# Patient Record
Sex: FEMALE | Race: WHITE | NOT HISPANIC OR LATINO | URBAN - METROPOLITAN AREA
[De-identification: names, ages, dates, MRNs, and addresses within clinical notes are randomized per-mention and may not be internally consistent; named-entity substitution may affect disease eponyms.]

---

## 2017-01-17 ENCOUNTER — COMMUNICATION - HEALTHEAST (OUTPATIENT)
Dept: FAMILY MEDICINE | Facility: CLINIC | Age: 82
End: 2017-01-17

## 2017-01-17 DIAGNOSIS — I10 UNSPECIFIED ESSENTIAL HYPERTENSION: ICD-10-CM

## 2017-02-01 ENCOUNTER — COMMUNICATION - HEALTHEAST (OUTPATIENT)
Dept: FAMILY MEDICINE | Facility: CLINIC | Age: 82
End: 2017-02-01

## 2017-02-27 ENCOUNTER — COMMUNICATION - HEALTHEAST (OUTPATIENT)
Dept: FAMILY MEDICINE | Facility: CLINIC | Age: 82
End: 2017-02-27

## 2017-02-27 DIAGNOSIS — E03.9 HYPOTHYROIDISM, UNSPECIFIED TYPE: ICD-10-CM

## 2017-03-01 ENCOUNTER — ANESTHESIA - HEALTHEAST (OUTPATIENT)
Dept: SURGERY | Facility: CLINIC | Age: 82
End: 2017-03-01

## 2017-03-01 ENCOUNTER — SURGERY - HEALTHEAST (OUTPATIENT)
Dept: SURGERY | Facility: CLINIC | Age: 82
End: 2017-03-01

## 2017-03-01 ASSESSMENT — MIFFLIN-ST. JEOR
SCORE: 1621.58
SCORE: 1601.62

## 2017-03-02 ASSESSMENT — MIFFLIN-ST. JEOR: SCORE: 1637.46

## 2017-03-03 ASSESSMENT — MIFFLIN-ST. JEOR: SCORE: 1655.6

## 2017-03-04 ASSESSMENT — MIFFLIN-ST. JEOR: SCORE: 1661.04

## 2017-03-05 ASSESSMENT — MIFFLIN-ST. JEOR
SCORE: 1748.59
SCORE: 1731.8

## 2017-03-06 ASSESSMENT — MIFFLIN-ST. JEOR: SCORE: 1729.54

## 2017-03-07 ENCOUNTER — OFFICE VISIT - HEALTHEAST (OUTPATIENT)
Dept: GERIATRICS | Facility: CLINIC | Age: 82
End: 2017-03-07

## 2017-03-07 ENCOUNTER — AMBULATORY - HEALTHEAST (OUTPATIENT)
Dept: GERIATRICS | Facility: CLINIC | Age: 82
End: 2017-03-07

## 2017-03-07 DIAGNOSIS — I25.10 ATHEROSCLEROSIS OF CORONARY ARTERY OF NATIVE HEART WITHOUT ANGINA PECTORIS, UNSPECIFIED VESSEL OR LESION TYPE: ICD-10-CM

## 2017-03-07 DIAGNOSIS — E66.01 MORBID OBESITY DUE TO EXCESS CALORIES (H): ICD-10-CM

## 2017-03-07 DIAGNOSIS — Z95.2 AORTIC VALVE REPLACED: ICD-10-CM

## 2017-03-07 DIAGNOSIS — N18.30 CKD (CHRONIC KIDNEY DISEASE), STAGE III (H): ICD-10-CM

## 2017-03-07 DIAGNOSIS — I48.0 PAROXYSMAL ATRIAL FIBRILLATION (H): ICD-10-CM

## 2017-03-07 DIAGNOSIS — Z98.890 S/P ORIF (OPEN REDUCTION INTERNAL FIXATION) FRACTURE: ICD-10-CM

## 2017-03-07 DIAGNOSIS — Z86.79 HISTORY OF AORTIC STENOSIS: ICD-10-CM

## 2017-03-07 DIAGNOSIS — Z95.0 CARDIAC PACEMAKER IN SITU: ICD-10-CM

## 2017-03-07 DIAGNOSIS — Z87.81 S/P ORIF (OPEN REDUCTION INTERNAL FIXATION) FRACTURE: ICD-10-CM

## 2017-03-07 DIAGNOSIS — I10 ESSENTIAL HYPERTENSION WITH GOAL BLOOD PRESSURE LESS THAN 140/90: ICD-10-CM

## 2017-03-07 DIAGNOSIS — I44.2 COMPLETE HEART BLOCK (H): ICD-10-CM

## 2017-03-07 DIAGNOSIS — S72.141D CLOSED INTERTROCHANTERIC FRACTURE OF HIP, RIGHT, WITH ROUTINE HEALING, SUBSEQUENT ENCOUNTER: ICD-10-CM

## 2017-03-07 DIAGNOSIS — J45.40 MODERATE PERSISTENT ASTHMA WITHOUT COMPLICATION: ICD-10-CM

## 2017-03-14 ENCOUNTER — OFFICE VISIT - HEALTHEAST (OUTPATIENT)
Dept: GERIATRICS | Facility: CLINIC | Age: 82
End: 2017-03-14

## 2017-03-14 DIAGNOSIS — N18.30 CKD (CHRONIC KIDNEY DISEASE), STAGE III (H): ICD-10-CM

## 2017-03-14 DIAGNOSIS — Z86.79 HISTORY OF AORTIC STENOSIS: ICD-10-CM

## 2017-03-14 DIAGNOSIS — Z87.81 S/P ORIF (OPEN REDUCTION INTERNAL FIXATION) FRACTURE: ICD-10-CM

## 2017-03-14 DIAGNOSIS — I10 ESSENTIAL HYPERTENSION WITH GOAL BLOOD PRESSURE LESS THAN 140/90: ICD-10-CM

## 2017-03-14 DIAGNOSIS — I44.2 COMPLETE HEART BLOCK (H): ICD-10-CM

## 2017-03-14 DIAGNOSIS — I48.0 PAROXYSMAL ATRIAL FIBRILLATION (H): ICD-10-CM

## 2017-03-14 DIAGNOSIS — Z95.2 AORTIC VALVE REPLACED: ICD-10-CM

## 2017-03-14 DIAGNOSIS — G25.81 RESTLESS LEGS SYNDROME (RLS): ICD-10-CM

## 2017-03-14 DIAGNOSIS — J45.40 MODERATE PERSISTENT ASTHMA WITHOUT COMPLICATION: ICD-10-CM

## 2017-03-14 DIAGNOSIS — E66.01 MORBID OBESITY DUE TO EXCESS CALORIES (H): ICD-10-CM

## 2017-03-14 DIAGNOSIS — Z95.0 CARDIAC PACEMAKER IN SITU: ICD-10-CM

## 2017-03-14 DIAGNOSIS — Z98.890 S/P ORIF (OPEN REDUCTION INTERNAL FIXATION) FRACTURE: ICD-10-CM

## 2017-03-14 DIAGNOSIS — E78.00 PURE HYPERCHOLESTEROLEMIA: ICD-10-CM

## 2017-03-14 DIAGNOSIS — S72.141D CLOSED INTERTROCHANTERIC FRACTURE OF HIP, RIGHT, WITH ROUTINE HEALING, SUBSEQUENT ENCOUNTER: ICD-10-CM

## 2017-03-14 DIAGNOSIS — E55.9 VITAMIN D DEFICIENCY: ICD-10-CM

## 2017-03-16 ENCOUNTER — OFFICE VISIT - HEALTHEAST (OUTPATIENT)
Dept: GERIATRICS | Facility: CLINIC | Age: 82
End: 2017-03-16

## 2017-03-16 ENCOUNTER — AMBULATORY - HEALTHEAST (OUTPATIENT)
Dept: CARDIOLOGY | Facility: CLINIC | Age: 82
End: 2017-03-16

## 2017-03-16 DIAGNOSIS — G25.81 RESTLESS LEGS SYNDROME (RLS): ICD-10-CM

## 2017-03-16 DIAGNOSIS — I48.0 PAROXYSMAL ATRIAL FIBRILLATION (H): ICD-10-CM

## 2017-03-16 DIAGNOSIS — Z98.890 S/P ORIF (OPEN REDUCTION INTERNAL FIXATION) FRACTURE: ICD-10-CM

## 2017-03-16 DIAGNOSIS — Z87.81 S/P ORIF (OPEN REDUCTION INTERNAL FIXATION) FRACTURE: ICD-10-CM

## 2017-03-16 DIAGNOSIS — Z95.0 PACEMAKER: ICD-10-CM

## 2017-03-16 DIAGNOSIS — R06.02 SHORTNESS OF BREATH: ICD-10-CM

## 2017-03-21 ENCOUNTER — OFFICE VISIT - HEALTHEAST (OUTPATIENT)
Dept: GERIATRICS | Facility: CLINIC | Age: 82
End: 2017-03-21

## 2017-03-21 DIAGNOSIS — I44.2 COMPLETE HEART BLOCK (H): ICD-10-CM

## 2017-03-21 DIAGNOSIS — Z87.81 S/P ORIF (OPEN REDUCTION INTERNAL FIXATION) FRACTURE: ICD-10-CM

## 2017-03-21 DIAGNOSIS — S72.141D CLOSED INTERTROCHANTERIC FRACTURE OF HIP, RIGHT, WITH ROUTINE HEALING, SUBSEQUENT ENCOUNTER: ICD-10-CM

## 2017-03-21 DIAGNOSIS — E66.01 MORBID OBESITY DUE TO EXCESS CALORIES (H): ICD-10-CM

## 2017-03-21 DIAGNOSIS — G25.81 RESTLESS LEGS SYNDROME (RLS): ICD-10-CM

## 2017-03-21 DIAGNOSIS — Z95.2 AORTIC VALVE REPLACED: ICD-10-CM

## 2017-03-21 DIAGNOSIS — I48.0 PAROXYSMAL ATRIAL FIBRILLATION (H): ICD-10-CM

## 2017-03-21 DIAGNOSIS — E55.9 VITAMIN D DEFICIENCY: ICD-10-CM

## 2017-03-21 DIAGNOSIS — E78.00 PURE HYPERCHOLESTEROLEMIA: ICD-10-CM

## 2017-03-21 DIAGNOSIS — J45.40 MODERATE PERSISTENT ASTHMA WITHOUT COMPLICATION: ICD-10-CM

## 2017-03-21 DIAGNOSIS — Z20.828 EXPOSURE TO INFLUENZA: ICD-10-CM

## 2017-03-21 DIAGNOSIS — Z95.0 CARDIAC PACEMAKER IN SITU: ICD-10-CM

## 2017-03-21 DIAGNOSIS — E87.79 OTHER HYPERVOLEMIA: ICD-10-CM

## 2017-03-21 DIAGNOSIS — I10 ESSENTIAL HYPERTENSION WITH GOAL BLOOD PRESSURE LESS THAN 140/90: ICD-10-CM

## 2017-03-21 DIAGNOSIS — Z98.890 S/P ORIF (OPEN REDUCTION INTERNAL FIXATION) FRACTURE: ICD-10-CM

## 2017-03-21 DIAGNOSIS — N18.30 CKD (CHRONIC KIDNEY DISEASE), STAGE III (H): ICD-10-CM

## 2017-03-21 DIAGNOSIS — Z86.79 HISTORY OF AORTIC STENOSIS: ICD-10-CM

## 2017-03-23 ENCOUNTER — OFFICE VISIT - HEALTHEAST (OUTPATIENT)
Dept: GERIATRICS | Facility: CLINIC | Age: 82
End: 2017-03-23

## 2017-03-23 DIAGNOSIS — I44.2 COMPLETE HEART BLOCK (H): ICD-10-CM

## 2017-03-23 DIAGNOSIS — Z95.0 CARDIAC PACEMAKER IN SITU: ICD-10-CM

## 2017-03-23 DIAGNOSIS — Z20.828 EXPOSURE TO INFLUENZA: ICD-10-CM

## 2017-03-23 DIAGNOSIS — S72.141D CLOSED INTERTROCHANTERIC FRACTURE OF HIP, RIGHT, WITH ROUTINE HEALING, SUBSEQUENT ENCOUNTER: ICD-10-CM

## 2017-03-23 DIAGNOSIS — Z87.81 S/P ORIF (OPEN REDUCTION INTERNAL FIXATION) FRACTURE: ICD-10-CM

## 2017-03-23 DIAGNOSIS — E66.01 MORBID OBESITY DUE TO EXCESS CALORIES (H): ICD-10-CM

## 2017-03-23 DIAGNOSIS — Z86.79 HISTORY OF AORTIC STENOSIS: ICD-10-CM

## 2017-03-23 DIAGNOSIS — I48.0 PAROXYSMAL ATRIAL FIBRILLATION (H): ICD-10-CM

## 2017-03-23 DIAGNOSIS — Z95.2 AORTIC VALVE REPLACED: ICD-10-CM

## 2017-03-23 DIAGNOSIS — J45.40 MODERATE PERSISTENT ASTHMA WITHOUT COMPLICATION: ICD-10-CM

## 2017-03-23 DIAGNOSIS — I10 ESSENTIAL HYPERTENSION WITH GOAL BLOOD PRESSURE LESS THAN 140/90: ICD-10-CM

## 2017-03-23 DIAGNOSIS — G25.81 RESTLESS LEGS SYNDROME (RLS): ICD-10-CM

## 2017-03-23 DIAGNOSIS — N18.30 CKD (CHRONIC KIDNEY DISEASE), STAGE III (H): ICD-10-CM

## 2017-03-23 DIAGNOSIS — E55.9 VITAMIN D DEFICIENCY: ICD-10-CM

## 2017-03-23 DIAGNOSIS — E78.00 PURE HYPERCHOLESTEROLEMIA: ICD-10-CM

## 2017-03-23 DIAGNOSIS — Z98.890 S/P ORIF (OPEN REDUCTION INTERNAL FIXATION) FRACTURE: ICD-10-CM

## 2017-03-26 ENCOUNTER — OFFICE VISIT - HEALTHEAST (OUTPATIENT)
Dept: GERIATRICS | Facility: CLINIC | Age: 82
End: 2017-03-26

## 2017-03-26 DIAGNOSIS — I10 ESSENTIAL HYPERTENSION WITH GOAL BLOOD PRESSURE LESS THAN 140/90: ICD-10-CM

## 2017-03-26 DIAGNOSIS — J45.909 ASTHMA: ICD-10-CM

## 2017-03-26 DIAGNOSIS — S72.141D CLOSED INTERTROCHANTERIC FRACTURE OF HIP, RIGHT, WITH ROUTINE HEALING, SUBSEQUENT ENCOUNTER: ICD-10-CM

## 2017-03-26 DIAGNOSIS — E66.01 MORBID OBESITY DUE TO EXCESS CALORIES (H): ICD-10-CM

## 2017-03-30 ENCOUNTER — OFFICE VISIT - HEALTHEAST (OUTPATIENT)
Dept: GERIATRICS | Facility: CLINIC | Age: 82
End: 2017-03-30

## 2017-03-30 DIAGNOSIS — E66.01 MORBID OBESITY DUE TO EXCESS CALORIES (H): ICD-10-CM

## 2017-03-30 DIAGNOSIS — Z87.81 S/P ORIF (OPEN REDUCTION INTERNAL FIXATION) FRACTURE: ICD-10-CM

## 2017-03-30 DIAGNOSIS — E87.79 OTHER HYPERVOLEMIA: ICD-10-CM

## 2017-03-30 DIAGNOSIS — Z86.79 HISTORY OF AORTIC STENOSIS: ICD-10-CM

## 2017-03-30 DIAGNOSIS — I48.0 PAROXYSMAL ATRIAL FIBRILLATION (H): ICD-10-CM

## 2017-03-30 DIAGNOSIS — B35.4 TINEA CORPORIS: ICD-10-CM

## 2017-03-30 DIAGNOSIS — I25.10 ATHEROSCLEROSIS OF CORONARY ARTERY OF NATIVE HEART WITHOUT ANGINA PECTORIS, UNSPECIFIED VESSEL OR LESION TYPE: ICD-10-CM

## 2017-03-30 DIAGNOSIS — Z95.0 CARDIAC PACEMAKER IN SITU: ICD-10-CM

## 2017-03-30 DIAGNOSIS — J45.40 MODERATE PERSISTENT ASTHMA WITHOUT COMPLICATION: ICD-10-CM

## 2017-03-30 DIAGNOSIS — Z95.2 AORTIC VALVE REPLACED: ICD-10-CM

## 2017-03-30 DIAGNOSIS — S72.141D CLOSED INTERTROCHANTERIC FRACTURE OF HIP, RIGHT, WITH ROUTINE HEALING, SUBSEQUENT ENCOUNTER: ICD-10-CM

## 2017-03-30 DIAGNOSIS — Z98.890 S/P ORIF (OPEN REDUCTION INTERNAL FIXATION) FRACTURE: ICD-10-CM

## 2017-03-30 DIAGNOSIS — E55.9 VITAMIN D DEFICIENCY: ICD-10-CM

## 2017-03-30 DIAGNOSIS — Z20.828 EXPOSURE TO INFLUENZA: ICD-10-CM

## 2017-03-30 DIAGNOSIS — E78.00 PURE HYPERCHOLESTEROLEMIA: ICD-10-CM

## 2017-03-30 DIAGNOSIS — I10 ESSENTIAL HYPERTENSION WITH GOAL BLOOD PRESSURE LESS THAN 140/90: ICD-10-CM

## 2017-03-30 DIAGNOSIS — I44.2 COMPLETE HEART BLOCK (H): ICD-10-CM

## 2017-03-30 DIAGNOSIS — G25.81 RESTLESS LEGS SYNDROME (RLS): ICD-10-CM

## 2017-03-30 DIAGNOSIS — N18.30 CKD (CHRONIC KIDNEY DISEASE), STAGE III (H): ICD-10-CM

## 2017-04-04 ENCOUNTER — OFFICE VISIT - HEALTHEAST (OUTPATIENT)
Dept: GERIATRICS | Facility: CLINIC | Age: 82
End: 2017-04-04

## 2017-04-04 DIAGNOSIS — Z87.81 S/P ORIF (OPEN REDUCTION INTERNAL FIXATION) FRACTURE: ICD-10-CM

## 2017-04-04 DIAGNOSIS — E87.70 FLUID OVERLOAD: ICD-10-CM

## 2017-04-04 DIAGNOSIS — S72.141D CLOSED INTERTROCHANTERIC FRACTURE OF HIP, RIGHT, WITH ROUTINE HEALING, SUBSEQUENT ENCOUNTER: ICD-10-CM

## 2017-04-04 DIAGNOSIS — Z95.2 AORTIC VALVE REPLACED: ICD-10-CM

## 2017-04-04 DIAGNOSIS — Z98.890 S/P ORIF (OPEN REDUCTION INTERNAL FIXATION) FRACTURE: ICD-10-CM

## 2017-04-04 DIAGNOSIS — J45.909 ASTHMA: ICD-10-CM

## 2017-04-04 DIAGNOSIS — J44.9 COPD (CHRONIC OBSTRUCTIVE PULMONARY DISEASE) (H): ICD-10-CM

## 2017-04-04 DIAGNOSIS — E66.01 MORBID OBESITY DUE TO EXCESS CALORIES (H): ICD-10-CM

## 2017-04-04 DIAGNOSIS — I48.0 PAROXYSMAL ATRIAL FIBRILLATION (H): ICD-10-CM

## 2017-04-04 DIAGNOSIS — I10 ESSENTIAL HYPERTENSION WITH GOAL BLOOD PRESSURE LESS THAN 140/90: ICD-10-CM

## 2017-04-04 DIAGNOSIS — I25.10 ATHEROSCLEROSIS OF CORONARY ARTERY OF NATIVE HEART WITHOUT ANGINA PECTORIS, UNSPECIFIED VESSEL OR LESION TYPE: ICD-10-CM

## 2017-04-04 DIAGNOSIS — N18.30 CKD (CHRONIC KIDNEY DISEASE), STAGE III (H): ICD-10-CM

## 2017-04-04 DIAGNOSIS — Z20.828 EXPOSURE TO INFLUENZA: ICD-10-CM

## 2017-04-04 DIAGNOSIS — Z95.0 CARDIAC PACEMAKER IN SITU: ICD-10-CM

## 2017-04-06 ENCOUNTER — OFFICE VISIT - HEALTHEAST (OUTPATIENT)
Dept: GERIATRICS | Facility: CLINIC | Age: 82
End: 2017-04-06

## 2017-04-06 DIAGNOSIS — G25.81 RESTLESS LEGS SYNDROME (RLS): ICD-10-CM

## 2017-04-06 DIAGNOSIS — Z87.81 S/P ORIF (OPEN REDUCTION INTERNAL FIXATION) FRACTURE: ICD-10-CM

## 2017-04-06 DIAGNOSIS — Z95.0 CARDIAC PACEMAKER IN SITU: ICD-10-CM

## 2017-04-06 DIAGNOSIS — Z95.2 AORTIC VALVE REPLACED: ICD-10-CM

## 2017-04-06 DIAGNOSIS — I10 ESSENTIAL HYPERTENSION WITH GOAL BLOOD PRESSURE LESS THAN 140/90: ICD-10-CM

## 2017-04-06 DIAGNOSIS — E66.01 MORBID OBESITY DUE TO EXCESS CALORIES (H): ICD-10-CM

## 2017-04-06 DIAGNOSIS — Z98.890 S/P ORIF (OPEN REDUCTION INTERNAL FIXATION) FRACTURE: ICD-10-CM

## 2017-04-06 DIAGNOSIS — E55.9 VITAMIN D DEFICIENCY: ICD-10-CM

## 2017-04-06 DIAGNOSIS — I25.10 ATHEROSCLEROSIS OF CORONARY ARTERY OF NATIVE HEART WITHOUT ANGINA PECTORIS, UNSPECIFIED VESSEL OR LESION TYPE: ICD-10-CM

## 2017-04-06 DIAGNOSIS — Z86.79 HISTORY OF AORTIC STENOSIS: ICD-10-CM

## 2017-04-06 DIAGNOSIS — B35.4 TINEA CORPORIS: ICD-10-CM

## 2017-04-06 DIAGNOSIS — N18.30 CKD (CHRONIC KIDNEY DISEASE), STAGE III (H): ICD-10-CM

## 2017-04-06 DIAGNOSIS — I44.2 COMPLETE HEART BLOCK (H): ICD-10-CM

## 2017-04-06 DIAGNOSIS — I48.0 PAROXYSMAL ATRIAL FIBRILLATION (H): ICD-10-CM

## 2017-04-06 DIAGNOSIS — S72.141D CLOSED INTERTROCHANTERIC FRACTURE OF HIP, RIGHT, WITH ROUTINE HEALING, SUBSEQUENT ENCOUNTER: ICD-10-CM

## 2017-04-11 ENCOUNTER — OFFICE VISIT - HEALTHEAST (OUTPATIENT)
Dept: GERIATRICS | Facility: CLINIC | Age: 82
End: 2017-04-11

## 2017-04-11 DIAGNOSIS — S72.141D CLOSED INTERTROCHANTERIC FRACTURE OF HIP, RIGHT, WITH ROUTINE HEALING, SUBSEQUENT ENCOUNTER: ICD-10-CM

## 2017-04-11 DIAGNOSIS — I25.10 ATHEROSCLEROSIS OF CORONARY ARTERY OF NATIVE HEART WITHOUT ANGINA PECTORIS, UNSPECIFIED VESSEL OR LESION TYPE: ICD-10-CM

## 2017-04-11 DIAGNOSIS — Z86.79 HISTORY OF AORTIC STENOSIS: ICD-10-CM

## 2017-04-11 DIAGNOSIS — I48.0 PAROXYSMAL ATRIAL FIBRILLATION (H): ICD-10-CM

## 2017-04-11 DIAGNOSIS — Z87.81 S/P ORIF (OPEN REDUCTION INTERNAL FIXATION) FRACTURE: ICD-10-CM

## 2017-04-11 DIAGNOSIS — I44.2 COMPLETE HEART BLOCK (H): ICD-10-CM

## 2017-04-11 DIAGNOSIS — B35.4 TINEA CORPORIS: ICD-10-CM

## 2017-04-11 DIAGNOSIS — E55.9 VITAMIN D DEFICIENCY: ICD-10-CM

## 2017-04-11 DIAGNOSIS — G25.81 RESTLESS LEGS SYNDROME (RLS): ICD-10-CM

## 2017-04-11 DIAGNOSIS — E66.01 MORBID OBESITY DUE TO EXCESS CALORIES (H): ICD-10-CM

## 2017-04-11 DIAGNOSIS — Z95.0 CARDIAC PACEMAKER IN SITU: ICD-10-CM

## 2017-04-11 DIAGNOSIS — Z98.890 S/P ORIF (OPEN REDUCTION INTERNAL FIXATION) FRACTURE: ICD-10-CM

## 2017-04-11 DIAGNOSIS — Z95.2 AORTIC VALVE REPLACED: ICD-10-CM

## 2017-04-11 DIAGNOSIS — I10 ESSENTIAL HYPERTENSION WITH GOAL BLOOD PRESSURE LESS THAN 140/90: ICD-10-CM

## 2017-04-11 DIAGNOSIS — N18.30 CKD (CHRONIC KIDNEY DISEASE), STAGE III (H): ICD-10-CM

## 2017-04-13 ENCOUNTER — OFFICE VISIT - HEALTHEAST (OUTPATIENT)
Dept: GERIATRICS | Facility: CLINIC | Age: 82
End: 2017-04-13

## 2017-04-13 DIAGNOSIS — E66.01 MORBID OBESITY DUE TO EXCESS CALORIES (H): ICD-10-CM

## 2017-04-13 DIAGNOSIS — J45.909 ASTHMA: ICD-10-CM

## 2017-04-13 DIAGNOSIS — I48.0 PAROXYSMAL ATRIAL FIBRILLATION (H): ICD-10-CM

## 2017-04-13 DIAGNOSIS — E87.70 FLUID OVERLOAD: ICD-10-CM

## 2017-04-13 DIAGNOSIS — J44.9 COPD (CHRONIC OBSTRUCTIVE PULMONARY DISEASE) (H): ICD-10-CM

## 2017-04-13 DIAGNOSIS — Z95.2 AORTIC VALVE REPLACED: ICD-10-CM

## 2017-04-13 DIAGNOSIS — N18.30 CKD (CHRONIC KIDNEY DISEASE), STAGE III (H): ICD-10-CM

## 2017-04-13 DIAGNOSIS — S72.141D CLOSED INTERTROCHANTERIC FRACTURE OF HIP, RIGHT, WITH ROUTINE HEALING, SUBSEQUENT ENCOUNTER: ICD-10-CM

## 2017-04-13 DIAGNOSIS — Z98.890 S/P ORIF (OPEN REDUCTION INTERNAL FIXATION) FRACTURE: ICD-10-CM

## 2017-04-13 DIAGNOSIS — I10 ESSENTIAL HYPERTENSION WITH GOAL BLOOD PRESSURE LESS THAN 140/90: ICD-10-CM

## 2017-04-13 DIAGNOSIS — Z87.81 S/P ORIF (OPEN REDUCTION INTERNAL FIXATION) FRACTURE: ICD-10-CM

## 2017-04-18 ENCOUNTER — OFFICE VISIT - HEALTHEAST (OUTPATIENT)
Dept: GERIATRICS | Facility: CLINIC | Age: 82
End: 2017-04-18

## 2017-04-18 DIAGNOSIS — Z95.0 CARDIAC PACEMAKER IN SITU: ICD-10-CM

## 2017-04-18 DIAGNOSIS — N18.30 CKD (CHRONIC KIDNEY DISEASE), STAGE III (H): ICD-10-CM

## 2017-04-18 DIAGNOSIS — E66.01 MORBID OBESITY DUE TO EXCESS CALORIES (H): ICD-10-CM

## 2017-04-18 DIAGNOSIS — I10 ESSENTIAL HYPERTENSION WITH GOAL BLOOD PRESSURE LESS THAN 140/90: ICD-10-CM

## 2017-04-18 DIAGNOSIS — E55.9 VITAMIN D DEFICIENCY: ICD-10-CM

## 2017-04-18 DIAGNOSIS — Z87.81 S/P ORIF (OPEN REDUCTION INTERNAL FIXATION) FRACTURE: ICD-10-CM

## 2017-04-18 DIAGNOSIS — Z95.2 AORTIC VALVE REPLACED: ICD-10-CM

## 2017-04-18 DIAGNOSIS — I48.0 PAROXYSMAL ATRIAL FIBRILLATION (H): ICD-10-CM

## 2017-04-18 DIAGNOSIS — Z86.79 HISTORY OF AORTIC STENOSIS: ICD-10-CM

## 2017-04-18 DIAGNOSIS — I25.10 ATHEROSCLEROSIS OF CORONARY ARTERY OF NATIVE HEART WITHOUT ANGINA PECTORIS, UNSPECIFIED VESSEL OR LESION TYPE: ICD-10-CM

## 2017-04-18 DIAGNOSIS — Z98.890 S/P ORIF (OPEN REDUCTION INTERNAL FIXATION) FRACTURE: ICD-10-CM

## 2017-04-18 DIAGNOSIS — G25.81 RESTLESS LEGS SYNDROME (RLS): ICD-10-CM

## 2017-04-18 DIAGNOSIS — B35.4 TINEA CORPORIS: ICD-10-CM

## 2017-04-18 DIAGNOSIS — S72.141D CLOSED INTERTROCHANTERIC FRACTURE OF HIP, RIGHT, WITH ROUTINE HEALING, SUBSEQUENT ENCOUNTER: ICD-10-CM

## 2017-04-18 DIAGNOSIS — I44.2 COMPLETE HEART BLOCK (H): ICD-10-CM

## 2017-04-20 ENCOUNTER — OFFICE VISIT - HEALTHEAST (OUTPATIENT)
Dept: GERIATRICS | Facility: CLINIC | Age: 82
End: 2017-04-20

## 2017-04-20 DIAGNOSIS — S72.141D CLOSED INTERTROCHANTERIC FRACTURE OF HIP, RIGHT, WITH ROUTINE HEALING, SUBSEQUENT ENCOUNTER: ICD-10-CM

## 2017-04-20 DIAGNOSIS — J44.9 COPD (CHRONIC OBSTRUCTIVE PULMONARY DISEASE) (H): ICD-10-CM

## 2017-04-20 DIAGNOSIS — J45.909 ASTHMA: ICD-10-CM

## 2017-04-20 DIAGNOSIS — E66.01 MORBID OBESITY DUE TO EXCESS CALORIES (H): ICD-10-CM

## 2017-04-20 DIAGNOSIS — I48.0 PAROXYSMAL ATRIAL FIBRILLATION (H): ICD-10-CM

## 2017-04-20 DIAGNOSIS — Z98.890 S/P ORIF (OPEN REDUCTION INTERNAL FIXATION) FRACTURE: ICD-10-CM

## 2017-04-20 DIAGNOSIS — Z87.81 S/P ORIF (OPEN REDUCTION INTERNAL FIXATION) FRACTURE: ICD-10-CM

## 2017-04-20 DIAGNOSIS — E87.70 FLUID OVERLOAD: ICD-10-CM

## 2017-04-24 ENCOUNTER — RECORDS - HEALTHEAST (OUTPATIENT)
Dept: ADMINISTRATIVE | Facility: OTHER | Age: 82
End: 2017-04-24

## 2017-04-25 ENCOUNTER — AMBULATORY - HEALTHEAST (OUTPATIENT)
Dept: GERIATRICS | Facility: CLINIC | Age: 82
End: 2017-04-25

## 2017-04-25 ENCOUNTER — OFFICE VISIT - HEALTHEAST (OUTPATIENT)
Dept: GERIATRICS | Facility: CLINIC | Age: 82
End: 2017-04-25

## 2017-04-25 DIAGNOSIS — Z87.81 S/P ORIF (OPEN REDUCTION INTERNAL FIXATION) FRACTURE: ICD-10-CM

## 2017-04-25 DIAGNOSIS — E55.9 VITAMIN D DEFICIENCY: ICD-10-CM

## 2017-04-25 DIAGNOSIS — B35.4 TINEA CORPORIS: ICD-10-CM

## 2017-04-25 DIAGNOSIS — I44.2 COMPLETE HEART BLOCK (H): ICD-10-CM

## 2017-04-25 DIAGNOSIS — I48.0 PAROXYSMAL ATRIAL FIBRILLATION (H): ICD-10-CM

## 2017-04-25 DIAGNOSIS — E66.01 MORBID OBESITY DUE TO EXCESS CALORIES (H): ICD-10-CM

## 2017-04-25 DIAGNOSIS — Z98.890 S/P ORIF (OPEN REDUCTION INTERNAL FIXATION) FRACTURE: ICD-10-CM

## 2017-04-25 DIAGNOSIS — Z95.0 CARDIAC PACEMAKER IN SITU: ICD-10-CM

## 2017-04-25 DIAGNOSIS — I25.10 ATHEROSCLEROSIS OF CORONARY ARTERY OF NATIVE HEART WITHOUT ANGINA PECTORIS, UNSPECIFIED VESSEL OR LESION TYPE: ICD-10-CM

## 2017-04-25 DIAGNOSIS — N18.30 CKD (CHRONIC KIDNEY DISEASE), STAGE III (H): ICD-10-CM

## 2017-04-25 DIAGNOSIS — S72.141D CLOSED INTERTROCHANTERIC FRACTURE OF HIP, RIGHT, WITH ROUTINE HEALING, SUBSEQUENT ENCOUNTER: ICD-10-CM

## 2017-04-25 DIAGNOSIS — Z95.3 H/O HEART VALVE REPLACEMENT WITH BIOPROSTHETIC VALVE: ICD-10-CM

## 2017-04-25 DIAGNOSIS — I10 ESSENTIAL HYPERTENSION WITH GOAL BLOOD PRESSURE LESS THAN 140/90: ICD-10-CM

## 2017-04-25 DIAGNOSIS — Z86.79 HISTORY OF AORTIC STENOSIS: ICD-10-CM

## 2017-05-08 ENCOUNTER — AMBULATORY - HEALTHEAST (OUTPATIENT)
Dept: GERIATRICS | Facility: CLINIC | Age: 82
End: 2017-05-08

## 2017-05-09 ENCOUNTER — COMMUNICATION - HEALTHEAST (OUTPATIENT)
Dept: FAMILY MEDICINE | Facility: CLINIC | Age: 82
End: 2017-05-09

## 2017-05-09 ENCOUNTER — COMMUNICATION - HEALTHEAST (OUTPATIENT)
Dept: GERIATRICS | Facility: CLINIC | Age: 82
End: 2017-05-09

## 2017-05-10 ENCOUNTER — COMMUNICATION - HEALTHEAST (OUTPATIENT)
Dept: FAMILY MEDICINE | Facility: CLINIC | Age: 82
End: 2017-05-10

## 2017-05-16 ENCOUNTER — COMMUNICATION - HEALTHEAST (OUTPATIENT)
Dept: FAMILY MEDICINE | Facility: CLINIC | Age: 82
End: 2017-05-16

## 2017-05-19 ENCOUNTER — COMMUNICATION - HEALTHEAST (OUTPATIENT)
Dept: FAMILY MEDICINE | Facility: CLINIC | Age: 82
End: 2017-05-19

## 2017-05-30 ENCOUNTER — COMMUNICATION - HEALTHEAST (OUTPATIENT)
Dept: FAMILY MEDICINE | Facility: CLINIC | Age: 82
End: 2017-05-30

## 2017-06-01 ENCOUNTER — OFFICE VISIT - HEALTHEAST (OUTPATIENT)
Dept: FAMILY MEDICINE | Facility: CLINIC | Age: 82
End: 2017-06-01

## 2017-06-01 DIAGNOSIS — D64.9 NORMOCHROMIC NORMOCYTIC ANEMIA: ICD-10-CM

## 2017-06-01 DIAGNOSIS — E03.9 HYPOTHYROIDISM: ICD-10-CM

## 2017-06-01 DIAGNOSIS — L30.4 INTERTRIGO: ICD-10-CM

## 2017-06-01 DIAGNOSIS — Z95.3 H/O HEART VALVE REPLACEMENT WITH BIOPROSTHETIC VALVE: ICD-10-CM

## 2017-06-01 DIAGNOSIS — E11.8 TYPE 2 DIABETES MELLITUS WITH COMPLICATION, WITHOUT LONG-TERM CURRENT USE OF INSULIN (H): ICD-10-CM

## 2017-06-01 DIAGNOSIS — J45.40 MODERATE PERSISTENT ASTHMA WITHOUT COMPLICATION: ICD-10-CM

## 2017-06-01 DIAGNOSIS — E55.9 VITAMIN D DEFICIENCY: ICD-10-CM

## 2017-06-01 DIAGNOSIS — I10 ESSENTIAL HYPERTENSION WITH GOAL BLOOD PRESSURE LESS THAN 140/90: ICD-10-CM

## 2017-06-01 DIAGNOSIS — I48.0 PAROXYSMAL ATRIAL FIBRILLATION (H): ICD-10-CM

## 2017-06-01 DIAGNOSIS — E21.3 HYPERPARATHYROIDISM, UNSPECIFIED (H): ICD-10-CM

## 2017-06-01 DIAGNOSIS — E11.9 TYPE 2 DIABETES MELLITUS (H): ICD-10-CM

## 2017-06-01 DIAGNOSIS — N18.4 CHRONIC KIDNEY DISEASE (CKD), STAGE 4 (SEVERE): ICD-10-CM

## 2017-06-01 DIAGNOSIS — R60.9 EDEMA: ICD-10-CM

## 2017-06-01 LAB — HBA1C MFR BLD: 7.3 % (ref 3.5–6)

## 2017-06-05 ENCOUNTER — COMMUNICATION - HEALTHEAST (OUTPATIENT)
Dept: FAMILY MEDICINE | Facility: CLINIC | Age: 82
End: 2017-06-05

## 2017-06-08 ENCOUNTER — OFFICE VISIT - HEALTHEAST (OUTPATIENT)
Dept: FAMILY MEDICINE | Facility: CLINIC | Age: 82
End: 2017-06-08

## 2017-06-08 DIAGNOSIS — R60.9 EDEMA: ICD-10-CM

## 2017-06-08 DIAGNOSIS — E03.9 HYPOTHYROIDISM, UNSPECIFIED TYPE: ICD-10-CM

## 2017-06-08 DIAGNOSIS — N18.30 CHRONIC KIDNEY DISEASE, STAGE III (MODERATE) (H): ICD-10-CM

## 2017-06-08 DIAGNOSIS — I25.10 ATHEROSCLEROSIS OF CORONARY ARTERY OF NATIVE HEART WITHOUT ANGINA PECTORIS, UNSPECIFIED VESSEL OR LESION TYPE: ICD-10-CM

## 2017-06-08 DIAGNOSIS — I10 ESSENTIAL HYPERTENSION WITH GOAL BLOOD PRESSURE LESS THAN 140/90: ICD-10-CM

## 2017-06-09 ENCOUNTER — COMMUNICATION - HEALTHEAST (OUTPATIENT)
Dept: FAMILY MEDICINE | Facility: CLINIC | Age: 82
End: 2017-06-09

## 2017-06-20 ENCOUNTER — COMMUNICATION - HEALTHEAST (OUTPATIENT)
Dept: FAMILY MEDICINE | Facility: CLINIC | Age: 82
End: 2017-06-20

## 2017-06-21 ENCOUNTER — OFFICE VISIT - HEALTHEAST (OUTPATIENT)
Dept: FAMILY MEDICINE | Facility: CLINIC | Age: 82
End: 2017-06-21

## 2017-06-21 DIAGNOSIS — N18.30 CHRONIC KIDNEY DISEASE, STAGE III (MODERATE) (H): ICD-10-CM

## 2017-06-21 DIAGNOSIS — E78.5 HYPERLIPIDEMIA: ICD-10-CM

## 2017-06-21 DIAGNOSIS — I10 ESSENTIAL HYPERTENSION WITH GOAL BLOOD PRESSURE LESS THAN 140/90: ICD-10-CM

## 2017-06-21 DIAGNOSIS — R60.9 EDEMA, UNSPECIFIED TYPE: ICD-10-CM

## 2017-06-22 ENCOUNTER — COMMUNICATION - HEALTHEAST (OUTPATIENT)
Dept: FAMILY MEDICINE | Facility: CLINIC | Age: 82
End: 2017-06-22

## 2017-06-23 ENCOUNTER — COMMUNICATION - HEALTHEAST (OUTPATIENT)
Dept: FAMILY MEDICINE | Facility: CLINIC | Age: 82
End: 2017-06-23

## 2017-06-27 ENCOUNTER — COMMUNICATION - HEALTHEAST (OUTPATIENT)
Dept: FAMILY MEDICINE | Facility: CLINIC | Age: 82
End: 2017-06-27

## 2017-07-07 ENCOUNTER — COMMUNICATION - HEALTHEAST (OUTPATIENT)
Dept: SCHEDULING | Facility: CLINIC | Age: 82
End: 2017-07-07

## 2017-07-11 ENCOUNTER — OFFICE VISIT - HEALTHEAST (OUTPATIENT)
Dept: FAMILY MEDICINE | Facility: CLINIC | Age: 82
End: 2017-07-11

## 2017-07-11 DIAGNOSIS — N18.4 CHRONIC KIDNEY DISEASE (CKD), STAGE 4 (SEVERE): ICD-10-CM

## 2017-07-11 DIAGNOSIS — R60.9 EDEMA, UNSPECIFIED TYPE: ICD-10-CM

## 2017-07-11 DIAGNOSIS — I95.9 HYPOTENSION: ICD-10-CM

## 2017-07-11 DIAGNOSIS — I10 ESSENTIAL HYPERTENSION: ICD-10-CM

## 2017-07-11 DIAGNOSIS — D64.9 NORMOCHROMIC NORMOCYTIC ANEMIA: ICD-10-CM

## 2017-07-12 ENCOUNTER — COMMUNICATION - HEALTHEAST (OUTPATIENT)
Dept: FAMILY MEDICINE | Facility: CLINIC | Age: 82
End: 2017-07-12

## 2017-07-12 ENCOUNTER — AMBULATORY - HEALTHEAST (OUTPATIENT)
Dept: FAMILY MEDICINE | Facility: CLINIC | Age: 82
End: 2017-07-12

## 2017-07-12 DIAGNOSIS — E87.6 HYPOKALEMIA: ICD-10-CM

## 2017-07-14 ENCOUNTER — COMMUNICATION - HEALTHEAST (OUTPATIENT)
Dept: FAMILY MEDICINE | Facility: CLINIC | Age: 82
End: 2017-07-14

## 2017-07-17 ENCOUNTER — COMMUNICATION - HEALTHEAST (OUTPATIENT)
Dept: FAMILY MEDICINE | Facility: CLINIC | Age: 82
End: 2017-07-17

## 2017-07-18 ENCOUNTER — OFFICE VISIT - HEALTHEAST (OUTPATIENT)
Dept: FAMILY MEDICINE | Facility: CLINIC | Age: 82
End: 2017-07-18

## 2017-07-18 ENCOUNTER — COMMUNICATION - HEALTHEAST (OUTPATIENT)
Dept: NURSING | Facility: CLINIC | Age: 82
End: 2017-07-18

## 2017-07-18 DIAGNOSIS — R29.898 LEG WEAKNESS, BILATERAL: ICD-10-CM

## 2017-07-18 DIAGNOSIS — R60.0 LOCALIZED EDEMA: ICD-10-CM

## 2017-07-18 DIAGNOSIS — S81.801A LEG WOUND, RIGHT: ICD-10-CM

## 2017-07-18 DIAGNOSIS — L03.115 CELLULITIS OF RIGHT LOWER EXTREMITY: ICD-10-CM

## 2017-07-19 ENCOUNTER — AMBULATORY - HEALTHEAST (OUTPATIENT)
Dept: CARE COORDINATION | Facility: CLINIC | Age: 82
End: 2017-07-19

## 2017-07-20 ENCOUNTER — AMBULATORY - HEALTHEAST (OUTPATIENT)
Dept: FAMILY MEDICINE | Facility: CLINIC | Age: 82
End: 2017-07-20

## 2017-07-20 DIAGNOSIS — S81.809A MULTIPLE OPEN WOUNDS OF LOWER LEG: ICD-10-CM

## 2017-07-24 ENCOUNTER — COMMUNICATION - HEALTHEAST (OUTPATIENT)
Dept: FAMILY MEDICINE | Facility: CLINIC | Age: 82
End: 2017-07-24

## 2017-07-25 ENCOUNTER — OFFICE VISIT - HEALTHEAST (OUTPATIENT)
Dept: FAMILY MEDICINE | Facility: CLINIC | Age: 82
End: 2017-07-25

## 2017-07-25 ENCOUNTER — COMMUNICATION - HEALTHEAST (OUTPATIENT)
Dept: FAMILY MEDICINE | Facility: CLINIC | Age: 82
End: 2017-07-25

## 2017-07-25 ENCOUNTER — COMMUNICATION - HEALTHEAST (OUTPATIENT)
Dept: CARE COORDINATION | Facility: CLINIC | Age: 82
End: 2017-07-25

## 2017-07-25 ENCOUNTER — AMBULATORY - HEALTHEAST (OUTPATIENT)
Dept: FAMILY MEDICINE | Facility: CLINIC | Age: 82
End: 2017-07-25

## 2017-07-25 DIAGNOSIS — M54.2 NECK PAIN: ICD-10-CM

## 2017-07-25 DIAGNOSIS — E87.6 HYPOKALEMIA: ICD-10-CM

## 2017-07-25 DIAGNOSIS — S81.801D LEG WOUND, RIGHT, SUBSEQUENT ENCOUNTER: ICD-10-CM

## 2017-07-25 DIAGNOSIS — N18.4 CHRONIC KIDNEY DISEASE (CKD), STAGE 4 (SEVERE): ICD-10-CM

## 2017-07-25 DIAGNOSIS — R60.9 EDEMA, UNSPECIFIED TYPE: ICD-10-CM

## 2017-07-26 ENCOUNTER — COMMUNICATION - HEALTHEAST (OUTPATIENT)
Dept: FAMILY MEDICINE | Facility: CLINIC | Age: 82
End: 2017-07-26

## 2017-07-28 ENCOUNTER — COMMUNICATION - HEALTHEAST (OUTPATIENT)
Dept: OBGYN | Facility: HOSPITAL | Age: 82
End: 2017-07-28

## 2017-07-31 ENCOUNTER — RECORDS - HEALTHEAST (OUTPATIENT)
Dept: ADMINISTRATIVE | Facility: OTHER | Age: 82
End: 2017-07-31

## 2017-08-10 ENCOUNTER — AMBULATORY - HEALTHEAST (OUTPATIENT)
Dept: CARDIOLOGY | Facility: CLINIC | Age: 82
End: 2017-08-10

## 2017-08-10 DIAGNOSIS — Z95.0 PACEMAKER: ICD-10-CM

## 2017-08-10 LAB — HCC DEVICE COMMENTS: NORMAL

## 2017-08-11 ENCOUNTER — COMMUNICATION - HEALTHEAST (OUTPATIENT)
Dept: FAMILY MEDICINE | Facility: CLINIC | Age: 82
End: 2017-08-11

## 2017-08-17 ENCOUNTER — COMMUNICATION - HEALTHEAST (OUTPATIENT)
Dept: NURSING | Facility: CLINIC | Age: 82
End: 2017-08-17

## 2017-08-20 ENCOUNTER — RECORDS - HEALTHEAST (OUTPATIENT)
Dept: ADMINISTRATIVE | Facility: OTHER | Age: 82
End: 2017-08-20

## 2017-08-21 ENCOUNTER — COMMUNICATION - HEALTHEAST (OUTPATIENT)
Dept: FAMILY MEDICINE | Facility: CLINIC | Age: 82
End: 2017-08-21

## 2017-08-21 DIAGNOSIS — K31.89 GASTRIC ACIDITY: ICD-10-CM

## 2017-08-21 DIAGNOSIS — E03.9 HYPOTHYROIDISM, UNSPECIFIED TYPE: ICD-10-CM

## 2017-08-24 ENCOUNTER — OFFICE VISIT - HEALTHEAST (OUTPATIENT)
Dept: FAMILY MEDICINE | Facility: CLINIC | Age: 82
End: 2017-08-24

## 2017-08-24 ENCOUNTER — COMMUNICATION - HEALTHEAST (OUTPATIENT)
Dept: NURSING | Facility: CLINIC | Age: 82
End: 2017-08-24

## 2017-08-24 DIAGNOSIS — E87.6 HYPOKALEMIA: ICD-10-CM

## 2017-08-24 DIAGNOSIS — N18.30 CKD (CHRONIC KIDNEY DISEASE), STAGE III (H): ICD-10-CM

## 2017-08-24 DIAGNOSIS — I10 ESSENTIAL HYPERTENSION WITH GOAL BLOOD PRESSURE LESS THAN 140/90: ICD-10-CM

## 2017-08-24 DIAGNOSIS — I25.10 ATHEROSCLEROSIS OF CORONARY ARTERY OF NATIVE HEART WITHOUT ANGINA PECTORIS, UNSPECIFIED VESSEL OR LESION TYPE: ICD-10-CM

## 2017-08-24 DIAGNOSIS — R60.9 EDEMA, UNSPECIFIED TYPE: ICD-10-CM

## 2017-08-24 DIAGNOSIS — S81.801D LEG WOUND, RIGHT, SUBSEQUENT ENCOUNTER: ICD-10-CM

## 2017-08-24 DIAGNOSIS — N18.4 CHRONIC KIDNEY DISEASE (CKD), STAGE 4 (SEVERE): ICD-10-CM

## 2017-08-24 ASSESSMENT — MIFFLIN-ST. JEOR: SCORE: 1556.55

## 2017-08-25 ENCOUNTER — COMMUNICATION - HEALTHEAST (OUTPATIENT)
Dept: FAMILY MEDICINE | Facility: CLINIC | Age: 82
End: 2017-08-25

## 2017-09-05 ENCOUNTER — COMMUNICATION - HEALTHEAST (OUTPATIENT)
Dept: NURSING | Facility: CLINIC | Age: 82
End: 2017-09-05

## 2017-09-05 DIAGNOSIS — R60.0 PERIPHERAL EDEMA: ICD-10-CM

## 2017-09-05 DIAGNOSIS — R27.0 ATAXIA: ICD-10-CM

## 2017-09-11 ENCOUNTER — COMMUNICATION - HEALTHEAST (OUTPATIENT)
Dept: NURSING | Facility: CLINIC | Age: 82
End: 2017-09-11

## 2017-09-12 ENCOUNTER — AMBULATORY - HEALTHEAST (OUTPATIENT)
Dept: CARDIOLOGY | Facility: CLINIC | Age: 82
End: 2017-09-12

## 2017-09-12 DIAGNOSIS — Z95.0 CARDIAC PACEMAKER IN SITU: ICD-10-CM

## 2017-09-12 LAB — HCC DEVICE COMMENTS: NORMAL

## 2017-09-12 ASSESSMENT — MIFFLIN-ST. JEOR: SCORE: 1615.23

## 2017-09-20 ENCOUNTER — COMMUNICATION - HEALTHEAST (OUTPATIENT)
Dept: FAMILY MEDICINE | Facility: CLINIC | Age: 82
End: 2017-09-20

## 2017-09-21 ENCOUNTER — COMMUNICATION - HEALTHEAST (OUTPATIENT)
Dept: NURSING | Facility: CLINIC | Age: 82
End: 2017-09-21

## 2017-09-28 ENCOUNTER — COMMUNICATION - HEALTHEAST (OUTPATIENT)
Dept: NURSING | Facility: CLINIC | Age: 82
End: 2017-09-28

## 2017-09-29 ENCOUNTER — COMMUNICATION - HEALTHEAST (OUTPATIENT)
Dept: NURSING | Facility: CLINIC | Age: 82
End: 2017-09-29

## 2017-10-04 ENCOUNTER — OFFICE VISIT - HEALTHEAST (OUTPATIENT)
Dept: FAMILY MEDICINE | Facility: CLINIC | Age: 82
End: 2017-10-04

## 2017-10-04 DIAGNOSIS — Z23 NEED FOR IMMUNIZATION AGAINST INFLUENZA: ICD-10-CM

## 2017-10-04 DIAGNOSIS — J45.40 MODERATE PERSISTENT ASTHMA: ICD-10-CM

## 2017-10-04 DIAGNOSIS — D64.9 NORMOCHROMIC NORMOCYTIC ANEMIA: ICD-10-CM

## 2017-10-04 DIAGNOSIS — E11.9 TYPE 2 DIABETES MELLITUS (H): ICD-10-CM

## 2017-10-04 DIAGNOSIS — N18.30 CHRONIC KIDNEY DISEASE, STAGE III (MODERATE) (H): ICD-10-CM

## 2017-10-04 DIAGNOSIS — R60.9 EDEMA, UNSPECIFIED TYPE: ICD-10-CM

## 2017-10-04 DIAGNOSIS — I10 ESSENTIAL HYPERTENSION WITH GOAL BLOOD PRESSURE LESS THAN 140/90: ICD-10-CM

## 2017-10-04 LAB — HBA1C MFR BLD: 7.2 % (ref 3.5–6)

## 2017-10-05 ENCOUNTER — COMMUNICATION - HEALTHEAST (OUTPATIENT)
Dept: FAMILY MEDICINE | Facility: CLINIC | Age: 82
End: 2017-10-05

## 2017-10-12 ENCOUNTER — AMBULATORY - HEALTHEAST (OUTPATIENT)
Dept: CARDIOLOGY | Facility: CLINIC | Age: 82
End: 2017-10-12

## 2017-10-12 DIAGNOSIS — Z95.0 CARDIAC PACEMAKER IN SITU: ICD-10-CM

## 2017-10-12 LAB — HCC DEVICE COMMENTS: NORMAL

## 2017-11-16 ENCOUNTER — AMBULATORY - HEALTHEAST (OUTPATIENT)
Dept: CARDIOLOGY | Facility: CLINIC | Age: 82
End: 2017-11-16

## 2017-11-16 DIAGNOSIS — Z95.0 CARDIAC PACEMAKER IN SITU: ICD-10-CM

## 2017-11-16 LAB — HCC DEVICE COMMENTS: NORMAL

## 2017-11-17 ENCOUNTER — OFFICE VISIT - HEALTHEAST (OUTPATIENT)
Dept: FAMILY MEDICINE | Facility: CLINIC | Age: 82
End: 2017-11-17

## 2017-11-17 DIAGNOSIS — B35.1 ONYCHOMYCOSIS: ICD-10-CM

## 2017-11-17 DIAGNOSIS — D64.9 NORMOCHROMIC NORMOCYTIC ANEMIA: ICD-10-CM

## 2017-11-17 DIAGNOSIS — I10 ESSENTIAL HYPERTENSION WITH GOAL BLOOD PRESSURE LESS THAN 140/90: ICD-10-CM

## 2017-11-17 DIAGNOSIS — I25.10 ATHEROSCLEROSIS OF CORONARY ARTERY OF NATIVE HEART WITHOUT ANGINA PECTORIS, UNSPECIFIED VESSEL OR LESION TYPE: ICD-10-CM

## 2017-11-17 DIAGNOSIS — R60.9 EDEMA, UNSPECIFIED TYPE: ICD-10-CM

## 2017-11-17 DIAGNOSIS — N18.30 CHRONIC KIDNEY DISEASE, STAGE III (MODERATE) (H): ICD-10-CM

## 2017-11-17 DIAGNOSIS — E11.8 TYPE 2 DIABETES MELLITUS WITH COMPLICATION, WITHOUT LONG-TERM CURRENT USE OF INSULIN (H): ICD-10-CM

## 2017-11-18 ENCOUNTER — COMMUNICATION - HEALTHEAST (OUTPATIENT)
Dept: FAMILY MEDICINE | Facility: CLINIC | Age: 82
End: 2017-11-18

## 2017-11-22 ENCOUNTER — COMMUNICATION - HEALTHEAST (OUTPATIENT)
Dept: FAMILY MEDICINE | Facility: CLINIC | Age: 82
End: 2017-11-22

## 2017-11-30 ENCOUNTER — RECORDS - HEALTHEAST (OUTPATIENT)
Dept: ADMINISTRATIVE | Facility: OTHER | Age: 82
End: 2017-11-30

## 2017-12-21 ENCOUNTER — AMBULATORY - HEALTHEAST (OUTPATIENT)
Dept: CARDIOLOGY | Facility: CLINIC | Age: 82
End: 2017-12-21

## 2017-12-21 DIAGNOSIS — Z95.0 CARDIAC PACEMAKER IN SITU: ICD-10-CM

## 2017-12-22 ENCOUNTER — AMBULATORY - HEALTHEAST (OUTPATIENT)
Dept: CARDIOLOGY | Facility: CLINIC | Age: 82
End: 2017-12-22

## 2017-12-22 ENCOUNTER — SURGERY - HEALTHEAST (OUTPATIENT)
Dept: CARDIOLOGY | Facility: CLINIC | Age: 82
End: 2017-12-22

## 2017-12-22 DIAGNOSIS — I44.2 COMPLETE HEART BLOCK (H): ICD-10-CM

## 2017-12-22 DIAGNOSIS — Z45.010 PACEMAKER BATTERY DEPLETION: ICD-10-CM

## 2017-12-22 LAB — HCC DEVICE COMMENTS: NORMAL

## 2017-12-23 ENCOUNTER — COMMUNICATION - HEALTHEAST (OUTPATIENT)
Dept: FAMILY MEDICINE | Facility: CLINIC | Age: 82
End: 2017-12-23

## 2017-12-23 DIAGNOSIS — I10 HTN (HYPERTENSION): ICD-10-CM

## 2017-12-28 ENCOUNTER — COMMUNICATION - HEALTHEAST (OUTPATIENT)
Dept: CARDIOLOGY | Facility: CLINIC | Age: 82
End: 2017-12-28

## 2018-01-02 ENCOUNTER — COMMUNICATION - HEALTHEAST (OUTPATIENT)
Dept: NURSING | Facility: CLINIC | Age: 83
End: 2018-01-02

## 2018-01-04 ENCOUNTER — COMMUNICATION - HEALTHEAST (OUTPATIENT)
Dept: FAMILY MEDICINE | Facility: CLINIC | Age: 83
End: 2018-01-04

## 2018-01-09 ENCOUNTER — COMMUNICATION - HEALTHEAST (OUTPATIENT)
Dept: FAMILY MEDICINE | Facility: CLINIC | Age: 83
End: 2018-01-09

## 2018-01-12 ENCOUNTER — COMMUNICATION - HEALTHEAST (OUTPATIENT)
Dept: CARDIOLOGY | Facility: CLINIC | Age: 83
End: 2018-01-12

## 2018-01-22 ENCOUNTER — COMMUNICATION - HEALTHEAST (OUTPATIENT)
Dept: FAMILY MEDICINE | Facility: CLINIC | Age: 83
End: 2018-01-22

## 2018-01-23 ENCOUNTER — COMMUNICATION - HEALTHEAST (OUTPATIENT)
Dept: FAMILY MEDICINE | Facility: CLINIC | Age: 83
End: 2018-01-23

## 2018-01-24 ENCOUNTER — COMMUNICATION - HEALTHEAST (OUTPATIENT)
Dept: FAMILY MEDICINE | Facility: CLINIC | Age: 83
End: 2018-01-24

## 2018-01-31 ENCOUNTER — OFFICE VISIT - HEALTHEAST (OUTPATIENT)
Dept: FAMILY MEDICINE | Facility: CLINIC | Age: 83
End: 2018-01-31

## 2018-01-31 DIAGNOSIS — Z01.810 PREOP CARDIOVASCULAR EXAM: ICD-10-CM

## 2018-01-31 DIAGNOSIS — N18.30 CHRONIC KIDNEY DISEASE, STAGE III (MODERATE) (H): ICD-10-CM

## 2018-01-31 DIAGNOSIS — I44.2 COMPLETE HEART BLOCK (H): ICD-10-CM

## 2018-01-31 DIAGNOSIS — J45.40 MODERATE PERSISTENT ASTHMA WITHOUT COMPLICATION: ICD-10-CM

## 2018-01-31 DIAGNOSIS — E11.8 TYPE 2 DIABETES MELLITUS WITH COMPLICATION, WITHOUT LONG-TERM CURRENT USE OF INSULIN (H): ICD-10-CM

## 2018-01-31 DIAGNOSIS — D64.9 NORMOCHROMIC NORMOCYTIC ANEMIA: ICD-10-CM

## 2018-01-31 DIAGNOSIS — I25.10 ATHEROSCLEROSIS OF CORONARY ARTERY OF NATIVE HEART WITHOUT ANGINA PECTORIS, UNSPECIFIED VESSEL OR LESION TYPE: ICD-10-CM

## 2018-01-31 DIAGNOSIS — I10 ESSENTIAL HYPERTENSION WITH GOAL BLOOD PRESSURE LESS THAN 140/90: ICD-10-CM

## 2018-01-31 LAB
ANION GAP SERPL CALCULATED.3IONS-SCNC: 9 MMOL/L (ref 5–18)
ATRIAL RATE - MUSE: NORMAL BPM
BUN SERPL-MCNC: 31 MG/DL (ref 8–28)
CALCIUM SERPL-MCNC: 9.8 MG/DL (ref 8.5–10.5)
CHLORIDE BLD-SCNC: 100 MMOL/L (ref 98–107)
CO2 SERPL-SCNC: 32 MMOL/L (ref 22–31)
CREAT SERPL-MCNC: 1.27 MG/DL (ref 0.6–1.1)
DIASTOLIC BLOOD PRESSURE - MUSE: NORMAL MMHG
ERYTHROCYTE [DISTWIDTH] IN BLOOD BY AUTOMATED COUNT: 14.6 % (ref 11–14.5)
GFR SERPL CREATININE-BSD FRML MDRD: 40 ML/MIN/1.73M2
GLUCOSE BLD-MCNC: 110 MG/DL (ref 70–125)
HBA1C MFR BLD: 6.9 % (ref 3.5–6)
HCT VFR BLD AUTO: 32.1 % (ref 35–47)
HGB BLD-MCNC: 10.2 G/DL (ref 12–16)
INTERPRETATION ECG - MUSE: NORMAL
MCH RBC QN AUTO: 28.3 PG (ref 27–34)
MCHC RBC AUTO-ENTMCNC: 31.8 G/DL (ref 32–36)
MCV RBC AUTO: 89 FL (ref 80–100)
P AXIS - MUSE: NORMAL DEGREES
PLATELET # BLD AUTO: 178 THOU/UL (ref 140–440)
PMV BLD AUTO: 7.7 FL (ref 7–10)
POTASSIUM BLD-SCNC: 4.6 MMOL/L (ref 3.5–5)
PR INTERVAL - MUSE: NORMAL MS
QRS DURATION - MUSE: 180 MS
QT - MUSE: 568 MS
QTC - MUSE: 568 MS
R AXIS - MUSE: 158 DEGREES
RBC # BLD AUTO: 3.61 MILL/UL (ref 3.8–5.4)
SODIUM SERPL-SCNC: 141 MMOL/L (ref 136–145)
SYSTOLIC BLOOD PRESSURE - MUSE: NORMAL MMHG
T AXIS - MUSE: 165 DEGREES
VENTRICULAR RATE- MUSE: 60 BPM
WBC: 5 THOU/UL (ref 4–11)

## 2018-01-31 ASSESSMENT — MIFFLIN-ST. JEOR: SCORE: 1610.69

## 2018-02-01 ENCOUNTER — COMMUNICATION - HEALTHEAST (OUTPATIENT)
Dept: FAMILY MEDICINE | Facility: CLINIC | Age: 83
End: 2018-02-01

## 2018-02-07 ENCOUNTER — COMMUNICATION - HEALTHEAST (OUTPATIENT)
Dept: CARDIOLOGY | Facility: CLINIC | Age: 83
End: 2018-02-07

## 2018-02-07 ASSESSMENT — MIFFLIN-ST. JEOR
SCORE: 1654.63
SCORE: 1610.69

## 2018-02-09 ENCOUNTER — COMMUNICATION - HEALTHEAST (OUTPATIENT)
Dept: CARDIOLOGY | Facility: CLINIC | Age: 83
End: 2018-02-09

## 2018-02-10 ASSESSMENT — MIFFLIN-ST. JEOR: SCORE: 1650.63

## 2018-02-11 ASSESSMENT — MIFFLIN-ST. JEOR: SCORE: 1706.63

## 2018-02-12 ENCOUNTER — SURGERY - HEALTHEAST (OUTPATIENT)
Dept: CARDIOLOGY | Facility: CLINIC | Age: 83
End: 2018-02-12

## 2018-02-13 ASSESSMENT — MIFFLIN-ST. JEOR: SCORE: 1692.63

## 2018-02-14 ENCOUNTER — RECORDS - HEALTHEAST (OUTPATIENT)
Dept: ADMINISTRATIVE | Facility: OTHER | Age: 83
End: 2018-02-14

## 2018-02-14 ENCOUNTER — COMMUNICATION - HEALTHEAST (OUTPATIENT)
Dept: FAMILY MEDICINE | Facility: CLINIC | Age: 83
End: 2018-02-14

## 2018-02-15 ENCOUNTER — OFFICE VISIT - HEALTHEAST (OUTPATIENT)
Dept: GERIATRICS | Facility: CLINIC | Age: 83
End: 2018-02-15

## 2018-02-15 DIAGNOSIS — E55.9 VITAMIN D DEFICIENCY: ICD-10-CM

## 2018-02-15 DIAGNOSIS — E03.9 HYPOTHYROIDISM, UNSPECIFIED TYPE: ICD-10-CM

## 2018-02-15 DIAGNOSIS — D64.9 NORMOCHROMIC NORMOCYTIC ANEMIA: ICD-10-CM

## 2018-02-15 DIAGNOSIS — L03.115 CELLULITIS OF RIGHT LOWER EXTREMITY: ICD-10-CM

## 2018-02-15 DIAGNOSIS — N18.30 CKD (CHRONIC KIDNEY DISEASE), STAGE III (H): ICD-10-CM

## 2018-02-15 DIAGNOSIS — J45.40 MODERATE PERSISTENT ASTHMA WITHOUT COMPLICATION: ICD-10-CM

## 2018-02-15 DIAGNOSIS — I25.10 ATHEROSCLEROSIS OF NATIVE CORONARY ARTERY OF NATIVE HEART WITHOUT ANGINA PECTORIS: ICD-10-CM

## 2018-02-15 DIAGNOSIS — I10 ESSENTIAL HYPERTENSION WITH GOAL BLOOD PRESSURE LESS THAN 140/90: ICD-10-CM

## 2018-02-15 DIAGNOSIS — F51.01 PRIMARY INSOMNIA: ICD-10-CM

## 2018-02-15 DIAGNOSIS — E78.2 MIXED HYPERLIPIDEMIA: ICD-10-CM

## 2018-02-15 DIAGNOSIS — K21.9 GASTROESOPHAGEAL REFLUX DISEASE WITHOUT ESOPHAGITIS: ICD-10-CM

## 2018-02-15 DIAGNOSIS — I50.31 ACUTE DIASTOLIC CHF (CONGESTIVE HEART FAILURE) (H): ICD-10-CM

## 2018-02-15 DIAGNOSIS — E87.6 HYPOKALEMIA: ICD-10-CM

## 2018-02-15 DIAGNOSIS — I48.0 PAF (PAROXYSMAL ATRIAL FIBRILLATION) (H): ICD-10-CM

## 2018-02-15 DIAGNOSIS — Z95.0 CARDIAC PACEMAKER IN SITU: ICD-10-CM

## 2018-02-16 ENCOUNTER — OFFICE VISIT - HEALTHEAST (OUTPATIENT)
Dept: GERIATRICS | Facility: CLINIC | Age: 83
End: 2018-02-16

## 2018-02-16 DIAGNOSIS — T14.8XXA OPEN WOUND: ICD-10-CM

## 2018-02-16 DIAGNOSIS — D64.9 NORMOCHROMIC NORMOCYTIC ANEMIA: ICD-10-CM

## 2018-02-16 DIAGNOSIS — L03.115 CELLULITIS OF RIGHT LOWER EXTREMITY: ICD-10-CM

## 2018-02-16 DIAGNOSIS — N18.30 TYPE 2 DIABETES MELLITUS WITH STAGE 3 CHRONIC KIDNEY DISEASE, WITHOUT LONG-TERM CURRENT USE OF INSULIN (H): ICD-10-CM

## 2018-02-16 DIAGNOSIS — N18.30 CKD (CHRONIC KIDNEY DISEASE), STAGE III (H): ICD-10-CM

## 2018-02-16 DIAGNOSIS — E03.9 HYPOTHYROIDISM, UNSPECIFIED TYPE: ICD-10-CM

## 2018-02-16 DIAGNOSIS — E66.01 MORBID OBESITY DUE TO EXCESS CALORIES (H): ICD-10-CM

## 2018-02-16 DIAGNOSIS — J45.40 MODERATE PERSISTENT ASTHMA WITHOUT COMPLICATION: ICD-10-CM

## 2018-02-16 DIAGNOSIS — E11.22 TYPE 2 DIABETES MELLITUS WITH STAGE 3 CHRONIC KIDNEY DISEASE, WITHOUT LONG-TERM CURRENT USE OF INSULIN (H): ICD-10-CM

## 2018-02-17 ENCOUNTER — RECORDS - HEALTHEAST (OUTPATIENT)
Dept: LAB | Facility: CLINIC | Age: 83
End: 2018-02-17

## 2018-02-19 ENCOUNTER — OFFICE VISIT - HEALTHEAST (OUTPATIENT)
Dept: GERIATRICS | Facility: CLINIC | Age: 83
End: 2018-02-19

## 2018-02-19 DIAGNOSIS — Z95.3 H/O HEART VALVE REPLACEMENT WITH BIOPROSTHETIC VALVE: ICD-10-CM

## 2018-02-19 DIAGNOSIS — E11.22 TYPE 2 DIABETES MELLITUS WITH STAGE 3 CHRONIC KIDNEY DISEASE, WITHOUT LONG-TERM CURRENT USE OF INSULIN (H): ICD-10-CM

## 2018-02-19 DIAGNOSIS — T14.8XXA OPEN WOUND: ICD-10-CM

## 2018-02-19 DIAGNOSIS — I48.21 PERMANENT ATRIAL FIBRILLATION (H): ICD-10-CM

## 2018-02-19 DIAGNOSIS — F51.01 PRIMARY INSOMNIA: ICD-10-CM

## 2018-02-19 DIAGNOSIS — I10 ESSENTIAL HYPERTENSION WITH GOAL BLOOD PRESSURE LESS THAN 140/90: ICD-10-CM

## 2018-02-19 DIAGNOSIS — N18.30 TYPE 2 DIABETES MELLITUS WITH STAGE 3 CHRONIC KIDNEY DISEASE, WITHOUT LONG-TERM CURRENT USE OF INSULIN (H): ICD-10-CM

## 2018-02-19 DIAGNOSIS — N18.30 CKD (CHRONIC KIDNEY DISEASE), STAGE III (H): ICD-10-CM

## 2018-02-19 DIAGNOSIS — J45.40 MODERATE PERSISTENT ASTHMA WITHOUT COMPLICATION: ICD-10-CM

## 2018-02-19 DIAGNOSIS — I50.31 ACUTE DIASTOLIC CHF (CONGESTIVE HEART FAILURE) (H): ICD-10-CM

## 2018-02-19 DIAGNOSIS — E03.9 HYPOTHYROIDISM, UNSPECIFIED TYPE: ICD-10-CM

## 2018-02-19 DIAGNOSIS — L03.115 CELLULITIS OF RIGHT LOWER EXTREMITY: ICD-10-CM

## 2018-02-19 LAB
25(OH)D3 SERPL-MCNC: 56.7 NG/ML (ref 30–80)
ANION GAP SERPL CALCULATED.3IONS-SCNC: 10 MMOL/L (ref 5–18)
BUN SERPL-MCNC: 33 MG/DL (ref 8–28)
CALCIUM SERPL-MCNC: 9.7 MG/DL (ref 8.5–10.5)
CHLORIDE BLD-SCNC: 90 MMOL/L (ref 98–107)
CO2 SERPL-SCNC: 36 MMOL/L (ref 22–31)
CREAT SERPL-MCNC: 1.74 MG/DL (ref 0.6–1.1)
ERYTHROCYTE [DISTWIDTH] IN BLOOD BY AUTOMATED COUNT: 15.6 % (ref 11–14.5)
GFR SERPL CREATININE-BSD FRML MDRD: 28 ML/MIN/1.73M2
GLUCOSE BLD-MCNC: 125 MG/DL (ref 70–125)
HCT VFR BLD AUTO: 32.4 % (ref 35–47)
HGB BLD-MCNC: 9.6 G/DL (ref 12–16)
MCH RBC QN AUTO: 27.9 PG (ref 27–34)
MCHC RBC AUTO-ENTMCNC: 29.6 G/DL (ref 32–36)
MCV RBC AUTO: 94 FL (ref 80–100)
PLATELET # BLD AUTO: 320 THOU/UL (ref 140–440)
PMV BLD AUTO: 9.8 FL (ref 8.5–12.5)
POTASSIUM BLD-SCNC: 4.5 MMOL/L (ref 3.5–5)
RBC # BLD AUTO: 3.44 MILL/UL (ref 3.8–5.4)
SODIUM SERPL-SCNC: 136 MMOL/L (ref 136–145)
WBC: 8.5 THOU/UL (ref 4–11)

## 2018-02-20 ENCOUNTER — OFFICE VISIT - HEALTHEAST (OUTPATIENT)
Dept: GERIATRICS | Facility: CLINIC | Age: 83
End: 2018-02-20

## 2018-02-20 DIAGNOSIS — Z95.0 CARDIAC PACEMAKER IN SITU: ICD-10-CM

## 2018-02-20 DIAGNOSIS — N18.30 CKD (CHRONIC KIDNEY DISEASE), STAGE III (H): ICD-10-CM

## 2018-02-20 DIAGNOSIS — L03.115 CELLULITIS OF RIGHT LOWER EXTREMITY: ICD-10-CM

## 2018-02-20 DIAGNOSIS — I50.30 HEART FAILURE WITH PRESERVED EJECTION FRACTION (H): ICD-10-CM

## 2018-02-22 ENCOUNTER — OFFICE VISIT - HEALTHEAST (OUTPATIENT)
Dept: CARDIOLOGY | Facility: CLINIC | Age: 83
End: 2018-02-22

## 2018-02-22 ENCOUNTER — OFFICE VISIT - HEALTHEAST (OUTPATIENT)
Dept: GERIATRICS | Facility: CLINIC | Age: 83
End: 2018-02-22

## 2018-02-22 DIAGNOSIS — T14.8XXA OPEN WOUND: ICD-10-CM

## 2018-02-22 DIAGNOSIS — E11.22 TYPE 2 DIABETES MELLITUS WITH STAGE 3 CHRONIC KIDNEY DISEASE, WITHOUT LONG-TERM CURRENT USE OF INSULIN (H): ICD-10-CM

## 2018-02-22 DIAGNOSIS — E87.6 HYPOKALEMIA: ICD-10-CM

## 2018-02-22 DIAGNOSIS — N18.30 TYPE 2 DIABETES MELLITUS WITH STAGE 3 CHRONIC KIDNEY DISEASE, WITHOUT LONG-TERM CURRENT USE OF INSULIN (H): ICD-10-CM

## 2018-02-22 DIAGNOSIS — N18.30 CKD (CHRONIC KIDNEY DISEASE), STAGE III (H): ICD-10-CM

## 2018-02-22 DIAGNOSIS — Z95.3 H/O HEART VALVE REPLACEMENT WITH BIOPROSTHETIC VALVE: ICD-10-CM

## 2018-02-22 DIAGNOSIS — I10 ESSENTIAL HYPERTENSION WITH GOAL BLOOD PRESSURE LESS THAN 140/90: ICD-10-CM

## 2018-02-22 DIAGNOSIS — J45.40 MODERATE PERSISTENT ASTHMA WITHOUT COMPLICATION: ICD-10-CM

## 2018-02-22 DIAGNOSIS — I25.10 ATHEROSCLEROSIS OF NATIVE CORONARY ARTERY OF NATIVE HEART WITHOUT ANGINA PECTORIS: ICD-10-CM

## 2018-02-22 DIAGNOSIS — E03.9 HYPOTHYROIDISM, UNSPECIFIED TYPE: ICD-10-CM

## 2018-02-22 DIAGNOSIS — Z95.0 CARDIAC PACEMAKER IN SITU: ICD-10-CM

## 2018-02-22 DIAGNOSIS — I50.30 HEART FAILURE WITH PRESERVED EJECTION FRACTION (H): ICD-10-CM

## 2018-02-22 DIAGNOSIS — L03.115 CELLULITIS OF RIGHT LOWER EXTREMITY: ICD-10-CM

## 2018-02-23 ENCOUNTER — RECORDS - HEALTHEAST (OUTPATIENT)
Dept: LAB | Facility: CLINIC | Age: 83
End: 2018-02-23

## 2018-02-26 ENCOUNTER — OFFICE VISIT - HEALTHEAST (OUTPATIENT)
Dept: GERIATRICS | Facility: CLINIC | Age: 83
End: 2018-02-26

## 2018-02-26 DIAGNOSIS — K21.9 GASTROESOPHAGEAL REFLUX DISEASE WITHOUT ESOPHAGITIS: ICD-10-CM

## 2018-02-26 DIAGNOSIS — D64.9 NORMOCHROMIC NORMOCYTIC ANEMIA: ICD-10-CM

## 2018-02-26 DIAGNOSIS — Z95.0 CARDIAC PACEMAKER IN SITU: ICD-10-CM

## 2018-02-26 DIAGNOSIS — L03.115 CELLULITIS OF RIGHT LOWER EXTREMITY: ICD-10-CM

## 2018-02-26 DIAGNOSIS — J45.40 MODERATE PERSISTENT ASTHMA WITHOUT COMPLICATION: ICD-10-CM

## 2018-02-26 DIAGNOSIS — I48.0 PAF (PAROXYSMAL ATRIAL FIBRILLATION) (H): ICD-10-CM

## 2018-02-26 DIAGNOSIS — I10 ESSENTIAL HYPERTENSION WITH GOAL BLOOD PRESSURE LESS THAN 140/90: ICD-10-CM

## 2018-02-26 DIAGNOSIS — E03.9 HYPOTHYROIDISM, UNSPECIFIED TYPE: ICD-10-CM

## 2018-02-26 DIAGNOSIS — R53.81 PHYSICAL DECONDITIONING: ICD-10-CM

## 2018-02-26 DIAGNOSIS — T14.8XXA OPEN WOUND: ICD-10-CM

## 2018-02-26 DIAGNOSIS — I50.30 HEART FAILURE WITH PRESERVED EJECTION FRACTION (H): ICD-10-CM

## 2018-02-26 LAB
ANION GAP SERPL CALCULATED.3IONS-SCNC: 10 MMOL/L (ref 5–18)
BUN SERPL-MCNC: 47 MG/DL (ref 8–28)
CALCIUM SERPL-MCNC: 9.7 MG/DL (ref 8.5–10.5)
CHLORIDE BLD-SCNC: 89 MMOL/L (ref 98–107)
CO2 SERPL-SCNC: 38 MMOL/L (ref 22–31)
CREAT SERPL-MCNC: 2.03 MG/DL (ref 0.6–1.1)
GFR SERPL CREATININE-BSD FRML MDRD: 23 ML/MIN/1.73M2
GLUCOSE BLD-MCNC: 121 MG/DL (ref 70–125)
POTASSIUM BLD-SCNC: 3.3 MMOL/L (ref 3.5–5)
SODIUM SERPL-SCNC: 137 MMOL/L (ref 136–145)

## 2018-02-27 ENCOUNTER — OFFICE VISIT - HEALTHEAST (OUTPATIENT)
Dept: GERIATRICS | Facility: CLINIC | Age: 83
End: 2018-02-27

## 2018-02-27 DIAGNOSIS — L03.115 CELLULITIS OF RIGHT LOWER EXTREMITY: ICD-10-CM

## 2018-02-27 DIAGNOSIS — I50.30 HEART FAILURE WITH PRESERVED EJECTION FRACTION (H): ICD-10-CM

## 2018-02-27 DIAGNOSIS — R53.81 PHYSICAL DECONDITIONING: ICD-10-CM

## 2018-03-05 ENCOUNTER — AMBULATORY - HEALTHEAST (OUTPATIENT)
Dept: GERIATRICS | Facility: CLINIC | Age: 83
End: 2018-03-05

## 2018-03-06 ENCOUNTER — OFFICE VISIT - HEALTHEAST (OUTPATIENT)
Dept: GERIATRICS | Facility: CLINIC | Age: 83
End: 2018-03-06

## 2018-03-06 DIAGNOSIS — I10 ESSENTIAL HYPERTENSION WITH GOAL BLOOD PRESSURE LESS THAN 140/90: ICD-10-CM

## 2018-03-06 DIAGNOSIS — E87.6 HYPOKALEMIA: ICD-10-CM

## 2018-03-06 DIAGNOSIS — I50.33 ACUTE ON CHRONIC DIASTOLIC CONGESTIVE HEART FAILURE (H): ICD-10-CM

## 2018-03-06 DIAGNOSIS — L03.115 CELLULITIS OF RIGHT LOWER EXTREMITY: ICD-10-CM

## 2018-03-07 ENCOUNTER — AMBULATORY - HEALTHEAST (OUTPATIENT)
Dept: GERIATRICS | Facility: CLINIC | Age: 83
End: 2018-03-07

## 2018-03-07 ENCOUNTER — RECORDS - HEALTHEAST (OUTPATIENT)
Dept: LAB | Facility: CLINIC | Age: 83
End: 2018-03-07

## 2018-03-07 ENCOUNTER — RECORDS - HEALTHEAST (OUTPATIENT)
Dept: ADMINISTRATIVE | Facility: OTHER | Age: 83
End: 2018-03-07

## 2018-03-07 LAB
ANION GAP SERPL CALCULATED.3IONS-SCNC: 11 MMOL/L (ref 5–18)
BUN SERPL-MCNC: 93 MG/DL (ref 8–28)
CALCIUM SERPL-MCNC: 9.9 MG/DL (ref 8.5–10.5)
CHLORIDE BLD-SCNC: 81 MMOL/L (ref 98–107)
CO2 SERPL-SCNC: 37 MMOL/L (ref 22–31)
CREAT SERPL-MCNC: 2.96 MG/DL (ref 0.6–1.1)
GFR SERPL CREATININE-BSD FRML MDRD: 15 ML/MIN/1.73M2
GLUCOSE BLD-MCNC: 109 MG/DL (ref 70–125)
POTASSIUM BLD-SCNC: 4.4 MMOL/L (ref 3.5–5)
SODIUM SERPL-SCNC: 129 MMOL/L (ref 136–145)

## 2018-03-13 ENCOUNTER — OFFICE VISIT - HEALTHEAST (OUTPATIENT)
Dept: GERIATRICS | Facility: CLINIC | Age: 83
End: 2018-03-13

## 2018-03-13 ENCOUNTER — COMMUNICATION - HEALTHEAST (OUTPATIENT)
Dept: FAMILY MEDICINE | Facility: CLINIC | Age: 83
End: 2018-03-13

## 2018-03-13 DIAGNOSIS — N17.9 AKI (ACUTE KIDNEY INJURY) (H): ICD-10-CM

## 2018-03-13 DIAGNOSIS — I48.0 PAF (PAROXYSMAL ATRIAL FIBRILLATION) (H): ICD-10-CM

## 2018-03-13 DIAGNOSIS — I50.32 CHRONIC DIASTOLIC CHF (CONGESTIVE HEART FAILURE) (H): ICD-10-CM

## 2018-03-13 DIAGNOSIS — N18.30 CKD (CHRONIC KIDNEY DISEASE), STAGE III (H): ICD-10-CM

## 2018-03-14 ENCOUNTER — AMBULATORY - HEALTHEAST (OUTPATIENT)
Dept: CARDIOLOGY | Facility: CLINIC | Age: 83
End: 2018-03-14

## 2018-03-14 DIAGNOSIS — Z95.0 CARDIAC PACEMAKER IN SITU: ICD-10-CM

## 2018-03-14 LAB — HCC DEVICE COMMENTS: NORMAL

## 2018-03-14 ASSESSMENT — MIFFLIN-ST. JEOR: SCORE: 1497.3

## 2018-03-15 ENCOUNTER — OFFICE VISIT - HEALTHEAST (OUTPATIENT)
Dept: GERIATRICS | Facility: CLINIC | Age: 83
End: 2018-03-15

## 2018-03-15 DIAGNOSIS — N17.9 AKI (ACUTE KIDNEY INJURY) (H): ICD-10-CM

## 2018-03-15 DIAGNOSIS — K59.04 CHRONIC IDIOPATHIC CONSTIPATION: ICD-10-CM

## 2018-03-15 DIAGNOSIS — I48.21 PERMANENT ATRIAL FIBRILLATION (H): ICD-10-CM

## 2018-03-15 DIAGNOSIS — Z95.0 CARDIAC PACEMAKER IN SITU: ICD-10-CM

## 2018-03-15 DIAGNOSIS — R53.81 PHYSICAL DECONDITIONING: ICD-10-CM

## 2018-03-15 DIAGNOSIS — D64.9 NORMOCHROMIC NORMOCYTIC ANEMIA: ICD-10-CM

## 2018-03-15 DIAGNOSIS — I50.30 HEART FAILURE WITH PRESERVED EJECTION FRACTION (H): ICD-10-CM

## 2018-03-15 DIAGNOSIS — J45.40 MODERATE PERSISTENT ASTHMA WITHOUT COMPLICATION: ICD-10-CM

## 2018-03-15 DIAGNOSIS — I10 ESSENTIAL HYPERTENSION WITH GOAL BLOOD PRESSURE LESS THAN 140/90: ICD-10-CM

## 2018-03-15 DIAGNOSIS — N18.30 CKD (CHRONIC KIDNEY DISEASE), STAGE III (H): ICD-10-CM

## 2018-03-15 DIAGNOSIS — E03.9 HYPOTHYROIDISM, UNSPECIFIED TYPE: ICD-10-CM

## 2018-03-15 DIAGNOSIS — N18.30 TYPE 2 DIABETES MELLITUS WITH STAGE 3 CHRONIC KIDNEY DISEASE, WITHOUT LONG-TERM CURRENT USE OF INSULIN (H): ICD-10-CM

## 2018-03-15 DIAGNOSIS — E11.22 TYPE 2 DIABETES MELLITUS WITH STAGE 3 CHRONIC KIDNEY DISEASE, WITHOUT LONG-TERM CURRENT USE OF INSULIN (H): ICD-10-CM

## 2018-03-16 ENCOUNTER — RECORDS - HEALTHEAST (OUTPATIENT)
Dept: LAB | Facility: CLINIC | Age: 83
End: 2018-03-16

## 2018-03-19 ENCOUNTER — OFFICE VISIT - HEALTHEAST (OUTPATIENT)
Dept: GERIATRICS | Facility: CLINIC | Age: 83
End: 2018-03-19

## 2018-03-19 DIAGNOSIS — I50.30 HEART FAILURE WITH PRESERVED EJECTION FRACTION (H): ICD-10-CM

## 2018-03-19 DIAGNOSIS — N18.30 TYPE 2 DIABETES MELLITUS WITH STAGE 3 CHRONIC KIDNEY DISEASE, WITHOUT LONG-TERM CURRENT USE OF INSULIN (H): ICD-10-CM

## 2018-03-19 DIAGNOSIS — J45.909 UNCOMPLICATED ASTHMA, UNSPECIFIED ASTHMA SEVERITY, UNSPECIFIED WHETHER PERSISTENT: ICD-10-CM

## 2018-03-19 DIAGNOSIS — I10 ESSENTIAL HYPERTENSION WITH GOAL BLOOD PRESSURE LESS THAN 140/90: ICD-10-CM

## 2018-03-19 DIAGNOSIS — R53.81 PHYSICAL DECONDITIONING: ICD-10-CM

## 2018-03-19 DIAGNOSIS — E11.22 TYPE 2 DIABETES MELLITUS WITH STAGE 3 CHRONIC KIDNEY DISEASE, WITHOUT LONG-TERM CURRENT USE OF INSULIN (H): ICD-10-CM

## 2018-03-19 DIAGNOSIS — E03.9 HYPOTHYROIDISM, UNSPECIFIED TYPE: ICD-10-CM

## 2018-03-19 DIAGNOSIS — N18.30 CKD (CHRONIC KIDNEY DISEASE), STAGE III (H): ICD-10-CM

## 2018-03-19 DIAGNOSIS — Z95.0 CARDIAC PACEMAKER IN SITU: ICD-10-CM

## 2018-03-19 LAB
ANION GAP SERPL CALCULATED.3IONS-SCNC: 7 MMOL/L (ref 5–18)
BASOPHILS # BLD AUTO: 0 THOU/UL (ref 0–0.2)
BASOPHILS NFR BLD AUTO: 1 % (ref 0–2)
BUN SERPL-MCNC: 46 MG/DL (ref 8–28)
CALCIUM SERPL-MCNC: 9.8 MG/DL (ref 8.5–10.5)
CHLORIDE BLD-SCNC: 93 MMOL/L (ref 98–107)
CO2 SERPL-SCNC: 35 MMOL/L (ref 22–31)
CREAT SERPL-MCNC: 1.86 MG/DL (ref 0.6–1.1)
EOSINOPHIL # BLD AUTO: 0.2 THOU/UL (ref 0–0.4)
EOSINOPHIL NFR BLD AUTO: 4 % (ref 0–6)
ERYTHROCYTE [DISTWIDTH] IN BLOOD BY AUTOMATED COUNT: 19.9 % (ref 11–14.5)
GFR SERPL CREATININE-BSD FRML MDRD: 26 ML/MIN/1.73M2
GLUCOSE BLD-MCNC: 135 MG/DL (ref 70–125)
HCT VFR BLD AUTO: 25.7 % (ref 35–47)
HGB BLD-MCNC: 8.6 G/DL (ref 12–16)
LYMPHOCYTES # BLD AUTO: 1.3 THOU/UL (ref 0.8–4.4)
LYMPHOCYTES NFR BLD AUTO: 26 % (ref 20–40)
MCH RBC QN AUTO: 33.7 PG (ref 27–34)
MCHC RBC AUTO-ENTMCNC: 33.5 G/DL (ref 32–36)
MCV RBC AUTO: 101 FL (ref 80–100)
MONOCYTES # BLD AUTO: 0.4 THOU/UL (ref 0–0.9)
MONOCYTES NFR BLD AUTO: 8 % (ref 2–10)
NEUTROPHILS # BLD AUTO: 3.1 THOU/UL (ref 2–7.7)
NEUTROPHILS NFR BLD AUTO: 61 % (ref 50–70)
PLATELET # BLD AUTO: 201 THOU/UL (ref 140–440)
PMV BLD AUTO: 10.8 FL (ref 8.5–12.5)
POTASSIUM BLD-SCNC: 3.9 MMOL/L (ref 3.5–5)
RBC # BLD AUTO: 2.55 MILL/UL (ref 3.8–5.4)
SODIUM SERPL-SCNC: 135 MMOL/L (ref 136–145)
WBC: 5.1 THOU/UL (ref 4–11)

## 2018-03-21 ENCOUNTER — OFFICE VISIT - HEALTHEAST (OUTPATIENT)
Dept: CARDIOLOGY | Facility: CLINIC | Age: 83
End: 2018-03-21

## 2018-03-21 DIAGNOSIS — I50.31 ACUTE DIASTOLIC CHF (CONGESTIVE HEART FAILURE) (H): ICD-10-CM

## 2018-03-21 DIAGNOSIS — I10 ESSENTIAL HYPERTENSION WITH GOAL BLOOD PRESSURE LESS THAN 140/90: ICD-10-CM

## 2018-03-21 DIAGNOSIS — I50.30 HEART FAILURE WITH PRESERVED EJECTION FRACTION (H): ICD-10-CM

## 2018-03-22 ENCOUNTER — OFFICE VISIT - HEALTHEAST (OUTPATIENT)
Dept: GERIATRICS | Facility: CLINIC | Age: 83
End: 2018-03-22

## 2018-03-22 DIAGNOSIS — E11.22 TYPE 2 DIABETES MELLITUS WITH STAGE 3 CHRONIC KIDNEY DISEASE, WITHOUT LONG-TERM CURRENT USE OF INSULIN (H): ICD-10-CM

## 2018-03-22 DIAGNOSIS — Z95.0 CARDIAC PACEMAKER IN SITU: ICD-10-CM

## 2018-03-22 DIAGNOSIS — J45.909 UNCOMPLICATED ASTHMA, UNSPECIFIED ASTHMA SEVERITY, UNSPECIFIED WHETHER PERSISTENT: ICD-10-CM

## 2018-03-22 DIAGNOSIS — E03.9 HYPOTHYROIDISM, UNSPECIFIED TYPE: ICD-10-CM

## 2018-03-22 DIAGNOSIS — R53.81 PHYSICAL DECONDITIONING: ICD-10-CM

## 2018-03-22 DIAGNOSIS — J45.40 MODERATE PERSISTENT ASTHMA WITHOUT COMPLICATION: ICD-10-CM

## 2018-03-22 DIAGNOSIS — N18.3 ACUTE RENAL FAILURE SUPERIMPOSED ON STAGE 3 CHRONIC KIDNEY DISEASE, UNSPECIFIED ACUTE RENAL FAILURE TYPE: ICD-10-CM

## 2018-03-22 DIAGNOSIS — N17.9 ACUTE RENAL FAILURE SUPERIMPOSED ON STAGE 3 CHRONIC KIDNEY DISEASE, UNSPECIFIED ACUTE RENAL FAILURE TYPE: ICD-10-CM

## 2018-03-22 DIAGNOSIS — N18.30 TYPE 2 DIABETES MELLITUS WITH STAGE 3 CHRONIC KIDNEY DISEASE, WITHOUT LONG-TERM CURRENT USE OF INSULIN (H): ICD-10-CM

## 2018-03-22 DIAGNOSIS — N18.30 CKD (CHRONIC KIDNEY DISEASE), STAGE III (H): ICD-10-CM

## 2018-03-22 DIAGNOSIS — I48.21 PERMANENT ATRIAL FIBRILLATION (H): ICD-10-CM

## 2018-03-22 DIAGNOSIS — I48.0 PAF (PAROXYSMAL ATRIAL FIBRILLATION) (H): ICD-10-CM

## 2018-03-22 DIAGNOSIS — I10 ESSENTIAL HYPERTENSION WITH GOAL BLOOD PRESSURE LESS THAN 140/90: ICD-10-CM

## 2018-03-22 DIAGNOSIS — I50.30 HEART FAILURE WITH PRESERVED EJECTION FRACTION (H): ICD-10-CM

## 2018-03-27 ENCOUNTER — AMBULATORY - HEALTHEAST (OUTPATIENT)
Dept: GERIATRICS | Facility: CLINIC | Age: 83
End: 2018-03-27

## 2018-03-27 ENCOUNTER — OFFICE VISIT - HEALTHEAST (OUTPATIENT)
Dept: GERIATRICS | Facility: CLINIC | Age: 83
End: 2018-03-27

## 2018-03-27 ENCOUNTER — OFFICE VISIT - HEALTHEAST (OUTPATIENT)
Dept: PODIATRY | Age: 83
End: 2018-03-27

## 2018-03-27 DIAGNOSIS — I50.30 HEART FAILURE WITH PRESERVED EJECTION FRACTION (H): ICD-10-CM

## 2018-03-27 DIAGNOSIS — K21.9 GASTROESOPHAGEAL REFLUX DISEASE WITHOUT ESOPHAGITIS: ICD-10-CM

## 2018-03-27 DIAGNOSIS — Z95.3 H/O HEART VALVE REPLACEMENT WITH BIOPROSTHETIC VALVE: ICD-10-CM

## 2018-03-27 DIAGNOSIS — I48.21 PERMANENT ATRIAL FIBRILLATION (H): ICD-10-CM

## 2018-03-27 DIAGNOSIS — L60.1 ONYCHOLYSIS OF TOENAIL: ICD-10-CM

## 2018-03-28 ENCOUNTER — RECORDS - HEALTHEAST (OUTPATIENT)
Dept: LAB | Facility: CLINIC | Age: 83
End: 2018-03-28

## 2018-03-28 LAB
ANION GAP SERPL CALCULATED.3IONS-SCNC: 9 MMOL/L (ref 5–18)
BUN SERPL-MCNC: 36 MG/DL (ref 8–28)
CALCIUM SERPL-MCNC: 9.5 MG/DL (ref 8.5–10.5)
CHLORIDE BLD-SCNC: 100 MMOL/L (ref 98–107)
CO2 SERPL-SCNC: 30 MMOL/L (ref 22–31)
CREAT SERPL-MCNC: 1.47 MG/DL (ref 0.6–1.1)
GFR SERPL CREATININE-BSD FRML MDRD: 34 ML/MIN/1.73M2
GLUCOSE BLD-MCNC: 117 MG/DL (ref 70–125)
POTASSIUM BLD-SCNC: 4.6 MMOL/L (ref 3.5–5)
SODIUM SERPL-SCNC: 139 MMOL/L (ref 136–145)

## 2018-03-29 ENCOUNTER — OFFICE VISIT - HEALTHEAST (OUTPATIENT)
Dept: GERIATRICS | Facility: CLINIC | Age: 83
End: 2018-03-29

## 2018-03-29 DIAGNOSIS — N18.30 TYPE 2 DIABETES MELLITUS WITH STAGE 3 CHRONIC KIDNEY DISEASE, WITHOUT LONG-TERM CURRENT USE OF INSULIN (H): ICD-10-CM

## 2018-03-29 DIAGNOSIS — I48.21 PERMANENT ATRIAL FIBRILLATION (H): ICD-10-CM

## 2018-03-29 DIAGNOSIS — E11.22 TYPE 2 DIABETES MELLITUS WITH STAGE 3 CHRONIC KIDNEY DISEASE, WITHOUT LONG-TERM CURRENT USE OF INSULIN (H): ICD-10-CM

## 2018-03-29 DIAGNOSIS — I50.30 HEART FAILURE WITH PRESERVED EJECTION FRACTION (H): ICD-10-CM

## 2018-03-29 DIAGNOSIS — I48.0 PAF (PAROXYSMAL ATRIAL FIBRILLATION) (H): ICD-10-CM

## 2018-03-29 DIAGNOSIS — N18.30 CKD (CHRONIC KIDNEY DISEASE), STAGE III (H): ICD-10-CM

## 2018-03-30 ENCOUNTER — RECORDS - HEALTHEAST (OUTPATIENT)
Dept: LAB | Facility: CLINIC | Age: 83
End: 2018-03-30

## 2018-04-02 ENCOUNTER — OFFICE VISIT - HEALTHEAST (OUTPATIENT)
Dept: GERIATRICS | Facility: CLINIC | Age: 83
End: 2018-04-02

## 2018-04-02 DIAGNOSIS — N18.30 CKD (CHRONIC KIDNEY DISEASE), STAGE III (H): ICD-10-CM

## 2018-04-02 DIAGNOSIS — R53.81 PHYSICAL DECONDITIONING: ICD-10-CM

## 2018-04-02 DIAGNOSIS — I25.10 ATHEROSCLEROSIS OF NATIVE CORONARY ARTERY OF NATIVE HEART WITHOUT ANGINA PECTORIS: ICD-10-CM

## 2018-04-02 DIAGNOSIS — E03.9 HYPOTHYROIDISM, UNSPECIFIED TYPE: ICD-10-CM

## 2018-04-02 DIAGNOSIS — I48.0 PAF (PAROXYSMAL ATRIAL FIBRILLATION) (H): ICD-10-CM

## 2018-04-02 DIAGNOSIS — I50.30 HEART FAILURE WITH PRESERVED EJECTION FRACTION (H): ICD-10-CM

## 2018-04-02 DIAGNOSIS — E11.22 TYPE 2 DIABETES MELLITUS WITH STAGE 3 CHRONIC KIDNEY DISEASE, WITHOUT LONG-TERM CURRENT USE OF INSULIN (H): ICD-10-CM
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
## 2018-04-02 DIAGNOSIS — Z95.0 CARDIAC PACEMAKER IN SITU: ICD-10-CM

## 2018-04-02 DIAGNOSIS — N18.30 TYPE 2 DIABETES MELLITUS WITH STAGE 3 CHRONIC KIDNEY DISEASE, WITHOUT LONG-TERM CURRENT USE OF INSULIN (H): ICD-10-CM

## 2018-04-02 DIAGNOSIS — E66.01 MORBID OBESITY DUE TO EXCESS CALORIES (H): ICD-10-CM

## 2018-04-02 DIAGNOSIS — D64.9 NORMOCHROMIC NORMOCYTIC ANEMIA: ICD-10-CM

## 2018-04-02 LAB
ANION GAP SERPL CALCULATED.3IONS-SCNC: 10 MMOL/L (ref 5–18)
BASOPHILS # BLD AUTO: 0 THOU/UL (ref 0–0.2)
BASOPHILS NFR BLD AUTO: 0 % (ref 0–2)
BUN SERPL-MCNC: 31 MG/DL (ref 8–28)
CALCIUM SERPL-MCNC: 9.1 MG/DL (ref 8.5–10.5)
CHLORIDE BLD-SCNC: 98 MMOL/L (ref 98–107)
CO2 SERPL-SCNC: 29 MMOL/L (ref 22–31)
CREAT SERPL-MCNC: 1.38 MG/DL (ref 0.6–1.1)
EOSINOPHIL # BLD AUTO: 0.4 THOU/UL (ref 0–0.4)
EOSINOPHIL NFR BLD AUTO: 8 % (ref 0–6)
ERYTHROCYTE [DISTWIDTH] IN BLOOD BY AUTOMATED COUNT: 17.7 % (ref 11–14.5)
GFR SERPL CREATININE-BSD FRML MDRD: 37 ML/MIN/1.73M2
GLUCOSE BLD-MCNC: 103 MG/DL (ref 70–125)
HCT VFR BLD AUTO: 26 % (ref 35–47)
HGB BLD-MCNC: 7.8 G/DL (ref 12–16)
LYMPHOCYTES # BLD AUTO: 1.2 THOU/UL (ref 0.8–4.4)
LYMPHOCYTES NFR BLD AUTO: 25 % (ref 20–40)
MCH RBC QN AUTO: 30.2 PG (ref 27–34)
MCHC RBC AUTO-ENTMCNC: 30 G/DL (ref 32–36)
MCV RBC AUTO: 101 FL (ref 80–100)
MONOCYTES # BLD AUTO: 0.4 THOU/UL (ref 0–0.9)
MONOCYTES NFR BLD AUTO: 9 % (ref 2–10)
NEUTROPHILS # BLD AUTO: 2.7 THOU/UL (ref 2–7.7)
NEUTROPHILS NFR BLD AUTO: 58 % (ref 50–70)
PLATELET # BLD AUTO: 137 THOU/UL (ref 140–440)
PMV BLD AUTO: 10.7 FL (ref 8.5–12.5)
POTASSIUM BLD-SCNC: 3.6 MMOL/L (ref 3.5–5)
RBC # BLD AUTO: 2.58 MILL/UL (ref 3.8–5.4)
SODIUM SERPL-SCNC: 137 MMOL/L (ref 136–145)
WBC: 4.7 THOU/UL (ref 4–11)

## 2018-04-05 ENCOUNTER — OFFICE VISIT - HEALTHEAST (OUTPATIENT)
Dept: GERIATRICS | Facility: CLINIC | Age: 83
End: 2018-04-05

## 2018-04-05 DIAGNOSIS — I50.30 HEART FAILURE WITH PRESERVED EJECTION FRACTION (H): ICD-10-CM

## 2018-04-05 DIAGNOSIS — N18.30 CKD (CHRONIC KIDNEY DISEASE), STAGE III (H): ICD-10-CM

## 2018-04-05 DIAGNOSIS — R63.5 WEIGHT GAIN: ICD-10-CM

## 2018-04-05 DIAGNOSIS — I48.21 PERMANENT ATRIAL FIBRILLATION (H): ICD-10-CM

## 2018-04-06 ENCOUNTER — OFFICE VISIT - HEALTHEAST (OUTPATIENT)
Dept: GERIATRICS | Facility: CLINIC | Age: 83
End: 2018-04-06

## 2018-04-06 ENCOUNTER — RECORDS - HEALTHEAST (OUTPATIENT)
Dept: LAB | Facility: CLINIC | Age: 83
End: 2018-04-06

## 2018-04-06 DIAGNOSIS — N18.30 CKD (CHRONIC KIDNEY DISEASE), STAGE III (H): ICD-10-CM

## 2018-04-06 DIAGNOSIS — I48.0 PAF (PAROXYSMAL ATRIAL FIBRILLATION) (H): ICD-10-CM

## 2018-04-06 DIAGNOSIS — N18.30 TYPE 2 DIABETES MELLITUS WITH STAGE 3 CHRONIC KIDNEY DISEASE, WITHOUT LONG-TERM CURRENT USE OF INSULIN (H): ICD-10-CM

## 2018-04-06 DIAGNOSIS — E11.22 TYPE 2 DIABETES MELLITUS WITH STAGE 3 CHRONIC KIDNEY DISEASE, WITHOUT LONG-TERM CURRENT USE OF INSULIN (H): ICD-10-CM

## 2018-04-06 DIAGNOSIS — R53.81 PHYSICAL DECONDITIONING: ICD-10-CM

## 2018-04-06 DIAGNOSIS — E03.9 HYPOTHYROIDISM, UNSPECIFIED TYPE: ICD-10-CM

## 2018-04-06 DIAGNOSIS — I48.21 PERMANENT ATRIAL FIBRILLATION (H): ICD-10-CM

## 2018-04-06 DIAGNOSIS — I10 ESSENTIAL HYPERTENSION WITH GOAL BLOOD PRESSURE LESS THAN 140/90: ICD-10-CM

## 2018-04-06 DIAGNOSIS — D64.9 NORMOCHROMIC NORMOCYTIC ANEMIA: ICD-10-CM

## 2018-04-06 DIAGNOSIS — Z95.0 CARDIAC PACEMAKER IN SITU: ICD-10-CM

## 2018-04-06 DIAGNOSIS — I50.30 HEART FAILURE WITH PRESERVED EJECTION FRACTION (H): ICD-10-CM

## 2018-04-09 ENCOUNTER — OFFICE VISIT - HEALTHEAST (OUTPATIENT)
Dept: GERIATRICS | Facility: CLINIC | Age: 83
End: 2018-04-09

## 2018-04-09 DIAGNOSIS — E11.22 TYPE 2 DIABETES MELLITUS WITH STAGE 3 CHRONIC KIDNEY DISEASE, WITHOUT LONG-TERM CURRENT USE OF INSULIN (H): ICD-10-CM

## 2018-04-09 DIAGNOSIS — I10 ESSENTIAL HYPERTENSION WITH GOAL BLOOD PRESSURE LESS THAN 140/90: ICD-10-CM

## 2018-04-09 DIAGNOSIS — K64.8 OTHER HEMORRHOIDS: ICD-10-CM

## 2018-04-09 DIAGNOSIS — J45.909 UNCOMPLICATED ASTHMA, UNSPECIFIED ASTHMA SEVERITY, UNSPECIFIED WHETHER PERSISTENT: ICD-10-CM

## 2018-04-09 DIAGNOSIS — I48.21 PERMANENT ATRIAL FIBRILLATION (H): ICD-10-CM

## 2018-04-09 DIAGNOSIS — R53.81 PHYSICAL DECONDITIONING: ICD-10-CM

## 2018-04-09 DIAGNOSIS — N18.30 TYPE 2 DIABETES MELLITUS WITH STAGE 3 CHRONIC KIDNEY DISEASE, WITHOUT LONG-TERM CURRENT USE OF INSULIN (H): ICD-10-CM

## 2018-04-09 DIAGNOSIS — E03.9 HYPOTHYROIDISM, UNSPECIFIED TYPE: ICD-10-CM

## 2018-04-09 DIAGNOSIS — I48.0 PAF (PAROXYSMAL ATRIAL FIBRILLATION) (H): ICD-10-CM

## 2018-04-09 LAB
ANION GAP SERPL CALCULATED.3IONS-SCNC: 7 MMOL/L (ref 5–18)
BASOPHILS # BLD AUTO: 0 THOU/UL (ref 0–0.2)
BASOPHILS NFR BLD AUTO: 1 % (ref 0–2)
BUN SERPL-MCNC: 34 MG/DL (ref 8–28)
CALCIUM SERPL-MCNC: 9.3 MG/DL (ref 8.5–10.5)
CHLORIDE BLD-SCNC: 98 MMOL/L (ref 98–107)
CO2 SERPL-SCNC: 32 MMOL/L (ref 22–31)
CREAT SERPL-MCNC: 1.4 MG/DL (ref 0.6–1.1)
EOSINOPHIL # BLD AUTO: 0.2 THOU/UL (ref 0–0.4)
EOSINOPHIL NFR BLD AUTO: 4 % (ref 0–6)
ERYTHROCYTE [DISTWIDTH] IN BLOOD BY AUTOMATED COUNT: 17.8 % (ref 11–14.5)
GFR SERPL CREATININE-BSD FRML MDRD: 36 ML/MIN/1.73M2
GLUCOSE BLD-MCNC: 100 MG/DL (ref 70–125)
HCT VFR BLD AUTO: 27 % (ref 35–47)
HGB BLD-MCNC: 8.1 G/DL (ref 12–16)
LYMPHOCYTES # BLD AUTO: 1.1 THOU/UL (ref 0.8–4.4)
LYMPHOCYTES NFR BLD AUTO: 24 % (ref 20–40)
MCH RBC QN AUTO: 29.8 PG (ref 27–34)
MCHC RBC AUTO-ENTMCNC: 30 G/DL (ref 32–36)
MCV RBC AUTO: 99 FL (ref 80–100)
MONOCYTES # BLD AUTO: 0.3 THOU/UL (ref 0–0.9)
MONOCYTES NFR BLD AUTO: 7 % (ref 2–10)
NEUTROPHILS # BLD AUTO: 3 THOU/UL (ref 2–7.7)
NEUTROPHILS NFR BLD AUTO: 64 % (ref 50–70)
PLATELET # BLD AUTO: 154 THOU/UL (ref 140–440)
PMV BLD AUTO: 10.2 FL (ref 8.5–12.5)
POTASSIUM BLD-SCNC: 3.9 MMOL/L (ref 3.5–5)
RBC # BLD AUTO: 2.72 MILL/UL (ref 3.8–5.4)
SODIUM SERPL-SCNC: 137 MMOL/L (ref 136–145)
WBC: 4.7 THOU/UL (ref 4–11)

## 2018-04-10 ENCOUNTER — OFFICE VISIT - HEALTHEAST (OUTPATIENT)
Dept: GERIATRICS | Facility: CLINIC | Age: 83
End: 2018-04-10

## 2018-04-10 DIAGNOSIS — I48.21 PERMANENT ATRIAL FIBRILLATION (H): ICD-10-CM

## 2018-04-10 DIAGNOSIS — I50.32 CHRONIC DIASTOLIC CHF (CONGESTIVE HEART FAILURE) (H): ICD-10-CM

## 2018-04-12 ENCOUNTER — OFFICE VISIT - HEALTHEAST (OUTPATIENT)
Dept: GERIATRICS | Facility: CLINIC | Age: 83
End: 2018-04-12

## 2018-04-12 DIAGNOSIS — I48.0 PAF (PAROXYSMAL ATRIAL FIBRILLATION) (H): ICD-10-CM

## 2018-04-12 DIAGNOSIS — Z95.0 CARDIAC PACEMAKER IN SITU: ICD-10-CM

## 2018-04-12 DIAGNOSIS — N18.30 TYPE 2 DIABETES MELLITUS WITH STAGE 3 CHRONIC KIDNEY DISEASE, WITHOUT LONG-TERM CURRENT USE OF INSULIN (H): ICD-10-CM

## 2018-04-12 DIAGNOSIS — E03.9 HYPOTHYROIDISM, UNSPECIFIED TYPE: ICD-10-CM

## 2018-04-12 DIAGNOSIS — R53.81 PHYSICAL DECONDITIONING: ICD-10-CM
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
## 2018-04-12 DIAGNOSIS — K21.9 GASTROESOPHAGEAL REFLUX DISEASE WITHOUT ESOPHAGITIS: ICD-10-CM

## 2018-04-12 DIAGNOSIS — I10 ESSENTIAL HYPERTENSION WITH GOAL BLOOD PRESSURE LESS THAN 140/90: ICD-10-CM

## 2018-04-12 DIAGNOSIS — N18.30 CKD (CHRONIC KIDNEY DISEASE), STAGE III (H): ICD-10-CM

## 2018-04-12 DIAGNOSIS — I50.30 HEART FAILURE WITH PRESERVED EJECTION FRACTION (H): ICD-10-CM

## 2018-04-12 DIAGNOSIS — E11.22 TYPE 2 DIABETES MELLITUS WITH STAGE 3 CHRONIC KIDNEY DISEASE, WITHOUT LONG-TERM CURRENT USE OF INSULIN (H): ICD-10-CM

## 2018-04-13 ENCOUNTER — RECORDS - HEALTHEAST (OUTPATIENT)
Dept: LAB | Facility: CLINIC | Age: 83
End: 2018-04-13

## 2018-04-16 ENCOUNTER — OFFICE VISIT - HEALTHEAST (OUTPATIENT)
Dept: GERIATRICS | Facility: CLINIC | Age: 83
End: 2018-04-16

## 2018-04-16 DIAGNOSIS — E11.22 TYPE 2 DIABETES MELLITUS WITH STAGE 3 CHRONIC KIDNEY DISEASE, WITHOUT LONG-TERM CURRENT USE OF INSULIN (H): ICD-10-CM

## 2018-04-16 DIAGNOSIS — K21.9 GASTROESOPHAGEAL REFLUX DISEASE WITHOUT ESOPHAGITIS: ICD-10-CM

## 2018-04-16 DIAGNOSIS — E87.6 HYPOKALEMIA: ICD-10-CM

## 2018-04-16 DIAGNOSIS — N18.30 CKD (CHRONIC KIDNEY DISEASE), STAGE III (H): ICD-10-CM

## 2018-04-16 DIAGNOSIS — N18.30 TYPE 2 DIABETES MELLITUS WITH STAGE 3 CHRONIC KIDNEY DISEASE, WITHOUT LONG-TERM CURRENT USE OF INSULIN (H): ICD-10-CM
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
## 2018-04-16 DIAGNOSIS — E03.9 HYPOTHYROIDISM, UNSPECIFIED TYPE: ICD-10-CM

## 2018-04-16 DIAGNOSIS — R53.81 PHYSICAL DECONDITIONING: ICD-10-CM

## 2018-04-16 DIAGNOSIS — I50.30 HEART FAILURE WITH PRESERVED EJECTION FRACTION (H): ICD-10-CM

## 2018-04-16 DIAGNOSIS — I10 ESSENTIAL HYPERTENSION WITH GOAL BLOOD PRESSURE LESS THAN 140/90: ICD-10-CM

## 2018-04-16 DIAGNOSIS — J45.40 MODERATE PERSISTENT ASTHMA WITHOUT COMPLICATION: ICD-10-CM

## 2018-04-16 LAB
ANION GAP SERPL CALCULATED.3IONS-SCNC: 9 MMOL/L (ref 5–18)
BASOPHILS # BLD AUTO: 0 THOU/UL (ref 0–0.2)
BASOPHILS NFR BLD AUTO: 0 % (ref 0–2)
BUN SERPL-MCNC: 47 MG/DL (ref 8–28)
CALCIUM SERPL-MCNC: 9.4 MG/DL (ref 8.5–10.5)
CHLORIDE BLD-SCNC: 89 MMOL/L (ref 98–107)
CO2 SERPL-SCNC: 40 MMOL/L (ref 22–31)
CREAT SERPL-MCNC: 1.66 MG/DL (ref 0.6–1.1)
EOSINOPHIL # BLD AUTO: 0.3 THOU/UL (ref 0–0.4)
EOSINOPHIL NFR BLD AUTO: 5 % (ref 0–6)
ERYTHROCYTE [DISTWIDTH] IN BLOOD BY AUTOMATED COUNT: 17 % (ref 11–14.5)
GFR SERPL CREATININE-BSD FRML MDRD: 29 ML/MIN/1.73M2
GLUCOSE BLD-MCNC: 100 MG/DL (ref 70–125)
HCT VFR BLD AUTO: 28 % (ref 35–47)
HGB BLD-MCNC: 8.5 G/DL (ref 12–16)
LYMPHOCYTES # BLD AUTO: 1.2 THOU/UL (ref 0.8–4.4)
LYMPHOCYTES NFR BLD AUTO: 22 % (ref 20–40)
MCH RBC QN AUTO: 31 PG (ref 27–34)
MCHC RBC AUTO-ENTMCNC: 30.4 G/DL (ref 32–36)
MCV RBC AUTO: 102 FL (ref 80–100)
MONOCYTES # BLD AUTO: 0.5 THOU/UL (ref 0–0.9)
MONOCYTES NFR BLD AUTO: 9 % (ref 2–10)
NEUTROPHILS # BLD AUTO: 3.3 THOU/UL (ref 2–7.7)
NEUTROPHILS NFR BLD AUTO: 63 % (ref 50–70)
PLATELET # BLD AUTO: 170 THOU/UL (ref 140–440)
PMV BLD AUTO: 10.4 FL (ref 8.5–12.5)
POTASSIUM BLD-SCNC: 3.1 MMOL/L (ref 3.5–5)
RBC # BLD AUTO: 2.74 MILL/UL (ref 3.8–5.4)
SODIUM SERPL-SCNC: 138 MMOL/L (ref 136–145)
WBC: 5.2 THOU/UL (ref 4–11)

## 2018-04-17 ENCOUNTER — OFFICE VISIT - HEALTHEAST (OUTPATIENT)
Dept: GERIATRICS | Facility: CLINIC | Age: 83
End: 2018-04-17

## 2018-04-17 ENCOUNTER — OFFICE VISIT - HEALTHEAST (OUTPATIENT)
Dept: CARDIOLOGY | Facility: CLINIC | Age: 83
End: 2018-04-17

## 2018-04-17 DIAGNOSIS — I48.0 PAF (PAROXYSMAL ATRIAL FIBRILLATION) (H): ICD-10-CM

## 2018-04-17 DIAGNOSIS — I50.32 CHRONIC DIASTOLIC CHF (CONGESTIVE HEART FAILURE) (H): ICD-10-CM

## 2018-04-17 DIAGNOSIS — I49.5 SINUS NODE DYSFUNCTION (H): ICD-10-CM

## 2018-04-17 DIAGNOSIS — I48.0 PAROXYSMAL ATRIAL FIBRILLATION (H): ICD-10-CM

## 2018-04-17 DIAGNOSIS — Z95.2 S/P AVR (AORTIC VALVE REPLACEMENT): ICD-10-CM

## 2018-04-17 DIAGNOSIS — N18.30 CKD (CHRONIC KIDNEY DISEASE), STAGE III (H): ICD-10-CM

## 2018-04-17 DIAGNOSIS — I50.32 CHRONIC DIASTOLIC HEART FAILURE (H): ICD-10-CM

## 2018-04-19 ENCOUNTER — OFFICE VISIT - HEALTHEAST (OUTPATIENT)
Dept: GERIATRICS | Facility: CLINIC | Age: 83
End: 2018-04-19

## 2018-04-19 DIAGNOSIS — R53.81 PHYSICAL DECONDITIONING: ICD-10-CM
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
## 2018-04-19 DIAGNOSIS — K21.9 GASTROESOPHAGEAL REFLUX DISEASE WITHOUT ESOPHAGITIS: ICD-10-CM

## 2018-04-19 DIAGNOSIS — I50.30 HEART FAILURE WITH PRESERVED EJECTION FRACTION (H): ICD-10-CM

## 2018-04-19 DIAGNOSIS — J45.909 UNCOMPLICATED ASTHMA, UNSPECIFIED ASTHMA SEVERITY, UNSPECIFIED WHETHER PERSISTENT: ICD-10-CM

## 2018-04-19 DIAGNOSIS — N18.30 TYPE 2 DIABETES MELLITUS WITH STAGE 3 CHRONIC KIDNEY DISEASE, WITHOUT LONG-TERM CURRENT USE OF INSULIN (H): ICD-10-CM

## 2018-04-19 DIAGNOSIS — I48.0 PAF (PAROXYSMAL ATRIAL FIBRILLATION) (H): ICD-10-CM

## 2018-04-19 DIAGNOSIS — N18.30 CKD (CHRONIC KIDNEY DISEASE), STAGE III (H): ICD-10-CM

## 2018-04-19 DIAGNOSIS — I10 ESSENTIAL HYPERTENSION WITH GOAL BLOOD PRESSURE LESS THAN 140/90: ICD-10-CM

## 2018-04-19 DIAGNOSIS — D64.9 NORMOCHROMIC NORMOCYTIC ANEMIA: ICD-10-CM

## 2018-04-19 DIAGNOSIS — I48.21 PERMANENT ATRIAL FIBRILLATION (H): ICD-10-CM

## 2018-04-19 DIAGNOSIS — J45.40 MODERATE PERSISTENT ASTHMA WITHOUT COMPLICATION: ICD-10-CM

## 2018-04-19 DIAGNOSIS — E11.22 TYPE 2 DIABETES MELLITUS WITH STAGE 3 CHRONIC KIDNEY DISEASE, WITHOUT LONG-TERM CURRENT USE OF INSULIN (H): ICD-10-CM

## 2018-04-19 DIAGNOSIS — E03.9 HYPOTHYROIDISM, UNSPECIFIED TYPE: ICD-10-CM

## 2018-04-20 ENCOUNTER — RECORDS - HEALTHEAST (OUTPATIENT)
Dept: LAB | Facility: CLINIC | Age: 83
End: 2018-04-20

## 2018-04-23 ENCOUNTER — OFFICE VISIT - HEALTHEAST (OUTPATIENT)
Dept: GERIATRICS | Facility: CLINIC | Age: 83
End: 2018-04-23

## 2018-04-23 DIAGNOSIS — E03.9 HYPOTHYROIDISM, UNSPECIFIED TYPE: ICD-10-CM

## 2018-04-23 DIAGNOSIS — E78.2 MIXED HYPERLIPIDEMIA: ICD-10-CM

## 2018-04-23 DIAGNOSIS — J45.40 MODERATE PERSISTENT ASTHMA WITHOUT COMPLICATION: ICD-10-CM

## 2018-04-23 DIAGNOSIS — N18.30 CKD (CHRONIC KIDNEY DISEASE), STAGE III (H): ICD-10-CM

## 2018-04-23 DIAGNOSIS — N18.30 TYPE 2 DIABETES MELLITUS WITH STAGE 3 CHRONIC KIDNEY DISEASE, WITHOUT LONG-TERM CURRENT USE OF INSULIN (H): ICD-10-CM

## 2018-04-23 DIAGNOSIS — E11.22 TYPE 2 DIABETES MELLITUS WITH STAGE 3 CHRONIC KIDNEY DISEASE, WITHOUT LONG-TERM CURRENT USE OF INSULIN (H): ICD-10-CM

## 2018-04-23 DIAGNOSIS — I48.0 PAF (PAROXYSMAL ATRIAL FIBRILLATION) (H): ICD-10-CM

## 2018-04-23 DIAGNOSIS — I50.30 HEART FAILURE WITH PRESERVED EJECTION FRACTION (H): ICD-10-CM

## 2018-04-23 DIAGNOSIS — I10 ESSENTIAL HYPERTENSION WITH GOAL BLOOD PRESSURE LESS THAN 140/90: ICD-10-CM

## 2018-04-23 DIAGNOSIS — R53.81 PHYSICAL DECONDITIONING: ICD-10-CM

## 2018-04-23 LAB
ANION GAP SERPL CALCULATED.3IONS-SCNC: 13 MMOL/L (ref 5–18)
BASOPHILS # BLD AUTO: 0 THOU/UL (ref 0–0.2)
BASOPHILS NFR BLD AUTO: 0 % (ref 0–2)
BUN SERPL-MCNC: 45 MG/DL (ref 8–28)
CALCIUM SERPL-MCNC: 9.9 MG/DL (ref 8.5–10.5)
CHLORIDE BLD-SCNC: 94 MMOL/L (ref 98–107)
CO2 SERPL-SCNC: 32 MMOL/L (ref 22–31)
CREAT SERPL-MCNC: 1.5 MG/DL (ref 0.6–1.1)
EOSINOPHIL # BLD AUTO: 0.3 THOU/UL (ref 0–0.4)
EOSINOPHIL NFR BLD AUTO: 4 % (ref 0–6)
ERYTHROCYTE [DISTWIDTH] IN BLOOD BY AUTOMATED COUNT: 16.4 % (ref 11–14.5)
GFR SERPL CREATININE-BSD FRML MDRD: 33 ML/MIN/1.73M2
GLUCOSE BLD-MCNC: 106 MG/DL (ref 70–125)
HCT VFR BLD AUTO: 31.7 % (ref 35–47)
HGB BLD-MCNC: 9.2 G/DL (ref 12–16)
LYMPHOCYTES # BLD AUTO: 0.9 THOU/UL (ref 0.8–4.4)
LYMPHOCYTES NFR BLD AUTO: 14 % (ref 20–40)
MCH RBC QN AUTO: 29.9 PG (ref 27–34)
MCHC RBC AUTO-ENTMCNC: 29 G/DL (ref 32–36)
MCV RBC AUTO: 103 FL (ref 80–100)
MONOCYTES # BLD AUTO: 0.5 THOU/UL (ref 0–0.9)
MONOCYTES NFR BLD AUTO: 7 % (ref 2–10)
NEUTROPHILS # BLD AUTO: 5.1 THOU/UL (ref 2–7.7)
NEUTROPHILS NFR BLD AUTO: 75 % (ref 50–70)
PLATELET # BLD AUTO: 160 THOU/UL (ref 140–440)
PMV BLD AUTO: 10.7 FL (ref 8.5–12.5)
POTASSIUM BLD-SCNC: 3.5 MMOL/L (ref 3.5–5)
RBC # BLD AUTO: 3.08 MILL/UL (ref 3.8–5.4)
SODIUM SERPL-SCNC: 139 MMOL/L (ref 136–145)
WBC: 6.8 THOU/UL (ref 4–11)

## 2018-04-24 ENCOUNTER — OFFICE VISIT - HEALTHEAST (OUTPATIENT)
Dept: GERIATRICS | Facility: CLINIC | Age: 83
End: 2018-04-24

## 2018-04-24 DIAGNOSIS — I50.32 CHRONIC DIASTOLIC CHF (CONGESTIVE HEART FAILURE) (H): ICD-10-CM

## 2018-04-24 DIAGNOSIS — N18.30 CKD (CHRONIC KIDNEY DISEASE), STAGE III (H): ICD-10-CM

## 2018-04-24 DIAGNOSIS — J45.909 UNCOMPLICATED ASTHMA, UNSPECIFIED ASTHMA SEVERITY, UNSPECIFIED WHETHER PERSISTENT: ICD-10-CM

## 2018-04-27 ENCOUNTER — OFFICE VISIT - HEALTHEAST (OUTPATIENT)
Dept: GERIATRICS | Facility: CLINIC | Age: 83
End: 2018-04-27

## 2018-04-27 DIAGNOSIS — E11.22 TYPE 2 DIABETES MELLITUS WITH STAGE 3 CHRONIC KIDNEY DISEASE, WITHOUT LONG-TERM CURRENT USE OF INSULIN (H): ICD-10-CM
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
## 2018-04-27 DIAGNOSIS — R53.81 PHYSICAL DECONDITIONING: ICD-10-CM

## 2018-04-27 DIAGNOSIS — I10 ESSENTIAL HYPERTENSION WITH GOAL BLOOD PRESSURE LESS THAN 140/90: ICD-10-CM

## 2018-04-27 DIAGNOSIS — I48.0 PAF (PAROXYSMAL ATRIAL FIBRILLATION) (H): ICD-10-CM

## 2018-04-27 DIAGNOSIS — I50.30 HEART FAILURE WITH PRESERVED EJECTION FRACTION (H): ICD-10-CM

## 2018-04-27 DIAGNOSIS — N18.30 CKD (CHRONIC KIDNEY DISEASE), STAGE III (H): ICD-10-CM

## 2018-04-27 DIAGNOSIS — N18.30 TYPE 2 DIABETES MELLITUS WITH STAGE 3 CHRONIC KIDNEY DISEASE, WITHOUT LONG-TERM CURRENT USE OF INSULIN (H): ICD-10-CM

## 2018-04-27 DIAGNOSIS — E03.9 HYPOTHYROIDISM, UNSPECIFIED TYPE: ICD-10-CM

## 2018-04-30 ENCOUNTER — OFFICE VISIT - HEALTHEAST (OUTPATIENT)
Dept: GERIATRICS | Facility: CLINIC | Age: 83
End: 2018-04-30

## 2018-04-30 DIAGNOSIS — I48.21 PERMANENT ATRIAL FIBRILLATION (H): ICD-10-CM

## 2018-04-30 DIAGNOSIS — E11.22 TYPE 2 DIABETES MELLITUS WITH STAGE 3 CHRONIC KIDNEY DISEASE, WITHOUT LONG-TERM CURRENT USE OF INSULIN (H): ICD-10-CM

## 2018-04-30 DIAGNOSIS — I48.0 PAF (PAROXYSMAL ATRIAL FIBRILLATION) (H): ICD-10-CM

## 2018-04-30 DIAGNOSIS — J45.40 MODERATE PERSISTENT ASTHMA WITHOUT COMPLICATION: ICD-10-CM

## 2018-04-30 DIAGNOSIS — Z95.0 CARDIAC PACEMAKER IN SITU: ICD-10-CM
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
## 2018-04-30 DIAGNOSIS — N18.30 TYPE 2 DIABETES MELLITUS WITH STAGE 3 CHRONIC KIDNEY DISEASE, WITHOUT LONG-TERM CURRENT USE OF INSULIN (H): ICD-10-CM

## 2018-04-30 DIAGNOSIS — I50.30 HEART FAILURE WITH PRESERVED EJECTION FRACTION (H): ICD-10-CM

## 2018-04-30 DIAGNOSIS — R53.81 PHYSICAL DECONDITIONING: ICD-10-CM

## 2018-04-30 DIAGNOSIS — I10 ESSENTIAL HYPERTENSION WITH GOAL BLOOD PRESSURE LESS THAN 140/90: ICD-10-CM

## 2018-05-01 ENCOUNTER — OFFICE VISIT - HEALTHEAST (OUTPATIENT)
Dept: GERIATRICS | Facility: CLINIC | Age: 83
End: 2018-05-01

## 2018-05-01 ENCOUNTER — RECORDS - HEALTHEAST (OUTPATIENT)
Dept: LAB | Facility: CLINIC | Age: 83
End: 2018-05-01

## 2018-05-01 DIAGNOSIS — I48.0 PAF (PAROXYSMAL ATRIAL FIBRILLATION) (H): ICD-10-CM

## 2018-05-01 DIAGNOSIS — I50.32 CHRONIC DIASTOLIC CHF (CONGESTIVE HEART FAILURE) (H): ICD-10-CM

## 2018-05-01 DIAGNOSIS — Z95.3 H/O HEART VALVE REPLACEMENT WITH BIOPROSTHETIC VALVE: ICD-10-CM

## 2018-05-01 DIAGNOSIS — N18.30 CKD (CHRONIC KIDNEY DISEASE), STAGE III (H): ICD-10-CM

## 2018-05-02 LAB
ANION GAP SERPL CALCULATED.3IONS-SCNC: 9 MMOL/L (ref 5–18)
BUN SERPL-MCNC: 53 MG/DL (ref 8–28)
CALCIUM SERPL-MCNC: 9.7 MG/DL (ref 8.5–10.5)
CHLORIDE BLD-SCNC: 93 MMOL/L (ref 98–107)
CO2 SERPL-SCNC: 36 MMOL/L (ref 22–31)
CREAT SERPL-MCNC: 1.61 MG/DL (ref 0.6–1.1)
GFR SERPL CREATININE-BSD FRML MDRD: 30 ML/MIN/1.73M2
GLUCOSE BLD-MCNC: 114 MG/DL (ref 70–125)
POTASSIUM BLD-SCNC: 2.9 MMOL/L (ref 3.5–5)
SODIUM SERPL-SCNC: 138 MMOL/L (ref 136–145)

## 2018-05-03 ENCOUNTER — OFFICE VISIT - HEALTHEAST (OUTPATIENT)
Dept: GERIATRICS | Facility: CLINIC | Age: 83
End: 2018-05-03

## 2018-05-03 DIAGNOSIS — R53.81 PHYSICAL DECONDITIONING: ICD-10-CM

## 2018-05-03 DIAGNOSIS — Z95.0 CARDIAC PACEMAKER IN SITU: ICD-10-CM

## 2018-05-03 DIAGNOSIS — J45.40 MODERATE PERSISTENT ASTHMA WITHOUT COMPLICATION: ICD-10-CM

## 2018-05-03 DIAGNOSIS — N18.30 TYPE 2 DIABETES MELLITUS WITH STAGE 3 CHRONIC KIDNEY DISEASE, WITHOUT LONG-TERM CURRENT USE OF INSULIN (H): ICD-10-CM

## 2018-05-03 DIAGNOSIS — D64.9 NORMOCHROMIC NORMOCYTIC ANEMIA: ICD-10-CM

## 2018-05-03 DIAGNOSIS — E03.9 HYPOTHYROIDISM, UNSPECIFIED TYPE: ICD-10-CM

## 2018-05-03 DIAGNOSIS — K59.04 CHRONIC IDIOPATHIC CONSTIPATION: ICD-10-CM

## 2018-05-03 DIAGNOSIS — E11.22 TYPE 2 DIABETES MELLITUS WITH STAGE 3 CHRONIC KIDNEY DISEASE, WITHOUT LONG-TERM CURRENT USE OF INSULIN (H): ICD-10-CM

## 2018-05-03 DIAGNOSIS — N18.30 CKD (CHRONIC KIDNEY DISEASE), STAGE III (H): ICD-10-CM

## 2018-05-03 DIAGNOSIS — I48.21 PERMANENT ATRIAL FIBRILLATION (H): ICD-10-CM

## 2018-05-03 DIAGNOSIS — I50.30 HEART FAILURE WITH PRESERVED EJECTION FRACTION (H): ICD-10-CM

## 2018-05-04 ENCOUNTER — OFFICE VISIT - HEALTHEAST (OUTPATIENT)
Dept: GERIATRICS | Facility: CLINIC | Age: 83
End: 2018-05-04

## 2018-05-04 DIAGNOSIS — I10 ESSENTIAL HYPERTENSION WITH GOAL BLOOD PRESSURE LESS THAN 140/90: ICD-10-CM

## 2018-05-04 DIAGNOSIS — E03.9 HYPOTHYROIDISM, UNSPECIFIED TYPE: ICD-10-CM

## 2018-05-04 DIAGNOSIS — B37.9 CANDIDIASIS: ICD-10-CM

## 2018-05-04 DIAGNOSIS — J45.40 MODERATE PERSISTENT ASTHMA WITHOUT COMPLICATION: ICD-10-CM

## 2018-05-04 DIAGNOSIS — E11.22 TYPE 2 DIABETES MELLITUS WITH STAGE 3 CHRONIC KIDNEY DISEASE, WITHOUT LONG-TERM CURRENT USE OF INSULIN (H): ICD-10-CM

## 2018-05-04 DIAGNOSIS — N18.30 TYPE 2 DIABETES MELLITUS WITH STAGE 3 CHRONIC KIDNEY DISEASE, WITHOUT LONG-TERM CURRENT USE OF INSULIN (H): ICD-10-CM

## 2018-05-04 DIAGNOSIS — R53.81 PHYSICAL DECONDITIONING: ICD-10-CM

## 2018-05-04 DIAGNOSIS — I48.0 PAF (PAROXYSMAL ATRIAL FIBRILLATION) (H): ICD-10-CM

## 2018-05-04 DIAGNOSIS — I50.30 HEART FAILURE WITH PRESERVED EJECTION FRACTION (H): ICD-10-CM

## 2018-05-04 DIAGNOSIS — Z95.0 CARDIAC PACEMAKER IN SITU: ICD-10-CM

## 2018-05-07 ENCOUNTER — OFFICE VISIT - HEALTHEAST (OUTPATIENT)
Dept: GERIATRICS | Facility: CLINIC | Age: 83
End: 2018-05-07

## 2018-05-07 ENCOUNTER — RECORDS - HEALTHEAST (OUTPATIENT)
Dept: LAB | Facility: CLINIC | Age: 83
End: 2018-05-07

## 2018-05-07 DIAGNOSIS — J45.40 MODERATE PERSISTENT ASTHMA WITHOUT COMPLICATION: ICD-10-CM

## 2018-05-07 DIAGNOSIS — I10 ESSENTIAL HYPERTENSION WITH GOAL BLOOD PRESSURE LESS THAN 140/90: ICD-10-CM

## 2018-05-07 DIAGNOSIS — N18.30 TYPE 2 DIABETES MELLITUS WITH STAGE 3 CHRONIC KIDNEY DISEASE, WITHOUT LONG-TERM CURRENT USE OF INSULIN (H): ICD-10-CM

## 2018-05-07 DIAGNOSIS — E03.9 HYPOTHYROIDISM, UNSPECIFIED TYPE: ICD-10-CM

## 2018-05-07 DIAGNOSIS — E11.22 TYPE 2 DIABETES MELLITUS WITH STAGE 3 CHRONIC KIDNEY DISEASE, WITHOUT LONG-TERM CURRENT USE OF INSULIN (H): ICD-10-CM

## 2018-05-07 DIAGNOSIS — N18.30 CKD (CHRONIC KIDNEY DISEASE), STAGE III (H): ICD-10-CM

## 2018-05-07 DIAGNOSIS — R53.81 PHYSICAL DECONDITIONING: ICD-10-CM

## 2018-05-07 DIAGNOSIS — I50.30 HEART FAILURE WITH PRESERVED EJECTION FRACTION (H): ICD-10-CM

## 2018-05-07 LAB
ANION GAP SERPL CALCULATED.3IONS-SCNC: 12 MMOL/L (ref 5–18)
BUN SERPL-MCNC: 59 MG/DL (ref 8–28)
CALCIUM SERPL-MCNC: 9.9 MG/DL (ref 8.5–10.5)
CHLORIDE BLD-SCNC: 92 MMOL/L (ref 98–107)
CO2 SERPL-SCNC: 35 MMOL/L (ref 22–31)
CREAT SERPL-MCNC: 1.59 MG/DL (ref 0.6–1.1)
GFR SERPL CREATININE-BSD FRML MDRD: 31 ML/MIN/1.73M2
GLUCOSE BLD-MCNC: 104 MG/DL (ref 70–125)
POTASSIUM BLD-SCNC: 3.4 MMOL/L (ref 3.5–5)
SODIUM SERPL-SCNC: 139 MMOL/L (ref 136–145)

## 2018-05-08 ENCOUNTER — OFFICE VISIT - HEALTHEAST (OUTPATIENT)
Dept: GERIATRICS | Facility: CLINIC | Age: 83
End: 2018-05-08

## 2018-05-08 DIAGNOSIS — I48.0 PAF (PAROXYSMAL ATRIAL FIBRILLATION) (H): ICD-10-CM

## 2018-05-08 DIAGNOSIS — I50.32 CHRONIC DIASTOLIC CHF (CONGESTIVE HEART FAILURE) (H): ICD-10-CM

## 2018-05-08 DIAGNOSIS — I25.10 ATHEROSCLEROSIS OF NATIVE CORONARY ARTERY OF NATIVE HEART WITHOUT ANGINA PECTORIS: ICD-10-CM

## 2018-05-08 DIAGNOSIS — Z95.3 H/O HEART VALVE REPLACEMENT WITH BIOPROSTHETIC VALVE: ICD-10-CM

## 2018-05-08 DIAGNOSIS — N18.30 CKD (CHRONIC KIDNEY DISEASE), STAGE III (H): ICD-10-CM

## 2018-05-10 ENCOUNTER — COMMUNICATION - HEALTHEAST (OUTPATIENT)
Dept: GERIATRICS | Facility: CLINIC | Age: 83
End: 2018-05-10

## 2018-05-10 ENCOUNTER — AMBULATORY - HEALTHEAST (OUTPATIENT)
Dept: GERIATRICS | Facility: CLINIC | Age: 83
End: 2018-05-10

## 2018-05-14 ENCOUNTER — RECORDS - HEALTHEAST (OUTPATIENT)
Dept: ADMINISTRATIVE | Facility: OTHER | Age: 83
End: 2018-05-14

## 2018-05-29 ENCOUNTER — OFFICE VISIT - HEALTHEAST (OUTPATIENT)
Dept: FAMILY MEDICINE | Facility: CLINIC | Age: 83
End: 2018-05-29

## 2018-05-29 ENCOUNTER — RECORDS - HEALTHEAST (OUTPATIENT)
Dept: GENERAL RADIOLOGY | Facility: CLINIC | Age: 83
End: 2018-05-29

## 2018-05-29 DIAGNOSIS — R05.9 COUGH: ICD-10-CM

## 2018-05-29 DIAGNOSIS — I10 ESSENTIAL HYPERTENSION WITH GOAL BLOOD PRESSURE LESS THAN 140/90: ICD-10-CM

## 2018-05-29 DIAGNOSIS — D53.9 MACROCYTIC ANEMIA: ICD-10-CM

## 2018-05-29 DIAGNOSIS — E11.8 TYPE 2 DIABETES MELLITUS WITH COMPLICATION, WITHOUT LONG-TERM CURRENT USE OF INSULIN (H): ICD-10-CM

## 2018-05-29 DIAGNOSIS — I48.0 PAF (PAROXYSMAL ATRIAL FIBRILLATION) (H): ICD-10-CM

## 2018-05-29 DIAGNOSIS — J18.9 HEALTHCARE-ASSOCIATED PNEUMONIA: ICD-10-CM

## 2018-05-29 DIAGNOSIS — J18.9 PNEUMONIA, UNSPECIFIED ORGANISM: ICD-10-CM

## 2018-05-29 DIAGNOSIS — E03.9 HYPOTHYROIDISM: ICD-10-CM

## 2018-05-29 DIAGNOSIS — I50.30 HEART FAILURE WITH PRESERVED EJECTION FRACTION (H): ICD-10-CM

## 2018-05-29 DIAGNOSIS — N18.30 CHRONIC KIDNEY DISEASE, STAGE III (MODERATE) (H): ICD-10-CM

## 2018-05-29 DIAGNOSIS — E78.5 HYPERLIPIDEMIA, UNSPECIFIED HYPERLIPIDEMIA TYPE: ICD-10-CM

## 2018-05-29 LAB
ANION GAP SERPL CALCULATED.3IONS-SCNC: 18 MMOL/L (ref 5–18)
BUN SERPL-MCNC: 66 MG/DL (ref 8–28)
CALCIUM SERPL-MCNC: 10.3 MG/DL (ref 8.5–10.5)
CHLORIDE BLD-SCNC: 86 MMOL/L (ref 98–107)
CO2 SERPL-SCNC: 32 MMOL/L (ref 22–31)
CREAT SERPL-MCNC: 2.07 MG/DL (ref 0.6–1.1)
ERYTHROCYTE [DISTWIDTH] IN BLOOD BY AUTOMATED COUNT: 13.1 % (ref 11–14.5)
GFR SERPL CREATININE-BSD FRML MDRD: 23 ML/MIN/1.73M2
GLUCOSE BLD-MCNC: 127 MG/DL (ref 70–125)
HCT VFR BLD AUTO: 35.9 % (ref 35–47)
HGB BLD-MCNC: 12 G/DL (ref 12–16)
MCH RBC QN AUTO: 31.2 PG (ref 27–34)
MCHC RBC AUTO-ENTMCNC: 33.4 G/DL (ref 32–36)
MCV RBC AUTO: 93 FL (ref 80–100)
PLATELET # BLD AUTO: 247 THOU/UL (ref 140–440)
PMV BLD AUTO: 7.4 FL (ref 7–10)
POTASSIUM BLD-SCNC: 3.4 MMOL/L (ref 3.5–5)
RBC # BLD AUTO: 3.85 MILL/UL (ref 3.8–5.4)
SODIUM SERPL-SCNC: 136 MMOL/L (ref 136–145)
TSH SERPL DL<=0.005 MIU/L-ACNC: 24.55 UIU/ML (ref 0.3–5)
WBC: 7.7 THOU/UL (ref 4–11)

## 2018-05-30 ENCOUNTER — COMMUNICATION - HEALTHEAST (OUTPATIENT)
Dept: FAMILY MEDICINE | Facility: CLINIC | Age: 83
End: 2018-05-30

## 2018-06-01 ENCOUNTER — COMMUNICATION - HEALTHEAST (OUTPATIENT)
Dept: FAMILY MEDICINE | Facility: CLINIC | Age: 83
End: 2018-06-01

## 2018-06-01 DIAGNOSIS — R60.0 LOCALIZED EDEMA: ICD-10-CM

## 2018-06-08 ENCOUNTER — RECORDS - HEALTHEAST (OUTPATIENT)
Dept: ADMINISTRATIVE | Facility: OTHER | Age: 83
End: 2018-06-08

## 2018-06-14 ENCOUNTER — RECORDS - HEALTHEAST (OUTPATIENT)
Dept: ADMINISTRATIVE | Facility: OTHER | Age: 83
End: 2018-06-14

## 2018-06-14 ENCOUNTER — AMBULATORY - HEALTHEAST (OUTPATIENT)
Dept: CARDIOLOGY | Facility: CLINIC | Age: 83
End: 2018-06-14

## 2018-06-14 DIAGNOSIS — Z95.0 PACEMAKER: ICD-10-CM

## 2018-06-15 ENCOUNTER — COMMUNICATION - HEALTHEAST (OUTPATIENT)
Dept: CARDIOLOGY | Facility: CLINIC | Age: 83
End: 2018-06-15

## 2018-06-15 LAB
HCC DEVICE COMMENTS: NORMAL
HCC DEVICE IMPLANTING PROVIDER: NORMAL
HCC DEVICE MANUFACTURE: NORMAL
HCC DEVICE MODEL: NORMAL
HCC DEVICE SERIAL NUMBER: NORMAL
HCC DEVICE TYPE: NORMAL

## 2018-07-05 ENCOUNTER — COMMUNICATION - HEALTHEAST (OUTPATIENT)
Dept: SCHEDULING | Facility: CLINIC | Age: 83
End: 2018-07-05

## 2018-07-05 ENCOUNTER — OFFICE VISIT - HEALTHEAST (OUTPATIENT)
Dept: FAMILY MEDICINE | Facility: CLINIC | Age: 83
End: 2018-07-05

## 2018-07-05 DIAGNOSIS — E03.9 HYPOTHYROIDISM, UNSPECIFIED TYPE: ICD-10-CM

## 2018-07-05 DIAGNOSIS — E11.8 TYPE 2 DIABETES MELLITUS WITH COMPLICATION, WITHOUT LONG-TERM CURRENT USE OF INSULIN (H): ICD-10-CM

## 2018-07-05 DIAGNOSIS — E11.9 TYPE 2 DIABETES MELLITUS (H): ICD-10-CM

## 2018-07-05 DIAGNOSIS — I48.0 PAF (PAROXYSMAL ATRIAL FIBRILLATION) (H): ICD-10-CM

## 2018-07-05 DIAGNOSIS — N18.30 CHRONIC KIDNEY DISEASE, STAGE III (MODERATE) (H): ICD-10-CM

## 2018-07-05 DIAGNOSIS — I10 ESSENTIAL HYPERTENSION WITH GOAL BLOOD PRESSURE LESS THAN 140/90: ICD-10-CM

## 2018-07-05 LAB
ANION GAP SERPL CALCULATED.3IONS-SCNC: 15 MMOL/L (ref 5–18)
BUN SERPL-MCNC: 52 MG/DL (ref 8–28)
CALCIUM SERPL-MCNC: 9.9 MG/DL (ref 8.5–10.5)
CHLORIDE BLD-SCNC: 85 MMOL/L (ref 98–107)
CO2 SERPL-SCNC: 39 MMOL/L (ref 22–31)
CREAT SERPL-MCNC: 2.19 MG/DL (ref 0.6–1.1)
GFR SERPL CREATININE-BSD FRML MDRD: 21 ML/MIN/1.73M2
GLUCOSE BLD-MCNC: 120 MG/DL (ref 70–125)
HBA1C MFR BLD: 6.9 % (ref 3.5–6)
POTASSIUM BLD-SCNC: 2.5 MMOL/L (ref 3.5–5)
SODIUM SERPL-SCNC: 139 MMOL/L (ref 136–145)
TSH SERPL DL<=0.005 MIU/L-ACNC: 9.31 UIU/ML (ref 0.3–5)

## 2018-07-06 ENCOUNTER — COMMUNICATION - HEALTHEAST (OUTPATIENT)
Dept: FAMILY MEDICINE | Facility: CLINIC | Age: 83
End: 2018-07-06

## 2018-07-09 ENCOUNTER — AMBULATORY - HEALTHEAST (OUTPATIENT)
Dept: FAMILY MEDICINE | Facility: CLINIC | Age: 83
End: 2018-07-09

## 2018-07-09 ENCOUNTER — COMMUNICATION - HEALTHEAST (OUTPATIENT)
Dept: FAMILY MEDICINE | Facility: CLINIC | Age: 83
End: 2018-07-09

## 2018-07-09 ENCOUNTER — AMBULATORY - HEALTHEAST (OUTPATIENT)
Dept: LAB | Facility: CLINIC | Age: 83
End: 2018-07-09

## 2018-07-09 DIAGNOSIS — E87.6 HYPOKALEMIA: ICD-10-CM

## 2018-07-09 LAB
ANION GAP SERPL CALCULATED.3IONS-SCNC: 15 MMOL/L (ref 5–18)
BUN SERPL-MCNC: 59 MG/DL (ref 8–28)
CALCIUM SERPL-MCNC: 10.8 MG/DL (ref 8.5–10.5)
CHLORIDE BLD-SCNC: 91 MMOL/L (ref 98–107)
CO2 SERPL-SCNC: 33 MMOL/L (ref 22–31)
CREAT SERPL-MCNC: 2.1 MG/DL (ref 0.6–1.1)
GFR SERPL CREATININE-BSD FRML MDRD: 22 ML/MIN/1.73M2
GLUCOSE BLD-MCNC: 129 MG/DL (ref 70–125)
POTASSIUM BLD-SCNC: 3.8 MMOL/L (ref 3.5–5)
SODIUM SERPL-SCNC: 139 MMOL/L (ref 136–145)

## 2018-07-10 ENCOUNTER — COMMUNICATION - HEALTHEAST (OUTPATIENT)
Dept: FAMILY MEDICINE | Facility: CLINIC | Age: 83
End: 2018-07-10

## 2018-07-10 ENCOUNTER — RECORDS - HEALTHEAST (OUTPATIENT)
Dept: ADMINISTRATIVE | Facility: OTHER | Age: 83
End: 2018-07-10

## 2018-07-24 ENCOUNTER — AMBULATORY - HEALTHEAST (OUTPATIENT)
Dept: LAB | Facility: CLINIC | Age: 83
End: 2018-07-24

## 2018-07-24 DIAGNOSIS — E87.6 HYPOKALEMIA: ICD-10-CM

## 2018-07-24 LAB
ANION GAP SERPL CALCULATED.3IONS-SCNC: 13 MMOL/L (ref 5–18)
BUN SERPL-MCNC: 49 MG/DL (ref 8–28)
CALCIUM SERPL-MCNC: 9.8 MG/DL (ref 8.5–10.5)
CHLORIDE BLD-SCNC: 95 MMOL/L (ref 98–107)
CO2 SERPL-SCNC: 31 MMOL/L (ref 22–31)
CREAT SERPL-MCNC: 2.07 MG/DL (ref 0.6–1.1)
GFR SERPL CREATININE-BSD FRML MDRD: 23 ML/MIN/1.73M2
GLUCOSE BLD-MCNC: 107 MG/DL (ref 70–125)
POTASSIUM BLD-SCNC: 3.3 MMOL/L (ref 3.5–5)
SODIUM SERPL-SCNC: 139 MMOL/L (ref 136–145)

## 2018-07-25 ENCOUNTER — COMMUNICATION - HEALTHEAST (OUTPATIENT)
Dept: FAMILY MEDICINE | Facility: CLINIC | Age: 83
End: 2018-07-25

## 2018-08-03 ENCOUNTER — COMMUNICATION - HEALTHEAST (OUTPATIENT)
Dept: LAB | Facility: CLINIC | Age: 83
End: 2018-08-03

## 2018-08-03 DIAGNOSIS — E87.6 HYPOKALEMIA: ICD-10-CM

## 2018-08-13 ENCOUNTER — COMMUNICATION - HEALTHEAST (OUTPATIENT)
Dept: FAMILY MEDICINE | Facility: CLINIC | Age: 83
End: 2018-08-13

## 2018-08-13 ENCOUNTER — AMBULATORY - HEALTHEAST (OUTPATIENT)
Dept: LAB | Facility: CLINIC | Age: 83
End: 2018-08-13

## 2018-08-13 DIAGNOSIS — E11.9 TYPE 2 DIABETES MELLITUS (H): ICD-10-CM

## 2018-08-13 DIAGNOSIS — E87.6 HYPOKALEMIA: ICD-10-CM

## 2018-08-13 LAB
ANION GAP SERPL CALCULATED.3IONS-SCNC: 16 MMOL/L (ref 5–18)
BUN SERPL-MCNC: 45 MG/DL (ref 8–28)
CALCIUM SERPL-MCNC: 9.9 MG/DL (ref 8.5–10.5)
CHLORIDE BLD-SCNC: 94 MMOL/L (ref 98–107)
CO2 SERPL-SCNC: 30 MMOL/L (ref 22–31)
CREAT SERPL-MCNC: 2.11 MG/DL (ref 0.6–1.1)
GFR SERPL CREATININE-BSD FRML MDRD: 22 ML/MIN/1.73M2
GLUCOSE BLD-MCNC: 106 MG/DL (ref 70–125)
HBA1C MFR BLD: 7 % (ref 3.5–6)
POTASSIUM BLD-SCNC: 3.5 MMOL/L (ref 3.5–5)
SODIUM SERPL-SCNC: 140 MMOL/L (ref 136–145)

## 2018-08-14 ENCOUNTER — COMMUNICATION - HEALTHEAST (OUTPATIENT)
Dept: FAMILY MEDICINE | Facility: CLINIC | Age: 83
End: 2018-08-14

## 2018-08-16 ENCOUNTER — COMMUNICATION - HEALTHEAST (OUTPATIENT)
Dept: FAMILY MEDICINE | Facility: CLINIC | Age: 83
End: 2018-08-16

## 2018-08-16 DIAGNOSIS — E87.6 HYPOKALEMIA: ICD-10-CM

## 2018-08-22 ENCOUNTER — RECORDS - HEALTHEAST (OUTPATIENT)
Dept: ADMINISTRATIVE | Facility: OTHER | Age: 83
End: 2018-08-22

## 2018-08-30 ENCOUNTER — RECORDS - HEALTHEAST (OUTPATIENT)
Dept: ADMINISTRATIVE | Facility: OTHER | Age: 83
End: 2018-08-30

## 2018-09-25 ENCOUNTER — AMBULATORY - HEALTHEAST (OUTPATIENT)
Dept: CARDIOLOGY | Facility: CLINIC | Age: 83
End: 2018-09-25

## 2018-09-25 ENCOUNTER — RECORDS - HEALTHEAST (OUTPATIENT)
Dept: ADMINISTRATIVE | Facility: OTHER | Age: 83
End: 2018-09-25

## 2018-09-25 DIAGNOSIS — Z95.0 PACEMAKER: ICD-10-CM

## 2018-09-28 ENCOUNTER — COMMUNICATION - HEALTHEAST (OUTPATIENT)
Dept: FAMILY MEDICINE | Facility: CLINIC | Age: 83
End: 2018-09-28

## 2018-09-28 DIAGNOSIS — K31.89 GASTRIC ACIDITY: ICD-10-CM

## 2018-11-06 ENCOUNTER — OFFICE VISIT - HEALTHEAST (OUTPATIENT)
Dept: FAMILY MEDICINE | Facility: CLINIC | Age: 83
End: 2018-11-06

## 2018-11-06 DIAGNOSIS — J45.40 MODERATE PERSISTENT ASTHMA WITHOUT COMPLICATION: ICD-10-CM

## 2018-11-06 DIAGNOSIS — Z23 NEED FOR VACCINATION: ICD-10-CM

## 2018-11-06 DIAGNOSIS — E11.8 TYPE 2 DIABETES MELLITUS WITH COMPLICATION, WITHOUT LONG-TERM CURRENT USE OF INSULIN (H): ICD-10-CM

## 2018-11-06 DIAGNOSIS — N18.4 CKD (CHRONIC KIDNEY DISEASE) STAGE 4, GFR 15-29 ML/MIN (H): ICD-10-CM

## 2018-11-06 DIAGNOSIS — I48.0 PAF (PAROXYSMAL ATRIAL FIBRILLATION) (H): ICD-10-CM

## 2018-11-06 DIAGNOSIS — E87.6 HYPOKALEMIA: ICD-10-CM

## 2018-11-06 DIAGNOSIS — E03.9 HYPOTHYROIDISM, UNSPECIFIED TYPE: ICD-10-CM

## 2018-11-06 DIAGNOSIS — I10 ESSENTIAL HYPERTENSION WITH GOAL BLOOD PRESSURE LESS THAN 140/90: ICD-10-CM

## 2018-11-06 LAB
ANION GAP SERPL CALCULATED.3IONS-SCNC: 14 MMOL/L (ref 5–18)
BUN SERPL-MCNC: 42 MG/DL (ref 8–28)
CALCIUM SERPL-MCNC: 9.9 MG/DL (ref 8.5–10.5)
CHLORIDE BLD-SCNC: 97 MMOL/L (ref 98–107)
CO2 SERPL-SCNC: 29 MMOL/L (ref 22–31)
CREAT SERPL-MCNC: 1.71 MG/DL (ref 0.6–1.1)
ERYTHROCYTE [DISTWIDTH] IN BLOOD BY AUTOMATED COUNT: 12.2 % (ref 11–14.5)
GFR SERPL CREATININE-BSD FRML MDRD: 28 ML/MIN/1.73M2
GLUCOSE BLD-MCNC: 118 MG/DL (ref 70–125)
HCT VFR BLD AUTO: 32.9 % (ref 35–47)
HGB BLD-MCNC: 11 G/DL (ref 12–16)
MCH RBC QN AUTO: 31.1 PG (ref 27–34)
MCHC RBC AUTO-ENTMCNC: 33.5 G/DL (ref 32–36)
MCV RBC AUTO: 93 FL (ref 80–100)
PLATELET # BLD AUTO: 144 THOU/UL (ref 140–440)
PMV BLD AUTO: 7.9 FL (ref 7–10)
POTASSIUM BLD-SCNC: 3.7 MMOL/L (ref 3.5–5)
RBC # BLD AUTO: 3.55 MILL/UL (ref 3.8–5.4)
SODIUM SERPL-SCNC: 140 MMOL/L (ref 136–145)
TSH SERPL DL<=0.005 MIU/L-ACNC: 23.27 UIU/ML (ref 0.3–5)
WBC: 5.1 THOU/UL (ref 4–11)

## 2018-11-07 ENCOUNTER — COMMUNICATION - HEALTHEAST (OUTPATIENT)
Dept: FAMILY MEDICINE | Facility: CLINIC | Age: 83
End: 2018-11-07

## 2018-11-07 ENCOUNTER — AMBULATORY - HEALTHEAST (OUTPATIENT)
Dept: FAMILY MEDICINE | Facility: CLINIC | Age: 83
End: 2018-11-07

## 2018-11-07 DIAGNOSIS — D64.9 NORMOCHROMIC NORMOCYTIC ANEMIA: ICD-10-CM

## 2018-11-07 DIAGNOSIS — E03.9 HYPOTHYROIDISM, UNSPECIFIED TYPE: ICD-10-CM

## 2018-11-07 LAB — 25(OH)D3 SERPL-MCNC: 41.7 NG/ML (ref 30–80)

## 2018-12-18 ENCOUNTER — OFFICE VISIT - HEALTHEAST (OUTPATIENT)
Dept: FAMILY MEDICINE | Facility: CLINIC | Age: 83
End: 2018-12-18

## 2018-12-18 ENCOUNTER — AMBULATORY - HEALTHEAST (OUTPATIENT)
Dept: FAMILY MEDICINE | Facility: CLINIC | Age: 83
End: 2018-12-18

## 2018-12-18 DIAGNOSIS — N18.30 TYPE 2 DIABETES MELLITUS WITH STAGE 3 CHRONIC KIDNEY DISEASE, WITHOUT LONG-TERM CURRENT USE OF INSULIN (H): ICD-10-CM

## 2018-12-18 DIAGNOSIS — E11.22 TYPE 2 DIABETES MELLITUS WITH STAGE 3 CHRONIC KIDNEY DISEASE, WITHOUT LONG-TERM CURRENT USE OF INSULIN (H): ICD-10-CM

## 2018-12-18 DIAGNOSIS — D64.9 NORMOCHROMIC NORMOCYTIC ANEMIA: ICD-10-CM

## 2018-12-18 DIAGNOSIS — I10 ESSENTIAL HYPERTENSION WITH GOAL BLOOD PRESSURE LESS THAN 140/90: ICD-10-CM

## 2018-12-18 DIAGNOSIS — E03.9 HYPOTHYROIDISM, UNSPECIFIED TYPE: ICD-10-CM

## 2018-12-18 DIAGNOSIS — R60.9 EDEMA, UNSPECIFIED TYPE: ICD-10-CM

## 2018-12-18 DIAGNOSIS — N18.30 CKD (CHRONIC KIDNEY DISEASE), STAGE III (H): ICD-10-CM

## 2018-12-18 LAB
ANION GAP SERPL CALCULATED.3IONS-SCNC: 14 MMOL/L (ref 5–18)
BUN SERPL-MCNC: 40 MG/DL (ref 8–28)
CALCIUM SERPL-MCNC: 10 MG/DL (ref 8.5–10.5)
CHLORIDE BLD-SCNC: 92 MMOL/L (ref 98–107)
CO2 SERPL-SCNC: 33 MMOL/L (ref 22–31)
CREAT SERPL-MCNC: 1.8 MG/DL (ref 0.6–1.1)
ERYTHROCYTE [DISTWIDTH] IN BLOOD BY AUTOMATED COUNT: 12.9 % (ref 11–14.5)
GFR SERPL CREATININE-BSD FRML MDRD: 27 ML/MIN/1.73M2
GLUCOSE BLD-MCNC: 108 MG/DL (ref 70–125)
HBA1C MFR BLD: 6.7 % (ref 3.5–6)
HCT VFR BLD AUTO: 33.8 % (ref 35–47)
HGB BLD-MCNC: 11.2 G/DL (ref 12–16)
MCH RBC QN AUTO: 30.8 PG (ref 27–34)
MCHC RBC AUTO-ENTMCNC: 33.2 G/DL (ref 32–36)
MCV RBC AUTO: 93 FL (ref 80–100)
PLATELET # BLD AUTO: 275 THOU/UL (ref 140–440)
PMV BLD AUTO: 7.8 FL (ref 7–10)
POTASSIUM BLD-SCNC: 3.4 MMOL/L (ref 3.5–5)
RBC # BLD AUTO: 3.64 MILL/UL (ref 3.8–5.4)
SODIUM SERPL-SCNC: 139 MMOL/L (ref 136–145)
TSH SERPL DL<=0.005 MIU/L-ACNC: 12.6 UIU/ML (ref 0.3–5)
WBC: 5 THOU/UL (ref 4–11)

## 2018-12-19 ENCOUNTER — COMMUNICATION - HEALTHEAST (OUTPATIENT)
Dept: FAMILY MEDICINE | Facility: CLINIC | Age: 83
End: 2018-12-19

## 2018-12-20 ENCOUNTER — COMMUNICATION - HEALTHEAST (OUTPATIENT)
Dept: FAMILY MEDICINE | Facility: CLINIC | Age: 83
End: 2018-12-20

## 2018-12-20 DIAGNOSIS — J45.40 MODERATE PERSISTENT ASTHMA: ICD-10-CM

## 2019-01-15 ENCOUNTER — AMBULATORY - HEALTHEAST (OUTPATIENT)
Dept: CARDIOLOGY | Facility: CLINIC | Age: 84
End: 2019-01-15

## 2019-01-15 DIAGNOSIS — Z95.0 CARDIAC PACEMAKER IN SITU: ICD-10-CM

## 2019-02-20 ENCOUNTER — COMMUNICATION - HEALTHEAST (OUTPATIENT)
Dept: ADMINISTRATIVE | Facility: CLINIC | Age: 84
End: 2019-02-20

## 2019-02-27 ENCOUNTER — COMMUNICATION - HEALTHEAST (OUTPATIENT)
Dept: FAMILY MEDICINE | Facility: CLINIC | Age: 84
End: 2019-02-27

## 2019-02-27 DIAGNOSIS — I10 HTN (HYPERTENSION): ICD-10-CM

## 2019-03-19 ENCOUNTER — OFFICE VISIT - HEALTHEAST (OUTPATIENT)
Dept: FAMILY MEDICINE | Facility: CLINIC | Age: 84
End: 2019-03-19

## 2019-03-19 DIAGNOSIS — N18.30 TYPE 2 DIABETES MELLITUS WITH STAGE 3 CHRONIC KIDNEY DISEASE, WITHOUT LONG-TERM CURRENT USE OF INSULIN (H): ICD-10-CM

## 2019-03-19 DIAGNOSIS — E03.9 HYPOTHYROIDISM, UNSPECIFIED TYPE: ICD-10-CM

## 2019-03-19 DIAGNOSIS — N18.30 CKD (CHRONIC KIDNEY DISEASE), STAGE III (H): ICD-10-CM

## 2019-03-19 DIAGNOSIS — I10 ESSENTIAL HYPERTENSION WITH GOAL BLOOD PRESSURE LESS THAN 140/90: ICD-10-CM

## 2019-03-19 DIAGNOSIS — E87.6 HYPOKALEMIA: ICD-10-CM

## 2019-03-19 DIAGNOSIS — E11.22 TYPE 2 DIABETES MELLITUS WITH STAGE 3 CHRONIC KIDNEY DISEASE, WITHOUT LONG-TERM CURRENT USE OF INSULIN (H): ICD-10-CM

## 2019-03-19 DIAGNOSIS — D64.9 NORMOCHROMIC NORMOCYTIC ANEMIA: ICD-10-CM

## 2019-03-19 LAB
ANION GAP SERPL CALCULATED.3IONS-SCNC: 11 MMOL/L (ref 5–18)
BUN SERPL-MCNC: 31 MG/DL (ref 8–28)
CALCIUM SERPL-MCNC: 9.8 MG/DL (ref 8.5–10.5)
CHLORIDE BLD-SCNC: 101 MMOL/L (ref 98–107)
CO2 SERPL-SCNC: 29 MMOL/L (ref 22–31)
CREAT SERPL-MCNC: 1.45 MG/DL (ref 0.6–1.1)
ERYTHROCYTE [DISTWIDTH] IN BLOOD BY AUTOMATED COUNT: 13.4 % (ref 11–14.5)
GFR SERPL CREATININE-BSD FRML MDRD: 34 ML/MIN/1.73M2
GLUCOSE BLD-MCNC: 109 MG/DL (ref 70–125)
HBA1C MFR BLD: 6.7 % (ref 3.5–6)
HCT VFR BLD AUTO: 34.4 % (ref 35–47)
HGB BLD-MCNC: 11.2 G/DL (ref 12–16)
MCH RBC QN AUTO: 30.7 PG (ref 27–34)
MCHC RBC AUTO-ENTMCNC: 32.4 G/DL (ref 32–36)
MCV RBC AUTO: 95 FL (ref 80–100)
PLATELET # BLD AUTO: 168 THOU/UL (ref 140–440)
PMV BLD AUTO: 8.3 FL (ref 7–10)
POTASSIUM BLD-SCNC: 4.4 MMOL/L (ref 3.5–5)
RBC # BLD AUTO: 3.63 MILL/UL (ref 3.8–5.4)
SODIUM SERPL-SCNC: 141 MMOL/L (ref 136–145)
TSH SERPL DL<=0.005 MIU/L-ACNC: 7.06 UIU/ML (ref 0.3–5)
WBC: 5.5 THOU/UL (ref 4–11)

## 2019-03-20 ENCOUNTER — COMMUNICATION - HEALTHEAST (OUTPATIENT)
Dept: FAMILY MEDICINE | Facility: CLINIC | Age: 84
End: 2019-03-20

## 2019-06-07 ENCOUNTER — OFFICE VISIT - HEALTHEAST (OUTPATIENT)
Dept: FAMILY MEDICINE | Facility: CLINIC | Age: 84
End: 2019-06-07

## 2019-06-07 DIAGNOSIS — D64.9 NORMOCHROMIC NORMOCYTIC ANEMIA: ICD-10-CM

## 2019-06-07 DIAGNOSIS — E11.22 TYPE 2 DIABETES MELLITUS WITH STAGE 3 CHRONIC KIDNEY DISEASE, WITHOUT LONG-TERM CURRENT USE OF INSULIN (H): ICD-10-CM

## 2019-06-07 DIAGNOSIS — N18.30 CKD (CHRONIC KIDNEY DISEASE), STAGE III (H): ICD-10-CM

## 2019-06-07 DIAGNOSIS — I48.0 PAF (PAROXYSMAL ATRIAL FIBRILLATION) (H): ICD-10-CM

## 2019-06-07 DIAGNOSIS — E78.5 HYPERLIPIDEMIA, UNSPECIFIED HYPERLIPIDEMIA TYPE: ICD-10-CM

## 2019-06-07 DIAGNOSIS — E03.9 HYPOTHYROIDISM, UNSPECIFIED TYPE: ICD-10-CM

## 2019-06-07 DIAGNOSIS — I10 ESSENTIAL HYPERTENSION WITH GOAL BLOOD PRESSURE LESS THAN 140/90: ICD-10-CM

## 2019-06-07 DIAGNOSIS — N18.30 TYPE 2 DIABETES MELLITUS WITH STAGE 3 CHRONIC KIDNEY DISEASE, WITHOUT LONG-TERM CURRENT USE OF INSULIN (H): ICD-10-CM

## 2019-06-07 DIAGNOSIS — J45.40 MODERATE PERSISTENT ASTHMA WITHOUT COMPLICATION: ICD-10-CM

## 2019-06-07 LAB
ANION GAP SERPL CALCULATED.3IONS-SCNC: 11 MMOL/L (ref 5–18)
BUN SERPL-MCNC: 40 MG/DL (ref 8–28)
CALCIUM SERPL-MCNC: 10 MG/DL (ref 8.5–10.5)
CHLORIDE BLD-SCNC: 99 MMOL/L (ref 98–107)
CHOLEST SERPL-MCNC: 155 MG/DL
CO2 SERPL-SCNC: 30 MMOL/L (ref 22–31)
CREAT SERPL-MCNC: 1.65 MG/DL (ref 0.6–1.1)
ERYTHROCYTE [DISTWIDTH] IN BLOOD BY AUTOMATED COUNT: 13.5 % (ref 11–14.5)
FASTING STATUS PATIENT QL REPORTED: YES
FERRITIN SERPL-MCNC: 102 NG/ML (ref 10–130)
GFR SERPL CREATININE-BSD FRML MDRD: 30 ML/MIN/1.73M2
GLUCOSE BLD-MCNC: 127 MG/DL (ref 70–125)
HCT VFR BLD AUTO: 34 % (ref 35–47)
HDLC SERPL-MCNC: 47 MG/DL
HGB BLD-MCNC: 10.8 G/DL (ref 12–16)
IRON SATN MFR SERPL: 12 % (ref 20–50)
IRON SERPL-MCNC: 34 UG/DL (ref 42–175)
LDLC SERPL CALC-MCNC: 93 MG/DL
MCH RBC QN AUTO: 30.3 PG (ref 27–34)
MCHC RBC AUTO-ENTMCNC: 31.7 G/DL (ref 32–36)
MCV RBC AUTO: 96 FL (ref 80–100)
PLATELET # BLD AUTO: 189 THOU/UL (ref 140–440)
PMV BLD AUTO: 8.7 FL (ref 7–10)
POTASSIUM BLD-SCNC: 4.2 MMOL/L (ref 3.5–5)
RBC # BLD AUTO: 3.56 MILL/UL (ref 3.8–5.4)
SODIUM SERPL-SCNC: 140 MMOL/L (ref 136–145)
TIBC SERPL-MCNC: 294 UG/DL (ref 313–563)
TRANSFERRIN SERPL-MCNC: 235 MG/DL (ref 212–360)
TRIGL SERPL-MCNC: 74 MG/DL
TSH SERPL DL<=0.005 MIU/L-ACNC: 5.6 UIU/ML (ref 0.3–5)
WBC: 6.8 THOU/UL (ref 4–11)

## 2019-06-09 ENCOUNTER — COMMUNICATION - HEALTHEAST (OUTPATIENT)
Dept: FAMILY MEDICINE | Facility: CLINIC | Age: 84
End: 2019-06-09

## 2019-06-16 ENCOUNTER — COMMUNICATION - HEALTHEAST (OUTPATIENT)
Dept: FAMILY MEDICINE | Facility: CLINIC | Age: 84
End: 2019-06-16

## 2019-06-16 DIAGNOSIS — E03.9 HYPOTHYROIDISM, UNSPECIFIED TYPE: ICD-10-CM

## 2019-07-02 ENCOUNTER — COMMUNICATION - HEALTHEAST (OUTPATIENT)
Dept: FAMILY MEDICINE | Facility: CLINIC | Age: 84
End: 2019-07-02

## 2019-07-02 DIAGNOSIS — E78.5 HYPERLIPIDEMIA, UNSPECIFIED HYPERLIPIDEMIA TYPE: ICD-10-CM

## 2019-07-02 DIAGNOSIS — E87.6 HYPOKALEMIA: ICD-10-CM

## 2019-08-05 ENCOUNTER — AMBULATORY - HEALTHEAST (OUTPATIENT)
Dept: CARDIOLOGY | Facility: CLINIC | Age: 84
End: 2019-08-05

## 2019-08-05 DIAGNOSIS — Z95.0 CARDIAC PACEMAKER IN SITU: ICD-10-CM

## 2019-09-25 ENCOUNTER — COMMUNICATION - HEALTHEAST (OUTPATIENT)
Dept: SCHEDULING | Facility: CLINIC | Age: 84
End: 2019-09-25

## 2019-09-30 ENCOUNTER — AMBULATORY - HEALTHEAST (OUTPATIENT)
Dept: OTHER | Facility: CLINIC | Age: 84
End: 2019-09-30

## 2019-09-30 ENCOUNTER — HOME CARE/HOSPICE - HEALTHEAST (OUTPATIENT)
Dept: HOME HEALTH SERVICES | Facility: HOME HEALTH | Age: 84
End: 2019-09-30

## 2019-10-01 ENCOUNTER — COMMUNICATION - HEALTHEAST (OUTPATIENT)
Dept: CARE COORDINATION | Facility: CLINIC | Age: 84
End: 2019-10-01

## 2019-10-01 ENCOUNTER — COMMUNICATION - HEALTHEAST (OUTPATIENT)
Dept: FAMILY MEDICINE | Facility: CLINIC | Age: 84
End: 2019-10-01

## 2019-10-02 ENCOUNTER — COMMUNICATION - HEALTHEAST (OUTPATIENT)
Dept: FAMILY MEDICINE | Facility: CLINIC | Age: 84
End: 2019-10-02

## 2019-10-04 ENCOUNTER — COMMUNICATION - HEALTHEAST (OUTPATIENT)
Dept: FAMILY MEDICINE | Facility: CLINIC | Age: 84
End: 2019-10-04

## 2019-10-09 ENCOUNTER — OFFICE VISIT - HEALTHEAST (OUTPATIENT)
Dept: FAMILY MEDICINE | Facility: CLINIC | Age: 84
End: 2019-10-09

## 2019-10-09 DIAGNOSIS — I10 ESSENTIAL HYPERTENSION WITH GOAL BLOOD PRESSURE LESS THAN 140/90: ICD-10-CM

## 2019-10-09 DIAGNOSIS — N18.30 TYPE 2 DIABETES MELLITUS WITH STAGE 3 CHRONIC KIDNEY DISEASE, WITHOUT LONG-TERM CURRENT USE OF INSULIN (H): ICD-10-CM

## 2019-10-09 DIAGNOSIS — E11.22 TYPE 2 DIABETES MELLITUS WITH STAGE 3 CHRONIC KIDNEY DISEASE, WITHOUT LONG-TERM CURRENT USE OF INSULIN (H): ICD-10-CM

## 2019-10-09 DIAGNOSIS — I50.33 ACUTE ON CHRONIC DIASTOLIC HEART FAILURE (H): ICD-10-CM

## 2019-10-09 DIAGNOSIS — J96.01 ACUTE RESPIRATORY FAILURE WITH HYPOXIA (H): ICD-10-CM

## 2019-10-09 DIAGNOSIS — I27.20 PULMONARY HYPERTENSION (H): ICD-10-CM

## 2019-10-09 DIAGNOSIS — Z09 HOSPITAL DISCHARGE FOLLOW-UP: ICD-10-CM

## 2019-10-09 DIAGNOSIS — Z95.0 CARDIAC PACEMAKER IN SITU: ICD-10-CM

## 2019-10-09 DIAGNOSIS — I50.30 HEART FAILURE WITH PRESERVED EJECTION FRACTION (H): ICD-10-CM

## 2019-10-09 DIAGNOSIS — I48.21 PERMANENT ATRIAL FIBRILLATION (H): ICD-10-CM

## 2019-10-09 DIAGNOSIS — Z95.3 H/O HEART VALVE REPLACEMENT WITH BIOPROSTHETIC VALVE: ICD-10-CM

## 2019-10-09 DIAGNOSIS — R55 NEAR SYNCOPE: ICD-10-CM

## 2019-10-09 DIAGNOSIS — E87.6 HYPOKALEMIA: ICD-10-CM

## 2019-10-10 ENCOUNTER — COMMUNICATION - HEALTHEAST (OUTPATIENT)
Dept: FAMILY MEDICINE | Facility: CLINIC | Age: 84
End: 2019-10-10

## 2019-10-10 LAB
ANION GAP SERPL CALCULATED.3IONS-SCNC: 17 MMOL/L (ref 5–18)
BUN SERPL-MCNC: 70 MG/DL (ref 8–28)
CALCIUM SERPL-MCNC: 10.4 MG/DL (ref 8.5–10.5)
CHLORIDE BLD-SCNC: 80 MMOL/L (ref 98–107)
CO2 SERPL-SCNC: 41 MMOL/L (ref 22–31)
CREAT SERPL-MCNC: 2.42 MG/DL (ref 0.6–1.1)
GFR SERPL CREATININE-BSD FRML MDRD: 19 ML/MIN/1.73M2
GLUCOSE BLD-MCNC: 109 MG/DL (ref 70–125)
MAGNESIUM SERPL-MCNC: 2.1 MG/DL (ref 1.8–2.6)
POTASSIUM BLD-SCNC: 3 MMOL/L (ref 3.5–5)
SODIUM SERPL-SCNC: 138 MMOL/L (ref 136–145)

## 2019-10-11 ENCOUNTER — AMBULATORY - HEALTHEAST (OUTPATIENT)
Dept: FAMILY MEDICINE | Facility: CLINIC | Age: 84
End: 2019-10-11

## 2019-10-11 ENCOUNTER — COMMUNICATION - HEALTHEAST (OUTPATIENT)
Dept: FAMILY MEDICINE | Facility: CLINIC | Age: 84
End: 2019-10-11

## 2019-10-11 DIAGNOSIS — N18.30 CKD (CHRONIC KIDNEY DISEASE), STAGE III (H): ICD-10-CM

## 2019-10-11 DIAGNOSIS — I50.30 HEART FAILURE WITH PRESERVED EJECTION FRACTION, UNSPECIFIED HF CHRONICITY (H): ICD-10-CM

## 2019-10-11 DIAGNOSIS — E87.6 HYPOKALEMIA: ICD-10-CM

## 2019-10-14 ENCOUNTER — AMBULATORY - HEALTHEAST (OUTPATIENT)
Dept: LAB | Facility: CLINIC | Age: 84
End: 2019-10-14

## 2019-10-14 DIAGNOSIS — N18.30 CKD (CHRONIC KIDNEY DISEASE), STAGE III (H): ICD-10-CM

## 2019-10-14 DIAGNOSIS — I50.30 HEART FAILURE WITH PRESERVED EJECTION FRACTION, UNSPECIFIED HF CHRONICITY (H): ICD-10-CM

## 2019-10-14 DIAGNOSIS — E87.6 HYPOKALEMIA: ICD-10-CM

## 2019-10-14 LAB
ANION GAP SERPL CALCULATED.3IONS-SCNC: 17 MMOL/L (ref 5–18)
BUN SERPL-MCNC: 76 MG/DL (ref 8–28)
CALCIUM SERPL-MCNC: 10.2 MG/DL (ref 8.5–10.5)
CHLORIDE BLD-SCNC: 84 MMOL/L (ref 98–107)
CO2 SERPL-SCNC: 37 MMOL/L (ref 22–31)
CREAT SERPL-MCNC: 2.31 MG/DL (ref 0.6–1.1)
GFR SERPL CREATININE-BSD FRML MDRD: 20 ML/MIN/1.73M2
GLUCOSE BLD-MCNC: 141 MG/DL (ref 70–125)
POTASSIUM BLD-SCNC: 3.2 MMOL/L (ref 3.5–5)
SODIUM SERPL-SCNC: 138 MMOL/L (ref 136–145)

## 2019-10-15 ENCOUNTER — COMMUNICATION - HEALTHEAST (OUTPATIENT)
Dept: LAB | Facility: CLINIC | Age: 84
End: 2019-10-15

## 2019-10-23 ENCOUNTER — COMMUNICATION - HEALTHEAST (OUTPATIENT)
Dept: FAMILY MEDICINE | Facility: CLINIC | Age: 84
End: 2019-10-23

## 2019-10-23 DIAGNOSIS — I50.30 HEART FAILURE WITH PRESERVED EJECTION FRACTION (H): ICD-10-CM

## 2019-10-23 DIAGNOSIS — K31.89 GASTRIC ACIDITY: ICD-10-CM

## 2019-10-24 ENCOUNTER — COMMUNICATION - HEALTHEAST (OUTPATIENT)
Dept: FAMILY MEDICINE | Facility: CLINIC | Age: 84
End: 2019-10-24

## 2019-10-24 ENCOUNTER — COMMUNICATION - HEALTHEAST (OUTPATIENT)
Dept: SCHEDULING | Facility: CLINIC | Age: 84
End: 2019-10-24

## 2019-12-13 ENCOUNTER — OFFICE VISIT - HEALTHEAST (OUTPATIENT)
Dept: CARDIOLOGY | Facility: CLINIC | Age: 84
End: 2019-12-13

## 2019-12-13 DIAGNOSIS — I50.32 CHRONIC DIASTOLIC HEART FAILURE (H): ICD-10-CM

## 2019-12-13 DIAGNOSIS — Z95.2 S/P AVR (AORTIC VALVE REPLACEMENT): ICD-10-CM

## 2019-12-13 DIAGNOSIS — I25.10 CORONARY ARTERY DISEASE INVOLVING NATIVE CORONARY ARTERY OF NATIVE HEART WITHOUT ANGINA PECTORIS: ICD-10-CM

## 2019-12-13 DIAGNOSIS — I48.20 CHRONIC ATRIAL FIBRILLATION (H): ICD-10-CM

## 2019-12-13 ASSESSMENT — MIFFLIN-ST. JEOR: SCORE: 1474.62

## 2019-12-20 ENCOUNTER — COMMUNICATION - HEALTHEAST (OUTPATIENT)
Dept: FAMILY MEDICINE | Facility: CLINIC | Age: 84
End: 2019-12-20

## 2019-12-20 DIAGNOSIS — J45.40 MODERATE PERSISTENT ASTHMA: ICD-10-CM

## 2019-12-25 ENCOUNTER — OFFICE VISIT - HEALTHEAST (OUTPATIENT)
Dept: FAMILY MEDICINE | Facility: CLINIC | Age: 84
End: 2019-12-25

## 2019-12-25 DIAGNOSIS — S81.802A OPEN WOUND OF LEFT LOWER EXTREMITY, INITIAL ENCOUNTER: ICD-10-CM

## 2019-12-27 ENCOUNTER — AMBULATORY - HEALTHEAST (OUTPATIENT)
Dept: CARDIOLOGY | Facility: CLINIC | Age: 84
End: 2019-12-27

## 2019-12-27 DIAGNOSIS — Z95.0 CARDIAC PACEMAKER IN SITU: ICD-10-CM

## 2019-12-27 DIAGNOSIS — I44.2 COMPLETE HEART BLOCK (H): ICD-10-CM

## 2019-12-27 ASSESSMENT — MIFFLIN-ST. JEOR: SCORE: 1451.94

## 2019-12-31 ENCOUNTER — OFFICE VISIT - HEALTHEAST (OUTPATIENT)
Dept: FAMILY MEDICINE | Facility: CLINIC | Age: 84
End: 2019-12-31

## 2019-12-31 DIAGNOSIS — J45.40 MODERATE PERSISTENT ASTHMA: ICD-10-CM

## 2019-12-31 DIAGNOSIS — S81.812A LACERATION OF LEG, LEFT, INITIAL ENCOUNTER: ICD-10-CM

## 2019-12-31 DIAGNOSIS — E11.8 TYPE 2 DIABETES MELLITUS WITH COMPLICATION, WITHOUT LONG-TERM CURRENT USE OF INSULIN (H): ICD-10-CM

## 2019-12-31 DIAGNOSIS — D64.9 NORMOCHROMIC NORMOCYTIC ANEMIA: ICD-10-CM

## 2019-12-31 DIAGNOSIS — E03.9 HYPOTHYROIDISM, UNSPECIFIED TYPE: ICD-10-CM

## 2019-12-31 DIAGNOSIS — M79.89 SWELLING OF RIGHT HAND: ICD-10-CM

## 2019-12-31 DIAGNOSIS — N18.4 CKD (CHRONIC KIDNEY DISEASE) STAGE 4, GFR 15-29 ML/MIN (H): ICD-10-CM

## 2019-12-31 DIAGNOSIS — M70.21 OLECRANON BURSITIS OF RIGHT ELBOW: ICD-10-CM

## 2019-12-31 LAB
ANION GAP SERPL CALCULATED.3IONS-SCNC: 12 MMOL/L (ref 5–18)
BUN SERPL-MCNC: 42 MG/DL (ref 8–28)
CALCIUM SERPL-MCNC: 10 MG/DL (ref 8.5–10.5)
CHLORIDE BLD-SCNC: 90 MMOL/L (ref 98–107)
CO2 SERPL-SCNC: 36 MMOL/L (ref 22–31)
CREAT SERPL-MCNC: 1.89 MG/DL (ref 0.6–1.1)
ERYTHROCYTE [DISTWIDTH] IN BLOOD BY AUTOMATED COUNT: 13.3 % (ref 11–14.5)
GFR SERPL CREATININE-BSD FRML MDRD: 25 ML/MIN/1.73M2
GLUCOSE BLD-MCNC: 129 MG/DL (ref 70–125)
HCT VFR BLD AUTO: 33.2 % (ref 35–47)
HGB BLD-MCNC: 11.1 G/DL (ref 12–16)
MCH RBC QN AUTO: 31.5 PG (ref 27–34)
MCHC RBC AUTO-ENTMCNC: 33.4 G/DL (ref 32–36)
MCV RBC AUTO: 94 FL (ref 80–100)
PLATELET # BLD AUTO: 243 THOU/UL (ref 140–440)
PMV BLD AUTO: 7.4 FL (ref 7–10)
POTASSIUM BLD-SCNC: 3.4 MMOL/L (ref 3.5–5)
RBC # BLD AUTO: 3.51 MILL/UL (ref 3.8–5.4)
SODIUM SERPL-SCNC: 138 MMOL/L (ref 136–145)
URATE SERPL-MCNC: 13.4 MG/DL (ref 2–7.5)
WBC: 8.9 THOU/UL (ref 4–11)

## 2020-01-01 ENCOUNTER — RECORDS - HEALTHEAST (OUTPATIENT)
Dept: ADMINISTRATIVE | Facility: OTHER | Age: 85
End: 2020-01-01

## 2020-01-01 ENCOUNTER — RECORDS - HEALTHEAST (OUTPATIENT)
Dept: LAB | Facility: CLINIC | Age: 85
End: 2020-01-01

## 2020-01-01 ENCOUNTER — OFFICE VISIT - HEALTHEAST (OUTPATIENT)
Dept: FAMILY MEDICINE | Facility: CLINIC | Age: 85
End: 2020-01-01

## 2020-01-01 ENCOUNTER — OFFICE VISIT - HEALTHEAST (OUTPATIENT)
Dept: RHEUMATOLOGY | Facility: CLINIC | Age: 85
End: 2020-01-01

## 2020-01-01 ENCOUNTER — COMMUNICATION - HEALTHEAST (OUTPATIENT)
Dept: SCHEDULING | Facility: CLINIC | Age: 85
End: 2020-01-01

## 2020-01-01 ENCOUNTER — COMMUNICATION - HEALTHEAST (OUTPATIENT)
Dept: GERIATRICS | Facility: CLINIC | Age: 85
End: 2020-01-01

## 2020-01-01 ENCOUNTER — SURGERY - HEALTHEAST (OUTPATIENT)
Dept: SURGERY | Facility: CLINIC | Age: 85
End: 2020-01-01

## 2020-01-01 ENCOUNTER — OFFICE VISIT - HEALTHEAST (OUTPATIENT)
Dept: GERIATRICS | Facility: CLINIC | Age: 85
End: 2020-01-01

## 2020-01-01 ENCOUNTER — RECORDS - HEALTHEAST (OUTPATIENT)
Dept: HEALTH INFORMATION MANAGEMENT | Facility: CLINIC | Age: 85
End: 2020-01-01

## 2020-01-01 ENCOUNTER — COMMUNICATION - HEALTHEAST (OUTPATIENT)
Dept: FAMILY MEDICINE | Facility: CLINIC | Age: 85
End: 2020-01-01

## 2020-01-01 ENCOUNTER — AMBULATORY - HEALTHEAST (OUTPATIENT)
Dept: GERIATRICS | Facility: CLINIC | Age: 85
End: 2020-01-01

## 2020-01-01 ENCOUNTER — COMMUNICATION - HEALTHEAST (OUTPATIENT)
Dept: PHARMACY | Facility: CLINIC | Age: 85
End: 2020-01-01

## 2020-01-01 ENCOUNTER — COMMUNICATION - HEALTHEAST (OUTPATIENT)
Dept: OTHER | Facility: CLINIC | Age: 85
End: 2020-01-01

## 2020-01-01 ENCOUNTER — AMBULATORY - HEALTHEAST (OUTPATIENT)
Dept: CARDIOLOGY | Facility: CLINIC | Age: 85
End: 2020-01-01

## 2020-01-01 ENCOUNTER — AMBULATORY - HEALTHEAST (OUTPATIENT)
Dept: FAMILY MEDICINE | Facility: CLINIC | Age: 85
End: 2020-01-01

## 2020-01-01 ENCOUNTER — ANESTHESIA - HEALTHEAST (OUTPATIENT)
Dept: SURGERY | Facility: CLINIC | Age: 85
End: 2020-01-01

## 2020-01-01 ENCOUNTER — COMMUNICATION - HEALTHEAST (OUTPATIENT)
Dept: RHEUMATOLOGY | Facility: CLINIC | Age: 85
End: 2020-01-01

## 2020-01-01 DIAGNOSIS — E11.22 TYPE 2 DIABETES MELLITUS WITH STAGE 3 CHRONIC KIDNEY DISEASE, WITHOUT LONG-TERM CURRENT USE OF INSULIN (H): ICD-10-CM

## 2020-01-01 DIAGNOSIS — J06.9 VIRAL URI: ICD-10-CM

## 2020-01-01 DIAGNOSIS — I10 ESSENTIAL HYPERTENSION WITH GOAL BLOOD PRESSURE LESS THAN 140/90: ICD-10-CM

## 2020-01-01 DIAGNOSIS — I50.21 CHF (CONGESTIVE HEART FAILURE), NYHA CLASS I, ACUTE, SYSTOLIC (H): ICD-10-CM

## 2020-01-01 DIAGNOSIS — I50.33 ACUTE ON CHRONIC HEART FAILURE WITH PRESERVED EJECTION FRACTION (H): ICD-10-CM

## 2020-01-01 DIAGNOSIS — N18.9 ACUTE KIDNEY INJURY SUPERIMPOSED ON CHRONIC KIDNEY DISEASE (H): ICD-10-CM

## 2020-01-01 DIAGNOSIS — S42.92XD TRAUMATIC CLOSED DISPLACED FRACTURE OF LEFT SHOULDER WITH ANTERIOR DISLOCATION WITH ROUTINE HEALING, SUBSEQUENT ENCOUNTER: ICD-10-CM

## 2020-01-01 DIAGNOSIS — I48.91 ATRIAL FIBRILLATION, UNSPECIFIED TYPE (H): ICD-10-CM

## 2020-01-01 DIAGNOSIS — N17.9 ACUTE KIDNEY INJURY SUPERIMPOSED ON CHRONIC KIDNEY DISEASE (H): ICD-10-CM

## 2020-01-01 DIAGNOSIS — Z96.612 STATUS POST REVERSE TOTAL REPLACEMENT OF LEFT SHOULDER: ICD-10-CM

## 2020-01-01 DIAGNOSIS — N18.30 CKD (CHRONIC KIDNEY DISEASE), STAGE III (H): ICD-10-CM

## 2020-01-01 DIAGNOSIS — J45.40 MODERATE PERSISTENT ASTHMA: ICD-10-CM

## 2020-01-01 DIAGNOSIS — M79.2 NEURALGIA: ICD-10-CM

## 2020-01-01 DIAGNOSIS — I50.23 ACUTE ON CHRONIC SYSTOLIC CONGESTIVE HEART FAILURE (H): ICD-10-CM

## 2020-01-01 DIAGNOSIS — R53.81 PHYSICAL DECONDITIONING: ICD-10-CM

## 2020-01-01 DIAGNOSIS — D64.9 NORMOCHROMIC NORMOCYTIC ANEMIA: ICD-10-CM

## 2020-01-01 DIAGNOSIS — E66.01 MORBID OBESITY (H): ICD-10-CM

## 2020-01-01 DIAGNOSIS — I48.0 PAF (PAROXYSMAL ATRIAL FIBRILLATION) (H): ICD-10-CM

## 2020-01-01 DIAGNOSIS — E03.9 HYPOTHYROIDISM, UNSPECIFIED TYPE: ICD-10-CM

## 2020-01-01 DIAGNOSIS — Z95.0 CARDIAC PACEMAKER IN SITU: ICD-10-CM

## 2020-01-01 DIAGNOSIS — S43.005A SHOULDER DISLOCATION, LEFT, INITIAL ENCOUNTER: ICD-10-CM

## 2020-01-01 DIAGNOSIS — I50.33 ACUTE ON CHRONIC DIASTOLIC HEART FAILURE (H): ICD-10-CM

## 2020-01-01 DIAGNOSIS — I89.0 LYMPHEDEMA: ICD-10-CM

## 2020-01-01 DIAGNOSIS — L30.4 INTERTRIGO: ICD-10-CM

## 2020-01-01 DIAGNOSIS — J45.909 UNCOMPLICATED ASTHMA, UNSPECIFIED ASTHMA SEVERITY, UNSPECIFIED WHETHER PERSISTENT: ICD-10-CM

## 2020-01-01 DIAGNOSIS — M15.0 PRIMARY OSTEOARTHRITIS INVOLVING MULTIPLE JOINTS: ICD-10-CM

## 2020-01-01 DIAGNOSIS — M10.9 ACUTE GOUT OF HAND, UNSPECIFIED CAUSE, UNSPECIFIED LATERALITY: ICD-10-CM

## 2020-01-01 DIAGNOSIS — I27.20 PULMONARY HYPERTENSION (H): ICD-10-CM

## 2020-01-01 DIAGNOSIS — N18.30 TYPE 2 DIABETES MELLITUS WITH STAGE 3 CHRONIC KIDNEY DISEASE, WITHOUT LONG-TERM CURRENT USE OF INSULIN (H): ICD-10-CM

## 2020-01-01 DIAGNOSIS — R19.5 LOOSE STOOLS: ICD-10-CM

## 2020-01-01 DIAGNOSIS — R41.0 CONFUSION: ICD-10-CM

## 2020-01-01 DIAGNOSIS — J45.41 MODERATE PERSISTENT ASTHMA WITH EXACERBATION: ICD-10-CM

## 2020-01-01 DIAGNOSIS — E11.22 TYPE 2 DIABETES MELLITUS WITH STAGE 3 CHRONIC KIDNEY DISEASE, WITHOUT LONG-TERM CURRENT USE OF INSULIN, UNSPECIFIED WHETHER STAGE 3A OR 3B CKD (H): ICD-10-CM

## 2020-01-01 DIAGNOSIS — I25.10 ATHEROSCLEROSIS OF NATIVE CORONARY ARTERY OF NATIVE HEART WITHOUT ANGINA PECTORIS: ICD-10-CM

## 2020-01-01 DIAGNOSIS — G25.81 RESTLESS LEGS SYNDROME (RLS): ICD-10-CM

## 2020-01-01 DIAGNOSIS — E87.6 HYPOKALEMIA: ICD-10-CM

## 2020-01-01 DIAGNOSIS — I44.2 COMPLETE HEART BLOCK (H): ICD-10-CM

## 2020-01-01 DIAGNOSIS — M06.4 INFLAMMATORY POLYARTHRITIS (H): ICD-10-CM

## 2020-01-01 DIAGNOSIS — J45.40 MODERATE PERSISTENT ASTHMA WITHOUT COMPLICATION: ICD-10-CM

## 2020-01-01 DIAGNOSIS — G25.81 RESTLESS LEG SYNDROME: ICD-10-CM

## 2020-01-01 DIAGNOSIS — M1A.9XX1 CHRONIC GOUT WITH TOPHUS, UNSPECIFIED CAUSE, UNSPECIFIED SITE: ICD-10-CM

## 2020-01-01 DIAGNOSIS — J96.01 ACUTE RESPIRATORY FAILURE WITH HYPOXIA (H): ICD-10-CM

## 2020-01-01 DIAGNOSIS — A04.72 C. DIFFICILE COLITIS: ICD-10-CM

## 2020-01-01 DIAGNOSIS — N18.32 TYPE 2 DIABETES MELLITUS WITH STAGE 3B CHRONIC KIDNEY DISEASE, WITHOUT LONG-TERM CURRENT USE OF INSULIN (H): ICD-10-CM

## 2020-01-01 DIAGNOSIS — R44.1 VISUAL HALLUCINATION: ICD-10-CM

## 2020-01-01 DIAGNOSIS — E11.22 TYPE 2 DIABETES MELLITUS WITH STAGE 3B CHRONIC KIDNEY DISEASE, WITHOUT LONG-TERM CURRENT USE OF INSULIN (H): ICD-10-CM

## 2020-01-01 DIAGNOSIS — I73.9 PVD (PERIPHERAL VASCULAR DISEASE) (H): ICD-10-CM

## 2020-01-01 DIAGNOSIS — N18.30 STAGE 3 CHRONIC KIDNEY DISEASE, UNSPECIFIED WHETHER STAGE 3A OR 3B CKD (H): ICD-10-CM

## 2020-01-01 DIAGNOSIS — N18.4 CKD (CHRONIC KIDNEY DISEASE) STAGE 4, GFR 15-29 ML/MIN (H): ICD-10-CM

## 2020-01-01 DIAGNOSIS — B02.9 VARICELLA ZOSTER: ICD-10-CM

## 2020-01-01 DIAGNOSIS — Z95.2 STATUS POST AORTIC VALVE REPLACEMENT: ICD-10-CM

## 2020-01-01 DIAGNOSIS — J45.40 MODERATE PERSISTENT ASTHMA, UNSPECIFIED WHETHER COMPLICATED: ICD-10-CM

## 2020-01-01 DIAGNOSIS — M15.0 PRIMARY GENERALIZED (OSTEO)ARTHRITIS: ICD-10-CM

## 2020-01-01 DIAGNOSIS — J45.41 MODERATE PERSISTENT ASTHMA WITH ACUTE EXACERBATION: ICD-10-CM

## 2020-01-01 DIAGNOSIS — R41.0 DELIRIUM: ICD-10-CM

## 2020-01-01 DIAGNOSIS — K31.89 GASTRIC ACIDITY: ICD-10-CM

## 2020-01-01 DIAGNOSIS — K21.9 GASTROESOPHAGEAL REFLUX DISEASE WITHOUT ESOPHAGITIS: ICD-10-CM

## 2020-01-01 DIAGNOSIS — I50.31 ACUTE DIASTOLIC HEART FAILURE (H): ICD-10-CM

## 2020-01-01 DIAGNOSIS — I50.32 CHRONIC HEART FAILURE WITH PRESERVED EJECTION FRACTION (H): ICD-10-CM

## 2020-01-01 DIAGNOSIS — L03.115 CELLULITIS OF RIGHT LOWER EXTREMITY: ICD-10-CM

## 2020-01-01 DIAGNOSIS — L03.116 LEFT LEG CELLULITIS: ICD-10-CM

## 2020-01-01 DIAGNOSIS — M25.50 POLYARTHRALGIA: ICD-10-CM

## 2020-01-01 DIAGNOSIS — R06.00 DYSPNEA, UNSPECIFIED TYPE: ICD-10-CM

## 2020-01-01 DIAGNOSIS — R44.3 HALLUCINATIONS: ICD-10-CM

## 2020-01-01 DIAGNOSIS — R60.0 BILATERAL LEG EDEMA: ICD-10-CM

## 2020-01-01 DIAGNOSIS — E78.5 HYPERLIPIDEMIA, UNSPECIFIED HYPERLIPIDEMIA TYPE: ICD-10-CM

## 2020-01-01 DIAGNOSIS — N18.30 TYPE 2 DIABETES MELLITUS WITH STAGE 3 CHRONIC KIDNEY DISEASE, WITHOUT LONG-TERM CURRENT USE OF INSULIN, UNSPECIFIED WHETHER STAGE 3A OR 3B CKD (H): ICD-10-CM

## 2020-01-01 DIAGNOSIS — E79.0 HYPERURICEMIA: ICD-10-CM

## 2020-01-01 DIAGNOSIS — I50.30 HEART FAILURE WITH PRESERVED EJECTION FRACTION (H): ICD-10-CM

## 2020-01-01 LAB
ALBUMIN SERPL-MCNC: 3.4 G/DL (ref 3.5–5)
ALBUMIN UR-MCNC: NEGATIVE MG/DL
ALBUMIN UR-MCNC: NEGATIVE MG/DL
ALP SERPL-CCNC: 67 U/L (ref 45–120)
ALT SERPL W P-5'-P-CCNC: 17 U/L (ref 0–45)
ANION GAP SERPL CALCULATED.3IONS-SCNC: 10 MMOL/L (ref 5–18)
ANION GAP SERPL CALCULATED.3IONS-SCNC: 11 MMOL/L (ref 5–18)
ANION GAP SERPL CALCULATED.3IONS-SCNC: 11 MMOL/L (ref 5–18)
ANION GAP SERPL CALCULATED.3IONS-SCNC: 12 MMOL/L (ref 5–18)
ANION GAP SERPL CALCULATED.3IONS-SCNC: 12 MMOL/L (ref 5–18)
ANION GAP SERPL CALCULATED.3IONS-SCNC: 13 MMOL/L (ref 5–18)
ANION GAP SERPL CALCULATED.3IONS-SCNC: 14 MMOL/L (ref 5–18)
ANION GAP SERPL CALCULATED.3IONS-SCNC: 14 MMOL/L (ref 5–18)
ANION GAP SERPL CALCULATED.3IONS-SCNC: 8 MMOL/L (ref 5–18)
ANION GAP SERPL CALCULATED.3IONS-SCNC: 8 MMOL/L (ref 5–18)
ANION GAP SERPL CALCULATED.3IONS-SCNC: 9 MMOL/L (ref 5–18)
ANION GAP SERPL CALCULATED.3IONS-SCNC: 9 MMOL/L (ref 5–18)
APPEARANCE FLD: ABNORMAL
APPEARANCE UR: CLEAR
APPEARANCE UR: CLEAR
AST SERPL W P-5'-P-CCNC: 23 U/L (ref 0–40)
BACTERIA #/AREA URNS HPF: ABNORMAL HPF
BACTERIA SPEC CULT: ABNORMAL
BACTERIA SPEC CULT: NO GROWTH
BACTERIA SPEC CULT: NORMAL
BASOPHILS # BLD AUTO: 0 THOU/UL (ref 0–0.2)
BASOPHILS NFR BLD AUTO: 0 % (ref 0–2)
BILIRUB SERPL-MCNC: 0.9 MG/DL (ref 0–1)
BILIRUB UR QL STRIP: NEGATIVE
BILIRUB UR QL STRIP: NEGATIVE
BNP SERPL-MCNC: 388 PG/ML (ref 0–167)
BNP SERPL-MCNC: 492 PG/ML (ref 0–167)
BUN SERPL-MCNC: 27 MG/DL (ref 8–28)
BUN SERPL-MCNC: 37 MG/DL (ref 8–28)
BUN SERPL-MCNC: 39 MG/DL (ref 8–28)
BUN SERPL-MCNC: 41 MG/DL (ref 8–28)
BUN SERPL-MCNC: 42 MG/DL (ref 8–28)
BUN SERPL-MCNC: 42 MG/DL (ref 8–28)
BUN SERPL-MCNC: 43 MG/DL (ref 8–28)
BUN SERPL-MCNC: 45 MG/DL (ref 8–28)
BUN SERPL-MCNC: 52 MG/DL (ref 8–28)
BUN SERPL-MCNC: 52 MG/DL (ref 8–28)
BUN SERPL-MCNC: 53 MG/DL (ref 8–28)
BUN SERPL-MCNC: 55 MG/DL (ref 8–28)
BUN SERPL-MCNC: 56 MG/DL (ref 8–28)
BUN SERPL-MCNC: 67 MG/DL (ref 8–28)
BUN SERPL-MCNC: 82 MG/DL (ref 8–28)
C DIFF TOX B STL QL: POSITIVE
CALCIUM SERPL-MCNC: 10 MG/DL (ref 8.5–10.5)
CALCIUM SERPL-MCNC: 10 MG/DL (ref 8.5–10.5)
CALCIUM SERPL-MCNC: 10.1 MG/DL (ref 8.5–10.5)
CALCIUM SERPL-MCNC: 10.3 MG/DL (ref 8.5–10.5)
CALCIUM SERPL-MCNC: 10.3 MG/DL (ref 8.5–10.5)
CALCIUM SERPL-MCNC: 10.5 MG/DL (ref 8.5–10.5)
CALCIUM SERPL-MCNC: 9.2 MG/DL (ref 8.5–10.5)
CALCIUM SERPL-MCNC: 9.5 MG/DL (ref 8.5–10.5)
CALCIUM SERPL-MCNC: 9.6 MG/DL (ref 8.5–10.5)
CALCIUM SERPL-MCNC: 9.7 MG/DL (ref 8.5–10.5)
CALCIUM SERPL-MCNC: 9.8 MG/DL (ref 8.5–10.5)
CALCIUM SERPL-MCNC: 9.9 MG/DL (ref 8.5–10.5)
CCP AB SER IA-ACNC: <0.5 U/ML
CHLORIDE BLD-SCNC: 100 MMOL/L (ref 98–107)
CHLORIDE BLD-SCNC: 102 MMOL/L (ref 98–107)
CHLORIDE BLD-SCNC: 84 MMOL/L (ref 98–107)
CHLORIDE BLD-SCNC: 84 MMOL/L (ref 98–107)
CHLORIDE BLD-SCNC: 85 MMOL/L (ref 98–107)
CHLORIDE BLD-SCNC: 88 MMOL/L (ref 98–107)
CHLORIDE BLD-SCNC: 92 MMOL/L (ref 98–107)
CHLORIDE BLD-SCNC: 92 MMOL/L (ref 98–107)
CHLORIDE BLD-SCNC: 93 MMOL/L (ref 98–107)
CHLORIDE BLD-SCNC: 94 MMOL/L (ref 98–107)
CHLORIDE BLD-SCNC: 95 MMOL/L (ref 98–107)
CHLORIDE BLD-SCNC: 95 MMOL/L (ref 98–107)
CHLORIDE BLD-SCNC: 97 MMOL/L (ref 98–107)
CHLORIDE BLD-SCNC: 97 MMOL/L (ref 98–107)
CHLORIDE BLD-SCNC: 98 MMOL/L (ref 98–107)
CO2 SERPL-SCNC: 28 MMOL/L (ref 22–31)
CO2 SERPL-SCNC: 30 MMOL/L (ref 22–31)
CO2 SERPL-SCNC: 31 MMOL/L (ref 22–31)
CO2 SERPL-SCNC: 32 MMOL/L (ref 22–31)
CO2 SERPL-SCNC: 32 MMOL/L (ref 22–31)
CO2 SERPL-SCNC: 35 MMOL/L (ref 22–31)
CO2 SERPL-SCNC: 36 MMOL/L (ref 22–31)
CO2 SERPL-SCNC: 37 MMOL/L (ref 22–31)
CO2 SERPL-SCNC: 40 MMOL/L (ref 22–31)
CO2 SERPL-SCNC: 42 MMOL/L (ref 22–31)
COLOR FLD: ABNORMAL
COLOR UR AUTO: NORMAL
COLOR UR AUTO: YELLOW
CREAT SERPL-MCNC: 1.42 MG/DL (ref 0.57–1.11)
CREAT SERPL-MCNC: 1.42 MG/DL (ref 0.6–1.1)
CREAT SERPL-MCNC: 1.47 MG/DL (ref 0.6–1.1)
CREAT SERPL-MCNC: 1.53 MG/DL (ref 0.6–1.1)
CREAT SERPL-MCNC: 1.56 MG/DL (ref 0.6–1.1)
CREAT SERPL-MCNC: 1.57 MG/DL (ref 0.6–1.1)
CREAT SERPL-MCNC: 1.68 MG/DL (ref 0.6–1.1)
CREAT SERPL-MCNC: 1.75 MG/DL (ref 0.6–1.1)
CREAT SERPL-MCNC: 1.76 MG/DL (ref 0.6–1.1)
CREAT SERPL-MCNC: 1.76 MG/DL (ref 0.6–1.1)
CREAT SERPL-MCNC: 1.77 MG/DL (ref 0.6–1.1)
CREAT SERPL-MCNC: 1.9 MG/DL (ref 0.6–1.1)
CREAT SERPL-MCNC: 1.93 MG/DL (ref 0.6–1.1)
CREAT SERPL-MCNC: 2.05 MG/DL (ref 0.6–1.1)
CREAT SERPL-MCNC: 2.07 MG/DL (ref 0.6–1.1)
CREAT SERPL-MCNC: 2.07 MG/DL (ref 0.6–1.1)
CRYSTALS SNV MICRO: NORMAL
EOSINOPHIL # BLD AUTO: 0.3 THOU/UL (ref 0–0.4)
EOSINOPHIL NFR BLD AUTO: 5 % (ref 0–6)
EOSINOPHIL NFR FLD MANUAL: 1 %
ERYTHROCYTE [DISTWIDTH] IN BLOOD BY AUTOMATED COUNT: 13.6 % (ref 11–14.5)
ERYTHROCYTE [DISTWIDTH] IN BLOOD BY AUTOMATED COUNT: 14.3 % (ref 11–14.5)
ERYTHROCYTE [DISTWIDTH] IN BLOOD BY AUTOMATED COUNT: 14.6 % (ref 11–14.5)
ERYTHROCYTE [DISTWIDTH] IN BLOOD BY AUTOMATED COUNT: 14.8 % (ref 11–14.5)
GFR ESTIMATE EXT - HISTORICAL: 35 ML/MIN/1.73M2
GFR ESTIMATE, IF BLACK EXT - HISTORICAL: 43 ML/MIN/1.73M2
GFR SERPL CREATININE-BSD FRML MDRD: 23 ML/MIN/1.73M2
GFR SERPL CREATININE-BSD FRML MDRD: 25 ML/MIN/1.73M2
GFR SERPL CREATININE-BSD FRML MDRD: 25 ML/MIN/1.73M2
GFR SERPL CREATININE-BSD FRML MDRD: 27 ML/MIN/1.73M2
GFR SERPL CREATININE-BSD FRML MDRD: 28 ML/MIN/1.73M2
GFR SERPL CREATININE-BSD FRML MDRD: 29 ML/MIN/1.73M2
GFR SERPL CREATININE-BSD FRML MDRD: 31 ML/MIN/1.73M2
GFR SERPL CREATININE-BSD FRML MDRD: 31 ML/MIN/1.73M2
GFR SERPL CREATININE-BSD FRML MDRD: 32 ML/MIN/1.73M2
GFR SERPL CREATININE-BSD FRML MDRD: 34 ML/MIN/1.73M2
GFR SERPL CREATININE-BSD FRML MDRD: 35 ML/MIN/1.73M2
GLUCOSE BLD-MCNC: 103 MG/DL (ref 70–125)
GLUCOSE BLD-MCNC: 107 MG/DL (ref 70–125)
GLUCOSE BLD-MCNC: 108 MG/DL (ref 70–125)
GLUCOSE BLD-MCNC: 111 MG/DL (ref 70–125)
GLUCOSE BLD-MCNC: 114 MG/DL (ref 70–125)
GLUCOSE BLD-MCNC: 118 MG/DL (ref 70–125)
GLUCOSE BLD-MCNC: 138 MG/DL (ref 70–125)
GLUCOSE BLD-MCNC: 141 MG/DL (ref 70–125)
GLUCOSE BLD-MCNC: 85 MG/DL (ref 70–125)
GLUCOSE BLD-MCNC: 87 MG/DL (ref 70–125)
GLUCOSE BLD-MCNC: 95 MG/DL (ref 70–125)
GLUCOSE BLD-MCNC: 96 MG/DL (ref 70–125)
GLUCOSE BLD-MCNC: 96 MG/DL (ref 70–125)
GLUCOSE BLD-MCNC: 98 MG/DL (ref 70–125)
GLUCOSE BLD-MCNC: 99 MG/DL (ref 70–125)
GLUCOSE UR STRIP-MCNC: NEGATIVE MG/DL
GLUCOSE UR STRIP-MCNC: NEGATIVE MG/DL
GRAM STAIN RESULT: NORMAL
GRAM STAIN RESULT: NORMAL
HBA1C MFR BLD: 7 % (ref 3.5–6)
HCC DEVICE COMMENTS: NORMAL
HCC DEVICE IMPLANTING PROVIDER: NORMAL
HCC DEVICE MANUFACTURE: NORMAL
HCC DEVICE MODEL: NORMAL
HCC DEVICE SERIAL NUMBER: NORMAL
HCC DEVICE TYPE: NORMAL
HCT VFR BLD AUTO: 29 % (ref 35–47)
HCT VFR BLD AUTO: 32.2 % (ref 35–47)
HCT VFR BLD AUTO: 32.6 % (ref 35–47)
HCT VFR BLD AUTO: 35.9 % (ref 35–47)
HGB BLD-MCNC: 10.2 G/DL (ref 12–16)
HGB BLD-MCNC: 10.2 G/DL (ref 12–16)
HGB BLD-MCNC: 10.7 G/DL (ref 12–16)
HGB BLD-MCNC: 10.9 G/DL (ref 12–16)
HGB BLD-MCNC: 8.2 G/DL (ref 12–16)
HGB BLD-MCNC: 8.4 G/DL (ref 12–16)
HGB BLD-MCNC: 9 G/DL (ref 12–16)
HGB BLD-MCNC: 9.7 G/DL (ref 12–16)
HGB BLD-MCNC: 9.8 G/DL (ref 12–16)
HGB BLD-MCNC: 9.8 G/DL (ref 12–16)
HGB UR QL STRIP: NEGATIVE
HGB UR QL STRIP: NEGATIVE
KETONES UR STRIP-MCNC: NEGATIVE MG/DL
KETONES UR STRIP-MCNC: NEGATIVE MG/DL
LEUKOCYTE ESTERASE UR QL STRIP: ABNORMAL
LEUKOCYTE ESTERASE UR QL STRIP: NEGATIVE
LYMPHOCYTES # BLD AUTO: 1.6 THOU/UL (ref 0.8–4.4)
LYMPHOCYTES NFR BLD AUTO: 23 % (ref 20–40)
LYMPHOCYTES NFR FLD MANUAL: 29 %
MACROPHAGE % - HISTORICAL: 8 %
MCH RBC QN AUTO: 29.9 PG (ref 27–34)
MCH RBC QN AUTO: 30.3 PG (ref 27–34)
MCH RBC QN AUTO: 30.4 PG (ref 27–34)
MCH RBC QN AUTO: 31.3 PG (ref 27–34)
MCHC RBC AUTO-ENTMCNC: 30.4 G/DL (ref 32–36)
MCHC RBC AUTO-ENTMCNC: 31 G/DL (ref 32–36)
MCHC RBC AUTO-ENTMCNC: 31.5 G/DL (ref 32–36)
MCHC RBC AUTO-ENTMCNC: 32.9 G/DL (ref 32–36)
MCV RBC AUTO: 100 FL (ref 80–100)
MCV RBC AUTO: 95 FL (ref 80–100)
MCV RBC AUTO: 95 FL (ref 80–100)
MCV RBC AUTO: 98 FL (ref 80–100)
MESOTHELIALS, FLUID: ABNORMAL
MONOCYTE % - HISTORICAL: 10 %
MONOCYTES # BLD AUTO: 0.5 THOU/UL (ref 0–0.9)
MONOCYTES NFR BLD AUTO: 7 % (ref 2–10)
NEUTROPHILS # BLD AUTO: 4.5 THOU/UL (ref 2–7.7)
NEUTROPHILS NFR BLD AUTO: 65 % (ref 50–70)
NEUTS BAND NFR FLD MANUAL: 52 %
NITRATE UR QL: NEGATIVE
NITRATE UR QL: NEGATIVE
OTHER CELLS FLD MANUAL: ABNORMAL
PH UR STRIP: 5.5 [PH] (ref 4.5–8)
PH UR STRIP: 7.5 [PH] (ref 4.5–8)
PLATELET # BLD AUTO: 164 THOU/UL (ref 140–440)
PLATELET # BLD AUTO: 175 THOU/UL (ref 140–440)
PLATELET # BLD AUTO: 227 THOU/UL (ref 140–440)
PLATELET # BLD AUTO: 94 THOU/UL (ref 140–440)
PMV BLD AUTO: 10.9 FL (ref 8.5–12.5)
PMV BLD AUTO: 11.3 FL (ref 8.5–12.5)
PMV BLD AUTO: 8.7 FL (ref 7–10)
PMV BLD AUTO: 9.9 FL (ref 7–10)
POTASSIUM BLD-SCNC: 2.9 MMOL/L (ref 3.5–5)
POTASSIUM BLD-SCNC: 3.1 MMOL/L (ref 3.5–5)
POTASSIUM BLD-SCNC: 3.2 MMOL/L (ref 3.5–5)
POTASSIUM BLD-SCNC: 3.3 MMOL/L (ref 3.5–5)
POTASSIUM BLD-SCNC: 3.4 MMOL/L (ref 3.5–5)
POTASSIUM BLD-SCNC: 3.6 MMOL/L (ref 3.5–5)
POTASSIUM BLD-SCNC: 3.8 MMOL/L (ref 3.5–5)
POTASSIUM BLD-SCNC: 3.9 MMOL/L (ref 3.5–5)
POTASSIUM BLD-SCNC: 4 MMOL/L (ref 3.5–5)
POTASSIUM BLD-SCNC: 4.4 MMOL/L (ref 3.5–5)
POTASSIUM BLD-SCNC: 5 MMOL/L (ref 3.5–5)
POTASSIUM BLD-SCNC: 5.2 MMOL/L (ref 3.5–5)
PROT SERPL-MCNC: 7.4 G/DL (ref 6–8)
RBC # BLD AUTO: 2.97 MILL/UL (ref 3.8–5.4)
RBC # BLD AUTO: 3.4 MILL/UL (ref 3.8–5.4)
RBC # BLD AUTO: 3.43 MILL/UL (ref 3.8–5.4)
RBC # BLD AUTO: 3.59 MILL/UL (ref 3.8–5.4)
RBC #/AREA URNS AUTO: ABNORMAL HPF
RBC FLUID - HISTORICAL: ABNORMAL /UL
RHEUMATOID FACT SERPL-ACNC: <15 IU/ML (ref 0–30)
RIBOTYPE 027/NAP1/BI: ABNORMAL
SODIUM SERPL-SCNC: 134 MMOL/L (ref 136–145)
SODIUM SERPL-SCNC: 135 MMOL/L (ref 136–145)
SODIUM SERPL-SCNC: 137 MMOL/L (ref 136–145)
SODIUM SERPL-SCNC: 139 MMOL/L (ref 136–145)
SODIUM SERPL-SCNC: 140 MMOL/L (ref 136–145)
SODIUM SERPL-SCNC: 141 MMOL/L (ref 136–145)
SODIUM SERPL-SCNC: 142 MMOL/L (ref 136–145)
SP GR UR STRIP: 1.01 (ref 1–1.03)
SP GR UR STRIP: 1.01 (ref 1–1.03)
SQUAMOUS #/AREA URNS AUTO: ABNORMAL LPF
TSH SERPL DL<=0.005 MIU/L-ACNC: 0.17 UIU/ML (ref 0.3–5)
TSH SERPL DL<=0.005 MIU/L-ACNC: 3.43 UIU/ML (ref 0.3–5)
URATE SERPL-MCNC: 10.5 MG/DL (ref 2–7.5)
URATE SERPL-MCNC: 11.5 MG/DL (ref 2–7.5)
UROBILINOGEN UR STRIP-ACNC: ABNORMAL
UROBILINOGEN UR STRIP-ACNC: NORMAL
WBC # FLD AUTO: 8998 /UL (ref 0–99)
WBC #/AREA URNS AUTO: ABNORMAL HPF
WBC: 5.1 THOU/UL (ref 4–11)
WBC: 5.4 THOU/UL (ref 4–11)
WBC: 6.9 THOU/UL (ref 4–11)
WBC: 8.4 THOU/UL (ref 4–11)

## 2020-01-01 ASSESSMENT — MIFFLIN-ST. JEOR
SCORE: 1418.37
SCORE: 1397.5

## 2020-01-02 ENCOUNTER — OFFICE VISIT - HEALTHEAST (OUTPATIENT)
Dept: FAMILY MEDICINE | Facility: CLINIC | Age: 85
End: 2020-01-02

## 2020-01-02 DIAGNOSIS — L30.4 INTERTRIGO: ICD-10-CM

## 2020-01-02 DIAGNOSIS — M79.89 SWELLING OF RIGHT HAND: ICD-10-CM

## 2020-01-02 DIAGNOSIS — M70.21 OLECRANON BURSITIS OF RIGHT ELBOW: ICD-10-CM

## 2020-01-02 DIAGNOSIS — D64.9 NORMOCHROMIC NORMOCYTIC ANEMIA: ICD-10-CM

## 2020-01-02 DIAGNOSIS — M10.9 ACUTE GOUTY ARTHRITIS: ICD-10-CM

## 2020-01-02 DIAGNOSIS — S81.812A LACERATION OF LEG, LEFT, INITIAL ENCOUNTER: ICD-10-CM

## 2020-01-02 DIAGNOSIS — E11.8 TYPE 2 DIABETES MELLITUS WITH COMPLICATION, WITHOUT LONG-TERM CURRENT USE OF INSULIN (H): ICD-10-CM

## 2020-01-02 DIAGNOSIS — N18.4 CKD (CHRONIC KIDNEY DISEASE) STAGE 4, GFR 15-29 ML/MIN (H): ICD-10-CM

## 2020-01-07 ENCOUNTER — HOME CARE/HOSPICE - HEALTHEAST (OUTPATIENT)
Dept: HOME HEALTH SERVICES | Facility: HOME HEALTH | Age: 85
End: 2020-01-07

## 2020-01-16 ENCOUNTER — OFFICE VISIT - HEALTHEAST (OUTPATIENT)
Dept: GERIATRICS | Facility: CLINIC | Age: 85
End: 2020-01-16

## 2020-01-16 DIAGNOSIS — E11.22 TYPE 2 DIABETES MELLITUS WITH STAGE 3 CHRONIC KIDNEY DISEASE, WITHOUT LONG-TERM CURRENT USE OF INSULIN (H): ICD-10-CM

## 2020-01-16 DIAGNOSIS — N18.30 TYPE 2 DIABETES MELLITUS WITH STAGE 3 CHRONIC KIDNEY DISEASE, WITHOUT LONG-TERM CURRENT USE OF INSULIN (H): ICD-10-CM

## 2020-01-16 DIAGNOSIS — I50.23 ACUTE ON CHRONIC SYSTOLIC CONGESTIVE HEART FAILURE (H): ICD-10-CM

## 2020-01-16 DIAGNOSIS — N17.9 ACUTE KIDNEY INJURY SUPERIMPOSED ON CHRONIC KIDNEY DISEASE (H): ICD-10-CM

## 2020-01-16 DIAGNOSIS — R53.81 PHYSICAL DECONDITIONING: ICD-10-CM

## 2020-01-16 DIAGNOSIS — N18.9 ACUTE KIDNEY INJURY SUPERIMPOSED ON CHRONIC KIDNEY DISEASE (H): ICD-10-CM

## 2020-01-16 DIAGNOSIS — I10 ESSENTIAL HYPERTENSION WITH GOAL BLOOD PRESSURE LESS THAN 140/90: ICD-10-CM

## 2020-01-16 DIAGNOSIS — J45.909 UNCOMPLICATED ASTHMA, UNSPECIFIED ASTHMA SEVERITY, UNSPECIFIED WHETHER PERSISTENT: ICD-10-CM

## 2020-01-16 DIAGNOSIS — I48.0 PAF (PAROXYSMAL ATRIAL FIBRILLATION) (H): ICD-10-CM

## 2020-01-16 DIAGNOSIS — E87.6 HYPOKALEMIA: ICD-10-CM

## 2020-01-16 DIAGNOSIS — I48.91 ATRIAL FIBRILLATION, UNSPECIFIED TYPE (H): ICD-10-CM

## 2020-01-16 DIAGNOSIS — I50.33 ACUTE ON CHRONIC DIASTOLIC HEART FAILURE (H): ICD-10-CM

## 2020-01-16 DIAGNOSIS — B02.9 VARICELLA ZOSTER: ICD-10-CM

## 2020-01-20 ENCOUNTER — OFFICE VISIT - HEALTHEAST (OUTPATIENT)
Dept: GERIATRICS | Facility: CLINIC | Age: 85
End: 2020-01-20

## 2020-01-20 ENCOUNTER — RECORDS - HEALTHEAST (OUTPATIENT)
Dept: LAB | Facility: CLINIC | Age: 85
End: 2020-01-20

## 2020-01-20 DIAGNOSIS — I73.9 PVD (PERIPHERAL VASCULAR DISEASE) (H): ICD-10-CM

## 2020-01-20 DIAGNOSIS — N18.30 TYPE 2 DIABETES MELLITUS WITH STAGE 3 CHRONIC KIDNEY DISEASE, WITHOUT LONG-TERM CURRENT USE OF INSULIN (H): ICD-10-CM

## 2020-01-20 DIAGNOSIS — J45.909 UNCOMPLICATED ASTHMA, UNSPECIFIED ASTHMA SEVERITY, UNSPECIFIED WHETHER PERSISTENT: ICD-10-CM

## 2020-01-20 DIAGNOSIS — B02.9 VARICELLA ZOSTER: ICD-10-CM

## 2020-01-20 DIAGNOSIS — K59.04 CHRONIC IDIOPATHIC CONSTIPATION: ICD-10-CM

## 2020-01-20 DIAGNOSIS — N17.9 ACUTE KIDNEY INJURY SUPERIMPOSED ON CHRONIC KIDNEY DISEASE (H): ICD-10-CM

## 2020-01-20 DIAGNOSIS — N18.9 ACUTE KIDNEY INJURY SUPERIMPOSED ON CHRONIC KIDNEY DISEASE (H): ICD-10-CM

## 2020-01-20 DIAGNOSIS — Z95.0 CARDIAC PACEMAKER IN SITU: ICD-10-CM

## 2020-01-20 DIAGNOSIS — I95.1 ORTHOSTATIC HYPOTENSION: ICD-10-CM

## 2020-01-20 DIAGNOSIS — I50.23 ACUTE ON CHRONIC SYSTOLIC CONGESTIVE HEART FAILURE (H): ICD-10-CM

## 2020-01-20 DIAGNOSIS — R53.81 PHYSICAL DECONDITIONING: ICD-10-CM

## 2020-01-20 DIAGNOSIS — E11.22 TYPE 2 DIABETES MELLITUS WITH STAGE 3 CHRONIC KIDNEY DISEASE, WITHOUT LONG-TERM CURRENT USE OF INSULIN (H): ICD-10-CM

## 2020-01-20 DIAGNOSIS — I25.10 ATHEROSCLEROSIS OF NATIVE CORONARY ARTERY OF NATIVE HEART WITHOUT ANGINA PECTORIS: ICD-10-CM

## 2020-01-20 DIAGNOSIS — N18.30 CKD (CHRONIC KIDNEY DISEASE), STAGE III (H): ICD-10-CM

## 2020-01-20 LAB
25(OH)D3 SERPL-MCNC: 45.9 NG/ML (ref 30–80)
ANION GAP SERPL CALCULATED.3IONS-SCNC: 11 MMOL/L (ref 5–18)
BASOPHILS # BLD AUTO: 0.1 THOU/UL (ref 0–0.2)
BASOPHILS NFR BLD AUTO: 1 % (ref 0–2)
BUN SERPL-MCNC: 48 MG/DL (ref 8–28)
CALCIUM SERPL-MCNC: 9.7 MG/DL (ref 8.5–10.5)
CHLORIDE BLD-SCNC: 91 MMOL/L (ref 98–107)
CO2 SERPL-SCNC: 33 MMOL/L (ref 22–31)
CREAT SERPL-MCNC: 1.71 MG/DL (ref 0.6–1.1)
EOSINOPHIL # BLD AUTO: 0.3 THOU/UL (ref 0–0.4)
EOSINOPHIL NFR BLD AUTO: 4 % (ref 0–6)
ERYTHROCYTE [DISTWIDTH] IN BLOOD BY AUTOMATED COUNT: 13.6 % (ref 11–14.5)
GFR SERPL CREATININE-BSD FRML MDRD: 28 ML/MIN/1.73M2
GLUCOSE BLD-MCNC: 112 MG/DL (ref 70–125)
HCT VFR BLD AUTO: 35.4 % (ref 35–47)
HGB BLD-MCNC: 11 G/DL (ref 12–16)
LYMPHOCYTES # BLD AUTO: 1.9 THOU/UL (ref 0.8–4.4)
LYMPHOCYTES NFR BLD AUTO: 30 % (ref 20–40)
MCH RBC QN AUTO: 30.8 PG (ref 27–34)
MCHC RBC AUTO-ENTMCNC: 31.1 G/DL (ref 32–36)
MCV RBC AUTO: 99 FL (ref 80–100)
MONOCYTES # BLD AUTO: 0.4 THOU/UL (ref 0–0.9)
MONOCYTES NFR BLD AUTO: 6 % (ref 2–10)
NEUTROPHILS # BLD AUTO: 3.8 THOU/UL (ref 2–7.7)
NEUTROPHILS NFR BLD AUTO: 59 % (ref 50–70)
PLATELET # BLD AUTO: 177 THOU/UL (ref 140–440)
PMV BLD AUTO: 11.4 FL (ref 8.5–12.5)
POTASSIUM BLD-SCNC: 3.8 MMOL/L (ref 3.5–5)
RBC # BLD AUTO: 3.57 MILL/UL (ref 3.8–5.4)
SODIUM SERPL-SCNC: 135 MMOL/L (ref 136–145)
WBC: 6.5 THOU/UL (ref 4–11)

## 2020-01-21 ENCOUNTER — OFFICE VISIT - HEALTHEAST (OUTPATIENT)
Dept: GERIATRICS | Facility: CLINIC | Age: 85
End: 2020-01-21

## 2020-01-21 DIAGNOSIS — I50.21 CHF (CONGESTIVE HEART FAILURE), NYHA CLASS I, ACUTE, SYSTOLIC (H): ICD-10-CM

## 2020-01-21 DIAGNOSIS — N18.30 TYPE 2 DIABETES MELLITUS WITH STAGE 3 CHRONIC KIDNEY DISEASE, WITHOUT LONG-TERM CURRENT USE OF INSULIN (H): ICD-10-CM

## 2020-01-21 DIAGNOSIS — J45.40 MODERATE PERSISTENT ASTHMA WITHOUT COMPLICATION: ICD-10-CM

## 2020-01-21 DIAGNOSIS — I48.0 PAF (PAROXYSMAL ATRIAL FIBRILLATION) (H): ICD-10-CM

## 2020-01-21 DIAGNOSIS — N18.30 CKD (CHRONIC KIDNEY DISEASE), STAGE III (H): ICD-10-CM

## 2020-01-21 DIAGNOSIS — E11.22 TYPE 2 DIABETES MELLITUS WITH STAGE 3 CHRONIC KIDNEY DISEASE, WITHOUT LONG-TERM CURRENT USE OF INSULIN (H): ICD-10-CM

## 2020-01-21 DIAGNOSIS — N17.9 ACUTE KIDNEY INJURY SUPERIMPOSED ON CHRONIC KIDNEY DISEASE (H): ICD-10-CM

## 2020-01-21 DIAGNOSIS — Z95.0 CARDIAC PACEMAKER IN SITU: ICD-10-CM

## 2020-01-21 DIAGNOSIS — I25.10 ATHEROSCLEROSIS OF NATIVE CORONARY ARTERY OF NATIVE HEART WITHOUT ANGINA PECTORIS: ICD-10-CM

## 2020-01-21 DIAGNOSIS — I27.20 PULMONARY HYPERTENSION (H): ICD-10-CM

## 2020-01-21 DIAGNOSIS — Z95.3 H/O HEART VALVE REPLACEMENT WITH BIOPROSTHETIC VALVE: ICD-10-CM

## 2020-01-21 DIAGNOSIS — I10 ESSENTIAL HYPERTENSION WITH GOAL BLOOD PRESSURE LESS THAN 140/90: ICD-10-CM

## 2020-01-21 DIAGNOSIS — N18.9 ACUTE KIDNEY INJURY SUPERIMPOSED ON CHRONIC KIDNEY DISEASE (H): ICD-10-CM

## 2020-01-21 DIAGNOSIS — R53.81 PHYSICAL DECONDITIONING: ICD-10-CM

## 2020-01-21 ASSESSMENT — MIFFLIN-ST. JEOR: SCORE: 1352.14

## 2020-01-23 ENCOUNTER — OFFICE VISIT - HEALTHEAST (OUTPATIENT)
Dept: GERIATRICS | Facility: CLINIC | Age: 85
End: 2020-01-23

## 2020-01-23 DIAGNOSIS — R53.81 PHYSICAL DECONDITIONING: ICD-10-CM

## 2020-01-23 DIAGNOSIS — B02.9 VARICELLA ZOSTER: ICD-10-CM

## 2020-01-23 DIAGNOSIS — J45.41 MODERATE PERSISTENT ASTHMA WITH EXACERBATION: ICD-10-CM

## 2020-01-23 DIAGNOSIS — F51.01 PRIMARY INSOMNIA: ICD-10-CM

## 2020-01-23 DIAGNOSIS — J06.9 VIRAL URI: ICD-10-CM

## 2020-01-23 DIAGNOSIS — E11.22 TYPE 2 DIABETES MELLITUS WITH STAGE 3 CHRONIC KIDNEY DISEASE, WITHOUT LONG-TERM CURRENT USE OF INSULIN (H): ICD-10-CM

## 2020-01-23 DIAGNOSIS — N18.30 TYPE 2 DIABETES MELLITUS WITH STAGE 3 CHRONIC KIDNEY DISEASE, WITHOUT LONG-TERM CURRENT USE OF INSULIN (H): ICD-10-CM

## 2020-01-23 DIAGNOSIS — J45.40 MODERATE PERSISTENT ASTHMA WITHOUT COMPLICATION: ICD-10-CM

## 2020-01-23 DIAGNOSIS — K59.04 CHRONIC IDIOPATHIC CONSTIPATION: ICD-10-CM

## 2020-01-23 DIAGNOSIS — I50.33 ACUTE ON CHRONIC DIASTOLIC HEART FAILURE (H): ICD-10-CM

## 2020-01-23 DIAGNOSIS — N18.30 CKD (CHRONIC KIDNEY DISEASE), STAGE III (H): ICD-10-CM

## 2020-01-27 ENCOUNTER — COMMUNICATION - HEALTHEAST (OUTPATIENT)
Dept: GERIATRICS | Facility: CLINIC | Age: 85
End: 2020-01-27

## 2020-01-27 ENCOUNTER — OFFICE VISIT - HEALTHEAST (OUTPATIENT)
Dept: GERIATRICS | Facility: CLINIC | Age: 85
End: 2020-01-27

## 2020-01-27 ENCOUNTER — RECORDS - HEALTHEAST (OUTPATIENT)
Dept: LAB | Facility: CLINIC | Age: 85
End: 2020-01-27

## 2020-01-27 DIAGNOSIS — I50.33 ACUTE ON CHRONIC DIASTOLIC HEART FAILURE (H): ICD-10-CM

## 2020-01-27 DIAGNOSIS — N17.9 ACUTE KIDNEY INJURY SUPERIMPOSED ON CHRONIC KIDNEY DISEASE (H): ICD-10-CM

## 2020-01-27 DIAGNOSIS — N18.9 ACUTE KIDNEY INJURY SUPERIMPOSED ON CHRONIC KIDNEY DISEASE (H): ICD-10-CM

## 2020-01-27 DIAGNOSIS — I73.9 PVD (PERIPHERAL VASCULAR DISEASE) (H): ICD-10-CM

## 2020-01-27 DIAGNOSIS — I95.1 ORTHOSTATIC HYPOTENSION: ICD-10-CM

## 2020-01-27 DIAGNOSIS — I48.0 PAF (PAROXYSMAL ATRIAL FIBRILLATION) (H): ICD-10-CM

## 2020-01-27 DIAGNOSIS — J06.9 VIRAL URI: ICD-10-CM

## 2020-01-27 LAB
ANION GAP SERPL CALCULATED.3IONS-SCNC: 11 MMOL/L (ref 5–18)
BUN SERPL-MCNC: 55 MG/DL (ref 8–28)
CALCIUM SERPL-MCNC: 9.7 MG/DL (ref 8.5–10.5)
CHLORIDE BLD-SCNC: 88 MMOL/L (ref 98–107)
CO2 SERPL-SCNC: 32 MMOL/L (ref 22–31)
CREAT SERPL-MCNC: 1.91 MG/DL (ref 0.6–1.1)
GFR SERPL CREATININE-BSD FRML MDRD: 25 ML/MIN/1.73M2
GLUCOSE BLD-MCNC: 105 MG/DL (ref 70–125)
POTASSIUM BLD-SCNC: 3.3 MMOL/L (ref 3.5–5)
SODIUM SERPL-SCNC: 131 MMOL/L (ref 136–145)

## 2021-01-01 ENCOUNTER — COMMUNICATION - HEALTHEAST (OUTPATIENT)
Dept: HOSPICE | Facility: HOSPICE | Age: 86
End: 2021-01-01

## 2021-01-01 ENCOUNTER — COMMUNICATION - HEALTHEAST (OUTPATIENT)
Dept: FAMILY MEDICINE | Facility: CLINIC | Age: 86
End: 2021-01-01

## 2021-01-01 ENCOUNTER — AMBULATORY - HEALTHEAST (OUTPATIENT)
Dept: CARDIOLOGY | Facility: CLINIC | Age: 86
End: 2021-01-01

## 2021-01-01 ENCOUNTER — OFFICE VISIT - HEALTHEAST (OUTPATIENT)
Dept: FAMILY MEDICINE | Facility: CLINIC | Age: 86
End: 2021-01-01

## 2021-01-01 ENCOUNTER — AMBULATORY - HEALTHEAST (OUTPATIENT)
Dept: FAMILY MEDICINE | Facility: CLINIC | Age: 86
End: 2021-01-01

## 2021-01-01 ENCOUNTER — COMMUNICATION - HEALTHEAST (OUTPATIENT)
Dept: SCHEDULING | Facility: CLINIC | Age: 86
End: 2021-01-01

## 2021-01-01 ENCOUNTER — AMBULATORY - HEALTHEAST (OUTPATIENT)
Dept: PULMONOLOGY | Facility: OTHER | Age: 86
End: 2021-01-01

## 2021-01-01 DIAGNOSIS — R09.02 HYPOXIA: ICD-10-CM

## 2021-01-01 DIAGNOSIS — J45.40 MODERATE PERSISTENT ASTHMA WITHOUT COMPLICATION: ICD-10-CM

## 2021-01-01 DIAGNOSIS — I44.2 COMPLETE HEART BLOCK (H): ICD-10-CM

## 2021-01-01 DIAGNOSIS — R60.0 BILATERAL LEG EDEMA: ICD-10-CM

## 2021-01-01 DIAGNOSIS — E03.9 HYPOTHYROIDISM, UNSPECIFIED TYPE: ICD-10-CM

## 2021-01-01 DIAGNOSIS — J45.909 ASTHMA: ICD-10-CM

## 2021-01-01 DIAGNOSIS — I50.21 CHF (CONGESTIVE HEART FAILURE), NYHA CLASS I, ACUTE, SYSTOLIC (H): ICD-10-CM

## 2021-01-01 DIAGNOSIS — L30.4 INTERTRIGO: ICD-10-CM

## 2021-01-01 DIAGNOSIS — I50.33 ACUTE ON CHRONIC HEART FAILURE WITH PRESERVED EJECTION FRACTION (H): ICD-10-CM

## 2021-01-01 DIAGNOSIS — E21.3 HYPERPARATHYROIDISM, UNSPECIFIED (H): ICD-10-CM

## 2021-01-01 DIAGNOSIS — Z95.0 CARDIAC PACEMAKER IN SITU: ICD-10-CM

## 2021-01-01 DIAGNOSIS — I27.20 PULMONARY HYPERTENSION (H): ICD-10-CM

## 2021-01-01 DIAGNOSIS — R44.3 HALLUCINATIONS: ICD-10-CM

## 2021-01-01 DIAGNOSIS — N18.32 STAGE 3B CHRONIC KIDNEY DISEASE (H): ICD-10-CM

## 2021-02-03 ENCOUNTER — AMBULATORY - HEALTHEAST (OUTPATIENT)
Dept: OTHER | Facility: CLINIC | Age: 86
End: 2021-02-03

## 2021-02-08 ENCOUNTER — COMMUNICATION - HEALTHEAST (OUTPATIENT)
Dept: FAMILY MEDICINE | Facility: CLINIC | Age: 86
End: 2021-02-08

## 2021-05-26 VITALS
HEART RATE: 60 BPM | SYSTOLIC BLOOD PRESSURE: 130 MMHG | TEMPERATURE: 97.9 F | DIASTOLIC BLOOD PRESSURE: 65 MMHG | RESPIRATION RATE: 16 BRPM | OXYGEN SATURATION: 93 %

## 2021-05-27 VITALS — OXYGEN SATURATION: 93 % | DIASTOLIC BLOOD PRESSURE: 60 MMHG | SYSTOLIC BLOOD PRESSURE: 126 MMHG | HEART RATE: 80 BPM

## 2021-05-27 VITALS — RESPIRATION RATE: 16 BRPM | RESPIRATION RATE: 16 BRPM

## 2021-05-27 VITALS — SYSTOLIC BLOOD PRESSURE: 130 MMHG | HEART RATE: 55 BPM | DIASTOLIC BLOOD PRESSURE: 70 MMHG | TEMPERATURE: 96.1 F

## 2021-05-28 ASSESSMENT — ASTHMA QUESTIONNAIRES: ACT_TOTALSCORE: 17

## 2021-05-29 ENCOUNTER — RECORDS - HEALTHEAST (OUTPATIENT)
Dept: ADMINISTRATIVE | Facility: CLINIC | Age: 86
End: 2021-05-29

## 2021-05-29 NOTE — TELEPHONE ENCOUNTER
Refill Approved    Rx renewed per Medication Renewal Policy. Medication was last renewed on 11/7/18.    Monie Jorgensen, [facility] Triage/Med Refill 6/17/2019     Requested Prescriptions   Pending Prescriptions Disp Refills     levothyroxine (SYNTHROID, LEVOTHROID) 150 MCG tablet [Pharmacy Med Name: LEVOTHYROXINE 0.150MG (150MCG) TAB] 90 tablet 0     Sig: TAKE 1 TABLET BY MOUTH DAILY AT 6:00 AM       Thyroid Hormones Protocol Passed - 6/16/2019 12:54 PM        Passed - Provider visit in past 12 months or next 3 months     Last office visit with prescriber/PCP: 6/7/2019 Gamal Hdz MD OR same dept: 6/7/2019 Gamal Hdz MD OR same specialty: 6/7/2019 Gamal Hdz MD  Last physical: 1/31/2018 Last MTM visit: Visit date not found   Next visit within 3 mo: Visit date not found  Next physical within 3 mo: Visit date not found  Prescriber OR PCP: Gamal Hdz MD  Last diagnosis associated with med order: 1. Hypothyroidism, unspecified type  - levothyroxine (SYNTHROID, LEVOTHROID) 150 MCG tablet [Pharmacy Med Name: LEVOTHYROXINE 0.150MG (150MCG) TAB]; TAKE 1 TABLET BY MOUTH DAILY AT 6:00 AM  Dispense: 90 tablet; Refill: 0    If protocol passes may refill for 12 months if within 3 months of last provider visit (or a total of 15 months).             Passed - TSH on file in past 12 months for patient age 12 & older     TSH   Date Value Ref Range Status   06/07/2019 5.60 (H) 0.30 - 5.00 uIU/mL Final

## 2021-05-29 NOTE — PROGRESS NOTES
Assessment/Plan:    1. Moderate persistent asthma without complication  Continued use of Advair 500/50 using 1 inhalation twice daily along with Combivent 1 puff 4 times daily.  No recent asthma exacerbation identified.    2. Type 2 diabetes mellitus with stage 3 chronic kidney disease, without long-term current use of insulin (H)  Type 2 diabetes remains stable with prior A1c of 6.7% March 19, 2019 and will reassess at follow-up in 3 months.  Dietary modifications reviewed.  - Basic Metabolic Panel    3. CKD (chronic kidney disease), stage III (H)  History of CKD stage III prior creatinine 1.45 and GFR 34.  Repeat renal function today to ensure stable.  - Basic Metabolic Panel    4. Essential hypertension with goal blood pressure less than 140/90  Hypertension, at goal currently continues metoprolol tartrate 150 mg twice daily.    5. Normochromic normocytic anemia  Normochromic normocytic anemia.  Repeat CBC.  Patient using iron supplement twice daily.  Did check ferritin and iron levels today.  Okay to use OTC 65 mg iron supplement once daily.  - HM2(CBC w/o Differential)  - Ferritin  - Iron and Transferrin Iron Binding Capacity    6. Hypothyroidism, unspecified type  Hypothyroidism with improvement following dose increase of levothyroxine from 125 up to 150 mcg daily.  Repeat TSH to ensure adequate replacement.  - Thyroid Stimulating Hormone (TSH)    7. PAF (paroxysmal atrial fibrillation) (H)  History of paroxysmal atrial fibrillation.  Appears in sinus rhythm currently.  Prior pacemaker interrogation confirming persistent atrial fibrillation.    8. Hyperlipidemia, unspecified hyperlipidemia type  Continued use of atorvastatin 80 mg daily for lipid management.  Lipid cascade pending.  - Lipid Cascade    9.  Heart failure with preserved function with peripheral edema  Patient continues Bumex 2 mg twice daily.  Using metolazone 2.5 mg twice per week.  Will increase to once daily x7 days before resuming twice per  "week dosing following recent increase ankle swelling associated while sleeping in chair due to bed issue.  Overdue for follow-up with Dr. Irvin and should schedule as soon as possible.    40 minutes total time with patient, > 50% with counseling and coordination of cares.           Subjective:    Thea Acosta is seen today for follow-up evaluation.  History of congestive heart failure felt to be due to diastolic dysfunction.  Echocardiogram done on February 7, 2018 revealed an ejection fraction of 55% with normal bioprosthetic aortic valve in place.  Patient did have her valve replaced for severe aortic stenosis in 2007 and developed complete heart block postoperatively with subsequent permanent pacemaker placed.  She underwent a generator change on February 12.  Patient has chronic leg edema that has improved with diuretic therapy.  Had issues with second toe recently strain from her sock got wrapped around it.  No fevers or chills.  Issues with her bed therefore sleeping in her chair.  Uses Bumex 2 mg twice daily.  Metolazone 2.5 mg twice a week generally.  No orthopnea currently.  Denies chest pain or palpitations.  Type 2 diabetes.  Diet controlled.  Patient is fasting.  Wondering about iron supplementation.  History of iron deficiency anemia.  Prior dose adjustment of levothyroxine from 125 mcg up to 150 mcg daily.  Comprehensive review of systems as above otherwise all negative.      Reviewed Dr. Irvin's cardiology note from 4/17/2018:  1.  Chronic diastolic congestive heart failure currently compensated  2.  Normal functioning bioprosthetic aortic valve  3.  Sinus node dysfunction with permanent pacemaker in place  4.  Persistent atrial fibrillation on device interrogation  5.  Mild coronary artery disease by coronary angiography 2007 without angina       (twice) now since 3/24/07 (\"Gal\" - renal cell CA) - remarried 3/8/82   3 sons - Nate Mcoky (); Edwin (head of work force center [place]; Amrit Ean works at the post [employer] (I see him now as a patient)   Son-in-law Esdras Mo   2 daughters - breast cancer   Granddaughter - Terri - plays the clarinet   New granddaughter - Patrizia?   Roman Catholic  Dad - dec MVA age 52   Mom - dec 86 old age   2 bros - 1 bro  due to lung cancer in Aug, 2010 (had quit smoking > 40-50 years ago)   Quit smoking    No EtOH   Work: laundry services at [employer]   Surgeries: s/p gallbladder, bunion 06 left TKA ([facility]) 07 Aortic valve replacement (bioprostheitc) 07 pacemaker - dual chamber   Cardiovascular surgeon called 07 - patient is \"vasculopath\", prior Hb = 8.9, no Coumadin, will use ASA only 10-20-08: started back on Warfarin, 09 GI bleed, D/C Warfarin   11-10-08: Dexa order faxed to Thea Ordaz will schedule    Past Surgical History:   Procedure Laterality Date     APPENDECTOMY       CHOLECYSTECTOMY       EYE SURGERY Bilateral     Cataracts     HIP PINNING Right 3/1/2017    Procedure: RIGHT HIP TROCHANTERIC RODDING;  Surgeon: Moshe Davies MD;  Location: [facility] Main OR;  Service:      INSERT / REPLACE / REMOVE PACEMAKER  2007    guidant     INSERT / REPLACE / REMOVE PACEMAKER  2018    phoebe sci gen change     JOINT REPLACEMENT Left     knee     TISSUE AORTIC VALVE REPLACEMENT  2007             Family History   Problem Relation Age of Onset     No Medical Problems Mother         age 86     No Medical Problems Father         MVA     Cancer Brother         lung     No Medical Problems Brother         Past Medical History:   Diagnosis Date     Acute kidney injury superimposed on CKD (H) 3/3/2017     Asthma      CAD (coronary artery disease)      Cataract      Chronic kidney disease     ckd3     Diabetes mellitus (H)      Disease of thyroid gland      H/O heart valve replacement with bioprosthetic valve      History of transfusion      Hypertension      Normochromic normocytic " anemia      Restless leg syndrome         Social History     Tobacco Use     Smoking status: Former Smoker     Smokeless tobacco: Never Used   Substance Use Topics     Alcohol use: No     Drug use: No        Current Outpatient Medications   Medication Sig Dispense Refill     acetaminophen (TYLENOL) 500 MG tablet Take 2 tablets (1,000 mg total) by mouth 3 (three) times a day as needed for pain. Not to exceed 4000 mg in 24 hours.  0     aspirin 81 MG EC tablet Take 81 mg by mouth daily.       atorvastatin (LIPITOR) 80 MG tablet Take 1 tablet (80 mg total) by mouth at bedtime. 90 tablet 3     bumetanide (BUMEX) 2 MG tablet Take 1 tablet (2 mg total) by mouth 2 (two) times a day at 9am and 6pm. 180 tablet 3     cholecalciferol, vitamin D3, (VITAMIN D3) 2,000 unit Tab Take 2,000 Units by mouth once daily.       COMBIVENT RESPIMAT  mcg/actuation Mist inhaler INHALE 1 PUFF BY MOUTH FOUR TIMES DAILY 4 g 11     ferrous sulfate 325 (65 FE) MG tablet TAKE 1 TABLET(325 MG) BY MOUTH TWICE DAILY 180 tablet 3     fluticasone-salmeterol (ADVAIR) 500-50 mcg/dose DISKUS Inhale 1 puff 2 (two) times a day.       levothyroxine (SYNTHROID, LEVOTHROID) 150 MCG tablet Take 1 tablet (150 mcg total) by mouth Daily at 6:00 am. 90 tablet 1     metOLazone (ZAROXOLYN) 2.5 MG tablet Take 1 tablet (2.5 mg total) by mouth 2 (two) times a week. 24 tablet 3     metoprolol tartrate (LOPRESSOR) 100 MG tablet TAKE 1 AND 1/2 TABLETS(150 MG) BY MOUTH TWICE DAILY 270 tablet 3     metoprolol tartrate (LOPRESSOR) 50 MG tablet Take 50 mg by mouth 2 (two) times a day.       omeprazole (PRILOSEC) 20 MG capsule TAKE 1 CAPSULE BY MOUTH EVERY DAY 90 capsule 3     potassium chloride (K-DUR,KLOR-CON) 20 MEQ tablet TAKE 1 TABLET(20 MEQ) BY MOUTH DAILY 90 tablet 3     No current facility-administered medications for this visit.           Objective:    Vitals:    06/07/19 1017   BP: 114/60   Pulse: 61   SpO2: 90%   Weight: (!) 240 lb (108.9 kg)      Body mass  index is 37.59 kg/m .    Alert.  Smiling.  Pleasant.  Chest clear.  Cardiac exam regular.  Abdomen benign, obese.  Significant peripheral edema persists 2+.  Desquamation.  No significant ulceration or signs of cellulitis, etc.      Echo 2/7/18 Summary       Normal left ventricular size and systolic function.    When compared to the previous study dated 3/1/2017, no significant change.    Left ventricle ejection fraction is normal. The estimated left ventricular ejection fraction is 55%.    Normal right ventricular size and systolic function.    Left Atrium: Left atrial volume is moderately increased.    Aortic Valve: Bioprosthetic valve is not well visualized. No evidence of paravalvular leak and mean gradient is 15 mmHg which is borderline elevated, however not significantly changed compared to prior.    Mitral Valve: There is severe mitral annular calcification. Moderate mitral Calcific stenosis. No mitral regurgitation.             This note has been dictated using voice recognition software and as a result may contain minor grammatical errors and unintended word substitutions.

## 2021-05-30 ENCOUNTER — RECORDS - HEALTHEAST (OUTPATIENT)
Dept: ADMINISTRATIVE | Facility: CLINIC | Age: 86
End: 2021-05-30

## 2021-05-30 VITALS — WEIGHT: 266 LBS | BODY MASS INDEX: 41.66 KG/M2

## 2021-05-30 VITALS — WEIGHT: 278.9 LBS | BODY MASS INDEX: 43.68 KG/M2

## 2021-05-30 VITALS — WEIGHT: 271 LBS | BODY MASS INDEX: 42.44 KG/M2

## 2021-05-30 VITALS — WEIGHT: 264.5 LBS | BODY MASS INDEX: 41.43 KG/M2

## 2021-05-30 VITALS — WEIGHT: 268 LBS | BODY MASS INDEX: 41.97 KG/M2

## 2021-05-30 VITALS — BODY MASS INDEX: 41.82 KG/M2 | WEIGHT: 267 LBS

## 2021-05-30 VITALS — BODY MASS INDEX: 41.97 KG/M2 | WEIGHT: 268 LBS

## 2021-05-30 VITALS — BODY MASS INDEX: 43.66 KG/M2 | HEIGHT: 67 IN | WEIGHT: 278.2 LBS

## 2021-05-30 VITALS — BODY MASS INDEX: 43.73 KG/M2 | WEIGHT: 279.2 LBS

## 2021-05-30 NOTE — TELEPHONE ENCOUNTER
Refill Approved    Rx renewed per Medication Renewal Policy. Medication was last renewed on 5/29/18.8/17/18    Monie Jorgensen, [facility] Triage/Med Refill 7/3/2019     Requested Prescriptions   Pending Prescriptions Disp Refills     atorvastatin (LIPITOR) 80 MG tablet [Pharmacy Med Name: ATORVASTATIN 80MG TABLETS] 90 tablet 0     Sig: TAKE 1 TABLET(80 MG) BY MOUTH AT BEDTIME       Statins Refill Protocol (Hmg CoA Reductase Inhibitors) Passed - 7/2/2019 12:54 PM        Passed - PCP or prescribing provider visit in past 12 months      Last office visit with prescriber/PCP: 6/7/2019 Gamal Hdz MD OR same dept: 6/7/2019 Gamal Hdz MD OR same specialty: 6/7/2019 Gamal Hdz MD  Last physical: 1/31/2018 Last MTM visit: Visit date not found   Next visit within 3 mo: Visit date not found  Next physical within 3 mo: Visit date not found  Prescriber OR PCP: Gamal Hdz MD  Last diagnosis associated with med order: 1. Hyperlipidemia, unspecified hyperlipidemia type  - atorvastatin (LIPITOR) 80 MG tablet [Pharmacy Med Name: ATORVASTATIN 80MG TABLETS]; TAKE 1 TABLET(80 MG) BY MOUTH AT BEDTIME  Dispense: 90 tablet; Refill: 0    2. Hypokalemia  - potassium chloride (K-DUR,KLOR-CON) 20 MEQ tablet [Pharmacy Med Name: POTASSIUM CL 20MEQ ER TABLETS]; TAKE 1 TABLET(20 MEQ) BY MOUTH DAILY  Dispense: 90 tablet; Refill: 0    If protocol passes may refill for 12 months if within 3 months of last provider visit (or a total of 15 months).             potassium chloride (K-DUR,KLOR-CON) 20 MEQ tablet [Pharmacy Med Name: POTASSIUM CL 20MEQ ER TABLETS] 90 tablet 0     Sig: TAKE 1 TABLET(20 MEQ) BY MOUTH DAILY       Potassium Supplements Refill Protocol Passed - 7/2/2019 12:54 PM        Passed - PCP or prescribing provider visit in past 12 months       Last office visit with prescriber/PCP: 6/7/2019 Gamal Hdz MD OR same dept: 6/7/2019 Gamal Hdz MD OR same specialty: 6/7/2019 Gamal Hdz MD   Last physical: 1/31/2018 Last MTM visit: Visit date not found   Next visit within 3 mo: Visit date not found  Next physical within 3 mo: Visit date not found  Prescriber OR PCP: Gamal Hdz MD  Last diagnosis associated with med order: 1. Hyperlipidemia, unspecified hyperlipidemia type  - atorvastatin (LIPITOR) 80 MG tablet [Pharmacy Med Name: ATORVASTATIN 80MG TABLETS]; TAKE 1 TABLET(80 MG) BY MOUTH AT BEDTIME  Dispense: 90 tablet; Refill: 0    2. Hypokalemia  - potassium chloride (K-DUR,KLOR-CON) 20 MEQ tablet [Pharmacy Med Name: POTASSIUM CL 20MEQ ER TABLETS]; TAKE 1 TABLET(20 MEQ) BY MOUTH DAILY  Dispense: 90 tablet; Refill: 0    If protocol passes may refill for 12 months if within 3 months of last provider visit (or a total of 15 months).             Passed - Potassium level in last 12 months     Lab Results   Component Value Date    Potassium 4.2 06/07/2019

## 2021-05-31 VITALS — BODY MASS INDEX: 41.82 KG/M2 | WEIGHT: 267 LBS

## 2021-05-31 VITALS — BODY MASS INDEX: 40.72 KG/M2 | WEIGHT: 260 LBS

## 2021-05-31 VITALS — HEIGHT: 67 IN | WEIGHT: 253 LBS | BODY MASS INDEX: 39.71 KG/M2

## 2021-05-31 VITALS — WEIGHT: 261 LBS | BODY MASS INDEX: 40.88 KG/M2

## 2021-05-31 VITALS — BODY MASS INDEX: 40.41 KG/M2 | WEIGHT: 258 LBS

## 2021-05-31 VITALS — BODY MASS INDEX: 43.27 KG/M2 | WEIGHT: 276.3 LBS

## 2021-05-31 VITALS — HEIGHT: 67 IN | WEIGHT: 240.06 LBS | BODY MASS INDEX: 37.68 KG/M2

## 2021-05-31 VITALS — BODY MASS INDEX: 37.28 KG/M2 | WEIGHT: 238 LBS

## 2021-05-31 VITALS — WEIGHT: 272 LBS | BODY MASS INDEX: 42.6 KG/M2

## 2021-06-01 ENCOUNTER — RECORDS - HEALTHEAST (OUTPATIENT)
Dept: ADMINISTRATIVE | Facility: CLINIC | Age: 86
End: 2021-06-01

## 2021-06-01 VITALS — BODY MASS INDEX: 38.22 KG/M2 | WEIGHT: 244 LBS

## 2021-06-01 VITALS — BODY MASS INDEX: 37.93 KG/M2 | WEIGHT: 242.2 LBS

## 2021-06-01 VITALS — WEIGHT: 248 LBS | BODY MASS INDEX: 38.84 KG/M2

## 2021-06-01 VITALS — BODY MASS INDEX: 37.59 KG/M2 | WEIGHT: 240 LBS

## 2021-06-01 VITALS — WEIGHT: 229 LBS | BODY MASS INDEX: 35.87 KG/M2

## 2021-06-01 VITALS — BODY MASS INDEX: 33.05 KG/M2 | WEIGHT: 211 LBS

## 2021-06-01 VITALS — WEIGHT: 217 LBS | BODY MASS INDEX: 33.99 KG/M2

## 2021-06-01 VITALS — BODY MASS INDEX: 39.5 KG/M2 | WEIGHT: 252.2 LBS

## 2021-06-01 VITALS — HEIGHT: 67 IN | BODY MASS INDEX: 35.63 KG/M2 | WEIGHT: 227 LBS

## 2021-06-01 VITALS — WEIGHT: 228 LBS | BODY MASS INDEX: 35.71 KG/M2

## 2021-06-01 VITALS — BODY MASS INDEX: 36.79 KG/M2 | WEIGHT: 234.9 LBS

## 2021-06-01 VITALS — BODY MASS INDEX: 37.75 KG/M2 | WEIGHT: 241 LBS

## 2021-06-01 VITALS — WEIGHT: 252 LBS | BODY MASS INDEX: 39.55 KG/M2 | HEIGHT: 67 IN

## 2021-06-01 VITALS — BODY MASS INDEX: 37.95 KG/M2 | WEIGHT: 242.3 LBS

## 2021-06-01 VITALS — BODY MASS INDEX: 36.21 KG/M2 | WEIGHT: 231.2 LBS

## 2021-06-01 VITALS — BODY MASS INDEX: 39.63 KG/M2 | WEIGHT: 253 LBS

## 2021-06-01 VITALS — WEIGHT: 242.1 LBS | BODY MASS INDEX: 37.92 KG/M2

## 2021-06-01 VITALS — WEIGHT: 217.7 LBS | BODY MASS INDEX: 34.1 KG/M2

## 2021-06-01 VITALS — BODY MASS INDEX: 42.39 KG/M2 | HEIGHT: 67 IN | WEIGHT: 270.06 LBS

## 2021-06-01 VITALS — WEIGHT: 241 LBS | BODY MASS INDEX: 37.75 KG/M2

## 2021-06-01 VITALS — BODY MASS INDEX: 37.43 KG/M2 | WEIGHT: 239 LBS

## 2021-06-01 VITALS — WEIGHT: 253.2 LBS | BODY MASS INDEX: 39.66 KG/M2

## 2021-06-01 VITALS — WEIGHT: 247 LBS | BODY MASS INDEX: 38.69 KG/M2

## 2021-06-01 VITALS — WEIGHT: 253 LBS | BODY MASS INDEX: 39.63 KG/M2

## 2021-06-01 VITALS — BODY MASS INDEX: 35.08 KG/M2 | WEIGHT: 224 LBS

## 2021-06-01 VITALS — WEIGHT: 245 LBS | BODY MASS INDEX: 38.37 KG/M2

## 2021-06-01 VITALS — WEIGHT: 237.4 LBS | BODY MASS INDEX: 37.18 KG/M2

## 2021-06-01 VITALS — WEIGHT: 243 LBS | BODY MASS INDEX: 38.06 KG/M2

## 2021-06-01 NOTE — TELEPHONE ENCOUNTER
I would suggest scheduling an appointment with Danielle Dimas, CNP  This appointment will need to be scheduled in an available appointment slot.

## 2021-06-01 NOTE — TELEPHONE ENCOUNTER
Per 9/30/2019 Cardiology note:   Plan:  1. Continue bumex 2 mg two times a day for CHF  2. Continue metolazone at 2.5 mg on M,W,F  3.  Daily BMP.   4.  Metoprolol 50 mg twice daily for coronary disease and A. Fib  5.  Continue aspirin 81 mg daily for coronary disease         Spoke with Dr. Wilder Franco to notify patient should continue bumex 2 mg twice daily and continue metolazone at 2.5 mg M,W,F    Reason contacted:  Medication question  Information relayed:  Patient notified of the above information. She was already aware she is to take both of these medications. Dose and directions verified with patient.    Additional questions:  No  Further follow-up needed:  No  Okay to leave a detailed message:  No

## 2021-06-01 NOTE — TELEPHONE ENCOUNTER
Medication Question or Clarification  Who is calling: Patient's daughter Surjit  What medication are you calling about? (include dose and sig) Metolazone 2.5 mg, Take one tablet on Monday, Wednesday and Friday  Who prescribed the medication?: Dr. Hayes  What is your question/concern?: Patient with recent hospitalization, was sent home on metolazone.  Is patient suppose to continue the diuretic she takes from Dr. Hdz  Pharmacy: n/a  Okay to leave a detailed message?: Yes  Site CMT - Please call the pharmacy to obtain any additional needed information.    Surjit is not on a Consent to Communicate.  Will Thea give verbal consent to notify Surjit of this answer?

## 2021-06-01 NOTE — PROGRESS NOTES
TCM DISCHARGE FOLLOW UP CALL    Discharge Date:  9/30/2019  Reason for hospital stay (discharge diagnosis)::  CHF, pHTN  Are you feeling better, the same or worse since your discharge?:  Patient is feeling better (breathing easier. She has a wet cough, sputum clear. She didn't weigh herself today.)  Do you feel like you have a plan in the event of a health emergency?: Yes (Granddtr who lives with pt or her dtr.)    As part of your discharge plan, were  home care services ordered for you?: Yes    Have you seen them yet, or are they scheduled to visit?: No    Did you receive any new medications, or was there a change to your medications?: Yes    Are you taking those medications, or do you have any established regiment?:  Instructed pt to take metolazone M,W,F 30 minutes before taking Bumex. Pt verbalized understanding. Pt hssn't been using Combivent. Instructed pt to start today four times a day. Reinforced need to take meds as ordered, especially diuretics.  Do you have any follow up visits scheduled with your PCP or Specialist?:  No  I'm glad to hear you're doing well and we want you to continue to do well. Your PCP would like to see you for a follow-up visit. Can we help set that up for your today?: Yes    (RN) Patient was assisted in making appointment and/or given number to [facility].  If there are immediate concerns, In Basket messge route to the PCP with a summary of concern.:  Patient prefers to schedule on his/her own  RN NOTES::  Mailed a [employer] Tracker brochure (CHF tele-monitoring program) and information letter to pt. If she agrees to participate, she would need MyChart set up.  Pt needs f/u appt at [facility] [place].

## 2021-06-01 NOTE — TELEPHONE ENCOUNTER
New Appointment Needed  What is the reason for the visit:    Inpatient/ED Follow Up Appt Request  At what hospital or facility were you seen?: [facility]  What is the reason you were seen?: shortness of breath, lightheadedness  What date were you admitted?: date: 9/25/19  What date were you discharged?: date: 9/30/19  What was the recommended timeframe for your follow up appointment?: 1 week, patient needs an appt for this Wednesday, Oct 8th as late in the day as possible. She is requesting a 4:40pm appt, as this is the only day her daughter can leave work early.  Provider Preference: Dr Hdz  How soon do you need to be seen?: next week  Waitlist offered?: No  Okay to leave a detailed message:  Yes

## 2021-06-01 NOTE — TELEPHONE ENCOUNTER
Spoke with patient and she needs to call her daughter to see what day next week she can bring her in. Please help schedule when patient calls back with Danielle Dimas.

## 2021-06-01 NOTE — TELEPHONE ENCOUNTER
Who is calling:  Patient   Reason for Call:  Returning call   Date of last appointment with primary care:   Okay to leave a detailed message: wilmer    Scheduled INF with Danielle Dimas per encounter on 10/9 at 4pm.

## 2021-06-01 NOTE — PROGRESS NOTES
09/30/2019 New intake called in for home o2 without contact information. From 9:38-10:00 transferred and attempted to locate someone overseeing o2 intake. Charge RNTao Fernandez reporting willing to assist in home o2 needs. Educated that it appears we are qualifying under I27.20 and that is a qualifying DX. We will require a valid order and smart phrase before we can dispense o2. Receptive to information and Rebecca will collaborate with MD.   10:30 Offered choice and patient agreeable to [facility]. Pt. Did not want to use tank system and agreeable to POC unit. Informed pt. That we currently obtaining needed documentation to submit to insurance. Once we have all qualifying information will arrange for  to deliver to bedside.   10:50 All qualifying documentation obtained. Informed charge SHYAM Fernandez. Ticket up for  delivery.   10:53 Intake phone call for questions. Attempted call back to confirm we have received everything, however no answer.

## 2021-06-02 VITALS — WEIGHT: 227 LBS | BODY MASS INDEX: 35.55 KG/M2

## 2021-06-02 VITALS — WEIGHT: 219 LBS | BODY MASS INDEX: 34.3 KG/M2

## 2021-06-02 NOTE — TELEPHONE ENCOUNTER
Patient Returning Call  Reason for call:  Message from clinic  Information relayed to patient:  Patient received a message about needing an appointment but she does not drive.  She called her son who is currently in [place] to relay the clinic's message.  The son, Nate Mckoy, is not on the Consent to Communicate nor is anyone else.  This writer is unable to relay the message from Dr. Hdz that patient's lab work is abnormal and needs to be redrawn today.  Patient has additional questions:  Yes  If YES, what are your questions/concerns:  Why does she need appointment?  Okay to leave a detailed message?: No

## 2021-06-02 NOTE — TELEPHONE ENCOUNTER
Called patient and confirmed she will be able to make it in today around 12 pm for lab only appointment.    I also verified she is able to make it to her scheduled appointment tomorrow with Danielle Dimas, SARA

## 2021-06-02 NOTE — TELEPHONE ENCOUNTER
"Patient's home health nurse who is with [facility], [place], calls reporting that patient sustained skin tear to left distal second toe when scraping it with walker 2 days ago.  Wound reported as red and swollen from tip to base of toe today with serous drainage.  Nurse washed with soap and water and applied dressing.  Nurse reports patient is home bound and resistant to going in to see doctor.  Temperature taken earlier today by OT is 99.0 F.  Patient reported as denying pain.  Home health nurse Kerry is relaying patient's wish to only notify doctor about toe.  [facility] nurse can be reached at [phone].  Nurse is encouraged to contact patient's daughter to report same and possibly convince patient to be seen.  Patient's number confirmed as [phone].  Pharmacy on record confirmed.  Nurse reports patient is \"excited\" about toe currently.      Niki Mcbride RN, Triage    "

## 2021-06-02 NOTE — TELEPHONE ENCOUNTER
Patient's potassium and kidney function is improving and stable overall, no need for her to come in today as long as she is feeling okay.

## 2021-06-02 NOTE — TELEPHONE ENCOUNTER
Refill Approved    Rx renewed per Medication Renewal Policy. Medication was last renewed on .  bumetanide (BUMEX) 2 MG tablet 180 tablet 3 5/29/2018     omeprazole (PRILOSEC) 20 MG capsule 90 capsule 3 10/1/2018     Jessie Vera, [facility] Connection Triage/Med Refill 10/23/2019     Requested Prescriptions   Pending Prescriptions Disp Refills     omeprazole (PRILOSEC) 20 MG capsule [Pharmacy Med Name: OMEPRAZOLE 20MG CAPSULES] 90 capsule 3     Sig: TAKE 1 CAPSULE BY MOUTH EVERY DAY       GI Medications Refill Protocol Passed - 10/23/2019  8:23 PM        Passed - PCP or prescribing provider visit in last 12 or next 3 months.     Last office visit with prescriber/PCP: 6/7/2019 Gamal Hdz MD OR same dept: 10/9/2019 Danielle Dimas CNP OR same specialty: 10/9/2019 Danielle Dimas CNP  Last physical: 1/31/2018 Last MTM visit: Visit date not found   Next visit within 3 mo: Visit date not found  Next physical within 3 mo: Visit date not found  Prescriber OR PCP: Gamal Hdz MD  Last diagnosis associated with med order: 1. Gastric acidity  - omeprazole (PRILOSEC) 20 MG capsule [Pharmacy Med Name: OMEPRAZOLE 20MG CAPSULES]; TAKE 1 CAPSULE BY MOUTH EVERY DAY  Dispense: 90 capsule; Refill: 3    2. Heart failure with preserved ejection fraction (H)  - bumetanide (BUMEX) 2 MG tablet [Pharmacy Med Name: BUMETANIDE 2MG TABLETS]; TAKE 1 TABLET(2 MG) BY MOUTH TWICE DAILY AT 9 AM AND AT 6 PM  Dispense: 180 tablet; Refill: 3    If protocol passes may refill for 12 months if within 3 months of last provider visit (or a total of 15 months).             bumetanide (BUMEX) 2 MG tablet [Pharmacy Med Name: BUMETANIDE 2MG TABLETS] 180 tablet 3     Sig: TAKE 1 TABLET(2 MG) BY MOUTH TWICE DAILY AT 9 AM AND AT 6 PM       Diuretics/Combination Diuretics Refill Protocol  Passed - 10/23/2019  8:23 PM        Passed - Visit with PCP or prescribing provider visit in past 12 months     Last office visit with prescriber/PCP: 6/7/2019  Gamal Hdz MD OR same dept: 10/9/2019 Danielle Dimas CNP OR same specialty: 10/9/2019 Danielle Dimas CNP  Last physical: 1/31/2018 Last MTM visit: Visit date not found   Next visit within 3 mo: Visit date not found  Next physical within 3 mo: Visit date not found  Prescriber OR PCP: Gamal Hdz MD  Last diagnosis associated with med order: 1. Gastric acidity  - omeprazole (PRILOSEC) 20 MG capsule [Pharmacy Med Name: OMEPRAZOLE 20MG CAPSULES]; TAKE 1 CAPSULE BY MOUTH EVERY DAY  Dispense: 90 capsule; Refill: 3    2. Heart failure with preserved ejection fraction (H)  - bumetanide (BUMEX) 2 MG tablet [Pharmacy Med Name: BUMETANIDE 2MG TABLETS]; TAKE 1 TABLET(2 MG) BY MOUTH TWICE DAILY AT 9 AM AND AT 6 PM  Dispense: 180 tablet; Refill: 3    If protocol passes may refill for 12 months if within 3 months of last provider visit (or a total of 15 months).             Passed - Serum Potassium in past 12 months      Lab Results   Component Value Date    Potassium 3.2 (L) 10/14/2019             Passed - Serum Sodium in past 12 months      Lab Results   Component Value Date    Sodium 138 10/14/2019             Passed - Blood pressure on file in past 12 months     BP Readings from Last 1 Encounters:   10/09/19 120/62             Passed - Serum Creatinine in past 12 months      Creatinine   Date Value Ref Range Status   10/14/2019 2.31 (H) 0.60 - 1.10 mg/dL Final

## 2021-06-02 NOTE — TELEPHONE ENCOUNTER
Orders being requested: Wean from using oxygen    Shana with [facility] provided the following parameters:  - 1/2 L during exercise, no less than 94%  - 1 L during exercise, no less than 95%  - Without oxygen, no less than 97%    Maintain heart rate within 20 bpm from starting heart rate.  Reason service is needed/diagnosis:   - CHF  - Patient requesting to wean from oxygen  When are orders needed by: as soon as possible  Where to send Orders: Phone:  [phone]  Okay to leave detailed message?  Yes      Name of form/paperwork: Other:  Home healthcare orders (please refer to above order request) requiring physician signature.  Have you been seen for this request: N/A  Do we have the form: Caller stated once a verbal is received, the form will be faxed to the provider.  Caller was provided with the clinic fax number of  [phone].   When is form needed by: as soon as possible once received.   How would you like the form returned: Fax  Fax Number: Will be provided on the form  Patient Notified form requests are processed in 3-5 business days: Yes  (If patient needs form sooner, please note that in this message.)  Okay to leave a detailed message? Yes  [phone]

## 2021-06-02 NOTE — TELEPHONE ENCOUNTER
Per Dr. Hdz continue daily dressing changes and follow-up next week if not better or sooner if worsening.    Called and notified patient.

## 2021-06-02 NOTE — TELEPHONE ENCOUNTER
Orders being requested: skilled nursing, 1 visit, 2 x a week for 1 week, 1 x a week for 4 weeks, PT and OT 1 visit for eval and treat  Reason service is needed/diagnosis: for assessment, teaching, safety, medication management, disease process  When are orders needed by: asap  Where to send Orders: Phone:  [phone]  Okay to leave detailed message?  Yes

## 2021-06-02 NOTE — TELEPHONE ENCOUNTER
Reason contacted:  Results   Information relayed:  Called and spoke with the patient and she reports she is feeling well. She was very pleasant on the phone. She was notified of the below message. She is scheduled for a lab only appointment on Monday as her daughter is out of town until Sunday. I confirmed with Dr. Hdz it is ok for patient to wait until Monday to have lab work done. She is scheduled to see Danielle Dimas CNP on Tuesday at 11:50 am.   Additional questions:  No  Further follow-up needed:  No  Okay to leave a detailed message:  No

## 2021-06-02 NOTE — TELEPHONE ENCOUNTER
----- Message from Gamal Hdz MD sent at 10/11/2019  6:15 AM CDT -----  Please notify patient that her labs were abnormal.  These need to be rechecked ASAP.  Please help her schedule a lab-only visit at least, however, prefer recheck office visit if she is able.  I will place orders for labs in case she is unable to be seen.

## 2021-06-02 NOTE — PATIENT INSTRUCTIONS - HE
You can decrease your Bumex to 1 tablet (2 mg) in the morning and half tablet (1 mg) in the afternoon.    Continue with the metolazone 2.5 mg three days a week.     Follow up with heart failure clinic in 2 weeks.

## 2021-06-02 NOTE — TELEPHONE ENCOUNTER
Reason contacted:  Orders request  Information relayed:  Notified ok for requested order per Dr. Hdz. Please advise if this is not ok.   Additional questions:  No  Further follow-up needed:  No  Okay to leave a detailed message:  No

## 2021-06-02 NOTE — TELEPHONE ENCOUNTER
Orders being requested: Physical Therapy    - One Time a week for One week   - Two Times a week for Five Weeks   - One time a week for two weeks .   Reason service is needed/diagnosis: To improve strength ,Balance ,Transfers , Gait, Safety in Home ,Stair Training with Oxygen Monitoring and to notified id Oxygen is less than 90% unless Doctor wants to use Different Parameters.  When are orders needed by: As Soon As Possible   Where to send Orders: Phone:  [phone]  Okay to leave detailed message?  Yes

## 2021-06-02 NOTE — TELEPHONE ENCOUNTER
Left message to call back for: orders request  Information to relay to patient:  Notified ok for requested orders per Dr. Hdz via

## 2021-06-02 NOTE — PROGRESS NOTES
Hospital Follow-up Visit:    Assessment/Plan:     1. Hospital discharge follow-up  2. Near syncope  3. Acute respiratory failure with hypoxia (H)  4. Acute on chronic diastolic heart failure (H)  5. Heart failure with preserved ejection fraction (H)  Hospital discharge follow-up visit completed today.  Reviewed pertinent notes, labs, imaging with the patient and her daughter today.  Patient reports feeling significantly better.  She appears to be euvolemic on exam today.  Weight loss of 13 pounds since hospital discharge.  I think it be reasonable to have her decrease second dose of Bumex to half a tablet until follow-up with heart failure clinic in 2 weeks.  Continue with full tablet of Bumex in the morning along with half a tablet in the evening in addition to metolazone 2.5 mg 3 times a week.  We will have her continue to monitor weight at home notify us of any significant change in weight.  Will check basic metabolic panel and magnesium to ensure stability.  She continues use of home oxygen with activity.  - Basic Metabolic Panel  - Magnesium    6. Pulmonary hypertension (H)  Chronic but stable.  Patient was approved for home oxygen which he continues to use.   No evidence of acute hypoxia or respiratory distress today.    7. Permanent atrial fibrillation  History of DIONE goodman, remains on metoprolol 100 mg and daily aspirin.    8. Type 2 diabetes mellitus with stage 3 chronic kidney disease, without long-term current use of insulin (H)  Type 2 diabetes, diet controlled.    9. Essential hypertension with goal blood pressure less than 140/90  Blood pressure well controlled today.  Continue with metoprolol as prescribed.    10. Cardiac pacemaker in situ    11. H/O heart valve replacement with bioprosthetic valve    12. Hypokalemia  History of chronic hypokalemia, remains on potassium chloride supplement 20 mEq daily.  Will check metabolic panel today.  - Basic Metabolic Panel      Subjective:     Thea Acosta is a  85 y.o. female who presents for a hospital discharge follow up.  She is accompanied by her daughter today.  Past medical history significant for diastolic heart failure, bioprosthetic aortic valve, CAD, chronic A. fib, pacemaker, diet-controlled type 2 diabetes, CKD stage III and hypothyroidism.   Patient was hospitalized at [facility] on 9/25/2019 after presenting to the emergency department with shortness of breath, lightheadedness and syncope.  Chest x-ray at that time indicated pulmonary edema, a lower extremity ultrasound was completed and showed to be negative for DVT, VQ scan negative for embolism.  Patient was hypoxic.  She was started on IV diuretics.  Echocardiogram completed and indicated severe pulmonary hypertension.  Patient was consulted by cardiology.  She was requiring supplemental oxygen at 2 L during the hospitalization and was sent home on oxygen.  She lost close to 20 pounds while in the hospital.  She was discharged on Bumex 2 mg twice daily along with metolazone 2.5 mg on Monday Wednesday and Friday.  She was started on a daily potassium due to hypokalemia.  Today, patient states that she is feeling much better since discharge.  She continues to monitor her daily weights and reports being compliant with diuretics.  She has lost roughly 13 pounds since discharge.  She is bothered by the frequent urination since increasing dose of her diuretics and is wondering if this can be adjusted in any way.  She denies any shortness of breath or difficulty breathing.  She uses oxygen during activity which is working well for her.  She denies any recurrent presyncopal events or symptoms of dizziness/lightheadedness.  Patient's daughter does not have any concerns since discharge.  Patient has yet to schedule a follow-up with cardiology, specifically heart failure clinic as advised at discharge.      Hospital/Nursing Home/IP Rehab Facility: [facility]  Date of Admission: 9/25/19  Date of  Discharge:930/19  Reason(s) for Admission:Shortness of breath, lightheadedness            Do you have any problems taking your medication regularly?  None       Have you had any changes in your medication since discharge? None       Have you had any difficulty following your discharge or treatment plan?  No    Summary of hospitalization:  Hospital discharge summary reviewed  Diagnostic Tests/Treatments reviewed.  Follow up needed: Hear failure Clinic   Other Healthcare Providers Involved in Patient's Care: Patient Care Team:  Gamal Hdz MD as PCP - General  Gamal Hdz MD as Assigned PCP      Update since discharge: {improved   Information from family, SNF, care coordination: n/a     Post Discharge Medication Reconciliation: discharge medications reconciled and changed, per note/orders (see AVS)  Plan of care communicated with: patient and family    Objective:     Vitals:    10/09/19 1611   BP: 120/62   Pulse: 66   Weight: 201 lb 1.6 oz (91.2 kg)         Physical Exam:    General Appearance:  Alert, cooperative, no distress, appears stated age   Lungs:   Clear to auscultation bilaterally, respirations unlabored.  No expiratory wheeze, rhonchi or inspiratory crackles noted.   Heart:  Regular rate and rhythm, S1, S2 normal, no murmur, rub or gallop   Abdomen:   Soft, non-tender, positive bowel sounds, no masses, no organomegaly   Extremities:  Chronic venous stasis noted.  Otherwise extremities normal.  No cyanosis or edema   Skin: Warm, dry.  Skin color, texture, turgor normal, no rashes or lesions   Neurologic:  Neuro exam is grossly intact.  No focal deficits noted.       Discharge potassium 3.4, magnesium 1.8     Xr Chest 2 Views  Result Date: 9/25/2019  Pulmonary vascular redistribution and slight interstitial prominence could reflect congestion. No focal airspace disease. Stable cardiac silhouette enlargement. Pacer. Sternotomy. Cardiac valvular prosthetic. Atherosclerosis. No significant pleural  effusion or pneumothorax.      Us Venous Legs Bilateral  Result Date: 9/25/2019  CONCLUSION: 1.  Bilateral leg veins are negative for DVT.     Nm Lung Vq Scan  Result Date: 9/25/2019  CONCLUSION: 1.  No evidence for pulmonary embolism.            Coding guidelines for this visit:  Type of Medical   Decision Making Face-to-Face Visit       within 7 Days of discharge Face-to-Face Visit        within 14 days of discharge   Moderate Complexity [id number]   High Complexity [id number]       Electronically signed by Danielle Dimas CNP 10/11/19 10:37 AM  fol

## 2021-06-02 NOTE — TELEPHONE ENCOUNTER
Left message to call back for: results   Information to relay to patient:  Below message.     Spoke with Dr. Hdz. Please determine how patient is feeling?  Patient needs a lab only appointment today to recheck her BMP.     If patient is feeling well then she needs to see Dr. Hdz or Danielle Dimas CNP on Tuesday next week for a follow up.

## 2021-06-02 NOTE — TELEPHONE ENCOUNTER
Please ask pt if she can come in again to see Danielle Dimas to follow up on some labs and have more blood drawn. Danielle has some openings today. If she is unable to see Danielle see if she can at least some in for a lab only appt to have more labs drawn.   Thank you

## 2021-06-02 NOTE — TELEPHONE ENCOUNTER
Patient Returning Call  Reason for call:  Patients son is very upset with the clinic and requesting Vanesa or Gamal Hdz MD to return his call. Caller stated The clinic can not just make appointments when she has no transportation. Caller was notified of HIPPA regulations- No consent on file to speak to him or anyone in chart, Writer requested caller to return call with mother for verbal consent caller declined. Caller then asked Why hasn't anyone sent this consent to my sister to sign when my sister requested this last week? -Caller was advised this consent must be signed by patient when caller demanded this to be faxed to him right now to complete this (caller is in [place]).   Information relayed to patient:  Patient's son was very demanding and repeated the same questions as to Why is the clinic making appointments when my mother has no transportation? Caller was advised the need to come in right away is a provider driven message.  It is not the clinic's practice to pick appointments and demand patient to be seen.    Patient has additional questions:  Yes  If YES, what are your questions/concerns:  Caller is requesting a call back today from Gamal Hdz MD and/or Vanesa.     Okay to leave a detailed message?: Yes

## 2021-06-02 NOTE — TELEPHONE ENCOUNTER
Patient returned yesterday for a lab only appointment to have a BMP rechecked.  She has a follow up scheduled today with Danielle Dimas but would like to know if this appointment is needed.  She says that she needs to get a ride from one of her children and they have to take time off of work to get her here.    Please review and call patient ASAP if we can cancel her office visit.  Thank you!

## 2021-06-02 NOTE — TELEPHONE ENCOUNTER
Attempted to contact Nate to explain needing consent again.    Notes added to appt, so we get the consent we need for future calls.     Provider is ok with patient coming Monday and we can reschedule if she cannot make it.

## 2021-06-03 VITALS
HEART RATE: 66 BPM | DIASTOLIC BLOOD PRESSURE: 62 MMHG | SYSTOLIC BLOOD PRESSURE: 120 MMHG | BODY MASS INDEX: 31.5 KG/M2 | WEIGHT: 201.1 LBS

## 2021-06-03 VITALS — BODY MASS INDEX: 37.59 KG/M2 | WEIGHT: 240 LBS

## 2021-06-04 VITALS
RESPIRATION RATE: 18 BRPM | HEART RATE: 60 BPM | TEMPERATURE: 96.8 F | OXYGEN SATURATION: 91 % | WEIGHT: 206 LBS | DIASTOLIC BLOOD PRESSURE: 51 MMHG | BODY MASS INDEX: 32.26 KG/M2 | SYSTOLIC BLOOD PRESSURE: 111 MMHG

## 2021-06-04 VITALS
RESPIRATION RATE: 18 BRPM | BODY MASS INDEX: 30.7 KG/M2 | TEMPERATURE: 96.7 F | WEIGHT: 196 LBS | OXYGEN SATURATION: 92 % | DIASTOLIC BLOOD PRESSURE: 67 MMHG | HEART RATE: 58 BPM | SYSTOLIC BLOOD PRESSURE: 120 MMHG

## 2021-06-04 VITALS
SYSTOLIC BLOOD PRESSURE: 103 MMHG | TEMPERATURE: 96.9 F | DIASTOLIC BLOOD PRESSURE: 62 MMHG | HEART RATE: 57 BPM | WEIGHT: 198 LBS | BODY MASS INDEX: 31.01 KG/M2 | OXYGEN SATURATION: 94 % | RESPIRATION RATE: 18 BRPM

## 2021-06-04 VITALS
TEMPERATURE: 98 F | RESPIRATION RATE: 18 BRPM | DIASTOLIC BLOOD PRESSURE: 53 MMHG | SYSTOLIC BLOOD PRESSURE: 125 MMHG | OXYGEN SATURATION: 91 % | HEART RATE: 57 BPM | BODY MASS INDEX: 36.96 KG/M2 | WEIGHT: 236 LBS

## 2021-06-04 VITALS
SYSTOLIC BLOOD PRESSURE: 132 MMHG | HEART RATE: 60 BPM | BODY MASS INDEX: 31.48 KG/M2 | WEIGHT: 201 LBS | OXYGEN SATURATION: 92 % | DIASTOLIC BLOOD PRESSURE: 65 MMHG | RESPIRATION RATE: 18 BRPM | TEMPERATURE: 96.8 F

## 2021-06-04 VITALS
DIASTOLIC BLOOD PRESSURE: 67 MMHG | WEIGHT: 230 LBS | TEMPERATURE: 96.8 F | RESPIRATION RATE: 16 BRPM | OXYGEN SATURATION: 92 % | SYSTOLIC BLOOD PRESSURE: 125 MMHG | BODY MASS INDEX: 36.02 KG/M2 | HEART RATE: 60 BPM

## 2021-06-04 VITALS
WEIGHT: 192 LBS | SYSTOLIC BLOOD PRESSURE: 114 MMHG | OXYGEN SATURATION: 100 % | RESPIRATION RATE: 18 BRPM | TEMPERATURE: 96.3 F | DIASTOLIC BLOOD PRESSURE: 68 MMHG | HEART RATE: 65 BPM | BODY MASS INDEX: 30.07 KG/M2

## 2021-06-04 VITALS
WEIGHT: 208 LBS | DIASTOLIC BLOOD PRESSURE: 60 MMHG | OXYGEN SATURATION: 90 % | SYSTOLIC BLOOD PRESSURE: 130 MMHG | HEART RATE: 61 BPM | BODY MASS INDEX: 32.58 KG/M2

## 2021-06-04 VITALS
WEIGHT: 217 LBS | HEIGHT: 67 IN | SYSTOLIC BLOOD PRESSURE: 112 MMHG | DIASTOLIC BLOOD PRESSURE: 80 MMHG | RESPIRATION RATE: 12 BRPM | BODY MASS INDEX: 34.06 KG/M2 | HEART RATE: 60 BPM

## 2021-06-04 VITALS
TEMPERATURE: 97 F | OXYGEN SATURATION: 90 % | SYSTOLIC BLOOD PRESSURE: 114 MMHG | WEIGHT: 198 LBS | HEART RATE: 64 BPM | DIASTOLIC BLOOD PRESSURE: 56 MMHG | BODY MASS INDEX: 31.01 KG/M2 | RESPIRATION RATE: 18 BRPM

## 2021-06-04 VITALS
BODY MASS INDEX: 30.85 KG/M2 | DIASTOLIC BLOOD PRESSURE: 64 MMHG | SYSTOLIC BLOOD PRESSURE: 120 MMHG | OXYGEN SATURATION: 94 % | TEMPERATURE: 96.7 F | HEART RATE: 61 BPM | WEIGHT: 197 LBS | RESPIRATION RATE: 18 BRPM

## 2021-06-04 VITALS
SYSTOLIC BLOOD PRESSURE: 112 MMHG | HEART RATE: 67 BPM | RESPIRATION RATE: 18 BRPM | DIASTOLIC BLOOD PRESSURE: 59 MMHG | BODY MASS INDEX: 32.26 KG/M2 | TEMPERATURE: 96.4 F | OXYGEN SATURATION: 94 % | WEIGHT: 206 LBS

## 2021-06-04 VITALS
BODY MASS INDEX: 29.76 KG/M2 | OXYGEN SATURATION: 96 % | RESPIRATION RATE: 20 BRPM | HEART RATE: 61 BPM | DIASTOLIC BLOOD PRESSURE: 63 MMHG | WEIGHT: 190 LBS | TEMPERATURE: 96.7 F | SYSTOLIC BLOOD PRESSURE: 100 MMHG

## 2021-06-04 VITALS
HEART RATE: 65 BPM | WEIGHT: 205 LBS | RESPIRATION RATE: 18 BRPM | BODY MASS INDEX: 32.11 KG/M2 | OXYGEN SATURATION: 98 % | TEMPERATURE: 97 F | DIASTOLIC BLOOD PRESSURE: 62 MMHG | SYSTOLIC BLOOD PRESSURE: 130 MMHG

## 2021-06-04 VITALS
BODY MASS INDEX: 32.18 KG/M2 | SYSTOLIC BLOOD PRESSURE: 122 MMHG | WEIGHT: 205 LBS | DIASTOLIC BLOOD PRESSURE: 72 MMHG | HEIGHT: 67 IN

## 2021-06-04 VITALS
BODY MASS INDEX: 36.02 KG/M2 | SYSTOLIC BLOOD PRESSURE: 106 MMHG | TEMPERATURE: 98 F | DIASTOLIC BLOOD PRESSURE: 57 MMHG | WEIGHT: 230 LBS | RESPIRATION RATE: 18 BRPM | OXYGEN SATURATION: 95 % | HEART RATE: 59 BPM

## 2021-06-04 VITALS
HEART RATE: 55 BPM | RESPIRATION RATE: 18 BRPM | OXYGEN SATURATION: 97 % | DIASTOLIC BLOOD PRESSURE: 59 MMHG | TEMPERATURE: 96 F | WEIGHT: 203 LBS | SYSTOLIC BLOOD PRESSURE: 114 MMHG | BODY MASS INDEX: 31.79 KG/M2

## 2021-06-04 VITALS
DIASTOLIC BLOOD PRESSURE: 73 MMHG | HEART RATE: 60 BPM | RESPIRATION RATE: 20 BRPM | TEMPERATURE: 96.6 F | BODY MASS INDEX: 33.36 KG/M2 | WEIGHT: 213 LBS | OXYGEN SATURATION: 95 % | SYSTOLIC BLOOD PRESSURE: 130 MMHG

## 2021-06-04 VITALS
RESPIRATION RATE: 18 BRPM | WEIGHT: 214 LBS | HEART RATE: 72 BPM | SYSTOLIC BLOOD PRESSURE: 132 MMHG | DIASTOLIC BLOOD PRESSURE: 72 MMHG | BODY MASS INDEX: 33.52 KG/M2 | TEMPERATURE: 96.8 F | OXYGEN SATURATION: 93 %

## 2021-06-04 VITALS
HEART RATE: 60 BPM | RESPIRATION RATE: 18 BRPM | TEMPERATURE: 96.5 F | WEIGHT: 196 LBS | BODY MASS INDEX: 30.7 KG/M2 | SYSTOLIC BLOOD PRESSURE: 100 MMHG | DIASTOLIC BLOOD PRESSURE: 62 MMHG | OXYGEN SATURATION: 98 %

## 2021-06-04 VITALS
DIASTOLIC BLOOD PRESSURE: 68 MMHG | HEART RATE: 60 BPM | SYSTOLIC BLOOD PRESSURE: 118 MMHG | TEMPERATURE: 96.1 F | OXYGEN SATURATION: 100 % | WEIGHT: 201 LBS | BODY MASS INDEX: 31.48 KG/M2 | RESPIRATION RATE: 23 BRPM

## 2021-06-04 VITALS
WEIGHT: 198 LBS | SYSTOLIC BLOOD PRESSURE: 119 MMHG | DIASTOLIC BLOOD PRESSURE: 68 MMHG | RESPIRATION RATE: 18 BRPM | HEART RATE: 60 BPM | TEMPERATURE: 97.2 F | BODY MASS INDEX: 31.01 KG/M2 | OXYGEN SATURATION: 94 %

## 2021-06-04 VITALS
HEART RATE: 60 BPM | HEIGHT: 67 IN | RESPIRATION RATE: 16 BRPM | SYSTOLIC BLOOD PRESSURE: 120 MMHG | DIASTOLIC BLOOD PRESSURE: 60 MMHG | BODY MASS INDEX: 34.84 KG/M2 | WEIGHT: 222 LBS

## 2021-06-04 VITALS
WEIGHT: 210 LBS | OXYGEN SATURATION: 93 % | BODY MASS INDEX: 32.89 KG/M2 | RESPIRATION RATE: 18 BRPM | SYSTOLIC BLOOD PRESSURE: 138 MMHG | DIASTOLIC BLOOD PRESSURE: 81 MMHG | HEART RATE: 60 BPM | TEMPERATURE: 97.1 F

## 2021-06-04 VITALS
HEART RATE: 60 BPM | OXYGEN SATURATION: 94 % | SYSTOLIC BLOOD PRESSURE: 124 MMHG | RESPIRATION RATE: 16 BRPM | WEIGHT: 213 LBS | BODY MASS INDEX: 33.36 KG/M2 | TEMPERATURE: 96.6 F | DIASTOLIC BLOOD PRESSURE: 54 MMHG

## 2021-06-04 VITALS
HEART RATE: 60 BPM | OXYGEN SATURATION: 94 % | DIASTOLIC BLOOD PRESSURE: 78 MMHG | SYSTOLIC BLOOD PRESSURE: 126 MMHG | WEIGHT: 205 LBS | BODY MASS INDEX: 32.11 KG/M2

## 2021-06-04 VITALS
TEMPERATURE: 96.2 F | DIASTOLIC BLOOD PRESSURE: 70 MMHG | SYSTOLIC BLOOD PRESSURE: 134 MMHG | RESPIRATION RATE: 18 BRPM | HEART RATE: 59 BPM | BODY MASS INDEX: 36.34 KG/M2 | OXYGEN SATURATION: 91 % | WEIGHT: 232 LBS

## 2021-06-04 VITALS
HEIGHT: 67 IN | HEART RATE: 60 BPM | TEMPERATURE: 95.5 F | OXYGEN SATURATION: 96 % | WEIGHT: 195 LBS | BODY MASS INDEX: 30.61 KG/M2 | RESPIRATION RATE: 16 BRPM | SYSTOLIC BLOOD PRESSURE: 96 MMHG | DIASTOLIC BLOOD PRESSURE: 58 MMHG

## 2021-06-04 VITALS — WEIGHT: 209.6 LBS | BODY MASS INDEX: 32.9 KG/M2 | HEIGHT: 67 IN

## 2021-06-04 NOTE — TELEPHONE ENCOUNTER
RN cannot approve Refill Request    RN can NOT refill this medication med is not covered by policy/route to provider. Last office visit: 6/7/2019 Gamal Hdz MD Last Physical: 1/31/2018 Last MTM visit: Visit date not found Last visit same specialty: 10/9/2019 Danielle Dimas, [facility].  Next visit within 3 mo: Visit date not found  Next physical within 3 mo: Visit date not found      Millie Ann, [facility] Triage/Med Refill 12/20/2019    Requested Prescriptions   Pending Prescriptions Disp Refills     COMBIVENT RESPIMAT  mcg/actuation Mist inhaler [Pharmacy Med Name: COMBIVENT RESPIMAT ORAL 120SPRAY 4G] 4 g 11     Sig: INHALE 1 PUFF BY MOUTH FOUR TIMES DAILY       There is no refill protocol information for this order

## 2021-06-04 NOTE — PROGRESS NOTES
Assessment/Plan:    1. Laceration of leg, left, initial encounter  Left leg laceration occurring December 23, 2019 without significant periwound inflammation or tenderness at this time.  Dressing in place.  Wound cares to continue.  Did provide clindamycin 300 mg 3 times daily x10 days empirically.  History of penicillin allergy distant past described swelling however uncertain.  - clindamycin (CLEOCIN) 300 MG capsule; Take 1 capsule (300 mg total) by mouth 3 (three) times a day for 10 days. 02/9/2018-03/01/2018 for Cellulitis of right lower limb  Dispense: 30 capsule; Refill: 0  - HM2(CBC w/o Differential)    2. Olecranon bursitis of right elbow  Olecranon bursitis of right elbow versus septic bursitis.  Check uric acid.  Prednisone 40 mg today then 20 mg twice daily to complete 5-day course.  - Uric Acid  - predniSONE (DELTASONE) 20 MG tablet; Take 20 mg by mouth 2 (two) times a day.  Dispense: 10 tablet; Refill: 0    3. Swelling of right hand  Swelling of right hand question component of acute gouty arthritis.  Prednisone as noted above.  Hand elevation.    4. CKD (chronic kidney disease) stage 4, GFR 15-29 ml/min (H)  CKD stage IV.  Ensure stable renal function.  - Basic Metabolic Panel    5. Moderate persistent asthma  Refill on Combivent provided for 1 puff 4 times daily use.  90-day supply with refills per patient request.  - ipratropium-albuterol (COMBIVENT RESPIMAT)  mcg/actuation Mist inhaler; INHALE 1 PUFF BY MOUTH FOUR TIMES DAILY  Dispense: 12 g; Refill: 3    40 minutes total time with patient, > 50% with counseling and coordination of cares.         Subjective:    Thea Acosta is seen today for evaluation.  Left leg injury occurring Monday, December 23, 2019.  Was trying to shift her car door and hit her in the left anterolateral leg.  Has been applying dressing to this area.  No drainage.  Right elbow pain.  Right hand swelling no fevers.  Denies history of significant gout.  No alcohol  "consumption.  Chronic kidney disease stage IV.  Needs refill on Combivent.  Asthma control test 17 out of 25 at this time.  Known history of type 2 diabetes, persistent asthma, hypothyroidism, paroxysmal atrial fibrillation, hyperlipidemia and heart failure continuing Bumex and metolazone as directed.  Prior A1c of 6.9% 2019.  Comprehensive review of systems as above otherwise all negative.     (twice) now since 3/24/07 (\"Gal\" - renal cell CA) - remarried 3/8/82   3 sons - Nate Mckoy (); Edwin (head of work force center in [facility]; Amrit Mckoy works at the post [employer] (I see him now as a patient)   Son-in-law Esdras Mo   2 daughters - breast cancer   Granddaughter - Terri - plays the clarinet   New granddaughter - Patrizia?   Confucianism  Dad - dec MVA age 52   Mom - dec 86 old age   2 bros - 1 bro  due to lung cancer in Aug, 2010 (had quit smoking > 40-50 years ago)   Quit smoking    No EtOH   Work: laundry services at [employer]   Surgeries: s/p gallbladder, bunion 06 left TKA ([facility]) 07 Aortic valve replacement (bioprostheitc) 07 pacemaker - dual chamber   Cardiovascular surgeon called 07 - patient is \"vasculopath\", prior Hb = 8.9, no Coumadin, will use ASA only 10-20-08: started back on Warfarin, 09 GI bleed, D/C Warfarin   11-10-08: Dexa order faxed to Thea Ordaz will schedule    3/13/18 FYI: Mrs. Acosta was admitted with an acute kidney injury. Renal function improved after IV fluids and holding diuretics. She also had an encephalopathy which improved significantly. She has UTI and was started on Ciprofloxacin. Mrs. Acosta was discharged back to TCU to continue her rehabilitation. For any additional concerns please call our services. thank you.     18 FYI: Mrs. Acosta was admitted for evaluation of weakness. Patient was found to be septic on presentation with sores from right foot cellulitis which great Strep " Group G. Patient was then started on Ancef and switched to Keflex upon discharge. Patient was also treated for acute CHF treated with IV diuretics. Patient was initially planned for pacer battery change as outpatient which was also done on this hospital stay on 18. Patient is discharged to TCU for further therapy. Thank you.    Past Surgical History:   Procedure Laterality Date     APPENDECTOMY       CHOLECYSTECTOMY       EYE SURGERY Bilateral     Cataracts     HIP PINNING Right 3/1/2017    Procedure: RIGHT HIP TROCHANTERIC RODDING;  Surgeon: Moshe Davies MD;  Location: [facility] OR;  Service:      INSERT / REPLACE / REMOVE PACEMAKER  2007    guidant     INSERT / REPLACE / REMOVE PACEMAKER  2018    phoebe sci gen change     JOINT REPLACEMENT Left     knee     TISSUE AORTIC VALVE REPLACEMENT  2007             Family History   Problem Relation Age of Onset     No Medical Problems Mother         age 86     No Medical Problems Father         MVA     Cancer Brother         lung     No Medical Problems Brother         Past Medical History:   Diagnosis Date     Acute kidney injury superimposed on CKD (H) 3/3/2017     Asthma      CAD (coronary artery disease)      Cataract      Chronic kidney disease     ckd3     Diabetes mellitus (H)      Disease of thyroid gland      H/O heart valve replacement with bioprosthetic valve      History of transfusion      Hypertension      Normochromic normocytic anemia      Pulmonary hypertension (H) 2019    Noted on echocardiogram     Restless leg syndrome         Social History     Tobacco Use     Smoking status: Former Smoker     Packs/day: 0.00     Last attempt to quit: 1950     Years since quittin.3     Smokeless tobacco: Never Used   Substance Use Topics     Alcohol use: No     Drug use: No        Current Outpatient Medications   Medication Sig Dispense Refill     acetaminophen (TYLENOL) 500 MG tablet Take 2 tablets (1,000 mg total) by  mouth 3 (three) times a day as needed for pain. Not to exceed 4000 mg in 24 hours.  0     aspirin 81 MG EC tablet Take 81 mg by mouth daily.       atorvastatin (LIPITOR) 80 MG tablet TAKE 1 TABLET(80 MG) BY MOUTH AT BEDTIME 90 tablet 3     bumetanide (BUMEX) 2 MG tablet TAKE 1 TABLET(2 MG) BY MOUTH TWICE DAILY AT 9 AM AND AT 6  tablet 2     cholecalciferol, vitamin D3, (VITAMIN D3) 2,000 unit Tab Take 2,000 Units by mouth once daily.       ferrous sulfate 325 (65 FE) MG tablet TAKE 1 TABLET(325 MG) BY MOUTH TWICE DAILY 180 tablet 3     ipratropium-albuterol (COMBIVENT RESPIMAT)  mcg/actuation Mist inhaler INHALE 1 PUFF BY MOUTH FOUR TIMES DAILY 12 g 3     levothyroxine (SYNTHROID, LEVOTHROID) 150 MCG tablet TAKE 1 TABLET BY MOUTH DAILY AT 6:00 AM 90 tablet 3     metOLazone (ZAROXOLYN) 2.5 MG tablet Take 1 tablet (2.5 mg total) by mouth see administration instructions. Take on Monday, Wednesday, Friday. 24 tablet 0     metoprolol tartrate (LOPRESSOR) 100 MG tablet Take 50 mg by mouth 2 (two) times a day. Half-tablet             miconazole (MICOTIN) 2 % powder Apply topically 2 (two) times a day. 43 g 0     mupirocin (BACTROBAN) 2 % ointment Apply to affected area 2-3 times daily 22 g 0     omeprazole (PRILOSEC) 20 MG capsule Take 1 capsule (20 mg total) by mouth daily. 90 capsule 2     potassium chloride (K-DUR,KLOR-CON) 20 MEQ tablet TAKE 1 TABLET(20 MEQ) BY MOUTH DAILY 90 tablet 3     clindamycin (CLEOCIN) 300 MG capsule Take 1 capsule (300 mg total) by mouth 3 (three) times a day for 10 days. 02/9/2018-03/01/2018 for Cellulitis of right lower limb 30 capsule 0     predniSONE (DELTASONE) 20 MG tablet Take 20 mg by mouth 2 (two) times a day. 10 tablet 0     No current facility-administered medications for this visit.           Objective:    Vitals:    12/31/19 1321   BP: 130/60   Patient Site: Left Arm   Patient Position: Sitting   Cuff Size: Adult Large   Pulse: 61   SpO2: 90%   Weight: 208 lb (94.3 kg)       Body mass index is 32.58 kg/m .    Alert.  Sitting in wheelchair.  Chest with some diminished breath sounds without significant expiratory wheeze or inspiratory crackles.  Cardiac exam regular without tachycardia.  Left anterolateral shin with area of puncture wound laceration.  Dressing in place.  Mild surrounding erythema only without significant purulent drainage etc.  Right elbow with tenderness at over olecranon bursa also right hand swelling mostly at MCP joints.  Left hand and elbow are fine.  Neurologic exam appears nonfocal.  Comprehensive review of systems as above otherwise all night      This note has been dictated using voice recognition software and as a result may contain minor grammatical errors and unintended word substitutions.

## 2021-06-04 NOTE — TELEPHONE ENCOUNTER
FYI - Status Update  Who is Calling: Patient  Update: Patient stated she requested this 2 days ago and has only about a day left of the inhaler. Patient stated she would like this request before the weekend.  Okay to leave a detailed message?:  No

## 2021-06-04 NOTE — PROGRESS NOTES
Assessment/Plan:   Open wound of left lower extremity, initial encounter  Puncture/skin tear type wound left shin occurred 12/23/19 on the car door. Tdap UTD. There is nothing to repair or close with suture at this time. This will heal by secondary intention.   I discussed red flag symptoms, return precautions to clinic/ER and follow up care with patient/parent.  Expected clinical course, symptomatic cares advised. Questions answered. Patient/parent amenable with plan.  - mupirocin (BACTROBAN) 2 % ointment; Apply to affected area 2-3 times daily  Dispense: 22 g; Refill: 0    Gently wash wound twice a day with warm water and mild soap, pat dry.   Apply mupirocin ointment sparingly to the wound and cover with a non-stick pad and if needed some guaze then secure in place with a coban wrap.   Be careful not to wrap the coban too tight.   Watch for signs of infection.  Recheck with primary care next week  Return or to ER sooner if fever, increased swelling of leg or around the wound, bleeding, redness, warmth, pus drainage.       Subjective:      Thea Acosta is a 85 y.o. female with DM2, CHF, afib, CKD, CAD, PVD, asthma, who presents in her usual state of health with a friend for evaluation of a cut on her leg. This happened Monday night 12/23/19 on the car door. It injured the left anteriolateral part of her lower leg. It was small so she was not too worried initially but she takes a baby aspirin and it has continued to ooze. She wanted to make sure it was not infected. She has leg edema normally due to CHF - it is not more swollen than usual at this time. Tdap 2016 is current. No increased pain, redness or pus draining from the wound. No fever or chills. She has cleaned it well at home.  Not smoking.     Allergies   Allergen Reactions     Codeine Hives     Codeine Phosphate (Bulk)      This allergy is per [facility] [place] records.     Codeine Sulfate      This allergy is per [facility] [place] records.     Codeine-Butalbital-Asa-Caff      This allergy is per [facility] [place] records.     Codeine-Guaifenesin      This allergy is per [facility] [place] records.     Lisinopril Cough     Penicillins Swelling     Current Outpatient Medications on File Prior to Visit   Medication Sig Dispense Refill     acetaminophen (TYLENOL) 500 MG tablet Take 2 tablets (1,000 mg total) by mouth 3 (three) times a day as needed for pain. Not to exceed 4000 mg in 24 hours.  0     aspirin 81 MG EC tablet Take 81 mg by mouth daily.       atorvastatin (LIPITOR) 80 MG tablet TAKE 1 TABLET(80 MG) BY MOUTH AT BEDTIME 90 tablet 3     bumetanide (BUMEX) 2 MG tablet TAKE 1 TABLET(2 MG) BY MOUTH TWICE DAILY AT 9 AM AND AT 6  tablet 2     cholecalciferol, vitamin D3, (VITAMIN D3) 2,000 unit Tab Take 2,000 Units by mouth once daily.       ferrous sulfate 325 (65 FE) MG tablet TAKE 1 TABLET(325 MG) BY MOUTH TWICE DAILY 180 tablet 3     levothyroxine (SYNTHROID, LEVOTHROID) 150 MCG tablet TAKE 1 TABLET BY MOUTH DAILY AT 6:00 AM 90 tablet 3     omeprazole (PRILOSEC) 20 MG capsule Take 1 capsule (20 mg total) by mouth daily. 90 capsule 2     No current facility-administered medications on file prior to visit.      Patient Active Problem List   Diagnosis     Constipation     Sciatica     Hypothyroidism     Type 2 diabetes mellitus with stage 3 chronic kidney disease, without long-term current use of insulin (H)     Hyperparathyroidism     Vitamin D deficiency     Mixed hyperlipidemia     Atherosclerosis of native coronary artery of native heart without angina pectoris     CKD (chronic kidney disease), stage III (H)     Osteoarthritis Of The Knee     Restless Legs Syndrome     H/O heart valve replacement with bioprosthetic valve     Cardiac pacemaker in situ     Complete heart block (H)     Normochromic normocytic anemia     Essential hypertension with  goal blood pressure less than 140/90     Moderate persistent asthma without complication     PAF (paroxysmal atrial fibrillation) (H)     GERD (gastroesophageal reflux disease)     Primary insomnia     Hypokalemia     Physical deconditioning     Asthma     Acute on chronic diastolic heart failure (H)     CHF (congestive heart failure), NYHA class I, acute, systolic (H)     Pulmonary hypertension (H)     Atrial fibrillation, unspecified type (H)     Status post aortic valve replacement     Non-rheumatic mitral valve stenosis     Varicella zoster     Orthostatic hypotension     Acute kidney injury superimposed on chronic kidney disease (H)     Nonrheumatic mitral valve stenosis     Acute on chronic systolic congestive heart failure (H)     PVD (peripheral vascular disease) (H)     Viral URI     Asthma exacerbation       Objective:     /65   Pulse 60   Temp 97.9  F (36.6  C) (Oral)   Resp 16   SpO2 93%   Breastfeeding No     Physical  General Appearance: Alert, cooperative, no distress, AVSS. Sitting in wheelchair.   Head: Normocephalic, without obvious abnormality, atraumatic  Eyes: Conjunctivae are normal.   Nose: No significant congestion.  Throat: lips pink and moist  Extremities: 1+ edema bilaterally. There is a small puncture type wound on the left anterolateral shin, no surrounding redness or purulent drainage thought there is some clear pink drainage. Not deep, no debris or foreign body. Wound is irrigated thoroughly. Small adjacent skin tear. No laceration edges to approximate. Bacitracin , nonstick pad, guaze and coban wrap applied to the wound.   Skin: no other rashes or lesions  Psychiatric: Patient has a normal mood and affect.

## 2021-06-04 NOTE — PROGRESS NOTES
Assessment/Plan:    1. Acute gouty arthritis  Acute gouty arthritis involving right olecranon bursa and improved with prednisone use.  Complete 5-day course 20 mg twice daily as noted.  Anticipate reassessment at follow-up no later than 4 weeks with a repeat uric acid for goal less than 6 ideally.  Dietary modifications reviewed.    2. Swelling of right hand  As above, right hand swelling improved significantly following prednisone use with likely acute gouty arthritis etiology with uric acid greater than 13.  Complete prednisone as noted with reassessment at follow-up no later than 4 weeks.    3. Olecranon bursitis of right elbow  As above.  No evidence for septic bursitis at this time.    4. Laceration of leg, left, initial encounter  Completing clindamycin 300 mg 3 times daily x10 days for recent left anterolateral leg laceration which appears to be healing well without significant signs of secondary infection, cellulitis or purulent drainage.  Wound care reviewed.    5. CKD (chronic kidney disease) stage 4, GFR 15-29 ml/min (H)  CKD stage IV.  Recent renal function stable and ensure adequate hydration.    6. Type 2 diabetes mellitus with complication, without long-term current use of insulin (H)  Type 2 diabetes history with A1c of 6.9% September 25, 2019.    7. Normochromic normocytic anemia  History normochromic normocytic anemia with recent hemoglobin 11.1.  Stable.    Lab Results   Component Value Date    WBC 8.9 12/31/2019    HGB 11.1 (L) 12/31/2019    HCT 33.2 (L) 12/31/2019    MCV 94 12/31/2019     12/31/2019          Subjective:    Thea Acosta is seen today for follow-up evaluation.  Had been seen 2 days ago with right elbow swelling and tenderness along with right hand swelling.  Also had injured left anterolateral shin a week earlier on car door.  Laceration had been noted.  Start on clindamycin 300 mg 3 times daily x10 days.  Also prednisone 40 mg initially then 20 mg twice daily to  "complete 5-day course for likely acute gouty arthritis associate with elbow and hand swelling.  Significant improvement noted at this point.  No fevers.  Labs showing stable renal function and.  Does have type 2 diabetes with prior A1c of 6.9%.  Comprehensive review of systems as above otherwise all negative.     (twice) now since 3/24/07 (\"Gal\" - renal cell CA) - remarried 3/8/82   3 sons - Nate Mckoy (); Edwin (head of work force center in [employer]; Amrit Mckoy works at the [employer] (I see him now as a patient)   Son-in-law Esdras Mo   2 daughters - breast cancer   Granddaughter - Terri - plays the clarinet   New granddaughter - Patrizia?   Gnosticist  Dad - dec MVA age 52   Mom - dec 86 old age   2 bros - 1 bro  due to lung cancer in Aug, 2010 (had quit smoking > 40-50 years ago)   Quit smoking    No EtOH   Work: laundry services at [employer]   Surgeries: s/p gallbladder, bunion 06 left TKA ([facility]) 07 Aortic valve replacement (bioprostheitc) 07 pacemaker - dual chamber   Cardiovascular surgeon called 07 - patient is \"vasculopath\", prior Hb = 8.9, no Coumadin, will use ASA only 10-20-08: started back on Warfarin, 09 GI bleed, D/C Warfarin   11-10-08: Dexa order faxed to Thea Ordaz will schedule    Past Surgical History:   Procedure Laterality Date     APPENDECTOMY       CHOLECYSTECTOMY       EYE SURGERY Bilateral     Cataracts     HIP PINNING Right 3/1/2017    Procedure: RIGHT HIP TROCHANTERIC RODDING;  Surgeon: Moshe Davies MD;  Location: [facility] OR;  Service:      INSERT / REPLACE / REMOVE PACEMAKER  2007    guidant     INSERT / REPLACE / REMOVE PACEMAKER  2018    phoebe sci gen change     JOINT REPLACEMENT Left     knee     TISSUE AORTIC VALVE REPLACEMENT  2007             Family History   Problem Relation Age of Onset     No Medical Problems Mother         age 86     No Medical Problems Father     "     MVA     Cancer Brother         lung     No Medical Problems Brother         Past Medical History:   Diagnosis Date     Acute kidney injury superimposed on CKD (H) 3/3/2017     Asthma      CAD (coronary artery disease)      Cataract      Chronic kidney disease     ckd3     Diabetes mellitus (H)      Disease of thyroid gland      H/O heart valve replacement with bioprosthetic valve      History of transfusion      Hypertension      Normochromic normocytic anemia      Pulmonary hypertension (H) 2019    Noted on echocardiogram     Restless leg syndrome         Social History     Tobacco Use     Smoking status: Former Smoker     Packs/day: 0.00     Last attempt to quit: 1950     Years since quittin.3     Smokeless tobacco: Never Used   Substance Use Topics     Alcohol use: No     Drug use: No        Current Outpatient Medications   Medication Sig Dispense Refill     acetaminophen (TYLENOL) 500 MG tablet Take 2 tablets (1,000 mg total) by mouth 3 (three) times a day as needed for pain. Not to exceed 4000 mg in 24 hours.  0     aspirin 81 MG EC tablet Take 81 mg by mouth daily.       atorvastatin (LIPITOR) 80 MG tablet TAKE 1 TABLET(80 MG) BY MOUTH AT BEDTIME 90 tablet 3     bumetanide (BUMEX) 2 MG tablet TAKE 1 TABLET(2 MG) BY MOUTH TWICE DAILY AT 9 AM AND AT 6  tablet 2     cholecalciferol, vitamin D3, (VITAMIN D3) 2,000 unit Tab Take 2,000 Units by mouth once daily.       clindamycin (CLEOCIN) 300 MG capsule Take 1 capsule (300 mg total) by mouth 3 (three) times a day for 10 days. 2018-2018 for Cellulitis of right lower limb 30 capsule 0     ferrous sulfate 325 (65 FE) MG tablet TAKE 1 TABLET(325 MG) BY MOUTH TWICE DAILY 180 tablet 3     ipratropium-albuterol (COMBIVENT RESPIMAT)  mcg/actuation Mist inhaler INHALE 1 PUFF BY MOUTH FOUR TIMES DAILY 12 g 3     levothyroxine (SYNTHROID, LEVOTHROID) 150 MCG tablet TAKE 1 TABLET BY MOUTH DAILY AT 6:00 AM 90 tablet 3     metOLazone  (ZAROXOLYN) 2.5 MG tablet Take 1 tablet (2.5 mg total) by mouth see administration instructions. Take on Monday, Wednesday, Friday. 24 tablet 0     metoprolol tartrate (LOPRESSOR) 100 MG tablet Take 50 mg by mouth 2 (two) times a day. Half-tablet             miconazole (MICOTIN) 2 % powder Apply topically 2 (two) times a day. 43 g 0     nystatin (MYCOSTATIN) powder Apply 1 application topically 2 (two) times a day as needed. 60 g 2     omeprazole (PRILOSEC) 20 MG capsule Take 1 capsule (20 mg total) by mouth daily. 90 capsule 2     potassium chloride (K-DUR,KLOR-CON) 20 MEQ tablet TAKE 1 TABLET(20 MEQ) BY MOUTH DAILY 90 tablet 3     predniSONE (DELTASONE) 20 MG tablet Take 20 mg by mouth 2 (two) times a day. 10 tablet 0     No current facility-administered medications for this visit.           Objective:    Vitals:    01/02/20 1459   BP: 126/60   Pulse: 80   SpO2: 93%      There is no height or weight on file to calculate BMI.    Alert.  No apparent distress.  Left anterolateral shin wound without signs of purulent drainage, significant surrounding cellulitis etc.  No significant tenderness.  Central area of what appears to be old coagulated blood otherwise no necrotic tissue.  Right hand with significant decrease in swelling over MCP joints.  Still has swelling olecranon bursa but less indurated and tender today and is able to supinate and pronate forearm as well as flex and extend elbow with minimal discomfort.      This note has been dictated using voice recognition software and as a result may contain minor grammatical errors and unintended word substitutions.

## 2021-06-04 NOTE — PROGRESS NOTES
[facility] Progress Note    Assessment:    1.  Chronic diastolic heart failure reasonably compensated at the current time  2.  Normal functioning bioprosthetic aortic valve  3.  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  Chronic atrial fibrillation  5.  Mild coronary artery disease by coronary angiography 2007    Plan:    Continue the patient's current medical regimen.  We will make arrangements for the patient to follow with Dr. Puckett in 6 months no interim cardiac testing ordered    An After Visit Summary was printed and given to the patient.    Subjective:    85-year-old female who underwent bioprosthetic aortic valve replacement 2007 for progressive and severe aortic stenosis.  Preoperative coronary angiography revealed only mild underlying coronary artery disease.  Patient developed postoperative heart block with a subsequent pacemaker placed.  She has developed chronic atrial fibrillation.  Patient was recently hospitalized on September 26 for congestive heart failure from diastolic dysfunction.  Echocardiogram done on September 27 revealed ejection fraction of 55% with moderate concentric left ventricular hypertrophy and normal functioning bioprosthetic aortic valve.  She did have calcific mitral valve mild to moderate stenosis with mild to moderate mitral regurgitation.  Over the last month the patient has had relatively stable weights without any significant orthopnea or PND    Problem List:    Patient Active Problem List   Diagnosis     Permanent atrial fibrillation     Constipation     Sciatica     Hypothyroidism     Type 2 diabetes mellitus with stage 3 chronic kidney disease, without long-term current use of insulin (H)     Hyperparathyroidism     Vitamin D deficiency     Mixed hyperlipidemia     Atherosclerosis of native coronary artery of native heart without angina pectoris     CKD (chronic kidney disease), stage III (H)     Osteoarthritis Of The  Knee     Restless Legs Syndrome     Morbid obesity due to excess calories (H)     H/O heart valve replacement with bioprosthetic valve     Cardiac pacemaker in situ     Complete heart block (H)     Normochromic normocytic anemia     Essential hypertension with goal blood pressure less than 140/90     Moderate persistent asthma without complication     PAF (paroxysmal atrial fibrillation) (H)     GERD (gastroesophageal reflux disease)     Primary insomnia     Hypokalemia     Heart failure with preserved ejection fraction (H)     Physical deconditioning     Asthma     Acute respiratory failure with hypoxia (H)     Acute on chronic diastolic heart failure (H)     Fall     Near syncope     CHF (congestive heart failure), NYHA class I, acute, systolic (H)     Acute systolic heart failure (H)     Pulmonary hypertension (H)     Hypervolemia, unspecified hypervolemia type     Atrial fibrillation, unspecified type (H)     Status post aortic valve replacement     Non-rheumatic mitral valve stenosis     Tinea corporis         Current Outpatient Medications   Medication Sig Dispense Refill     acetaminophen (TYLENOL) 500 MG tablet Take 2 tablets (1,000 mg total) by mouth 3 (three) times a day as needed for pain. Not to exceed 4000 mg in 24 hours.  0     aspirin 81 MG EC tablet Take 81 mg by mouth daily.       atorvastatin (LIPITOR) 80 MG tablet TAKE 1 TABLET(80 MG) BY MOUTH AT BEDTIME 90 tablet 3     bumetanide (BUMEX) 2 MG tablet TAKE 1 TABLET(2 MG) BY MOUTH TWICE DAILY AT 9 AM AND AT 6  tablet 2     cholecalciferol, vitamin D3, (VITAMIN D3) 2,000 unit Tab Take 2,000 Units by mouth once daily.       COMBIVENT RESPIMAT  mcg/actuation Mist inhaler INHALE 1 PUFF BY MOUTH FOUR TIMES DAILY (Patient taking differently: Inhale 1 puff 4 (four) times a day as needed.       ) 4 g 11     ferrous sulfate 325 (65 FE) MG tablet TAKE 1 TABLET(325 MG) BY MOUTH TWICE DAILY 180 tablet 3     levothyroxine (SYNTHROID, LEVOTHROID) 150  MCG tablet TAKE 1 TABLET BY MOUTH DAILY AT 6:00 AM 90 tablet 3     metOLazone (ZAROXOLYN) 2.5 MG tablet Take 1 tablet (2.5 mg total) by mouth see administration instructions. Take on Monday, Wednesday, Friday. 24 tablet 0     metoprolol tartrate (LOPRESSOR) 100 MG tablet Take 50 mg by mouth 2 (two) times a day. Half-tablet             miconazole (MICOTIN) 2 % powder Apply topically 2 (two) times a day. 43 g 0     omeprazole (PRILOSEC) 20 MG capsule Take 1 capsule (20 mg total) by mouth daily. 90 capsule 2     potassium chloride (K-DUR,KLOR-CON) 20 MEQ tablet TAKE 1 TABLET(20 MEQ) BY MOUTH DAILY 90 tablet 3     No current facility-administered medications for this visit.        .  Past Surgical History:   Procedure Laterality Date     APPENDECTOMY       CHOLECYSTECTOMY       EYE SURGERY Bilateral     Cataracts     HIP PINNING Right 3/1/2017    Procedure: RIGHT HIP TROCHANTERIC RODDING;  Surgeon: Moshe Davies MD;  Location: [facility] OR;  Service:      INSERT / REPLACE / REMOVE PACEMAKER  2007    guidant     INSERT / REPLACE / REMOVE PACEMAKER  2018    phoebe sci gen change     JOINT REPLACEMENT Left     knee     TISSUE AORTIC VALVE REPLACEMENT  2007            .  Social History     Socioeconomic History     Marital status:      Spouse name: Not on file     Number of children: Not on file     Years of education: Not on file     Highest education level: Not on file   Occupational History     Occupation:  in operating room     Comment: retired   Social Needs     Financial resource strain: Not on file     Food insecurity:     Worry: Not on file     Inability: Not on file     Transportation needs:     Medical: Not on file     Non-medical: Not on file   Tobacco Use     Smoking status: Former Smoker     Packs/day: 0.00     Last attempt to quit: 1950     Years since quittin.2     Smokeless tobacco: Never Used   Substance and Sexual Activity     Alcohol use: No     Drug  "use: No     Sexual activity: Never   Lifestyle     Physical activity:     Days per week: Not on file     Minutes per session: Not on file     Stress: Not on file   Relationships     Social connections:     Talks on phone: Not on file     Gets together: Not on file     Attends Jew service: Not on file     Active member of club or organization: Not on file     Attends meetings of clubs or organizations: Not on file     Relationship status: Not on file     Intimate partner violence:     Fear of current or ex partner: Not on file     Emotionally abused: Not on file     Physically abused: Not on file     Forced sexual activity: Not on file   Other Topics Concern     Incontinent Not Asked     Toileting independently Not Asked     Cognitive impairment Not Asked     Vision impairment Not Asked     Hearing impairment Not Asked     Dentures Not Asked   Social History Narrative    She live in a single floor house.  Her son lives next door and her granddaughter is \"around a lot\"   3/2017       .  Family History   Problem Relation Age of Onset     No Medical Problems Mother         age 86     No Medical Problems Father         MVA     Cancer Brother         lung     No Medical Problems Brother      Review of Systems:  General: WNL  Eyes: WNL  Ears/Nose/Throat: WNL  Lungs: WNL  Heart: WNL  Stomach: WNL  Bladder: WNL  Muscle/Joints: WNL  Skin: WNL  Nervous System: WNL  Mental Health: WNL     Blood: WNL    Objective:     /60 (Patient Site: Right Arm, Patient Position: Sitting, Cuff Size: Adult Large)   Pulse 60   Resp 16   Ht 5' 7\" (1.702 m)   Wt 222 lb (100.7 kg)   BMI 34.77 kg/m    222 lb (100.7 kg)   [unfilled]    Physical Exam:    GENERAL APPEARANCE: alert, no apparent distress  HEENT: no scleral icterus or xanthelasma  NECK: jugular venous pressure normal  CHEST: symmetric, the lungs were clear to auscultation  CARDIOVASCULAR: regular rhythm with 1/6 systolic ejection murmur; no carotid bruits  ABDOMEN: " nondistended, nontender, bowel sounds present  EXTREMITIES: no cyanosis, clubbing 2-3+ edema by lateral lower legs to the level of the knees    Cardiac Testing:    echocardiogram from September 27, 2019 results reviewed as above      Lab Results:    LIPIDS:  Lab Results   Component Value Date    CHOL 155 06/07/2019    CHOL 164 09/08/2016    CHOL 191 05/04/2016     Lab Results   Component Value Date    HDL 47 (L) 06/07/2019    HDL 50 09/08/2016    HDL 55 05/04/2016     Lab Results   Component Value Date    LDLCALC 93 06/07/2019    LDLCALC 100 09/08/2016    LDLCALC 118 05/04/2016     Lab Results   Component Value Date    TRIG 74 06/07/2019    TRIG 68 09/08/2016    TRIG 92 05/04/2016     No components found for: CHOLHDL    BMP:  Lab Results   Component Value Date    CREATININE 2.31 (H) 10/14/2019    BUN 76 (H) 10/14/2019     10/14/2019    K 3.2 (L) 10/14/2019    CL 84 (L) 10/14/2019    CO2 37 (H) 10/14/2019         Brandt Irvin MD,F.A.C.C.  [facility]  [phone]

## 2021-06-04 NOTE — PATIENT INSTRUCTIONS - HE
Gently wash wound twice a day with warm water and mild soap, pat dry.   Apply mupirocin ointment sparingly to the wound and cover with a non-stick pad and if needed some guaze then secure in place with a coban wrap.   Be careful not to wrap the coban too tight.   Watch for signs of infection.  Recheck with primary care next week  Return or to ER sooner if fever, increased swelling of leg or around the wound, bleeding, redness, warmth, pus drainage.

## 2021-06-05 VITALS
SYSTOLIC BLOOD PRESSURE: 102 MMHG | WEIGHT: 205 LBS | DIASTOLIC BLOOD PRESSURE: 64 MMHG | HEART RATE: 64 BPM | BODY MASS INDEX: 32.11 KG/M2 | TEMPERATURE: 96.6 F | RESPIRATION RATE: 18 BRPM | OXYGEN SATURATION: 93 %

## 2021-06-05 VITALS
WEIGHT: 205 LBS | SYSTOLIC BLOOD PRESSURE: 131 MMHG | OXYGEN SATURATION: 94 % | HEART RATE: 60 BPM | BODY MASS INDEX: 32.11 KG/M2 | RESPIRATION RATE: 18 BRPM | TEMPERATURE: 96.2 F | DIASTOLIC BLOOD PRESSURE: 73 MMHG

## 2021-06-05 VITALS
TEMPERATURE: 97 F | BODY MASS INDEX: 33.83 KG/M2 | HEART RATE: 56 BPM | DIASTOLIC BLOOD PRESSURE: 68 MMHG | OXYGEN SATURATION: 95 % | SYSTOLIC BLOOD PRESSURE: 108 MMHG | RESPIRATION RATE: 18 BRPM | WEIGHT: 216 LBS

## 2021-06-05 VITALS
BODY MASS INDEX: 32.26 KG/M2 | HEART RATE: 60 BPM | TEMPERATURE: 96.1 F | OXYGEN SATURATION: 91 % | SYSTOLIC BLOOD PRESSURE: 128 MMHG | RESPIRATION RATE: 18 BRPM | DIASTOLIC BLOOD PRESSURE: 72 MMHG | WEIGHT: 206 LBS

## 2021-06-05 VITALS
WEIGHT: 215 LBS | DIASTOLIC BLOOD PRESSURE: 64 MMHG | HEART RATE: 64 BPM | TEMPERATURE: 96.6 F | RESPIRATION RATE: 18 BRPM | OXYGEN SATURATION: 94 % | SYSTOLIC BLOOD PRESSURE: 119 MMHG | BODY MASS INDEX: 33.67 KG/M2

## 2021-06-05 VITALS
WEIGHT: 207 LBS | TEMPERATURE: 96.9 F | SYSTOLIC BLOOD PRESSURE: 116 MMHG | DIASTOLIC BLOOD PRESSURE: 76 MMHG | HEART RATE: 97 BPM | BODY MASS INDEX: 32.42 KG/M2

## 2021-06-05 VITALS
DIASTOLIC BLOOD PRESSURE: 75 MMHG | RESPIRATION RATE: 18 BRPM | TEMPERATURE: 96.6 F | SYSTOLIC BLOOD PRESSURE: 138 MMHG | OXYGEN SATURATION: 97 % | BODY MASS INDEX: 36.02 KG/M2 | WEIGHT: 230 LBS | HEART RATE: 59 BPM

## 2021-06-05 VITALS
WEIGHT: 203 LBS | RESPIRATION RATE: 18 BRPM | BODY MASS INDEX: 31.79 KG/M2 | SYSTOLIC BLOOD PRESSURE: 104 MMHG | OXYGEN SATURATION: 95 % | HEART RATE: 59 BPM | TEMPERATURE: 97.6 F | DIASTOLIC BLOOD PRESSURE: 57 MMHG

## 2021-06-05 VITALS
HEART RATE: 60 BPM | WEIGHT: 228 LBS | OXYGEN SATURATION: 93 % | DIASTOLIC BLOOD PRESSURE: 60 MMHG | SYSTOLIC BLOOD PRESSURE: 100 MMHG | BODY MASS INDEX: 35.71 KG/M2 | TEMPERATURE: 97.7 F

## 2021-06-05 VITALS
SYSTOLIC BLOOD PRESSURE: 109 MMHG | DIASTOLIC BLOOD PRESSURE: 53 MMHG | WEIGHT: 230 LBS | OXYGEN SATURATION: 94 % | HEART RATE: 52 BPM | RESPIRATION RATE: 20 BRPM | TEMPERATURE: 96.4 F | BODY MASS INDEX: 36.02 KG/M2

## 2021-06-05 NOTE — PROGRESS NOTES
[facility]    DATE: 2020    NAME:  Thea Acosta             :  1934  MRN: [id number]  CODE STATUS:  DNR    VISIT TYPE: Problem Visit (wheezing, cough)     FACILITY:  [facility] [[id number]]       CHIEF COMPLAIN/REASON FOR VISIT:    Chief Complaint   Patient presents with     Problem Visit     wheezing, cough               HISTORY OF PRESENT ILLNESS: Thea Acosta is a 85 y.o. female who was admitted - for dyspnea and fluid overload, acute on chronic diastolic CHF. She was treated with IV diuresis and later transitioned to PO. Then she developed MIGUEL and held bumex and metolazone. During stay she developed herpes zoster outbreak on left chest. She also had left leg laceration and treated with clindamycin. Left leg negative for DVT on doppler. She completed IV ceftezole and po keflex for cellulitis. She did have some orthostatic hypotension that required med changes. TSH stable and ACTH stim test not indicated. She did have some epigastric pain and was treated with PPI and maalox. She was recommended TCU stay for deconditioning. She has PMH of CAD, CKD, DM, s/p PPM, diastolic CHF, pulmonary artery hypertension, mitral valve stenosis, chronic a fib no anticoagulation, s/p AVR, asthma. Prior to this she lived at home with her grand daughter.     Today Ms. Acosta is seen for concerns of wheezing and congestion today. Nursing noted she did not have a fever today. On exam she says she doesn't feel the best and has a significant cough and congestion. She does feel a little more winded but not a lot. She does not feel that her legs are more swollen than normal. She is not feeling feverish and denies a sore throat. She does have some runny nose but just some clear drainage. No nasal congestion or sinus pressure. She is not having any concerns with her bowels and appetite is still good. She denies dizziness or other concerns today. Discussed  treatment options and concerns regarding lung sounds today. She says she does not like masks on her face so she doesn't like the nebulizers but she would do it if they had the one with the mouthpiece. Reviewed her weights and they are up as well 190-196lbs.       REVIEW OF SYSTEMS:  PROBLEMS AND REVIEW OF SYSTEMS:   Review of Systems  Today on ROS:   Currently, no fever, chills, or rigors. Decreased vision and hearing. Denies any chest pain, headaches, palpitations, lightheadedness, dizziness. Appetite is good.  Denies any abdominal pain. No active bleeding. Positive for edema ble, shortness of breath with exertion and at rest today, sleeps in recliner, low blood pressure at times, chest rash improving, urinary incontinence, no pain, constipation improved, dry flaking skin on legs, weight gain, congested cough, wheezing      Allergies   Allergen Reactions     Codeine Hives     Codeine Phosphate (Bulk)      This allergy is per [facility] records.     Codeine Sulfate      This allergy is per [facility] records.     Codeine-Butalbital-Asa-Caff      This allergy is per [facility] records.     Codeine-Guaifenesin      This allergy is per [facility] records.     Lisinopril Cough     Penicillins Swelling     Current Outpatient Medications   Medication Sig     acetaminophen (TYLENOL) 500 MG tablet Take 2 tablets (1,000 mg total) by mouth 3 (three) times a day as needed for pain. Not to exceed 4000 mg in 24 hours.     aspirin 81 MG EC tablet Take 81 mg by mouth daily.     atorvastatin (LIPITOR) 80 MG tablet TAKE 1 TABLET(80 MG) BY MOUTH AT BEDTIME     bacitracin 500 unit/gram ointment Apply topically 2 (two) times a day.     bumetanide (BUMEX) 2 MG tablet TAKE 1 TABLET(2 MG) BY MOUTH TWICE DAILY AT 9 AM AND AT 6 PM     cholecalciferol, vitamin D3, (VITAMIN D3) 2,000 unit Tab Take 2,000 Units by  mouth once daily.     ferrous sulfate 325 (65 FE) MG tablet TAKE 1 TABLET(325 MG) BY MOUTH TWICE DAILY     ipratropium-albuterol (COMBIVENT RESPIMAT)  mcg/actuation Mist inhaler INHALE 1 PUFF BY MOUTH FOUR TIMES DAILY (Patient taking differently: 4 (four) times a day as needed. )     levothyroxine (SYNTHROID, LEVOTHROID) 150 MCG tablet TAKE 1 TABLET BY MOUTH DAILY AT 6:00 AM     lidocaine (XYLOCAINE) 5 % ointment Apply to painful areas     metoprolol tartrate (LOPRESSOR) 25 MG tablet Take 0.5 tablets (12.5 mg total) by mouth 2 (two) times a day.     omeprazole (PRILOSEC) 20 MG capsule Take 1 capsule (20 mg total) by mouth daily.     polyethylene glycol (MIRALAX) 17 gram packet Take 17 g by mouth daily.     potassium chloride (K-DUR,KLOR-CON) 10 MEQ tablet Take 1 tablet (10 mEq total) by mouth daily.     senna (SENOKOT) 8.6 mg tablet Take 1 tablet by mouth daily as needed for constipation.     white petrolatum (AQUAPHOR ORIGINAL) 41 % Oint Apply 1 application topically at bedtime.     Past Medical History:    Past Medical History:   Diagnosis Date     Acute kidney injury superimposed on CKD (H) 3/3/2017     Asthma      CAD (coronary artery disease)      Cataract      Chronic kidney disease     ckd3     Diabetes mellitus (H)      Disease of thyroid gland      H/O heart valve replacement with bioprosthetic valve      History of transfusion      Hypertension      Normochromic normocytic anemia      Pulmonary hypertension (H) 09/27/2019    Noted on echocardiogram     Restless leg syndrome            PHYSICAL EXAMINATION  Vitals:    01/22/20 2237   BP: 100/62   Pulse: 60   Resp: 18   Temp: (!) 96.5  F (35.8  C)   SpO2: 98%   Weight: 196 lb (88.9 kg)       Today on physical exam:     GENERAL: Awake, Alert, obese, oriented x3, not in any form of acute distress, answers questions appropriately, follows simple commands, conversant  HEENT: Head is normocephalic with normal hair distribution. No evidence of trauma. Ears:  No acute purulent discharge. Eyes: Conjunctivae pink with no scleral jaundice. Nose: Normal mucosa and septum. NECK: Supple with no cervical or supraclavicular lymphadenopathy. Trachea is midline. [place], decreased vision  CHEST: No tenderness or deformity, no crepitus, left chest PPM  LUNG: Dim with scattered coarse crackles, expiratory wheezing to auscultation with good chest expansion.   Shortness of breath with exertion and at rest, harsh, congested mildly productive cough  BACK: No kyphosis of the thoracic spine. Symmetric, no curvature, ROM normal, no CVA tenderness, no spinal tenderness   CVS: irregularly irregular rhythm, murmur,  2+ pulses symmetric in all extremities.  ABDOMEN: Rounded and soft, nontender to palpation, non distended, no masses, no organomegaly, good bowel sounds, no rebound or guarding, no peritoneal signs.   EXTREMITIES: 1+ ble nonpitting edema, dry flaking and scaly skin, left leg wound healed, scabbed  SKIN: Warm and dry  NEUROLOGICAL: The patient is oriented to person, place and time. Strength and sensation are grossly intact. Face is symmetric.            LABS:   Recent Results (from the past 168 hour(s))   Basic Metabolic Panel   Result Value Ref Range    Sodium 135 (L) 136 - 145 mmol/L    Potassium 3.8 3.5 - 5.0 mmol/L    Chloride 91 (L) 98 - 107 mmol/L    CO2 33 (H) 22 - 31 mmol/L    Anion Gap, Calculation 11 5 - 18 mmol/L    Glucose 112 70 - 125 mg/dL    Calcium 9.7 8.5 - 10.5 mg/dL    BUN 48 (H) 8 - 28 mg/dL    Creatinine 1.71 (H) 0.60 - 1.10 mg/dL    GFR MDRD Af Amer 34 (L) >60 mL/min/1.73m2    GFR MDRD Non Af Amer 28 (L) >60 mL/min/1.73m2   Vitamin D, Total (25-Hydroxy)   Result Value Ref Range    Vitamin D, Total (25-Hydroxy) 45.9 30.0 - 80.0 ng/mL   HM1 (CBC with Diff)   Result Value Ref Range    WBC 6.5 4.0 - 11.0 thou/uL    RBC 3.57 (L) 3.80 - 5.40 mill/uL    Hemoglobin 11.0 (L) 12.0 - 16.0 g/dL    Hematocrit 35.4 35.0 - 47.0 %    MCV 99 80 - 100 fL    MCH 30.8 27.0 - 34.0 pg     MCHC 31.1 (L) 32.0 - 36.0 g/dL    RDW 13.6 11.0 - 14.5 %    Platelets 177 140 - 440 thou/uL    MPV 11.4 8.5 - 12.5 fL    Neutrophils % 59 50 - 70 %    Lymphocytes % 30 20 - 40 %    Monocytes % 6 2 - 10 %    Eosinophils % 4 0 - 6 %    Basophils % 1 0 - 2 %    Neutrophils Absolute 3.8 2.0 - 7.7 thou/uL    Lymphocytes Absolute 1.9 0.8 - 4.4 thou/uL    Monocytes Absolute 0.4 0.0 - 0.9 thou/uL    Eosinophils Absolute 0.3 0.0 - 0.4 thou/uL    Basophils Absolute 0.1 0.0 - 0.2 thou/uL     Results for orders placed or performed in visit on 01/20/20   Basic Metabolic Panel   Result Value Ref Range    Sodium 135 (L) 136 - 145 mmol/L    Potassium 3.8 3.5 - 5.0 mmol/L    Chloride 91 (L) 98 - 107 mmol/L    CO2 33 (H) 22 - 31 mmol/L    Anion Gap, Calculation 11 5 - 18 mmol/L    Glucose 112 70 - 125 mg/dL    Calcium 9.7 8.5 - 10.5 mg/dL    BUN 48 (H) 8 - 28 mg/dL    Creatinine 1.71 (H) 0.60 - 1.10 mg/dL    GFR MDRD Af Amer 34 (L) >60 mL/min/1.73m2    GFR MDRD Non Af Amer 28 (L) >60 mL/min/1.73m2         Lab Results   Component Value Date    WBC 6.5 01/20/2020    HGB 11.0 (L) 01/20/2020    HCT 35.4 01/20/2020    MCV 99 01/20/2020     01/20/2020       No results found for: [id number]  Lab Results   Component Value Date    HGBA1C 6.9 (H) 09/25/2019     Lab Results   Component Value Date    INR 1.30 (H) 02/07/2018    INR 1.51 (H) 03/06/2017    INR 1.27 (H) 03/05/2017     Vitamin D, Total (25-Hydroxy)   Date Value Ref Range Status   01/20/2020 45.9 30.0 - 80.0 ng/mL Final     Lab Results   Component Value Date    TSH 4.22 09/25/2019           ASSESSMENT/PLAN:     1 Viral URI with cough: congestion, wheezing today. No fever, did have one dose of robitussin overnight and did improve cough. No sore throat, but clear rhinorrhea, wheezing, increased shortness of breath. Will order duonebs two times a day and q4h prn x 5 days, robitussin 10ml three times a day x 3 days. Encourage rest, limit fluids due to chf. Notify if develops  fever and will order chest xray. o2 sat stable today.   2. Acute on chronic CHF: Daily [phone], reports weighing over 300lbs a year or so ago. Shortness of breath with exertion, 1+ ble edema. On bumex 2mg two times a day, keep legs elevated when possible. Sleeps in recliner.  F/u with cardiology prn. PT, OT for conditioning. Cardiac, low sodium diet. Daily weights. Weights are up and increased shortness of breath, congestion today. Will order metolazone 2.5mg x 1 dose today for fluid overload.   3. Herpes Zoster: Across upper abdomen/chest. scabbed, healing. Minimal itching, no pain, scabs now falling off. No isolation needed. Tylenol prn pain. Completed antiviral treatment. Much improved.   4. S/p PPM: follows with device clinic. No recent concerns.   5. Acute on chronic Mod persistent Asthma: On combivent four times a day prn. Increased shortness of breath, wheezing, cough today. Ordered duonebs two times a day and prn x 5 days, robitussin. Monitor closely if need prednisone burst.   6. MIGUEL on CKD stage 3: Cr 1.75 on 1/14. Cr 1.71 on 1/20. Stable. Bmp on 1/27.   7. Type 2 DM: No meds, no monitoring needed. Diet controlled. Weight loss recently. Glucose on bmp 1/20 was 112.   8. Dyslipidemia: On aspirin, atorvastatin.   9. HTN: SBP 100s. On lopressor, bumex.  hold parameters for lopressor. Reports dizziness only in am when gets up too fast.   10. GERD: On omeprazole. No concerns today.   11. Hypothyroid: On levothyroxine.   12. Vitamin d deficiency: On vitamin d. Vit d 45 on 1/20. Stable.   13. Hypokalemia: On kcl.    14. PAF, s/p AVR: Regular rate and rhythm today. On metoprolol. F/u with caridology. No recent palpitations or chest pain.   15. Right elbow gout exacerbation: resolved.   16. Anemia of CKD: on iron two times a day. Hg  11.3 on 1/14. Hg 11 on 1/20.   17. Constipation:  scheduled miralax daily and senna daily prn. Improved today.   18. PVD, venous stasis: ordered aquaphor at bedtime and  daily prn for dry, scaly, flaking skin to maintain dry and supple skin barrier. No open wounds on legs. No numb/tingling in legs. No leg pain.     Per therapy PT - 140ft fww SBA/CGA, t/f's SBA/CGA with extra time, OT - UB setup, doesn t wear pants, toileting CGA/Min A, showering min/mod A. Yellow tag. Lives in house with family with stairs. recommend 2 weeks.      Electronically signed by: Jennifer Valdes NP     Total floor/unit time spent 35 min with 25 min spent on counseling and coordination of care. Counseling regarding asthma exacerbation, cough management, acute chf management. Coordinated care with nursing for management of asthma exacerbation, viral uri, monitoring for hypoxia and worsening of condition, when to notify provider versus send to ED, chf management.

## 2021-06-05 NOTE — PROGRESS NOTES
"[facility]    Facility:   [facility] [[id number]]   Code Status: DNR      CHIEF COMPLAINT/REASON FOR VISIT:  Chief Complaint   Patient presents with     H & P       HISTORY:      HPI: Ms. Trivedi is an 85-year-old female with a past medical history of chronic diastolic heart failure, severe pulmonary hypertension, mitral valve stenosis, chronic atrial fibrillation, permanent pacemaker in place, bioprosthetic AVR 2006, CKD 3, coronary artery disease, hyperlipidemia, asthma, hypothyroidism, GERD, vitamin D deficiency, gout, anemia, admitted to TCU following her recent hospital stay, seen today for H&P.    Hospital course patient was admitted on January 4 and discharged on January 14 from [facility] where she presented with shortness of breath.  She had a concurrent left chest rash.   Patient was diagnosed with acute on chronic diastolic heart failure, with suspicion that prednisone may have triggered fluid gain.  She was treated with IV diuretics, metolazone and return to her usual p.o. Bumex 2 mg twice daily by the time of discharge with a discharge weight of 86 kg on room air.   Left chest rash was diagnosed as shingles which was treated with antivirals.   Patient appeared to have resolving right elbow gout, her steroids were discontinued.   However she was felt to have left leg cellulitis related to a prior pretibial wound and was treated with cephalexin transition to Keflex with completed course by the time of discharge.   Other: Patient developed acute kidney injury and was troubled by orthostatic hypotension with adjustment in her diuretics resulting.    Subjective/review of systems  Patient reports that she is feeling \"pretty good\".  When I asked her she feels normal she says no that she is still feeling tired and worn out.  She admits to some lightheadedness on January 20 but none today despite ongoing marginal blood pressures.  She reports her appetite is " "okay.  Bowels are working with \"prune juice\".  Last normal bowel movement was 2 days ago requiring suppository treatment, until she had another one this morning.  She says she usually goes every 2 to 3 days at home.  She reports that the wound in the left pretibial area occurred around Sheri time when she took her family to the wild game and bumped and try to get out of the car quickly.  She reports that she has had the shingles vaccine and is never had shingles before.  She says she gets occasional headaches but denies other pains including chest pain and abdominal pain she says that the gout occasionally flares but that her right elbow is always a bit red and swollen but it does not bother her so she pays no attention.  She says her memory is \"not the best\".  No nausea vomiting diarrhea melena bright red blood per rectum dysuria fever sweats chills falls injuries orthopnea PND.  She says that she is to have terrible peripheral edema but it is improved greatly.  She says she is lost 100 pounds in the last year and thinks that a lot of it was fluid weight.  Remainder 13 system review is negative.    Social history: Patient says she lives in her long-term home of about 40 years on the [facility].  She reports that she also owns a home in [place] which is her home town.  She said she spent many weeks there mainly during the warmer months over the past years but now is facing the prospect of selling the house because she cannot get there.  She gave up driving a couple of years ago.    Past Medical History:   Diagnosis Date     Acute kidney injury superimposed on CKD (H) 3/3/2017     Asthma      CAD (coronary artery disease)      Cataract      Chronic kidney disease     ckd3     Diabetes mellitus (H)      Disease of thyroid gland      H/O heart valve replacement with bioprosthetic valve      History of transfusion      Hypertension      Normochromic normocytic anemia      Pulmonary hypertension " "(H) 2019    Noted on echocardiogram     Restless leg syndrome              Family History   Problem Relation Age of Onset     No Medical Problems Mother         age 86     No Medical Problems Father         MVA     Cancer Brother         lung     No Medical Problems Brother      Social History     Socioeconomic History     Marital status:      Spouse name: None     Number of children: None     Years of education: None     Highest education level: None   Occupational History     Occupation:  in operating room     Comment: retired   Social Needs     Financial resource strain: None     Food insecurity:     Worry: None     Inability: None     Transportation needs:     Medical: None     Non-medical: None   Tobacco Use     Smoking status: Former Smoker     Packs/day: 0.00     Last attempt to quit: 1950     Years since quittin.3     Smokeless tobacco: Never Used   Substance and Sexual Activity     Alcohol use: No     Drug use: No     Sexual activity: Never   Lifestyle     Physical activity:     Days per week: None     Minutes per session: None     Stress: None   Relationships     Social connections:     Talks on phone: None     Gets together: None     Attends L[employer] service: None     Active member of club or organization: None     Attends meetings of clubs or organizations: None     Relationship status: None     Intimate partner violence:     Fear of current or ex partner: None     Emotionally abused: None     Physically abused: None     Forced sexual activity: None   Other Topics Concern     Incontinent Not Asked     Toileting independently Not Asked     Cognitive impairment Not Asked     Vision impairment Not Asked     Hearing impairment Not Asked     Dentures Not Asked   Social History Narrative    She live in a single floor house.  Her son lives next door and her granddaughter is \"around a lot\"   3/2017         Review of Systems as above    Vitals:    20 1629   BP: 96/58 " "  Pulse: 60   Resp: 16   Temp: (!) 95.5  F (35.3  C)   SpO2: 96%   Weight: 195 lb (88.5 kg)   Height: 5' 7\" (1.702 m)       Physical Exam  Patient is very pleasant, normally conversant female in no acute distress.  She has clear sclera EOMI oropharynx is moist and clear neck is without adenopathy or mass heart is regular in the 70s with crisp valve sounds I do not hear significant murmur.  Lungs are clear she moves her easily no resolved rhonchi or wheezing.  Abdomen is obese without organomegaly mass tenderness.  Right elbow shows boggy bursa over the olecranon.  There is a 8 cm red/erythematous area over the proximal olecranon itself.  However it is nontender with no sign of secondary cellulitis.  Both lower extremities are remarkable for venous stasis dermatitis to the midshin.  She also has a 2.5 cm scabbed lesion in the left pretibial area which is not currently draining.  Skin is also remarkable for very dense scabbed rash in the approximate T8 dermatome left-sided from midline to midline.  Some of the lesions are macerated especially under the left breast where there is skin on skin contact.  The rest are crusted and dry.  Patient says they are no longer painful or tender to the touch.  There are few scattered hyperkeratotic lesions on her arms right greater than left.  Elsewhere skin is warm and dry  LABS:   From January 20 sodium 135 potassium 3.8 chloride 91 CO2 33 BUN 48 creatinine 1.71 similar to her recent baseline for example she was discharged at 1.75 in the hospital.  White blood count 6.5 hemoglobin 11.0 platelets 177K.    ASSESSMENT:      ICD-10-CM    1. Type 2 diabetes mellitus with stage 3 chronic kidney disease, without long-term current use of insulin (H) E11.22     N18.3    2. Atherosclerosis of native coronary artery of native heart without angina pectoris I25.10    3. Essential hypertension with goal blood pressure less than 140/90 I10    4. Moderate persistent asthma without complication " J45.40    5. PAF (paroxysmal atrial fibrillation) (H) I48.0    6. CHF (congestive heart failure), NYHA class I, acute, systolic (H) I50.21    7. Pulmonary hypertension (H) I27.20    8. CKD (chronic kidney disease), stage III (H) N18.3    9. H/O heart valve replacement with bioprosthetic valve Z95.3    10. Acute kidney injury superimposed on chronic kidney disease (H) N17.9     N18.9    11. Physical deconditioning R53.81    12. Cardiac pacemaker in situ Z95.0      Assessment/plan    Acute on chronic CHF, suspected trigger prednisone  Severe pulmonary hypertension  Mitral valve stenosis  Chronic atrial fibrillation  Permanent pacemaker  Bioprosthetic AVR 2006     Aspirin is patient's anticoagulant.  Metoprolol 12.5 mg twice daily with hold parameters added recently.  Longstanding diuretic dose is Bumex 2 mg twice daily, which she is currently taking.  Renal function is stable.  Fairly massive weight loss in the last year is notable as well.  Patient reports this as a desired weight loss with lifestyle changes and better diuresis.  I am concerned about patient's hypotension weight is currently 195 pounds she was discharged at 190 pounds, albeit on a different scale.  Weight at this facility peaked at 193.4 on January 20.   -Hold parameters written for Bumex.   -Monitor weight   -Monitor renal function, next BMP January 27 in a week    Hypotension  History of orthostatic hypotension     Asymptomatic today although she did have some lightheadedness yesterday.  Hold parameters have been added for Bumex, metoprolol.  Need to continue to monitor blood pressures, weights, renal function as above.  Orthostatic precautions are reviewed with patient.  She states understanding.    CKD 3     Appears relatively stable   -See above no additional changes    Cellulitis left leg  Left pretibial wound  Chronic venous stasis dermatitis     There is no remaining sign of cellulitis.  Patient does have venous stasis dermatitis bilaterally,  but all who know her agree that her massive edema is markedly improved from the past.  She does also has a pretibial wound from around Sheri time in the left leg.  I added bacitracin to this wound and recommend leg elevation above heart level resting.  Compression stockings if/as tolerated    Shingles left T8     Resolving now with no active blistering however she has some maceration of the superficial wounds where skin on skin contact underneath the left breast is present.   -Continue inter-dry   -Add bacitracin ointment due to the extensive scabbing, irritation and risk for secondary bacterial infection    Right elbow bursitis  Presumed gout     Patient has recurrent gout with a recent flare treated with prednisone.  She says that her elbow is back to baseline.  She does have evidence of chronic bursitis with bogginess and mild erythema.  We will need to watch for secondary signs of inflammation/infection.  I cannot discover whether patient does have a crystal diagnosis for gout or if it has been a presumptive diagnosis.  If the former, consider allopurinol renally dosed.  If not I would keep my mind open to the possibility of recurrent bursitis as an alternate diagnosis.    GERD     -Omeprazole    Hypothyroidism     -Levothyroxine 150 daily    Asthma     Patient reports good control.  Hospital physicians did not feel this represented an exacerbation rather more of a pure heart failure presentation.  She did receive 1 dose of Solu-Medrol in the emergency room but none since.  There is no wheezing on exam and she feels at baseline.   -Continue Anoro Ellipta and PRN albuterol     Vitamin D deficiency     On replacement with calcium    Hyperlipidemia     Atorvastatin 80 mg daily    Constipation     Reviewed bowel programs with patient she has MiraLAX scheduled daily and senna as needed.  Discussed with the staff as well to use as needed senna as needed.    Pain     Acetaminophen    Generalized weakness  TCU  need  Disposition     PT OT  involved.  So far everyone seems optimistic that this will be a relatively short stay with return to her home with her granddaughter.  She has 3 steps to get in.    Electronically signed by: Rojelio Farnsworth MD

## 2021-06-05 NOTE — TELEPHONE ENCOUNTER
[facility] Nurse Triage Telephone Note      Provider: CHILO Quigley  Facility: [facility]    Facility Type: TCU    Caller: Vanesa  Call Back Number:  [phone]    Allergies: Codeine; Codeine phosphate (bulk); Codeine sulfate; Codeine-butalbital-asa-caff; Codeine-guaifenesin; Lisinopril; and Penicillins    Reason for call: Nurse reporting BMP results.  Notable meds:  Bumex 2mg two times a day, potassium 20meq daily, Metoprolol 12.5mg two times a day.       Verbal Order/Direction given by Provider: Increase potassium to 40meq daily.  Labs already ordered for 1/31/20.      Provider giving order: CHILO Quigley    Verbal order given to: Vanesa Stephen RN

## 2021-06-05 NOTE — PROGRESS NOTES
[facility]    DATE: 2020    NAME:  Thea Acosta             :  1934  MRN: [id number]  CODE STATUS:  DNR    VISIT TYPE: Problem Visit (fluid overload, cellulitis)     FACILITY:  [facility] [[id number]]       CHIEF COMPLAIN/REASON FOR VISIT:    Chief Complaint   Patient presents with     Problem Visit     fluid overload, cellulitis               HISTORY OF PRESENT ILLNESS: Thea Acosta is a 85 y.o. female who was admitted - for dyspnea and fluid overload, acute on chronic diastolic CHF. She was treated with IV diuresis and later transitioned to PO. Then she developed MIGUEL and held bumex and metolazone. During stay she developed herpes zoster outbreak on left chest. She also had left leg laceration and treated with clindamycin. Left leg negative for DVT on doppler. She completed IV ceftezole and po keflex for cellulitis. She did have some orthostatic hypotension that required med changes. TSH stable and ACTH stim test not indicated. She did have some epigastric pain and was treated with PPI and maalox. She was recommended TCU stay for deconditioning. She has PMH of CAD, CKD, DM, s/p PPM, diastolic CHF, pulmonary artery hypertension, mitral valve stenosis, chronic a fib no anticoagulation, s/p AVR, asthma. Prior to this she lived at home with her grand daughter.     Today Ms. Acosta is seen for nursing concerns for reddened lower extremities and concerns for cellulitis. Her legs are also more swollen and pitting. Her weights continue to be up >10 lbs since admission. She continues to have severe congestion, cough, and increased shortness of breath. Nursing reports there was a hold parameter put on her bumex a few days ago and as result some doses have been held. Nursing reports her blood pressures have been on lower side since admission and has not been symptomatic with this. On exam she is seen in her room alone. She reports her legs are  more red but they are not painful for her. She says when she had cellulitis before it was very painful. She does feel they are more swollen and admits she does not elevate her legs. She sits in her wheelchair as the recliner is uncomfortable for her. She says she still has a terrible cough and feels very congested. She is very short of breath with any movement now. She has to stop in therapy to catch her breath frequently. She reports that she does not feel very well. She does think the breathing treatments help some. She is not able to cough much up though and clear the fluid. She denies fevers or chills. She has been sleeping ok. Discussed with Thea that she is having a CHF exacerbation and this explains her symptoms. We discussed that by missing the doses of bumex have most likely set her back as she is not able to get rid of the extra fluid. We reviewed her weights and she is up 13lbs since admission. We discussed her respiratory status and her oxygen levels are borderline needing oxygen. If she drops <88-90% we will have to use supplemental o2. We discussed increasing her duonebs to assist with shortness of breath, congestion, cough and will increase her metolazone. We also discussed the metolazone was ordered to augment the bumex but since she has not been taking the bumex the metolazone is very minimally effective. We did discuss as well the symptoms on her legs and she has worsening edema that is now tight and pitting with cellulitis infection on both legs. We discussed treating with antibiotic and consulting therapy for lymphedema wrapping. She was agreeable to plan.       REVIEW OF SYSTEMS:  PROBLEMS AND REVIEW OF SYSTEMS:   Review of Systems  Today on ROS:   Currently, no fever, chills, or rigors. Decreased vision and hearing. Denies any chest pain, headaches, palpitations, lightheadedness, dizziness. Appetite is good.  Denies any abdominal pain. No active bleeding. Positive for sleeps in recliner, low  blood pressure at times, chest rash nearly resolved, urinary incontinence, no pain, constipation improved, dry flaking skin on legs, weight gain, congested cough, wheezing, worsening shortness of breath, hypoxia, worsening edema, redness and warmth on both legs      Allergies   Allergen Reactions     Codeine Hives     Codeine Phosphate (Bulk)      This allergy is per [facility] records.     Codeine Sulfate      This allergy is per [facility] records.     Codeine-Butalbital-Asa-Caff      This allergy is per [facility] records.     Codeine-Guaifenesin      This allergy is per [facility] records.     Lisinopril Cough     Penicillins Swelling     Current Outpatient Medications   Medication Sig     acetaminophen (TYLENOL) 500 MG tablet Take 2 tablets (1,000 mg total) by mouth 3 (three) times a day as needed for pain. Not to exceed 4000 mg in 24 hours.     aspirin 81 MG EC tablet Take 81 mg by mouth daily.     atorvastatin (LIPITOR) 80 MG tablet TAKE 1 TABLET(80 MG) BY MOUTH AT BEDTIME     bacitracin 500 unit/gram ointment Apply topically 2 (two) times a day.     bumetanide (BUMEX) 2 MG tablet TAKE 1 TABLET(2 MG) BY MOUTH TWICE DAILY AT 9 AM AND AT 6 PM     cholecalciferol, vitamin D3, (VITAMIN D3) 2,000 unit Tab Take 2,000 Units by mouth once daily.     ferrous sulfate 325 (65 FE) MG tablet TAKE 1 TABLET(325 MG) BY MOUTH TWICE DAILY     ipratropium-albuterol (COMBIVENT RESPIMAT)  mcg/actuation Mist inhaler INHALE 1 PUFF BY MOUTH FOUR TIMES DAILY (Patient taking differently: 4 (four) times a day as needed. )     levothyroxine (SYNTHROID, LEVOTHROID) 150 MCG tablet TAKE 1 TABLET BY MOUTH DAILY AT 6:00 AM     lidocaine (XYLOCAINE) 5 % ointment Apply to painful areas     metoprolol tartrate (LOPRESSOR) 25 MG tablet Take 0.5 tablets (12.5 mg total) by mouth 2 (two) times a day.      omeprazole (PRILOSEC) 20 MG capsule Take 1 capsule (20 mg total) by mouth daily.     polyethylene glycol (MIRALAX) 17 gram packet Take 17 g by mouth daily.     potassium chloride (K-DUR,KLOR-CON) 20 MEQ tablet Take 40 mEq by mouth daily.     senna (SENOKOT) 8.6 mg tablet Take 1 tablet by mouth daily as needed for constipation.     white petrolatum (AQUAPHOR ORIGINAL) 41 % Oint Apply 1 application topically at bedtime.     Past Medical History:    Past Medical History:   Diagnosis Date     Acute kidney injury superimposed on CKD (H) 3/3/2017     Asthma      CAD (coronary artery disease)      Cataract      Chronic kidney disease     ckd3     Diabetes mellitus (H)      Disease of thyroid gland      H/O heart valve replacement with bioprosthetic valve      History of transfusion      Hypertension      Normochromic normocytic anemia      Pulmonary hypertension (H) 09/27/2019    Noted on echocardiogram     Restless leg syndrome            PHYSICAL EXAMINATION  Vitals:    01/30/20 0700   BP: 132/65   Pulse: 60   Resp: 18   Temp: 96.8  F (36  C)   SpO2: 92%   Weight: 201 lb (91.2 kg)       Today on physical exam:     GENERAL: Awake, Alert, obese, oriented x3, not in any form of acute distress, answers questions appropriately, follows simple commands, conversant  HEENT: Head is normocephalic with normal hair distribution. No evidence of trauma. Ears: No acute purulent discharge. Eyes: Conjunctivae pink with no scleral jaundice. Nose: Normal mucosa and septum. NECK: Supple with no cervical or supraclavicular lymphadenopathy. Trachea is midline. [place], decreased vision  CHEST: No tenderness or deformity, no crepitus, left chest PPM  LUNG: Dim with frequent and diffuse coarse crackles, expiratory and inspiratory wheezing to auscultation.   Shortness of breath with exertion and at rest, harsh, congested nonproductive cough  BACK: No kyphosis of the thoracic spine. Symmetric, no curvature, ROM normal, no CVA tenderness, no spinal  tenderness   CVS: irregularly irregular rhythm, murmur,  2+ pulses symmetric in all extremities.  ABDOMEN: Rounded and soft, nontender to palpation, non distended, no masses, no organomegaly, good bowel sounds, no rebound or guarding, no peritoneal signs.   EXTREMITIES: 3+ ble pitting edema, dry flaking and scaly skin, left leg wound healed, scabbed, legs are taut today, ace wraps in place and were removed for exam. Diffuse erythema on lower legs extends ankles to knees, warm to touch  SKIN: Warm and dry  NEUROLOGICAL: The patient is oriented to person, place and time. Strength and sensation are grossly intact. Face is symmetric.            LABS:   Recent Results (from the past 168 hour(s))   Basic Metabolic Panel   Result Value Ref Range    Sodium 131 (L) 136 - 145 mmol/L    Potassium 3.3 (L) 3.5 - 5.0 mmol/L    Chloride 88 (L) 98 - 107 mmol/L    CO2 32 (H) 22 - 31 mmol/L    Anion Gap, Calculation 11 5 - 18 mmol/L    Glucose 105 70 - 125 mg/dL    Calcium 9.7 8.5 - 10.5 mg/dL    BUN 55 (H) 8 - 28 mg/dL    Creatinine 1.91 (H) 0.60 - 1.10 mg/dL    GFR MDRD Af Amer 30 (L) >60 mL/min/1.73m2    GFR MDRD Non Af Amer 25 (L) >60 mL/min/1.73m2     Results for orders placed or performed in visit on 01/27/20   Basic Metabolic Panel   Result Value Ref Range    Sodium 131 (L) 136 - 145 mmol/L    Potassium 3.3 (L) 3.5 - 5.0 mmol/L    Chloride 88 (L) 98 - 107 mmol/L    CO2 32 (H) 22 - 31 mmol/L    Anion Gap, Calculation 11 5 - 18 mmol/L    Glucose 105 70 - 125 mg/dL    Calcium 9.7 8.5 - 10.5 mg/dL    BUN 55 (H) 8 - 28 mg/dL    Creatinine 1.91 (H) 0.60 - 1.10 mg/dL    GFR MDRD Af Amer 30 (L) >60 mL/min/1.73m2    GFR MDRD Non Af Amer 25 (L) >60 mL/min/1.73m2         Lab Results   Component Value Date    WBC 6.5 01/20/2020    HGB 11.0 (L) 01/20/2020    HCT 35.4 01/20/2020    MCV 99 01/20/2020     01/20/2020       No results found for: [id number]  Lab Results   Component Value Date    HGBA1C 6.9 (H) 09/25/2019     Lab Results    Component Value Date    INR 1.30 (H) 02/07/2018    INR 1.51 (H) 03/06/2017    INR 1.27 (H) 03/05/2017     Vitamin D, Total (25-Hydroxy)   Date Value Ref Range Status   01/20/2020 45.9 30.0 - 80.0 ng/mL Final     Lab Results   Component Value Date    TSH 4.22 09/25/2019           ASSESSMENT/PLAN:     1 Viral URI with cough, Acute on chronic moderate persistent asthma exacerbation: congestion, wheezing, worsening sob. No fever, No sore throat, no further rhinorrhea. On duonebs two times a day and q4h prn, mucinex bid. Encourage rest, limit fluids due to chf. Notify if develops fever and will order chest xray. o2 sats in low 90s, if drops <88-90% will need o2 supplementation. Concern for acute CHF. Will further diurese, no improvement in next day will start prednisone burst as well. IS a1h while awake. Increased duonebs to four times a day and prn.   2. Acute on chronic CHF: Daily [phone], reports weighing over 300lbs a year or so ago. Shortness of breath continues to worsen, now 3+ pitting edema. On bumex 2mg two times a day, keep legs elevated when possible. Sleeps in recliner.  F/u with cardiology prn. PT, OT for conditioning. Cardiac, low sodium diet. Daily weights. Ace wrap legs, elevate as much as possible. Hold parameter was placed on bumex and now missed several doses. Has been asymptomatic with hypotension. Will remove hold parameter. Completed 3 days of metolazone and only lost 2 lbs. Still up from baseline and appears very fluid overloaded on exam. Schedule metolazone 5mg daily x 5 more days. Bmp on 2/3. Lymphedema wrapping consult to OT.    3. BLE Cellulitis: erythema, warmth ble, suspect from significant fluid overload and worsening edema. No open areas at this time. Needs to elevate (educated today), reports recliner is uncomfortable. Will consult OT for lymphedema wrapping. aquaphor two times a day until start lymph wrapping. Ace wraps in mean time. Further diurese. Start  keflex two times a day x 7 days,  Noted pcn allergy but previously tolerated keflex.   4. S/p PPM: follows with device clinic. No recent concerns.   5. Herpes Zoster: Across upper abdomen/chest. scabbed, healing. Minimal itching, no pain, scabs now falling off. No isolation needed. Tylenol prn pain. Completed antiviral treatment. Much improved.   6. MIGUEL on CKD stage 3: Cr 1.75 on 1/14. Cr 1.71 on 1/20. Stable. Bmp on 1/27-cr 1.91. stable. Bmp on 1/31.   7. Type 2 DM: No meds, no monitoring needed. Diet controlled. Weight loss recently. Glucose on bmp 1/20 was 112.   8. Dyslipidemia: On aspirin, atorvastatin.   9. HTN: SBP baseline 90-110s, missed few doses of bumex due to hold parameter. Has normally run lower and tolerated fine with no orthostasis. Today 130s, suspect from fluid overload. Will remove parameter on bumex. Further diuresis. On lopressor, bumex.  hold parameters for lopressor.  10. GERD: On omeprazole. No concerns today.   11. Hypothyroid: On levothyroxine.   12. Vitamin d deficiency: On vitamin d. Vit d 45 on 1/20. Stable.   13. Hypokalemia: On kcl.  Monitor with further diuresis.   14. PAF, s/p AVR: Regular rate and rhythm today. On metoprolol. F/u with caridology. No recent palpitations or chest pain.   15. Right elbow gout exacerbation: resolved.   16. Anemia of CKD: on iron two times a day. Hg  11.3 on 1/14. Hg 11 on 1/20.   17. Constipation:  scheduled miralax daily and senna daily prn. Now stable.   18. PVD, venous stasis: aquaphor at bedtime and daily prn for dry, scaly, flaking skin to maintain dry and supple skin barrier. No open wounds on legs. No numb/tingling in legs. No leg pain. Ace wraps. Elevate. Increased aquaphor to two times a day until lymph wrapping.      Per therapy PT - 140ft fww SBA/CGA, t/f's SBA/CGA with extra time, high fall risk (17/28 Octavio), OT - UB setup, doesn t wear pants, toileting CGA/Min A, showering min/mod A. Yellow tag. Lives in house with family. Recommend 1-2  weeks.      Electronically signed by: Jennifer Valdes NP       Total floor/unit time spent 45 min with >50% time spent on counseling and coordination of care. Counseling with patient regarding acute chf exacerbation, cellulitis, edema management. Discussed and coordinated care with nursing for evaluation and management of edema, cellulitis, chf exacerbation. Discussed with OT and coordinated referral for lymphedema management with them.

## 2021-06-05 NOTE — PROGRESS NOTES
[facility]    DATE: 2020    NAME:  Thea Acosta             :  1934  MRN: [id number]  CODE STATUS:  DNR    VISIT TYPE: Review Of Multiple Medical Conditions (face to face for wheelchair)     FACILITY:  [facility] [[id number]]       CHIEF COMPLAIN/REASON FOR VISIT:    Chief Complaint   Patient presents with     Review Of Multiple Medical Conditions     face to face for wheelchair               HISTORY OF PRESENT ILLNESS: Thea Acosta is a 85 y.o. female who was admitted - for dyspnea and fluid overload, acute on chronic diastolic CHF. She was treated with IV diuresis and later transitioned to PO. Then she developed MIGUEL and held bumex and metolazone. During stay she developed herpes zoster outbreak on left chest. She also had left leg laceration and treated with clindamycin. Left leg negative for DVT on doppler. She completed IV ceftezole and po keflex for cellulitis. She did have some orthostatic hypotension that required med changes. TSH stable and ACTH stim test not indicated. She did have some epigastric pain and was treated with PPI and maalox. She was recommended TCU stay for deconditioning. She has PMH of CAD, CKD, DM, s/p PPM, diastolic CHF, pulmonary artery hypertension, mitral valve stenosis, chronic a fib no anticoagulation, s/p AVR, asthma. Prior to this she lived at home with her grand daughter.     Today Ms. Acosta is seen today for review of systems and face to face for wheelchair order. Nursing staff reports her weight today was 196lbs, which is stable. She reported to nursing feeling much better today and even wanting to eat breakfast, which she does not usually do. She was started on lymphedema treatment yesterday. On exam she is seen in her room sitting in wheelchair. She says she feels great today and thinks the steroids are helping her breathing. She is not coughing as much or as short of breath. She denies pain  right now and no trouble sleeping. No urinary concerns or other complaints today.     DME Order  Wheelchair    Wheelchair Face to Face documentation  Length of need: 99 (lifetime)  Diagnosis:   1. Acute on chronic systolic congestive heart failure (H)     2. Moderate persistent asthma with exacerbation     3. Atherosclerosis of native coronary artery of native heart without angina pectoris     4. Type 2 diabetes mellitus with stage 3 chronic kidney disease, without long-term current use of insulin (H)     5. Viral URI     6. Physical deconditioning     7. Lymphedema          This patient has significant mobility limitations that impair her activities of daily living including toileting, dressing, and bathing in a customary home.  She has had weakness, falls, poor standing balance, poor endurance while attempting to perform MRADLs from chronic heart failure and unsteady gait and mobility.  She has impairment in ability to complete mobility related ADLs from standing and would not be able to safely complete mobility and mobility related ADLs with use of cane, walker, or crutches.  Her current home has adequate space in entry doorway, bedrooms, bathroom, and kitchen for maneuvering a manual wheelchair.  The patient will use this wheelchair daily and it will improve her participation in MRADLs as she will be able to get out of bed and move about her home efficiently.  She is willing to use a manual wheelchair.  The patient does have sufficient upper extremity strength to propel a light weight wheelchair but has 24 hour supervision who will propel her in the wheelchair.  She is anticipated to spend at least 8 hours/day in the wheelchair.  she requires a smaller wheelchair due to significant weight loss and current wheelchair is too wide and unable to propel independently. She requires light weight wheelchair due to being unable to propel standard wheelchair. She has demonstrated independence with light weight wheelchair  with hands and feet. She does require trang height due to short stature.     Please contact with questions [phone], Fax: [phone].    Signature  Jennifermarquis Valdes RUTH  [facility]        REVIEW OF SYSTEMS:  PROBLEMS AND REVIEW OF SYSTEMS:   Review of Systems  Today on ROS:   Currently, no fever, chills, or rigors. Decreased vision and hearing. Denies any chest pain, headaches, palpitations, lightheadedness, dizziness. Appetite is good.  Denies any abdominal pain. No active bleeding. Positive for sleeps in recliner, chest rash nearly resolved, urinary incontinence, no pain, constipation improved, dry flaking skin on legs, cough improved, shortness of breath improved, leg swelling, lymphedema wraps      Allergies   Allergen Reactions     Codeine Hives     Codeine Phosphate (Bulk)      This allergy is per [facility] records.     Codeine Sulfate      This allergy is per [facility] records.     Codeine-Butalbital-Asa-Caff      This allergy is per [facility] records.     Codeine-Guaifenesin      This allergy is per [facility] records.     Lisinopril Cough     Penicillins Swelling     Current Outpatient Medications   Medication Sig     acetaminophen (TYLENOL) 500 MG tablet Take 2 tablets (1,000 mg total) by mouth 3 (three) times a day as needed for pain. Not to exceed 4000 mg in 24 hours.     aspirin 81 MG EC tablet Take 81 mg by mouth daily.     atorvastatin (LIPITOR) 80 MG tablet TAKE 1 TABLET(80 MG) BY MOUTH AT BEDTIME     bacitracin 500 unit/gram ointment Apply topically 2 (two) times a day.     bumetanide (BUMEX) 2 MG tablet TAKE 1 TABLET(2 MG) BY MOUTH TWICE DAILY AT 9 AM AND AT 6 PM     cholecalciferol, vitamin D3, (VITAMIN D3) 2,000 unit Tab Take 2,000 Units by mouth once daily.     ferrous sulfate 325 (65 FE) MG tablet TAKE 1 TABLET(325 MG) BY MOUTH  TWICE DAILY     ipratropium-albuterol (COMBIVENT RESPIMAT)  mcg/actuation Mist inhaler INHALE 1 PUFF BY MOUTH FOUR TIMES DAILY (Patient taking differently: 4 (four) times a day as needed. )     levothyroxine (SYNTHROID, LEVOTHROID) 150 MCG tablet TAKE 1 TABLET BY MOUTH DAILY AT 6:00 AM     lidocaine (XYLOCAINE) 5 % ointment Apply to painful areas     metoprolol tartrate (LOPRESSOR) 25 MG tablet Take 0.5 tablets (12.5 mg total) by mouth 2 (two) times a day.     omeprazole (PRILOSEC) 20 MG capsule Take 1 capsule (20 mg total) by mouth daily.     polyethylene glycol (MIRALAX) 17 gram packet Take 17 g by mouth daily.     potassium chloride (K-DUR,KLOR-CON) 20 MEQ tablet Take 40 mEq by mouth 2 (two) times a day.      senna (SENOKOT) 8.6 mg tablet Take 1 tablet by mouth daily as needed for constipation.     white petrolatum (AQUAPHOR ORIGINAL) 41 % Oint Apply 1 application topically at bedtime.     Past Medical History:    Past Medical History:   Diagnosis Date     Acute kidney injury superimposed on CKD (H) 3/3/2017     Asthma      CAD (coronary artery disease)      Cataract      Chronic kidney disease     ckd3     Diabetes mellitus (H)      Disease of thyroid gland      H/O heart valve replacement with bioprosthetic valve      History of transfusion      Hypertension      Normochromic normocytic anemia      Pulmonary hypertension (H) 09/27/2019    Noted on echocardiogram     Restless leg syndrome            PHYSICAL EXAMINATION  Vitals:    02/04/20 0700   BP: 120/67   Pulse: (!) 58   Resp: 18   Temp: 96.7  F (35.9  C)   SpO2: 92%   Weight: 196 lb (88.9 kg)       Today on physical exam:     GENERAL: Awake, Alert, obese, oriented x3, not in any form of acute distress, answers questions appropriately, follows simple commands, conversant  HEENT: Head is normocephalic with normal hair distribution. No evidence of trauma. Ears: No acute purulent discharge. Eyes: Conjunctivae pink with no scleral jaundice. Nose: Normal  mucosa and septum. NECK: Supple with no cervical or supraclavicular lymphadenopathy. Trachea is midline. [place], decreased vision  CHEST: No tenderness or deformity, no crepitus, left chest PPM  LUNG: Dim but clear today to auscultation.   Shortness of breath with exertion, sometimes at rest, dry and intermittently congested cough  BACK: No kyphosis of the thoracic spine. Symmetric, no curvature, ROM normal, no CVA tenderness, no spinal tenderness   CVS: irregularly irregular rhythm, murmur,  2+ pulses symmetric in all extremities.  ABDOMEN: Rounded and soft, nontender to palpation, non distended, no masses, no organomegaly, good bowel sounds, no rebound or guarding, no peritoneal signs.   EXTREMITIES: 2+ ble pitting edema, dry flaking and scaly skin but covered with lymphedema wraps today  SKIN: Warm and dry  NEUROLOGICAL: The patient is oriented to person, place and time. Strength and sensation are grossly intact. Face is symmetric.            LABS:   Recent Results (from the past 168 hour(s))   Basic Metabolic Panel   Result Value Ref Range    Sodium 134 (L) 136 - 145 mmol/L    Potassium 2.9 (L) 3.5 - 5.0 mmol/L    Chloride 84 (L) 98 - 107 mmol/L    CO2 37 (H) 22 - 31 mmol/L    Anion Gap, Calculation 13 5 - 18 mmol/L    Glucose 103 70 - 125 mg/dL    Calcium 9.8 8.5 - 10.5 mg/dL    BUN 56 (H) 8 - 28 mg/dL    Creatinine 1.75 (H) 0.60 - 1.10 mg/dL    GFR MDRD Af Amer 33 (L) >60 mL/min/1.73m2    GFR MDRD Non Af Amer 28 (L) >60 mL/min/1.73m2   Basic Metabolic Panel   Result Value Ref Range    Sodium 137 136 - 145 mmol/L    Potassium 3.1 (L) 3.5 - 5.0 mmol/L    Chloride 88 (L) 98 - 107 mmol/L    CO2 37 (H) 22 - 31 mmol/L    Anion Gap, Calculation 12 5 - 18 mmol/L    Glucose 107 70 - 125 mg/dL    Calcium 10.1 8.5 - 10.5 mg/dL    BUN 67 (H) 8 - 28 mg/dL    Creatinine 2.07 (H) 0.60 - 1.10 mg/dL    GFR MDRD Af Amer 28 (L) >60 mL/min/1.73m2    GFR MDRD Non Af Amer 23 (L) >60 mL/min/1.73m2     Results for orders placed or  performed in visit on 02/03/20   Basic Metabolic Panel   Result Value Ref Range    Sodium 137 136 - 145 mmol/L    Potassium 3.1 (L) 3.5 - 5.0 mmol/L    Chloride 88 (L) 98 - 107 mmol/L    CO2 37 (H) 22 - 31 mmol/L    Anion Gap, Calculation 12 5 - 18 mmol/L    Glucose 107 70 - 125 mg/dL    Calcium 10.1 8.5 - 10.5 mg/dL    BUN 67 (H) 8 - 28 mg/dL    Creatinine 2.07 (H) 0.60 - 1.10 mg/dL    GFR MDRD Af Amer 28 (L) >60 mL/min/1.73m2    GFR MDRD Non Af Amer 23 (L) >60 mL/min/1.73m2         Lab Results   Component Value Date    WBC 6.5 01/20/2020    HGB 11.0 (L) 01/20/2020    HCT 35.4 01/20/2020    MCV 99 01/20/2020     01/20/2020       No results found for: [id number]  Lab Results   Component Value Date    HGBA1C 6.9 (H) 09/25/2019     Lab Results   Component Value Date    INR 1.30 (H) 02/07/2018    INR 1.51 (H) 03/06/2017    INR 1.27 (H) 03/05/2017     Vitamin D, Total (25-Hydroxy)   Date Value Ref Range Status   01/20/2020 45.9 30.0 - 80.0 ng/mL Final     Lab Results   Component Value Date    TSH 4.22 09/25/2019           ASSESSMENT/PLAN:     1 Viral URI with cough, Acute on chronic moderate persistent asthma exacerbation:No fever, No sore throat, no further rhinorrhea. Shortness of breath and cough improved, wheezing improved today. On duonebs four times a day and q4h prn, mucinex bid. Encourage rest, limit fluids due to chf. Extended prednisone and appears improving.  2. Acute on chronic CHF: Daily [id number], reports weighing over 300lbs a year or so ago. Shortness of breath continues improved,  2+ pitting ble edema. On bumex 2mg two times a day, keep legs elevated when possible. Sleeps in recliner.  F/u with cardiology prn. PT, OT for conditioning. Cardiac, low sodium diet. Daily weights.  lymphedema wrapping. Bmp on 2/3 stable. Improving, weights back down. Feels baseline weight around 195lbs. Completed metolazone 2/4.   3. BLE Cellulitis: improving. On keflex.  Lymphedema wraps.  4. S/p PPM: follows with device clinic. No recent concerns.   5. Herpes Zoster: Across upper abdomen/chest. scabbed, healing. Minimal itching, no pain, scabs now falling off. No isolation needed. Tylenol prn pain. Completed antiviral treatment. Now resolved.   6. MIGUEL on CKD stage 3: Cr 1.75 on 1/14. Cr 1.71 on 1/20. Stable. Bmp on 1/27-cr 1.91. stable. Bmp on 1/31.   7. Type 2 DM: No meds, no monitoring needed. Diet controlled. Weight loss recently. Glucose on bmp 1/20 was 112.   8. Dyslipidemia: On aspirin, atorvastatin.   9. HTN: SBP 120s, On lopressor, bumex.  hold parameters for lopressor. No recent concerns, occasional reading in 90s but asymptomatic.   10. GERD: On omeprazole. No concerns today.   11. Hypothyroid: On levothyroxine.   12. Vitamin d deficiency: On vitamin d. Vit d 45 on 1/20. Stable.   13. Hypokalemia: On kcl.bmp today 2/3-k 3.1, given additional 40meq and recheck on 2/7. Now completed metolazone.    14. PAF, s/p AVR: Regular rate and rhythm today. On metoprolol. F/u with caridology. No recent palpitations or chest pain.   15. Right elbow gout exacerbation: resolved.   16. Anemia of CKD: on iron two times a day. Hg  11.3 on 1/14. Hg 11 on 1/20.   17. Constipation:  scheduled miralax daily and senna daily prn. Now stable.   18. PVD, venous stasis: aquaphor two times a day. maintain dry and supple skin barrier. No open wounds on legs. No numb/tingling in legs. No leg pain. Lymphedema wrapping.     Per therapy PT - 90-140ft fww SBA, t/f's SBA, high fall risk (18/28 [place]), need to work on stairs, OT - UB setup, doesn t wear pants, toileting CGA, starting lymph treatment with her.    Yellow tag. Lives in house with family. Recommend 1-2 weeks. Wheelchair ordered.      Electronically signed by: Jennifer Valdes NP

## 2021-06-05 NOTE — TELEPHONE ENCOUNTER
[facility] Nurse Triage Telephone Note      Provider: CHILO Quigley  Facility: [facility]    Facility Type: TCU    Caller: Gurinder  Call Back Number:  [phone]    Allergies: Codeine; Codeine phosphate (bulk); Codeine sulfate; Codeine-butalbital-asa-caff; Codeine-guaifenesin; Lisinopril; and Penicillins    Reason for call: Nurse reporting BMP results.  Patient continues to eat/drink poorly.  Metolazone was recently discontinued.  Currently on Bumex 2mg two times a day, Metoprolol 12.5mg two times a day, potassium 40meq two times a day.       Verbal Order/Direction given by Provider: No new orders.      Provider giving order: CHILO Quigley    Verbal order given to: Gurinder Stephen RN

## 2021-06-05 NOTE — PROGRESS NOTES
[facility]    DATE: 2020    NAME:  Thea Acosta             :  1934  MRN: [id number]  CODE STATUS:  DNR    VISIT TYPE: Problem Visit (constipation, hypotension)     FACILITY:  [facility] [[id number]]       CHIEF COMPLAIN/REASON FOR VISIT:    Chief Complaint   Patient presents with     Problem Visit     constipation, hypotension               HISTORY OF PRESENT ILLNESS: Thea Acosta is a 85 y.o. female who was admitted - for dyspnea and fluid overload, acute on chronic diastolic CHF. She was treated with IV diuresis and later transitioned to PO. Then she developed MIGUEL and held bumex and metolazone. During stay she developed herpes zoster outbreak on left chest. She also had left leg laceration and treated with clindamycin. Left leg negative for DVT on doppler. She completed IV ceftezole and po keflex for cellulitis. She did have some orthostatic hypotension that required med changes. TSH stable and ACTH stim test not indicated. She did have some epigastric pain and was treated with PPI and maalox. She was recommended TCU stay for deconditioning. She has PMH of CAD, CKD, DM, s/p PPM, diastolic CHF, pulmonary artery hypertension, mitral valve stenosis, chronic a fib no anticoagulation, s/p AVR, asthma. Prior to this she lived at home with her grand daughter.     Today Ms. Acosta is seen for concerns of hypotension over weekend and constipation. She says today that she had a suppository yesterday and it did work for her but she needs some stool softeners on a regular basis. She denies any pain today or recently. She denies any recent headaches. She says her appetite is fine but depends on the food. She denies urinary trouble recently. She is sleeping pretty well. She gets dizzy if she gets up and moves too fast, but mostly in the morning and then she is fine. She doesn't think she was more dizzy over the weekend. Her blood pressures are  running 90-110s. She is trying to drink more. She denies any numbness or tingling in her legs. Her rash on upper abdomen/chest area is much improved and the scabs are starting to fall off. She says it itches at times, but just a little. She is not having any burning or pain though. Her legs are very dry and scaly and she denies that they are putting any lotion on her legs. She is not having any shortness of breath today but does get winded at times with therapy. No cough recently. Her left leg wound is healed.       REVIEW OF SYSTEMS:  PROBLEMS AND REVIEW OF SYSTEMS:   Review of Systems  Today on ROS:   Currently, no fever, chills, or rigors. Decreased vision and hearing. Denies any chest pain, headaches, palpitations, lightheadedness, dizziness. Appetite is good.  Denies any abdominal pain. No active bleeding. Positive for edema ble, shortness of breath with exertion, sleeps in recliner, low blood pressure at times, chest rash improving, urinary incontinence, right elbow swelling improved, no pain, constipation, dry flaking skin on legs      Allergies   Allergen Reactions     Codeine Hives     Codeine Phosphate (Bulk)      This allergy is per [facility] records.     Codeine Sulfate      This allergy is per [facility] records.     Codeine-Butalbital-Asa-Caff      This allergy is per [facility] records.     Codeine-Guaifenesin      This allergy is per [facility] records.     Lisinopril Cough     Penicillins Swelling     Current Outpatient Medications   Medication Sig     polyethylene glycol (MIRALAX) 17 gram packet Take 17 g by mouth daily.     senna (SENOKOT) 8.6 mg tablet Take 1 tablet by mouth daily as needed for constipation.     white petrolatum (AQUAPHOR ORIGINAL) 41 % Oint Apply 1 application topically at bedtime.     acetaminophen (TYLENOL) 500 MG tablet Take 2 tablets (1,000 mg  total) by mouth 3 (three) times a day as needed for pain. Not to exceed 4000 mg in 24 hours.     aspirin 81 MG EC tablet Take 81 mg by mouth daily.     atorvastatin (LIPITOR) 80 MG tablet TAKE 1 TABLET(80 MG) BY MOUTH AT BEDTIME     bumetanide (BUMEX) 2 MG tablet TAKE 1 TABLET(2 MG) BY MOUTH TWICE DAILY AT 9 AM AND AT 6 PM     cholecalciferol, vitamin D3, (VITAMIN D3) 2,000 unit Tab Take 2,000 Units by mouth once daily.     ferrous sulfate 325 (65 FE) MG tablet TAKE 1 TABLET(325 MG) BY MOUTH TWICE DAILY     ipratropium-albuterol (COMBIVENT RESPIMAT)  mcg/actuation Mist inhaler INHALE 1 PUFF BY MOUTH FOUR TIMES DAILY (Patient taking differently: 4 (four) times a day as needed. )     levothyroxine (SYNTHROID, LEVOTHROID) 150 MCG tablet TAKE 1 TABLET BY MOUTH DAILY AT 6:00 AM     lidocaine (XYLOCAINE) 5 % ointment Apply to painful areas     metoprolol tartrate (LOPRESSOR) 25 MG tablet Take 0.5 tablets (12.5 mg total) by mouth 2 (two) times a day.     omeprazole (PRILOSEC) 20 MG capsule Take 1 capsule (20 mg total) by mouth daily.     potassium chloride (K-DUR,KLOR-CON) 10 MEQ tablet Take 1 tablet (10 mEq total) by mouth daily.     Past Medical History:    Past Medical History:   Diagnosis Date     Acute kidney injury superimposed on CKD (H) 3/3/2017     Asthma      CAD (coronary artery disease)      Cataract      Chronic kidney disease     ckd3     Diabetes mellitus (H)      Disease of thyroid gland      H/O heart valve replacement with bioprosthetic valve      History of transfusion      Hypertension      Normochromic normocytic anemia      Pulmonary hypertension (H) 09/27/2019    Noted on echocardiogram     Restless leg syndrome            PHYSICAL EXAMINATION  Vitals:    01/19/20 1944   BP: 114/68   Pulse: 65   Resp: 18   Temp: (!) 96.3  F (35.7  C)   SpO2: 100%   Weight: 192 lb (87.1 kg)       Today on physical exam:     GENERAL: Awake, Alert, obese, oriented x3, not in any form of acute distress, answers  questions appropriately, follows simple commands, conversant  HEENT: Head is normocephalic with normal hair distribution. No evidence of trauma. Ears: No acute purulent discharge. Eyes: Conjunctivae pink with no scleral jaundice. Nose: Normal mucosa and septum. NECK: Supple with no cervical or supraclavicular lymphadenopathy. Trachea is midline. [place], decreased vision  CHEST: No tenderness or deformity, no crepitus, left chest PPM  LUNG: Dim to auscultation with good chest expansion. There are no crackles or wheezes, normal AP diameter. Shortness of breath with exertion, no cough today  BACK: No kyphosis of the thoracic spine. Symmetric, no curvature, ROM normal, no CVA tenderness, no spinal tenderness   CVS: irregularly irregular rhythm, murmur,  2+ pulses symmetric in all extremities.  ABDOMEN: Rounded and soft, nontender to palpation, non distended, no masses, no organomegaly, good bowel sounds, no rebound or guarding, no peritoneal signs.   EXTREMITIES: 1+ ble nonpitting edema, dry flaking and scaly skin, left leg wound healed, scabbed  SKIN: Warm and dry  NEUROLOGICAL: The patient is oriented to person, place and time. Strength and sensation are grossly intact. Face is symmetric.            LABS:   Recent Results (from the past 168 hour(s))   C-Reactive Protein(CRP)   Result Value Ref Range    CRP 0.6 0.0 - 0.8 mg/dL   HM2(CBC w/o Differential)   Result Value Ref Range    WBC 8.4 4.0 - 11.0 thou/uL    RBC 3.69 (L) 3.80 - 5.40 mill/uL    Hemoglobin 11.3 (L) 12.0 - 16.0 g/dL    Hematocrit 35.9 35.0 - 47.0 %    MCV 97 80 - 100 fL    MCH 30.6 27.0 - 34.0 pg    MCHC 31.5 (L) 32.0 - 36.0 g/dL    RDW 13.3 11.0 - 14.5 %    Platelets 248 140 - 440 thou/uL    MPV 10.6 8.5 - 12.5 fL   Basic Metabolic Panel   Result Value Ref Range    Sodium 135 (L) 136 - 145 mmol/L    Potassium 4.2 3.5 - 5.0 mmol/L    Chloride 88 (L) 98 - 107 mmol/L    CO2 35 (H) 22 - 31 mmol/L    Anion Gap, Calculation 12 5 - 18 mmol/L    Glucose 139 (H)  70 - 125 mg/dL    Calcium 9.7 8.5 - 10.5 mg/dL    BUN 66 (H) 8 - 28 mg/dL    Creatinine 1.75 (H) 0.60 - 1.10 mg/dL    GFR MDRD Af Amer 33 (L) >60 mL/min/1.73m2    GFR MDRD Non Af Amer 28 (L) >60 mL/min/1.73m2   Hepatic Profile   Result Value Ref Range    Bilirubin, Total 0.7 0.0 - 1.0 mg/dL    Bilirubin, Direct 0.3 <=0.5 mg/dL    Protein, Total 7.1 6.0 - 8.0 g/dL    Albumin 2.8 (L) 3.5 - 5.0 g/dL    Alkaline Phosphatase 57 45 - 120 U/L    AST 22 0 - 40 U/L    ALT <9 0 - 45 U/L   Basic Metabolic Panel   Result Value Ref Range    Sodium 135 (L) 136 - 145 mmol/L    Potassium 3.8 3.5 - 5.0 mmol/L    Chloride 91 (L) 98 - 107 mmol/L    CO2 33 (H) 22 - 31 mmol/L    Anion Gap, Calculation 11 5 - 18 mmol/L    Glucose 112 70 - 125 mg/dL    Calcium 9.7 8.5 - 10.5 mg/dL    BUN 48 (H) 8 - 28 mg/dL    Creatinine 1.71 (H) 0.60 - 1.10 mg/dL    GFR MDRD Af Amer 34 (L) >60 mL/min/1.73m2    GFR MDRD Non Af Amer 28 (L) >60 mL/min/1.73m2   Vitamin D, Total (25-Hydroxy)   Result Value Ref Range    Vitamin D, Total (25-Hydroxy) 45.9 30.0 - 80.0 ng/mL   HM1 (CBC with Diff)   Result Value Ref Range    WBC 6.5 4.0 - 11.0 thou/uL    RBC 3.57 (L) 3.80 - 5.40 mill/uL    Hemoglobin 11.0 (L) 12.0 - 16.0 g/dL    Hematocrit 35.4 35.0 - 47.0 %    MCV 99 80 - 100 fL    MCH 30.8 27.0 - 34.0 pg    MCHC 31.1 (L) 32.0 - 36.0 g/dL    RDW 13.6 11.0 - 14.5 %    Platelets 177 140 - 440 thou/uL    MPV 11.4 8.5 - 12.5 fL    Neutrophils % 59 50 - 70 %    Lymphocytes % 30 20 - 40 %    Monocytes % 6 2 - 10 %    Eosinophils % 4 0 - 6 %    Basophils % 1 0 - 2 %    Neutrophils Absolute 3.8 2.0 - 7.7 thou/uL    Lymphocytes Absolute 1.9 0.8 - 4.4 thou/uL    Monocytes Absolute 0.4 0.0 - 0.9 thou/uL    Eosinophils Absolute 0.3 0.0 - 0.4 thou/uL    Basophils Absolute 0.1 0.0 - 0.2 thou/uL     Results for orders placed or performed in visit on 01/20/20   Basic Metabolic Panel   Result Value Ref Range    Sodium 135 (L) 136 - 145 mmol/L    Potassium 3.8 3.5 - 5.0 mmol/L     Chloride 91 (L) 98 - 107 mmol/L    CO2 33 (H) 22 - 31 mmol/L    Anion Gap, Calculation 11 5 - 18 mmol/L    Glucose 112 70 - 125 mg/dL    Calcium 9.7 8.5 - 10.5 mg/dL    BUN 48 (H) 8 - 28 mg/dL    Creatinine 1.71 (H) 0.60 - 1.10 mg/dL    GFR MDRD Af Amer 34 (L) >60 mL/min/1.73m2    GFR MDRD Non Af Amer 28 (L) >60 mL/min/1.73m2         Lab Results   Component Value Date    WBC 6.5 01/20/2020    HGB 11.0 (L) 01/20/2020    HCT 35.4 01/20/2020    MCV 99 01/20/2020     01/20/2020       No results found for: [id number]  Lab Results   Component Value Date    HGBA1C 6.9 (H) 09/25/2019     Lab Results   Component Value Date    INR 1.30 (H) 02/07/2018    INR 1.51 (H) 03/06/2017    INR 1.27 (H) 03/05/2017     Vitamin D, Total (25-Hydroxy)   Date Value Ref Range Status   01/20/2020 45.9 30.0 - 80.0 ng/mL Final     Lab Results   Component Value Date    TSH 4.22 09/25/2019           ASSESSMENT/PLAN:     1 Acute on chronic CHF: Daily [id number], reports weighing over 300lbs a year or so ago. Shortness of breath with exertion, 1+ ble edema. On bumex 2mg two times a day, keep legs elevated when possible. Sleeps in recliner.  F/u with cardiology prn. PT, OT for conditioning. Cardiac, low sodium diet. Daily weights. Appears euvolemic today.   2. Herpes Zoster: Across upper abdomen/chest. scabbed, healing. Minimal itching, no pain, scabs now falling off. No isolation needed. Tylenol prn pain. Completed antiviral treatment. Consider adding gabapentin if develops further burning, pain, neuralgia. No concerns today.   3. S/p PPM: follows with device clinic. No recent concerns.   4. Mod persistent Asthma: On combivent four times a day prn. SOB with exertion, no cough today. Feels stable, no recent wheezing or cough.   5. MIGUEL on CKD stage 3: Cr 1.75 on 1/14. Cr 1.71 on 1/20. Stable.   6. Type 2 DM: No meds, no monitoring needed. Diet controlled. Weight loss recently. Glucose on bmp 1/20 was 112.   7. Dyslipidemia: On aspirin,  atorvastatin.   8. HTN: SBP 90-110s. On lopressor, bumex.  hold parameters for lopressor. Reports dizziness only in am when gets up too fast.   9. GERD: On omeprazole. No concerns today.   10. Hypothyroid: On levothyroxine.   11. Vitamin d deficiency: On vitamin d. Vit d 45 on 1/20. Stable.   12. Hypokalemia: On kcl.    13. PAF, s/p AVR: Regular rate and rhythm today. On metoprolol. F/u with caridology. No recent palpitations or chest pain.   14. Right elbow gout exacerbation: ice prn, tylenol prn. No pain today. No gout medications and previously declined starting. Erythema resolved today. Edema much improved.   15. Anemia of CKD: on iron two times a day. Hg  11.3 on 1/14. Hg 11 on 1/20.   16. Left leg cellulitis, left leg wound: now resolved. left leg wound now scabbed and healed. Leave open to air. edema improved. Monitor. Completed antibiotic treatment. Venous doppler negative for DVT.   17. Constipation: reports supp given yesterday. Will schedule miralax daily and add senna daily prn.   18. PVD, venous stasis: ordered aquaphor at bedtime and daily prn for dry, scaly, flaking skin to maintain dry and supple skin barrier. No open wounds on legs. No numb/tingling in legs. No leg pain.     Per therapy PT - 140ft fww SBA/CGA, t/f's SBA/CGA with extra time, OT - UB setup, doesn t wear pants, toileting CGA/Min A, showering min/mod A. Yellow tag. Lives in house with family with stairs. recommend 2 weeks.      Electronically signed by: Jennifer Valdes NP

## 2021-06-05 NOTE — TELEPHONE ENCOUNTER
[facility] Nurse Triage Telephone Note      Provider: CHILO Quigley  Facility: [facility]    Facility Type: TCU    Caller: Mendoza  Call Back Number:  [phone]    Allergies: Codeine; Codeine phosphate (bulk); Codeine sulfate; Codeine-butalbital-asa-caff; Codeine-guaifenesin; Lisinopril; and Penicillins    Reason for call: Nurse reporting BMP results.  Notable meds:  Bumex 2mg two times a day, Metolazone 5mg daily(ends today), Metoprolol 12.5mg two times a day, potassium 40meq two times a day starting today.       Verbal Order/Direction given by Provider: Give potassium 40meq x 1 and continue 40meq two times a day.  Check BMP on 2/7/20.      Provider giving order: CHILO Quigley    Verbal order given to: Mendoza Stephen RN

## 2021-06-05 NOTE — PROGRESS NOTES
[facility]    DATE: 2/10/2020    NAME:  Thea Acosta             :  1934  MRN: [id number]  CODE STATUS:  DNR    VISIT TYPE: Review Of Multiple Medical Conditions     FACILITY:  [facility] [[id number]]       CHIEF COMPLAIN/REASON FOR VISIT:    Chief Complaint   Patient presents with     Review Of Multiple Medical Conditions               HISTORY OF PRESENT ILLNESS: Thea Acosta is a 85 y.o. female who was admitted - for dyspnea and fluid overload, acute on chronic diastolic CHF. She was treated with IV diuresis and later transitioned to PO. Then she developed MIGUEL and held bumex and metolazone. During stay she developed herpes zoster outbreak on left chest. She also had left leg laceration and treated with clindamycin. Left leg negative for DVT on doppler. She completed IV ceftezole and po keflex for cellulitis. She did have some orthostatic hypotension that required med changes. TSH stable and ACTH stim test not indicated. She did have some epigastric pain and was treated with PPI and maalox. She was recommended TCU stay for deconditioning. She has PMH of CAD, CKD, DM, s/p PPM, diastolic CHF, pulmonary artery hypertension, mitral valve stenosis, chronic a fib no anticoagulation, s/p AVR, asthma. Prior to this she lived at home with her grand daughter.     Today Ms. Acosta is seen for follow up visit. Staff note she continues to have some cough and wheezing but has been doing better. Her weights are up again recently 197-198lbs from 195lbs last week. She also has rash in her groin and abdominal folds still and her nystatin order was only for 7 days. Her leg swelling is reportedly improving with the lymphedema wraps. On exam she is seen in her wheelchair alone in room. She says she is doing pretty well but did not sleep last night and is very tired and dozing off in her wheelchair today. Her leg swelling is improving and the redness and  warmth is gone. She is not having any pain right now. She thinks her breathing is getting better but does still have the cough and wheezing at times. She denies any other concerns today and her bowels are moving.         REVIEW OF SYSTEMS:  PROBLEMS AND REVIEW OF SYSTEMS:   Review of Systems  Today on ROS:   Currently, no fever, chills, or rigors. Decreased vision and hearing. Denies any chest pain, headaches, palpitations, lightheadedness, dizziness. Appetite is good.  Denies any abdominal pain. No active bleeding. Positive for urinary incontinence, no pain, constipation improved, cough improved, shortness of breath improved, leg swelling improving, lymphedema wraps, intermittent wheezing, rash in groin and abdominal folds, weight gain      Allergies   Allergen Reactions     Codeine Hives     Codeine Phosphate (Bulk)      This allergy is per [facility] records.     Codeine Sulfate      This allergy is per [facility] records.     Codeine-Butalbital-Asa-Caff      This allergy is per [facility] records.     Codeine-Guaifenesin      This allergy is per [facility] records.     Lisinopril Cough     Penicillins Swelling     Current Outpatient Medications   Medication Sig     acetaminophen (TYLENOL) 500 MG tablet Take 2 tablets (1,000 mg total) by mouth 3 (three) times a day as needed for pain. Not to exceed 4000 mg in 24 hours.     aspirin 81 MG EC tablet Take 81 mg by mouth daily.     atorvastatin (LIPITOR) 80 MG tablet TAKE 1 TABLET(80 MG) BY MOUTH AT BEDTIME     bacitracin 500 unit/gram ointment Apply topically 2 (two) times a day.     bumetanide (BUMEX) 2 MG tablet TAKE 1 TABLET(2 MG) BY MOUTH TWICE DAILY AT 9 AM AND AT 6 PM     cholecalciferol, vitamin D3, (VITAMIN D3) 2,000 unit Tab Take 2,000 Units by mouth once daily.     ferrous sulfate 325 (65 FE) MG tablet TAKE 1 TABLET(325 MG) BY  MOUTH TWICE DAILY     ipratropium-albuterol (COMBIVENT RESPIMAT)  mcg/actuation Mist inhaler INHALE 1 PUFF BY MOUTH FOUR TIMES DAILY (Patient taking differently: 4 (four) times a day as needed. )     levothyroxine (SYNTHROID, LEVOTHROID) 150 MCG tablet TAKE 1 TABLET BY MOUTH DAILY AT 6:00 AM     lidocaine (XYLOCAINE) 5 % ointment Apply to painful areas     metoprolol tartrate (LOPRESSOR) 25 MG tablet Take 0.5 tablets (12.5 mg total) by mouth 2 (two) times a day.     omeprazole (PRILOSEC) 20 MG capsule Take 1 capsule (20 mg total) by mouth daily.     polyethylene glycol (MIRALAX) 17 gram packet Take 17 g by mouth daily.     potassium chloride (K-DUR,KLOR-CON) 20 MEQ tablet Take 40 mEq by mouth 2 (two) times a day.      senna (SENOKOT) 8.6 mg tablet Take 1 tablet by mouth daily as needed for constipation.     white petrolatum (AQUAPHOR ORIGINAL) 41 % Oint Apply 1 application topically at bedtime.     Past Medical History:    Past Medical History:   Diagnosis Date     Acute kidney injury superimposed on CKD (H) 3/3/2017     Asthma      CAD (coronary artery disease)      Cataract      Chronic kidney disease     ckd3     Diabetes mellitus (H)      Disease of thyroid gland      H/O heart valve replacement with bioprosthetic valve      History of transfusion      Hypertension      Normochromic normocytic anemia      Pulmonary hypertension (H) 09/27/2019    Noted on echocardiogram     Restless leg syndrome            PHYSICAL EXAMINATION  Vitals:    02/09/20 1954   BP: 120/64   Pulse: 61   Resp: 18   Temp: 96.7  F (35.9  C)   SpO2: 94%   Weight: 197 lb (89.4 kg)       Today on physical exam:     GENERAL: Awake, Alert, obese, oriented x3, not in any form of acute distress, answers questions appropriately, follows simple commands, conversant  HEENT: Head is normocephalic with normal hair distribution. No evidence of trauma. Ears: No acute purulent discharge. Eyes: Conjunctivae pink with no scleral jaundice. Nose: Normal  mucosa and septum. NECK: Supple with no cervical or supraclavicular lymphadenopathy. Trachea is midline. [place], decreased vision  CHEST: No tenderness or deformity, no crepitus, left chest PPM  LUNG: Dim with expiratory wheezes today to auscultation.   Shortness of breath with exertion, sometimes at rest, dry and intermittently congested cough  BACK: No kyphosis of the thoracic spine. Symmetric, no curvature, ROM normal, no CVA tenderness, no spinal tenderness   CVS: irregularly irregular rhythm, murmur,  2+ pulses symmetric in all extremities.  ABDOMEN: Rounded and soft, nontender to palpation, non distended, no masses, no organomegaly, good bowel sounds, no rebound or guarding, no peritoneal signs.   EXTREMITIES: 1-2+ ble pitting edema,  lymphedema wraps  SKIN: Warm and dry  NEUROLOGICAL: The patient is oriented to person, place and time. Strength and sensation are grossly intact. Face is symmetric.            LABS:   Recent Results (from the past 168 hour(s))   Basic Metabolic Panel   Result Value Ref Range    Sodium 137 136 - 145 mmol/L    Potassium 3.1 (L) 3.5 - 5.0 mmol/L    Chloride 88 (L) 98 - 107 mmol/L    CO2 37 (H) 22 - 31 mmol/L    Anion Gap, Calculation 12 5 - 18 mmol/L    Glucose 107 70 - 125 mg/dL    Calcium 10.1 8.5 - 10.5 mg/dL    BUN 67 (H) 8 - 28 mg/dL    Creatinine 2.07 (H) 0.60 - 1.10 mg/dL    GFR MDRD Af Amer 28 (L) >60 mL/min/1.73m2    GFR MDRD Non Af Amer 23 (L) >60 mL/min/1.73m2   Basic Metabolic Panel   Result Value Ref Range    Sodium 135 (L) 136 - 145 mmol/L    Potassium 3.6 3.5 - 5.0 mmol/L    Chloride 84 (L) 98 - 107 mmol/L    CO2 37 (H) 22 - 31 mmol/L    Anion Gap, Calculation 14 5 - 18 mmol/L    Glucose 138 (H) 70 - 125 mg/dL    Calcium 10.0 8.5 - 10.5 mg/dL    BUN 82 (H) 8 - 28 mg/dL    Creatinine 2.07 (H) 0.60 - 1.10 mg/dL    GFR MDRD Af Amer 28 (L) >60 mL/min/1.73m2    GFR MDRD Non Af Amer 23 (L) >60 mL/min/1.73m2     Results for orders placed or performed in visit on 02/07/20    Basic Metabolic Panel   Result Value Ref Range    Sodium 135 (L) 136 - 145 mmol/L    Potassium 3.6 3.5 - 5.0 mmol/L    Chloride 84 (L) 98 - 107 mmol/L    CO2 37 (H) 22 - 31 mmol/L    Anion Gap, Calculation 14 5 - 18 mmol/L    Glucose 138 (H) 70 - 125 mg/dL    Calcium 10.0 8.5 - 10.5 mg/dL    BUN 82 (H) 8 - 28 mg/dL    Creatinine 2.07 (H) 0.60 - 1.10 mg/dL    GFR MDRD Af Amer 28 (L) >60 mL/min/1.73m2    GFR MDRD Non Af Amer 23 (L) >60 mL/min/1.73m2         Lab Results   Component Value Date    WBC 6.5 01/20/2020    HGB 11.0 (L) 01/20/2020    HCT 35.4 01/20/2020    MCV 99 01/20/2020     01/20/2020       No results found for: [id number]  Lab Results   Component Value Date    HGBA1C 6.9 (H) 09/25/2019     Lab Results   Component Value Date    INR 1.30 (H) 02/07/2018    INR 1.51 (H) 03/06/2017    INR 1.27 (H) 03/05/2017     Vitamin D, Total (25-Hydroxy)   Date Value Ref Range Status   01/20/2020 45.9 30.0 - 80.0 ng/mL Final     Lab Results   Component Value Date    TSH 4.22 09/25/2019           ASSESSMENT/PLAN:     1 Viral URI with cough, Acute on chronic moderate persistent asthma exacerbation:No fever, No sore throat, no further rhinorrhea. Shortness of breath and cough improved. On duonebs four times a day and q4h prn, mucinex bid. Continues to have intermittent expiratory wheezing. Will extend prednisone again until 2/14. Overall improving.   2. Acute on chronic CHF: Daily [phone], reports weighing over 300lbs a year or so ago. Shortness of breath continues improved,  1-2+ pitting ble edema. On bumex 2mg two times a day, keep legs elevated when possible.  F/u with cardiology prn. PT, OT for conditioning. Cardiac, low sodium diet. Daily weights.  lymphedema wrapping. Bmp on 2/3 stable. Weights increased again and salvador add metolazone one time on 2/11 5mg and monitor weights.   3. BLE Cellulitis: improving. Completed keflex. Lymphedema wraps.  4. S/p PPM:  follows with device clinic. No recent concerns.   5. Herpes Zoster: resolved.   6. MIGUEL on CKD stage 3: Cr 1.75 on 1/14. Cr 1.71 on 1/20. Stable. Bmp on 1/27-cr 1.91. stable. Bmp on 1/31.   7. Type 2 DM: No meds, no monitoring needed. Diet controlled. Weight loss recently. Glucose on bmp 1/20 was 112.   8. Dyslipidemia: On aspirin, atorvastatin.   9. HTN: SBP 120s, On lopressor, bumex.  hold parameters for lopressor. No recent concerns, occasional reading in 90s but asymptomatic.   10. GERD: On omeprazole. No concerns today.   11. Hypothyroid: On levothyroxine.   12. Vitamin d deficiency: On vitamin d. Vit d 45 on 1/20. Stable.   13. Hypokalemia: On kcl.  14. PAF, s/p AVR: Regular rate and rhythm today. On metoprolol. F/u with caridology. No recent palpitations or chest pain.   15. Right elbow gout exacerbation: resolved.   16. Anemia of CKD: on iron two times a day. Hg  11.3 on 1/14. Hg 11 on 1/20.   17. Constipation:  scheduled miralax daily and senna daily prn. Now stable.   18. PVD, venous stasis: aquaphor two times a day. maintain dry and supple skin barrier. No open wounds on legs. No numb/tingling in legs. No leg pain. Lymphedema wrapping.   19. Candidal intertrigo: ordered nystatin powder two times a day until resolved.     Per therapy PT -150ft fww SPV, t/f's SPV, high fall risk (18/28 [place]etti), 3 stairs CGA-SBA, OT - UB setup, doesn t wear pants, toileting CGA, lymph treatment going well. Green tag. Lives in house with stairs with family assist. lcd 2/17.  pt, ot   Wheelchair ordered.      Electronically signed by: Jennifer Valdes NP

## 2021-06-05 NOTE — PROGRESS NOTES
[facility]    DATE: 2020    NAME:  Thea Acosta             :  1934  MRN: [id number]  CODE STATUS:  DNR    VISIT TYPE: Problem Visit (hospital f/u)     FACILITY:  [facility] [[id number]]       CHIEF COMPLAIN/REASON FOR VISIT:    Chief Complaint   Patient presents with     Problem Visit     hospital f/u               HISTORY OF PRESENT ILLNESS: Thea Acosta is a 85 y.o. female who was admitted - for dyspnea and fluid overload, acute on chronic diastolic CHF. She was treated with IV diuresis and later transitioned to PO. Then she developed MIGUEL and held bumex and metolazone. During stay she developed herpes zoster outbreak on left chest. She also had left leg laceration and treated with clindamycin. Left leg negative for DVT on doppler. She completed IV ceftezole and po keflex for cellulitis. She did have some orthostatic hypotension that required med changes. TSH stable and ACTH stim test not indicated. She did have some epigastric pain and was treated with PPI and maalox. She was recommended TCU stay for deconditioning. She has PMH of CAD, CKD, DM, s/p PPM, diastolic CHF, pulmonary artery hypertension, mitral valve stenosis, chronic a fib no anticoagulation, s/p AVR, asthma. Prior to this she lived at home with her grand daughter.     Today Ms. Acosta states she has swelling of her right elbow due to a recent gout flare. She denies taking any medication for gout though. It is not very painful anymore. She slept ok and has very minimal pain. She says her appetite is poor and just does not feel hungry. She thinks she has lost weight and that she weighed over 300lbs some times ago, maybe over a year. She denies any bowel issues recently. She has a little constipation but not much. She denies any issues with bowels at home. She does not go often though, maybe every couple days at home. She denies any nausea, vomiting, stomach upset. She  denies any shortness of breath. She denies any recent illness. She has urinary incontinence and no recent burning or pain. She has a dry cough. She denies any headaches. She does still have the shingles rash but it is not hurting or itching as much. It is getting better. She is not having any breathing issues. She confirms she wants to be full code. Per nursing her blood pressure today was 97/50. She says her son is visiting here from [place] but she does live with her granddaughter still.       REVIEW OF SYSTEMS:  PROBLEMS AND REVIEW OF SYSTEMS:   Review of Systems  Today on ROS:   Currently, no fever, chills, or rigors. Decreased vision and hearing. Denies any chest pain, headaches, palpitations, lightheadedness, dizziness. Appetite is fair.  Denies any abdominal pain. No active bleeding. Positive for edema ble, shortness of breath at baseline, sleeps in recliner, low blood pressure today, left leg redness, wound, chest rash, urinary incontinence, right elbow swelling, redness, right elbow minimal pain, constipation intermittent, appetite fair, dry cough      Allergies   Allergen Reactions     Codeine Hives     Codeine Phosphate (Bulk)      This allergy is per [facility] records.     Codeine Sulfate      This allergy is per [facility] records.     Codeine-Butalbital-Asa-Caff      This allergy is per [facility] records.     Codeine-Guaifenesin      This allergy is per [facility] records.     Lisinopril Cough     Penicillins Swelling     Current Outpatient Medications   Medication Sig     acetaminophen (TYLENOL) 500 MG tablet Take 2 tablets (1,000 mg total) by mouth 3 (three) times a day as needed for pain. Not to exceed 4000 mg in 24 hours.     aspirin 81 MG EC tablet Take 81 mg by mouth daily.     atorvastatin (LIPITOR) 80 MG tablet TAKE 1 TABLET(80 MG) BY MOUTH AT BEDTIME      bumetanide (BUMEX) 2 MG tablet TAKE 1 TABLET(2 MG) BY MOUTH TWICE DAILY AT 9 AM AND AT 6 PM     cholecalciferol, vitamin D3, (VITAMIN D3) 2,000 unit Tab Take 2,000 Units by mouth once daily.     ferrous sulfate 325 (65 FE) MG tablet TAKE 1 TABLET(325 MG) BY MOUTH TWICE DAILY     ipratropium-albuterol (COMBIVENT RESPIMAT)  mcg/actuation Mist inhaler INHALE 1 PUFF BY MOUTH FOUR TIMES DAILY (Patient taking differently: 4 (four) times a day as needed. )     levothyroxine (SYNTHROID, LEVOTHROID) 150 MCG tablet TAKE 1 TABLET BY MOUTH DAILY AT 6:00 AM     lidocaine (XYLOCAINE) 5 % ointment Apply to painful areas     metoprolol tartrate (LOPRESSOR) 25 MG tablet Take 0.5 tablets (12.5 mg total) by mouth 2 (two) times a day.     omeprazole (PRILOSEC) 20 MG capsule Take 1 capsule (20 mg total) by mouth daily.     potassium chloride (K-DUR,KLOR-CON) 10 MEQ tablet Take 1 tablet (10 mEq total) by mouth daily.     Past Medical History:    Past Medical History:   Diagnosis Date     Acute kidney injury superimposed on CKD (H) 3/3/2017     Asthma      CAD (coronary artery disease)      Cataract      Chronic kidney disease     ckd3     Diabetes mellitus (H)      Disease of thyroid gland      H/O heart valve replacement with bioprosthetic valve      History of transfusion      Hypertension      Normochromic normocytic anemia      Pulmonary hypertension (H) 09/27/2019    Noted on echocardiogram     Restless leg syndrome            PHYSICAL EXAMINATION  Vitals:    01/15/20 2330   BP: 100/63   Pulse: 61   Resp: 20   Temp: 96.7  F (35.9  C)   SpO2: 96%   Weight: 190 lb (86.2 kg)       Today on physical exam:     GENERAL: Awake, Alert, obese, oriented x3, not in any form of acute distress, answers questions appropriately, follows simple commands, conversant  HEENT: Head is normocephalic with normal hair distribution. No evidence of trauma. Ears: No acute purulent discharge. Eyes: Conjunctivae pink with no scleral jaundice. Nose:  Normal mucosa and septum. NECK: Supple with no cervical or supraclavicular lymphadenopathy. Trachea is midline. [place], decreased vision  CHEST: No tenderness or deformity, no crepitus, left chest PPM  LUNG: Dim to auscultation with good chest expansion. There are no crackles or wheezes, normal AP diameter. Shortness of breath with exertion, dry cough  BACK: No kyphosis of the thoracic spine. Symmetric, no curvature, ROM normal, no CVA tenderness, no spinal tenderness   CVS: irregularly irregular rhythm, murmur,  2+ pulses symmetric in all extremities.  ABDOMEN: Rounded and soft, nontender to palpation, non distended, no masses, no organomegaly, good bowel sounds, no rebound or guarding, no peritoneal signs.   EXTREMITIES: 1-2+ ble nonpitting edema, dry flaking skin, left leg erythema, left leg wound   SKIN: Warm and dry  NEUROLOGICAL: The patient is oriented to person, place and time. Strength and sensation are grossly intact. Face is symmetric. weakness            LABS:   Recent Results (from the past 168 hour(s))   Potassium - Next AM   Result Value Ref Range    Potassium 3.9 3.5 - 5.0 mmol/L   Basic metabolic panel   Result Value Ref Range    Sodium 135 (L) 136 - 145 mmol/L    Potassium 3.9 3.5 - 5.0 mmol/L    Chloride 87 (L) 98 - 107 mmol/L    CO2 35 (H) 22 - 31 mmol/L    Anion Gap, Calculation 13 5 - 18 mmol/L    Glucose 129 (H) 70 - 125 mg/dL    Calcium 9.6 8.5 - 10.5 mg/dL    BUN 73 (H) 8 - 28 mg/dL    Creatinine 1.74 (H) 0.60 - 1.10 mg/dL    GFR MDRD Af Amer 34 (L) >60 mL/min/1.73m2    GFR MDRD Non Af Amer 28 (L) >60 mL/min/1.73m2   Potassium - Next AM   Result Value Ref Range    Potassium 4.1 3.5 - 5.0 mmol/L   C-Reactive Protein(CRP)   Result Value Ref Range    CRP 0.6 0.0 - 0.8 mg/dL   HM2(CBC w/o Differential)   Result Value Ref Range    WBC 8.4 4.0 - 11.0 thou/uL    RBC 3.69 (L) 3.80 - 5.40 mill/uL    Hemoglobin 11.3 (L) 12.0 - 16.0 g/dL    Hematocrit 35.9 35.0 - 47.0 %    MCV 97 80 - 100 fL    MCH 30.6  27.0 - 34.0 pg    MCHC 31.5 (L) 32.0 - 36.0 g/dL    RDW 13.3 11.0 - 14.5 %    Platelets 248 140 - 440 thou/uL    MPV 10.6 8.5 - 12.5 fL   Basic Metabolic Panel   Result Value Ref Range    Sodium 135 (L) 136 - 145 mmol/L    Potassium 4.2 3.5 - 5.0 mmol/L    Chloride 88 (L) 98 - 107 mmol/L    CO2 35 (H) 22 - 31 mmol/L    Anion Gap, Calculation 12 5 - 18 mmol/L    Glucose 139 (H) 70 - 125 mg/dL    Calcium 9.7 8.5 - 10.5 mg/dL    BUN 66 (H) 8 - 28 mg/dL    Creatinine 1.75 (H) 0.60 - 1.10 mg/dL    GFR MDRD Af Amer 33 (L) >60 mL/min/1.73m2    GFR MDRD Non Af Amer 28 (L) >60 mL/min/1.73m2   Hepatic Profile   Result Value Ref Range    Bilirubin, Total 0.7 0.0 - 1.0 mg/dL    Bilirubin, Direct 0.3 <=0.5 mg/dL    Protein, Total 7.1 6.0 - 8.0 g/dL    Albumin 2.8 (L) 3.5 - 5.0 g/dL    Alkaline Phosphatase 57 45 - 120 U/L    AST 22 0 - 40 U/L    ALT <9 0 - 45 U/L     Results for orders placed or performed during the hospital encounter of 01/04/20   Basic Metabolic Panel   Result Value Ref Range    Sodium 135 (L) 136 - 145 mmol/L    Potassium 4.2 3.5 - 5.0 mmol/L    Chloride 88 (L) 98 - 107 mmol/L    CO2 35 (H) 22 - 31 mmol/L    Anion Gap, Calculation 12 5 - 18 mmol/L    Glucose 139 (H) 70 - 125 mg/dL    Calcium 9.7 8.5 - 10.5 mg/dL    BUN 66 (H) 8 - 28 mg/dL    Creatinine 1.75 (H) 0.60 - 1.10 mg/dL    GFR MDRD Af Amer 33 (L) >60 mL/min/1.73m2    GFR MDRD Non Af Amer 28 (L) >60 mL/min/1.73m2         Lab Results   Component Value Date    WBC 8.4 01/14/2020    HGB 11.3 (L) 01/14/2020    HCT 35.9 01/14/2020    MCV 97 01/14/2020     01/14/2020       No results found for: [id number]  Lab Results   Component Value Date    HGBA1C 6.9 (H) 09/25/2019     Lab Results   Component Value Date    INR 1.30 (H) 02/07/2018    INR 1.51 (H) 03/06/2017    INR 1.27 (H) 03/05/2017     Vitamin D, Total (25-Hydroxy)   Date Value Ref Range Status   11/06/2018 41.7 30.0 - 80.0 ng/mL Final     Lab Results   Component Value Date    TSH 4.22 09/25/2019            ASSESSMENT/PLAN:     1 Acute on chronic CHF: Daily weights-190lbs on admit, reports weighing over 300lbs a year or so ago. Shortness of breath with exertion, 1-2+ ble edema. On bumex 2mg two times a day, keep elevated when possible. Sleeps in recliner.  F/u with cardiology prn. PT, OT for conditioning. Will consider ordering renan hose if wound improves. Cardiac, low sodium diet. Daily weights.   2. Herpes Zoster: Across upper abdomen/chest. Now scabbed, healing. Minimal itching and pain. No isolation needed. Tylenol prn pain. Completed antiviral treatment. Consider adding gabapentin if develops further burning, pain, neuralgia.   3. S/p PPM: follows with device clinic.   4. Mod persistent Asthma: On combivent four times a day, changed to four times a day prn. SOB with exertion, dry cough.   5. MIGUEL on CKD stage 3: Cr 1.75 on 1/14.   6. Type 2 DM: No meds, no monitoring needed. Diet controlled. Weight loss recently.   7. Dyslipidemia: On aspirin, atorvastatin.   8. HTN: SBP 120s. On lopressor, bumex. Added hold parameters for lopressor.   9. GERD: On omeprazole. No concerns today.   10. Hypothyroid: On levothyroxine.   11. Vitamin d deficiency: On vitamin d.   12. Hypokalemia: On kcl.    13. PAF, s/p AVR: Regular rate and rhythm today. On metoprolol. F/u with caridology.   14. Right elbow gout exacerbation: ice prn, tylenol prn. No pain today, edema improving, erythema improving. No gout medications and declines starting these today.   15. Anemia of CKD: on iron two times a day. Hg  11.3 on 1/14. Monitor.   16. Hypotension: sbp 90-100s. Will add hold parameters to metoprolol.   17. Left leg cellulitis, left leg wound: improving. Edema improved, minimal pain. Wound cares. Monitor. Completed antibiotic treatment. Venous doppler negative for DVT.     Per therapy eval today    Bmp, hm1, vit d on 1/20     Electronically signed by: Jennifer Valdes NP     Total floor/unit time spent 35 min with 25 min spent on  counseling and coordination of care. Counseling regarding herpes zoster management chf management, left leg cellulitis management, cellulitis management, wound care management, pain management. Coordinated care with nursing for management of chf, diabetes, herpes zoster, cellulitis, wound cares, a fib management, hypotension management.

## 2021-06-05 NOTE — PROGRESS NOTES
[facility]    DATE: 2/3/2020    NAME:  Thea Acosta             :  1934  MRN: [id number]  CODE STATUS:  DNR    VISIT TYPE: Problem Visit (wheezing, sob)     FACILITY:  [facility] [[id number]]       CHIEF COMPLAIN/REASON FOR VISIT:    Chief Complaint   Patient presents with     Problem Visit     wheezing, sob               HISTORY OF PRESENT ILLNESS: Thea Acosta is a 85 y.o. female who was admitted - for dyspnea and fluid overload, acute on chronic diastolic CHF. She was treated with IV diuresis and later transitioned to PO. Then she developed MIGUEL and held bumex and metolazone. During stay she developed herpes zoster outbreak on left chest. She also had left leg laceration and treated with clindamycin. Left leg negative for DVT on doppler. She completed IV ceftezole and po keflex for cellulitis. She did have some orthostatic hypotension that required med changes. TSH stable and ACTH stim test not indicated. She did have some epigastric pain and was treated with PPI and maalox. She was recommended TCU stay for deconditioning. She has PMH of CAD, CKD, DM, s/p PPM, diastolic CHF, pulmonary artery hypertension, mitral valve stenosis, chronic a fib no anticoagulation, s/p AVR, asthma. Prior to this she lived at home with her grand daughter.     Today Ms. Acosta is seen for follow up on shortness of breath and wheezing. She is seen in her wheelchair today with Ot present. They are working on lymphedema wrapping. Thea says her breathing is ok. She thinks her cough is better and not as congested or wet but still having some wheezing and shortness of breath at times. She thinks it is improved from last week and that the steroids over the weekend did help. She is still doing the nebulizers as well. She denies fevers or chills and thinks her leg redness is really improving. She denies pain in her legs but her legs are still very swollen. She  thinks her weight was down to 196lbs today. She does not believe the 190lbs was accurate from when she came in and thinks her baseline is more around 195lbs. She is not wearing the oxygen and bowels are moving fine. No fevers or other concerns over the weekend.       REVIEW OF SYSTEMS:  PROBLEMS AND REVIEW OF SYSTEMS:   Review of Systems  Today on ROS:   Currently, no fever, chills, or rigors. Decreased vision and hearing. Denies any chest pain, headaches, palpitations, lightheadedness, dizziness. Appetite is good.  Denies any abdominal pain. No active bleeding. Positive for sleeps in recliner, chest rash nearly resolved, urinary incontinence, no pain, constipation improved, dry flaking skin on legs, cough improved, shortness of breath improved, wheezing today, leg swelling, redness and warmth on legs improved      Allergies   Allergen Reactions     Codeine Hives     Codeine Phosphate (Bulk)      This allergy is per [facility] records.     Codeine Sulfate      This allergy is per [facility] records.     Codeine-Butalbital-Asa-Caff      This allergy is per [facility] records.     Codeine-Guaifenesin      This allergy is per [facility] records.     Lisinopril Cough     Penicillins Swelling     Current Outpatient Medications   Medication Sig     acetaminophen (TYLENOL) 500 MG tablet Take 2 tablets (1,000 mg total) by mouth 3 (three) times a day as needed for pain. Not to exceed 4000 mg in 24 hours.     aspirin 81 MG EC tablet Take 81 mg by mouth daily.     atorvastatin (LIPITOR) 80 MG tablet TAKE 1 TABLET(80 MG) BY MOUTH AT BEDTIME     bacitracin 500 unit/gram ointment Apply topically 2 (two) times a day.     bumetanide (BUMEX) 2 MG tablet TAKE 1 TABLET(2 MG) BY MOUTH TWICE DAILY AT 9 AM AND AT 6 PM     cholecalciferol, vitamin D3, (VITAMIN D3) 2,000 unit Tab Take 2,000 Units by mouth once  daily.     ferrous sulfate 325 (65 FE) MG tablet TAKE 1 TABLET(325 MG) BY MOUTH TWICE DAILY     ipratropium-albuterol (COMBIVENT RESPIMAT)  mcg/actuation Mist inhaler INHALE 1 PUFF BY MOUTH FOUR TIMES DAILY (Patient taking differently: 4 (four) times a day as needed. )     levothyroxine (SYNTHROID, LEVOTHROID) 150 MCG tablet TAKE 1 TABLET BY MOUTH DAILY AT 6:00 AM     lidocaine (XYLOCAINE) 5 % ointment Apply to painful areas     metoprolol tartrate (LOPRESSOR) 25 MG tablet Take 0.5 tablets (12.5 mg total) by mouth 2 (two) times a day.     omeprazole (PRILOSEC) 20 MG capsule Take 1 capsule (20 mg total) by mouth daily.     polyethylene glycol (MIRALAX) 17 gram packet Take 17 g by mouth daily.     potassium chloride (K-DUR,KLOR-CON) 20 MEQ tablet Take 40 mEq by mouth 2 (two) times a day.      senna (SENOKOT) 8.6 mg tablet Take 1 tablet by mouth daily as needed for constipation.     white petrolatum (AQUAPHOR ORIGINAL) 41 % Oint Apply 1 application topically at bedtime.     Past Medical History:    Past Medical History:   Diagnosis Date     Acute kidney injury superimposed on CKD (H) 3/3/2017     Asthma      CAD (coronary artery disease)      Cataract      Chronic kidney disease     ckd3     Diabetes mellitus (H)      Disease of thyroid gland      H/O heart valve replacement with bioprosthetic valve      History of transfusion      Hypertension      Normochromic normocytic anemia      Pulmonary hypertension (H) 09/27/2019    Noted on echocardiogram     Restless leg syndrome            PHYSICAL EXAMINATION  Vitals:    02/02/20 2241   BP: 114/56   Pulse: 64   Resp: 18   Temp: 97  F (36.1  C)   SpO2: 90%   Weight: 198 lb (89.8 kg)       Today on physical exam:     GENERAL: Awake, Alert, obese, oriented x3, not in any form of acute distress, answers questions appropriately, follows simple commands, conversant  HEENT: Head is normocephalic with normal hair distribution. No evidence of trauma. Ears: No acute purulent  discharge. Eyes: Conjunctivae pink with no scleral jaundice. Nose: Normal mucosa and septum. NECK: Supple with no cervical or supraclavicular lymphadenopathy. Trachea is midline. [place], decreased vision  CHEST: No tenderness or deformity, no crepitus, left chest PPM  LUNG: Dim with expiratory wheezing to auscultation.   Shortness of breath with exertion, sometimes at rest, dry and intermittently congested cough  BACK: No kyphosis of the thoracic spine. Symmetric, no curvature, ROM normal, no CVA tenderness, no spinal tenderness   CVS: irregularly irregular rhythm, murmur,  2+ pulses symmetric in all extremities.  ABDOMEN: Rounded and soft, nontender to palpation, non distended, no masses, no organomegaly, good bowel sounds, no rebound or guarding, no peritoneal signs.   EXTREMITIES: 2-3+ ble pitting edema, dry flaking and scaly ski. improved erythema on lower legs extends ankles to knees, warm to touch improved  SKIN: Warm and dry  NEUROLOGICAL: The patient is oriented to person, place and time. Strength and sensation are grossly intact. Face is symmetric.            LABS:   Recent Results (from the past 168 hour(s))   Basic Metabolic Panel   Result Value Ref Range    Sodium 134 (L) 136 - 145 mmol/L    Potassium 2.9 (L) 3.5 - 5.0 mmol/L    Chloride 84 (L) 98 - 107 mmol/L    CO2 37 (H) 22 - 31 mmol/L    Anion Gap, Calculation 13 5 - 18 mmol/L    Glucose 103 70 - 125 mg/dL    Calcium 9.8 8.5 - 10.5 mg/dL    BUN 56 (H) 8 - 28 mg/dL    Creatinine 1.75 (H) 0.60 - 1.10 mg/dL    GFR MDRD Af Amer 33 (L) >60 mL/min/1.73m2    GFR MDRD Non Af Amer 28 (L) >60 mL/min/1.73m2     Results for orders placed or performed in visit on 01/31/20   Basic Metabolic Panel   Result Value Ref Range    Sodium 134 (L) 136 - 145 mmol/L    Potassium 2.9 (L) 3.5 - 5.0 mmol/L    Chloride 84 (L) 98 - 107 mmol/L    CO2 37 (H) 22 - 31 mmol/L    Anion Gap, Calculation 13 5 - 18 mmol/L    Glucose 103 70 - 125 mg/dL    Calcium 9.8 8.5 - 10.5 mg/dL    BUN  56 (H) 8 - 28 mg/dL    Creatinine 1.75 (H) 0.60 - 1.10 mg/dL    GFR MDRD Af Amer 33 (L) >60 mL/min/1.73m2    GFR MDRD Non Af Amer 28 (L) >60 mL/min/1.73m2         Lab Results   Component Value Date    WBC 6.5 01/20/2020    HGB 11.0 (L) 01/20/2020    HCT 35.4 01/20/2020    MCV 99 01/20/2020     01/20/2020       No results found for: [id number]  Lab Results   Component Value Date    HGBA1C 6.9 (H) 09/25/2019     Lab Results   Component Value Date    INR 1.30 (H) 02/07/2018    INR 1.51 (H) 03/06/2017    INR 1.27 (H) 03/05/2017     Vitamin D, Total (25-Hydroxy)   Date Value Ref Range Status   01/20/2020 45.9 30.0 - 80.0 ng/mL Final     Lab Results   Component Value Date    TSH 4.22 09/25/2019           ASSESSMENT/PLAN:     1 Viral URI with cough, Acute on chronic moderate persistent asthma exacerbation:No fever, No sore throat, no further rhinorrhea. Shortness of breath and cough improved, still has some wheezing. On duonebs four times a day and q4h prn, mucinex bid. Encourage rest, limit fluids due to chf. No o2 supplementation needed over weekend, dc orders. Extend prednisone burst another 3 days then stop. Appears improving. Continue duonebs for now.   2. Acute on chronic CHF: Daily [phone], reports weighing over 300lbs a year or so ago. Shortness of breath continues improved,  2-3+ pitting ble edema. On bumex 2mg two times a day, keep legs elevated when possible. Sleeps in recliner.  F/u with cardiology prn. PT, OT for conditioning. Cardiac, low sodium diet. Daily weights. Starting lymphedema wrapping today, will dc ace wraps keep elevated. Bmp on 2/3. Improving, weights back down. Feels baseline weight around 195lbs. Completes metolazone 2/4.   3. BLE Cellulitis: improving. On keflex.OT started lymphedema wrapping today.   4. S/p PPM: follows with device clinic. No recent concerns.   5. Herpes Zoster: Across upper abdomen/chest. scabbed, healing. Minimal itching, no  pain, scabs now falling off. No isolation needed. Tylenol prn pain. Completed antiviral treatment. Now resolved.   6. MIGUEL on CKD stage 3: Cr 1.75 on 1/14. Cr 1.71 on 1/20. Stable. Bmp on 1/27-cr 1.91. stable. Bmp on 1/31.   7. Type 2 DM: No meds, no monitoring needed. Diet controlled. Weight loss recently. Glucose on bmp 1/20 was 112.   8. Dyslipidemia: On aspirin, atorvastatin.   9. HTN: SBP 110s, On lopressor, bumex.  hold parameters for lopressor. No recent concerns, occasional reading in 90s but asymptomatic.   10. GERD: On omeprazole. No concerns today.   11. Hypothyroid: On levothyroxine.   12. Vitamin d deficiency: On vitamin d. Vit d 45 on 1/20. Stable.   13. Hypokalemia: On kcl.bmp today 2/3.   14. PAF, s/p AVR: Regular rate and rhythm today. On metoprolol. F/u with caridology. No recent palpitations or chest pain.   15. Right elbow gout exacerbation: resolved.   16. Anemia of CKD: on iron two times a day. Hg  11.3 on 1/14. Hg 11 on 1/20.   17. Constipation:  scheduled miralax daily and senna daily prn. Now stable.   18. PVD, venous stasis: aquaphor two times a day. maintain dry and supple skin barrier. No open wounds on legs. No numb/tingling in legs. No leg pain. Lymphedema wrapping.     Per therapy PT - 90-140ft fww SBA, t/f's SBA, high fall risk (18/28 [place]), need to work on stairs, OT - UB setup, doesn t wear pants, toileting CGA, starting lymph treatment with her.    Yellow tag. Lives in house with family. Recommend 1-2 weeks.      Electronically signed by: Jennifer Valdes NP

## 2021-06-05 NOTE — PROGRESS NOTES
[facility]    DATE: 2020    NAME:  Thea Acosta             :  1934  MRN: [id number]  CODE STATUS:  DNR    VISIT TYPE: Problem Visit (wheezing, cough)     FACILITY:  [facility] [[id number]]       CHIEF COMPLAIN/REASON FOR VISIT:    Chief Complaint   Patient presents with     Problem Visit     wheezing, cough               HISTORY OF PRESENT ILLNESS: Thea Acosta is a 85 y.o. female who was admitted - for dyspnea and fluid overload, acute on chronic diastolic CHF. She was treated with IV diuresis and later transitioned to PO. Then she developed MIGUEL and held bumex and metolazone. During stay she developed herpes zoster outbreak on left chest. She also had left leg laceration and treated with clindamycin. Left leg negative for DVT on doppler. She completed IV ceftezole and po keflex for cellulitis. She did have some orthostatic hypotension that required med changes. TSH stable and ACTH stim test not indicated. She did have some epigastric pain and was treated with PPI and maalox. She was recommended TCU stay for deconditioning. She has PMH of CAD, CKD, DM, s/p PPM, diastolic CHF, pulmonary artery hypertension, mitral valve stenosis, chronic a fib no anticoagulation, s/p AVR, asthma. Prior to this she lived at home with her grand daughter.     Today Ms. Acosta is seen for concerns of persistent cough and wheezing. Nursing staff also reported overnight she was hypoxic and only was 79%. They did not put oxygen on her but did have her sit up and later gave her a neb treatment. This morning nursing checked her again she was up to low 90s, 90-92%. She is not on oxygen now. She has not complained of more shortness of breath but staff can tell that she is. She is seen today in her room by herself. She says she does still have wheezing and congestion. She is doing the nebs and thinks they help some. She doesn't think she has been weighed yet  today. She thinks her legs are still swollen but the redness is better. She denies fever or chills. No muscle aches.       REVIEW OF SYSTEMS:  PROBLEMS AND REVIEW OF SYSTEMS:   Review of Systems  Today on ROS:   Currently, no fever, chills, or rigors. Decreased vision and hearing. Denies any chest pain, headaches, palpitations, lightheadedness, dizziness. Appetite is good.  Denies any abdominal pain. No active bleeding. Positive for sleeps in recliner, low blood pressure at times, chest rash nearly resolved, urinary incontinence, no pain, constipation improved, dry flaking skin on legs, weight gain, congested cough, wheezing, worsening shortness of breath, hypoxia, worsening edema, redness and warmth on both legs improved today      Allergies   Allergen Reactions     Codeine Hives     Codeine Phosphate (Bulk)      This allergy is per [facility] records.     Codeine Sulfate      This allergy is per [facility] records.     Codeine-Butalbital-Asa-Caff      This allergy is per [facility] records.     Codeine-Guaifenesin      This allergy is per [facility] records.     Lisinopril Cough     Penicillins Swelling     Current Outpatient Medications   Medication Sig     acetaminophen (TYLENOL) 500 MG tablet Take 2 tablets (1,000 mg total) by mouth 3 (three) times a day as needed for pain. Not to exceed 4000 mg in 24 hours.     aspirin 81 MG EC tablet Take 81 mg by mouth daily.     atorvastatin (LIPITOR) 80 MG tablet TAKE 1 TABLET(80 MG) BY MOUTH AT BEDTIME     bacitracin 500 unit/gram ointment Apply topically 2 (two) times a day.     bumetanide (BUMEX) 2 MG tablet TAKE 1 TABLET(2 MG) BY MOUTH TWICE DAILY AT 9 AM AND AT 6 PM     cholecalciferol, vitamin D3, (VITAMIN D3) 2,000 unit Tab Take 2,000 Units by mouth once daily.     ferrous sulfate 325 (65 FE) MG tablet TAKE 1 TABLET(325 MG) BY MOUTH  TWICE DAILY     ipratropium-albuterol (COMBIVENT RESPIMAT)  mcg/actuation Mist inhaler INHALE 1 PUFF BY MOUTH FOUR TIMES DAILY (Patient taking differently: 4 (four) times a day as needed. )     levothyroxine (SYNTHROID, LEVOTHROID) 150 MCG tablet TAKE 1 TABLET BY MOUTH DAILY AT 6:00 AM     lidocaine (XYLOCAINE) 5 % ointment Apply to painful areas     metoprolol tartrate (LOPRESSOR) 25 MG tablet Take 0.5 tablets (12.5 mg total) by mouth 2 (two) times a day.     omeprazole (PRILOSEC) 20 MG capsule Take 1 capsule (20 mg total) by mouth daily.     polyethylene glycol (MIRALAX) 17 gram packet Take 17 g by mouth daily.     potassium chloride (K-DUR,KLOR-CON) 20 MEQ tablet Take 40 mEq by mouth 2 (two) times a day.      senna (SENOKOT) 8.6 mg tablet Take 1 tablet by mouth daily as needed for constipation.     white petrolatum (AQUAPHOR ORIGINAL) 41 % Oint Apply 1 application topically at bedtime.     Past Medical History:    Past Medical History:   Diagnosis Date     Acute kidney injury superimposed on CKD (H) 3/3/2017     Asthma      CAD (coronary artery disease)      Cataract      Chronic kidney disease     ckd3     Diabetes mellitus (H)      Disease of thyroid gland      H/O heart valve replacement with bioprosthetic valve      History of transfusion      Hypertension      Normochromic normocytic anemia      Pulmonary hypertension (H) 09/27/2019    Noted on echocardiogram     Restless leg syndrome            PHYSICAL EXAMINATION  Vitals:    01/31/20 0700   BP: 103/62   Pulse: (!) 57   Resp: 18   Temp: 96.9  F (36.1  C)   SpO2: 94%   Weight: 198 lb (89.8 kg)       Today on physical exam:     GENERAL: Awake, Alert, obese, oriented x3, not in any form of acute distress, answers questions appropriately, follows simple commands, conversant  HEENT: Head is normocephalic with normal hair distribution. No evidence of trauma. Ears: No acute purulent discharge. Eyes: Conjunctivae pink with no scleral jaundice. Nose: Normal  mucosa and septum. NECK: Supple with no cervical or supraclavicular lymphadenopathy. Trachea is midline. [place], decreased vision  CHEST: No tenderness or deformity, no crepitus, left chest PPM  LUNG: Dim with frequent and diffuse coarse crackles, expiratory and inspiratory wheezing to auscultation.   Shortness of breath with exertion and at rest, harsh, congested nonproductive cough  BACK: No kyphosis of the thoracic spine. Symmetric, no curvature, ROM normal, no CVA tenderness, no spinal tenderness   CVS: irregularly irregular rhythm, murmur,  2+ pulses symmetric in all extremities.  ABDOMEN: Rounded and soft, nontender to palpation, non distended, no masses, no organomegaly, good bowel sounds, no rebound or guarding, no peritoneal signs.   EXTREMITIES: 3+ ble pitting edema, dry flaking and scaly skin, left leg wound healed, scabbed, legs are taut today, ace wraps. improved erythema on lower legs extends ankles to knees, warm to touch  SKIN: Warm and dry  NEUROLOGICAL: The patient is oriented to person, place and time. Strength and sensation are grossly intact. Face is symmetric.            LABS:   Recent Results (from the past 168 hour(s))   Basic Metabolic Panel   Result Value Ref Range    Sodium 131 (L) 136 - 145 mmol/L    Potassium 3.3 (L) 3.5 - 5.0 mmol/L    Chloride 88 (L) 98 - 107 mmol/L    CO2 32 (H) 22 - 31 mmol/L    Anion Gap, Calculation 11 5 - 18 mmol/L    Glucose 105 70 - 125 mg/dL    Calcium 9.7 8.5 - 10.5 mg/dL    BUN 55 (H) 8 - 28 mg/dL    Creatinine 1.91 (H) 0.60 - 1.10 mg/dL    GFR MDRD Af Amer 30 (L) >60 mL/min/1.73m2    GFR MDRD Non Af Amer 25 (L) >60 mL/min/1.73m2   Basic Metabolic Panel   Result Value Ref Range    Sodium 134 (L) 136 - 145 mmol/L    Potassium 2.9 (L) 3.5 - 5.0 mmol/L    Chloride 84 (L) 98 - 107 mmol/L    CO2 37 (H) 22 - 31 mmol/L    Anion Gap, Calculation 13 5 - 18 mmol/L    Glucose 103 70 - 125 mg/dL    Calcium 9.8 8.5 - 10.5 mg/dL    BUN 56 (H) 8 - 28 mg/dL    Creatinine 1.75  (H) 0.60 - 1.10 mg/dL    GFR MDRD Af Amer 33 (L) >60 mL/min/1.73m2    GFR MDRD Non Af Amer 28 (L) >60 mL/min/1.73m2     Results for orders placed or performed in visit on 01/31/20   Basic Metabolic Panel   Result Value Ref Range    Sodium 134 (L) 136 - 145 mmol/L    Potassium 2.9 (L) 3.5 - 5.0 mmol/L    Chloride 84 (L) 98 - 107 mmol/L    CO2 37 (H) 22 - 31 mmol/L    Anion Gap, Calculation 13 5 - 18 mmol/L    Glucose 103 70 - 125 mg/dL    Calcium 9.8 8.5 - 10.5 mg/dL    BUN 56 (H) 8 - 28 mg/dL    Creatinine 1.75 (H) 0.60 - 1.10 mg/dL    GFR MDRD Af Amer 33 (L) >60 mL/min/1.73m2    GFR MDRD Non Af Amer 28 (L) >60 mL/min/1.73m2         Lab Results   Component Value Date    WBC 6.5 01/20/2020    HGB 11.0 (L) 01/20/2020    HCT 35.4 01/20/2020    MCV 99 01/20/2020     01/20/2020       No results found for: [id number]  Lab Results   Component Value Date    HGBA1C 6.9 (H) 09/25/2019     Lab Results   Component Value Date    INR 1.30 (H) 02/07/2018    INR 1.51 (H) 03/06/2017    INR 1.27 (H) 03/05/2017     Vitamin D, Total (25-Hydroxy)   Date Value Ref Range Status   01/20/2020 45.9 30.0 - 80.0 ng/mL Final     Lab Results   Component Value Date    TSH 4.22 09/25/2019           ASSESSMENT/PLAN:     1 Viral URI with cough, Acute on chronic moderate persistent asthma exacerbation: congestion, wheezing, worsening sob. No fever, No sore throat, no further rhinorrhea. On duonebs four times a day and q4h prn, mucinex bid. Encourage rest, limit fluids due to chf. Notify if develops fever and will order chest xray. o2 sats in low 90s, if drops <88-90% will need o2 supplementation. Concern for acute CHF.  IS a1h while awake. Ordered o2 prn, not used during the night. Was low and needs to be used if hypoxic. Discussed with nursing today. Will add short prednisone burst. Prednisone 20mg daily x 3 days.   2. Acute on chronic CHF: Daily [id number]-[phone], reports weighing over 300lbs a year or so ago. Shortness of  breath continues to worsen, now 3+ pitting edema. On bumex 2mg two times a day, keep legs elevated when possible. Sleeps in recliner.  F/u with cardiology prn. PT, OT for conditioning. Cardiac, low sodium diet. Daily weights. Ace wrap legs, elevate as much as possible. metolazone 5mg daily x 5 more days. Bmp on 2/3. Lymphedema wrapping consult to OT-not started, discussed with OT today.   3. BLE Cellulitis: erythema, warmth ble, suspect from significant fluid overload and worsening edema. No open areas at this time. Needs to elevate (educated today), reports recliner is uncomfortable. consult OT for lymphedema wrapping. aquaphor two times a day until start lymph wrapping. Ace wraps in mean time. Further diurese. on keflex two times a day x 7 days,  Noted pcn allergy but previously tolerated keflex. Improved erythema today.   4. S/p PPM: follows with device clinic. No recent concerns.   5. Herpes Zoster: Across upper abdomen/chest. scabbed, healing. Minimal itching, no pain, scabs now falling off. No isolation needed. Tylenol prn pain. Completed antiviral treatment. Much improved.   6. MIGUEL on CKD stage 3: Cr 1.75 on 1/14. Cr 1.71 on 1/20. Stable. Bmp on 1/27-cr 1.91. stable. Bmp on 1/31.   7. Type 2 DM: No meds, no monitoring needed. Diet controlled. Weight loss recently. Glucose on bmp 1/20 was 112.   8. Dyslipidemia: On aspirin, atorvastatin.   9. HTN: SBP baseline 110s, On lopressor, bumex.  hold parameters for lopressor.  10. GERD: On omeprazole. No concerns today.   11. Hypothyroid: On levothyroxine.   12. Vitamin d deficiency: On vitamin d. Vit d 45 on 1/20. Stable.   13. Hypokalemia: On kcl.critical k 2.9 today, give 40kcl q8 x 4 doses then increase to 40 two times a day. Bmp on 2/3.   14. PAF, s/p AVR: Regular rate and rhythm today. On metoprolol. F/u with caridology. No recent palpitations or chest pain.   15. Right elbow gout exacerbation: resolved.   16. Anemia of CKD: on iron two times a day. Hg  11.3 on  1/14. Hg 11 on 1/20.   17. Constipation:  scheduled miralax daily and senna daily prn. Now stable.   18. PVD, venous stasis: aquaphor at bedtime and daily prn for dry, scaly, flaking skin to maintain dry and supple skin barrier. No open wounds on legs. No numb/tingling in legs. No leg pain. Ace wraps. Elevate. Increased aquaphor to two times a day until lymph wrapping.      Per therapy PT - 140ft fww SBA/CGA, t/f's SBA/CGA with extra time, high fall risk (17/28 [place]), OT - UB setup, doesn t wear pants, toileting CGA/Min A, showering min/mod A. Yellow tag. Lives in house with family. Recommend 1-2 weeks.      Electronically signed by: Jennifer Valdes NP

## 2021-06-05 NOTE — PROGRESS NOTES
[facility]    DATE: 2020    NAME:  Thea Acosta             :  1934  MRN: [id number]  CODE STATUS:  DNR    VISIT TYPE: Problem Visit (cough)     FACILITY:  [facility] [[id number]]       CHIEF COMPLAIN/REASON FOR VISIT:    Chief Complaint   Patient presents with     Problem Visit     cough               HISTORY OF PRESENT ILLNESS: Thea Acosta is a 85 y.o. female who was admitted - for dyspnea and fluid overload, acute on chronic diastolic CHF. She was treated with IV diuresis and later transitioned to PO. Then she developed MIGUEL and held bumex and metolazone. During stay she developed herpes zoster outbreak on left chest. She also had left leg laceration and treated with clindamycin. Left leg negative for DVT on doppler. She completed IV ceftezole and po keflex for cellulitis. She did have some orthostatic hypotension that required med changes. TSH stable and ACTH stim test not indicated. She did have some epigastric pain and was treated with PPI and maalox. She was recommended TCU stay for deconditioning. She has PMH of CAD, CKD, DM, s/p PPM, diastolic CHF, pulmonary artery hypertension, mitral valve stenosis, chronic a fib no anticoagulation, s/p AVR, asthma. Prior to this she lived at home with her grand daughter.     Today Ms. Acosta is seen for concerns of cough. Staff report she coughed quite frequently and harshly most of the weekend. She has not been complaining of more shortness of breath but her weights have been steadily increasing as well and is up 190-201lbs then this morning was 203lbs. On exam she is seen alone in her room. She reports that she has been coughing and feels congested but cannot get anything up. She has used her inhaler but not really helping. She thinks her breathing is ok but does admit to feeling shortness of breath probably more than baseline. Her legs are swollen and they have not always been  wrapping. She says they are dry. She says it is difficult to elevate her legs. She denies any fevers or cold symptoms with the cough. No nasal drainage or congestion. She denies feeling dizzy today.       REVIEW OF SYSTEMS:  PROBLEMS AND REVIEW OF SYSTEMS:   Review of Systems  Today on ROS:   Currently, no fever, chills, or rigors. Decreased vision and hearing. Denies any chest pain, headaches, palpitations, lightheadedness, dizziness. Appetite is good.  Denies any abdominal pain. No active bleeding. Positive for edema ble, shortness of breath with exertion and at rest today, sleeps in recliner, low blood pressure at times, chest rash improving, urinary incontinence, no pain, constipation improved, dry flaking skin on legs, weight gain, congested cough, wheezing      Allergies   Allergen Reactions     Codeine Hives     Codeine Phosphate (Bulk)      This allergy is per [facility] records.     Codeine Sulfate      This allergy is per [facility] records.     Codeine-Butalbital-Asa-Caff      This allergy is per [facility] records.     Codeine-Guaifenesin      This allergy is per [facility] records.     Lisinopril Cough     Penicillins Swelling     Current Outpatient Medications   Medication Sig     acetaminophen (TYLENOL) 500 MG tablet Take 2 tablets (1,000 mg total) by mouth 3 (three) times a day as needed for pain. Not to exceed 4000 mg in 24 hours.     aspirin 81 MG EC tablet Take 81 mg by mouth daily.     atorvastatin (LIPITOR) 80 MG tablet TAKE 1 TABLET(80 MG) BY MOUTH AT BEDTIME     bacitracin 500 unit/gram ointment Apply topically 2 (two) times a day.     bumetanide (BUMEX) 2 MG tablet TAKE 1 TABLET(2 MG) BY MOUTH TWICE DAILY AT 9 AM AND AT 6 PM     cholecalciferol, vitamin D3, (VITAMIN D3) 2,000 unit Tab Take 2,000 Units by mouth once daily.     ferrous sulfate 325 (65 FE) MG tablet  TAKE 1 TABLET(325 MG) BY MOUTH TWICE DAILY     ipratropium-albuterol (COMBIVENT RESPIMAT)  mcg/actuation Mist inhaler INHALE 1 PUFF BY MOUTH FOUR TIMES DAILY (Patient taking differently: 4 (four) times a day as needed. )     levothyroxine (SYNTHROID, LEVOTHROID) 150 MCG tablet TAKE 1 TABLET BY MOUTH DAILY AT 6:00 AM     lidocaine (XYLOCAINE) 5 % ointment Apply to painful areas     metoprolol tartrate (LOPRESSOR) 25 MG tablet Take 0.5 tablets (12.5 mg total) by mouth 2 (two) times a day.     omeprazole (PRILOSEC) 20 MG capsule Take 1 capsule (20 mg total) by mouth daily.     polyethylene glycol (MIRALAX) 17 gram packet Take 17 g by mouth daily.     potassium chloride (K-DUR,KLOR-CON) 20 MEQ tablet Take 40 mEq by mouth daily.     senna (SENOKOT) 8.6 mg tablet Take 1 tablet by mouth daily as needed for constipation.     white petrolatum (AQUAPHOR ORIGINAL) 41 % Oint Apply 1 application topically at bedtime.     Past Medical History:    Past Medical History:   Diagnosis Date     Acute kidney injury superimposed on CKD (H) 3/3/2017     Asthma      CAD (coronary artery disease)      Cataract      Chronic kidney disease     ckd3     Diabetes mellitus (H)      Disease of thyroid gland      H/O heart valve replacement with bioprosthetic valve      History of transfusion      Hypertension      Normochromic normocytic anemia      Pulmonary hypertension (H) 09/27/2019    Noted on echocardiogram     Restless leg syndrome            PHYSICAL EXAMINATION  Vitals:    01/26/20 1941   BP: 118/68   Pulse: 60   Resp: 23   Temp: (!) 96.1  F (35.6  C)   SpO2: 100%   Weight: 201 lb (91.2 kg)       Today on physical exam:     GENERAL: Awake, Alert, obese, oriented x3, not in any form of acute distress, answers questions appropriately, follows simple commands, conversant  HEENT: Head is normocephalic with normal hair distribution. No evidence of trauma. Ears: No acute purulent discharge. Eyes: Conjunctivae pink with no scleral  jaundice. Nose: Normal mucosa and septum. NECK: Supple with no cervical or supraclavicular lymphadenopathy. Trachea is midline. [place], decreased vision  CHEST: No tenderness or deformity, no crepitus, left chest PPM  LUNG: Dim with scattered coarse crackles, expiratory wheezing to auscultation with good chest expansion.   Shortness of breath with exertion and at rest, harsh, congested nonproductive cough  BACK: No kyphosis of the thoracic spine. Symmetric, no curvature, ROM normal, no CVA tenderness, no spinal tenderness   CVS: irregularly irregular rhythm, murmur,  2+ pulses symmetric in all extremities.  ABDOMEN: Rounded and soft, nontender to palpation, non distended, no masses, no organomegaly, good bowel sounds, no rebound or guarding, no peritoneal signs.   EXTREMITIES: 2+ ble nonpitting edema, dry flaking and scaly skin, left leg wound healed, scabbed  SKIN: Warm and dry  NEUROLOGICAL: The patient is oriented to person, place and time. Strength and sensation are grossly intact. Face is symmetric.            LABS:   Recent Results (from the past 168 hour(s))   Basic Metabolic Panel   Result Value Ref Range    Sodium 131 (L) 136 - 145 mmol/L    Potassium 3.3 (L) 3.5 - 5.0 mmol/L    Chloride 88 (L) 98 - 107 mmol/L    CO2 32 (H) 22 - 31 mmol/L    Anion Gap, Calculation 11 5 - 18 mmol/L    Glucose 105 70 - 125 mg/dL    Calcium 9.7 8.5 - 10.5 mg/dL    BUN 55 (H) 8 - 28 mg/dL    Creatinine 1.91 (H) 0.60 - 1.10 mg/dL    GFR MDRD Af Amer 30 (L) >60 mL/min/1.73m2    GFR MDRD Non Af Amer 25 (L) >60 mL/min/1.73m2     Results for orders placed or performed in visit on 01/27/20   Basic Metabolic Panel   Result Value Ref Range    Sodium 131 (L) 136 - 145 mmol/L    Potassium 3.3 (L) 3.5 - 5.0 mmol/L    Chloride 88 (L) 98 - 107 mmol/L    CO2 32 (H) 22 - 31 mmol/L    Anion Gap, Calculation 11 5 - 18 mmol/L    Glucose 105 70 - 125 mg/dL    Calcium 9.7 8.5 - 10.5 mg/dL    BUN 55 (H) 8 - 28 mg/dL    Creatinine 1.91 (H) 0.60 - 1.10  mg/dL    GFR MDRD Af Amer 30 (L) >60 mL/min/1.73m2    GFR MDRD Non Af Amer 25 (L) >60 mL/min/1.73m2         Lab Results   Component Value Date    WBC 6.5 01/20/2020    HGB 11.0 (L) 01/20/2020    HCT 35.4 01/20/2020    MCV 99 01/20/2020     01/20/2020       No results found for: [id number]  Lab Results   Component Value Date    HGBA1C 6.9 (H) 09/25/2019     Lab Results   Component Value Date    INR 1.30 (H) 02/07/2018    INR 1.51 (H) 03/06/2017    INR 1.27 (H) 03/05/2017     Vitamin D, Total (25-Hydroxy)   Date Value Ref Range Status   01/20/2020 45.9 30.0 - 80.0 ng/mL Final     Lab Results   Component Value Date    TSH 4.22 09/25/2019           ASSESSMENT/PLAN:     1 Viral URI with cough: congestion, wheezing today. No fever, No sore throat, had clear rhinorrhea last week but reports improved today. Still has wheezing, increased shortness of breath. On duonebs two times a day and q4h prn x 5 days, robitussin 10ml three times a day x 3 days. Encourage rest, limit fluids due to chf. Notify if develops fever and will order chest xray. o2 sat stable today. Continues to be symptomatic, extended duoenbs until 1/31, completed robitussin so will order mucinex tabs two times a day x 7days. Treat for acute chf as well.    2. Acute on chronic CHF: Daily [id number], reports weighing over 300lbs a year or so ago. Shortness of breath with exertion worse today, 2+ ble edema. On bumex 2mg two times a day, keep legs elevated when possible. Sleeps in recliner.  F/u with cardiology prn. PT, OT for conditioning. Cardiac, low sodium diet. Daily weights. Weights continue to climb give one additional bumex today and metolazone daily 30 min prior to am bumex dose x 3 days then will re evaluate. Increased potassium supplement as well. Bmp on 1/31. Ace wrap legs and need to elevate.    3. Herpes Zoster: Across upper abdomen/chest. scabbed, healing. Minimal itching, no pain, scabs now falling off. No isolation  needed. Tylenol prn pain. Completed antiviral treatment. Much improved.   4. S/p PPM: follows with device clinic. No recent concerns.   5. Acute on chronic Mod persistent Asthma: On combivent four times a day prn. Increased shortness of breath, wheezing, cough recently. Ordered duonebs two times a day and prn x 5 days-now extended until 1/31, ordered mucinex x 7 days. Monitor closely if need prednisone burst however appears more chf related.   6. MIGUEL on CKD stage 3: Cr 1.75 on 1/14. Cr 1.71 on 1/20. Stable. Bmp on 1/27-cr 1.91. stable. Bmp on 1/31.   7. Type 2 DM: No meds, no monitoring needed. Diet controlled. Weight loss recently. Glucose on bmp 1/20 was 112.   8. Dyslipidemia: On aspirin, atorvastatin.   9. HTN: SBP 90-110s. On lopressor, bumex.  hold parameters for lopressor. Reports dizziness only in am when gets up too fast.   10. GERD: On omeprazole. No concerns today.   11. Hypothyroid: On levothyroxine.   12. Vitamin d deficiency: On vitamin d. Vit d 45 on 1/20. Stable.   13. Hypokalemia: On kcl.    14. PAF, s/p AVR: Regular rate and rhythm today. On metoprolol. F/u with caridology. No recent palpitations or chest pain.   15. Right elbow gout exacerbation: resolved.   16. Anemia of CKD: on iron two times a day. Hg  11.3 on 1/14. Hg 11 on 1/20.   17. Constipation:  scheduled miralax daily and senna daily prn. Improved today.   18. PVD, venous stasis: ordered aquaphor at bedtime and daily prn for dry, scaly, flaking skin to maintain dry and supple skin barrier. No open wounds on legs. No numb/tingling in legs. No leg pain. Ace wraps. Elevate.     Per therapy PT - 140ft fww SBA/CGA, t/f's SBA/CGA with extra time, high fall risk (17/28 Octavio), OT - UB setup, doesn t wear pants, toileting CGA/Min A, showering min/mod A. Yellow tag. Lives in house with family. Recommend 1-2 weeks.      Electronically signed by: Jennifer Valdes NP

## 2021-06-06 NOTE — TELEPHONE ENCOUNTER
Left message to call back for: orders request  Information to relay to patient:  Notified ok for requested orders per Dr. Hdz via VM. Please advise if this is not ok.

## 2021-06-06 NOTE — TELEPHONE ENCOUNTER
Orders being requested: 1) skill nurse 2 time a weeks for 3 weeks.   2) Home health aide 1- 2 visit a week   3) Pt and Ot evaluation \  4) verification on medications.   Reason service is needed/diagnosis: teaching and respiratory assessments   When are orders needed by: as soon as possible   Where to send Orders: Phone:  [phone]  Okay to leave detailed message?  Yes

## 2021-06-06 NOTE — TELEPHONE ENCOUNTER
Medication Question or Clarification  Who is calling: nurse  What medication are you calling about (include dose and sig)?: combivent  Who prescribed the medication?: Dr Hdz  What is your question/concern?: Nurse wanted to clarify this is a PRN order not .  Writer shared per order PRN.  After call writer noted the order was scheduled but patient is taking the order PRN. Writer called nurse back and left vm with the correct order.  Writer advised please  call if more questions.   Requested Pharmacy: [facility]  Okay to leave a detailed message?: Yes  [phone]

## 2021-06-06 NOTE — PROGRESS NOTES
Hospital Follow-up Visit:    Assessment/Plan:     1. Acute on chronic diastolic heart failure (H)  Acute on chronic diastolic heart failure, improved.  Check BNP.  Did refer patient for compression stockings with zipper up to see if able to utilize herself without assistance from family members.  Continues use of Bumex 2 mg twice daily.  Remains off metolazone 2.5 mg daily.  Follow-up in 6 weeks in office, sooner if concerns otherwise more frequent follow-ups to ensure no need for rehospitalization.  - BNP(B-type Natriuretic Peptide)  - Ambulatory referral for Orthotics / Prosthetics - [place]    2. Type 2 diabetes mellitus with stage 3 chronic kidney disease, without long-term current use of insulin (H)  Type 2 diabetes fair control with A1c of 7.0%.  Reassess in next 3 to 4 months.  - Glycosylated Hemoglobin A1c  - Basic Metabolic Panel    3. Pulmonary hypertension (H)  Pulmonary hypertension, stable.  Using metoprolol tartrate 25 mg taking half tablet 2 times daily.  Continues aspirin 81 mg daily.    4. CKD (chronic kidney disease) stage 4, GFR 15-29 ml/min (H)  CKD stage IV.  Ensure stable renal function.  - Basic Metabolic Panel    5. Intertrigo  Intertrigo.  Refill on nystatin provided.  - nystatin (MYCOSTATIN) powder; Apply topically 4 (four) times a day.  Dispense: 60 g; Refill: 2    6. Moderate persistent asthma with acute exacerbation  Duo nebs for home use otherwise Combivent metered-dose inhaler 4 times daily.  - ipratropium-albuteroL (DUO-NEB) 0.5-2.5 mg/3 mL nebulizer; Take 3 mL by nebulization every 6 (six) hours as needed.  Dispense: 25 vial; Refill: 2    7. Moderate persistent asthma  As above.  - ipratropium-albuteroL (COMBIVENT RESPIMAT)  mcg/actuation Mist inhaler; INHALE 1 PUFF BY MOUTH FOUR TIMES DAILY  Dispense: 12 g; Refill: 3    8. Gastric acidity  Reflux management.  Omeprazole 20 mg daily with benefits described.    9. Hyperlipidemia, unspecified hyperlipidemia type  Atorvastatin 80  mg daily for lipid management.  - atorvastatin (LIPITOR) 80 MG tablet; TAKE 1 TABLET(80 MG) BY MOUTH DAILY  Dispense: 90 tablet; Refill: 3    10. Chronic gout with tophus, unspecified cause, unspecified site  Chronic gout with likely tophaceous gout findings.  Rheumatology referral.  Wrist brace can be utilized.  Repeat uric acid for goal less than 6 ideally.  Remain off prednisone at this time with heart failure history.  - Ambulatory referral to Rheumatology  - Uric Acid    11. Gastroesophageal reflux disease without esophagitis  Reflux history stable with omeprazole 20 mg daily.  - omeprazole (PRILOSEC) 20 MG capsule; Take 1 capsule (20 mg total) by mouth daily before breakfast.  Dispense: 90 capsule; Refill: 3    12. Normochromic normocytic anemia  Check CBC to ensure stable normochromic normocytic anemia.  - HM2(CBC w/o Differential)           Subjective:     Thea Acosta is a 85 y.o. female who presents for a hospital discharge follow up.  Feeling better.  Still loose cough.  Wants compression stockings with zipper for lower extremity edema associated with diastolic heart failure.  Right wrist continues to hurt more on ulnar aspect.  History of gouty arthritis noted.  Has not seen rheumatologist.  Denies fever.  Ears are plugged and decreased hearing noted.  Needs refill on DuoNeb for home neb use.  Needs refill on omeprazole for reflux and atorvastatin for lipid management.  Needs refill on nystatin powder for intertrigo.    Reviewed hospital d/c summary from 1/14/20:  85-year-old woman with past medical history of left ventricular diastolic heart failure with preserved ejection fraction, pulmonary artery hypertension, mitral valve stenosis, chronic atrial fibrillation on aspirin but not full anticoagulation, chronic kidney disease, status post aortic valve replacement was admitted to the hospital with concerns of fluid retention and some shortness of breath.  Patient was admitted to the hospital and  "treated aggressively with IV diuretics and her overall condition was stabilized.  The patient unfortunately had recently developed shingles also which was treated appropriately and prior to discharge have been in the resolution phase.  Prior to discharge patient had been oxygenating appropriately at room air and her discharge weight was 86 kg.       (twice) now since 3/24/07 (\"Gal\" - renal cell CA) - remarried 3/8/82   3 sons - Nate Mckoy (); Edwin (head of work force center in [facility]; Amrit Mckoy works at the [employer] (I see him now as a patient)   Son-in-law Esdras Mo   2 daughters - breast cancer   Granddaughter - Terri - plays the clarinet   New granddaughter - Patrizia?   Nondenominational  Dad - dec MVA age 52   Mom - dec 86 old age   2 bros - 1 bro  due to lung cancer in Aug, 2010 (had quit smoking > 40-50 years ago)   Quit smoking    No EtOH   Work: laundry services at [employer]   Surgeries: s/p gallbladder, bunion 06 left TKA ([facility]) 07 Aortic valve replacement (bioprostheitc) 07 pacemaker - dual chamber   Cardiovascular surgeon called 07 - patient is \"vasculopath\", prior Hb = 8.9, no Coumadin, will use ASA only 10-20-08: started back on Warfarin, 09 GI bleed, D/C Warfarin   11-10-08: Dexa order faxed to Thea Ordaz will schedule    Hospital/Nursing Home/IP Rehab Facility: [facility]  Date of Admission: 20  Date of Discharge:20  Tcu 20  Reason(s) for Admission:heart failure            Do you have any problems taking your medication regularly?  None       Have you had any changes in your medication since discharge? None       Have you had any difficulty following your discharge or treatment plan?  No    Summary of hospitalization:  Hospital discharge summary reviewed  Diagnostic Tests/Treatments reviewed.  Follow up needed: None  Other Healthcare Providers Involved in Patient's Care: Patient Care " Team:  Gamal Hdz MD as PCP - General  Gamal Hdz MD as Assigned PCP      Update since discharge: {improved   Information from family, SNF, care coordination: patient     Post Discharge Medication Reconciliation: discharge medications reconciled, continue medications without change  Plan of care communicated with: patient    Objective:     Vitals:    02/26/20 1030   BP: 126/78   Pulse: 60   SpO2: 94%   Weight: 205 lb (93 kg)         Physical Exam:    General Appearance:  Alert, cooperative, no distress, appears stated age   HEENT:  Normal.  No acute findings.   Neck: Supple, symmetrical, no adenopathy.   Lungs:    Frequent loose cough.  Clear to auscultation bilaterally, respirations unlabored.  No expiratory wheeze or inspiratory crackles noted.  Scattered rhonchi.   Heart:  Regular rate and rhythm, S1, S2 normal, no murmur, rub or gallop   Abdomen:   Soft, non-tender, positive bowel sounds, no masses, no organomegaly   Extremities: Extremities normal.  No cyanosis or edema.  Right lateral wrist swelling question tophaceous gout etiology.  Mild tenderness.  No significant acute findings however of acute gouty arthritis.  Edema with leg wraps in place bilateral lower extremity, stable.   Skin: Warm, dry.  Skin color, texture, turgor normal, or lesions.  Intertrigo left breast.   Neurologic: Nonfocal.            Coding guidelines for this visit:  Type of Medical   Decision Making Face-to-Face Visit       within 7 Days of discharge Face-to-Face Visit        within 14 days of discharge   Moderate Complexity [id number]   High Complexity [id number]       Electronically signed by Gamal Hdz MD 02/26/20 11:08 AM

## 2021-06-06 NOTE — TELEPHONE ENCOUNTER
Who is calling:  SHYAM Velásquez  Reason for Call:  She is calling to report increase bilateral lower extremity edema with a 2 lbs weight increase to 212 lbs since Friday. Per caller, lungs sound more congested and she is having the patient do a nebulizer treatment at this time. She is requesting a call back as soon as possible to discuss.   Date of last appointment with primary care: 2/26/2020  Okay to leave a detailed message: Yes

## 2021-06-06 NOTE — PROGRESS NOTES
[facility]    DATE: 2020    NAME:  Thea Acosta             :  1934  MRN: [id number]  CODE STATUS:  DNR    VISIT TYPE: Discharge Summary     FACILITY:  [facility] [[id number]]       CHIEF COMPLAIN/REASON FOR VISIT:    Chief Complaint   Patient presents with     Discharge Summary               HISTORY OF PRESENT ILLNESS: Thea Acosta is a 85 y.o. female who was admitted - for dyspnea and fluid overload, acute on chronic diastolic CHF. She was treated with IV diuresis and later transitioned to PO. Then she developed MIGUEL and held bumex and metolazone. During stay she developed herpes zoster outbreak on left chest. She also had left leg laceration and treated with clindamycin. Left leg negative for DVT on doppler. She completed IV ceftezole and po keflex for cellulitis. She did have some orthostatic hypotension that required med changes. TSH stable and ACTH stim test not indicated. She did have some epigastric pain and was treated with PPI and maalox. She was recommended TCU stay for deconditioning. She has PMH of CAD, CKD, DM, s/p PPM, diastolic CHF, pulmonary artery hypertension, mitral valve stenosis, chronic a fib no anticoagulation, s/p AVR, asthma. Prior to this she lived at home with her grand daughter.     TCU course:   Ms. Acosta has made progress with therapy and is ambulating 150 feet with walker and supervision. She is able to navigate up to 3 stairs with cga. She is toileting with contact guard assist. She is still high fall risk with tinnetti score of 18/28. Wheelchair was ordered for her. During her tcu course she completed treatment for herpes zoster, ble cellulitis, acute on chronic chf. She received several doses of metolazone and recently has been stable. She was instructed on importance of monitoring daily weights at home and when to notify primary care provider. She completed treatment for viral uri and asthma  exacerbation. She has been weaned off her oxygen. Her labs and vitals were stable. She completed treatment for right elbow gout exacerbation. She did have lymphedema wrapping for worsening edema but now is back on renan hose. She will be returning home today with  PT, OT, CARLO, RN. She will need to follow up with PCP in 5-7 days.         REVIEW OF SYSTEMS:  PROBLEMS AND REVIEW OF SYSTEMS:   Review of Systems  Today on ROS:   Currently, no fever, chills, or rigors. Decreased vision and hearing. Denies any chest pain, headaches, palpitations, lightheadedness, dizziness. Appetite is good.  Denies any abdominal pain. No active bleeding. Positive for urinary incontinence, no pain, constipation improved, cough improved, shortness of breath improved, leg swelling improving, renan hose      Allergies   Allergen Reactions     Codeine Hives     Codeine Phosphate (Bulk)      This allergy is per [facility] records.     Codeine Sulfate      This allergy is per [facility] records.     Codeine-Butalbital-Asa-Caff      This allergy is per [facility] records.     Codeine-Guaifenesin      This allergy is per [facility] records.     Lisinopril Cough     Penicillins Swelling     Current Outpatient Medications   Medication Sig     acetaminophen (TYLENOL) 500 MG tablet Take 2 tablets (1,000 mg total) by mouth 3 (three) times a day as needed for pain. Not to exceed 4000 mg in 24 hours.     aspirin 81 MG EC tablet Take 81 mg by mouth daily.     atorvastatin (LIPITOR) 80 MG tablet TAKE 1 TABLET(80 MG) BY MOUTH AT BEDTIME     bumetanide (BUMEX) 2 MG tablet TAKE 1 TABLET(2 MG) BY MOUTH TWICE DAILY AT 9 AM AND AT 6 PM     cholecalciferol, vitamin D3, (VITAMIN D3) 2,000 unit Tab Take 2,000 Units by mouth once daily.     ferrous sulfate 325 (65 FE) MG tablet TAKE 1 TABLET(325 MG) BY MOUTH TWICE DAILY (Patient taking  differently: Take 1 tablet by mouth daily with breakfast. )     ipratropium-albuterol (COMBIVENT RESPIMAT)  mcg/actuation Mist inhaler INHALE 1 PUFF BY MOUTH FOUR TIMES DAILY (Patient taking differently: 4 (four) times a day as needed. )     levothyroxine (SYNTHROID, LEVOTHROID) 150 MCG tablet TAKE 1 TABLET BY MOUTH DAILY AT 6:00 AM     lidocaine (XYLOCAINE) 5 % ointment Apply to painful areas     metoprolol tartrate (LOPRESSOR) 25 MG tablet Take 0.5 tablets (12.5 mg total) by mouth 2 (two) times a day.     omeprazole (PRILOSEC) 20 MG capsule Take 1 capsule (20 mg total) by mouth daily.     polyethylene glycol (MIRALAX) 17 gram packet Take 17 g by mouth daily.     potassium chloride (K-DUR,KLOR-CON) 20 MEQ tablet Take 40 mEq by mouth 2 (two) times a day.      senna (SENOKOT) 8.6 mg tablet Take 1 tablet by mouth daily as needed for constipation.     white petrolatum (AQUAPHOR ORIGINAL) 41 % Oint Apply 1 application topically at bedtime.     Past Medical History:    Past Medical History:   Diagnosis Date     Acute kidney injury superimposed on CKD (H) 3/3/2017     Asthma      CAD (coronary artery disease)      Cataract      Chronic kidney disease     ckd3     Diabetes mellitus (H)      Disease of thyroid gland      H/O heart valve replacement with bioprosthetic valve      History of transfusion      Hypertension      Normochromic normocytic anemia      Pulmonary hypertension (H) 09/27/2019    Noted on echocardiogram     Restless leg syndrome            PHYSICAL EXAMINATION  Vitals:    02/17/20 1942   BP: 114/59   Pulse: (!) 55   Resp: 18   Temp: (!) 96  F (35.6  C)   SpO2: 97%   Weight: 203 lb (92.1 kg)       Today on physical exam:     GENERAL: Awake, Alert, obese, oriented x3, not in any form of acute distress, answers questions appropriately, follows simple commands, conversant  HEENT: Head is normocephalic with normal hair distribution. No evidence of trauma. Ears: No acute purulent discharge. Eyes:  Conjunctivae pink with no scleral jaundice. Nose: Normal mucosa and septum. NECK: Supple with no cervical or supraclavicular lymphadenopathy. Trachea is midline. [place], decreased vision  CHEST: No tenderness or deformity, no crepitus, left chest PPM  LUNG: Dim but clear today to auscultation.   Shortness of breath with exertion improving  BACK: No kyphosis of the thoracic spine. Symmetric, no curvature, ROM normal, no CVA tenderness, no spinal tenderness   CVS: irregularly irregular rhythm, murmur,  2+ pulses symmetric in all extremities.  ABDOMEN: Rounded and soft, nontender to palpation, non distended, no masses, no organomegaly, good bowel sounds, no rebound or guarding, no peritoneal signs.   EXTREMITIES: 1+ ble nonpitting edema,  Dean hose  SKIN: Warm and dry  NEUROLOGICAL: The patient is oriented to person, place and time. Strength and sensation are grossly intact. Face is symmetric.            LABS:   No results found for this or any previous visit (from the past 168 hour(s)).  Results for orders placed or performed in visit on 02/07/20   Basic Metabolic Panel   Result Value Ref Range    Sodium 135 (L) 136 - 145 mmol/L    Potassium 3.6 3.5 - 5.0 mmol/L    Chloride 84 (L) 98 - 107 mmol/L    CO2 37 (H) 22 - 31 mmol/L    Anion Gap, Calculation 14 5 - 18 mmol/L    Glucose 138 (H) 70 - 125 mg/dL    Calcium 10.0 8.5 - 10.5 mg/dL    BUN 82 (H) 8 - 28 mg/dL    Creatinine 2.07 (H) 0.60 - 1.10 mg/dL    GFR MDRD Af Amer 28 (L) >60 mL/min/1.73m2    GFR MDRD Non Af Amer 23 (L) >60 mL/min/1.73m2         Lab Results   Component Value Date    WBC 6.5 01/20/2020    HGB 11.0 (L) 01/20/2020    HCT 35.4 01/20/2020    MCV 99 01/20/2020     01/20/2020       No results found for: [id number]  Lab Results   Component Value Date    HGBA1C 6.9 (H) 09/25/2019     Lab Results   Component Value Date    INR 1.30 (H) 02/07/2018    INR 1.51 (H) 03/06/2017    INR 1.27 (H) 03/05/2017     Vitamin D, Total (25-Hydroxy)   Date Value Ref Range  Status   01/20/2020 45.9 30.0 - 80.0 ng/mL Final     Lab Results   Component Value Date    TSH 4.22 09/25/2019           ASSESSMENT/PLAN:     1 Viral URI with cough, Acute on chronic moderate persistent asthma exacerbation: Now resolved. Shortness of breath improved, cough improved. Completed prednisone. Stable. Off o2.   2. Acute on chronic CHF: Daily [id number], reports weighing over 300lbs a year or so ago. Shortness of breath continues improved,  1+ nonpitting ble edema. On bumex 2mg two times a day, keep legs elevated when possible.  F/u with cardiology prn. PT, OT for conditioning. Cardiac, low sodium diet. Daily weights. Dean short. Instructed importance of monitoring weights and salt intake at home and when to notify primary care provider.   3. BLE Cellulitis: resolved.   4. S/p PPM: follows with device clinic. No recent concerns.   5. Herpes Zoster: resolved.   6. MIGUEL on CKD stage 3: Cr 1.75 on 1/14. Cr 1.71 on 1/20. Stable. Bmp on 1/27-cr 1.91. stable. Bmp on 1/31.   7. Type 2 DM: No meds, no monitoring needed. Diet controlled. Weight loss recently. Glucose on bmp 1/20 was 112.   8. Dyslipidemia: On aspirin, atorvastatin.   9. HTN: SBP 120s, On lopressor, bumex.  hold parameters for lopressor. Stable.  10. GERD: On omeprazole. No concerns today.   11. Hypothyroid: On levothyroxine.   12. Vitamin d deficiency: On vitamin d. Vit d 45 on 1/20. Stable.   13. Hypokalemia: On kcl.  14. PAF, s/p AVR: Regular rate and rhythm today. On metoprolol. F/u with caridology. No recent palpitations or chest pain.   15. Right elbow gout exacerbation: resolved.   16. Anemia of CKD: on iron daily.. Hg  11.3 on 1/14. Hg 11 on 1/20.   17. Constipation:  scheduled miralax daily and senna daily prn. Now stable.   18. PVD, venous stasis: aquaphor two times a day. maintain dry and supple skin barrier. No open wounds on legs. No numb/tingling in legs. No leg pain. Lymphedema  "wrapping now completed today and renan hose in place.   19. Candidal intertrigo:  nystatin powder two times a day until resolved.      Electronically signed by: Jennifer Valdes NP       Total floor/unit time spent 35 min with 25 min spent on counseling and coordination of care. Counseling regarding discharge instructions, chf monitoring and management for home. Coordinated care with nursing, therapy,  for discharge planning, wheelchair orders, home care services, primary care and cardiology follow up       Please evaluate Thea Acosta for admission to Home Health.    Face to Face Attestation and Initial Plan of Care    The face-to-face encounter occurred on date: 2/18/20  Face to Face encounter was with: Jennifer Valdes    Please provide brief clinical summary of reason for visit and need for home care. Deconditioning after hospital course for acute on chronic asthma, acute on chronic chf, cellulitis, herpes zoster, gout    Please identify which of the following home health disciplines the patient will need AND describe the skilled services that you would like the home health agency to perform: SKILLED NURSING (RN): complex med management, PHYSICAL THERAPY: strength training and gait training, OCCUPATIONAL THERAPY: ADLs and home safety and HOME HEALTH AIDE    Homebound Status (describe the functional limitations that support this patient is confined to his/her home. Medicaid recipients are not required to be homebound.):assistive device needed:  4WW    Name of physician who will be responsible for the ongoing home health plan of care (CMS requires the referring physician to provide the specific name of the community physician instead of a title, such as \"PCP\"): Gamal Hdz MD    Requested Start of Care Date: Within 48 hours    Other information to assist the home health agency in developing the initial Plan of Care:    I certify that services are/were furnished while this patient was under the " care of a physician and that a physician or an allowed non-physician practitioner (NPP), had a face-to-face encounter that meets the physician face-to-face encounter requirements. The encounter was in whole, or in part, related to the primary reason for home health. The patient is confined to his/her home and needs intermittent skilled nursing, physical therapy, speech-language pathology, or the continued need for occupational therapy. A plan of care has been established by a physician and is periodically reviewed by a physician.    Post Discharge Medication Reconciliation Status: discharge medications reconciled, continue medications without change

## 2021-06-06 NOTE — PROGRESS NOTES
[facility]    DATE: 2020    NAME:  Thea Acosta             :  1934  MRN: [id number]  CODE STATUS:  DNR    VISIT TYPE: Review Of Multiple Medical Conditions     FACILITY:  [facility] [[id number]]       CHIEF COMPLAIN/REASON FOR VISIT:    Chief Complaint   Patient presents with     Review Of Multiple Medical Conditions               HISTORY OF PRESENT ILLNESS: Thea Acosta is a 85 y.o. female who was admitted - for dyspnea and fluid overload, acute on chronic diastolic CHF. She was treated with IV diuresis and later transitioned to PO. Then she developed MIGUEL and held bumex and metolazone. During stay she developed herpes zoster outbreak on left chest. She also had left leg laceration and treated with clindamycin. Left leg negative for DVT on doppler. She completed IV ceftezole and po keflex for cellulitis. She did have some orthostatic hypotension that required med changes. TSH stable and ACTH stim test not indicated. She did have some epigastric pain and was treated with PPI and maalox. She was recommended TCU stay for deconditioning. She has PMH of CAD, CKD, DM, s/p PPM, diastolic CHF, pulmonary artery hypertension, mitral valve stenosis, chronic a fib no anticoagulation, s/p AVR, asthma. Prior to this she lived at home with her grand daughter.     Today Ms. Acosta is seen today for follow up visit. She is seen alone in her room. She says she is still coughing but her breathing is so much better. She feels she can finally take some deep breaths and is not getting as winded with therapy. She is not bringing much up with her cough but sometimes she does. She thinks her legs look good and the swelling has gone down. They did just switch her today from lymphedema wrapping to renan hose. She says otherwise she is itching to get out of here. She is doing a lot more for herself and feels much stronger. She is planning to go home on Tuesday  and cannot wait for this. On review of chart her weights are up again 194-198lbs but clinically appears very stable and euvolemic today.         REVIEW OF SYSTEMS:  PROBLEMS AND REVIEW OF SYSTEMS:   Review of Systems  Today on ROS:   Currently, no fever, chills, or rigors. Decreased vision and hearing. Denies any chest pain, headaches, palpitations, lightheadedness, dizziness. Appetite is good.  Denies any abdominal pain. No active bleeding. Positive for urinary incontinence, no pain, constipation improved, cough improved, shortness of breath improved, leg swelling improving, renan hose, wheezing resolved, weight gain      Allergies   Allergen Reactions     Codeine Hives     Codeine Phosphate (Bulk)      This allergy is per [facility] records.     Codeine Sulfate      This allergy is per [facility] records.     Codeine-Butalbital-Asa-Caff      This allergy is per [facility] records.     Codeine-Guaifenesin      This allergy is per [facility] records.     Lisinopril Cough     Penicillins Swelling     Current Outpatient Medications   Medication Sig     acetaminophen (TYLENOL) 500 MG tablet Take 2 tablets (1,000 mg total) by mouth 3 (three) times a day as needed for pain. Not to exceed 4000 mg in 24 hours.     aspirin 81 MG EC tablet Take 81 mg by mouth daily.     atorvastatin (LIPITOR) 80 MG tablet TAKE 1 TABLET(80 MG) BY MOUTH AT BEDTIME     bacitracin 500 unit/gram ointment Apply topically 2 (two) times a day.     bumetanide (BUMEX) 2 MG tablet TAKE 1 TABLET(2 MG) BY MOUTH TWICE DAILY AT 9 AM AND AT 6 PM     cholecalciferol, vitamin D3, (VITAMIN D3) 2,000 unit Tab Take 2,000 Units by mouth once daily.     ferrous sulfate 325 (65 FE) MG tablet TAKE 1 TABLET(325 MG) BY MOUTH TWICE DAILY     ipratropium-albuterol (COMBIVENT RESPIMAT)  mcg/actuation Mist inhaler INHALE 1 PUFF BY MOUTH  FOUR TIMES DAILY (Patient taking differently: 4 (four) times a day as needed. )     levothyroxine (SYNTHROID, LEVOTHROID) 150 MCG tablet TAKE 1 TABLET BY MOUTH DAILY AT 6:00 AM     lidocaine (XYLOCAINE) 5 % ointment Apply to painful areas     metoprolol tartrate (LOPRESSOR) 25 MG tablet Take 0.5 tablets (12.5 mg total) by mouth 2 (two) times a day.     omeprazole (PRILOSEC) 20 MG capsule Take 1 capsule (20 mg total) by mouth daily.     polyethylene glycol (MIRALAX) 17 gram packet Take 17 g by mouth daily.     potassium chloride (K-DUR,KLOR-CON) 20 MEQ tablet Take 40 mEq by mouth 2 (two) times a day.      senna (SENOKOT) 8.6 mg tablet Take 1 tablet by mouth daily as needed for constipation.     white petrolatum (AQUAPHOR ORIGINAL) 41 % Oint Apply 1 application topically at bedtime.     Past Medical History:    Past Medical History:   Diagnosis Date     Acute kidney injury superimposed on CKD (H) 3/3/2017     Asthma      CAD (coronary artery disease)      Cataract      Chronic kidney disease     ckd3     Diabetes mellitus (H)      Disease of thyroid gland      H/O heart valve replacement with bioprosthetic valve      History of transfusion      Hypertension      Normochromic normocytic anemia      Pulmonary hypertension (H) 09/27/2019    Noted on echocardiogram     Restless leg syndrome            PHYSICAL EXAMINATION  Vitals:    02/13/20 2230   BP: 119/68   Pulse: 60   Resp: 18   Temp: 97.2  F (36.2  C)   SpO2: 94%   Weight: 198 lb (89.8 kg)       Today on physical exam:     GENERAL: Awake, Alert, obese, oriented x3, not in any form of acute distress, answers questions appropriately, follows simple commands, conversant  HEENT: Head is normocephalic with normal hair distribution. No evidence of trauma. Ears: No acute purulent discharge. Eyes: Conjunctivae pink with no scleral jaundice. Nose: Normal mucosa and septum. NECK: Supple with no cervical or supraclavicular lymphadenopathy. Trachea is midline. [place], decreased  vision  CHEST: No tenderness or deformity, no crepitus, left chest PPM  LUNG: Dim but clear today to auscultation.   Shortness of breath with exertion improving, dry cough improving  BACK: No kyphosis of the thoracic spine. Symmetric, no curvature, ROM normal, no CVA tenderness, no spinal tenderness   CVS: irregularly irregular rhythm, murmur,  2+ pulses symmetric in all extremities.  ABDOMEN: Rounded and soft, nontender to palpation, non distended, no masses, no organomegaly, good bowel sounds, no rebound or guarding, no peritoneal signs.   EXTREMITIES: 1+ ble nonpitting edema,  Dean hose  SKIN: Warm and dry  NEUROLOGICAL: The patient is oriented to person, place and time. Strength and sensation are grossly intact. Face is symmetric.            LABS:   No results found for this or any previous visit (from the past 168 hour(s)).  Results for orders placed or performed in visit on 02/07/20   Basic Metabolic Panel   Result Value Ref Range    Sodium 135 (L) 136 - 145 mmol/L    Potassium 3.6 3.5 - 5.0 mmol/L    Chloride 84 (L) 98 - 107 mmol/L    CO2 37 (H) 22 - 31 mmol/L    Anion Gap, Calculation 14 5 - 18 mmol/L    Glucose 138 (H) 70 - 125 mg/dL    Calcium 10.0 8.5 - 10.5 mg/dL    BUN 82 (H) 8 - 28 mg/dL    Creatinine 2.07 (H) 0.60 - 1.10 mg/dL    GFR MDRD Af Amer 28 (L) >60 mL/min/1.73m2    GFR MDRD Non Af Amer 23 (L) >60 mL/min/1.73m2         Lab Results   Component Value Date    WBC 6.5 01/20/2020    HGB 11.0 (L) 01/20/2020    HCT 35.4 01/20/2020    MCV 99 01/20/2020     01/20/2020       No results found for: [id number]  Lab Results   Component Value Date    HGBA1C 6.9 (H) 09/25/2019     Lab Results   Component Value Date    INR 1.30 (H) 02/07/2018    INR 1.51 (H) 03/06/2017    INR 1.27 (H) 03/05/2017     Vitamin D, Total (25-Hydroxy)   Date Value Ref Range Status   01/20/2020 45.9 30.0 - 80.0 ng/mL Final     Lab Results   Component Value Date    TSH 4.22 09/25/2019           ASSESSMENT/PLAN:     1 Viral URI  with cough, Acute on chronic moderate persistent asthma exacerbation: Now resolved. Shortness of breath improved, cough improved. no further wheezing and completes prednisone today. On duonebs four times a day and q4h prn, mucinex bid.    2. Acute on chronic CHF: Daily [id number], reports weighing over 300lbs a year or so ago. Shortness of breath continues improved,  1+ nonpitting ble edema. On bumex 2mg two times a day, keep legs elevated when possible.  F/u with cardiology prn. PT, OT for conditioning. Cardiac, low sodium diet. Daily weights. Now back to renan hose. Stable today on exam.   3. BLE Cellulitis: improving. Completed keflex. Lymphedema wraps.  4. S/p PPM: follows with device clinic. No recent concerns.   5. Herpes Zoster: resolved.   6. MIGUEL on CKD stage 3: Cr 1.75 on 1/14. Cr 1.71 on 1/20. Stable. Bmp on 1/27-cr 1.91. stable. Bmp on 1/31.   7. Type 2 DM: No meds, no monitoring needed. Diet controlled. Weight loss recently. Glucose on bmp 1/20 was 112.   8. Dyslipidemia: On aspirin, atorvastatin.   9. HTN: SBP 110s, On lopressor, bumex.  hold parameters for lopressor. Stable.  10. GERD: On omeprazole. No concerns today.   11. Hypothyroid: On levothyroxine.   12. Vitamin d deficiency: On vitamin d. Vit d 45 on 1/20. Stable.   13. Hypokalemia: On kcl.  14. PAF, s/p AVR: Regular rate and rhythm today. On metoprolol. F/u with caridology. No recent palpitations or chest pain.   15. Right elbow gout exacerbation: resolved.   16. Anemia of CKD: on iron two times a day. Hg  11.3 on 1/14. Hg 11 on 1/20.   17. Constipation:  scheduled miralax daily and senna daily prn. Now stable.   18. PVD, venous stasis: aquaphor two times a day. maintain dry and supple skin barrier. No open wounds on legs. No numb/tingling in legs. No leg pain. Lymphedema wrapping now completed today and renan short in place.   19. Candidal intertrigo:  nystatin powder two times a day until  resolved.     Per therapy PT -150ft fww SPV, t/f's SPV, high fall risk (18/28 Octavio), 3 stairs CGA-SBA, OT - UB setup, doesn t wear pants, toileting CGA, lymph treatment going well. Green tag. Lives in house with stairs with family assist. lcd 2/17. hh pt, ot   Wheelchair ordered.      Electronically signed by: Jennifer Valdes NP

## 2021-06-06 NOTE — PROGRESS NOTES
ASSESSMENT AND PLAN:  Thea Acosta 85 y.o. female is seen here on 03/09/20 for evaluation of worsening pain in her right upper extremity affecting the right wrist, right elbow, where she has olecranon bursitis.  She has hyperuricemia.  She has renal impairment.  She has coronary artery disease pulmonary hypertension recent hospitalization with worsening cardiomyopathy.  I have discussed this with her.  Gout remains 1 of the suspects, we discussed pseudogout.  Other reasons for inflammatory joint disease were outlined.  She declines to consider aspiration or injection such as the right elbow or the right olecranon bursa.  I have asked her to consider this option.  Should she have prednisone again, though not contraindicated, the risk of water retention and precipitation of cardiac decompensation reviewed.  She is not a candidate for nonsteroidals.  One option would be colchicine, once the rest of the data are available.  X-rays of the hands done today, personally reviewed the films, findings: Erosions/cystic changes at the bases of proximal phalanx of both index fingers.  No chondrocalcinosis of the wrist seen.  We will meet here in the next few days.       Diagnoses and all orders for this visit:    Polyarthralgia  -     XR Wrist Right 3 or More VWS; Future; Expected date: 03/09/2020  -     XR Hands Bilateral 3 or More VWS; Future; Expected date: 03/09/2020  -     XR Shoulders Bilateral 2 Or More Views; Future; Expected date: 03/09/2020  -     XR Wrist Right 3 or More VWS  -     XR Hands Bilateral 3 or More VWS  -     XR Shoulders Bilateral 2 Or More Views  -     Uric Acid  -     CCP Antibodies  -     Rheumatoid Factor Quant    CKD (chronic kidney disease), stage III (H)  -     Uric Acid  -     CCP Antibodies  -     Rheumatoid Factor Quant    CHF (congestive heart failure), NYHA class I, acute, systolic (H)    Primary osteoarthritis involving multiple joints  -     XR Wrist Right 3 or More VWS; Future; Expected  "date: 03/09/2020  -     XR Hands Bilateral 3 or More VWS; Future; Expected date: 03/09/2020  -     XR Shoulders Bilateral 2 Or More Views; Future; Expected date: 03/09/2020  -     XR Wrist Right 3 or More VWS  -     XR Hands Bilateral 3 or More VWS  -     XR Shoulders Bilateral 2 Or More Views      HISTORY OF PRESENTING ILLNESS:  Thea Acosta, 85 y.o., female is here for evaluation of painful joints.  While she has had longstanding joint symptoms associated with osteoarthritis, status post left TKA right hip arthroplasty, left shoulder tendinopathy, has been gradually declining in overall health, recent hospitalization with worsening of her diastolic cardiomyopathy, developed zoster, and other comorbidities as noted later, reports here with her son and granddaughter that she has been hurting more in her right hand wrist elbow area.  This is troubled her for the past several weeks may be it is in the middle part of January, this is been of gradual onset, this is interfering with day-to-day activities, has taken Tylenol without benefit, was given prednisone, this may have contributed she thinks to fluid retention as would be expected, she rated this pain is severe in the right wrist moderately so in the right elbow, she is in wheelchair, and uses this as well as walker at home, she was not sure about stiffness in the morning.  She has diabetes on insulin chronic renal impairment grade 3, without family or personal history of psoriasis ulcerative colitis Crohn's disease or family history of lupus rheumatoid arthritis or ankylosing spondylitis.. She lives in her own home.  Her granddaughter lives with her.    Following is the excerpt from a recent note:    (twice) now since 3/24/07 (\"Gal\" - renal cell CA) - remarried 3/8/82   3 sons - Nate Mckoy (); Edwin (head of work force center in [facility]; Amrit Mckoy works at the post [employer] (I see him now as a patient)   Son-in-law Esdras Mo   2 " "daughters - breast cancer   Granddaughter - Terri - plays the clarinet   New granddaughter - Patrizia?   Sabianism  Dad - dec MVA age 52   Mom - dec 86 old age   2 bros - 1 bro  due to lung cancer in Aug, 2010 (had quit smoking > 40-50 years ago)   Quit smoking    No EtOH   Work: laundry services at [employer]   Surgeries: s/p gallbladder, bunion 06 left TKA ([facility]) 07 Aortic valve replacement (bioprostheitc) 07 pacemaker - dual chamber   Cardiovascular surgeon called 07 - patient is \"vasculopath\", prior Hb = 8.9, no Coumadin, will use ASA only 10-20-08: started back on Warfarin, 09 GI bleed, D/C Warfarin   11-10-08: Dexa order faxed to Thea Ordaz will schedule    Further historical information and ADL limitations as noted in the multidimensional health assessment questionnaire attached in the EMR.  Rest of the 13 system ROS is negative.     ALLERGIES:Codeine; Codeine phosphate (bulk); Codeine sulfate; Codeine-butalbital-asa-caff; Codeine-guaifenesin; Lisinopril; and Penicillins    PAST MEDICAL/ACTIVE PROBLEMS/MEDICATION/ FAMILY HISTORY/SOCIAL DATA:  The patient has a family history of  Past Medical History:   Diagnosis Date     Acute kidney injury superimposed on CKD (H) 3/3/2017     Asthma      CAD (coronary artery disease)      Cataract      Chronic kidney disease     ckd3     Diabetes mellitus (H)      Disease of thyroid gland      H/O heart valve replacement with bioprosthetic valve      History of transfusion      Hypertension      Normochromic normocytic anemia      Pulmonary hypertension (H) 2019    Noted on echocardiogram     Restless leg syndrome      Social History     Tobacco Use   Smoking Status Former Smoker     Packs/day: 0.00     Last attempt to quit: 1950     Years since quittin.5   Smokeless Tobacco Never Used     Patient Active Problem List   Diagnosis     Constipation     Sciatica     Hypothyroidism     Type 2 diabetes " mellitus with stage 3 chronic kidney disease, without long-term current use of insulin (H)     Hyperparathyroidism     Vitamin D deficiency     Mixed hyperlipidemia     Atherosclerosis of native coronary artery of native heart without angina pectoris     CKD (chronic kidney disease), stage III (H)     Osteoarthritis Of The Knee     Restless Legs Syndrome     H/O heart valve replacement with bioprosthetic valve     Cardiac pacemaker in situ     Complete heart block (H)     Normochromic normocytic anemia     Essential hypertension with goal blood pressure less than 140/90     Moderate persistent asthma without complication     PAF (paroxysmal atrial fibrillation) (H)     GERD (gastroesophageal reflux disease)     Primary insomnia     Hypokalemia     Physical deconditioning     Asthma     CHF (congestive heart failure), NYHA class I, acute, systolic (H)     Pulmonary hypertension (H)     Atrial fibrillation, unspecified type (H)     Status post aortic valve replacement     Non-rheumatic mitral valve stenosis     Varicella zoster     Acute kidney injury superimposed on chronic kidney disease (H)     Nonrheumatic mitral valve stenosis     PVD (peripheral vascular disease) (H)     Lymphedema     Current Outpatient Medications   Medication Sig Dispense Refill     acetaminophen (TYLENOL) 500 MG tablet Take 2 tablets (1,000 mg total) by mouth 3 (three) times a day as needed for pain. Not to exceed 4000 mg in 24 hours.  0     aspirin 81 MG EC tablet Take 81 mg by mouth daily.       atorvastatin (LIPITOR) 80 MG tablet TAKE 1 TABLET(80 MG) BY MOUTH DAILY 90 tablet 3     bumetanide (BUMEX) 2 MG tablet TAKE 1 TABLET(2 MG) BY MOUTH TWICE DAILY AT 9 AM AND AT 6  tablet 2     cholecalciferol, vitamin D3, (VITAMIN D3) 2,000 unit Tab Take 2,000 Units by mouth once daily.       ferrous sulfate 325 (65 FE) MG tablet TAKE 1 TABLET(325 MG) BY MOUTH TWICE DAILY (Patient taking differently: Take 1 tablet by mouth daily with breakfast.  ") 180 tablet 3     ipratropium-albuteroL (COMBIVENT RESPIMAT)  mcg/actuation Mist inhaler INHALE 1 PUFF BY MOUTH FOUR TIMES DAILY 12 g 3     ipratropium-albuteroL (DUO-NEB) 0.5-2.5 mg/3 mL nebulizer Take 3 mL by nebulization every 6 (six) hours as needed. 25 vial 2     levothyroxine (SYNTHROID, LEVOTHROID) 150 MCG tablet TAKE 1 TABLET BY MOUTH DAILY AT 6:00 AM 90 tablet 3     lidocaine (XYLOCAINE) 5 % ointment Apply to painful areas 35.44 g 0     nystatin (MYCOSTATIN) powder Apply topically 4 (four) times a day. 60 g 2     omeprazole (PRILOSEC) 20 MG capsule Take 1 capsule (20 mg total) by mouth daily before breakfast. 90 capsule 3     polyethylene glycol (MIRALAX) 17 gram packet Take 17 g by mouth daily.       potassium chloride (K-DUR,KLOR-CON) 20 MEQ tablet Take 40 mEq by mouth 2 (two) times a day.        senna (SENOKOT) 8.6 mg tablet Take 1 tablet by mouth daily as needed for constipation.       white petrolatum (AQUAPHOR ORIGINAL) 41 % Oint Apply 1 application topically at bedtime.       metoprolol tartrate (LOPRESSOR) 25 MG tablet Take 0.5 tablets (12.5 mg total) by mouth 2 (two) times a day. 30 tablet 0     No current facility-administered medications for this visit.        COMPREHENSIVE EXAMINATION:  Vitals:    03/09/20 0904   BP: 122/72   Patient Site: Right Arm   Patient Position: Sitting   Cuff Size: Adult Regular   Weight: 205 lb (93 kg)   Height: 5' 7\" (1.702 m)     A well appearing alert oriented female. Vital data as noted above. Her eyes without inflammation/scleromalacia. ENTwithout oral mucositis, thrush, nasal deformity, external ear redness, deformity. Her neck is without lymphadenopathy and supple. Lungs normal sounds, no pleural rub. Heart auscultation normal rate, rhythm; no pericardial rub and murmurs. Abdomen soft, non tender, no organomegaly. Skin examined for heliotrope, malar area eruption, lupus pernio, periungual erythema, sclerodactyly, papules, erythema nodosum, purpura, nail " pitting, onycholysis, and obvious psoriasis lesion. Neurological examination shows normal alertness, speech, facial symmetry, tone and power in upper and lower extremities. The joint examination is performed for swelling, tenderness, warmth, erythema, and range of motion in the following joints: DIPs, PIPs, MCPs, wrists, first CMC's, elbows, shoulders, hips, knees, ankles, feet; spine for range of motion and paraspinal muscles for tenderness. The salient  findings are: She has swelling on the right olecranon area with bursa fluid, she has tenderness in the right wrist without warmth, there is widespread Melanie's, with associated deformities, left TKA, lymphedema bilaterally in the ankles with Ace bandages both sides, severe impingement and abduction of the left shoulder.  There is no dactylitis of the digits.  She is accompanied by her son and granddaughter.  She is in a wheelchair.    LAB / IMAGING DATA:  ALT   Date Value Ref Range Status   01/14/2020 <9 0 - 45 U/L Final   09/26/2019 <9 0 - 45 U/L Final   02/07/2018 10 0 - 45 U/L Final     Albumin   Date Value Ref Range Status   01/14/2020 2.8 (L) 3.5 - 5.0 g/dL Final   01/05/2020 3.0 (L) 3.5 - 5.0 g/dL Final   09/26/2019 3.4 (L) 3.5 - 5.0 g/dL Final     Creatinine   Date Value Ref Range Status   02/26/2020 1.57 (H) 0.60 - 1.10 mg/dL Final   02/07/2020 2.07 (H) 0.60 - 1.10 mg/dL Final   02/03/2020 2.07 (H) 0.60 - 1.10 mg/dL Final       WBC   Date Value Ref Range Status   02/26/2020 5.1 4.0 - 11.0 thou/uL Final   01/20/2020 6.5 4.0 - 11.0 thou/uL Final   07/15/2015 7.8 4.0 - 11.0 thou/uL Final   07/15/2014 6.9 4.0 - 11.0 thou/uL Final     Hemoglobin   Date Value Ref Range Status   02/26/2020 10.7 (L) 12.0 - 16.0 g/dL Final   01/20/2020 11.0 (L) 12.0 - 16.0 g/dL Final   01/14/2020 11.3 (L) 12.0 - 16.0 g/dL Final     Platelets   Date Value Ref Range Status   02/26/2020 164 140 - 440 thou/uL Final   01/20/2020 177 140 - 440 thou/uL Final   01/14/2020 248 140 - 440  karla/Juliocesar Final       Lab Results   Component Value Date    SEDRATE 39 (H) 09/25/2019

## 2021-06-06 NOTE — TELEPHONE ENCOUNTER
Who is calling:  Regulo  Reason for Call: Pt weight is up more than 5 pounds, extra swelling in legs, and increase congestion but not short of breath, Machine is not working well would like Dr to order new machine.  Okay to leave a detailed message: Yes

## 2021-06-06 NOTE — TELEPHONE ENCOUNTER
Reviewed chart including Dr. Hdz's last note and recommendation on March 6 to take extra dose of Bumex for 5 pound weight gain at that time.    Based on the described clinical situation will increase Bumex dose by 1 extra dose today and I request that they call again tomorrow to report patient status and Dr. Hdz to decide about further course of action.  Previous electrolytes reviewed noting to be normal.  Kidney function within acceptable range at last check on February 26.

## 2021-06-06 NOTE — TELEPHONE ENCOUNTER
Spoke with home care nurse  Patient has had a 5 Lb weight gain within 1 weeks with increased edema in legs.     Spoke with Dr. Hdz regarding whether patient should take an extra dose of Bumex at 12 pm - called and notified home care nurse.    Patient needs a new Nebulizer machine to go to [facility] medical equipment.     Please sign order

## 2021-06-07 NOTE — TELEPHONE ENCOUNTER
Orders being requested: continue with skilled nursing and home health aide, 1 x a month for 2 months,  1 to 3 hours per week x 9 weeks for aide   Reason service is needed/diagnosis: general assessment and ADL's  When are orders needed by: asap  Where to send Orders: Phone:  [phone]  Okay to leave detailed message?  Yes

## 2021-06-07 NOTE — PROGRESS NOTES
"Thea Acosta is a 85 y.o. female who is being evaluated via a billable telephone visit.      The patient has been notified of following:     \"This telephone visit will be conducted via a call between you and your physician/provider. We have found that certain health care needs can be provided without the need for a physical exam.  This service lets us provide the care you need with a short phone conversation.  If a prescription is necessary we can send it directly to your pharmacy.  If lab work is needed we can place an order for that and you can then stop by our lab to have the test done at a later time.    Telephone visits are billed at different rates depending on your insurance coverage. During this emergency period, for some insurers they may be billed the same as an in-person visit.  Please reach out to your insurance provider with any questions.    If during the course of the call the physician/provider feels a telephone visit is not appropriate, you will not be charged for this service.\"    Patient has given verbal consent to a Telephone visit? Yes    Thea Acosta complains of    Chief Complaint   Patient presents with     Hand Pain     left hand, no known injury - 3rd, 4th and 5th digit a bit red, swelling - maybe gout     Ankle Pain     right ankle, reddness, slight swelling , no known injury, difficult to stand due to discomfort       I have reviewed and updated the patient's Past Medical History, Social History, Family History and Medication List.    ALLERGIES  Codeine; Codeine phosphate (bulk); Codeine sulfate; Codeine-butalbital-asa-caff; Codeine-guaifenesin; Lisinopril; and Penicillins     (twice) now since 3/24/07 (\"Gal\" - renal cell CA) - remarried 3/8/82   3 sons - Nate Mckoy (); Edwin (head of work force center in [facility]; Amrit Mckoy works at the post office (I see him now as a patient)   Son-in-law Esdras Mo   2 daughters - breast cancer   Granddaughter - Terri " "- plays the mary   New granddaughter - Patrizia?   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  Patient describes right hand pain and swelling.  Involvement of small finger, ring finger and middle finger.  Mild numbness perhaps as well.  Recent evaluation 2020 with uric acid level of 10.5.  Right wrist swelling previously with some improvement however still sore.  Right ankle discomfort noted.  Patient does have underlying history of diabetes chronic diastolic heart failure, pulmonary hypertension and chronic kidney disease with recent GFR 31.  Breathing's been stable with moderate persistent asthma history.  Comprehensive review of systems as above otherwise all negative.        Assessment/Plan:    1. Chronic gout with tophus, unspecified cause, unspecified site  Chronic gout with recent exacerbation.  Colchicine 0.6 mg use 2 tablet at onset then 1 tablet 1 hour later.  Notify if ongoing symptomatic issues.  Use sparingly.  Monitor closely with history of CKD.  - colchicine 0.6 mg tablet; take two tablets (1.2 mg) by mouth at onset of gout symptoms, then repeat one tablet (0.6 mg) by mouth one hour later  Dispense: 24 tablet; Refill: 2    2. Type 2 diabetes mellitus with stage 3 chronic kidney disease, without long-term current use of insulin (H)  Continue diabetes management " with prior A1c of 7%.  Anticipate reassessment in office likely July 2020.    3. Acute on chronic diastolic heart failure (H)  Diastolic heart failure noted.  Continues Bumex 2 mg twice daily and remains off metolazone 2.5 mg daily.  Had referred patient previously for compression stocking with zipper in order to improve utilization.        Phone call duration:  12 minutes    Gamal Hdz MD

## 2021-06-07 NOTE — TELEPHONE ENCOUNTER
To reduce the risk of the COVID-19 outbreak pt is offered to reschedule/ or a telephone visit.    .Pt son would like to be call regarding the telephone visit for his mother live in [place] but he live in [place].    Appt is rescheduled to MAY 5 10:30.  Patient son have some question regarding her xray and labs.    Dr Canchola did reviewed the xray and lab. I did call the patient son to relay message. Per Dr. Canchola she has advanced Osteoarthritis causes joint pain, she may have gout but unsure we will  need to asp and send in the fluid for testing along with injection but pt declined the option. She can only take Tylenol.      Son name is Nate Mckoy is asking if Dr. Canchola can just call him to go over the lab and xray result.

## 2021-06-07 NOTE — TELEPHONE ENCOUNTER
Who is calling:  Christen from [facility]   Reason for Call:    1. Caller is requesting that a current medication list be sent to the [facility] pharmacy #[id number].  2. Caller will send over a copy of the patient's current medication list that she has for home care.   3. The caller would like the current medication list reviewed and clarify it matches what the patient should be taking along with correct dosages.   This writer reviewed the dose on the Bumex and the Potasium Chloride with the caller from the patient's current medication list.   Date of last appointment with primary care:   Okay to leave a detailed message: Yes

## 2021-06-08 NOTE — TELEPHONE ENCOUNTER
Orders being requested: Skilled nursing 1 visit monthly no end date.  HHA 3 hours per week, no end date  Reason service is needed/diagnosis: General assessment and ADL's.  When are orders needed by: asap  Where to send Orders: Phone:  Christen at [phone]  Okay to leave detailed message?  Yes        Weight has been stable since increase of bumex.dose.   Nurse is wondering if provider wants to continue on the same dose.    Also patient had a fall last evening getting up out of her wheel chair. No injuries notes.

## 2021-06-08 NOTE — TELEPHONE ENCOUNTER
We will need to schedule a face to face office visit for her.  Can you call her to schedule with an available provider?

## 2021-06-08 NOTE — TELEPHONE ENCOUNTER
Spoke with Christen.  Will increase Bumex from 2 mg twice daily up to 3 mg twice daily as directed.  Christen will call back with update in patient weight and symptom management at next scheduled follow-up.

## 2021-06-08 NOTE — TELEPHONE ENCOUNTER
Orders being requested: New wheelchair.  Reason service is needed/diagnosis: The wheelchair that patient has, the brakes are not working.  When are orders needed by: asap  Where to send Orders: Medical supply in [facility] that delivers.  Okay to leave detailed message?  Yes

## 2021-06-08 NOTE — TELEPHONE ENCOUNTER
Who is calling:  Christen   Reason for Call:  Caller stated that patient had a weight increase 10%. Caller would like a call back to go over any medication changes for the patient.   Date of last appointment with primary care:   Okay to leave a detailed message: Yes  [phone]

## 2021-06-09 ENCOUNTER — RECORDS - HEALTHEAST (OUTPATIENT)
Dept: ADMINISTRATIVE | Facility: CLINIC | Age: 86
End: 2021-06-09

## 2021-06-09 ENCOUNTER — RECORDS - HEALTHEAST (OUTPATIENT)
Dept: CARDIOLOGY | Facility: CLINIC | Age: 86
End: 2021-06-09

## 2021-06-09 DIAGNOSIS — Z45.010 ENCOUNTER FOR CHECKING AND TESTING OF CARDIAC PACEMAKER PULSE GENERATOR (BATTERY): ICD-10-CM

## 2021-06-09 DIAGNOSIS — I44.2 ATRIOVENTRICULAR BLOCK, COMPLETE (H): ICD-10-CM

## 2021-06-09 NOTE — PROGRESS NOTES
[facility]    DATE: 3/7/2017  NAME: Thea Acosta  : 1934           MR# [id number]     CODE STATUS:  FULL CODE  VISIT TYPE: Admission  FACILITY: [facility] [[id number]]    ROOM: 329  PRIMARY CARE PROVIDER: Gamal Hdz MD Phone: [phone] Fax:[phone]    History of Present Illness:   Thea Acosta is a 82 y.o. female with A fall on 3/1/17 with Rodding of the intertrochanteric fracture on the right also on 3/1/17.She is eager to regain her strength and return home where she lives independently although she has significant help from a 20-year-old granddaughter and her son lives in the house next door. Her comorbidities include mild asthma history of bioprosthetic aortic valve in  left knee replacement and pacemaker placement for 3rd  heart block. Today she's feeling vigorous but limited by the recent hip surgery not unexpectedly.  Surgery has cleared her for weight-bearing as tolerated and given her very rigorous appearance I would suspect rapid rehabilitation and returned to independent living is more than just a goal.    Past Medical History:  Past Medical History:   Diagnosis Date     Acute kidney injury superimposed on CKD 3/3/2017     Asthma      CAD (coronary artery disease)      Cataract      Diabetes mellitus      Disease of thyroid gland      History of transfusion      Hypertension        Past Surgical History:  Past Surgical History:   Procedure Laterality Date     APPENDECTOMY       CARDIAC SURGERY       CHOLECYSTECTOMY       EYE SURGERY Bilateral     Cataracts     HIP PINNING Right 3/1/2017    Procedure: RIGHT HIP TROCHANTERIC RODDING;  Surgeon: Moshe Davies MD;  Location: [facility];  Service:      JOINT REPLACEMENT Left     knee       Allergies:  Allergies   Allergen Reactions     Codeine      Lisinopril Cough     Penicillins Swelling       Social History:  Social History     Social History     Marital status:   "    Spouse name: N/A     Number of children: N/A     Years of education: N/A     Occupational History      in operating room      retired     Social History Main Topics     Smoking status: Former Smoker     Smokeless tobacco: Never Used     Alcohol use No     Drug use: No     Sexual activity: No     Other Topics Concern     Not on file     Social History Narrative    She live in a single floor house.  Her son lives next door and her granddaughter is \"around a lot\"   3/2017       Family History:  Family History   Problem Relation Age of Onset     No Medical Problems Mother      age 86     No Medical Problems Father      MVA     Cancer Brother      lung     No Medical Problems Brother        Current Medications:  Current Outpatient Prescriptions   Medication Sig     acetaminophen (TYLENOL) 500 MG tablet Take 500 mg by mouth every 6 (six) hours as needed for pain.     amLODIPine (NORVASC) 10 MG tablet Take 5 mg by mouth daily.     aspirin 81 MG EC tablet Take 81 mg by mouth daily.     atorvastatin (LIPITOR) 80 MG tablet Take 80 mg by mouth bedtime.     cholecalciferol, vitamin D3, (VITAMIN D3) 2,000 unit Tab Take 2,000 Units by mouth once daily.     fluticasone-salmeterol (ADVAIR) 500-50 mcg/dose DISKUS Inhale 1 puff 2 (two) times a day.     folic acid (FOLVITE) 1 MG tablet Take 1 mg by mouth daily.     furosemide (LASIX) 40 MG tablet Take 1 tablet (40 mg total) by mouth every morning.     ipratropium-albuterol (COMBIVENT RESPIMAT)  mcg/actuation Aero inhaler Inhale 1 puff 4 (four) times a day as needed (Max 6 puffs per 24 hours).     levothyroxine (SYNTHROID, LEVOTHROID) 125 MCG tablet Take 125 mcg by mouth daily.     losartan (COZAAR) 100 MG tablet Take 100 mg by mouth daily.     metoprolol tartrate (LOPRESSOR) 100 MG tablet Take 150 mg by mouth 2 (two) times a day.     omeprazole (PRILOSEC) 20 MG capsule Take 20 mg by mouth Daily before breakfast.     polyethylene glycol (MIRALAX) 17 gram packet " Take 1 packet (17 g total) by mouth daily for 3 days.     senna-docusate (PERICOLACE) 8.6-50 mg tablet Take 1 tablet by mouth 2 (two) times a day.     traMADol (ULTRAM) 50 mg tablet Take 1 tablet (50 mg total) by mouth every 6 (six) hours as needed. (Patient taking differently: Take 25 mg by mouth every 6 (six) hours as needed. )     warfarin (COUMADIN) 2.5 MG tablet Take 2 tablets (5 mg) by mouth daily. Adjust dose based on INR results as directed.       Review of Systems:  History obtained from chart review and the patient  General ROS: positive for  - fatigue  negative for - chills or fever  Psychological ROS: negative for - concentration difficulties, disorientation, hallucinations or memory difficulties  Ophthalmic ROS: negative for - decreased vision, double vision or loss of vision  ENT ROS: negative for - hearing change or sore throat  Breast ROS: negative for breast lumps  Respiratory ROS: no cough, shortness of breath, or wheezing  Cardiovascular ROS: no chest pain or dyspnea on exertion  Gastrointestinal ROS: no abdominal pain, change in bowel habits, or black or bloody stools  Genito-Urinary ROS: no dysuria, trouble voiding, or hematuria  Musculoskeletal ROS: Weakness in the right leg consistent with surgical intervention  Neurological ROS: no TIA or stroke symptoms  Dermatological ROS: I noted abrasion on her left knee that she relates to the fall       Physical Examination:  Visit Vitals     /67     Pulse 62     Temp (!) 96.3  F (35.7  C)     Resp 19     SpO2 95%     General appearance: alert, appears stated age, cooperative, flushed, moderate distress and morbidly obese  Head: Normocephalic, without obvious abnormality, atraumatic  Eyes: conjunctivae/corneas clear. PERRL, EOM's intact. Fundi benign.  Nose: Nares normal. Septum midline. Mucosa normal. No drainage or sinus tenderness.  Throat: lips, mucosa, and tongue normal; teeth and gums normal  Neck: no adenopathy, no carotid bruit, no JVD,  supple, symmetrical, trachea midline and thyroid not enlarged, symmetric, no tenderness/mass/nodules  Back: symmetric, no curvature. ROM normal. No CVA tenderness.  Lungs: clear to auscultation bilaterally  Breasts: normal appearance, no masses or tenderness  Heart: regular rate and rhythm, S1, S2 normal, no murmur, click, rub or gallop  Abdomen: soft, non-tender; bowel sounds normal; no masses,  no organomegaly and morbidly obese  Extremities: abrasion on left knee right hip minimally swollen very difficult for her to lift very much consistent with recent surgical procedure  Pulses: 2+ and symmetric  Skin: Skin color, texture, turgor normal. No rashes or lesions  Neurologic: Mental status: Alert, oriented, thought content appropriate  Motor:diminished in operated right lower extremity otherwise good       Impression:  Thea Acosta is a 82 y.o. female with Closed Intertrochanteric fracture, status post rodding    1. Closed intertrochanteric fracture of hip, right, with routine healing, subsequent encounter     2. S/P ORIF (open reduction internal fixation) fracture     3. Morbid obesity due to excess calories     4. CKD (chronic kidney disease), stage III     5. Essential hypertension with goal blood pressure less than 140/90     6. Paroxysmal atrial fibrillation     7. Atherosclerosis of coronary artery of native heart without angina pectoris, unspecified vessel or lesion type     8. Moderate persistent asthma without complication     9. History of aortic stenosis     10. Aortic valve replaced     11. Complete heart block     12. Cardiac pacemaker in situ           Plan: Will work with her to maximize her strength and minimize her length of stay is that is very much in her direct interest. She is very resistant to the idea of strong pain medicines having had a hallucinations in the hospital    Electronically signed by: Gregg Panchal Sr., MD

## 2021-06-09 NOTE — PROGRESS NOTES
[facility]    DATE: 3/14/2017    NAME: Thea Acosta  : 1934           MR# [id number]     CODE STATUS:  FULL CODE      VISIT TYPE: Problem   FACILITY: [facility] [[id number]]    ROOM: 328    PRIMARY CARE PROVIDER: Gamal Hdz MD Phone: [phone] Fax:[phone]    History of Present Illness:   Thea Acosta is a 82 y.o. female with 3/1/17 Rodding of an intertrochanteric fracture of the right hip. She prefers sleeping in a recliner in her bed is therefore been removed from her room to give her more access to the space. Today is revisions he seems quite comfortable although she admits it's a little hot in the window from the sun outside and request the blind to be drawn. She has been in the [facility] suited suture removal and staple removal but reports she's doing well  She's had an INR slightly elevated at 3.2 and therefore I will be cutting her Coumadin down to 4 mg day rather than for alternating with five. She is quite comfortable sitting in her chair and visiting with her relatives and friends today    Past Medical History:  Past Medical History:   Diagnosis Date     Acute kidney injury superimposed on CKD 3/3/2017     Asthma      CAD (coronary artery disease)      Cataract      Diabetes mellitus      Disease of thyroid gland      History of transfusion      Hypertension        Allergies:  Allergies   Allergen Reactions     Codeine      Lisinopril Cough     Penicillins Swelling       Current Medications:  Current Outpatient Prescriptions   Medication Sig     acetaminophen (TYLENOL) 500 MG tablet Take 500 mg by mouth every 6 (six) hours as needed for pain.     amLODIPine (NORVASC) 10 MG tablet Take 5 mg by mouth daily.     aspirin 81 MG EC tablet Take 81 mg by mouth daily.     atorvastatin (LIPITOR) 80 MG tablet Take 80 mg by mouth bedtime.     cholecalciferol, vitamin D3, (VITAMIN D3) 2,000 unit Tab Take 2,000 Units by mouth once daily.      fluticasone-salmeterol (ADVAIR) 500-50 mcg/dose DISKUS Inhale 1 puff 2 (two) times a day.     folic acid (FOLVITE) 1 MG tablet Take 1 mg by mouth daily.     furosemide (LASIX) 40 MG tablet Take 1 tablet (40 mg total) by mouth every morning.     ipratropium-albuterol (COMBIVENT RESPIMAT)  mcg/actuation Aero inhaler Inhale 1 puff 4 (four) times a day as needed (Max 6 puffs per 24 hours).     levothyroxine (SYNTHROID, LEVOTHROID) 125 MCG tablet Take 125 mcg by mouth daily.     losartan (COZAAR) 100 MG tablet Take 100 mg by mouth daily.     melatonin 3 mg Tab tablet Take 3 mg by mouth bedtime as needed.     metoprolol tartrate (LOPRESSOR) 100 MG tablet Take 150 mg by mouth 2 (two) times a day.     nystatin (MYCOSTATIN) powder Apply topically 2 (two) times a day.     omeprazole (PRILOSEC) 20 MG capsule Take 20 mg by mouth Daily before breakfast.     traMADol (ULTRAM) 50 mg tablet Take 1 tablet (50 mg total) by mouth every 6 (six) hours as needed. (Patient taking differently: Take 25 mg by mouth every 6 (six) hours as needed. )     WARFARIN SODIUM (WARFARIN ORAL) Take by mouth. 4 mg daily       Review of Systems:  History obtained from chart review and the patient  Respiratory ROS: no cough, shortness of breath, or wheezing  Cardiovascular ROS: no chest pain or dyspnea on exertion  Gastrointestinal ROS: no abdominal pain, change in bowel habits, or black or bloody stools  Genito-Urinary ROS: no dysuria, trouble voiding, or hematuria  Musculoskeletal ROS: As would be expected right leg is slightly tender and swollen  With pain on motion but improving  Neurological ROS: no TIA or stroke symptoms  Dermatological ROS: despite reports of slight green drainage wound is been evaluated by orthopedics         Laboratory:  No results for input(s): INR in the last 72 hours.       Physical Examination:  Visit Vitals     /57     Pulse 60     Temp 96.8  F (36  C)     Resp 19     SpO2 97%     General appearance: alert,  appears stated age, cooperative, distracted, fatigued, mild distress, morbidly obese and slowed mentation  Neck: no adenopathy, no carotid bruit, no JVD, supple, symmetrical, trachea midline and thyroid not enlarged, symmetric, no tenderness/mass/nodules  Lungs: clear to auscultation bilaterally  Heart: regular rate and rhythm, S1, S2 normal, no murmur, click, rub or gallop  Abdomen: obese  Extremities: extremities normal, atraumatic, no cyanosis or edema  Pulses: 2+ and symmetric  Skin: Skin color, texture, turgor normal. No rashes or lesions  Neurologic: Mental status: Alert, oriented, thought content appropriate  Motor:with the exception of the right hip symmetrical strength and motion       Impression:  Thea Acosta is a 82 y.o. female with 3/1/17 Intra-medullary rodding of a intertrochanteric fracture of the right hip    1. Closed intertrochanteric fracture of hip, right, with routine healing, subsequent encounter     2. S/P ORIF (open reduction internal fixation) fracture     3. Morbid obesity due to excess calories     4. CKD (chronic kidney disease), stage III     5. Essential hypertension with goal blood pressure less than 140/90     6. Paroxysmal atrial fibrillation     7. Moderate persistent asthma without complication     8. History of aortic stenosis     9. Aortic valve replaced     10. Complete heart block     11. Cardiac pacemaker in situ     12. Hypercholesterolemia     13. Vitamin D deficiency     14. Restless Legs Syndrome         Plan: Continue with aggressive rehabilitation in hopes of allowing her to return to her prior living situation she seems to be making wonderful progress.    Electronically signed by: Gregg Panchal Sr., MD

## 2021-06-09 NOTE — ANESTHESIA PROCEDURE NOTES
Spinal Block    Patient location during procedure: OR  Start time: 3/1/2017 3:25 PM  End time: 3/1/2017 3:35 PM  Reason for block: primary anesthetic    Staffing:  Performing  Anesthesiologist: RAYNA GONCALVES    Preanesthetic Checklist  Completed: patient identified, risks, benefits, and alternatives discussed, timeout performed, consent obtained, airway assessed, oxygen available, suction available, emergency drugs available and hand hygiene performed  Spinal Block  Patient position: left lateral decubitus  Prep: ChloraPrep  Patient monitoring: heart rate, cardiac monitor, continuous pulse ox and blood pressure  Approach: midline  Location: L2-3  Injection technique: single-shot  Needle type: Quincke   Needle gauge: 22 G      Additional Notes:  [id number] exp 18  Subarachnoid Block for Operative Procedure    Informed consent was obtained.  Benefit, alternatives, and risks of procedure were discussed including but not limited to the risks of infection, bleeding, temporary or permanent nerve injury, headache, seizure, cardio-respiratory arrest, and death.  All questions answered.  Patient agreed to proceed.  Procedural pause was completed prior to commencement of procedure.       Midazolam and Ketamine were provided for light-to-moderate sedation and patient comfort along with supplemental oxygen and continuous monitoring.  The patient remained arousable throughout the procedure.    The patient was placed in the LLD position.  A mask, sterile gloves, and Spinocan Spinal Tray ([id number]) were used.  After identification of appropriate landmarks, aseptic preparation of the lumbar spine was completed using  Chloraprep solution.  A 25 gauge, 1.5 inch needle was used to inject a total of 3 mL of 1% Lidocaine to anesthetize the skin and deeper tissues at the level of spinal injection.    .   A 4 inch, 22g gauge Spinocan needle was gently placed   to access the intrathecal space.  No pain or paresthesias were noted by patient  during needle placement.  Clear cerebrospinal fluid flowed freely out of the needle and in all four quadrants.  No blood evident.  A total of 3 mL of 0.5% PF Bupivacaine  was then injected slowly without paresthesia or pain.  The spinal needle was removed upon completion of medication injection.  The patient was returned to supine position.  The patient tolerated the procedure well.

## 2021-06-09 NOTE — PROGRESS NOTES
"Thea Acosta is a 86 y.o. female who is being evaluated via a billable telephone visit.      The patient has been notified of following:     \"This telephone visit will be conducted via a call between you and your physician/provider. We have found that certain health care needs can be provided without the need for a physical exam.  This service lets us provide the care you need with a short phone conversation.  If a prescription is necessary we can send it directly to your pharmacy.  If lab work is needed we can place an order for that and you can then stop by our lab to have the test done at a later time.    Telephone visits are billed at different rates depending on your insurance coverage. During this emergency period, for some insurers they may be billed the same as an in-person visit.  Please reach out to your insurance provider with any questions.    If during the course of the call the physician/provider feels a telephone visit is not appropriate, you will not be charged for this service.\"    Patient has given verbal consent to a Telephone visit? Yes    What phone number would you like to be contacted at? [phone]     Patient would like to receive their AVS by AVS Preference: [place].      ASSESSMENT AND PLAN:    Diagnoses and all orders for this visit:    Polyarthralgia  -     predniSONE (DELTASONE) 2.5 MG tablet; Take 7.5 mg/d prednisone PO for 3 weeks.  Dispense: 90 tablet; Refill: 0    Inflammatory polyarthritis (H)  -     predniSONE (DELTASONE) 2.5 MG tablet; Take 7.5 mg/d prednisone PO for 3 weeks.  Dispense: 90 tablet; Refill: 0    CKD (chronic kidney disease), stage III (H)    Hyperuricemia          HISTORY OF PRESENTING ILLNESS:  Thea Acosta 86 y.o. is evaluated here via phone link.  She has inflammatory joint disease of undifferentiated type.  Given her uricemia and other findings gout was 1 of the suspects.  We had discussed pseudogout.  She reports that unlike the past visits where she " had mostly pain in her elbows that has resolved and for the past few weeks she has been hurting in both her hands, her fingers are swollen she cannot use them for day-to-day activities these are quite a bit painful.  She reports no history of psoriasis ulcerative colitis Crohn's disease while she has several comorbidities as noted.  It is hard for her to come in for a person visit.  Given the limitations of evaluation on the phone, for now the priority is going to be to make her more comfortable, to this and discussed options.  She has taken over-the-counter measures without help.  She is not a candidate for nonsteroidals in view of her comorbidities.  Prednisone has its own risks in her but perhaps is a more tolerable and more effective option she is agreeable to it take 7-1/2 mg daily for 3 weeks, and we will revisit this in that time on the phone preferably in person if she can.  ROS enquiry held for fever, ocular symptoms, rash, headache,  GI issues.  Today we also discussed the issues related to the current pandemic, the pros and cons of the current treatment plan, the CDC guidelines such as social distancing washing the hands covering the cough.  ALLERGIES:Codeine; Codeine phosphate (bulk); Codeine sulfate; Codeine-butalbital-asa-caff; Codeine-guaifenesin; Lisinopril; and Penicillins    PAST MEDICAL/ACTIVE PROBLEMS/MEDICATION/SOCIAL DATA  Past Medical History:   Diagnosis Date     Acute kidney injury superimposed on CKD (H) 3/3/2017     Asthma      CAD (coronary artery disease)      Cataract      Chronic kidney disease     ckd3     Diabetes mellitus (H)      Disease of thyroid gland      H/O heart valve replacement with bioprosthetic valve      History of transfusion      Hypertension      Normochromic normocytic anemia      Pulmonary hypertension (H) 09/27/2019    Noted on echocardiogram     Restless leg syndrome      Social History     Tobacco Use   Smoking Status Former Smoker     Packs/day: 0.00     Last  attempt to quit: 1950     Years since quittin.8   Smokeless Tobacco Never Used     Patient Active Problem List   Diagnosis     Constipation     Sciatica     Hypothyroidism     Type 2 diabetes mellitus with stage 3 chronic kidney disease, without long-term current use of insulin (H)     Hyperparathyroidism     Vitamin D deficiency     Mixed hyperlipidemia     Atherosclerosis of native coronary artery of native heart without angina pectoris     CKD (chronic kidney disease), stage III (H)     Osteoarthritis Of The Knee     Restless Legs Syndrome     H/O heart valve replacement with bioprosthetic valve     Cardiac pacemaker in situ     Complete heart block (H)     Normochromic normocytic anemia     Essential hypertension with goal blood pressure less than 140/90     Moderate persistent asthma without complication     PAF (paroxysmal atrial fibrillation) (H)     GERD (gastroesophageal reflux disease)     Primary insomnia     Hypokalemia     Physical deconditioning     Asthma     CHF (congestive heart failure), NYHA class I, acute, systolic (H)     Pulmonary hypertension (H)     Atrial fibrillation, unspecified type (H)     Status post aortic valve replacement     Non-rheumatic mitral valve stenosis     Varicella zoster     Acute kidney injury superimposed on chronic kidney disease (H)     Nonrheumatic mitral valve stenosis     PVD (peripheral vascular disease) (H)     Lymphedema     Current Outpatient Medications   Medication Sig Dispense Refill     acetaminophen (TYLENOL) 500 MG tablet Take 2 tablets (1,000 mg total) by mouth 3 (three) times a day as needed for pain. Not to exceed 4000 mg in 24 hours.  0     aspirin 81 MG EC tablet Take 81 mg by mouth daily.       atorvastatin (LIPITOR) 80 MG tablet TAKE 1 TABLET(80 MG) BY MOUTH DAILY 90 tablet 3     bumetanide (BUMEX) 2 MG tablet Take 1.5 tablets (3 mg total) by mouth 2 (two) times a day at 9am and 6pm. 270 tablet 2     cholecalciferol, vitamin D3, (VITAMIN  D3) 2,000 unit Tab Take 2,000 Units by mouth once daily.       ferrous sulfate 325 (65 FE) MG tablet TAKE 1 TABLET(325 MG) BY MOUTH TWICE DAILY (Patient taking differently: Take 1 tablet by mouth daily with breakfast. ) 180 tablet 3     ipratropium-albuteroL (COMBIVENT RESPIMAT)  mcg/actuation Mist inhaler INHALE 1 PUFF BY MOUTH FOUR TIMES DAILY 12 g 3     ipratropium-albuteroL (DUO-NEB) 0.5-2.5 mg/3 mL nebulizer Take 3 mL by nebulization every 6 (six) hours as needed. 25 vial 2     levothyroxine (SYNTHROID, LEVOTHROID) 150 MCG tablet TAKE 1 TABLET BY MOUTH DAILY AT 6:00 AM 90 tablet 3     lidocaine (XYLOCAINE) 5 % ointment Apply to painful areas 35.44 g 0     nystatin (MYCOSTATIN) powder Apply topically 4 (four) times a day. 60 g 2     omeprazole (PRILOSEC) 20 MG capsule Take 1 capsule (20 mg total) by mouth daily before breakfast. 90 capsule 3     polyethylene glycol (MIRALAX) 17 gram packet Take 17 g by mouth daily.       potassium chloride (K-DUR,KLOR-CON) 20 MEQ tablet Take 40 mEq by mouth 2 (two) times a day.        senna (SENOKOT) 8.6 mg tablet Take 1 tablet by mouth daily as needed for constipation.       white petrolatum (AQUAPHOR ORIGINAL) 41 % Oint Apply 1 application topically at bedtime.       colchicine 0.6 mg tablet take two tablets (1.2 mg) by mouth at onset of gout symptoms, then repeat one tablet (0.6 mg) by mouth one hour later 24 tablet 2     metoprolol tartrate (LOPRESSOR) 25 MG tablet Take 0.5 tablets (12.5 mg total) by mouth 2 (two) times a day. 30 tablet 0     No current facility-administered medications for this visit.          EXAMINATION: She sounded comfortable, not short of breath, does not appear to be in pain alert oriented speech is fluent.    LAB / IMAGING DATA:  ALT   Date Value Ref Range Status   01/14/2020 <9 0 - 45 U/L Final   09/26/2019 <9 0 - 45 U/L Final   02/07/2018 10 0 - 45 U/L Final     Albumin   Date Value Ref Range Status   01/14/2020 2.8 (L) 3.5 - 5.0 g/dL Final    01/05/2020 3.0 (L) 3.5 - 5.0 g/dL Final   09/26/2019 3.4 (L) 3.5 - 5.0 g/dL Final     Creatinine   Date Value Ref Range Status   02/26/2020 1.57 (H) 0.60 - 1.10 mg/dL Final   02/07/2020 2.07 (H) 0.60 - 1.10 mg/dL Final   02/03/2020 2.07 (H) 0.60 - 1.10 mg/dL Final       WBC   Date Value Ref Range Status   02/26/2020 5.1 4.0 - 11.0 thou/uL Final   01/20/2020 6.5 4.0 - 11.0 thou/uL Final   07/15/2015 7.8 4.0 - 11.0 thou/uL Final   07/15/2014 6.9 4.0 - 11.0 thou/uL Final     Hemoglobin   Date Value Ref Range Status   02/26/2020 10.7 (L) 12.0 - 16.0 g/dL Final   01/20/2020 11.0 (L) 12.0 - 16.0 g/dL Final   01/14/2020 11.3 (L) 12.0 - 16.0 g/dL Final     Platelets   Date Value Ref Range Status   02/26/2020 164 140 - 440 thou/uL Final   01/20/2020 177 140 - 440 thou/uL Final   01/14/2020 248 140 - 440 thou/uL Final       Lab Results   Component Value Date    RF <15.0 03/09/2020    SEDRATE 39 (H) 09/25/2019     Duration of the call:5  Minutes  Call start: 435  pm  Call end:   439pm

## 2021-06-09 NOTE — PROGRESS NOTES
[facility]    DATE: 3/23/2017    NAME: Thea Acosta  : 1934           MR# [id number]     CODE STATUS:  FULL CODE      VISIT TYPE: Problem   FACILITY: [facility] [[id number]]    ROOM: 324    PRIMARY CARE PROVIDER: Gamal Hdz MD Phone: [phone] Fax:[phone]    History of Present Illness:   Thea Acosta is a 82 y.o. female with 3/1/17 rotting of the intertrochanteric fracture of the right hip. She has had some difficulty breathing since being back from the hospital as a continuation of symptoms she showed in the hospital. We have noted fluid overload and increased your diuretic although she complains of nocturia. As of today I have increased her COPD treatment and added an antibiotic in an effort to control her flair of COPD perhaps complicated by heart failure with fluid retention. She denies any shortness of breath but shows especially upper lobe wheezing chest x-ray is unrevealing for acute infiltrate that might suggest ammonia but she has persistent edema that has shown only slight improvement after an extra days diuretic treatment. I would expect with a more aggressive treatment for her underlying COPD and continuation of the diuretics that we should see significant improvement in her respiratory status that would allow continued improvement in her functional status post hip operation.    Past Medical History:  Past Medical History:   Diagnosis Date     Acute kidney injury superimposed on CKD 3/3/2017     Asthma      CAD (coronary artery disease)      Cataract      Diabetes mellitus      Disease of thyroid gland      History of transfusion      Hypertension        Allergies:  Allergies   Allergen Reactions     Codeine      Lisinopril Cough     Penicillins Swelling       Current Medications:  Current Outpatient Prescriptions   Medication Sig     acetaminophen (TYLENOL) 500 MG tablet Take 500 mg by mouth every 6 (six) hours as needed for pain.      aspirin 81 MG EC tablet Take 81 mg by mouth daily.     atorvastatin (LIPITOR) 80 MG tablet Take 80 mg by mouth bedtime.     cholecalciferol, vitamin D3, (VITAMIN D3) 2,000 unit Tab Take 2,000 Units by mouth once daily.     fluticasone-salmeterol (ADVAIR) 500-50 mcg/dose DISKUS Inhale 1 puff 2 (two) times a day.     folic acid (FOLVITE) 1 MG tablet Take 1 mg by mouth daily.     furosemide (LASIX) 40 MG tablet Take 1 tablet (40 mg total) by mouth every morning. (Patient taking differently: Take 40 mg by mouth 2 (two) times a day at 9am and 6pm. )     ipratropium-albuterol (COMBIVENT RESPIMAT)  mcg/actuation Aero inhaler Inhale 1 puff 4 (four) times a day.      levothyroxine (SYNTHROID, LEVOTHROID) 125 MCG tablet Take 125 mcg by mouth daily.     loperamide (IMODIUM A-D) 2 mg tablet Take 2 mg by mouth 4 (four) times a day as needed for diarrhea.     losartan (COZAAR) 100 MG tablet Take 100 mg by mouth daily.     melatonin 3 mg Tab tablet Take 3 mg by mouth bedtime as needed.     metoprolol tartrate (LOPRESSOR) 100 MG tablet Take 150 mg by mouth 2 (two) times a day.     nystatin (MYCOSTATIN) powder Apply topically 2 (two) times a day.     omeprazole (PRILOSEC) 20 MG capsule Take 20 mg by mouth Daily before breakfast.     oseltamivir (TAMIFLU) 75 MG capsule Take 75 mg by mouth daily.     traMADol (ULTRAM) 50 mg tablet Take 25 mg by mouth every 6 (six) hours as needed for pain.     WARFARIN SODIUM (WARFARIN ORAL) Take 3.5 mg by mouth. 3.5 mg daily next INR 3/21/17       Review of Systems:  History obtained from chart review and the patient  Respiratory ROS: She denies respiratory symptoms but nursing service notes some dyspnea on exertion and wheezing on exam  Cardiovascular ROS:Dyspnea on exertion is acknowledged unfortunately her body habitus makes that inevitable  Gastrointestinal ROS: no abdominal pain, change in bowel habits, or black or bloody stools  Genito-Urinary ROS: no dysuria, trouble voiding, or  hematuria  Musculoskeletal ROS: Improvement in strength and function of the right hip although slow  Neurological ROS: no TIA or stroke symptoms  Dermatological ROS: significant panniculus area fungal infection treated with antifungals and efforts to dry the area. Unfortunately her habit of sleeping in an recliner instead of a bed makes it harder to air out the area underneath her abdominal skinfold         Laboratory:  No results for input(s): INR in the last 72 hours.  At the time of this dictation recent BUN creatinine are pending chest x-ray is as above    Physical Examination:  /60  Pulse 62  Temp 97.8  F (36.6  C)  Resp 18  Wt (!) 279 lb 3.2 oz (126.6 kg)  SpO2 93%  BMI 43.73 kg/m2  General appearance: alert, appears stated age, cooperative, fatigued, flushed, mild distress, morbidly obese and slowed mentation  Neck: no adenopathy, no carotid bruit, no JVD, supple, symmetrical, trachea midline and thyroid not enlarged, symmetric, no tenderness/mass/nodules  Lungs: clear to auscultation bilaterally  Heart: regular rate and rhythm, S1, S2 normal, no murmur, click, rub or gallop  Abdomen: tinea corporus under the abdominal wall skin panniculus  Extremities:Persistent edema in both lower extremities but increasing function on the right hip with residual pain and tenderness noted  Pulses: 2+ and symmetric  Skin: she has the tinea corporis with inflammation and the entire skin talk contact area under the panniculus honorable, wall  Neurologic: Grossly normal       Impression:  Thea Acosta is a 82 y.o. female with Recent hip fracture surgery, COPD, fluid overload    1. Closed intertrochanteric fracture of hip, right, with routine healing, subsequent encounter     2. S/P ORIF (open reduction internal fixation) fracture     3. Morbid obesity due to excess calories     4. CKD (chronic kidney disease), stage III     5. Essential hypertension with goal blood pressure less than 140/90     6. Paroxysmal  atrial fibrillation     7. Moderate persistent asthma without complication     8. History of aortic stenosis     9. Aortic valve replaced     10. Complete heart block     11. Cardiac pacemaker in situ     12. Hypercholesterolemia     13. Vitamin D deficiency     14. Restless Legs Syndrome     15. Exposure to influenza         Plan: I have continued increase diuretics, will monitor electrolytes, and have tried a burst of treatment for COPD with antibiotics and bronchodilators in hopes of stabilizing her respiratory status    Electronically signed by: Gregg Panchal Sr., MD

## 2021-06-09 NOTE — PROGRESS NOTES
[facility]    DATE: 2017    NAME: Thea Acosta  : 1934           MR# [id number]     CODE STATUS:  FULL CODE      VISIT TYPE: Problem   FACILITY: [facility] [[id number]]    ROOM: 324    PRIMARY CARE PROVIDER: Gamal Hdz MD Phone: [phone] Fax:[phone]    History of Present Illness:   Thea Acosta is a 82 y.o. female with 3/1/17 open reduction internal fixation of intertrochanteric fracture of the right hip. Her recovery from the hip fracture has been obstructed by bouts of fluid retention and flares of COPD. Her functional status with the leg is improving steadily but  Until today with the recent burst of steroids her breathing had remain problematic. She is much less edematous although institution of the steroids may increase or fluid retention. Today however she's breathing normally with no wheezing and no shortness of breath. She remains fundamentally challenged by her body habitus.    Past Medical History:  Past Medical History:   Diagnosis Date     Acute kidney injury superimposed on CKD 3/3/2017     Asthma      CAD (coronary artery disease)      Cataract      Diabetes mellitus      Disease of thyroid gland      History of transfusion      Hypertension        Allergies:  Allergies   Allergen Reactions     Codeine      Lisinopril Cough     Penicillins Swelling       Current Medications:  Current Outpatient Prescriptions   Medication Sig     acetaminophen (TYLENOL) 500 MG tablet Take 500 mg by mouth every 6 (six) hours as needed for pain.     atorvastatin (LIPITOR) 80 MG tablet Take 80 mg by mouth bedtime.     cholecalciferol, vitamin D3, (VITAMIN D3) 2,000 unit Tab Take 2,000 Units by mouth once daily.     fluticasone-salmeterol (ADVAIR) 500-50 mcg/dose DISKUS Inhale 1 puff 2 (two) times a day.     folic acid (FOLVITE) 1 MG tablet Take 1 mg by mouth daily.     furosemide (LASIX) 40 MG tablet Take 1 tablet (40 mg total) by mouth every  morning. (Patient taking differently: Take 40 mg by mouth 2 (two) times a day at 9am and 6pm. 40 am  And 20 noon)     ipratropium-albuterol (COMBIVENT RESPIMAT)  mcg/actuation Aero inhaler Inhale 1 puff 4 (four) times a day.      levothyroxine (SYNTHROID, LEVOTHROID) 125 MCG tablet Take 125 mcg by mouth daily.     loperamide (IMODIUM A-D) 2 mg tablet Take 2 mg by mouth 4 (four) times a day as needed for diarrhea.     losartan (COZAAR) 100 MG tablet Take 100 mg by mouth daily.     melatonin 3 mg Tab tablet Take 3 mg by mouth bedtime as needed.     metoprolol tartrate (LOPRESSOR) 100 MG tablet Take 150 mg by mouth 2 (two) times a day.     nystatin (MYCOSTATIN) powder Apply topically 2 (two) times a day.     omeprazole (PRILOSEC) 20 MG capsule Take 20 mg by mouth Daily before breakfast.     predniSONE (DELTASONE) 5 MG tablet Take 5 mg by mouth daily. Start with 30 mg daily then wean down over 10 days     senna-docusate (PERICOLACE) 8.6-50 mg tablet Take 1 tablet by mouth daily.     traMADol (ULTRAM) 50 mg tablet Take 25 mg by mouth every 6 (six) hours as needed for pain.     WARFARIN SODIUM (WARFARIN ORAL) Take 3.5 mg by mouth. hold     aspirin 81 MG EC tablet Take 81 mg by mouth daily.       Review of Systems:  History obtained from chart review and the patient  Respiratory ROS: no cough, shortness of breath, or wheezing  Cardiovascular ROS: no chest pain or dyspnea on exertion  Gastrointestinal ROS: no abdominal pain, change in bowel habits, or black or bloody stools  Genito-Urinary ROS: no dysuria, trouble voiding, or hematuria  Musculoskeletal ROS: Right hip is still a little stiff but she feels she's making steady improvement in strength  Neurological ROS: no TIA or stroke symptoms  Dermatological ROS: I have not undressed the hip today but I note that nursing service reports good early healing and improvement in her tinea corporus         Laboratory:  No results for input(s): INR in the last 72 hours.        Physical Examination:  /57  Pulse 60  Temp 97.4  F (36.3  C)  Resp 18  Wt (!) 267 lb (121.1 kg)  SpO2 95%  BMI 41.82 kg/m2  General appearance: alert, appears stated age, cooperative, fatigued, no distress, morbidly obese and slowed mentation  Neck: no adenopathy, no carotid bruit, no JVD, supple, symmetrical, trachea midline and thyroid not enlarged, symmetric, no tenderness/mass/nodules  Lungs: clear to auscultation bilaterally  Heart: regular rate and rhythm, S1, S2 normal, no murmur, click, rub or gallop  Abdomen: soft, non-tender; bowel sounds normal; no masses,  no organomegaly Morbidly obese  Extremities: moves extremities symmetrically  Pulses: 2+ and symmetric  Skin: marked improvement in the tinea corporis by nursing service notes she had a visitor and I did not disrobe or today  Neurologic: Mental status: Alert, oriented, thought content appropriate, fixated on a return home at all costs  Motor:steadily improving strength and function       Impression:  Thea Acosta is a 82 y.o. female with Status post open reduction internal fixation of her right hip fracture    1. Closed intertrochanteric fracture of hip, right, with routine healing, subsequent encounter     2. S/P ORIF (open reduction internal fixation) fracture     3. Morbid obesity due to excess calories     4. CKD (chronic kidney disease), stage III     5. Essential hypertension with goal blood pressure less than 140/90     6. Paroxysmal atrial fibrillation     7. History of aortic stenosis     8. Aortic valve replaced     9. Complete heart block     10. Cardiac pacemaker in situ     11. Vitamin D deficiency     12. Restless Legs Syndrome     13. Osteoarthritis Of The Knee     14. Atherosclerosis of coronary artery of native heart without angina pectoris, unspecified vessel or lesion type     15. Tinea corporis         Plan: I believe her lungs are stabilizing finally but I'm concerned about the steroids which have improved her  lungs causing recurrence of fluid retention will monitor carefully. She may be a candidate for low-dose long-term steroid use to keep her lungs more functional    Electronically signed by: Gregg Panchal Sr., MD

## 2021-06-09 NOTE — PROGRESS NOTES
"[facility]    Facility:   [facility] [[id number]]   Code Status: FULL CODE      CHIEF COMPLAINT/REASON FOR VISIT:  Chief Complaint   Patient presents with     Review Of Multiple Medical Conditions       HISTORY:      HPI: Thea is a 82 y.o. female who underwent an ORIF of the right hip on  3/1/17.  She was seen today in her room. Incision is intact with a few surrounding blisters.She is due to have her pacemaker checked and nurse and clinic will do this today. The facility did have the equipment needed. She also carries a diagnosis of vit D deficiency in which she is on replacement, HTN, currently stable with medications, aortic valve replacement,, atrial fibrillation and chronic kidney disease.    Past Medical History:   Diagnosis Date     Acute kidney injury superimposed on CKD 3/3/2017     Asthma      CAD (coronary artery disease)      Cataract      Diabetes mellitus      Disease of thyroid gland      History of transfusion      Hypertension              Family History   Problem Relation Age of Onset     No Medical Problems Mother      age 86     No Medical Problems Father      MVA     Cancer Brother      lung     No Medical Problems Brother      Social History     Social History     Marital status:      Spouse name: N/A     Number of children: N/A     Years of education: N/A     Occupational History      in operating room      retired     Social History Main Topics     Smoking status: Former Smoker     Smokeless tobacco: Never Used     Alcohol use No     Drug use: No     Sexual activity: No     Other Topics Concern     Not on file     Social History Narrative    She live in a single floor house.  Her son lives next door and her granddaughter is \"around a lot\"   3/2017         Review of Systems   Constitutional: Negative for appetite change, chills, fatigue and fever.   HENT: Negative for congestion and sore throat.    Respiratory: Negative for " cough, shortness of breath and wheezing.    Cardiovascular: Positive for leg swelling. Negative for chest pain.   Gastrointestinal: Positive for diarrhea. Negative for abdominal distention, abdominal pain, constipation and nausea.   Genitourinary: Negative for dysuria.   Musculoskeletal: Negative for arthralgias and myalgias.   Skin: Positive for wound. Negative for color change and rash.        Right hip incision   Neurological: Negative for dizziness and weakness.   Psychiatric/Behavioral: Negative for sleep disturbance.       .  Vitals:    03/16/17 0641   BP: 122/59   Pulse: 64   Resp: 20   Temp: (!) 96.2  F (35.7  C)   SpO2: 92%   Weight: (!) 271 lb (122.9 kg)       Physical Exam   Constitutional: She is oriented to person, place, and time. She appears well-developed and well-nourished.   HENT:   Head: Normocephalic.   Eyes: Conjunctivae are normal.   Neck: Normal range of motion.   Cardiovascular: Normal rate, regular rhythm and normal heart sounds.    No murmur heard.  pacemaker   Pulmonary/Chest: No respiratory distress. She has no wheezes. She has rales.   Right lower lobe   Abdominal: Soft. Bowel sounds are normal. She exhibits no distension. There is no tenderness.   Musculoskeletal: Normal range of motion. She exhibits edema.   2+ bilateral lower extremities.   Neurological: She is alert and oriented to person, place, and time.   Skin: Skin is warm.   Bruising bilateral forearms  Right hip incision C/D/I with a few surrounding blisters   Psychiatric: She has a normal mood and affect. Her behavior is normal.         LABS:   INR 3.3    ASSESSMENT:      ICD-10-CM    1. Paroxysmal atrial fibrillation I48.0    2. Shortness of breath R06.02    3. S/P ORIF (open reduction internal fixation) fracture Z96.7     Z87.81    4. Restless Legs Syndrome G25.81        PLAN:    Hypertension- Continue  Amlodipine, lasix, lopressor, losartan  Right hip fracture- Ice BID for 20 minutes, Monitor for S/S  infection.  Anticoagulant management- decrease coumadin to 3.5mg daily and recheck INR on 3/21/1  Encourage and teach pt to use IS. SHe can only get it up 500ml.  Diarrhea- Imodium PRN  Pacemaker- Pt due to have it checked tomorrow, clinic notified that she was in the TCU, Clinic was able to check it today and will do so with the nurse.. The facility had the equipment needed. Unsure of results at this time.    Electronically signed by: Shana Regalado CNP

## 2021-06-09 NOTE — PROGRESS NOTES
2nd attempt, called cell phone no answer. [facility]     Pooja Trevizo CMA [facility] Rheumatology 7/8/2020 2:32 PM

## 2021-06-09 NOTE — ANESTHESIA POSTPROCEDURE EVALUATION
Patient: Thea Acosta  RIGHT HIP TROCHANTERIC RODDING  Anesthesia type: spinal    Patient location: PACU  Last vitals:   Vitals:    03/01/17 1710   BP: 95/48   Pulse: 60   Resp: 12   Temp: 36.1  C (97  F)   SpO2: 99%     Post vital signs: stable  Level of consciousness: awake and responds to simple questions  Post-anesthesia pain: pain controlled  Post-anesthesia nausea and vomiting: no  Pulmonary: unassisted, return to baseline  Cardiovascular: stable and blood pressure at baseline  Hydration: adequate  Anesthetic events: no    QCDR Measures:  ASA# 11 - Connie-op Cardiac Arrest: ASA11B - Patient did NOT experience unanticipated cardiac arrest  ASA# 12 - Connie-op Mortality Rate: ASA12B - Patient did NOT die  ASA# 13 - PACU Re-Intubation Rate: NA - No ETT / LMA used for case  ASA# 10 - Composite Anes Safety: ASA10A - No serious adverse event  ASA# 38 - New Corneal Injury: ASA38A - No new exposure keratitis or corneal abrasion in PACU    Additional Notes:

## 2021-06-09 NOTE — PROGRESS NOTES
[facility]    DATE: 3/21/2017    NAME: Thea Acosta  : 1934           MR# [id number]     CODE STATUS:  FULL CODE      VISIT TYPE: Problem   FACILITY: [facility] [[id number]]    ROOM: [id number]    PRIMARY CARE PROVIDER: Gamal Hdz MD Phone: [phone] Fax:[phone]    History of Present Illness:   Thea Acosta is a 82 y.o. female with 3/1/17 Rodding of the intertrochanteric fracture of the right hip. She has consistently had somewhat queasy breathing with sudden weight gain of 8 pounds. She has persistent edema in the lower extremities which could potentially be related to the fact that she sleeps in a recliner and her legs are always down. On auscultation her lungs do sound slightly wheezy and congested bringing up the question of fluid overload related to her chronic kidney disease and coronary artery disease. She also has a florid rash under her panniculus again fungal in origin but related to her continual skin contact in that area from upright posture in the recliner    Past Medical History:  Past Medical History:   Diagnosis Date     Acute kidney injury superimposed on CKD 3/3/2017     Asthma      CAD (coronary artery disease)      Cataract      Diabetes mellitus      Disease of thyroid gland      History of transfusion      Hypertension        Allergies:  Allergies   Allergen Reactions     Codeine      Lisinopril Cough     Penicillins Swelling       Current Medications:  Current Outpatient Prescriptions   Medication Sig     acetaminophen (TYLENOL) 500 MG tablet Take 500 mg by mouth every 6 (six) hours as needed for pain.     amLODIPine (NORVASC) 10 MG tablet Take 5 mg by mouth daily.     aspirin 81 MG EC tablet Take 81 mg by mouth daily.     atorvastatin (LIPITOR) 80 MG tablet Take 80 mg by mouth bedtime.     cholecalciferol, vitamin D3, (VITAMIN D3) 2,000 unit Tab Take 2,000 Units by mouth once daily.     fluticasone-salmeterol (ADVAIR) 500-50  mcg/dose DISKUS Inhale 1 puff 2 (two) times a day.     folic acid (FOLVITE) 1 MG tablet Take 1 mg by mouth daily.     furosemide (LASIX) 40 MG tablet Take 1 tablet (40 mg total) by mouth every morning.     ipratropium-albuterol (COMBIVENT RESPIMAT)  mcg/actuation Aero inhaler Inhale 1 puff 4 (four) times a day as needed (Max 6 puffs per 24 hours).     levothyroxine (SYNTHROID, LEVOTHROID) 125 MCG tablet Take 125 mcg by mouth daily.     loperamide (IMODIUM A-D) 2 mg tablet Take 2 mg by mouth 4 (four) times a day as needed for diarrhea.     losartan (COZAAR) 100 MG tablet Take 100 mg by mouth daily.     melatonin 3 mg Tab tablet Take 3 mg by mouth bedtime as needed.     metoprolol tartrate (LOPRESSOR) 100 MG tablet Take 150 mg by mouth 2 (two) times a day.     nystatin (MYCOSTATIN) powder Apply topically 2 (two) times a day.     omeprazole (PRILOSEC) 20 MG capsule Take 20 mg by mouth Daily before breakfast.     oseltamivir (TAMIFLU) 75 MG capsule Take 75 mg by mouth daily.     traMADol (ULTRAM) 50 mg tablet Take 25 mg by mouth every 6 (six) hours as needed for pain.     WARFARIN SODIUM (WARFARIN ORAL) Take 3.5 mg by mouth. 3.5 mg daily next INR 3/21/17       Review of Systems:  History obtained from chart review and the patient  Respiratory ROS: Mild wheezing and shortness of breath with exertion she thinks somewhat different than her usual asthma attack  Cardiovascular ROS: Dyspnea on exertion is noted but again paroxysmal nocturnal dyspnea is avoided by sleeping in the recliner  Gastrointestinal ROS: no abdominal pain, change in bowel habits, or black or bloody stools  Genito-Urinary ROS: no dysuria, trouble voiding, or hematuria  Musculoskeletal ROS: Right hip is less mobile in the left and still stiff and sore  Neurological ROS: no TIA or stroke symptoms  Dermatological ROS: florid rash in the skinfold underneath her panniculus obviously fungal in origin but no ulcers no bulae         Laboratory:  No results  for input(s): INR in the last 72 hours.       Physical Examination:  Visit Vitals     /53     Pulse 62     Temp 97.2  F (36.2  C)     Resp 18     SpO2 90%     General appearance: alert, appears stated age, cooperative, fatigued, moderate distress and slowed mentation  Neck: no adenopathy, no carotid bruit, no JVD, supple, symmetrical, trachea midline and thyroid not enlarged, symmetric, no tenderness/mass/nodules  Lungs: clear to auscultation bilaterally  Heart: regular rate and rhythm, S1, S2 normal, no murmur, click, rub or gallop  Abdomen: obese skin under panniculus obvious fungal infection but no sizable ulcers or bleeding  Extremities: marked bilateral lower extremity edema  Pulses: 2+ and symmetric  Skin: taute on lower extremities  Neurologic: Grossly normal       Impression:  Thea Acosta is a 82 y.o. female with Status post open reduction internal fixation of right hip fracture, fungal skin infection secondary to body habitus, fluid overload related to positioning body habitus heart failure and chronic kidney disease    1. Closed intertrochanteric fracture of hip, right, with routine healing, subsequent encounter     2. S/P ORIF (open reduction internal fixation) fracture     3. Morbid obesity due to excess calories     4. CKD (chronic kidney disease), stage III     5. Essential hypertension with goal blood pressure less than 140/90     6. Paroxysmal atrial fibrillation     7. Moderate persistent asthma without complication     8. History of aortic stenosis     9. Aortic valve replaced     10. Complete heart block     11. Cardiac pacemaker in situ     12. Hypercholesterolemia     13. Vitamin D deficiency     14. Restless Legs Syndrome     15. Exposure to influenza     16. Other hypervolemia         Plan: I have doubled her diuretics to 40 mg twice a day  We will see if we can get some Hernandez edema despite her positioning.. I have encouraged four times a day powder in the skinfold as well as some  method of drainage with aid absorbent pad    Electronically signed by: Gregg Panchal Sr., MD

## 2021-06-09 NOTE — PROGRESS NOTES
1st attempt called home phone but mailbox hasn't been setting up yet. Will try cell phone     NAE Saenz Rheumatology 7/8/2020 2:28 PM

## 2021-06-09 NOTE — ANESTHESIA CARE TRANSFER NOTE
Last vitals:   Vitals:    03/01/17 1710   BP: 95/48   Pulse: 60   Resp: 12   Temp: 36.1  C (97  F)   SpO2: 99%     Patient's level of consciousness is drowsy  Spontaneous respirations: yes  Maintains airway independently: yes  Dentition unchanged: yes  Oropharynx: oropharynx clear of all foreign objects    QCDR Measures:  ASA# 20 - Surgical Safety Checklist: ASA20A - Safety Checks Done  PQRS# 430 - Adult PONV Prevention: 4558F - Pt received => 2 anti-emetic agents (different classes) preop & intraop  ASA# 8 - Peds PONV Prevention: NA - Not pediatric patient, not GA or 2 or more risk factors NOT present  PQRS# 424 - Connie-op Temp Management: 4559F - At least one body temp DOCUMENTED => 35.5C or 95.9F within required timeframe  PQRS# 426 - PACU Transfer Protocol: - Transfer of care checklist used  ASA# 14 - Acute Post-op Pain: ASA14B - Patient did NOT experience pain >= 7 out of 10    I completed my SBAR handoff to the receiving nurse per policy and procedure.

## 2021-06-09 NOTE — TELEPHONE ENCOUNTER
Home care nurse notified of this and will have patients granddaughter help arrange this appointment.

## 2021-06-09 NOTE — PROGRESS NOTES
Date: 3/26/2017    Name:  Thea Acosta  :  1934  MRN: [id number]  Code Status:  FULL CODE    Visit Type: Review Of Multiple Medical Conditions     Facility:  [facility] [[id number]]  Facility Type: SNF (Skilled Nursing Facility, TCU)    History of Present Illness:    This is a 82 year old female here with multiple medical conditions. She was admitted following a right intertrochanteric fracture 3/1/17 with rodding that followed. She apparently had a fall at home. She is here with Asthma and a history of fluid overload. Staff reports she does have quite a bit of congestion noted- and she does have oxygen ordered. She is here for PT and staff has no other reports of any other issues of concern. She had been living independently in her home before her fall- and has an extended family that watches over her. Staff has no other issues of concern in regard to patient status. She does have dx of HTN with vital signs listed below.    Past Medical History:   Diagnosis Date     Acute kidney injury superimposed on CKD 3/3/2017     Asthma      CAD (coronary artery disease)      Cataract      Diabetes mellitus      Disease of thyroid gland      History of transfusion      Hypertension      Past Surgical History:   Procedure Laterality Date     APPENDECTOMY       CARDIAC SURGERY       CHOLECYSTECTOMY       EYE SURGERY Bilateral     Cataracts     HIP PINNING Right 3/1/2017    Procedure: RIGHT HIP TROCHANTERIC RODDING;  Surgeon: Moshe Davies MD;  Location: [facility];  Service:      JOINT REPLACEMENT Left     knee     Family History   Problem Relation Age of Onset     No Medical Problems Mother      age 86     No Medical Problems Father      MVA     Cancer Brother      lung     No Medical Problems Brother      Social History   Substance Use Topics     Smoking status: Former Smoker     Smokeless tobacco: Never Used     Alcohol use No       Medications:  Current Outpatient Prescriptions    Medication Sig Dispense Refill     acetaminophen (TYLENOL) 500 MG tablet Take 500 mg by mouth every 6 (six) hours as needed for pain.       aspirin 81 MG EC tablet Take 81 mg by mouth daily.       atorvastatin (LIPITOR) 80 MG tablet Take 80 mg by mouth bedtime.       cholecalciferol, vitamin D3, (VITAMIN D3) 2,000 unit Tab Take 2,000 Units by mouth once daily.       fluticasone-salmeterol (ADVAIR) 500-50 mcg/dose DISKUS Inhale 1 puff 2 (two) times a day.       folic acid (FOLVITE) 1 MG tablet Take 1 mg by mouth daily.       furosemide (LASIX) 40 MG tablet Take 1 tablet (40 mg total) by mouth every morning. (Patient taking differently: Take 40 mg by mouth 2 (two) times a day at 9am and 6pm. ) 90 tablet 0     ipratropium-albuterol (COMBIVENT RESPIMAT)  mcg/actuation Aero inhaler Inhale 1 puff 4 (four) times a day.        levothyroxine (SYNTHROID, LEVOTHROID) 125 MCG tablet Take 125 mcg by mouth daily.       loperamide (IMODIUM A-D) 2 mg tablet Take 2 mg by mouth 4 (four) times a day as needed for diarrhea.       losartan (COZAAR) 100 MG tablet Take 100 mg by mouth daily.       melatonin 3 mg Tab tablet Take 3 mg by mouth bedtime as needed.       metoprolol tartrate (LOPRESSOR) 100 MG tablet Take 150 mg by mouth 2 (two) times a day.       nystatin (MYCOSTATIN) powder Apply topically 2 (two) times a day.       omeprazole (PRILOSEC) 20 MG capsule Take 20 mg by mouth Daily before breakfast.       oseltamivir (TAMIFLU) 75 MG capsule Take 75 mg by mouth daily.       traMADol (ULTRAM) 50 mg tablet Take 25 mg by mouth every 6 (six) hours as needed for pain.       WARFARIN SODIUM (WARFARIN ORAL) Take 3.5 mg by mouth. 3.5 mg daily next INR 3/21/17       No current facility-administered medications for this visit.          Allergies   Allergen Reactions     Codeine      Lisinopril Cough     Penicillins Swelling       Vital Signs:   B/P 100/50  P  62  R  19     ROS:   Resident denies any uncontrolled pain, nausea,  "vomiting, dizziness headache, no increase in shortness of breath-02 1 liter, no vision bowel or bladder problems, night sweats fever chills chest pain abdominal pain. No dysuria hematuria frequency urgency constipation loose stools. Swallows okay eats okay. \" I do have a dry cough\"    PHYSICAL EXAM:  General: this is an alert female up in her room in NAD 02 on 1 liter  HEENT:Normocephalic/atraumatic Conjunctivae without erythema nares are clear of any drainage.  Neck: No adenopathy JVD thyromegaly  Resp: Clear No rhonchi wheezing rales bilateral congestion > on RLL  Cardio: Regular rate and rhythm No murmurs appreciated  Abdomen: Soft nontender no masses distention bowel sounds are present.  Extremities :2+ lower extremity edema fair peripheral pulses  Skin: Skin intact No noted skin issues  : N/A  Musculoskeletal: Age-related degenerative joint disease  Psych: alert and conversive    Labs:   Lab Requisition on 03/23/2017   Component Date Value Ref Range Status     Sodium 03/23/2017 137  136 - 145 mmol/L Final     Potassium 03/23/2017 4.4  3.5 - 5.0 mmol/L Final     Chloride 03/23/2017 98  98 - 107 mmol/L Final     CO2 03/23/2017 32* 22 - 31 mmol/L Final     Anion Gap, Calculation 03/23/2017 7  5 - 18 mmol/L Final     Glucose 03/23/2017 136* 70 - 125 mg/dL Final     Calcium 03/23/2017 9.7  8.5 - 10.5 mg/dL Final     BUN 03/23/2017 39* 8 - 28 mg/dL Final     Creatinine 03/23/2017 1.56* 0.60 - 1.10 mg/dL Final     GFR MDRD Af Amer 03/23/2017 39* >60 mL/min/1.73m2 Final     GFR MDRD Non Af Amer 03/23/2017 32* >60 mL/min/1.73m2 Final   Lab Requisition on 03/18/2017   Component Date Value Ref Range Status     Influenza  A, Rapid Antigen 03/18/2017 No Influenza A antigen detected  No Influenza A antigen detected Final     Influenza B, Rapid Antigen 03/18/2017 No Influenza B antigen detected  No Influenza B antigen detected Final   Admission on 03/01/2017, Discharged on 03/06/2017   No results displayed because visit has " over 200 results.           Diagnoses:    ICD-10-CM    1. Closed intertrochanteric fracture of hip, right, with routine healing, subsequent encounter S72.141D    2. Asthma J45.909    3. Morbid obesity due to excess calories E66.01    4. Essential hypertension with goal blood pressure less than 140/90 I10        Plan: Patient does have noted congestion in bilateral lungs- we will give extra dose of lasix 20 mg dly for 4 days - then BMP- and update primary physician on Tuesday. Otherwise we will continue to monitor and continue with current care plan. We will continue with PT as indicated and follow with ortho.    Electronically signed by: Chloe Becker, CNP

## 2021-06-09 NOTE — PROGRESS NOTES
Code Status:  FULL CODE  Visit Type: Problem Visit     Facility:  [facility] [[id number]]         Facility Type: SNF (Skilled Nursing Facility, TCU)    History of Present Illness: Thea Acosta is a 82 y.o. female who I am seeing today for follow up on the TCU. Pt recently admitted s/p ORIF of the right hip 2/t to intertrochanteric fracture 2/t fall. Pain well controlled. Pt has had increasing LE edema weight gain and SOB. She has underlying COPD with asthmatic component. She was treated with lasix. She is down ~ 10 lbs. She continues with 3+ LE edema. LE wrapped. Large body habitus. She is having some low blood pressures with systolics in the low 90s. Her am dose of lasix was held. Her cough had improved. She continues on oxygen @ 2L NC. She tells me today her cough has worsened over the last 2 days. She is producing thick clear phelgm. She was treated previously with Z pack. She has combivent INH 4 times daily. She was treated prophalatically for Influenza. She continues in therapy.       Active Ambulatory Problems     Diagnosis Date Noted     Paroxysmal Atrial Fibrillation      Sciatica      Shortness Of Breath      Type 2 Diabetes Mellitus      Hyperparathyroidism      Vitamin D Deficiency      Hypercholesterolemia      Essential hypertension      Coronary Artery Disease      Moderate persistent asthma      CKD (chronic kidney disease), stage III      Osteoarthritis Of The Knee      Restless Legs Syndrome      History of aortic stenosis 11/11/2014     History of gastrointestinal bleeding 11/11/2014     Closed intertrochanteric fracture of hip 03/01/2017     Elevated brain natriuretic peptide (BNP) level      Acute kidney injury superimposed on CKD 03/03/2017     S/P ORIF (open reduction internal fixation) fracture 03/06/2017     Morbid obesity due to excess calories 03/06/2017     Aortic valve replaced 03/06/2017     Cardiac pacemaker in situ 03/06/2017     Complete heart block 03/06/2017      Exposure to influenza 03/20/2017     Fluid overload 03/21/2017     Tinea corporis 03/30/2017     Resolved Ambulatory Problems     Diagnosis Date Noted     Constipation      Cough      Acute bronchitis      Hypothyroidism      Past Medical History:   Diagnosis Date     Acute kidney injury superimposed on CKD 3/3/2017     Asthma      CAD (coronary artery disease)      Cataract      Diabetes mellitus      Disease of thyroid gland      History of transfusion      Hypertension        Current Outpatient Prescriptions   Medication Sig     acetaminophen (TYLENOL) 500 MG tablet Take 500 mg by mouth every 6 (six) hours as needed for pain.     aspirin 81 MG EC tablet Take 81 mg by mouth daily.     atorvastatin (LIPITOR) 80 MG tablet Take 80 mg by mouth bedtime.     cholecalciferol, vitamin D3, (VITAMIN D3) 2,000 unit Tab Take 2,000 Units by mouth once daily.     fluticasone-salmeterol (ADVAIR) 500-50 mcg/dose DISKUS Inhale 1 puff 2 (two) times a day.     folic acid (FOLVITE) 1 MG tablet Take 1 mg by mouth daily.     furosemide (LASIX) 40 MG tablet Take 1 tablet (40 mg total) by mouth every morning. (Patient taking differently: Take 40 mg by mouth 2 (two) times a day at 9am and 6pm. )     ipratropium-albuterol (COMBIVENT RESPIMAT)  mcg/actuation Aero inhaler Inhale 1 puff 4 (four) times a day.      levothyroxine (SYNTHROID, LEVOTHROID) 125 MCG tablet Take 125 mcg by mouth daily.     loperamide (IMODIUM A-D) 2 mg tablet Take 2 mg by mouth 4 (four) times a day as needed for diarrhea.     losartan (COZAAR) 100 MG tablet Take 100 mg by mouth daily.     melatonin 3 mg Tab tablet Take 3 mg by mouth bedtime as needed.     metoprolol tartrate (LOPRESSOR) 100 MG tablet Take 150 mg by mouth 2 (two) times a day.     nystatin (MYCOSTATIN) powder Apply topically 2 (two) times a day.     omeprazole (PRILOSEC) 20 MG capsule Take 20 mg by mouth Daily before breakfast.     senna-docusate (PERICOLACE) 8.6-50 mg tablet Take 1 tablet by  mouth daily.     traMADol (ULTRAM) 50 mg tablet Take 25 mg by mouth every 6 (six) hours as needed for pain.     WARFARIN SODIUM (WARFARIN ORAL) Take 3.5 mg by mouth. hold     Allergies   Allergen Reactions     Codeine      Lisinopril Cough     Penicillins Swelling         Review of Systems   No fevers or chills. No headache, lightheadedness or dizziness. No SOB, increasing cough with production of clear phelgm. No chest pains or palpitations. Appetite is good. Continued LE edema. No nausea, vomiting, constipation or diarrhea. No dysuria, frequency, burning or pain with urination. Pain well controlled.     Physical Exam  PHYSICAL EXAMINATION:  Vital signs:   Vitals:    04/04/17 1933   BP: 144/65   Pulse: 62   Resp: 18   Temp: 96.8  F (36  C)   SpO2: 94%     General: Awake, Alert, appropriately, follows simple commands, conversant  HEENT:PERRLA, Pink conjunctiva, anicteric sclerae, moist oral mucosa  NECK: Supple, without any lymphadenopathy, or masses  CVS:  S1  S2, without murmur or gallop.   LUNG: Thick Rhonchi throughout. Wet non productive cough. Oxygen on @ 2L NC>   BACK: No kyphosis of the thoracic spine  ABDOMEN: Soft, morbidly obese, nontender to palpation, with positive bowel sounds  EXTREMITIES: Good range of motion on both upper and lower extremities with generalized weakness, 3+ pedal edema, Ace wraps in place, no calf tenderness  SKIN: Warm and dry, no rashes or erythema noted  NEUROLOGIC: Intact, pulses palpable  PSYCHIATRIC: Cognition intact              Labs:    Lab Results   Component Value Date    WBC 13.2 (H) 03/04/2017    HGB 9.4 (L) 03/04/2017    HCT 29.7 (L) 03/04/2017    MCV 92 03/04/2017     03/04/2017     Results for orders placed or performed in visit on 03/30/17   Basic Metabolic Panel   Result Value Ref Range    Sodium 139 136 - 145 mmol/L    Potassium 3.6 3.5 - 5.0 mmol/L    Chloride 99 98 - 107 mmol/L    CO2 32 (H) 22 - 31 mmol/L    Anion Gap, Calculation 8 5 - 18 mmol/L     Glucose 110 70 - 125 mg/dL    Calcium 9.1 8.5 - 10.5 mg/dL    BUN 29 (H) 8 - 28 mg/dL    Creatinine 1.36 (H) 0.60 - 1.10 mg/dL    GFR MDRD Af Amer 45 (L) >60 mL/min/1.73m2    GFR MDRD Non Af Amer 37 (L) >60 mL/min/1.73m2       INR 1.96    Assessment/Plan:  1. Closed intertrochanteric fracture of hip, right, with routine healing, subsequent encounter     2. S/P ORIF (open reduction internal fixation) fracture     3. Fluid overload     4. COPD (chronic obstructive pulmonary disease)     5. Paroxysmal atrial fibrillation     6. Aortic valve replaced     7. CKD (chronic kidney disease), stage III     8. Morbid obesity due to excess calories     9. Essential hypertension with goal blood pressure less than 140/90     10. Cardiac pacemaker in situ     11. Osteoarthritis Of The Knee     12. Exposure to influenza     13. Atherosclerosis of coronary artery of native heart without angina pectoris, unspecified vessel or lesion type     14. Asthma       Recent ORIF of the right hip.  Pain well controlled.  She continues in therapy.  History of COPD with asthmatic component.  Patient with continued oxygen use, shortness of breath lower extremity edema and cough.  She was treated with Z-Nikhil with no improvement.  She was treated with Lasix.  She is down 10 pounds.  Edema improved somewhat.  However continues us to lower extremities.  Large body habitus.  She is having some low blood pressures.  I will decrease her Lasix to 40 in the morning and 20 in the p.m.  Slight increase in her creatinine and GFR.  Cough had improved however she tells me over the last 2 days it has increased with increased production of clear phlegm.  She is afebrile.  I will treat her with steroids.  This is more likely long exacerbation.  She does have underlying atrial fib on chronic anticoagulation.  Today's INR 1.96.  Will dose her Coumadin.  She did have a recent chest x-ray on 331 was negative for pneumonia or infiltrate.  Did show cardiomegaly.  She  was treated prophylactically for influenza.  She continues on Combivent inhalers 4 times daily.  Incentive spirometer spirometer encouraged.  She also has as needed nebulizers.      35 minutes spent of which greater than 50% was face to face communication with the patient about above plan of care    Electronically signed by: Eva Kim CNP

## 2021-06-09 NOTE — PROGRESS NOTES
[facility]    DATE: 3/30/2017    NAME: Thea Acosta  : 1934           MR# [id number]     CODE STATUS:  FULL CODE      VISIT TYPE: Problem   FACILITY: [facility] [[id number]]    ROOM: 324    PRIMARY CARE PROVIDER: Gamal Hdz MD Phone: [phone] Fax:[phone]    History of Present Illness:   Thea Acosta is a 82 y.o. female with 3/1/17 rodding of intertrochanteric fracture of the right hip. Her recovery has been complicated by her COPD and her tinea corporis. Her rash seems to be improving with topical powder oral Diflucan and close attention to  the skin edges to prevent moisture trapping. Her lungs are stable but not particularly good with occasional wheezes and dyspnea on exertion. She generally has limited enthusiasm and limited respiratory reserve for exercise. She has been treated aggressively on a couple of occasions while in the TCU for respiratory  Insufficiency. She still has some slight fluid overload but her electrolytes are as reported below unremarkable despite the fact that she's had some renal insufficiency again reported below    Past Medical History:  Past Medical History:   Diagnosis Date     Acute kidney injury superimposed on CKD 3/3/2017     Asthma      CAD (coronary artery disease)      Cataract      Diabetes mellitus      Disease of thyroid gland      History of transfusion      Hypertension        Allergies:  Allergies   Allergen Reactions     Codeine      Lisinopril Cough     Penicillins Swelling       Current Medications:  Current Outpatient Prescriptions   Medication Sig     acetaminophen (TYLENOL) 500 MG tablet Take 500 mg by mouth every 6 (six) hours as needed for pain.     aspirin 81 MG EC tablet Take 81 mg by mouth daily.     atorvastatin (LIPITOR) 80 MG tablet Take 80 mg by mouth bedtime.     cholecalciferol, vitamin D3, (VITAMIN D3) 2,000 unit Tab Take 2,000 Units by mouth once daily.     fluconazole  (DIFLUCAN) 150 MG tablet Take 150 mg by mouth once.     fluticasone-salmeterol (ADVAIR) 500-50 mcg/dose DISKUS Inhale 1 puff 2 (two) times a day.     folic acid (FOLVITE) 1 MG tablet Take 1 mg by mouth daily.     furosemide (LASIX) 40 MG tablet Take 1 tablet (40 mg total) by mouth every morning. (Patient taking differently: Take 40 mg by mouth 2 (two) times a day at 9am and 6pm. )     ipratropium-albuterol (COMBIVENT RESPIMAT)  mcg/actuation Aero inhaler Inhale 1 puff 4 (four) times a day.      levothyroxine (SYNTHROID, LEVOTHROID) 125 MCG tablet Take 125 mcg by mouth daily.     loperamide (IMODIUM A-D) 2 mg tablet Take 2 mg by mouth 4 (four) times a day as needed for diarrhea.     losartan (COZAAR) 100 MG tablet Take 100 mg by mouth daily.     melatonin 3 mg Tab tablet Take 3 mg by mouth bedtime as needed.     metoprolol tartrate (LOPRESSOR) 100 MG tablet Take 150 mg by mouth 2 (two) times a day.     nystatin (MYCOSTATIN) powder Apply topically 2 (two) times a day.     omeprazole (PRILOSEC) 20 MG capsule Take 20 mg by mouth Daily before breakfast.     oseltamivir (TAMIFLU) 75 MG capsule Take 75 mg by mouth daily.     senna-docusate (PERICOLACE) 8.6-50 mg tablet Take 1 tablet by mouth daily.     traMADol (ULTRAM) 50 mg tablet Take 25 mg by mouth every 6 (six) hours as needed for pain.     WARFARIN SODIUM (WARFARIN ORAL) Take 3.5 mg by mouth. hold       Review of Systems:  History obtained from chart review and the patient  Respiratory ROS: Occasional cough dyspnea on exertion and wheezing now by her report normal  Cardiovascular ROS: no chest pain    Gastrointestinal ROS: no abdominal pain, change in bowel habits, or black or bloody stools  Genito-Urinary ROS: no dysuria, trouble voiding, or hematuria  Musculoskeletal ROS: Still increased soreness and restriction of function in the right hip somewhat delayed in recovery  Neurological ROS: no TIA or stroke symptoms  Dermatological ROS: she denies open skin          Laboratory:  No results for input(s): INR in the last 72 hours.   Na 139  K 3.6  BUN 29  Creat 1.36  GFR 37  Ca 9.1  Glu 110    Physical Examination:  BP 96/46  Pulse 65  Temp 97.1  F (36.2  C)  Resp 19  Wt (!) 278 lb 14.4 oz (126.5 kg)  SpO2 95%  BMI 43.68 kg/m2  General appearance: alert, appears stated age, cooperative, distracted, fatigued, no distress, morbidly obese and slowed mentation  Neck: no adenopathy, no carotid bruit, no JVD, supple, symmetrical, trachea midline and thyroid not enlarged, symmetric, no tenderness/mass/nodules  Lungs: Rare posterior rhonchi  Heart: regular rate and rhythm, S1, S2 normal, no murmur, click, rub or gallop  Abdomen: soft, non-tender; bowel sounds normal; no masses,  no organomegaly  Extremities: extremities normal, atraumatic, no cyanosis or edema  Pulses: 2+ and symmetric  Skin: Skin color, texture, turgor normal. No rashes or lesions  Neurologic: Grossly normal       Impression:  Thea Acosta is a 82 y.o. female with Recent open reduction internal fixation of a hip fracture and comorbid COPD with morbid obesity and tinea corporis    1. Closed intertrochanteric fracture of hip, right, with routine healing, subsequent encounter     2. S/P ORIF (open reduction internal fixation) fracture     3. Morbid obesity due to excess calories     4. CKD (chronic kidney disease), stage III     5. Essential hypertension with goal blood pressure less than 140/90     6. Paroxysmal atrial fibrillation     7. Moderate persistent asthma without complication     8. History of aortic stenosis     9. Aortic valve replaced     10. Complete heart block     11. Cardiac pacemaker in situ     12. Hypercholesterolemia     13. Vitamin D deficiency     14. Other hypervolemia     15. Restless Legs Syndrome     16. Osteoarthritis Of The Knee     17. Exposure to influenza     18. Atherosclerosis of coronary artery of native heart without angina pectoris, unspecified vessel or lesion type      19. Tinea corporis         Plan: I believe were on top of the tinny corporis at this point but I'm encouraging her to be vigorous in her physical therapy to maximize her recovery. I suspect her respiratory status is in fact normal for her although certainly diminished and I hope that with conditioning she will improve    Electronically signed by: Gregg Panchal Sr., MD

## 2021-06-09 NOTE — ANESTHESIA PREPROCEDURE EVALUATION
Anesthesia Evaluation        Airway   Mallampati: II  Neck ROM: full   Pulmonary    (+) asthma  shortness of breath, decreased breath sounds,                          Cardiovascular - normal exam  (+) hypertension, CAD, ,      Neuro/Psych      Endo/Other    (+) diabetes mellitus,      GI/Hepatic/Renal    (+)   chronic renal disease,      Other findings: S/p AVR bioprosthesis  Cardiac echo in chart      Dental - normal exam                        Anesthesia Plan  Planned anesthetic: spinal    GA prn  ASA 3     Anesthetic plan and risks discussed with: patient and child/children    Post-op plan: routine recovery

## 2021-06-10 NOTE — PROGRESS NOTES
[facility]    DATE: 2020    NAME:  Thea Acosta             :  1934  MRN: [id number]  CODE STATUS:  DNR    VISIT TYPE: Review Of Multiple Medical Conditions (tsa, pain check, chf)     FACILITY:  [facility] [[id number]]       CHIEF COMPLAIN/REASON FOR VISIT:    Chief Complaint   Patient presents with     Review Of Multiple Medical Conditions     tsa, pain check, chf               HISTORY OF PRESENT ILLNESS: Thea Acosta is a 86 y.o. female who admitted - for acute metabolic encephalopathy. This was felt to be s/t hypothyroidism and noncompliance with levothyroxine dosing. She was readmitted - for left shoulder dislocation and underwent reverse total shoulder arthroplasty on . Postop course was uncomplicated and transferred back to [facility]. She has PMH of CAD, CKD, DM, s/p PPM, diastolic CHF, pulmonary artery hypertension, mitral valve stenosis, chronic a fib no anticoagulation, s/p AVR, asthma. Prior to this she lived at home with her grand daughter.     Today Ms. Acosta is seen for review of systems for total shoulder arthroplasty, pain check, chf. She is seen in her wheelchair and just finished working with therapy. She says she is doing well today. Her shoulder is continuing to get better. She hopes she can return home soon. She is wondering how long she may need to be on restrictions. We discussed this is usually for 6 weeks. She says she did see ortho and they  Told her not to come back for another 5 weeks. She thinks her incision is doing fine. The swelling her arm and shoulder is better but legs remain swollen. They are doing lymphedema wrapping for her. She denies any other concerns today.         REVIEW OF SYSTEMS:  PROBLEMS AND REVIEW OF SYSTEMS:   Review of Systems  Today on ROS:   Currently, no fever, chills, or rigors. Decreased vision and hearing. Denies any chest pain, headaches, palpitations,  "lightheadedness, dizziness, no cough, no sob. Appetite is good.  Denies any abdominal pain. No active bleeding. Positive for urinary incontinence, leg swelling, left arm and shoulder swelling, sling in place, left shoulder incision, no nausea or vomiting, no constipation, pain improving      Allergies   Allergen Reactions     Tramadol Anxiety and Other (See Comments)     Per patient, Thea developed psychosis and severe anxiety and agitation \"for three days\" after receiving tramadol in the past. She stated that she would \"never\" take it again and would be extremely upset if she receives it. She asked me to add this to her chart's allergy list specifically.      Codeine Hives     Codeine Phosphate (Bulk)      This allergy is per [facility] records.     Codeine Sulfate      This allergy is per [facility] records.     Codeine-Butalbital-Asa-Caff      This allergy is per [facility] records.     Codeine-Guaifenesin      This allergy is per [facility] records.     Lisinopril Cough     Penicillins Swelling     Current Outpatient Medications   Medication Sig     acetaminophen (TYLENOL) 500 MG tablet Take 2 tablets (1,000 mg total) by mouth 3 (three) times a day. (Patient taking differently: Take 1,000 mg by mouth 3 (three) times a day. And daily prn)     albuterol (PROAIR HFA;PROVENTIL HFA;VENTOLIN HFA) 90 mcg/actuation inhaler Inhale 2 puffs every 4 (four) hours as needed for wheezing.     aspirin 81 MG EC tablet Take 1 tablet (81 mg total) by mouth 2 (two) times a day.     atorvastatin (LIPITOR) 80 MG tablet TAKE 1 TABLET(80 MG) BY MOUTH DAILY     bumetanide (BUMEX) 2 MG tablet Take 1.5 tablets (3 mg total) by mouth 2 (two) times a day at 9am and 6pm.     colchicine 0.6 mg tablet take two tablets (1.2 mg) by mouth at onset of gout symptoms, then repeat one tablet (0.6 mg) by mouth one hour " later     diclofenac sodium (VOLTAREN) 1 % Gel Apply 2 g topically 4 (four) times a day as needed.      ferrous sulfate 325 (65 FE) MG tablet TAKE 1 TABLET(325 MG) BY MOUTH TWICE DAILY (Patient taking differently: Take 1 tablet by mouth 3 (three) times a day with meals. )     levothyroxine (SYNTHROID, LEVOTHROID) 150 MCG tablet Take 1 tablet (150 mcg total) by mouth daily before supper.     nystatin (MYCOSTATIN) powder Apply topically 4 (four) times a day.     omeprazole (PRILOSEC) 20 MG capsule Take 1 capsule (20 mg total) by mouth daily before breakfast.     polyethylene glycol (MIRALAX) 17 gram packet Take 17 g by mouth daily as needed.     potassium chloride (K-DUR,KLOR-CON) 20 MEQ tablet Take 20 mEq by mouth daily.      predniSONE (DELTASONE) 2.5 MG tablet Take 7.5 mg/d prednisone PO for 3 weeks.     senna (SENOKOT) 8.6 mg tablet Take 1 tablet by mouth daily.     white petrolatum (AQUAPHOR ORIGINAL) 41 % Oint Apply 1 application topically at bedtime.     Past Medical History:    Past Medical History:   Diagnosis Date     Acute kidney injury superimposed on CKD (H) 3/3/2017     Asthma      CAD (coronary artery disease)      Cataract      Chronic kidney disease     ckd3     COPD (chronic obstructive pulmonary disease) (H)      Diabetes mellitus (H)      Disease of thyroid gland      H/O heart valve replacement with bioprosthetic valve      History of transfusion      Hypertension      Normochromic normocytic anemia      Pulmonary hypertension (H) 09/27/2019    Noted on echocardiogram     Restless leg syndrome            PHYSICAL EXAMINATION  Vitals:    08/21/20 0700   BP: 124/54   Pulse: 60   Resp: 16   Temp: 96.6  F (35.9  C)   SpO2: 94%   Weight: 213 lb (96.6 kg)       Today on physical exam:     GENERAL: Awake, Alert, obese,  not in any form of acute distress, answers questions appropriately, follows simple commands, conversant  HEENT: Head is normocephalic with normal hair distribution. No evidence of trauma.  Ears: No acute purulent discharge. Eyes: Conjunctivae pink with no scleral jaundice. Nose: Normal mucosa and septum. NECK: Supple with no cervical or supraclavicular lymphadenopathy. Trachea is midline. [place], decreased vision  CHEST: No tenderness or deformity, no crepitus, left chest PPM  LUNG: Dim but clear today to auscultation.   No shortness of breath noted today, no cough noted  BACK: No kyphosis of the thoracic spine. Symmetric, no curvature, ROM normal, no CVA tenderness, no spinal tenderness   CVS: irregularly irregular rhythm, murmur,  2+ pulses symmetric in all extremities.  ABDOMEN: Rounded and soft, nontender to palpation, non distended, no masses, no organomegaly, good bowel sounds, no rebound or guarding, no peritoneal signs.   EXTREMITIES: 2+ ble nonpitting edema, lymphedema wrapping, left upper extremity 1+ edema with lymphedema sleeve, left shoulder incision c/d/i, left arm sling in place  SKIN: Warm and dry  NEUROLOGICAL: The patient is oriented to person, place and time. Oriented today, no recent confusion, Forgetful at times.             LABS:   Recent Results (from the past 168 hour(s))   Basic Metabolic Panel   Result Value Ref Range    Sodium 141 136 - 145 mmol/L    Potassium 3.6 3.5 - 5.0 mmol/L    Chloride 98 98 - 107 mmol/L    CO2 35 (H) 22 - 31 mmol/L    Anion Gap, Calculation 8 5 - 18 mmol/L    Glucose 114 70 - 125 mg/dL    Calcium 9.2 8.5 - 10.5 mg/dL    BUN 37 (H) 8 - 28 mg/dL    Creatinine 1.47 (H) 0.60 - 1.10 mg/dL    GFR MDRD Af Amer 41 (L) >60 mL/min/1.73m2    GFR MDRD Non Af Amer 34 (L) >60 mL/min/1.73m2   Hemoglobin   Result Value Ref Range    Hemoglobin 8.2 (L) 12.0 - 16.0 g/dL   Thyroid Stimulating Hormone (TSH)   Result Value Ref Range    TSH 3.43 0.30 - 5.00 uIU/mL     Results for orders placed or performed in visit on 08/17/20   Basic Metabolic Panel   Result Value Ref Range    Sodium 141 136 - 145 mmol/L    Potassium 3.6 3.5 - 5.0 mmol/L    Chloride 98 98 - 107 mmol/L    CO2  35 (H) 22 - 31 mmol/L    Anion Gap, Calculation 8 5 - 18 mmol/L    Glucose 114 70 - 125 mg/dL    Calcium 9.2 8.5 - 10.5 mg/dL    BUN 37 (H) 8 - 28 mg/dL    Creatinine 1.47 (H) 0.60 - 1.10 mg/dL    GFR MDRD Af Amer 41 (L) >60 mL/min/1.73m2    GFR MDRD Non Af Amer 34 (L) >60 mL/min/1.73m2         Lab Results   Component Value Date    WBC 6.9 08/05/2020    HGB 8.2 (L) 08/17/2020    HCT 29.0 (L) 08/05/2020    MCV 98 08/05/2020     08/05/2020       Lab Results   Component Value Date    MAUETXAG38 333 07/21/2020     Lab Results   Component Value Date    HGBA1C 6.5 (H) 07/31/2020     Lab Results   Component Value Date    INR 0.99 07/28/2020    INR 1.30 (H) 02/07/2018    INR 1.51 (H) 03/06/2017     Vitamin D, Total (25-Hydroxy)   Date Value Ref Range Status   01/20/2020 45.9 30.0 - 80.0 ng/mL Final     Lab Results   Component Value Date    TSH 3.43 08/17/2020           ASSESSMENT/PLAN:    Left shoulder dislocation, s/p reverse total shoulder arthroplasty: Incision c/d/i. Pain controlled on tylenol.  Ice prn. PT, OT following. voltaren gel prn. F/u with surgeon Dr. Ramirez on 8/14-f/u again in 4 weeks, limit external rotation, no bathing until 4 weeks postop, healing well.  Wearing lymphedema sleeve on LUE. Overall improving. Remains NWB, may remove sling when resting. No complaints or concerns today.   Asthma:  Encourage cough and deep breathe. IS q1h while awake. Albuterol prn. Shortness of breath at baseline today.   Hypothyroid: on levothyroxine.   Chronic CHF: Daily [id number]. No shortness of breath recently. 2+ ble edema. On bumex 2mg two times a day, keep legs elevated when possible.  F/u with cardiology prn. Cardiac diet. Monitor for fluid overload. Appears stable today. Lymphedema wrapping.   S/p PPM: follows with device clinic. No recent concerns.   CKD stage 3: Cr 1.39, gfr 36 on 8/1. Stable.   H/o Type 2 DM: No meds, no monitoring needed. Diet controlled. No recent concerns.   Dyslipidemia:  On aspirin, atorvastatin.   HTN: SBP 120s, On lopressor, bumex.  hold parameters for lopressor. Stable.  GERD: On omeprazole. No concerns today.   Vitamin d deficiency: On vitamin d.  Hypokalemia: On kcl.  PAF, s/p AVR: Regular rate and rhythm today. On metoprolol. F/u with caridology. No concenrs.   Anemia of CKD: on iron. Hg 10.9 on 7/24.   Hg 8.8 on 8/1. Hg 9 on 8/5. Recheck on 8/17 8.2, increased iron to three times a day. . Will order recheck on 8/24.   Candidal intertrigo:  Nystatin.   Constipation: senna daily, miralax daily prn.        Electronically signed by: Jennifer Valdes NP

## 2021-06-10 NOTE — PROGRESS NOTES
[facility]    DATE: 2017    NAME: Thea Acosta  : 1934           MR# [id number]     CODE STATUS:  FULL CODE      VISIT TYPE: Problem   FACILITY: [facility] [[id number]]    ROOM: 324    PRIMARY CARE PROVIDER: Gamal Hdz MD Phone: [phone] Fax:[phone]    History of Present Illness:   Thea Acosta is a 82 y.o. female with 3/1/17 open reduction internal fixation of intertrochanteric fracture of the right hip. Her recovery has been episodic given her recurrent flares of COPD and her refusal to initiate attempts at therapy without direct commands from physical therapy. Today she reports that she has just a little bit of wheezing and I do in fact note that she has wheezing in her left lung I have therefore requested that she take the program albuterol spray four times a day is ordered. Her current weight at 268 pounds seems to have stabilized although weighing errors make the historical record somewhat confusing    Past Medical History:  Past Medical History:   Diagnosis Date     Acute kidney injury superimposed on CKD 3/3/2017     Asthma      CAD (coronary artery disease)      Cataract      Diabetes mellitus      Disease of thyroid gland      History of transfusion      Hypertension        Allergies:  Allergies   Allergen Reactions     Codeine      Lisinopril Cough     Penicillins Swelling       Current Medications:  Current Outpatient Prescriptions   Medication Sig     acetaminophen (TYLENOL) 500 MG tablet Take 500 mg by mouth every 6 (six) hours as needed for pain.     aspirin 81 MG EC tablet Take 81 mg by mouth daily.     atorvastatin (LIPITOR) 80 MG tablet Take 80 mg by mouth bedtime.     cholecalciferol, vitamin D3, (VITAMIN D3) 2,000 unit Tab Take 2,000 Units by mouth once daily.     fluticasone-salmeterol (ADVAIR) 500-50 mcg/dose DISKUS Inhale 1 puff 2 (two) times a day.     folic acid (FOLVITE) 1 MG tablet Take 1 mg by mouth daily.      furosemide (LASIX) 40 MG tablet Take 1 tablet (40 mg total) by mouth every morning. (Patient taking differently: Take 40 mg by mouth 2 (two) times a day at 9am and 6pm. 40 am  And 20 noon)     ipratropium (ATROVENT) 0.02 % nebulizer solution Take 500 mcg by nebulization 4 (four) times a day.      ipratropium-albuterol (COMBIVENT RESPIMAT)  mcg/actuation Aero inhaler Inhale 1 puff 4 (four) times a day.      levothyroxine (SYNTHROID, LEVOTHROID) 125 MCG tablet Take 125 mcg by mouth daily.     loperamide (IMODIUM A-D) 2 mg tablet Take 2 mg by mouth 4 (four) times a day as needed for diarrhea.     losartan (COZAAR) 100 MG tablet Take 100 mg by mouth daily.     melatonin 3 mg Tab tablet Take 3 mg by mouth bedtime as needed.     metoprolol tartrate (LOPRESSOR) 100 MG tablet Take 150 mg by mouth 2 (two) times a day.     nystatin (MYCOSTATIN) powder Apply topically 2 (two) times a day.     omeprazole (PRILOSEC) 20 MG capsule Take 20 mg by mouth Daily before breakfast.     predniSONE (DELTASONE) 5 MG tablet Take 5 mg by mouth daily. Start with 30 mg daily then wean down over 10 days     senna-docusate (PERICOLACE) 8.6-50 mg tablet Take 1 tablet by mouth daily.     traMADol (ULTRAM) 50 mg tablet Take 25 mg by mouth every 6 (six) hours as needed for pain.     WARFARIN SODIUM (WARFARIN ORAL) Take 2 mg by mouth. hold       Review of Systems:  History obtained from chart review and the patient  Respiratory ROS:Slight wheezing on the left side and none in the right less short of breath now  Cardiovascular ROS: no chest pain or dyspnea on exertion  Gastrointestinal ROS: no abdominal pain, change in bowel habits, or black or bloody stools  Genito-Urinary ROS: no dysuria, trouble voiding, or hematuria  Musculoskeletal ROS: Increasing motion of the operative right hip although some coercion is necessary to get her to move  Neurological ROS: no TIA or stroke symptoms  Dermatological ROS: her tinea corporus is resolved will try to  keep the skinfolds drive         Laboratory:  No results for input(s): INR in the last 72 hours.       Physical Examination:  /58  Pulse 60  Temp (!) 96.5  F (35.8  C)  Resp 21  Wt (!) 268 lb (121.6 kg)  SpO2 90%  BMI 41.97 kg/m2  General appearance: alert, appears stated age, cooperative, fatigued, flushed, mild distress, morbidly obese and slowed mentation  Neck: no adenopathy, no carotid bruit, no JVD, supple, symmetrical, trachea midline and thyroid not enlarged, symmetric, no tenderness/mass/nodules  Lungs:Left anterior wheezes persist  Heart: regular rate and rhythm, S1, S2 normal, no murmur, click, rub or gallop  Abdomen: morbidly obese but skinfolds clear now  Extremities: some residual stiffness and swelling in the right lower extremity  Pulses: 2+ and symmetric  Skin: tinea corporus resolved  Neurologic: Grossly normal       Impression:  Thea Acosta is a 82 y.o. female with Open reduction internal fixation of a right hip fracture    1. Closed intertrochanteric fracture of hip, right, with routine healing, subsequent encounter     2. S/P ORIF (open reduction internal fixation) fracture     3. Morbid obesity due to excess calories     4. CKD (chronic kidney disease), stage III     5. Essential hypertension with goal blood pressure less than 140/90     6. Paroxysmal atrial fibrillation     7. History of aortic stenosis     8. Aortic valve replaced     9. Complete heart block     10. Cardiac pacemaker in situ     11. Vitamin D deficiency     12. Restless Legs Syndrome     13. Osteoarthritis Of The Knee     14. Atherosclerosis of coronary artery of native heart without angina pectoris, unspecified vessel or lesion type     15. Tinea corporis         Plan: Will continue current regimen and insist you take the bronchodilators to help resolve her wheezing.    Electronically signed by: Gregg Panchal Sr., MD

## 2021-06-10 NOTE — PROGRESS NOTES
[facility]    DATE: 8/3/2020    NAME:  Thea Acosta             :  1934  MRN: [id number]  CODE STATUS:  DNR    VISIT TYPE: Problem Visit (Pain management)     FACILITY:  [facility] [[id number]]       CHIEF COMPLAIN/REASON FOR VISIT:    Chief Complaint   Patient presents with     Problem Visit     Pain management               HISTORY OF PRESENT ILLNESS: Thea Acosta is a 86 y.o. female who admitted - for acute metabolic encephalopathy. This was felt to be s/t hypothyroidism and noncompliance with levothyroxine dosing. She was readmitted - for left shoulder dislocation and underwent reverse total shoulder arthroplasty on . Postop course was uncomplicated and transferred back to [facility]. She has PMH of CAD, CKD, DM, s/p PPM, diastolic CHF, pulmonary artery hypertension, mitral valve stenosis, chronic a fib no anticoagulation, s/p AVR, asthma. Prior to this she lived at home with her grand daughter.     Today Ms. Acosta is seen for left shoulder dislocation.  Thea said that she continues to have aching intermittent pain in her left shoulder rated at 4-5 out of 10.  She was never able to start diclofenac gel due to being hospitalized for altered mental status.  She was agreeable to plan to restart diclofenac gel as previously prescribed for pain management.  She denied any other concerns at this time.  The patient denied fever, chills, diaphoresis, changes in mood or appetite, sleep disturbances, nasal congestion, sore throat, lightheadedness, dizziness, breathing difficulty, chest pain, palpitations, constipation, urinary symptoms, or numbness or tingling in extremities.      REVIEW OF SYSTEMS:  PROBLEMS AND REVIEW OF SYSTEMS:   Review of Systems  Today on ROS:   Currently, no fever, chills, or rigors. Decreased vision and hearing. Denies any chest pain, headaches, palpitations, lightheadedness, dizziness, no cough, no sob.  "Appetite is good.  Denies any abdominal pain. No active bleeding. Positive for urinary incontinence, leg swelling, left arm and shoulder swelling, sling in place, left shoulder incision, no nausea or vomiting, no constipation, pain controlled      Allergies   Allergen Reactions     Tramadol Anxiety and Other (See Comments)     Per patient, Thea developed psychosis and severe anxiety and agitation \"for three days\" after receiving tramadol in the past. She stated that she would \"never\" take it again and would be extremely upset if she receives it. She asked me to add this to her chart's allergy list specifically.      Codeine Hives     Codeine Phosphate (Bulk)      This allergy is per [facility] records.     Codeine Sulfate      This allergy is per [facility] records.     Codeine-Butalbital-Asa-Caff      This allergy is per [facility] records.     Codeine-Guaifenesin      This allergy is per [facility] records.     Lisinopril Cough     Penicillins Swelling     Current Outpatient Medications   Medication Sig     acetaminophen (TYLENOL) 500 MG tablet Take 2 tablets (1,000 mg total) by mouth 3 (three) times a day. (Patient taking differently: Take 1,000 mg by mouth 3 (three) times a day. And daily prn)     albuterol (PROAIR HFA;PROVENTIL HFA;VENTOLIN HFA) 90 mcg/actuation inhaler Inhale 2 puffs every 4 (four) hours as needed for wheezing.     aspirin 81 MG EC tablet Take 1 tablet (81 mg total) by mouth 2 (two) times a day.     atorvastatin (LIPITOR) 80 MG tablet TAKE 1 TABLET(80 MG) BY MOUTH DAILY     bumetanide (BUMEX) 2 MG tablet Take 1.5 tablets (3 mg total) by mouth 2 (two) times a day at 9am and 6pm.     colchicine 0.6 mg tablet take two tablets (1.2 mg) by mouth at onset of gout symptoms, then repeat one tablet (0.6 mg) by mouth one hour later     diclofenac sodium (VOLTAREN) 1 % " Gel Apply 2 g topically 4 (four) times a day as needed.      ferrous sulfate 325 (65 FE) MG tablet TAKE 1 TABLET(325 MG) BY MOUTH TWICE DAILY (Patient taking differently: Take 1 tablet by mouth 3 (three) times a day with meals. )     levothyroxine (SYNTHROID, LEVOTHROID) 150 MCG tablet Take 1 tablet (150 mcg total) by mouth daily before supper.     nystatin (MYCOSTATIN) powder Apply topically 4 (four) times a day.     omeprazole (PRILOSEC) 20 MG capsule Take 1 capsule (20 mg total) by mouth daily before breakfast.     polyethylene glycol (MIRALAX) 17 gram packet Take 17 g by mouth daily as needed.     potassium chloride (K-DUR,KLOR-CON) 20 MEQ tablet Take 20 mEq by mouth daily.      predniSONE (DELTASONE) 2.5 MG tablet Take 7.5 mg/d prednisone PO for 3 weeks.     senna (SENOKOT) 8.6 mg tablet Take 1 tablet by mouth daily.     white petrolatum (AQUAPHOR ORIGINAL) 41 % Oint Apply 1 application topically at bedtime.     Past Medical History:    Past Medical History:   Diagnosis Date     Acute kidney injury superimposed on CKD (H) 3/3/2017     Asthma      CAD (coronary artery disease)      Cataract      Chronic kidney disease     ckd3     COPD (chronic obstructive pulmonary disease) (H)      Diabetes mellitus (H)      Disease of thyroid gland      H/O heart valve replacement with bioprosthetic valve      History of transfusion      Hypertension      Normochromic normocytic anemia      Pulmonary hypertension (H) 09/27/2019    Noted on echocardiogram     Restless leg syndrome            PHYSICAL EXAMINATION  Vitals:    07/24/20 0252   Resp: 16       Today on physical exam:     GENERAL: Awake, Alert, obese,  not in any form of acute distress, answers questions appropriately, follows simple commands, conversant  HEENT: Head is normocephalic with normal hair distribution. No evidence of trauma. Ears: No acute purulent discharge. Eyes: Conjunctivae pink with no scleral jaundice. Nose: Normal mucosa and septum. NECK: Supple  with no cervical or supraclavicular lymphadenopathy. Trachea is midline. [place], decreased vision  CHEST: No tenderness or deformity, no crepitus, left chest PPM  LUNG: Lung sounds clear in all fields   no shortness of breath noted today, no cough noted  BACK: No kyphosis of the thoracic spine. Symmetric, no curvature, ROM normal, no CVA tenderness, no spinal tenderness   CVS: irregularly irregular rhythm, murmur,  2+ pulses symmetric in all extremities.  ABDOMEN: Rounded and soft, nontender to palpation, non distended, no masses, no organomegaly, good bowel sounds, no rebound or guarding, no peritoneal signs.   EXTREMITIES: 1+ ble nonpitting edema, left upper extremity 2+ edema, left shoulder incision c/d/i, left arm sling in place  SKIN: Warm and dry  NEUROLOGICAL: The patient is oriented to person, place and time. Confused at times, oriented on exam. Forgetful at times.  Positive for visual hallucinations.            LABS:   Recent Results (from the past 168 hour(s))   Basic Metabolic Panel   Result Value Ref Range    Sodium 141 136 - 145 mmol/L    Potassium 3.6 3.5 - 5.0 mmol/L    Chloride 98 98 - 107 mmol/L    CO2 35 (H) 22 - 31 mmol/L    Anion Gap, Calculation 8 5 - 18 mmol/L    Glucose 114 70 - 125 mg/dL    Calcium 9.2 8.5 - 10.5 mg/dL    BUN 37 (H) 8 - 28 mg/dL    Creatinine 1.47 (H) 0.60 - 1.10 mg/dL    GFR MDRD Af Amer 41 (L) >60 mL/min/1.73m2    GFR MDRD Non Af Amer 34 (L) >60 mL/min/1.73m2   Hemoglobin   Result Value Ref Range    Hemoglobin 8.2 (L) 12.0 - 16.0 g/dL   Thyroid Stimulating Hormone (TSH)   Result Value Ref Range    TSH 3.43 0.30 - 5.00 uIU/mL     Results for orders placed or performed during the hospital encounter of 07/18/20   Basic metabolic panel   Result Value Ref Range    Sodium 141 136 - 145 mmol/L    Potassium 4.1 3.5 - 5.0 mmol/L    Chloride 95 (L) 98 - 107 mmol/L    CO2 34 (H) 22 - 31 mmol/L    Anion Gap, Calculation 12 5 - 18 mmol/L    Glucose 108 70 - 125 mg/dL    Calcium 9.4 8.5 -  10.5 mg/dL    BUN 49 (H) 8 - 28 mg/dL    Creatinine 1.67 (H) 0.60 - 1.10 mg/dL    GFR MDRD Af Amer 35 (L) >60 mL/min/1.73m2    GFR MDRD Non Af Amer 29 (L) >60 mL/min/1.73m2         Lab Results   Component Value Date    WBC 6.9 08/05/2020    HGB 8.2 (L) 08/17/2020    HCT 29.0 (L) 08/05/2020    MCV 98 08/05/2020     08/05/2020       Lab Results   Component Value Date    HCQADWQU64 333 07/21/2020     Lab Results   Component Value Date    HGBA1C 6.5 (H) 07/31/2020     Lab Results   Component Value Date    INR 0.99 07/28/2020    INR 1.30 (H) 02/07/2018    INR 1.51 (H) 03/06/2017     Vitamin D, Total (25-Hydroxy)   Date Value Ref Range Status   01/20/2020 45.9 30.0 - 80.0 ng/mL Final     Lab Results   Component Value Date    TSH 3.43 08/17/2020           ASSESSMENT/PLAN:    Restart diclofenac 1% gel to left shoulder 3 times daily for osteoarthritis pain.  Otherwise continue current care plan for all other chronic medical conditions, as they are stable. Encouraged patient to engage in healthy lifestyle behaviors such as engaging in social activities, exercising (PT/OT), eating well, and following care plan. Follow up for routine check-up, or sooner if needed. Will continue to monitor patient and work with nursing staff collaboratively to work toward positive patient outcomes.        Electronically signed by: Shawna Crespo CNP

## 2021-06-10 NOTE — PROGRESS NOTES
[facility]/ Geriatrics    Facility:  [facility] [[id number]]    Code Status:  FULL CODE    Chief Complaint   Patient presents with     H & P   :                    Patient Active Problem List   Diagnosis     Constipation     Sciatica     Dyspnea, unspecified type     Hypothyroidism     Type 2 diabetes mellitus with stage 3 chronic kidney disease, without long-term current use of insulin (H)     Hyperparathyroidism     Vitamin D deficiency     Mixed hyperlipidemia     Atherosclerosis of native coronary artery of native heart without angina pectoris     CKD (chronic kidney disease), stage III (H)     Osteoarthritis Of The Knee     Restless Legs Syndrome     H/O heart valve replacement with bioprosthetic valve     Cardiac pacemaker in situ     Complete heart block (H)     Normochromic normocytic anemia     Essential hypertension with goal blood pressure less than 140/90     Moderate persistent asthma without complication     PAF (paroxysmal atrial fibrillation) (H)     GERD (gastroesophageal reflux disease)     Primary insomnia     Hypokalemia     Physical deconditioning     Asthma     CHF (congestive heart failure), NYHA class I, acute, systolic (H)     Pulmonary hypertension (H)     Atrial fibrillation, unspecified type (H)     Status post aortic valve replacement     Non-rheumatic mitral valve stenosis     Nonrheumatic mitral valve stenosis     PVD (peripheral vascular disease) (H)     Lymphedema     Acute respiratory failure (H)     Delirium     S/p reverse total shoulder arthroplasty       History:  Thea Acosta  is an 86 year old female with history of coronary artery disease, CHF, paroxysmal atrial fibrillation, AVR, permanent pacemaker placement, COPD, CKD 3, DM 2, hypertension, GERD,  seen for admission to TCU on 8/4/2020 following a series of hospitalizations    Hospital course #3/most recent:  Patient was hospitalized between July 28 and August 1 after I sent her from  this TCU upon discovering her dislocated left shoulder on July 28.  Shoulder reduction was achieved but could not be maintained.  She was seen by orthopedics who diagnosed chronic instability and   recommended total reverse shoulder which was completed on July 31, 2020.  Surgery was complicated by mild perioperative hypoxia.  Patient was treated with hydrocodone (since discontinued).    Hospital course #1:  Patient was hospitalized at [facility] between July 12 and July 6 following a fall with left shoulder dislocation which was reduced in the emergency room.  However she was noted to be hypoxic and was diagnosed with acute hypoxic respiratory failure felt most likely secondary to atelectasis plus opiates which are being used for pain control.  She had weaned off oxygen by the time of discharge.  She also had acute kidney injury and was noted to have metabolic alkalosis that hospital stay.  She was discharged with a creatinine of 1.4 after holding her Bumex for a brief time.     She was discharged to [facility] where she stayed for fewer than 48 hours due to development of acute delirium.    Hospital course #2:     She was readmitted between July 18 and July 21 again at [facility].  Hypothyroidism was felt to be the primary cause of her delirium.  TSH 67 on July 18.  She has been noncompliant with her thyroid medication quite regularly for at least months.  She was again found to be hypoxic without certain cause as she was not on opiates this time.  She did have a chest x-ray on July 20 suggesting a chronic CHF type appearance but no acute problems noted.  She again had elevation of her creatinine to a peak of 1006 discharged 1.67 with a baseline of about 1.4.        Subjective/ROS:    -augmented by discussion with facility staff involved in direct care  -limited by: Memory    Patient does not have good recall of the events leading to the hospitalization or the hospitalization itself.  She feels  "\"pretty good\" today.  She denies pain.  Therapist is wondering about removing the buttress pad underneath the immobilizer and does not think it is doing much good in her seated position in the wheelchair.  I asked staff to address that question to the orthopedist.    Patient reports that her breathing is \".  She is been off oxygen again.    I pushed her a little bit on the pain and she sticks to her guns saying that her shoulder just does not seem to bother her much.  She is grateful for that.  She is not requiring opiates which have been discontinued.  She has Voltaren gel and acetaminophen.    Weight gain is a considerable concern although she is not feeling short of breath and is off oxygen.  She was unaware of her leg edema status.  She is on Bumex 1.5 twice daily.    When asked about bowel movements she does not think she is had one for days.  But when I talked to the staff she had a large bowel movement fewer than 24 hours ago.  She does not feel abdominal pain flank pain dysuria.  Denies chest pain headaches change in vision speaking swallowing hearing.  Remainder negative    Past Medical History:   Diagnosis Date     Acute kidney injury superimposed on CKD (H) 3/3/2017     Asthma      CAD (coronary artery disease)      Cataract      Chronic kidney disease     ckd3     COPD (chronic obstructive pulmonary disease) (H)      Diabetes mellitus (H)      Disease of thyroid gland      H/O heart valve replacement with bioprosthetic valve      History of transfusion      Hypertension      Normochromic normocytic anemia      Pulmonary hypertension (H) 09/27/2019    Noted on echocardiogram     Restless leg syndrome      Past Surgical History:   Procedure Laterality Date     APPENDECTOMY       CHOLECYSTECTOMY       EYE SURGERY Bilateral     Cataracts     HIP PINNING Right 3/1/2017    Procedure: RIGHT HIP TROCHANTERIC RODDING;  Surgeon: Moshe Davies MD;  Location: [facility];  Service:      INSERT / REPLACE / " REMOVE PACEMAKER  2007    guidant     INSERT / REPLACE / REMOVE PACEMAKER  2018    phoebe sci gen change     JOINT REPLACEMENT Left     knee     MD RECONSTR TOTAL SHOULDER IMPLANT Left 2020    Procedure: LEFT REVERSE TOTAL SHOULDER ARTHROPLASTY;  Surgeon: Alvarez Palomino MD;  Location: [facility];  Service: Orthopedics     TISSUE AORTIC VALVE REPLACEMENT  2007               Family History   Problem Relation Age of Onset     No Medical Problems Mother         age 86     No Medical Problems Father         MVA     Cancer Brother         lung     No Medical Problems Brother    :       Social History     Socioeconomic History     Marital status:      Spouse name: Not on file     Number of children: Not on file     Years of education: Not on file     Highest education level: Not on file   Occupational History     Occupation:  in operating room     Comment: retired   Social Needs     Financial resource strain: Not on file     Food insecurity     Worry: Not on file     Inability: Not on file     Transportation needs     Medical: Not on file     Non-medical: Not on file   Tobacco Use     Smoking status: Former Smoker     Packs/day: 0.00     Last attempt to quit: 1950     Years since quittin.9     Smokeless tobacco: Never Used   Substance and Sexual Activity     Alcohol use: No     Drug use: No     Sexual activity: Never   Lifestyle     Physical activity     Days per week: Not on file     Minutes per session: Not on file     Stress: Not on file   Relationships     Social connections     Talks on phone: Not on file     Gets together: Not on file     Attends Jain service: Not on file     Active member of club or organization: Not on file     Attends meetings of clubs or organizations: Not on file     Relationship status: Not on file     Intimate partner violence     Fear of current or ex partner: Not on file     Emotionally abused: Not on file     Physically abused:  "Not on file     Forced sexual activity: Not on file   Other Topics Concern     Incontinent Not Asked     Toileting independently Not Asked     Cognitive impairment Not Asked     Vision impairment Not Asked     Hearing impairment Not Asked     Dentures Not Asked   Social History Narrative    She live in a single floor house.  Her son lives next door and her granddaughter is \"around a lot\"   3/2017   :    Patient says she lives on [address]  She lives with her granddaughter.  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    Current Outpatient Medications on File Prior to Visit   Medication Sig Dispense Refill     acetaminophen (TYLENOL) 500 MG tablet Take 2 tablets (1,000 mg total) by mouth 3 (three) times a day. (Patient taking differently: Take 1,000 mg by mouth 3 (three) times a day. And daily prn)  0     albuterol (PROAIR HFA;PROVENTIL HFA;VENTOLIN HFA) 90 mcg/actuation inhaler Inhale 2 puffs every 4 (four) hours as needed for wheezing.  0     aspirin 81 MG EC tablet Take 1 tablet (81 mg total) by mouth 2 (two) times a day.  0     atorvastatin (LIPITOR) 80 MG tablet TAKE 1 TABLET(80 MG) BY MOUTH DAILY 90 tablet 3     bumetanide (BUMEX) 2 MG tablet Take 1.5 tablets (3 mg total) by mouth 2 (two) times a day at 9am and 6pm. 270 tablet 2     colchicine 0.6 mg tablet take two tablets (1.2 mg) by mouth at onset of gout symptoms, then repeat one tablet (0.6 mg) by mouth one hour later 24 tablet 2     diclofenac sodium (VOLTAREN) 1 % Gel Apply 2 g topically 4 (four) times a day as needed.        ferrous sulfate 325 (65 FE) MG tablet TAKE 1 TABLET(325 MG) BY MOUTH TWICE DAILY 180 tablet 3     levothyroxine (SYNTHROID, LEVOTHROID) 150 MCG tablet Take 1 tablet (150 mcg total) by mouth daily before supper. 90 tablet 3     nystatin (MYCOSTATIN) powder Apply topically 4 (four) times a day. 60 g 2     omeprazole (PRILOSEC) 20 MG capsule Take 1 capsule (20 mg total) by mouth daily before " breakfast. 90 capsule 3     potassium chloride (K-DUR,KLOR-CON) 20 MEQ tablet Take 20 mEq by mouth daily.        predniSONE (DELTASONE) 2.5 MG tablet Take 7.5 mg/d prednisone PO for 3 weeks. 90 tablet 0     white petrolatum (AQUAPHOR ORIGINAL) 41 % Oint Apply 1 application topically at bedtime.       No current facility-administered medications on file prior to visit.    :      ALLERGIES:  Tramadol; Codeine; Codeine phosphate (bulk); Codeine sulfate; Codeine-butalbital-asa-caff; Codeine-guaifenesin; Lisinopril; and Penicillins    Vitals:  There were no vitals taken for this visit. except as noted below    Vital signs:    Most Recent Vitals Date/Time Taken  Temperature: 96.2  F 08/04/2020 02:48 PM  Pulse: 60 per minute 08/04/2020 02:48 PM  Respirations: 18 per minute 08/04/2020 05:45 PM  Blood Pressure: 105 / 58 mmHg 08/04/2020 02:48 PM  O2 Saturation: 95 % 08/04/2020 02:48 PM  Blood Sugar: 114 mg/dL 03/20/2017 05:08 PM  Weight: 220 lbs / Routine       BMI: 34.45 08/04/2020 11:00 AM  Height: 5ft 7.0in 07/17/2020 12:01 PM    Physical exam:    General:  Alert  oriented oriented x3 appears calm, no apparent distress, normally conversant.  Speech is clear.  She answers questions appropriately.  Left upper chest/shoulder surgical wound shows dsg intact and dry.  Dressings were not removed.  Surrounding skin is unremarkable without erythema or tenderness.  However she has 3+ pitting edema in the hand but very little in the forearm or upper arm.  It looks like the shoulder immobilizer may put a little pressure on the dorsum of the hand the way she positions it in the sling.    She also has bilateral pitting edema to the knees left greater than right with some mild erythematous venous stasis dermatitis in the pretibial areas.    However she is breathing comfortably with decreased breath sounds in the bases but no rales and she is moving air easily without accessory muscle use or tachypnea.  Heart is regular today in the 70s  S1-S2 without murmur gallop or rub abdomen is soft.  Demonstrates good range of motion of her neck with rotation of head.        Due to the 2020 Covid 19 pandemic, except as noted above, the patient was visually observed at a 6 foot plus distance.  An observational exam was performed in an effort to keep patient safe from Covid 19 and other communicable diseases.   Labs:  Lab Results   Component Value Date    WBC 9.0 07/28/2020    HGB 8.8 (L) 08/01/2020    HCT 36.3 07/28/2020     07/28/2020     08/01/2020     Results for orders placed or performed during the hospital encounter of 07/28/20   Basic Metabolic Panel   Result Value Ref Range    Sodium 139 136 - 145 mmol/L    Potassium 4.3 3.5 - 5.0 mmol/L    Chloride 97 (L) 98 - 107 mmol/L    CO2 36 (H) 22 - 31 mmol/L    Anion Gap, Calculation 6 5 - 18 mmol/L    Glucose 165 (H) 70 - 125 mg/dL    Calcium 8.8 8.5 - 10.5 mg/dL    BUN 47 (H) 8 - 28 mg/dL    Creatinine 1.39 (H) 0.60 - 1.10 mg/dL    GFR MDRD Af Amer 44 (L) >60 mL/min/1.73m2    GFR MDRD Non Af Amer 36 (L) >60 mL/min/1.73m2         Lab Results   Component Value Date    TSH 67.01 (H) 07/18/2020     Lab Results   Component Value Date    HGBA1C 6.5 (H) 07/31/2020     [unfilled]  Lab Results   Component Value Date    QRGOQYRN89 333 07/21/2020     Lab Results   Component Value Date     07/18/2020     [unfilled]  Most Recent EKG     Units 07/28/20  1543   VENTRATE BPM 63   ATRIALRATE BPM 57   QRSDURATION ms 172   QTINTERVAL ms 496   QTCCALC ms 507   RAXIS degrees 106   TAXIS degrees 261   MUSEDX  Ventricular-paced rhythm with occasional Premature ventricular complexes  Abnormal ECG  When compared with ECG of 18-JUL-2020 11:23,  Electronic ventricular pacemaker has replaced Wide QRS rhythm  Confirmed by SEE ED PROVIDER NOTE FOR, ECG INTERPRETATION ([id number]),  VIV ELDER ([id number]) on 7/28/2020 10:25:45 PM           Assessment/Plan:      ICD-10-CM    1. Status post reverse total replacement of  left shoulder  Z96.612    Left shoulder instability  Recurrent dislocation left shoulder  Status post reverse total replacement of left shoulder    Patient is doing quite well in most regards.  Major concerns are edema of the hand but she has very thin loose skin there and some minimal pressure from the shoulder immobilizer is probably mostly responsible because the edema does not extend up into the forearm or shoulder.  There is no tenderness.  No cords.  - OT lymphedema therapy  Consider lymphedema garment for the hand per therapist  Reposition her  as necessary to prevent any pressure which could contribute to the edema  - Resting the arm up above the heart on the chest when resting would make sense, however she likes to be in the wheelchair.          Bilateral lower extremity edema   weight gain  Chronic diastolic CHF   Patient does not appear to have pulmonary edema on exam.  O2 sats are good off of room air.  She does have a history of recurrent oxygen need though.   However her weight is up from a baseline of 205 to 220 pounds today.  However she was at 224 yesterday and has had some diuresis on her current Bumex dose, 3 mg twice daily, up from her chronic 2 mg twice daily dosage..  I think that she is clinically stable we can continue current dose of Bumex and add leg elevation above heart except when she is up for therapy meals or bathroom, which should be 20 out of 24 hours/day with the legs elevated.  Lymphedema therapy consultation, lymphedema garments per therapist.  We will continue daily weights and close monitoring.  BMP .  Performing Date  Performing Department    2018   CARDIAC TESTING [[id number]]    Patient Information     Patient Name   Thea Acosta  MRN   [id number]  Sex   Female   1   1934 (83 y.o.)    Indications     Heart murmur; Fever    Summary       Normal left ventricular size and systolic function.    When compared to the previous study dated  3/1/2017, no significant change.    Left ventricle ejection fraction is normal. The estimated left ventricular ejection fraction is 55%.    Normal right ventricular size and systolic function.    Left Atrium: Left atrial volume is moderately increased.    Aortic Valve: Bioprosthetic valve is not well visualized. No evidence of paravalvular leak and mean gradient is 15 mmHg which is borderline elevated, however not significantly changed compared to prior.    Mitral Valve: There is severe mitral annular calcification. Moderate mitral Calcific stenosis. No mitral regurgitation.              Acute metabolic encephalopathy  Hallucinations  Hypothyroidism   Abnormal urinalysis   I still feel that patient has not had a chance to get back to her best baseline status.  She is been in and out of the hospital 3 times now has undergone surgery with anesthesia, has been continually in unfamiliar settings.  She is off opiates.  Her pain is controlled.  These issues should help.  She did have a negative head CT on July 18.  Her hypoxia has resolved.      Recall that her hypothyroidism was thought to be a significant contributor to this issue and if so we should continue to see rapid improvement due to the replacement.  TSH was 67 on July 18.    Suspected chronic hypoxic respiratory failure   Though this finding has been intermittent, she is prone to dropping her O2 sats.  I suspect it is multifactorial.  She has been felt to have COPD in the past although she is unaware of that and denies it.  She also has history of CHF though I do not find evidence of pulmonary edema.  She is now off opiates.  Obesity, possible hypoventilation, possible RANJIT contributing?  - Monitor O2 sats, supplemental oxygen as need be  -Avoid opiates and other sedating medications as possible  - PE has not been suspected.  - Patient sounds like she is not interested in sleep study  -No plan for VQ scan/PE rounds based on current  "improvement.            COPD   I see this in old notes and records.  I do not see complete pulmonary function tests on record however.  Patient says that she smoked in the past \"but did not inhale\".  She is skeptical.  Nonetheless I suspect that there is some COPD present.  She does have albuterol if need be but not needed currently with no wheezing.  No wheezing heard today.    Coronary artery disease   Continue aspirin and atorvastatin.  Not sure why she is not on beta-blocker, can follow-up with primary MD on that.    Paroxysmal atrial fibrillation   Sounds are she is in normal sinus rhythm today.  Takes only aspirin for stroke prophylaxis, does not need rate controlling agent    Permanent pacemaker   Palpable in the left anterior chest wall but below the mass described above, distinct from what I think is shoulder dislocation.  Follows with device clinic.    DM 2   Blood sugar control adequate.  A1c 7.0 on February 26.    Gout   Presumed.  Hand pain left greater than right.  Patient is on prednisone taper which she will continue.  She also has colchicine but is not currently taking it, just a PRN medication    CKD 3   Mild elevations of creatinine with recent hospitalizations.  Monitor closely avoid nephrotoxins as able.    Hypertension   Patient has had relatively low blood pressure at times throughout these past weeks.  Thus she is not on antihypertensives.  Has been on metoprolol up to recently.  She is on Bumex.  Expect that Lopressor may be restarted in the foreseeable future.    GERD   Omeprazole    Intertrigo   Improved on nystatin  Vitamin D deficiency   On replacement  Case discussed with:    Facility staff             Rojelio Farnsworth MD  "

## 2021-06-10 NOTE — PROGRESS NOTES
[facility]    DATE: 2020    NAME:  Thea Acosta             :  1934  MRN: [id number]  CODE STATUS:  DNR    VISIT TYPE: Review Of Multiple Medical Conditions (tsa, chf)     FACILITY:  [facility] [[id number]]       CHIEF COMPLAIN/REASON FOR VISIT:    Chief Complaint   Patient presents with     Review Of Multiple Medical Conditions     tsa, chf               HISTORY OF PRESENT ILLNESS: Thea Acosta is a 86 y.o. female who admitted - for acute metabolic encephalopathy. This was felt to be s/t hypothyroidism and noncompliance with levothyroxine dosing. She was readmitted - for left shoulder dislocation and underwent reverse total shoulder arthroplasty on . Postop course was uncomplicated and transferred back to [facility]. She has PMH of CAD, CKD, DM, s/p PPM, diastolic CHF, pulmonary artery hypertension, mitral valve stenosis, chronic a fib no anticoagulation, s/p AVR, asthma. Prior to this she lived at home with her grand daughter.     Today Ms. Acosta is seen for review of systems for chf, tsa. She is seen today in her room sitting in wheelchair. She feels pretty good, just some soreness in the shoulder at times. She is improving with therapy just wishes she could use the shoulder more. She is asking again how long she will have restrictions. She says she is not sure when she goes back to see her surgeon again. She denies any concerns with bowels or bladder. She is not having any other new concerns today.         REVIEW OF SYSTEMS:  PROBLEMS AND REVIEW OF SYSTEMS:   Review of Systems  Today on ROS:   Currently, no fever, chills, or rigors. Decreased vision and hearing. Denies any chest pain, headaches, palpitations, lightheadedness, dizziness, no cough, no sob. Appetite is good.  Denies any abdominal pain. No active bleeding. Positive for urinary incontinence, leg swelling, left arm and shoulder swelling improving, sling in  "place, left shoulder incision, no nausea or vomiting, no constipation, pain controlled and improving, lymphedema wraps      Allergies   Allergen Reactions     Tramadol Anxiety and Other (See Comments)     Per patient, Thea developed psychosis and severe anxiety and agitation \"for three days\" after receiving tramadol in the past. She stated that she would \"never\" take it again and would be extremely upset if she receives it. She asked me to add this to her chart's allergy list specifically.      Codeine Hives     Codeine Phosphate (Bulk)      This allergy is per [facility] records.     Codeine Sulfate      This allergy is per [facility] records.     Codeine-Butalbital-Asa-Caff      This allergy is per [facility] records.     Codeine-Guaifenesin      This allergy is per [facility] records.     Lisinopril Cough     Penicillins Swelling     Current Outpatient Medications   Medication Sig     acetaminophen (TYLENOL) 500 MG tablet Take 2 tablets (1,000 mg total) by mouth 3 (three) times a day. (Patient taking differently: Take 1,000 mg by mouth 3 (three) times a day. And daily prn)     albuterol (PROAIR HFA;PROVENTIL HFA;VENTOLIN HFA) 90 mcg/actuation inhaler Inhale 2 puffs every 4 (four) hours as needed for wheezing.     aspirin 81 MG EC tablet Take 1 tablet (81 mg total) by mouth 2 (two) times a day.     atorvastatin (LIPITOR) 80 MG tablet TAKE 1 TABLET(80 MG) BY MOUTH DAILY     bumetanide (BUMEX) 2 MG tablet Take 1.5 tablets (3 mg total) by mouth 2 (two) times a day at 9am and 6pm.     colchicine 0.6 mg tablet take two tablets (1.2 mg) by mouth at onset of gout symptoms, then repeat one tablet (0.6 mg) by mouth one hour later     diclofenac sodium (VOLTAREN) 1 % Gel Apply 2 g topically 4 (four) times a day as needed.      ferrous sulfate 325 (65 FE) MG tablet TAKE 1 TABLET(325 MG) BY " MOUTH TWICE DAILY (Patient taking differently: Take 1 tablet by mouth 3 (three) times a day with meals. )     levothyroxine (SYNTHROID, LEVOTHROID) 150 MCG tablet Take 1 tablet (150 mcg total) by mouth daily before supper.     nystatin (MYCOSTATIN) powder Apply topically 4 (four) times a day.     omeprazole (PRILOSEC) 20 MG capsule Take 1 capsule (20 mg total) by mouth daily before breakfast.     polyethylene glycol (MIRALAX) 17 gram packet Take 17 g by mouth daily as needed.     potassium chloride (K-DUR,KLOR-CON) 20 MEQ tablet Take 20 mEq by mouth daily.      predniSONE (DELTASONE) 2.5 MG tablet Take 7.5 mg/d prednisone PO for 3 weeks.     senna (SENOKOT) 8.6 mg tablet Take 1 tablet by mouth daily.     white petrolatum (AQUAPHOR ORIGINAL) 41 % Oint Apply 1 application topically at bedtime.     Past Medical History:    Past Medical History:   Diagnosis Date     Acute kidney injury superimposed on CKD (H) 3/3/2017     Asthma      CAD (coronary artery disease)      Cataract      Chronic kidney disease     ckd3     COPD (chronic obstructive pulmonary disease) (H)      Diabetes mellitus (H)      Disease of thyroid gland      H/O heart valve replacement with bioprosthetic valve      History of transfusion      Hypertension      Normochromic normocytic anemia      Pulmonary hypertension (H) 09/27/2019    Noted on echocardiogram     Restless leg syndrome            PHYSICAL EXAMINATION  Vitals:    08/28/20 0700   BP: 132/72   Pulse: 72   Resp: 18   Temp: 96.8  F (36  C)   SpO2: 93%   Weight: 214 lb (97.1 kg)       Today on physical exam:     GENERAL: Awake, Alert, obese,  not in any form of acute distress, answers questions appropriately, follows simple commands, conversant  HEENT: Head is normocephalic with normal hair distribution. No evidence of trauma. Ears: No acute purulent discharge. Eyes: Conjunctivae pink with no scleral jaundice. Nose: Normal mucosa and septum. NECK: Supple with no cervical or supraclavicular  lymphadenopathy. Trachea is midline. [place], decreased vision  CHEST: No tenderness or deformity, no crepitus, left chest PPM  LUNG: Dim but clear today to auscultation.   No shortness of breath noted today, no cough noted  BACK: No kyphosis of the thoracic spine. Symmetric, no curvature, ROM normal, no CVA tenderness, no spinal tenderness   CVS: irregularly irregular rhythm, murmur,  2+ pulses symmetric in all extremities.  ABDOMEN: Rounded and soft, nontender to palpation, non distended, no masses, no organomegaly, good bowel sounds, no rebound or guarding, no peritoneal signs.   EXTREMITIES: 2+ ble nonpitting edema, lymphedema wrapping, left upper extremity 1+ edema with lymphedema sleeve, left shoulder incision c/d/i, left arm sling in place  SKIN: Warm and dry  NEUROLOGICAL: The patient is oriented to person, place and time. Oriented today, no recent confusion, Forgetful at times.             LABS:   Recent Results (from the past 168 hour(s))   Hemoglobin   Result Value Ref Range    Hemoglobin 8.4 (L) 12.0 - 16.0 g/dL     Results for orders placed or performed in visit on 08/17/20   Basic Metabolic Panel   Result Value Ref Range    Sodium 141 136 - 145 mmol/L    Potassium 3.6 3.5 - 5.0 mmol/L    Chloride 98 98 - 107 mmol/L    CO2 35 (H) 22 - 31 mmol/L    Anion Gap, Calculation 8 5 - 18 mmol/L    Glucose 114 70 - 125 mg/dL    Calcium 9.2 8.5 - 10.5 mg/dL    BUN 37 (H) 8 - 28 mg/dL    Creatinine 1.47 (H) 0.60 - 1.10 mg/dL    GFR MDRD Af Amer 41 (L) >60 mL/min/1.73m2    GFR MDRD Non Af Amer 34 (L) >60 mL/min/1.73m2         Lab Results   Component Value Date    WBC 6.9 08/05/2020    HGB 8.4 (L) 08/24/2020    HCT 29.0 (L) 08/05/2020    MCV 98 08/05/2020     08/05/2020       Lab Results   Component Value Date    [id number] 333 07/21/2020     Lab Results   Component Value Date    HGBA1C 6.5 (H) 07/31/2020     Lab Results   Component Value Date    INR 0.99 07/28/2020    INR 1.30 (H) 02/07/2018    INR 1.51 (H)  03/06/2017     Vitamin D, Total (25-Hydroxy)   Date Value Ref Range Status   01/20/2020 45.9 30.0 - 80.0 ng/mL Final     Lab Results   Component Value Date    TSH 3.43 08/17/2020           ASSESSMENT/PLAN:    Left shoulder dislocation, s/p reverse total shoulder arthroplasty: Incision c/d/i. Pain controlled on tylenol.  Ice prn. PT, OT following. voltaren gel prn. F/u with surgeon Dr. Ramirez on 8/14-f/u again in 4 weeks, limit external rotation, no bathing until 4 weeks postop, healing well.  lymphedema sleeve on LUE. Overall improving. Remains NWB, may remove sling when resting. Improving with therapy. No new concerns today.   Asthma:  Encourage cough and deep breathe. IS q1h while awake. Albuterol prn. Shortness of breath at baseline today.   Hypothyroid: on levothyroxine.   Chronic CHF: Daily [id number]. Shortness of breath at baseline, 2+ ble edema. On bumex 2mg two times a day, keep legs elevated when possible.  F/u with cardiology prn. Cardiac diet. Monitor for fluid overload. Lymphedema wrapping. No concerns today, appears euvolemic.   S/p PPM: follows with device clinic. No recent concerns.   CKD stage 3: Cr 1.39, gfr 36 on 8/1. Stable.   H/o Type 2 DM: No meds, no monitoring needed. Diet controlled. No recent concerns.   Dyslipidemia: On aspirin, atorvastatin.   HTN: SBP 130s, On lopressor, bumex.  hold parameters for lopressor. Stable.  GERD: On omeprazole. No concerns today.   Vitamin d deficiency: On vitamin d.  Hypokalemia: On kcl.  PAF, s/p AVR: Regular rate and rhythm today. On metoprolol. F/u with caridology. No concerns.   Anemia of CKD: on iron. Hg 10.9 on 7/24.   Hg 8.8 on 8/1. Hg 9 on 8/5. Recheck on 8/17 8.2, iron to three times a day. recheck on 8/24-hg 8.4.   Candidal intertrigo:  Nystatin.   Constipation: senna daily, miralax daily prn.      PT: trsf min-max A, walking 25ft with trang walker with Min A, OT: LE & hand lymph, mod A UB drsg, max A toileting. Lives with  granddaughter. Yellow tag recommend 2 weeks    Electronically signed by: Jennifer Valdes NP

## 2021-06-10 NOTE — PROGRESS NOTES
[facility]    DATE: 2017    NAME: Thea Acosta  : 1934           MR# [id number]     CODE STATUS:  FULL CODE      VISIT TYPE: Problem   FACILITY: [facility] [[id number]]    ROOM: 324    PRIMARY CARE PROVIDER: Gamal Hdz MD Phone: [phone] Fax:[phone]    History of Present Illness:   Thea Acosta is a 83 y.o. female with 3/1/17 open reduction internal fixation of an intertrochanteric fracture of the right hip. Recovery is been compromised by intermittent flares of COPD and her body habitus which makes it difficult for her to progress in therapy. He does feel like she's doing better now her Weight is down a pound and a half over the weekend electrolytes are back And are stable she is finally willing to take the scheduled nebulizer and feels she's breathing well right now.    Past Medical History:  Past Medical History:   Diagnosis Date     Acute kidney injury superimposed on CKD 3/3/2017     Asthma      CAD (coronary artery disease)      Cataract      Diabetes mellitus      Disease of thyroid gland      History of transfusion      Hypertension        Allergies:  Allergies   Allergen Reactions     Codeine      Lisinopril Cough     Penicillins Swelling       Current Medications:  Current Outpatient Prescriptions   Medication Sig     acetaminophen (TYLENOL) 500 MG tablet Take 500 mg by mouth every 6 (six) hours as needed for pain.     aspirin 81 MG EC tablet Take 81 mg by mouth daily.     atorvastatin (LIPITOR) 80 MG tablet Take 80 mg by mouth bedtime.     cholecalciferol, vitamin D3, (VITAMIN D3) 2,000 unit Tab Take 2,000 Units by mouth once daily.     fluticasone-salmeterol (ADVAIR) 500-50 mcg/dose DISKUS Inhale 1 puff 2 (two) times a day.     folic acid (FOLVITE) 1 MG tablet Take 1 mg by mouth daily.     furosemide (LASIX) 40 MG tablet Take 1 tablet (40 mg total) by mouth every morning. (Patient taking differently: Take 40 mg by mouth 2  (two) times a day at 9am and 6pm. 40 am  And 20 noon)     ipratropium (ATROVENT) 0.02 % nebulizer solution Take 500 mcg by nebulization 4 (four) times a day.      ipratropium-albuterol (COMBIVENT RESPIMAT)  mcg/actuation Aero inhaler Inhale 1 puff 4 (four) times a day.      levothyroxine (SYNTHROID, LEVOTHROID) 125 MCG tablet Take 125 mcg by mouth daily.     loperamide (IMODIUM A-D) 2 mg tablet Take 2 mg by mouth 4 (four) times a day as needed for diarrhea.     losartan (COZAAR) 100 MG tablet Take 100 mg by mouth daily.     melatonin 3 mg Tab tablet Take 3 mg by mouth bedtime as needed.     metoprolol tartrate (LOPRESSOR) 100 MG tablet Take 100 mg by mouth 2 (two) times a day.      nystatin (MYCOSTATIN) powder Apply topically 2 (two) times a day.     omeprazole (PRILOSEC) 20 MG capsule Take 20 mg by mouth Daily before breakfast.     predniSONE (DELTASONE) 5 MG tablet Take 5 mg by mouth daily. Start with 30 mg daily then wean down over 10 days     senna-docusate (PERICOLACE) 8.6-50 mg tablet Take 1 tablet by mouth daily.     WARFARIN SODIUM (WARFARIN ORAL) Take 2 mg by mouth. hold       Review of Systems:  History obtained from chart review and the patient  Respiratory ROS: Where coughing but dyspnea on exertion cough is nonproductive and she feels her some wheezing  Cardiovascular ROS: no chest pain    Gastrointestinal ROS: no abdominal pain, change in bowel habits, or black or bloody stools  Genito-Urinary ROS: no dysuria, trouble voiding, or hematuria  Musculoskeletal ROS: She acknowledges weakness in the right hip and is been slow to make turn to use of function in the right hip occur  Neurological ROS: no TIA or stroke symptoms  Dermatological ROS: previous fungal rash in skinfolds has resolved         Laboratory:  No results for input(s): INR in the last 72 hours.  None    Physical Examination:  /53  Pulse 60  Temp 97.1  F (36.2  C)  Resp 20  Wt (!) 264 lb 8 oz (120 kg)  SpO2 91%  BMI 41.43  kg/m2  General appearance: alert, appears stated age, cooperative, flushed, no distress, morbidly obese and slowed mentation  Neck: no adenopathy, no carotid bruit, no JVD, supple, symmetrical, trachea midline and thyroid not enlarged, symmetric, no tenderness/mass/nodules  Lungs: clear to auscultation bilaterally  Heart: regular rate and rhythm, S1, S2 normal, no murmur, click, rub or gallop  Abdomen: soft, non-tender; bowel sounds normal; no masses,  no organomegaly and morbidly obese  Extremities: She has bilateral lower extremity edema and we can function on the right leg postoperatively  Pulses: symmetrical but because of edema lower extremities somewhat difficult to palpate  Skin: Skin color, texture, turgor normal. No rashes or lesions  Neurologic: Mental status: as always she is happy and feels like things are going well for her  Motor:week right lower extremity otherwise symmetrically present strength although globally diminished large body habitus emphasizes the insufficiency of her strength       Impression:  Thea Acosta is a 83 y.o. female with Hip fracture and COPD    1. Closed intertrochanteric fracture of hip, right, with routine healing, subsequent encounter     2. S/P ORIF (open reduction internal fixation) fracture     3. Morbid obesity due to excess calories     4. CKD (chronic kidney disease), stage III     5. Essential hypertension with goal blood pressure less than 140/90     6. Paroxysmal atrial fibrillation     7. History of aortic stenosis     8. Aortic valve replaced     9. Complete heart block     10. Cardiac pacemaker in situ     11. Vitamin D deficiency     12. Restless Legs Syndrome     13. Osteoarthritis Of The Knee     14. Atherosclerosis of coronary artery of native heart without angina pectoris, unspecified vessel or lesion type     15. Tinea corporis         Plan: Continue to try to stabilize her COPD continue to push in therapy but her body habitus has made it difficult for  her to be functional and she is more comfortable resting in her recliner than any exertion would provide    Electronically signed by: Gregg Panchal Sr., MD

## 2021-06-10 NOTE — TELEPHONE ENCOUNTER
[facility] Nurse Triage Telephone Note      Provider: LUIS CARLOS Vazquez  Facility: [facility]    Facility Type: TCU    Caller: Anne  Call Back Number:  [phone]    Allergies: Codeine; Codeine phosphate (bulk); Codeine sulfate; Codeine-butalbital-asa-caff; Codeine-guaifenesin; Lisinopril; and Penicillins    Reason for call: BMP & Heme results     Verbal Order/Direction given by Provider: NNO    Provider giving order: LUIS CARLOS Vazquez    Verbal order given to: Humphrey Haynes RN

## 2021-06-10 NOTE — PROGRESS NOTES
Code Status:  FULL CODE  Visit Type: Problem Visit     Facility:  [facility] [[id number]]         Facility Type: SNF (Skilled Nursing Facility, TCU)    History of Present Illness: Thea Acosta is a 83 y.o. female who I am seeing today for follow up on the TCU. Pt recently admitted s/p ORIF of the right hip 2/t to intertrochanteric fracture 2/t fall. Pain well controlled. Pt had increasing LE edema weight gain and SOB.  She was treated with diuretics however we have begin to wean those down.  She is down 10 pounds.  She does continue with chronic lymphedema with lymphedema wraps.  She has underlying COPD with asthmatic component.  She was treated with Z-Nikhil.  Influenza was negative.  She was started on a burst of prednisone and continues on this.  Recent addition of Combivent inhaler in addition to her Advair.  Today she has wet adventurous sounds without stethoscope on chest.  Rhonchi throughout.  Wet nonproductive cough.  She says her sputum is clear.  She is afebrile.  She has been weaned off her oxygen.  However this cough seems to be persistent.  History of hypertension.  However blood pressures now on the low side.  She continues on Lasix lisinopril and metoprolol.  Systolics are in the low 90s with diastolics 40s-60s. She continues in therapy.       Active Ambulatory Problems     Diagnosis Date Noted     Paroxysmal Atrial Fibrillation      Sciatica      Shortness Of Breath      Type 2 Diabetes Mellitus      Hyperparathyroidism      Vitamin D Deficiency      Hypercholesterolemia      Essential hypertension      Coronary Artery Disease      Moderate persistent asthma      CKD (chronic kidney disease), stage III      Osteoarthritis Of The Knee      Restless Legs Syndrome      History of aortic stenosis 11/11/2014     History of gastrointestinal bleeding 11/11/2014     Closed intertrochanteric fracture of hip 03/01/2017     Elevated brain natriuretic peptide (BNP) level      Acute kidney  injury superimposed on CKD 03/03/2017     S/P ORIF (open reduction internal fixation) fracture 03/06/2017     Morbid obesity due to excess calories 03/06/2017     Aortic valve replaced 03/06/2017     Cardiac pacemaker in situ 03/06/2017     Complete heart block 03/06/2017     Exposure to influenza 03/20/2017     Fluid overload 03/21/2017     Tinea corporis 03/30/2017     Resolved Ambulatory Problems     Diagnosis Date Noted     Constipation      Cough      Acute bronchitis      Hypothyroidism      Past Medical History:   Diagnosis Date     Acute kidney injury superimposed on CKD 3/3/2017     Asthma      CAD (coronary artery disease)      Cataract      Diabetes mellitus      Disease of thyroid gland      History of transfusion      Hypertension        Current Outpatient Prescriptions   Medication Sig     acetaminophen (TYLENOL) 500 MG tablet Take 500 mg by mouth every 6 (six) hours as needed for pain.     aspirin 81 MG EC tablet Take 81 mg by mouth daily.     atorvastatin (LIPITOR) 80 MG tablet Take 80 mg by mouth bedtime.     cholecalciferol, vitamin D3, (VITAMIN D3) 2,000 unit Tab Take 2,000 Units by mouth once daily.     fluticasone-salmeterol (ADVAIR) 500-50 mcg/dose DISKUS Inhale 1 puff 2 (two) times a day.     folic acid (FOLVITE) 1 MG tablet Take 1 mg by mouth daily.     furosemide (LASIX) 40 MG tablet Take 1 tablet (40 mg total) by mouth every morning. (Patient taking differently: Take 40 mg by mouth 2 (two) times a day at 9am and 6pm. 40 am  And 20 noon)     ipratropium (ATROVENT) 0.02 % nebulizer solution Take 500 mcg by nebulization 4 (four) times a day.      ipratropium-albuterol (COMBIVENT RESPIMAT)  mcg/actuation Aero inhaler Inhale 1 puff 4 (four) times a day.      levothyroxine (SYNTHROID, LEVOTHROID) 125 MCG tablet Take 125 mcg by mouth daily.     loperamide (IMODIUM A-D) 2 mg tablet Take 2 mg by mouth 4 (four) times a day as needed for diarrhea.     losartan (COZAAR) 100 MG tablet Take 100 mg  by mouth daily.     melatonin 3 mg Tab tablet Take 3 mg by mouth bedtime as needed.     metoprolol tartrate (LOPRESSOR) 100 MG tablet Take 100 mg by mouth 2 (two) times a day.      nystatin (MYCOSTATIN) powder Apply topically 2 (two) times a day.     omeprazole (PRILOSEC) 20 MG capsule Take 20 mg by mouth Daily before breakfast.     predniSONE (DELTASONE) 5 MG tablet Take 5 mg by mouth daily. Start with 30 mg daily then wean down over 10 days     senna-docusate (PERICOLACE) 8.6-50 mg tablet Take 1 tablet by mouth daily.     traMADol (ULTRAM) 50 mg tablet Take 25 mg by mouth every 6 (six) hours as needed for pain.     WARFARIN SODIUM (WARFARIN ORAL) Take 2 mg by mouth. hold     Allergies   Allergen Reactions     Codeine      Lisinopril Cough     Penicillins Swelling         Review of Systems   No fevers or chills. No headache, lightheadedness or dizziness. No SOB, increasing cough with production of clear phelgm. No chest pains or palpitations. Appetite is good. Continued LE edema. No nausea, vomiting, constipation or diarrhea. No dysuria, frequency, burning or pain with urination. Pain well controlled.     Physical Exam  PHYSICAL EXAMINATION:  Vital signs:   Vitals:    04/13/17 2039   BP: 91/46   Pulse: 62   Resp: 16   Temp: (!) 96.5  F (35.8  C)   SpO2: 92%     General: Awake, Alert, appropriately, follows simple commands, conversant  HEENT:PERRLA, Pink conjunctiva, anicteric sclerae, moist oral mucosa  NECK: Supple, without any lymphadenopathy, or masses  CVS:  S1  S2, without murmur or gallop.   LUNG: Thick Rhonchi throughout. Wet non productive cough.   BACK: No kyphosis of the thoracic spine  ABDOMEN: Soft, morbidly obese, nontender to palpation, with positive bowel sounds  EXTREMITIES: Good range of motion on both upper and lower extremities with generalized weakness, 3+ pedal edema, Ace wraps in place, no calf tenderness  SKIN: Warm and dry, no rashes or erythema noted  NEUROLOGIC: Intact, pulses  palpable  PSYCHIATRIC: Cognition intact              Labs:    Lab Results   Component Value Date    WBC 13.2 (H) 03/04/2017    HGB 9.4 (L) 03/04/2017    HCT 29.7 (L) 03/04/2017    MCV 92 03/04/2017     03/04/2017     Results for orders placed or performed in visit on 03/30/17   Basic Metabolic Panel   Result Value Ref Range    Sodium 139 136 - 145 mmol/L    Potassium 3.6 3.5 - 5.0 mmol/L    Chloride 99 98 - 107 mmol/L    CO2 32 (H) 22 - 31 mmol/L    Anion Gap, Calculation 8 5 - 18 mmol/L    Glucose 110 70 - 125 mg/dL    Calcium 9.1 8.5 - 10.5 mg/dL    BUN 29 (H) 8 - 28 mg/dL    Creatinine 1.36 (H) 0.60 - 1.10 mg/dL    GFR MDRD Af Amer 45 (L) >60 mL/min/1.73m2    GFR MDRD Non Af Amer 37 (L) >60 mL/min/1.73m2       INR 1.96    Assessment/Plan:  1. Closed intertrochanteric fracture of hip, right, with routine healing, subsequent encounter     2. S/P ORIF (open reduction internal fixation) fracture     3. Morbid obesity due to excess calories     4. Fluid overload     5. COPD (chronic obstructive pulmonary disease)     6. Aortic valve replaced     7. Paroxysmal atrial fibrillation     8. CKD (chronic kidney disease), stage III     9. Asthma     10. Essential hypertension with goal blood pressure less than 140/90       Status post ORIF of the right hip.  Pain well controlled.  History of COPD with asthmatic component.  She was originally treated for fluid overload with an increase in diuretics.  She is down 10 pounds.  However she continues with chronic lower extremity lymphedema with edema 3-4+.  She is in wraps.  She continues with wet nonproductive cough with increased production of clear phlegm.  She is afebrile.  Chest x-ray showed cardiomegaly.  She has been weaned off her O2 however cough has been very persistent and very wet.  I will follow-up with a chest x-ray for comparison.  She is on Combivent inhaler as well as Advair.  I will hold her Combivent inhaler and treat her with duo nebs ×4 daily.  I will  also obtain CBC BMP and BNP to measure her fluid overload.  She continues on Lasix 40 daily.  hypertension now with blood pressures on the low side.  She continues on Lasix lisinopril metoprolol.  I will decrease her metoprolol to 100 twice daily down from 150 twice daily.  This may be exact exacerbating her respiratory symptoms.  Could attempt to wean lower.  History of atrial fib as well as aortic valve replacement.  Chronic anticoagulation.  INR pending.      Electronically signed by: Eva Kim CNP

## 2021-06-10 NOTE — ANESTHESIA PROCEDURE NOTES
Peripheral Block    Patient location during procedure: pre-op  Start time: 7/31/2020 9:45 AM  End time: 7/31/2020 10:50 AM  post-op analgesia per surgeon order as noted in medical record  Staffing:  Performing  Anesthesiologist: Kd Smith MD  Preanesthetic Checklist  Completed: patient identified, site marked, risks, benefits, and alternatives discussed, timeout performed, consent obtained, airway assessed, oxygen available, suction available, emergency drugs available and hand hygiene performed  Peripheral Block  Block type: brachial plexus, supraclavicular  Prep: ChloraPrep  Patient position: supine  Patient monitoring: cardiac monitor, continuous pulse oximetry, heart rate and blood pressure  Injection technique: ultrasound guided    Ultrasound used to visualize needle placement in proximity to nerve being blocked: yes       Permanent ultrasound image captured for medical record  Sterile gel and probe cover used for ultrasound.  Needle  Needle type: Stimuplex   Needle gauge: 20G  Needle length: 4 in  no peripheral nerve catheter placed  Assessment  Injection assessment: no difficulty with injection, negative aspiration for heme, no paresthesia on injection and incremental injection

## 2021-06-10 NOTE — TELEPHONE ENCOUNTER
[facility] Nurse Triage Telephone Note      Provider: CHILO Quigley  Facility: [facility]    Facility Type: TCU    Caller: Humphrey  Call Back Number:  [phone]    Allergies: Tramadol; Codeine; Codeine phosphate (bulk); Codeine sulfate; Codeine-butalbital-asa-caff; Codeine-guaifenesin; Lisinopril; and Penicillins    Reason for call: Nurse reporting Hgb results.  Patient is currently receiving ferrous sulfate 325mg three times a day.       Verbal Order/Direction given by Provider: No new orders.      Provider giving order: CHILO Quigley    Verbal order given to: Humphrey Stephen RN

## 2021-06-10 NOTE — PROGRESS NOTES
[facility] Care [facility]/ Geriatrics    Facility:  [facility] [[id number]]    Code Status:  FULL CODE    Chief Complaint   Patient presents with     H & P   :                    Patient Active Problem List   Diagnosis     Constipation     Sciatica     Dyspnea, unspecified type     Hypothyroidism     Type 2 diabetes mellitus with stage 3 chronic kidney disease, without long-term current use of insulin (H)     Hyperparathyroidism     Vitamin D deficiency     Mixed hyperlipidemia     Atherosclerosis of native coronary artery of native heart without angina pectoris     CKD (chronic kidney disease), stage III (H)     Osteoarthritis Of The Knee     Restless Legs Syndrome     H/O heart valve replacement with bioprosthetic valve     Cardiac pacemaker in situ     Complete heart block (H)     Normochromic normocytic anemia     Essential hypertension with goal blood pressure less than 140/90     Moderate persistent asthma without complication     PAF (paroxysmal atrial fibrillation) (H)     GERD (gastroesophageal reflux disease)     Primary insomnia     Hypokalemia     Physical deconditioning     Asthma     CHF (congestive heart failure), NYHA class I, acute, systolic (H)     Pulmonary hypertension (H)     Atrial fibrillation, unspecified type (H)     Status post aortic valve replacement     Non-rheumatic mitral valve stenosis     Nonrheumatic mitral valve stenosis     PVD (peripheral vascular disease) (H)     Lymphedema     Shoulder dislocation, left, initial encounter     Acute respiratory failure (H)     Delirium     Acute delirium     Visual hallucination       History:  Thea Acosta  is an 86 year old female with history of coronary artery disease, CHF, paroxysmal atrial fibrillation, AVR, permanent pacemaker placement, COPD, CKD 3, DM 2, hypertension, GERD,  seen for admission to TCU on 7/28/2020 following a series of hospitalizations    Hospital course #1:  Patient was hospitalized at  "[facility] between July 12 and July 6 following a fall with left shoulder dislocation which was reduced in the emergency room.  However she was noted to be hypoxic and was diagnosed with acute hypoxic respiratory failure felt most likely secondary to atelectasis plus opiates which are being used for pain control.  She had weaned off oxygen by the time of discharge.  She also had acute kidney injury and was noted to have metabolic alkalosis that hospital stay.  She was discharged with a creatinine of 1.4 after holding her Bumex for a brief time.     She was discharged to [facility] where she stayed for fewer than 48 hours due to development of acute delirium.    Hospital course #2:     She was readmitted between July 18 and July 21 again at [facility].  Hypothyroidism was felt to be the primary cause of her delirium.  TSH 67 on July 18.  She has been noncompliant with her thyroid medication quite regularly for at least months.  She was again found to be hypoxic without certain cause as she was not on opiates this time.  She did have a chest x-ray on July 20 suggesting a chronic CHF type appearance but no acute problems noted.  She again had elevation of her creatinine to a peak of 1006 discharged 1.67 with a baseline of about 1.4.        Subjective/ROS:    -augmented by discussion with facility staff involved in direct care  -limited by: Memory    While patient is generally a good historian, she does have memory loss/retrograde amnesia and has lost some of the last days.  However she retains some insight to her hallucinations and says \"they are gone now\".  Chart notes that she has had hallucinations and confusion in the past with opiates.  Chart also notes that she is had hypoxia before during the hospitalization but has never been on home oxygen.    Patient reports that she feels okay now.  She says her pain is better.  However she says she is unable to move her shoulder and that affects her sleep.  " "She is wearing the sling when up.  She denies numbness weakness tingling into her left hand.  She does have some swelling in the left hand which she says is not new.  She does have some bruising and dorsum of her left hand which she says is not new.  She says that she has had quite severe arthritis in her hands, someone at the hospital \"thought it was gout\" which is why she is currently on prednisone.  She is not sure that it has helped.    She says she has been able to eat.  She does not think she is constipated.  She has had no dysuria.  She denies nausea vomiting headache change in vision speaking swallowing or hearing.  She denies falls or injuries here at the facility.  He denies depression and anxiety.  Remainder 13 system ROS negative    Past Medical History:   Diagnosis Date     Acute kidney injury superimposed on CKD (H) 3/3/2017     Asthma      CAD (coronary artery disease)      Cataract      Chronic kidney disease     ckd3     Diabetes mellitus (H)      Disease of thyroid gland      H/O heart valve replacement with bioprosthetic valve      History of transfusion      Hypertension      Normochromic normocytic anemia      Pulmonary hypertension (H) 09/27/2019    Noted on echocardiogram     Restless leg syndrome      Past Surgical History:   Procedure Laterality Date     APPENDECTOMY       CHOLECYSTECTOMY       EYE SURGERY Bilateral     Cataracts     HIP PINNING Right 3/1/2017    Procedure: RIGHT HIP TROCHANTERIC RODDING;  Surgeon: Moshe Davies MD;  Location: [facility];  Service:      INSERT / REPLACE / REMOVE PACEMAKER  06/25/2007    guidant     INSERT / REPLACE / REMOVE PACEMAKER  02/07/2018    phoebe sci gen change     JOINT REPLACEMENT Left     knee     TISSUE AORTIC VALVE REPLACEMENT  06/25/2007               Family History   Problem Relation Age of Onset     No Medical Problems Mother         age 86     No Medical Problems Father         MVA     Cancer Brother         lung     No Medical " "Problems Brother    :       Social History     Socioeconomic History     Marital status:      Spouse name: Not on file     Number of children: Not on file     Years of education: Not on file     Highest education level: Not on file   Occupational History     Occupation:  in operating room     Comment: retired   Social Needs     Financial resource strain: Not on file     Food insecurity     Worry: Not on file     Inability: Not on file     Transportation needs     Medical: Not on file     Non-medical: Not on file   Tobacco Use     Smoking status: Former Smoker     Packs/day: 0.00     Last attempt to quit: 1950     Years since quittin.8     Smokeless tobacco: Never Used   Substance and Sexual Activity     Alcohol use: No     Drug use: No     Sexual activity: Never   Lifestyle     Physical activity     Days per week: Not on file     Minutes per session: Not on file     Stress: Not on file   Relationships     Social connections     Talks on phone: Not on file     Gets together: Not on file     Attends Taoist service: Not on file     Active member of club or organization: Not on file     Attends meetings of clubs or organizations: Not on file     Relationship status: Not on file     Intimate partner violence     Fear of current or ex partner: Not on file     Emotionally abused: Not on file     Physically abused: Not on file     Forced sexual activity: Not on file   Other Topics Concern     Incontinent Not Asked     Toileting independently Not Asked     Cognitive impairment Not Asked     Vision impairment Not Asked     Hearing impairment Not Asked     Dentures Not Asked   Social History Narrative    She live in a single floor house.  Her son lives next door and her granddaughter is \"around a lot\"   3/2017   :    Patient says she lives on [address]  She lives with her granddaughter.  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    Current " Facility-Administered Medications on File Prior to Visit   Medication Dose Route Frequency Provider Last Rate Last Dose     [COMPLETED] fentaNYL pf injection 50 mcg (SUBLIMAZE)  50 mcg Intravenous Once OhAgapito donaldson DO   50 mcg at 07/28/20 1154     [COMPLETED] ketamine 50 mg/mL injection 46.5 mg (NEPHRON)  0.5 mg/kg Intravenous Once OhAgapito donaldson DO   50 mg at 07/28/20 1317     naloxone injection 0.2-0.4 mg (NARCAN)  0.2-0.4 mg Intravenous PRN OhAgapito donaldson DO        Or     naloxone injection 0.2-0.4 mg (NARCAN)  0.2-0.4 mg Intramuscular PRN OhAgapito donaldson DO         [COMPLETED] ondansetron injection 4 mg (ZOFRAN)  4 mg Intravenous Once OhAgapito donaldson DO   4 mg at 07/28/20 1311     [COMPLETED] propofoL injection (DIPRIVAN)   Intravenous Code/Trauma/Sedation Med OhAgapito donaldson DO   10 mg at 07/28/20 1327     [COMPLETED] propofoL injection 47 mg (DIPRIVAN)  0.5 mg/kg Intravenous Once OhAgapito donaldson DO   25 mg at 07/28/20 1317     sodium chloride flush 10 mL (NS)  10 mL Intravenous [facility]         Current Outpatient Medications on File Prior to Visit   Medication Sig Dispense Refill     acetaminophen (TYLENOL) 500 MG tablet Take 2 tablets (1,000 mg total) by mouth 3 (three) times a day. (Patient taking differently: Take 1,000 mg by mouth 3 (three) times a day. And daily prn.)  0     albuterol (PROAIR HFA;PROVENTIL HFA;VENTOLIN HFA) 90 mcg/actuation inhaler Inhale 2 puffs every 4 (four) hours as needed for wheezing.  0     albuterol (PROVENTIL) 2.5 mg /3 mL (0.083 %) nebulizer solution Take 3 mL (2.5 mg total) by nebulization every 4 (four) hours as needed for wheezing or shortness of breath.  0     aspirin 81 MG EC tablet Take 81 mg by mouth daily.       atorvastatin (LIPITOR) 80 MG tablet TAKE 1 TABLET(80 MG) BY MOUTH DAILY 90 tablet 3     bumetanide (BUMEX) 2 MG tablet Take 1.5 tablets (3 mg total) by mouth 2 (two) times a day at 9am and 6pm. 270 tablet 2      cholecalciferol, vitamin D3, (VITAMIN D3) 2,000 unit Tab Take 2,000 Units by mouth once daily.       colchicine 0.6 mg tablet take two tablets (1.2 mg) by mouth at onset of gout symptoms, then repeat one tablet (0.6 mg) by mouth one hour later 24 tablet 2     ferrous sulfate 325 (65 FE) MG tablet TAKE 1 TABLET(325 MG) BY MOUTH TWICE DAILY 180 tablet 3     levothyroxine (SYNTHROID, LEVOTHROID) 150 MCG tablet Take 1 tablet (150 mcg total) by mouth daily before supper. 90 tablet 3     lidocaine (XYLOCAINE) 5 % ointment Apply to painful areas (Patient taking differently: Apply 1 application topically 2 (two) times a day as needed. Apply to painful areas) 35.44 g 0     nystatin (MYCOSTATIN) powder Apply topically 4 (four) times a day. 60 g 2     omeprazole (PRILOSEC) 20 MG capsule Take 1 capsule (20 mg total) by mouth daily before breakfast. 90 capsule 3     potassium chloride (K-DUR,KLOR-CON) 20 MEQ tablet Take 20 mEq by mouth daily.        predniSONE (DELTASONE) 2.5 MG tablet Take 7.5 mg/d prednisone PO for 3 weeks. 90 tablet 0     white petrolatum (AQUAPHOR ORIGINAL) 41 % Oint Apply 1 application topically at bedtime.     :      ALLERGIES:  Codeine; Codeine phosphate (bulk); Codeine sulfate; Codeine-butalbital-asa-caff; Codeine-guaifenesin; Lisinopril; and Penicillins    Vitals:  There were no vitals taken for this visit. except as noted below    Vital signs: Reviewed per facility EMR vitals including as follows:              st Recent Vitals Date/Time Taken  Temperature: 94.7  F 07/28/2020 07:03 AM  Pulse: 58 per minute 07/28/2020 07:03 AM  Respirations: 19 per minute 07/28/2020 09:18 AM  Blood Pressure: 119 / 63 mmHg 07/28/2020 07:03 AM  O2 Saturation: 94 % 07/28/2020 07:03 AM  Blood Sugar: 114 mg/dL 03/20/2017 05:08 PM  Weight: 206.5 lbs / Admission       BMI: 32.34 07/24/2020 10:26 AM  Height: 5ft 7.0in 07/17/2020 12:01 PM  There is no height or weight on file to calculate BMI.    Physical exam:    General:  Alert   oriented oriented x3 appears calm, no apparent distress, normally conversant.  Speech is clear.  She answers questions appropriately.    HEENT:  NC/AT, sclera clear, EOMI   Neck:  no mass, adenopathy, thyromegaly  Chest: Patient has obvious rounded deformity in the left upper outer chest.  This is above her pacemaker.  Shoulders: She has little to no spontaneous active range of motion of her left shoulder.  Passive range of motion leads to guarding.  The rounded mass is mildly tender especially medially.    Heart:  rhythm: Regular today rate: 80s m/g/r: Soft systolic murmur   Lungs: Decreased breath sounds in bases otherwise clear to auscultation throughout.  She is breathing company without accessory muscle use or tachypnea  Abd:  nontender, nondistended, bowel sounds audible and wnls, no mass, no organomegaly     Ext: 1-2 symmetric pitting edema in the pretibial areas  Skin: Mild pretibial venous stasis dermatitis and irritation but no infection weeping or blistering  Neuro:                CMS is intact to the fingertips on the left.  She is got a quick refill of capillaries in the finger beds.  I can palpate radial pulse which is strong.  She denies numbness.      Due to the 2020 Covid 19 pandemic, except as noted above, the patient was visually observed at a 6 foot plus distance.  An observational exam was performed in an effort to keep patient safe from Covid 19 and other communicable diseases.   Labs:  Lab Results   Component Value Date    WBC 8.4 07/24/2020    HGB 10.9 (L) 07/24/2020    HCT 35.9 07/24/2020     07/24/2020     07/24/2020     Results for orders placed or performed during the hospital encounter of 07/18/20   Basic metabolic panel   Result Value Ref Range    Sodium 141 136 - 145 mmol/L    Potassium 4.1 3.5 - 5.0 mmol/L    Chloride 95 (L) 98 - 107 mmol/L    CO2 34 (H) 22 - 31 mmol/L    Anion Gap, Calculation 12 5 - 18 mmol/L    Glucose 108 70 - 125 mg/dL    Calcium 9.4 8.5 - 10.5  mg/dL    BUN 49 (H) 8 - 28 mg/dL    Creatinine 1.67 (H) 0.60 - 1.10 mg/dL    GFR MDRD Af Amer 35 (L) >60 mL/min/1.73m2    GFR MDRD Non Af Amer 29 (L) >60 mL/min/1.73m2         Lab Results   Component Value Date    TSH 67.01 (H) 07/18/2020     Lab Results   Component Value Date    HGBA1C 7.0 (H) 02/26/2020     [unfilled]  Lab Results   Component Value Date    [id number] 333 07/21/2020     Lab Results   Component Value Date     07/18/2020     [unfilled]  Most Recent EKG     Units 07/18/20  1123   VENTRATE BPM 60   ATRIALRATE BPM 63   QRSDURATION ms 202   QTINTERVAL ms 532   QTCCALC ms 532   RAXIS degrees 99   TAXIS degrees -83   MUSEDX  Wide QRS rhythm  Rightward axis  Left bundle branch block  Abnormal ECG  When compared with ECG of 04-JAN-2020 16:44,  Wide QRS rhythm has replaced Electronic ventricular pacemaker  Confirmed by SEE ED PROVIDER NOTE FOR, ECG INTERPRETATION ([id number]),  ZEYAD KHAN ([id number]) on 7/18/2020 10:15:30 PM           Assessment/Plan:      ICD-10-CM    1. Visual hallucination  R44.1    2. Cardiac pacemaker in situ  Z95.0    3. Acute respiratory failure with hypoxia (H)  J96.01    4. CHF (congestive heart failure), NYHA class I, acute, systolic (H)  I50.21    5. Shoulder dislocation, left, initial encounter  S43.005A        Fall with injury   suspected anterior inferior dislocation left shoulder, recurrent   This all started early in the month after a fall.  I stressed there has not been a new fall known with what appears to be recurrence of her dislocation discovered today.  Surprisingly pain-free.  CMS intact in the left upper extremity.  Unclear how long shoulder has been out.  Unclear if dislocation could be part of prior encephalopathy/poor sleep/hallucinations?  - Send to hospital for x-rays and definitive treatment/reduction now, discussed with staff  -Should note that she had a head CT, with no acute changes.  Acute metabolic encephalopathy  Hallucinations  Hypothyroidism  "  Abnormal urinalysis   It is not clear to me that patient was ever back to her best mental baseline after her initial hospitalization.  She seemed to be having trouble to 1 degree or another with confusion throughout her earlier TCU stay.  She was still using opiates at that time, thus this may be opiate related as it has been in the past.  She has now been off opiates and feels clear today.  Unfortunately she has potentially painful condition noted above, we will see how things go at the hospital but would like to avoid opiates if at all possible.  Recall the patient had repeat negative CT scan on July 18     Furthermore hypoxia has been intermittently present and may well contribute.     While her urinalysis is abnormal, is not highly suspicious for UTI.  Thus will wait for her urine culture results which are pending today.     Finally her hypothyroidism has considered to be a likely contributor here.  If so we might not see rapid resolution.  This is the first I have met her, but I am betting the other factors above along with a somewhat \"susceptible brain\" other bigger factors.    Suspected chronic hypoxic respiratory failure   Though this finding has been intermittent, she seems to be prone to dropping her O2 sats.  I suspect it is multifactorial.  She has been felt to have COPD in the past although she is unaware of that and denies it.  She also has history of CHF though that has appeared compensated.  She has been treated with opiates recently.  Obesity, possible hypoventilation, possible RANJIT contributing?  - Monitor O2 sats, supplemental oxygen as need be  -Avoid opiates and other sedating medications as possible  - PE has not been suspected.  - Patient sounds like she is not interested in sleep study  -No plan for VQ scan/PE rounds based on current improvement.    CHF, chronic diastolic   See echo from 2018 below.  Patient's weight has been stable between 202 and 206.  She does not shortness of breath.  " "Lungs sound clear except for the decreased breath sounds at bases today.  -Agree with low-sodium diet  - Bumex 2 mg twice daily as current  - Monitor weights daily  Performing Date  Performing Department    2018   CARDIAC TESTING [[id number]]    Patient Information     Patient Name   Thea Acosta  MRN   [id number]  Sex   Female   1   1934 (83 y.o.)    Indications     Heart murmur; Fever    Summary       Normal left ventricular size and systolic function.    When compared to the previous study dated 3/1/2017, no significant change.    Left ventricle ejection fraction is normal. The estimated left ventricular ejection fraction is 55%.    Normal right ventricular size and systolic function.    Left Atrium: Left atrial volume is moderately increased.    Aortic Valve: Bioprosthetic valve is not well visualized. No evidence of paravalvular leak and mean gradient is 15 mmHg which is borderline elevated, however not significantly changed compared to prior.    Mitral Valve: There is severe mitral annular calcification. Moderate mitral Calcific stenosis. No mitral regurgitation.          COPD   I see this in old notes and records.  I do not see complete pulmonary function tests on record however.  Patient says that she smoked in the past \"but did not inhale\".  She is skeptical.  Nonetheless I suspect that there is some COPD present.  She does have albuterol if need be but not needed currently with no wheezing    Coronary artery disease   Continue aspirin and atorvastatin.  Not sure why she is not on beta-blocker, can follow-up with primary MD on that.    Paroxysmal atrial fibrillation   Sounds are she is in normal sinus rhythm today.  Takes only aspirin for stroke prophylaxis, does not need rate controlling agent    Permanent pacemaker   Palpable in the left anterior chest wall but below the mass described above, distinct from what I think is shoulder dislocation.  Follows with device clinic.    DM " 2   Blood sugar control adequate.  A1c 7.0 on February 26.    Gout   Presumed.  Hand pain left greater than right.  Patient is on prednisone taper which she will continue.  She also has colchicine but is not currently taking it, just a PRN medication    CKD 3   Patient has had some kidney stress these last 2 hospitalizations with elevation of her creatinine above baseline.  I have ordered a recheck for next Tuesday of her BMP.    Hypertension   Not currently on therapy be on Bumex.  Monitor without changes today    GERD   Omeprazole      Case discussed with:    Facility staff             Rojelio Farnsworth MD

## 2021-06-10 NOTE — ANESTHESIA CARE TRANSFER NOTE
Last vitals:   Vitals:    07/31/20 1224   BP: 95/55   Pulse: 60   Resp: 26   Temp:    SpO2: 95%     Patient's level of consciousness is drowsy  Spontaneous respirations: yes  Maintains airway independently: yes  Dentition unchanged: yes  Oropharynx: oropharynx clear of all foreign objects    QCDR Measures:  ASA# 20 - Surgical Safety Checklist: WHO surgical safety checklist completed prior to induction    PQRS# 430 - Adult PONV Prevention: 4558F - Pt received => 2 anti-emetic agents (different classes) preop & intraop  ASA# 8 - Peds PONV Prevention: NA - Not pediatric patient, not GA or 2 or more risk factors NOT present  PQRS# 424 - Connie-op Temp Management: 4559F - At least one body temp DOCUMENTED => 35.5C or 95.9F within required timeframe  PQRS# 426 - PACU Transfer Protocol: - Transfer of care checklist used  ASA# 14 - Acute Post-op Pain: ASA14B - Patient did NOT experience pain >= 7 out of 10

## 2021-06-10 NOTE — PROGRESS NOTES
Medical Care for Seniors/ Geriatrics    Facility:  [facility] [[id number]]    Code Status:  FULL CODE    Chief Complaint   Patient presents with     Review Of Multiple Medical Conditions   :                    Patient Active Problem List   Diagnosis     Constipation     Sciatica     Dyspnea, unspecified type     Hypothyroidism     Type 2 diabetes mellitus with stage 3 chronic kidney disease, without long-term current use of insulin (H)     Hyperparathyroidism     Vitamin D deficiency     Mixed hyperlipidemia     Atherosclerosis of native coronary artery of native heart without angina pectoris     CKD (chronic kidney disease), stage III (H)     Osteoarthritis Of The Knee     Restless Legs Syndrome     H/O heart valve replacement with bioprosthetic valve     Cardiac pacemaker in situ     Complete heart block (H)     Normochromic normocytic anemia     Essential hypertension with goal blood pressure less than 140/90     Moderate persistent asthma without complication     PAF (paroxysmal atrial fibrillation) (H)     GERD (gastroesophageal reflux disease)     Primary insomnia     Hypokalemia     Physical deconditioning     Asthma     CHF (congestive heart failure), NYHA class I, acute, systolic (H)     Pulmonary hypertension (H)     Atrial fibrillation, unspecified type (H)     Status post aortic valve replacement     Non-rheumatic mitral valve stenosis     Nonrheumatic mitral valve stenosis     PVD (peripheral vascular disease) (H)     Lymphedema     S/p reverse total shoulder arthroplasty       History:  Thea Acosta  is an 86 year old female with history of coronary artery disease, CHF, paroxysmal atrial fibrillation, AVR, permanent pacemaker placement, COPD, CKD 3, DM 2, hypertension, GERD,  seen for admission to TCU on 8/18/2020 following a series of hospitalizations    Hospital course #3/most recent:  Patient was hospitalized between July 28 and August 1 after I sent her from this TCU upon  discovering her dislocated left shoulder on July 28.  Shoulder reduction was achieved but could not be maintained.  She was seen by orthopedics who diagnosed chronic instability and   recommended total reverse shoulder which was completed on July 31, 2020.  Surgery was complicated by mild perioperative hypoxia.  Patient was treated with hydrocodone (since discontinued).    Hospital course #1:  Patient was hospitalized at [facility] between July 12 and July 6 following a fall with left shoulder dislocation which was reduced in the emergency room.  However she was noted to be hypoxic and was diagnosed with acute hypoxic respiratory failure felt most likely secondary to atelectasis plus opiates which are being used for pain control.  She had weaned off oxygen by the time of discharge.  She also had acute kidney injury and was noted to have metabolic alkalosis that hospital stay.  She was discharged with a creatinine of 1.4 after holding her Bumex for a brief time.     [facility] stayed for fewer than 48 hours due to development of acute delirium.    Hospital course #2:     She was readmitted between July 18 and July 21 again at [facility].  Hypothyroidism was felt to be the primary cause of her delirium.  TSH 67 on July 18.  She has been noncompliant with her thyroid medication quite regularly for at least months.  She was again found to be hypoxic without certain cause as she was not on opiates this time.  She did have a chest x-ray on July 20 suggesting a chronic CHF type appearance but no acute problems noted.  She again had elevation of her creatinine to a peak of 1006 discharged 1.67 with a baseline of about 1.4.        Subjective/ROS:    -augmented by discussion with facility staff involved in direct care  -limited by: Memory     -Patient sees me in the leung visiting her neighbor and asks me to visit with her as well.  -Patient wishes to report that she like to go  "home.  However when we talked this through I find out that she is fully aware of the plan and has orthopedic follow-up in about 5 weeks and that she realizes she is going to need some more help with her shoulder immobilized.  -Patient reports that her edema is much improved.  She is very much liking the Velcro wraps and says that they are so much easier to live with than the JOHAN stockings/compression stockings of the past.  \"I am glad I spent the extra money on them, it is worth it\"  -Patient says she is enjoying the therapist and the care she is getting that she does not have complaints.  -Patient is using Tylenol as needed for pain and says that things are much better in that area.    No fever sweats chills cough shortness of breath orthopnea PND.  Appetite is good.  No constipation no dysuria.  Remainder negative.    Past Medical History:   Diagnosis Date     Acute kidney injury superimposed on CKD (H) 3/3/2017     Asthma      CAD (coronary artery disease)      Cataract      Chronic kidney disease     ckd3     COPD (chronic obstructive pulmonary disease) (H)      Diabetes mellitus (H)      Disease of thyroid gland      H/O heart valve replacement with bioprosthetic valve      History of transfusion      Hypertension      Normochromic normocytic anemia      Pulmonary hypertension (H) 09/27/2019    Noted on echocardiogram     Restless leg syndrome      Past Surgical History:   Procedure Laterality Date     APPENDECTOMY       CHOLECYSTECTOMY       EYE SURGERY Bilateral     Cataracts     HIP PINNING Right 3/1/2017    Procedure: RIGHT HIP TROCHANTERIC RODDING;  Surgeon: Moshe Davies MD;  Location: [facility] OR;  Service:      INSERT / REPLACE / REMOVE PACEMAKER  06/25/2007    guidant     INSERT / REPLACE / REMOVE PACEMAKER  02/07/2018    phoebe sci gen change     JOINT REPLACEMENT Left     knee     NJ RECONSTR TOTAL SHOULDER IMPLANT Left 7/31/2020    Procedure: LEFT REVERSE TOTAL SHOULDER ARTHROPLASTY;  " Surgeon: Alvarez Palomino MD;  Location: [facility] OR;  Service: Orthopedics     TISSUE AORTIC VALVE REPLACEMENT  2007               Family History   Problem Relation Age of Onset     No Medical Problems Mother         age 86     No Medical Problems Father         MVA     Cancer Brother         lung     No Medical Problems Brother    :       Social History     Socioeconomic History     Marital status:      Spouse name: Not on file     Number of children: Not on file     Years of education: Not on file     Highest education level: Not on file   Occupational History     Occupation:  in operating room     Comment: retired   Social Needs     Financial resource strain: Not on file     Food insecurity     Worry: Not on file     Inability: Not on file     Transportation needs     Medical: Not on file     Non-medical: Not on file   Tobacco Use     Smoking status: Former Smoker     Packs/day: 0.00     Last attempt to quit: 1950     Years since quittin.9     Smokeless tobacco: Never Used   Substance and Sexual Activity     Alcohol use: No     Drug use: No     Sexual activity: Never   Lifestyle     Physical activity     Days per week: Not on file     Minutes per session: Not on file     Stress: Not on file   Relationships     Social connections     Talks on phone: Not on file     Gets together: Not on file     Attends Confucianism service: Not on file     Active member of club or organization: Not on file     Attends meetings of clubs or organizations: Not on file     Relationship status: Not on file     Intimate partner violence     Fear of current or ex partner: Not on file     Emotionally abused: Not on file     Physically abused: Not on file     Forced sexual activity: Not on file   Other Topics Concern     Incontinent Not Asked     Toileting independently Not Asked     Cognitive impairment Not Asked     Vision impairment Not Asked     Hearing impairment Not Asked     Dentures Not Asked  "  Social History Narrative    She live in a single floor house.  Her son lives next door and her granddaughter is \"around a lot\"   3/2017   :    Patient says she lives on [address]  She lives with her granddaughter.  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    Current Outpatient Medications on File Prior to Visit   Medication Sig Dispense Refill     acetaminophen (TYLENOL) 500 MG tablet Take 2 tablets (1,000 mg total) by mouth 3 (three) times a day. (Patient taking differently: Take 1,000 mg by mouth 3 (three) times a day. And daily prn)  0     albuterol (PROAIR HFA;PROVENTIL HFA;VENTOLIN HFA) 90 mcg/actuation inhaler Inhale 2 puffs every 4 (four) hours as needed for wheezing.  0     aspirin 81 MG EC tablet Take 1 tablet (81 mg total) by mouth 2 (two) times a day.  0     atorvastatin (LIPITOR) 80 MG tablet TAKE 1 TABLET(80 MG) BY MOUTH DAILY 90 tablet 3     bumetanide (BUMEX) 2 MG tablet Take 1.5 tablets (3 mg total) by mouth 2 (two) times a day at 9am and 6pm. 270 tablet 2     colchicine 0.6 mg tablet take two tablets (1.2 mg) by mouth at onset of gout symptoms, then repeat one tablet (0.6 mg) by mouth one hour later 24 tablet 2     diclofenac sodium (VOLTAREN) 1 % Gel Apply 2 g topically 4 (four) times a day as needed.        ferrous sulfate 325 (65 FE) MG tablet TAKE 1 TABLET(325 MG) BY MOUTH TWICE DAILY (Patient taking differently: Take 1 tablet by mouth 3 (three) times a day with meals. ) 180 tablet 3     levothyroxine (SYNTHROID, LEVOTHROID) 150 MCG tablet Take 1 tablet (150 mcg total) by mouth daily before supper. 90 tablet 3     nystatin (MYCOSTATIN) powder Apply topically 4 (four) times a day. 60 g 2     omeprazole (PRILOSEC) 20 MG capsule Take 1 capsule (20 mg total) by mouth daily before breakfast. 90 capsule 3     polyethylene glycol (MIRALAX) 17 gram packet Take 17 g by mouth daily as needed.       potassium chloride (K-DUR,KLOR-CON) 20 MEQ tablet Take 20 mEq by " mouth daily.        predniSONE (DELTASONE) 2.5 MG tablet Take 7.5 mg/d prednisone PO for 3 weeks. 90 tablet 0     senna (SENOKOT) 8.6 mg tablet Take 1 tablet by mouth daily.       white petrolatum (AQUAPHOR ORIGINAL) 41 % Oint Apply 1 application topically at bedtime.       No current facility-administered medications on file prior to visit.    :      ALLERGIES:  Tramadol; Codeine; Codeine phosphate (bulk); Codeine sulfate; Codeine-butalbital-asa-caff; Codeine-guaifenesin; Lisinopril; and Penicillins    Vitals:  There were no vitals taken for this visit. except as noted below    Vital signs:        Recent Vitals Date/Time Taken  Temperature: 95.6  F 08/18/2020 02:38 PM  Pulse: 55 per minute 08/18/2020 02:38 PM  Respirations: 20 per minute 08/18/2020 06:30 PM  Blood Pressure: 117 / 61 mmHg 08/18/2020 02:38 PM  O2 Saturation: 94 % 08/18/2020 02:38 PM  Blood Sugar: 114 mg/dL 03/20/2017 05:08 PM  Weight: 212.3 lbs / Routine       BMI: 33.25 08/18/2020 02:17 PM  Height: 5ft 7.0in 07/17/2020 12:01 PM  Physical exam:    Patient recognizes me immediately despite the mask and face shield, though cannot say my name.  She is bright alert talkative.  She is breathing comfortably without accessory muscle use or tachypnea.  Her left arm is in the shoulder immobilizer.  Her hands as well with no edema do not erythema today.  She has good CMS with good cap refill.  Lower extremities with bilateral Velcro compression garments in place.    Due to the 2020 Covid 19 pandemic, except as noted above, the patient was visually observed at a 6 foot plus distance.  An observational exam was performed in an effort to keep patient safe from Covid 19 and other communicable diseases.   Labs:  Lab Results   Component Value Date    WBC 6.9 08/05/2020    HGB 8.2 (L) 08/17/2020    HCT 29.0 (L) 08/05/2020    MCV 98 08/05/2020     08/05/2020     Results for orders placed or performed in visit on 08/17/20   Basic Metabolic Panel   Result Value  Ref Range    Sodium 141 136 - 145 mmol/L    Potassium 3.6 3.5 - 5.0 mmol/L    Chloride 98 98 - 107 mmol/L    CO2 35 (H) 22 - 31 mmol/L    Anion Gap, Calculation 8 5 - 18 mmol/L    Glucose 114 70 - 125 mg/dL    Calcium 9.2 8.5 - 10.5 mg/dL    BUN 37 (H) 8 - 28 mg/dL    Creatinine 1.47 (H) 0.60 - 1.10 mg/dL    GFR MDRD Af Amer 41 (L) >60 mL/min/1.73m2    GFR MDRD Non Af Amer 34 (L) >60 mL/min/1.73m2         Lab Results   Component Value Date    TSH 3.43 08/17/2020     Lab Results   Component Value Date    HGBA1C 6.5 (H) 07/31/2020     [unfilled]  Lab Results   Component Value Date    HYQQYMSX07 333 07/21/2020     Lab Results   Component Value Date     07/18/2020     [unfilled]  Most Recent EKG     Units 07/28/20  1543   VENTRATE BPM 63   ATRIALRATE BPM 57   QRSDURATION ms 172   QTINTERVAL ms 496   QTCCALC ms 507   RAXIS degrees 106   TAXIS degrees 261   MUSEDX  Ventricular-paced rhythm with occasional Premature ventricular complexes  Abnormal ECG  When compared with ECG of 18-JUL-2020 11:23,  Electronic ventricular pacemaker has replaced Wide QRS rhythm  Confirmed by SEE ED PROVIDER NOTE FOR, ECG INTERPRETATION ([id number]),  VIV ELDER ([id number]) on 7/28/2020 10:25:45 PM           Assessment/Plan:      ICD-10-CM    1. Physical deconditioning  R53.81    2. Lymphedema  I89.0    3. Status post reverse total replacement of left shoulder  Z96.612    Left shoulder instability  Recurrent dislocation left shoulder  Status post reverse total replacement of left shoulder July 31   Patient has stabilized with slow improvement over the last couple of weeks.  She continues to require therapy and support at this time.  Anticipate discharge timing per therapy/ as her medical situation is looking more stable now.          Bilateral lower extremity edema   weight gain  Chronic diastolic CHF   I cannot explain her weight fluctuation.  She was only down 10 pounds when I saw her last week and now back up to her  recent baseline to 12.  She is down from a peak of 224 earlier this hospitalization.  Regardless of weight, her edema is much improved and clinically she looks good.  Results for orders placed or performed in visit on 20   Basic Metabolic Panel   Result Value Ref Range    Sodium 141 136 - 145 mmol/L    Potassium 3.6 3.5 - 5.0 mmol/L    Chloride 98 98 - 107 mmol/L    CO2 35 (H) 22 - 31 mmol/L    Anion Gap, Calculation 8 5 - 18 mmol/L    Glucose 114 70 - 125 mg/dL    Calcium 9.2 8.5 - 10.5 mg/dL    BUN 37 (H) 8 - 28 mg/dL    Creatinine 1.47 (H) 0.60 - 1.10 mg/dL    GFR MDRD Af Amer 41 (L) >60 mL/min/1.73m2    GFR MDRD Non Af Amer 34 (L) >60 mL/min/1.73m2       Continue frequent weights, low-salt diet, diuretics as above.   I thought about decreasing her Bumex back to 2 mg twice a day instead of the current 3 mg twice daily but with questionable 10 pound weight gain avoid leave it where it is as we will continue to monitor clinically.  Renal function stable as noted above.        Echocardiogram reading:    Performing Date  Performing Department    2018   CARDIAC TESTING [[id number]]    Patient Information     Patient Name   Thea Acosta  MRN   [id number]  Sex   Female   1   1934 (83 y.o.)    Indications     Heart murmur; Fever    Summary       Normal left ventricular size and systolic function.    When compared to the previous study dated 3/1/2017, no significant change.    Left ventricle ejection fraction is normal. The estimated left ventricular ejection fraction is 55%.    Normal right ventricular size and systolic function.    Left Atrium: Left atrial volume is moderately increased.    Aortic Valve: Bioprosthetic valve is not well visualized. No evidence of paravalvular leak and mean gradient is 15 mmHg which is borderline elevated, however not significantly changed compared to prior.    Mitral Valve: There is severe mitral annular calcification. Moderate mitral Calcific stenosis. No  "mitral regurgitation.              Acute metabolic encephalopathy  Hallucinations  Hypothyroidism   Abnormal urinalysis   Resolving nicely now.  I suspect patient is at or near baseline.  She has had quite a john paul course with 3 hospitalizations.  She is off opiates which is helped quite a bit.  She has little pain complaint which is fortunate.     However patient's hypothyroidism likely a significant contributor as well.  TSH was 67 on July 18.  She is back on replacement  -Follow-up for next TSH and dosage adjustment    Suspected chronic hypoxic respiratory failure   Nothing new here today except to again report patient has done well over the last week off oxygen.  Though this finding has been intermittent, she is prone to dropping her O2 sats.  I suspect it is multifactorial.  She has been felt to have COPD in the past although she is unaware of that and denies it.  She also has history of CHF though I do not find evidence of pulmonary edema.  She is now off opiates.  Obesity, possible hypoventilation, possible RANJIT contributing?  - Continue to monitor O2 sats, supplemental oxygen as need be  -Continue to avoid opiates and other sedating medications as possible  - No additional work-up anticipated          COPD   I see this in old notes and records.  I do not see complete pulmonary function tests on record however.  Patient says that she smoked in the past \"but did not inhale\".  She is skeptical of the diagnosis..  Nonetheless I suspect that there is some COPD present.  She does have albuterol if need be but not needed currently with no wheezing.  No wheezing heard today.    Coronary artery disease   Continue aspirin and atorvastatin.  Not sure why she is not on beta-blocker, can follow-up with primary MD on that.    Paroxysmal atrial fibrillation     Takes only aspirin for stroke prophylaxis, does not need rate controlling agent    Permanent pacemaker   Follows with device clinic.    DM 2   Blood sugar control " adequate.  A1c 7.0 on February 26.    Gout   Presumed.  Hand pain left greater than right.  Patient is on prednisone taper which she will continue herself.  Her 7.5 mg dose ends on August 22, she will need a new tapering dose entered at that point.  She can likely taper rapidly.  Question renally dosed allopurinol?     She also has colchicine but is not currently taking it, just a PRN medication     CKD 3     Results for orders placed or performed in visit on 08/17/20   Basic Metabolic Panel   Result Value Ref Range    Sodium 141 136 - 145 mmol/L    Potassium 3.6 3.5 - 5.0 mmol/L    Chloride 98 98 - 107 mmol/L    CO2 35 (H) 22 - 31 mmol/L    Anion Gap, Calculation 8 5 - 18 mmol/L    Glucose 114 70 - 125 mg/dL    Calcium 9.2 8.5 - 10.5 mg/dL    BUN 37 (H) 8 - 28 mg/dL    Creatinine 1.47 (H) 0.60 - 1.10 mg/dL    GFR MDRD Af Amer 41 (L) >60 mL/min/1.73m2    GFR MDRD Non Af Amer 34 (L) >60 mL/min/1.73m2       Mild elevations of creatinine with recent hospitalizations.  Doing relatively well, avoid nephrotoxins    Hypertension   Patient has had relatively low blood pressure at times throughout these past weeks.  Thus she is not on antihypertensives.   -July the past week and recent memory for blood pressures this past week.  - Anticipate restart of beta-blocker though that could be done as an outpatient with primary MD.    GERD   Omeprazole    Intertrigo   Improved on nystatin  Vitamin D deficiency   On replacement  Case discussed with:    Facility staff             Rojelio Farnsworth MD

## 2021-06-10 NOTE — PROGRESS NOTES
[facility]    DATE: 2020    NAME:  Thea Acosta             :  1934  MRN: [id number]  CODE STATUS:  Full code    VISIT TYPE: Problem Visit (hallucinations)     FACILITY:  [facility] [[id number]]       CHIEF COMPLAIN/REASON FOR VISIT:    Chief Complaint   Patient presents with     Problem Visit     hallucinations               HISTORY OF PRESENT ILLNESS: Thea Acosta is a 86 y.o. female who was at tcu for 2 days after fall and shoulder dislocation then admitted - for acute metabolic encephalopathy. This was felt to be s/t hypothyroidism and noncompliance with levothyroxine dosing. She did have some acute hypoxia and required intermittent o2 supplementation. She was recommended further tcu stay for rehab. She has PMH of CAD, CKD, DM, s/p PPM, diastolic CHF, pulmonary artery hypertension, mitral valve stenosis, chronic a fib no anticoagulation, s/p AVR, asthma. Prior to this she lived at home with her grand daughter.     Today Ms. Acosta is seen for concerns of hallucinations. Staff report this has been going on for 3-4 days now. She was seeing people in her window and a train going by her window (she is on the 3rd floor). Staff also note she has some yeast infection under her breasts and armpits. No labs or urine check has been ordered for the hallucinations. On exam she is seen in her room sitting in her wheelchair. She says her hallucinations were better today  And last night. She says they are not frightening and usually just seeing people waving at her that aren't there. She is not wearing oxygen today and thinks her breathing is fine. She denies cough, shortness of breath, fever or other concerns today. She thinks her bowels are moving too good and appetite is great. She has minimal pain in her shoulder. She thinks otherwise she is doing fine. Per nursing she is taking her meds as prescribed. She is not needing oxygen today with  o2 sat of 94%. Thea does mention today that she has gotten confused and had hallucinations in past with narcotics but she is not currently on any. She denies urinary complaints but we will check her urine for uti.         REVIEW OF SYSTEMS:  PROBLEMS AND REVIEW OF SYSTEMS:   Review of Systems  Today on ROS:   Currently, no fever, chills, or rigors. Decreased vision and hearing. Denies any chest pain, headaches, palpitations, lightheadedness, dizziness, no cough, no sob. Appetite is good.  Denies any abdominal pain. No active bleeding. Positive for urinary incontinence, leg swelling, left arm and shoulder swelling, immobilizer in place, no nausea or vomiting, no constipation, hallucinations, rash under breasts and in armpit      Allergies   Allergen Reactions     Codeine Hives     Codeine Phosphate (Bulk)      This allergy is per [facility] records.     Codeine Sulfate      This allergy is per [facility] records.     Codeine-Butalbital-Asa-Caff      This allergy is per [facility] records.     Codeine-Guaifenesin      This allergy is per [facility] records.     Lisinopril Cough     Penicillins Swelling     Current Outpatient Medications   Medication Sig     acetaminophen (TYLENOL) 500 MG tablet Take 2 tablets (1,000 mg total) by mouth 3 (three) times a day. (Patient taking differently: Take 1,000 mg by mouth 3 (three) times a day. And daily prn.)     albuterol (PROAIR HFA;PROVENTIL HFA;VENTOLIN HFA) 90 mcg/actuation inhaler Inhale 2 puffs every 4 (four) hours as needed for wheezing.     albuterol (PROVENTIL) 2.5 mg /3 mL (0.083 %) nebulizer solution Take 3 mL (2.5 mg total) by nebulization every 4 (four) hours as needed for wheezing or shortness of breath.     aspirin 81 MG EC tablet Take 81 mg by mouth daily.     atorvastatin (LIPITOR) 80 MG tablet TAKE 1 TABLET(80 MG) BY MOUTH DAILY      bumetanide (BUMEX) 2 MG tablet Take 1.5 tablets (3 mg total) by mouth 2 (two) times a day at 9am and 6pm.     cholecalciferol, vitamin D3, (VITAMIN D3) 2,000 unit Tab Take 2,000 Units by mouth once daily.     colchicine 0.6 mg tablet take two tablets (1.2 mg) by mouth at onset of gout symptoms, then repeat one tablet (0.6 mg) by mouth one hour later     ferrous sulfate 325 (65 FE) MG tablet TAKE 1 TABLET(325 MG) BY MOUTH TWICE DAILY     levothyroxine (SYNTHROID, LEVOTHROID) 150 MCG tablet Take 1 tablet (150 mcg total) by mouth daily before supper.     lidocaine (XYLOCAINE) 5 % ointment Apply to painful areas (Patient taking differently: Apply 1 application topically 2 (two) times a day as needed. Apply to painful areas)     nystatin (MYCOSTATIN) powder Apply topically 4 (four) times a day.     omeprazole (PRILOSEC) 20 MG capsule Take 1 capsule (20 mg total) by mouth daily before breakfast.     potassium chloride (K-DUR,KLOR-CON) 20 MEQ tablet Take 20 mEq by mouth daily.      predniSONE (DELTASONE) 2.5 MG tablet Take 7.5 mg/d prednisone PO for 3 weeks.     white petrolatum (AQUAPHOR ORIGINAL) 41 % Oint Apply 1 application topically at bedtime.     Past Medical History:    Past Medical History:   Diagnosis Date     Acute kidney injury superimposed on CKD (H) 3/3/2017     Asthma      CAD (coronary artery disease)      Cataract      Chronic kidney disease     ckd3     Diabetes mellitus (H)      Disease of thyroid gland      H/O heart valve replacement with bioprosthetic valve      History of transfusion      Hypertension      Normochromic normocytic anemia      Pulmonary hypertension (H) 09/27/2019    Noted on echocardiogram     Restless leg syndrome            PHYSICAL EXAMINATION  Vitals:    07/27/20 0700   BP: 112/59   Pulse: 67   Resp: 18   Temp: (!) 96.4  F (35.8  C)   SpO2: 94%   Weight: 206 lb (93.4 kg)       Today on physical exam:     GENERAL: Awake, Alert, obese,  not in any form of acute distress, answers  questions appropriately, follows simple commands, conversant  HEENT: Head is normocephalic with normal hair distribution. No evidence of trauma. Ears: No acute purulent discharge. Eyes: Conjunctivae pink with no scleral jaundice. Nose: Normal mucosa and septum. NECK: Supple with no cervical or supraclavicular lymphadenopathy. Trachea is midline. [place], decreased vision  CHEST: No tenderness or deformity, no crepitus, left chest PPM  LUNG: Dim but clear today to auscultation.   No shortness of breath noted today, no cough noted  BACK: No kyphosis of the thoracic spine. Symmetric, no curvature, ROM normal, no CVA tenderness, no spinal tenderness   CVS: irregularly irregular rhythm, murmur,  2+ pulses symmetric in all extremities.  ABDOMEN: Rounded and soft, nontender to palpation, non distended, no masses, no organomegaly, good bowel sounds, no rebound or guarding, no peritoneal signs.   EXTREMITIES: 1+ ble nonpitting edema, left upper extremity 2+ edema, shoulder immobilizer in place  SKIN: Warm and dry  NEUROLOGICAL: The patient is oriented to person, place and time. Confused at times, oriented on exam. Forgetful at times. Visual hallucinations, no auditory hallucinations noted            LABS:   Recent Results (from the past 168 hour(s))   Vitamin B12   Result Value Ref Range    Vitamin B-12 333 213 - 816 pg/mL   HM2(CBC w/o Differential)   Result Value Ref Range    WBC 8.4 4.0 - 11.0 thou/uL    RBC 3.59 (L) 3.80 - 5.40 mill/uL    Hemoglobin 10.9 (L) 12.0 - 16.0 g/dL    Hematocrit 35.9 35.0 - 47.0 %     80 - 100 fL    MCH 30.4 27.0 - 34.0 pg    MCHC 30.4 (L) 32.0 - 36.0 g/dL    RDW 14.3 11.0 - 14.5 %    Platelets 227 140 - 440 thou/uL    MPV 10.9 8.5 - 12.5 fL   Comprehensive Metabolic Panel   Result Value Ref Range    Sodium 142 136 - 145 mmol/L    Potassium 3.6 3.5 - 5.0 mmol/L    Chloride 97 (L) 98 - 107 mmol/L    CO2 32 (H) 22 - 31 mmol/L    Anion Gap, Calculation 13 5 - 18 mmol/L    Glucose 108 70 - 125  mg/dL    BUN 55 (H) 8 - 28 mg/dL    Creatinine 1.56 (H) 0.60 - 1.10 mg/dL    GFR MDRD Af Amer 38 (L) >60 mL/min/1.73m2    GFR MDRD Non Af Amer 31 (L) >60 mL/min/1.73m2    Bilirubin, Total 0.9 0.0 - 1.0 mg/dL    Calcium 9.7 8.5 - 10.5 mg/dL    Protein, Total 7.4 6.0 - 8.0 g/dL    Albumin 3.4 (L) 3.5 - 5.0 g/dL    Alkaline Phosphatase 67 45 - 120 U/L    AST 23 0 - 40 U/L    ALT 17 0 - 45 U/L     Results for orders placed or performed during the hospital encounter of 07/18/20   Basic metabolic panel   Result Value Ref Range    Sodium 141 136 - 145 mmol/L    Potassium 4.1 3.5 - 5.0 mmol/L    Chloride 95 (L) 98 - 107 mmol/L    CO2 34 (H) 22 - 31 mmol/L    Anion Gap, Calculation 12 5 - 18 mmol/L    Glucose 108 70 - 125 mg/dL    Calcium 9.4 8.5 - 10.5 mg/dL    BUN 49 (H) 8 - 28 mg/dL    Creatinine 1.67 (H) 0.60 - 1.10 mg/dL    GFR MDRD Af Amer 35 (L) >60 mL/min/1.73m2    GFR MDRD Non Af Amer 29 (L) >60 mL/min/1.73m2         Lab Results   Component Value Date    WBC 8.4 07/24/2020    HGB 10.9 (L) 07/24/2020    HCT 35.9 07/24/2020     07/24/2020     07/24/2020       Lab Results   Component Value Date    XBKOZUMG29 333 07/21/2020     Lab Results   Component Value Date    HGBA1C 7.0 (H) 02/26/2020     Lab Results   Component Value Date    INR 1.30 (H) 02/07/2018    INR 1.51 (H) 03/06/2017    INR 1.27 (H) 03/05/2017     Vitamin D, Total (25-Hydroxy)   Date Value Ref Range Status   01/20/2020 45.9 30.0 - 80.0 ng/mL Final     Lab Results   Component Value Date    TSH 67.01 (H) 07/18/2020           ASSESSMENT/PLAN:     Visual hallucinations, metabolic encephalopathy: may be related to UTI, will check UA/UC. Also ordered bmp hm1 for tomorrow. Could be related to hypothyroidism but unclear. No narcotic use. Med review and no other concerning medications to contribute.   Acute hypoxic respiratory failure, asthma: off o2 today, o2 sat 94%. Dc o2 orders and monitor. Encourage cough  And deep breathe. IS q1h while awake.  Albuterol prn. On prednisone x 3 weeks.   Hypothyroid: felt to be noncompliant with levothyroxine dosing, tsh 67. Felt to be contributing to metabolic encephalopathy. Now being compliant with staff. Will need repeat tsh in 6 weeks.   Left shoulder dislocation: immobilizer in place, pt, ot following. Tylenol three times a day and will add daily prn for pain control.   chronic CHF: Daily [id number]. No shortness of breath today. 1+ ble edema. On bumex 2mg two times a day, keep legs elevated when possible.  F/u with cardiology prn. Changed diet from regular to cardiac, low sodium. Daily weights.   S/p PPM: follows with device clinic. No recent concerns.   CKD stage 3: Cr 1.56 on 7/24.   H/o Type 2 DM: No meds, no monitoring needed. Diet controlled. No recent concerns.   Dyslipidemia: On aspirin, atorvastatin.   HTN: SBP 110s, On lopressor, bumex.  hold parameters for lopressor. Stable.  GERD: On omeprazole. No concerns today.   Vitamin d deficiency: On vitamin d.  Hypokalemia: On kcl.  PAF, s/p AVR: Regular rate and rhythm today. On metoprolol. F/u with caridology. No concenrs.   Anemia of CKD: on iron. No recent concerns. Hg 10.9 on 7/24.     Candidal intertrigo:  Already on nystatin four times a day.      Electronically signed by: Jennifer Valdes NP

## 2021-06-10 NOTE — PROGRESS NOTES
Code Status:  FULL CODE  Visit Type: Follow Up (rehab, pain, gout)     Facility:  [facility] [[id number]]        Facility Type: SNF (Skilled Nursing Facility, TCU)    History of Present Illness: Thea Acosta is a 86 y.o. female with a past medical history for CAD, CHF, paroxysmal AFIB, AVR, s/p PPM, COPD, CKD3, DM2, HTN, GERD.      She was recently hospitalized 7/28/ to 8/1 after a fall at the TCU in which she dilocated her left shoulder She underwent a total reverse arthroplasty on 7/31.      She did have a CBC last week showing improvement in her hgb from 8.8 to 9.0.  Cr was slightly higher since her discharge.  Will need to continue to monitor.      Today, she states she is generally doing well.  Incision is cover with dressing and I do peek at the incision which is well approximated, glued and free of s/s of infection. She has good CMS and radial pulse.  She is wearing a compression glove for soft non pitting edema in which she states is improving.  She is NWB to her left arm. Appetite is good.     Review of Systems   Patient denies fever, chills, headache, lightheadedness, dizziness, rhinorrhea, cough, congestion, shortness of breath, chest pain, palpitations, abdominal pain, n/v, diarrhea, constipation, change in appetite, dysuria, frequency, burning or pain with urination.  Other than stated in HPI all other review of systems is negative.     Physical Exam   Vital signs: /61, HR 60, resp 15, temp 96.6  GENERAL APPEARANCE: Well developed, well nourished, in no acute distress.  HEENT: normocephalic, atraumatic  sclerae anicteric, conjunctivae clear and moist, EOM intact  LUNGS: Lung sounds CTA, no adventitious sounds, respiratory effort normal.  CARD: RRR, S1, S2, without murmurs, gallops, rubs  ABD: Soft and nontender with normal bowel sounds.   EXTREMITIES: soft nonpitting edema of left hand, soft nonpitting BLE, wearing farrow wraps.   NEURO: Alert and oriented. Face is  symmetric.  SKIN: Left shoulder incision is well approximated without s/s of infection.   PSYCH: euthymic          Labs:    Recent Results (from the past 240 hour(s))   Basic Metabolic Panel   Result Value Ref Range    Sodium 139 136 - 145 mmol/L    Potassium 3.8 3.5 - 5.0 mmol/L    Chloride 95 (L) 98 - 107 mmol/L    CO2 35 (H) 22 - 31 mmol/L    Anion Gap, Calculation 9 5 - 18 mmol/L    Glucose 111 70 - 125 mg/dL    Calcium 9.5 8.5 - 10.5 mg/dL    BUN 42 (H) 8 - 28 mg/dL    Creatinine 1.42 (H) 0.60 - 1.10 mg/dL    GFR MDRD Af Amer 43 (L) >60 mL/min/1.73m2    GFR MDRD Non Af Amer 35 (L) >60 mL/min/1.73m2   HM1 (CBC with Diff)   Result Value Ref Range    WBC 6.9 4.0 - 11.0 thou/uL    RBC 2.97 (L) 3.80 - 5.40 mill/uL    Hemoglobin 9.0 (L) 12.0 - 16.0 g/dL    Hematocrit 29.0 (L) 35.0 - 47.0 %    MCV 98 80 - 100 fL    MCH 30.3 27.0 - 34.0 pg    MCHC 31.0 (L) 32.0 - 36.0 g/dL    RDW 14.8 (H) 11.0 - 14.5 %    Platelets 175 140 - 440 thou/uL    MPV 11.3 8.5 - 12.5 fL    Neutrophils % 65 50 - 70 %    Lymphocytes % 23 20 - 40 %    Monocytes % 7 2 - 10 %    Eosinophils % 5 0 - 6 %    Basophils % 0 0 - 2 %    Neutrophils Absolute 4.5 2.0 - 7.7 thou/uL    Lymphocytes Absolute 1.6 0.8 - 4.4 thou/uL    Monocytes Absolute 0.5 0.0 - 0.9 thou/uL    Eosinophils Absolute 0.3 0.0 - 0.4 thou/uL    Basophils Absolute 0.0 0.0 - 0.2 thou/uL         Assessment:  1. Status post reverse total replacement of left shoulder     2. CHF (congestive heart failure), NYHA class I, acute, systolic (H)     3. Essential hypertension with goal blood pressure less than 140/90     4. Acute gout of hand, unspecified cause, unspecified laterality         Plan:   TRA: stable, pain in good control, continue with scheduled APAP and prn APAP. ASA two times a day for DVT prophylaxis. Continue with therapies    CHF: compensated, bumex    HTN: good control on no medications.     Gout: improving, wean off prednisone 8/22. Colchicine prn       Electronically signed  by: Mar Balbuena, CNP

## 2021-06-10 NOTE — ANESTHESIA PREPROCEDURE EVALUATION
Anesthesia Evaluation        Airway   Mallampati: II  Neck ROM: full   Pulmonary - normal exam   (+) asthma  shortness of breath,                          Cardiovascular - normal exam  (+) hypertension, CAD, dysrhythmias, CHF, ,      Neuro/Psych    (+) neuromuscular disease,      Endo/Other    (+) diabetes mellitus, hypothyroidism,      GI/Hepatic/Renal    (+) GERD,   chronic renal disease,           Dental - normal exam                        Anesthesia Plan  Planned anesthetic: peripheral nerve block and general endotracheal    ASA 3   Induction: intravenous   Anesthetic plan and risks discussed with: patient    Post-op plan: routine recovery

## 2021-06-10 NOTE — TELEPHONE ENCOUNTER
[facility] Nurse Triage Telephone Note      Provider: CHILO Quigley  Facility: [facility]    Facility Type: TCU    Caller: Mendoza  Call Back Number:  [phone]    Allergies: Tramadol; Codeine; Codeine phosphate (bulk); Codeine sulfate; Codeine-butalbital-asa-caff; Codeine-guaifenesin; Lisinopril; and Penicillins    Reason for call: Nurse reporting Heme 1 and BMP results.  Notable meds:  ASA 81mg two times a day, Bumex 3mg two times a day, ferrous sulfate 325mg two times a day, Omeprazole 20mg daily, potassium 20meq daily.       Verbal Order/Direction given by Provider: No new orders.      Provider giving order: CHILO Quigley    Verbal order given to: Eva Stephen RN

## 2021-06-10 NOTE — PROGRESS NOTES
[facility] Patient Outreach:     Discharge Date::  5/7/17      Reason for TCU stay (discharge diagnosis)::  Right hip fx with ORIF, CKD stage 3, A-fib      Are you feeling better, the same or worse since your discharge?:  Patient is feeling better          As part of your discharge plan, did they discuss home care with you?: Yes        Have your seen them yet, or are they scheduled to visit?: Yes                Do you have any follow up visits scheduled with your PCP or Specialist?:  No          I'm glad to hear you're doing well and we want you to continue to do well. Your PCP would like to see you for a follow-up visit. Can we help set that up for your today?: No (Patient's driveway is currently under construction d/t water leak.  Unable to get out of her garage.  After that is completed this week, she'll call to schedule an appt with PCP.  )        (RN) Provided patient the PCP's phone number to call if they have any questions or concerns?: Yes

## 2021-06-10 NOTE — PROGRESS NOTES
[facility]    DATE: 8/3/2020    NAME:  Thea Acosta             :  1934  MRN: [id number]  CODE STATUS:  DNR    VISIT TYPE: Problem Visit (admit tcu, shoulder p ain)     FACILITY:  [facility] [[id number]]       CHIEF COMPLAIN/REASON FOR VISIT:    Chief Complaint   Patient presents with     Problem Visit     admit tcu, shoulder p ain               HISTORY OF PRESENT ILLNESS: Thea Acosta is a 86 y.o. female who admitted - for acute metabolic encephalopathy. This was felt to be s/t hypothyroidism and noncompliance with levothyroxine dosing. She was readmitted - for left shoulder dislocation and underwent reverse total shoulder arthroplasty on . Postop course was uncomplicated and transferred back to [facility]. She has PMH of CAD, CKD, DM, s/p PPM, diastolic CHF, pulmonary artery hypertension, mitral valve stenosis, chronic a fib no anticoagulation, s/p AVR, asthma. Prior to this she lived at home with her grand daughter.     Today Ms. Acosta is seen for admit to tcu and shoulder pain. She is seen in her room after just finishing with OT. She says her pain is definitely there but controlled with tylenol. She says she does not want to take anything stronger than tylenol. She does not like narcotics and they tend to make her confused and goofy. She thinks her bowels have not moved yet since surgery but does not feel constipated. She thinks her breathing is fine. She is not having urinary issues. She is not having dizziness or lightheadedness. No recent chest pain. She says she is doing well. She just wants her shoulder better so she can get out of here. She denies medication concerns. Nursing had reported some nausea but when asked about this she said she had nausea when she first arrived but not since. She is not having any issues today.         REVIEW OF SYSTEMS:  PROBLEMS AND REVIEW OF SYSTEMS:   Review of Systems  Today on ROS:  "  Currently, no fever, chills, or rigors. Decreased vision and hearing. Denies any chest pain, headaches, palpitations, lightheadedness, dizziness, no cough, no sob. Appetite is good.  Denies any abdominal pain. No active bleeding. Positive for urinary incontinence, leg swelling, left arm and shoulder swelling, sling in place, left shoulder incision, no nausea or vomiting, no constipation, pain controlled      Allergies   Allergen Reactions     Tramadol Anxiety and Other (See Comments)     Per patient, Thea developed psychosis and severe anxiety and agitation \"for three days\" after receiving tramadol in the past. She stated that she would \"never\" take it again and would be extremely upset if she receives it. She asked me to add this to her chart's allergy list specifically.      Codeine Hives     Codeine Phosphate (Bulk)      This allergy is per [facility] records.     Codeine Sulfate      This allergy is per [facility] records.     Codeine-Butalbital-Asa-Caff      This allergy is per [facility] records.     Codeine-Guaifenesin      This allergy is per [facility] records.     Lisinopril Cough     Penicillins Swelling     Current Outpatient Medications   Medication Sig     acetaminophen (TYLENOL) 500 MG tablet Take 2 tablets (1,000 mg total) by mouth 3 (three) times a day. (Patient taking differently: Take 1,000 mg by mouth 3 (three) times a day. And daily prn)     albuterol (PROAIR HFA;PROVENTIL HFA;VENTOLIN HFA) 90 mcg/actuation inhaler Inhale 2 puffs every 4 (four) hours as needed for wheezing.     aspirin 81 MG EC tablet Take 1 tablet (81 mg total) by mouth 2 (two) times a day.     atorvastatin (LIPITOR) 80 MG tablet TAKE 1 TABLET(80 MG) BY MOUTH DAILY     bumetanide (BUMEX) 2 MG tablet Take 1.5 tablets (3 mg total) by mouth 2 (two) times a day at 9am and 6pm.     colchicine 0.6 " mg tablet take two tablets (1.2 mg) by mouth at onset of gout symptoms, then repeat one tablet (0.6 mg) by mouth one hour later     diclofenac sodium (VOLTAREN) 1 % Gel Apply 2 g topically 4 (four) times a day as needed.      ferrous sulfate 325 (65 FE) MG tablet TAKE 1 TABLET(325 MG) BY MOUTH TWICE DAILY     levothyroxine (SYNTHROID, LEVOTHROID) 150 MCG tablet Take 1 tablet (150 mcg total) by mouth daily before supper.     nystatin (MYCOSTATIN) powder Apply topically 4 (four) times a day.     omeprazole (PRILOSEC) 20 MG capsule Take 1 capsule (20 mg total) by mouth daily before breakfast.     potassium chloride (K-DUR,KLOR-CON) 20 MEQ tablet Take 20 mEq by mouth daily.      predniSONE (DELTASONE) 2.5 MG tablet Take 7.5 mg/d prednisone PO for 3 weeks.     white petrolatum (AQUAPHOR ORIGINAL) 41 % Oint Apply 1 application topically at bedtime.     Past Medical History:    Past Medical History:   Diagnosis Date     Acute kidney injury superimposed on CKD (H) 3/3/2017     Asthma      CAD (coronary artery disease)      Cataract      Chronic kidney disease     ckd3     COPD (chronic obstructive pulmonary disease) (H)      Diabetes mellitus (H)      Disease of thyroid gland      H/O heart valve replacement with bioprosthetic valve      History of transfusion      Hypertension      Normochromic normocytic anemia      Pulmonary hypertension (H) 09/27/2019    Noted on echocardiogram     Restless leg syndrome            PHYSICAL EXAMINATION  Vitals:    08/03/20 0700   BP: 111/51   Pulse: 60   Resp: 18   Temp: 96.8  F (36  C)   SpO2: 91%   Weight: 206 lb (93.4 kg)       Today on physical exam:     GENERAL: Awake, Alert, obese,  not in any form of acute distress, answers questions appropriately, follows simple commands, conversant  HEENT: Head is normocephalic with normal hair distribution. No evidence of trauma. Ears: No acute purulent discharge. Eyes: Conjunctivae pink with no scleral jaundice. Nose: Normal mucosa and septum.  NECK: Supple with no cervical or supraclavicular lymphadenopathy. Trachea is midline. [place], decreased vision  CHEST: No tenderness or deformity, no crepitus, left chest PPM  LUNG: Dim but clear today to auscultation.   No shortness of breath noted today, no cough noted  BACK: No kyphosis of the thoracic spine. Symmetric, no curvature, ROM normal, no CVA tenderness, no spinal tenderness   CVS: irregularly irregular rhythm, murmur,  2+ pulses symmetric in all extremities.  ABDOMEN: Rounded and soft, nontender to palpation, non distended, no masses, no organomegaly, good bowel sounds, no rebound or guarding, no peritoneal signs.   EXTREMITIES: 1+ ble nonpitting edema, left upper extremity 2+ edema, left shoulder incision c/d/i, left arm sling in place  SKIN: Warm and dry  NEUROLOGICAL: The patient is oriented to person, place and time. Confused at times, oriented on exam. Forgetful at times.             LABS:   Recent Results (from the past 168 hour(s))   Urinalysis   Result Value Ref Range    Color, UA Yellow Colorless, Yellow, Straw, Light Yellow    Clarity, UA Clear Clear    Glucose, UA Negative Negative    Bilirubin, UA Negative Negative    Ketones, UA Negative Negative    Specific Gravity, UA 1.010 1.001 - 1.030    Blood, UA Negative Negative    pH, UA 5.5 4.5 - 8.0    Protein, UA Negative Negative mg/dL    Urobilinogen, UA <2.0 E.U./dL <2.0 E.U./dL, 2.0 E.U./dL    Nitrite, UA Negative Negative    Leukocytes, UA Trace (!) Negative    Bacteria, UA Few (!) None Seen hpf    RBC, UA 0-2 None Seen, 0-2 hpf    WBC, UA 5-10 (!) None Seen, 0-5 hpf    Squam Epithel, UA 5-10 (!) None Seen, 0-5 lpf   Culture, Urine    Specimen: Urine   Result Value Ref Range    Culture 10,000-50,000 col/ml Enterococcus faecalis (!)        Susceptibility    Enterococcus faecalis - SHARI     Ampicillin 1 Sensitive      Nitrofurantoin <=16 Sensitive      Levofloxacin 1 Sensitive      Tetracycline <=0.5 Sensitive    ECG 12 lead nursing unit  performed   Result Value Ref Range    SYSTOLIC BLOOD PRESSURE 124 mmHg    DIASTOLIC BLOOD PRESSURE 60 mmHg    VENTRICULAR RATE 63 BPM    ATRIAL RATE 57 BPM    P-R INTERVAL      QRS DURATION 172 ms    Q-T INTERVAL 496 ms    QTC CALCULATION (BEZET) 507 ms    P Axis      R AXIS 106 degrees    T AXIS 261 degrees    MUSE DIAGNOSIS       Ventricular-paced rhythm with occasional Premature ventricular complexes  Abnormal ECG  When compared with ECG of 18-JUL-2020 11:23,  Electronic ventricular pacemaker has replaced Wide QRS rhythm  Confirmed by SEE ED PROVIDER NOTE FOR, ECG INTERPRETATION ([id number]),  VIV ELDER ([id number]) on 7/28/2020 10:25:45 PM     INR   Result Value Ref Range    INR 0.99 0.90 - 1.10   APTT(PTT)   Result Value Ref Range    PTT 24 24 - 37 seconds   Type and Screen   Result Value Ref Range    ABORh A POS     Antibody Screen Negative Negative   HM1 (CBC with Diff)   Result Value Ref Range    WBC 9.0 4.0 - 11.0 thou/uL    RBC 3.65 (L) 3.80 - 5.40 mill/uL    Hemoglobin 11.5 (L) 12.0 - 16.0 g/dL    Hematocrit 36.3 35.0 - 47.0 %     80 - 100 fL    MCH 31.5 27.0 - 34.0 pg    MCHC 31.7 (L) 32.0 - 36.0 g/dL    RDW 14.6 (H) 11.0 - 14.5 %    Platelets 227 140 - 440 thou/uL    MPV 10.8 8.5 - 12.5 fL    Neutrophils % 86 (H) 50 - 70 %    Lymphocytes % 8 (L) 20 - 40 %    Monocytes % 4 2 - 10 %    Eosinophils % 1 0 - 6 %    Basophils % 0 0 - 2 %    Neutrophils Absolute 7.7 2.0 - 7.7 thou/uL    Lymphocytes Absolute 0.7 (L) 0.8 - 4.4 thou/uL    Monocytes Absolute 0.3 0.0 - 0.9 thou/uL    Eosinophils Absolute 0.1 0.0 - 0.4 thou/uL    Basophils Absolute 0.0 0.0 - 0.2 thou/uL   Basic Metabolic Panel   Result Value Ref Range    Sodium 142 136 - 145 mmol/L    Potassium 3.9 3.5 - 5.0 mmol/L    Chloride 96 (L) 98 - 107 mmol/L    CO2 35 (H) 22 - 31 mmol/L    Anion Gap, Calculation 11 5 - 18 mmol/L    Glucose 152 (H) 70 - 125 mg/dL    Calcium 9.7 8.5 - 10.5 mg/dL    BUN 52 (H) 8 - 28 mg/dL    Creatinine 1.47 (H) 0.60 -  1.10 mg/dL    GFR MDRD Af Amer 41 (L) >60 mL/min/1.73m2    GFR MDRD Non Af Amer 34 (L) >60 mL/min/1.73m2   COVID-19 Virus PCR MRF    Specimen: Respiratory   Result Value Ref Range    COVID-19 VIRUS SPECIMEN SOURCE Nasopharyngeal     2019-nCOV Not Detected    Platelet Count - every other day x 3   Result Value Ref Range    Platelets 183 140 - 440 thou/uL   POCT Glucose    Specimen: Blood   Result Value Ref Range    Glucose 164 (H) 70 - 139 mg/dL   POCT Glucose    Specimen: Blood   Result Value Ref Range    Glucose 248 (H) 70 - 139 mg/dL   POCT Glucose    Specimen: Blood   Result Value Ref Range    Glucose 153 (H) 70 - 139 mg/dL   Glycosylated Hemoglobin A1C   Result Value Ref Range    Hemoglobin A1c 6.5 (H) <=5.6 %   POCT Glucose    Specimen: Blood   Result Value Ref Range    Glucose 119 70 - 139 mg/dL   POCT Glucose in PACU if patient is diabetic to assess for hypo/hyperglycemia    Specimen: Blood   Result Value Ref Range    Glucose 161 (H) 70 - 139 mg/dL   POCT Glucose    Specimen: Blood   Result Value Ref Range    Glucose 205 (H) 70 - 139 mg/dL   Hemoglobin - Daily x 2   Result Value Ref Range    Hemoglobin 8.8 (L) 12.0 - 16.0 g/dL   Basic Metabolic Panel   Result Value Ref Range    Sodium 139 136 - 145 mmol/L    Potassium 4.3 3.5 - 5.0 mmol/L    Chloride 97 (L) 98 - 107 mmol/L    CO2 36 (H) 22 - 31 mmol/L    Anion Gap, Calculation 6 5 - 18 mmol/L    Glucose 165 (H) 70 - 125 mg/dL    Calcium 8.8 8.5 - 10.5 mg/dL    BUN 47 (H) 8 - 28 mg/dL    Creatinine 1.39 (H) 0.60 - 1.10 mg/dL    GFR MDRD Af Amer 44 (L) >60 mL/min/1.73m2    GFR MDRD Non Af Amer 36 (L) >60 mL/min/1.73m2   Platelet Count - every other day x 3   Result Value Ref Range    Platelets 162 140 - 440 thou/uL   POCT Glucose    Specimen: Blood   Result Value Ref Range    Glucose 157 (H) 70 - 139 mg/dL   POCT Glucose    Specimen: Blood   Result Value Ref Range    Glucose 129 70 - 139 mg/dL     Results for orders placed or performed during the hospital  encounter of 07/28/20   Basic Metabolic Panel   Result Value Ref Range    Sodium 139 136 - 145 mmol/L    Potassium 4.3 3.5 - 5.0 mmol/L    Chloride 97 (L) 98 - 107 mmol/L    CO2 36 (H) 22 - 31 mmol/L    Anion Gap, Calculation 6 5 - 18 mmol/L    Glucose 165 (H) 70 - 125 mg/dL    Calcium 8.8 8.5 - 10.5 mg/dL    BUN 47 (H) 8 - 28 mg/dL    Creatinine 1.39 (H) 0.60 - 1.10 mg/dL    GFR MDRD Af Amer 44 (L) >60 mL/min/1.73m2    GFR MDRD Non Af Amer 36 (L) >60 mL/min/1.73m2         Lab Results   Component Value Date    WBC 9.0 07/28/2020    HGB 8.8 (L) 08/01/2020    HCT 36.3 07/28/2020     07/28/2020     08/01/2020       Lab Results   Component Value Date    [id number] 333 07/21/2020     Lab Results   Component Value Date    HGBA1C 6.5 (H) 07/31/2020     Lab Results   Component Value Date    INR 0.99 07/28/2020    INR 1.30 (H) 02/07/2018    INR 1.51 (H) 03/06/2017     Vitamin D, Total (25-Hydroxy)   Date Value Ref Range Status   01/20/2020 45.9 30.0 - 80.0 ng/mL Final     Lab Results   Component Value Date    TSH 67.01 (H) 07/18/2020           ASSESSMENT/PLAN:    Left shoulder dislocation, s/p reverse total shoulder arthroplasty: Incision c/d/i. Pain controlled on tylenol. Will dc norco per her request. Ice prn. PT, OT following. voltaren changed to four times a day prn. F/u with surgeon.  Asthma:  Encourage cough and deep breathe. IS q1h while awake. Albuterol prn.   Hypothyroid: on levothyroxine.   Chronic CHF: Daily [id number]. No shortness of breath recently. 1+ ble edema. On bumex 2mg two times a day, keep legs elevated when possible.  F/u with cardiology prn. Cardiac diet. Monitor for fluid overload.   S/p PPM: follows with device clinic. No recent concerns.   CKD stage 3: Cr 1.39, gfr 36 on 8/1. Stable.   H/o Type 2 DM: No meds, no monitoring needed. Diet controlled. No recent concerns.   Dyslipidemia: On aspirin, atorvastatin.   HTN: SBP 110s, On lopressor, bumex.  hold parameters for lopressor.  Stable.  GERD: On omeprazole. No concerns today.   Vitamin d deficiency: On vitamin d.  Hypokalemia: On kcl.  PAF, s/p AVR: Regular rate and rhythm today. On metoprolol. F/u with caridology. No concenrs.   Anemia of CKD: on iron. No recent concerns. Hg 10.9 on 7/24.   Hg 8.8 on 8/1.   Candidal intertrigo:  Nystatin.        Electronically signed by: Jennifer Valdes NP

## 2021-06-10 NOTE — PROGRESS NOTES
[facility]    DATE: 2020    NAME:  Thea Acosta             :  1934  MRN: [id number]  CODE STATUS:  DNR    VISIT TYPE: Review Of Multiple Medical Conditions (total shoulder arthroplasty)     FACILITY:  [facility] [[id number]]       CHIEF COMPLAIN/REASON FOR VISIT:    Chief Complaint   Patient presents with     Review Of Multiple Medical Conditions     total shoulder arthroplasty               HISTORY OF PRESENT ILLNESS: Thea Acosta is a 86 y.o. female who admitted - for acute metabolic encephalopathy. This was felt to be s/t hypothyroidism and noncompliance with levothyroxine dosing. She was readmitted - for left shoulder dislocation and underwent reverse total shoulder arthroplasty on . Postop course was uncomplicated and transferred back to [facility]. She has PMH of CAD, CKD, DM, s/p PPM, diastolic CHF, pulmonary artery hypertension, mitral valve stenosis, chronic a fib no anticoagulation, s/p AVR, asthma. Prior to this she lived at home with her grand daughter.     Today Ms. Acosta is seen for review of systems for total shoulder arthroplasty. She is seen in her recliner today. She says her shoulder is sore but overall pain is controlled. She denies any issues with her bowels. Her legs were rewrapped today and the therapist told her the swelling was better. She is not having any more shortness of breath than usual. She denies any dizziness or other concerns recently. She is asking questions about how long it will take her to heal from this surgery. Her vitals and weights have been stable.         REVIEW OF SYSTEMS:  PROBLEMS AND REVIEW OF SYSTEMS:   Review of Systems  Today on ROS:   Currently, no fever, chills, or rigors. Decreased vision and hearing. Denies any chest pain, headaches, palpitations, lightheadedness, dizziness, no cough, no sob. Appetite is good.  Denies any abdominal pain. No active bleeding.  "Positive for urinary incontinence, leg swelling, left arm and shoulder swelling improving, sling in place, left shoulder incision, no nausea or vomiting, no constipation, pain controlled, lymphedema wraps      Allergies   Allergen Reactions     Tramadol Anxiety and Other (See Comments)     Per patient, Thea developed psychosis and severe anxiety and agitation \"for three days\" after receiving tramadol in the past. She stated that she would \"never\" take it again and would be extremely upset if she receives it. She asked me to add this to her chart's allergy list specifically.      Codeine Hives     Codeine Phosphate (Bulk)      This allergy is per [facility] records.     Codeine Sulfate      This allergy is per [facility] records.     Codeine-Butalbital-Asa-Caff      This allergy is per [facility] records.     Codeine-Guaifenesin      This allergy is per [facility] records.     Lisinopril Cough     Penicillins Swelling     Current Outpatient Medications   Medication Sig     acetaminophen (TYLENOL) 500 MG tablet Take 2 tablets (1,000 mg total) by mouth 3 (three) times a day. (Patient taking differently: Take 1,000 mg by mouth 3 (three) times a day. And daily prn)     albuterol (PROAIR HFA;PROVENTIL HFA;VENTOLIN HFA) 90 mcg/actuation inhaler Inhale 2 puffs every 4 (four) hours as needed for wheezing.     aspirin 81 MG EC tablet Take 1 tablet (81 mg total) by mouth 2 (two) times a day.     atorvastatin (LIPITOR) 80 MG tablet TAKE 1 TABLET(80 MG) BY MOUTH DAILY     bumetanide (BUMEX) 2 MG tablet Take 1.5 tablets (3 mg total) by mouth 2 (two) times a day at 9am and 6pm.     colchicine 0.6 mg tablet take two tablets (1.2 mg) by mouth at onset of gout symptoms, then repeat one tablet (0.6 mg) by mouth one hour later     diclofenac sodium (VOLTAREN) 1 % Gel Apply 2 g topically 4 (four) times a " day as needed.      ferrous sulfate 325 (65 FE) MG tablet TAKE 1 TABLET(325 MG) BY MOUTH TWICE DAILY (Patient taking differently: Take 1 tablet by mouth 3 (three) times a day with meals. )     levothyroxine (SYNTHROID, LEVOTHROID) 150 MCG tablet Take 1 tablet (150 mcg total) by mouth daily before supper.     nystatin (MYCOSTATIN) powder Apply topically 4 (four) times a day.     omeprazole (PRILOSEC) 20 MG capsule Take 1 capsule (20 mg total) by mouth daily before breakfast.     polyethylene glycol (MIRALAX) 17 gram packet Take 17 g by mouth daily as needed.     potassium chloride (K-DUR,KLOR-CON) 20 MEQ tablet Take 20 mEq by mouth daily.      predniSONE (DELTASONE) 2.5 MG tablet Take 7.5 mg/d prednisone PO for 3 weeks.     senna (SENOKOT) 8.6 mg tablet Take 1 tablet by mouth daily.     white petrolatum (AQUAPHOR ORIGINAL) 41 % Oint Apply 1 application topically at bedtime.     Past Medical History:    Past Medical History:   Diagnosis Date     Acute kidney injury superimposed on CKD (H) 3/3/2017     Asthma      CAD (coronary artery disease)      Cataract      Chronic kidney disease     ckd3     COPD (chronic obstructive pulmonary disease) (H)      Diabetes mellitus (H)      Disease of thyroid gland      H/O heart valve replacement with bioprosthetic valve      History of transfusion      Hypertension      Normochromic normocytic anemia      Pulmonary hypertension (H) 09/27/2019    Noted on echocardiogram     Restless leg syndrome            PHYSICAL EXAMINATION  Vitals:    08/24/20 0700   BP: 130/73   Pulse: 60   Resp: 20   Temp: 96.6  F (35.9  C)   SpO2: 95%   Weight: 213 lb (96.6 kg)       Today on physical exam:     GENERAL: Awake, Alert, obese,  not in any form of acute distress, answers questions appropriately, follows simple commands, conversant  HEENT: Head is normocephalic with normal hair distribution. No evidence of trauma. Ears: No acute purulent discharge. Eyes: Conjunctivae pink with no scleral  jaundice. Nose: Normal mucosa and septum. NECK: Supple with no cervical or supraclavicular lymphadenopathy. Trachea is midline. [place], decreased vision  CHEST: No tenderness or deformity, no crepitus, left chest PPM  LUNG: Dim but clear today to auscultation.   No shortness of breath noted today, no cough noted  BACK: No kyphosis of the thoracic spine. Symmetric, no curvature, ROM normal, no CVA tenderness, no spinal tenderness   CVS: irregularly irregular rhythm, murmur,  2+ pulses symmetric in all extremities.  ABDOMEN: Rounded and soft, nontender to palpation, non distended, no masses, no organomegaly, good bowel sounds, no rebound or guarding, no peritoneal signs.   EXTREMITIES: 2+ ble nonpitting edema, lymphedema wrapping, left upper extremity 1+ edema with lymphedema sleeve, left shoulder incision c/d/i, left arm sling in place  SKIN: Warm and dry  NEUROLOGICAL: The patient is oriented to person, place and time. Oriented today, no recent confusion, Forgetful at times.             LABS:   Recent Results (from the past 168 hour(s))   Hemoglobin   Result Value Ref Range    Hemoglobin 8.4 (L) 12.0 - 16.0 g/dL     Results for orders placed or performed in visit on 08/17/20   Basic Metabolic Panel   Result Value Ref Range    Sodium 141 136 - 145 mmol/L    Potassium 3.6 3.5 - 5.0 mmol/L    Chloride 98 98 - 107 mmol/L    CO2 35 (H) 22 - 31 mmol/L    Anion Gap, Calculation 8 5 - 18 mmol/L    Glucose 114 70 - 125 mg/dL    Calcium 9.2 8.5 - 10.5 mg/dL    BUN 37 (H) 8 - 28 mg/dL    Creatinine 1.47 (H) 0.60 - 1.10 mg/dL    GFR MDRD Af Amer 41 (L) >60 mL/min/1.73m2    GFR MDRD Non Af Amer 34 (L) >60 mL/min/1.73m2         Lab Results   Component Value Date    WBC 6.9 08/05/2020    HGB 8.4 (L) 08/24/2020    HCT 29.0 (L) 08/05/2020    MCV 98 08/05/2020     08/05/2020       Lab Results   Component Value Date    [id number] 333 07/21/2020     Lab Results   Component Value Date    HGBA1C 6.5 (H) 07/31/2020     Lab Results    Component Value Date    INR 0.99 07/28/2020    INR 1.30 (H) 02/07/2018    INR 1.51 (H) 03/06/2017     Vitamin D, Total (25-Hydroxy)   Date Value Ref Range Status   01/20/2020 45.9 30.0 - 80.0 ng/mL Final     Lab Results   Component Value Date    TSH 3.43 08/17/2020           ASSESSMENT/PLAN:    Left shoulder dislocation, s/p reverse total shoulder arthroplasty: Incision c/d/i. Pain controlled on tylenol.  Ice prn. PT, OT following. voltaren gel prn. F/u with surgeon Dr. Ramirez on 8/14-f/u again in 4 weeks, limit external rotation, no bathing until 4 weeks postop, healing well.  lymphedema sleeve on LUE. Overall improving. Remains NWB, may remove sling when resting. Improving with therapy.   Asthma:  Encourage cough and deep breathe. IS q1h while awake. Albuterol prn. Shortness of breath at baseline today.   Hypothyroid: on levothyroxine.   Chronic CHF: Daily [id number]. Shortness of breath at baseline, 2+ ble edema. On bumex 2mg two times a day, keep legs elevated when possible.  F/u with cardiology prn. Cardiac diet. Monitor for fluid overload. Lymphedema wrapping. No concerns today, appears euvolemic.   S/p PPM: follows with device clinic. No recent concerns.   CKD stage 3: Cr 1.39, gfr 36 on 8/1. Stable.   H/o Type 2 DM: No meds, no monitoring needed. Diet controlled. No recent concerns.   Dyslipidemia: On aspirin, atorvastatin.   HTN: SBP 130s, On lopressor, bumex.  hold parameters for lopressor. Stable.  GERD: On omeprazole. No concerns today.   Vitamin d deficiency: On vitamin d.  Hypokalemia: On kcl.  PAF, s/p AVR: Regular rate and rhythm today. On metoprolol. F/u with caridology. No concerns.   Anemia of CKD: on iron. Hg 10.9 on 7/24.   Hg 8.8 on 8/1. Hg 9 on 8/5. Recheck on 8/17 8.2, increased iron to three times a day. recheck on 8/24.   Candidal intertrigo:  Nystatin.   Constipation: senna daily, miralax daily prn.        Electronically signed by: Jennifer Valdes NP

## 2021-06-10 NOTE — PROGRESS NOTES
Code Status:  FULL CODE  Visit Type: Problem Visit     Facility:  [facility] [[id number]]         Facility Type: SNF (Skilled Nursing Facility, TCU)    History of Present Illness: Thea Acosta is a 83 y.o. female who I am seeing today for follow up on the TCU. Pt recently admitted s/p ORIF of the right hip 2/t to intertrochanteric fracture 2/t fall. Pain well controlled. Pt had increasing LE edema weight gain and SOB.  She was treated with diuretics however we have begin to wean those down.  She is down 10 pounds.  She does continue with chronic lymphedema with lymphedema wraps.  She has underlying COPD with asthmatic component.  She was treated with Z-Nkihil.  Influenza was negative.  She was started on a burst of prednisone and continues on this.  Recent addition of Combivent inhaler in addition to her Advair. Recent CXR showed cardiomegaly with infiltrate. She was treated with increased diuretics. She continues with rhonchi throughout and non productive cough, LE edema and weight gain. She continues to use supplemental oxygen.     Active Ambulatory Problems     Diagnosis Date Noted     Paroxysmal Atrial Fibrillation      Sciatica      Shortness Of Breath      Type 2 Diabetes Mellitus      Hyperparathyroidism      Vitamin D Deficiency      Hypercholesterolemia      Essential hypertension      Coronary Artery Disease      Moderate persistent asthma      CKD (chronic kidney disease), stage III      Osteoarthritis Of The Knee      Restless Legs Syndrome      History of aortic stenosis 11/11/2014     History of gastrointestinal bleeding 11/11/2014     Closed intertrochanteric fracture of hip 03/01/2017     Elevated brain natriuretic peptide (BNP) level      Acute kidney injury superimposed on CKD 03/03/2017     S/P ORIF (open reduction internal fixation) fracture 03/06/2017     Morbid obesity due to excess calories 03/06/2017     Aortic valve replaced 03/06/2017     Cardiac pacemaker in situ  03/06/2017     Complete heart block 03/06/2017     Exposure to influenza 03/20/2017     Fluid overload 03/21/2017     Tinea corporis 03/30/2017     Resolved Ambulatory Problems     Diagnosis Date Noted     Constipation      Cough      Acute bronchitis      Hypothyroidism      Past Medical History:   Diagnosis Date     Acute kidney injury superimposed on CKD 3/3/2017     Asthma      CAD (coronary artery disease)      Cataract      Diabetes mellitus      Disease of thyroid gland      History of transfusion      Hypertension        Current Outpatient Prescriptions   Medication Sig     acetaminophen (TYLENOL) 500 MG tablet Take 500 mg by mouth every 6 (six) hours as needed for pain.     aspirin 81 MG EC tablet Take 81 mg by mouth daily.     atorvastatin (LIPITOR) 80 MG tablet Take 80 mg by mouth bedtime.     cholecalciferol, vitamin D3, (VITAMIN D3) 2,000 unit Tab Take 2,000 Units by mouth once daily.     fluticasone-salmeterol (ADVAIR) 500-50 mcg/dose DISKUS Inhale 1 puff 2 (two) times a day.     folic acid (FOLVITE) 1 MG tablet Take 1 mg by mouth daily.     furosemide (LASIX) 40 MG tablet Take 1 tablet (40 mg total) by mouth every morning. (Patient taking differently: Take 40 mg by mouth 2 (two) times a day at 9am and 6pm. 40 am  And 20 noon)     ipratropium (ATROVENT) 0.02 % nebulizer solution Take 500 mcg by nebulization 4 (four) times a day.      ipratropium-albuterol (COMBIVENT RESPIMAT)  mcg/actuation Aero inhaler Inhale 1 puff 4 (four) times a day.      levothyroxine (SYNTHROID, LEVOTHROID) 125 MCG tablet Take 125 mcg by mouth daily.     loperamide (IMODIUM A-D) 2 mg tablet Take 2 mg by mouth 4 (four) times a day as needed for diarrhea.     losartan (COZAAR) 100 MG tablet Take 100 mg by mouth daily.     melatonin 3 mg Tab tablet Take 3 mg by mouth bedtime as needed.     metoprolol tartrate (LOPRESSOR) 100 MG tablet Take 100 mg by mouth 2 (two) times a day.      nystatin (MYCOSTATIN) powder Apply topically  2 (two) times a day.     omeprazole (PRILOSEC) 20 MG capsule Take 20 mg by mouth Daily before breakfast.     predniSONE (DELTASONE) 5 MG tablet Take 5 mg by mouth daily. Start with 30 mg daily then wean down over 10 days     senna-docusate (PERICOLACE) 8.6-50 mg tablet Take 1 tablet by mouth daily.     WARFARIN SODIUM (WARFARIN ORAL) Take 2 mg by mouth. hold     Allergies   Allergen Reactions     Codeine      Lisinopril Cough     Penicillins Swelling         Review of Systems   No fevers or chills. No headache, lightheadedness or dizziness. No SOB, increasing cough with production of clear phelgm. No chest pains or palpitations. Appetite is good. Continued LE edema. No nausea, vomiting, constipation or diarrhea. No dysuria, frequency, burning or pain with urination. Pain well controlled.     Physical Exam  PHYSICAL EXAMINATION:  Vital signs:   Vitals:    04/20/17 2045   BP: 91/46   Pulse: 62   Resp: 16   Temp: (!) 96.5  F (35.8  C)   SpO2: 92%     General: Awake, Alert, appropriately, follows simple commands, conversant  HEENT:PERRLA, Pink conjunctiva, anicteric sclerae, moist oral mucosa  NECK: Supple, without any lymphadenopathy, or masses  CVS:  S1  S2, without murmur or gallop.   LUNG: Thick Rhonchi throughout. Wet non productive cough.   BACK: No kyphosis of the thoracic spine  ABDOMEN: Soft, morbidly obese, nontender to palpation, with positive bowel sounds  EXTREMITIES: Good range of motion on both upper and lower extremities with generalized weakness, 2+ pedal edema, Ace wraps in place, no calf tenderness  SKIN: Warm and dry, no rashes or erythema noted  NEUROLOGIC: Intact, pulses palpable  PSYCHIATRIC: Cognition intact              Labs:    Lab Results   Component Value Date    WBC 8.9 04/14/2017    HGB 11.0 (L) 04/14/2017    HCT 34.3 (L) 04/14/2017    MCV 96 04/14/2017     04/14/2017     Results for orders placed or performed in visit on 04/18/17   Basic Metabolic Panel   Result Value Ref Range     Sodium 140 136 - 145 mmol/L    Potassium 3.9 3.5 - 5.0 mmol/L    Chloride 99 98 - 107 mmol/L    CO2 30 22 - 31 mmol/L    Anion Gap, Calculation 11 5 - 18 mmol/L    Glucose 156 (H) 70 - 125 mg/dL    Calcium 9.4 8.5 - 10.5 mg/dL    BUN 36 (H) 8 - 28 mg/dL    Creatinine 1.32 (H) 0.60 - 1.10 mg/dL    GFR MDRD Af Amer 47 (L) >60 mL/min/1.73m2    GFR MDRD Non Af Amer 38 (L) >60 mL/min/1.73m2       INR 1.96    Assessment/Plan:  1. Closed intertrochanteric fracture of hip, right, with routine healing, subsequent encounter     2. S/P ORIF (open reduction internal fixation) fracture     3. Paroxysmal atrial fibrillation     4. Fluid overload     5. COPD (chronic obstructive pulmonary disease)     6. Asthma     7. Morbid obesity due to excess calories       Pt with ORIF of right hip. Pain well controlled. She continues in therapy. We continue manage her COPD and CHF symptoms. Today she continues with wet non productive cough, LE edema and weight gain. She continues on Lasix 40/20 mg QD. I will add Metolazone. I recently decreased her metoprolol. Blood pressures stable. Will f/u with laboratory next week. Hx of CKD. She now has compression stockings on. She has up coming discharge and not sure where she will go because her house is having work done on it. She continues to desat occasionally and requires supplemental oxygen at times to keep sats >90%. She continues with nebulizer's. Chronic anticoagulation. INR pending.       Electronically signed by: Eva Kim, CNP

## 2021-06-10 NOTE — PROGRESS NOTES
[facility]    DATE: 2020    NAME:  Thea Acosta             :  1934  MRN: [id number]  CODE STATUS:  DNR    VISIT TYPE: Problem Visit (constipation)     FACILITY:  [facility] [[id number]]       CHIEF COMPLAIN/REASON FOR VISIT:    Chief Complaint   Patient presents with     Problem Visit     constipation               HISTORY OF PRESENT ILLNESS: Thea Acosta is a 86 y.o. female who admitted - for acute metabolic encephalopathy. This was felt to be s/t hypothyroidism and noncompliance with levothyroxine dosing. She was readmitted - for left shoulder dislocation and underwent reverse total shoulder arthroplasty on . Postop course was uncomplicated and transferred back to [facility]. She has PMH of CAD, CKD, DM, s/p PPM, diastolic CHF, pulmonary artery hypertension, mitral valve stenosis, chronic a fib no anticoagulation, s/p AVR, asthma. Prior to this she lived at home with her grand daughter.     Today Ms. Acosta is seen for constipation today. She says her pain is well controlled and her therapy seems to be going well. Her swelling seems pretty good in her arm and shoulder today. Her legs are a little swollen but not too bad. Her bowels are not moving regularly and she does feel constipated. We discussed adding senna daily and miralax as needed. She cant recall when her last bm was but thinks it was a few days ago. She denies any worse shortness of breath today. Her weights recently have been stable 206-205lbs. She denies any dizziness or other concerns lately.         REVIEW OF SYSTEMS:  PROBLEMS AND REVIEW OF SYSTEMS:   Review of Systems  Today on ROS:   Currently, no fever, chills, or rigors. Decreased vision and hearing. Denies any chest pain, headaches, palpitations, lightheadedness, dizziness, no cough, no sob. Appetite is good.  Denies any abdominal pain. No active bleeding. Positive for urinary incontinence, leg  "swelling, left arm and shoulder swelling, sling in place, left shoulder incision, no nausea or vomiting, constipation, pain controlled      Allergies   Allergen Reactions     Tramadol Anxiety and Other (See Comments)     Per patient, Thea developed psychosis and severe anxiety and agitation \"for three days\" after receiving tramadol in the past. She stated that she would \"never\" take it again and would be extremely upset if she receives it. She asked me to add this to her chart's allergy list specifically.      Codeine Hives     Codeine Phosphate (Bulk)      This allergy is per [facility] records.     Codeine Sulfate      This allergy is per [facility] records.     Codeine-Butalbital-Asa-Caff      This allergy is per [facility] records.     Codeine-Guaifenesin      This allergy is per [facility] records.     Lisinopril Cough     Penicillins Swelling     Current Outpatient Medications   Medication Sig     polyethylene glycol (MIRALAX) 17 gram packet Take 17 g by mouth daily as needed.     senna (SENOKOT) 8.6 mg tablet Take 1 tablet by mouth daily.     acetaminophen (TYLENOL) 500 MG tablet Take 2 tablets (1,000 mg total) by mouth 3 (three) times a day. (Patient taking differently: Take 1,000 mg by mouth 3 (three) times a day. And daily prn)     albuterol (PROAIR HFA;PROVENTIL HFA;VENTOLIN HFA) 90 mcg/actuation inhaler Inhale 2 puffs every 4 (four) hours as needed for wheezing.     aspirin 81 MG EC tablet Take 1 tablet (81 mg total) by mouth 2 (two) times a day.     atorvastatin (LIPITOR) 80 MG tablet TAKE 1 TABLET(80 MG) BY MOUTH DAILY     bumetanide (BUMEX) 2 MG tablet Take 1.5 tablets (3 mg total) by mouth 2 (two) times a day at 9am and 6pm.     colchicine 0.6 mg tablet take two tablets (1.2 mg) by mouth at onset of gout symptoms, then repeat one tablet (0.6 mg) by mouth one hour " later     diclofenac sodium (VOLTAREN) 1 % Gel Apply 2 g topically 4 (four) times a day as needed.      ferrous sulfate 325 (65 FE) MG tablet TAKE 1 TABLET(325 MG) BY MOUTH TWICE DAILY     levothyroxine (SYNTHROID, LEVOTHROID) 150 MCG tablet Take 1 tablet (150 mcg total) by mouth daily before supper.     nystatin (MYCOSTATIN) powder Apply topically 4 (four) times a day.     omeprazole (PRILOSEC) 20 MG capsule Take 1 capsule (20 mg total) by mouth daily before breakfast.     potassium chloride (K-DUR,KLOR-CON) 20 MEQ tablet Take 20 mEq by mouth daily.      predniSONE (DELTASONE) 2.5 MG tablet Take 7.5 mg/d prednisone PO for 3 weeks.     white petrolatum (AQUAPHOR ORIGINAL) 41 % Oint Apply 1 application topically at bedtime.     Past Medical History:    Past Medical History:   Diagnosis Date     Acute kidney injury superimposed on CKD (H) 3/3/2017     Asthma      CAD (coronary artery disease)      Cataract      Chronic kidney disease     ckd3     COPD (chronic obstructive pulmonary disease) (H)      Diabetes mellitus (H)      Disease of thyroid gland      H/O heart valve replacement with bioprosthetic valve      History of transfusion      Hypertension      Normochromic normocytic anemia      Pulmonary hypertension (H) 09/27/2019    Noted on echocardiogram     Restless leg syndrome            PHYSICAL EXAMINATION  Vitals:    08/06/20 0700   BP: 130/62   Pulse: 65   Resp: 18   Temp: 97  F (36.1  C)   SpO2: 98%   Weight: 205 lb (93 kg)       Today on physical exam:     GENERAL: Awake, Alert, obese,  not in any form of acute distress, answers questions appropriately, follows simple commands, conversant  HEENT: Head is normocephalic with normal hair distribution. No evidence of trauma. Ears: No acute purulent discharge. Eyes: Conjunctivae pink with no scleral jaundice. Nose: Normal mucosa and septum. NECK: Supple with no cervical or supraclavicular lymphadenopathy. Trachea is midline. [place], decreased vision  CHEST: No  tenderness or deformity, no crepitus, left chest PPM  LUNG: Dim but clear today to auscultation.   No shortness of breath noted today, no cough noted  BACK: No kyphosis of the thoracic spine. Symmetric, no curvature, ROM normal, no CVA tenderness, no spinal tenderness   CVS: irregularly irregular rhythm, murmur,  2+ pulses symmetric in all extremities.  ABDOMEN: Rounded and soft, nontender to palpation, non distended, no masses, no organomegaly, good bowel sounds, no rebound or guarding, no peritoneal signs.   EXTREMITIES: 1+ ble nonpitting edema, left upper extremity 1+ edema, left shoulder incision c/d/i, left arm sling in place  SKIN: Warm and dry  NEUROLOGICAL: The patient is oriented to person, place and time. Confused at times, oriented on exam. Forgetful at times.             LABS:   Recent Results (from the past 168 hour(s))   Glycosylated Hemoglobin A1C   Result Value Ref Range    Hemoglobin A1c 6.5 (H) <=5.6 %   POCT Glucose    Specimen: Blood   Result Value Ref Range    Glucose 119 70 - 139 mg/dL   POCT Glucose in PACU if patient is diabetic to assess for hypo/hyperglycemia    Specimen: Blood   Result Value Ref Range    Glucose 161 (H) 70 - 139 mg/dL   POCT Glucose    Specimen: Blood   Result Value Ref Range    Glucose 205 (H) 70 - 139 mg/dL   Hemoglobin - Daily x 2   Result Value Ref Range    Hemoglobin 8.8 (L) 12.0 - 16.0 g/dL   Basic Metabolic Panel   Result Value Ref Range    Sodium 139 136 - 145 mmol/L    Potassium 4.3 3.5 - 5.0 mmol/L    Chloride 97 (L) 98 - 107 mmol/L    CO2 36 (H) 22 - 31 mmol/L    Anion Gap, Calculation 6 5 - 18 mmol/L    Glucose 165 (H) 70 - 125 mg/dL    Calcium 8.8 8.5 - 10.5 mg/dL    BUN 47 (H) 8 - 28 mg/dL    Creatinine 1.39 (H) 0.60 - 1.10 mg/dL    GFR MDRD Af Amer 44 (L) >60 mL/min/1.73m2    GFR MDRD Non Af Amer 36 (L) >60 mL/min/1.73m2   Platelet Count - every other day x 3   Result Value Ref Range    Platelets 162 140 - 440 thou/uL   POCT Glucose    Specimen: Blood    Result Value Ref Range    Glucose 157 (H) 70 - 139 mg/dL   POCT Glucose    Specimen: Blood   Result Value Ref Range    Glucose 129 70 - 139 mg/dL   Basic Metabolic Panel   Result Value Ref Range    Sodium 139 136 - 145 mmol/L    Potassium 3.8 3.5 - 5.0 mmol/L    Chloride 95 (L) 98 - 107 mmol/L    CO2 35 (H) 22 - 31 mmol/L    Anion Gap, Calculation 9 5 - 18 mmol/L    Glucose 111 70 - 125 mg/dL    Calcium 9.5 8.5 - 10.5 mg/dL    BUN 42 (H) 8 - 28 mg/dL    Creatinine 1.42 (H) 0.60 - 1.10 mg/dL    GFR MDRD Af Amer 43 (L) >60 mL/min/1.73m2    GFR MDRD Non Af Amer 35 (L) >60 mL/min/1.73m2   HM1 (CBC with Diff)   Result Value Ref Range    WBC 6.9 4.0 - 11.0 thou/uL    RBC 2.97 (L) 3.80 - 5.40 mill/uL    Hemoglobin 9.0 (L) 12.0 - 16.0 g/dL    Hematocrit 29.0 (L) 35.0 - 47.0 %    MCV 98 80 - 100 fL    MCH 30.3 27.0 - 34.0 pg    MCHC 31.0 (L) 32.0 - 36.0 g/dL    RDW 14.8 (H) 11.0 - 14.5 %    Platelets 175 140 - 440 thou/uL    MPV 11.3 8.5 - 12.5 fL    Neutrophils % 65 50 - 70 %    Lymphocytes % 23 20 - 40 %    Monocytes % 7 2 - 10 %    Eosinophils % 5 0 - 6 %    Basophils % 0 0 - 2 %    Neutrophils Absolute 4.5 2.0 - 7.7 thou/uL    Lymphocytes Absolute 1.6 0.8 - 4.4 thou/uL    Monocytes Absolute 0.5 0.0 - 0.9 thou/uL    Eosinophils Absolute 0.3 0.0 - 0.4 thou/uL    Basophils Absolute 0.0 0.0 - 0.2 thou/uL     Results for orders placed or performed in visit on 08/05/20   Basic Metabolic Panel   Result Value Ref Range    Sodium 139 136 - 145 mmol/L    Potassium 3.8 3.5 - 5.0 mmol/L    Chloride 95 (L) 98 - 107 mmol/L    CO2 35 (H) 22 - 31 mmol/L    Anion Gap, Calculation 9 5 - 18 mmol/L    Glucose 111 70 - 125 mg/dL    Calcium 9.5 8.5 - 10.5 mg/dL    BUN 42 (H) 8 - 28 mg/dL    Creatinine 1.42 (H) 0.60 - 1.10 mg/dL    GFR MDRD Af Amer 43 (L) >60 mL/min/1.73m2    GFR MDRD Non Af Amer 35 (L) >60 mL/min/1.73m2         Lab Results   Component Value Date    WBC 6.9 08/05/2020    HGB 9.0 (L) 08/05/2020    HCT 29.0 (L) 08/05/2020     MCV 98 08/05/2020     08/05/2020       Lab Results   Component Value Date    QMMHCHSY97 333 07/21/2020     Lab Results   Component Value Date    HGBA1C 6.5 (H) 07/31/2020     Lab Results   Component Value Date    INR 0.99 07/28/2020    INR 1.30 (H) 02/07/2018    INR 1.51 (H) 03/06/2017     Vitamin D, Total (25-Hydroxy)   Date Value Ref Range Status   01/20/2020 45.9 30.0 - 80.0 ng/mL Final     Lab Results   Component Value Date    TSH 67.01 (H) 07/18/2020           ASSESSMENT/PLAN:    Left shoulder dislocation, s/p reverse total shoulder arthroplasty: Incision c/d/i. Pain controlled on tylenol.  Ice prn. PT, OT following. voltaren gel prn. F/u with surgeon. Feels she is progressing well. Edema improved today.  Asthma:  Encourage cough and deep breathe. IS q1h while awake. Albuterol prn. Shortness of breath at baseline today.   Hypothyroid: on levothyroxine.   Chronic CHF: Daily [id number]-[phone]. No shortness of breath recently. 1+ ble edema. On bumex 2mg two times a day, keep legs elevated when possible.  F/u with cardiology prn. Cardiac diet. Monitor for fluid overload. Dean short.   S/p PPM: follows with device clinic. No recent concerns.   CKD stage 3: Cr 1.39, gfr 36 on 8/1. Stable.   H/o Type 2 DM: No meds, no monitoring needed. Diet controlled. No recent concerns.   Dyslipidemia: On aspirin, atorvastatin.   HTN: SBP 130s, On lopressor, bumex.  hold parameters for lopressor. Stable.  GERD: On omeprazole. No concerns today.   Vitamin d deficiency: On vitamin d.  Hypokalemia: On kcl.  PAF, s/p AVR: Regular rate and rhythm today. On metoprolol. F/u with caridology. No concenrs.   Anemia of CKD: on iron. No recent concerns. Hg 10.9 on 7/24.   Hg 8.8 on 8/1.   Candidal intertrigo:  Nystatin.   Constipation: ordered senna daily, miralax daily prn.        Electronically signed by: Jennifer Valdes NP

## 2021-06-10 NOTE — PROGRESS NOTES
[facility]    DATE: 2020    NAME:  Thea Acosta             :  1934  MRN: [id number]  CODE STATUS:  DNR    VISIT TYPE: Review Of Multiple Medical Conditions (total shoulder arthroplasty, anemia)     FACILITY:  [facility] [[id number]]       CHIEF COMPLAIN/REASON FOR VISIT:    Chief Complaint   Patient presents with     Review Of Multiple Medical Conditions     total shoulder arthroplasty, anemia               HISTORY OF PRESENT ILLNESS: Thea Acosta is a 86 y.o. female who admitted - for acute metabolic encephalopathy. This was felt to be s/t hypothyroidism and noncompliance with levothyroxine dosing. She was readmitted - for left shoulder dislocation and underwent reverse total shoulder arthroplasty on . Postop course was uncomplicated and transferred back to [facility]. She has PMH of CAD, CKD, DM, s/p PPM, diastolic CHF, pulmonary artery hypertension, mitral valve stenosis, chronic a fib no anticoagulation, s/p AVR, asthma. Prior to this she lived at home with her grand daughter.     Today Ms. Acosta is seen for review of systems for total shoulder arthroplasty and anemia. She is seen in her room sitting in wheelchair today. She says she thinks she is doing very well. Her shoulder hurts at times and she takes the sling off when she is in bed and can rest it. However she thinks most of the time it feels better being in the sling. She feels her pain is controlled. Her bowels are moving well and even moved this morning. Her appetite remains too good and no problems with  Stomach upset. Her breathing is fine and no recent concerns. She denies feeling dizzy or lightheaded at all recently.         REVIEW OF SYSTEMS:  PROBLEMS AND REVIEW OF SYSTEMS:   Review of Systems  Today on ROS:   Currently, no fever, chills, or rigors. Decreased vision and hearing. Denies any chest pain, headaches, palpitations, lightheadedness,  "dizziness, no cough, no sob. Appetite is good.  Denies any abdominal pain. No active bleeding. Positive for urinary incontinence, leg swelling, left arm and shoulder swelling, sling in place, left shoulder incision, no nausea or vomiting, constipation improved, pain controlled      Allergies   Allergen Reactions     Tramadol Anxiety and Other (See Comments)     Per patient, Thea developed psychosis and severe anxiety and agitation \"for three days\" after receiving tramadol in the past. She stated that she would \"never\" take it again and would be extremely upset if she receives it. She asked me to add this to her chart's allergy list specifically.      Codeine Hives     Codeine Phosphate (Bulk)      This allergy is per [facility] records.     Codeine Sulfate      This allergy is per [facility] records.     Codeine-Butalbital-Asa-Caff      This allergy is per [facility] records.     Codeine-Guaifenesin      This allergy is per [facility] records.     Lisinopril Cough     Penicillins Swelling     Current Outpatient Medications   Medication Sig     acetaminophen (TYLENOL) 500 MG tablet Take 2 tablets (1,000 mg total) by mouth 3 (three) times a day. (Patient taking differently: Take 1,000 mg by mouth 3 (three) times a day. And daily prn)     albuterol (PROAIR HFA;PROVENTIL HFA;VENTOLIN HFA) 90 mcg/actuation inhaler Inhale 2 puffs every 4 (four) hours as needed for wheezing.     aspirin 81 MG EC tablet Take 1 tablet (81 mg total) by mouth 2 (two) times a day.     atorvastatin (LIPITOR) 80 MG tablet TAKE 1 TABLET(80 MG) BY MOUTH DAILY     bumetanide (BUMEX) 2 MG tablet Take 1.5 tablets (3 mg total) by mouth 2 (two) times a day at 9am and 6pm.     colchicine 0.6 mg tablet take two tablets (1.2 mg) by mouth at onset of gout symptoms, then repeat one tablet (0.6 mg) by mouth one hour later     " diclofenac sodium (VOLTAREN) 1 % Gel Apply 2 g topically 4 (four) times a day as needed.      ferrous sulfate 325 (65 FE) MG tablet TAKE 1 TABLET(325 MG) BY MOUTH TWICE DAILY (Patient taking differently: Take 1 tablet by mouth 3 (three) times a day with meals. )     levothyroxine (SYNTHROID, LEVOTHROID) 150 MCG tablet Take 1 tablet (150 mcg total) by mouth daily before supper.     nystatin (MYCOSTATIN) powder Apply topically 4 (four) times a day.     omeprazole (PRILOSEC) 20 MG capsule Take 1 capsule (20 mg total) by mouth daily before breakfast.     polyethylene glycol (MIRALAX) 17 gram packet Take 17 g by mouth daily as needed.     potassium chloride (K-DUR,KLOR-CON) 20 MEQ tablet Take 20 mEq by mouth daily.      predniSONE (DELTASONE) 2.5 MG tablet Take 7.5 mg/d prednisone PO for 3 weeks.     senna (SENOKOT) 8.6 mg tablet Take 1 tablet by mouth daily.     white petrolatum (AQUAPHOR ORIGINAL) 41 % Oint Apply 1 application topically at bedtime.     Past Medical History:    Past Medical History:   Diagnosis Date     Acute kidney injury superimposed on CKD (H) 3/3/2017     Asthma      CAD (coronary artery disease)      Cataract      Chronic kidney disease     ckd3     COPD (chronic obstructive pulmonary disease) (H)      Diabetes mellitus (H)      Disease of thyroid gland      H/O heart valve replacement with bioprosthetic valve      History of transfusion      Hypertension      Normochromic normocytic anemia      Pulmonary hypertension (H) 09/27/2019    Noted on echocardiogram     Restless leg syndrome            PHYSICAL EXAMINATION  Vitals:    08/17/20 0700   BP: 138/81   Pulse: 60   Resp: 18   Temp: 97.1  F (36.2  C)   SpO2: 93%   Weight: 210 lb (95.3 kg)       Today on physical exam:     GENERAL: Awake, Alert, obese,  not in any form of acute distress, answers questions appropriately, follows simple commands, conversant  HEENT: Head is normocephalic with normal hair distribution. No evidence of trauma. Ears: No  acute purulent discharge. Eyes: Conjunctivae pink with no scleral jaundice. Nose: Normal mucosa and septum. NECK: Supple with no cervical or supraclavicular lymphadenopathy. Trachea is midline. [place], decreased vision  CHEST: No tenderness or deformity, no crepitus, left chest PPM  LUNG: Dim but clear today to auscultation.   No shortness of breath noted today, no cough noted  BACK: No kyphosis of the thoracic spine. Symmetric, no curvature, ROM normal, no CVA tenderness, no spinal tenderness   CVS: irregularly irregular rhythm, murmur,  2+ pulses symmetric in all extremities.  ABDOMEN: Rounded and soft, nontender to palpation, non distended, no masses, no organomegaly, good bowel sounds, no rebound or guarding, no peritoneal signs.   EXTREMITIES: 1+ ble nonpitting edema, left upper extremity 1+ edema with lymphedema sleeve, left shoulder incision c/d/i, left arm sling in place  SKIN: Warm and dry  NEUROLOGICAL: The patient is oriented to person, place and time. Oriented today, no recent confusion, Forgetful at times.             LABS:   Recent Results (from the past 168 hour(s))   Basic Metabolic Panel   Result Value Ref Range    Sodium 141 136 - 145 mmol/L    Potassium 3.6 3.5 - 5.0 mmol/L    Chloride 98 98 - 107 mmol/L    CO2 35 (H) 22 - 31 mmol/L    Anion Gap, Calculation 8 5 - 18 mmol/L    Glucose 114 70 - 125 mg/dL    Calcium 9.2 8.5 - 10.5 mg/dL    BUN 37 (H) 8 - 28 mg/dL    Creatinine 1.47 (H) 0.60 - 1.10 mg/dL    GFR MDRD Af Amer 41 (L) >60 mL/min/1.73m2    GFR MDRD Non Af Amer 34 (L) >60 mL/min/1.73m2   Hemoglobin   Result Value Ref Range    Hemoglobin 8.2 (L) 12.0 - 16.0 g/dL   Thyroid Stimulating Hormone (TSH)   Result Value Ref Range    TSH 3.43 0.30 - 5.00 uIU/mL     Results for orders placed or performed in visit on 08/17/20   Basic Metabolic Panel   Result Value Ref Range    Sodium 141 136 - 145 mmol/L    Potassium 3.6 3.5 - 5.0 mmol/L    Chloride 98 98 - 107 mmol/L    CO2 35 (H) 22 - 31 mmol/L     Anion Gap, Calculation 8 5 - 18 mmol/L    Glucose 114 70 - 125 mg/dL    Calcium 9.2 8.5 - 10.5 mg/dL    BUN 37 (H) 8 - 28 mg/dL    Creatinine 1.47 (H) 0.60 - 1.10 mg/dL    GFR MDRD Af Amer 41 (L) >60 mL/min/1.73m2    GFR MDRD Non Af Amer 34 (L) >60 mL/min/1.73m2         Lab Results   Component Value Date    WBC 6.9 08/05/2020    HGB 8.2 (L) 08/17/2020    HCT 29.0 (L) 08/05/2020    MCV 98 08/05/2020     08/05/2020       Lab Results   Component Value Date    [id number] 333 07/21/2020     Lab Results   Component Value Date    HGBA1C 6.5 (H) 07/31/2020     Lab Results   Component Value Date    INR 0.99 07/28/2020    INR 1.30 (H) 02/07/2018    INR 1.51 (H) 03/06/2017     Vitamin D, Total (25-Hydroxy)   Date Value Ref Range Status   01/20/2020 45.9 30.0 - 80.0 ng/mL Final     Lab Results   Component Value Date    TSH 3.43 08/17/2020           ASSESSMENT/PLAN:    Left shoulder dislocation, s/p reverse total shoulder arthroplasty: Incision c/d/i. Pain controlled on tylenol.  Ice prn. PT, OT following. voltaren gel prn. F/u with surgeon Dr. Ramirez on 8/14-f/u again in 4 weeks, limit external rotation, no bathing until 4 weeks postop, healing well.  Wearing lymphedema sleeve on LUE. Overall improving. Remains NWB, may remove sling when resting.   Asthma:  Encourage cough and deep breathe. IS q1h while awake. Albuterol prn. Shortness of breath at baseline today.   Hypothyroid: on levothyroxine.   Chronic CHF: Daily [id number]. No shortness of breath recently. 1+ ble edema. On bumex 2mg two times a day, keep legs elevated when possible.  F/u with cardiology prn. Cardiac diet. Monitor for fluid overload. Dean short. Appears stable today.   S/p PPM: follows with device clinic. No recent concerns.   CKD stage 3: Cr 1.39, gfr 36 on 8/1. Stable.   H/o Type 2 DM: No meds, no monitoring needed. Diet controlled. No recent concerns.   Dyslipidemia: On aspirin, atorvastatin.   HTN: SBP 130s, On lopressor, bumex.  hold  parameters for lopressor. Stable.  GERD: On omeprazole. No concerns today.   Vitamin d deficiency: On vitamin d.  Hypokalemia: On kcl.  PAF, s/p AVR: Regular rate and rhythm today. On metoprolol. F/u with caridology. No concenrs.   Anemia of CKD: on iron. Hg 10.9 on 7/24.   Hg 8.8 on 8/1. Hg 9 on 8/5. Recheck on 8/17 8.2, will increase iron to three times a day. Discussed with her and no signs of blood loss. No symptoms with Anemia. Staff will continue to monitor for signs of blood loss and symptoms, will notify  With  Any changes. Will order recheck on 8/24.   Candidal intertrigo:  Nystatin.   Constipation: ordered senna daily, miralax daily prn.        Electronically signed by: Jennifer Valdes NP

## 2021-06-10 NOTE — PROGRESS NOTES
[facility]    DATE: 8/3/2020    NAME:  Thea Acosta             :  1934  MRN: [id number]  CODE STATUS:  DNR    VISIT TYPE: Problem Visit (Left shoulder dislocation)     FACILITY:  [facility] [[id number]]       CHIEF COMPLAIN/REASON FOR VISIT:    Chief Complaint   Patient presents with     Problem Visit     Left shoulder dislocation               HISTORY OF PRESENT ILLNESS: Thea Acosta is a 86 y.o. female who admitted - for acute metabolic encephalopathy. This was felt to be s/t hypothyroidism and noncompliance with levothyroxine dosing. She was readmitted - for left shoulder dislocation and underwent reverse total shoulder arthroplasty on . Postop course was uncomplicated and transferred back to [facility]. She has PMH of CAD, CKD, DM, s/p PPM, diastolic CHF, pulmonary artery hypertension, mitral valve stenosis, chronic a fib no anticoagulation, s/p AVR, asthma. Prior to this she lived at home with her grand daughter.     Today Ms. Acosta is seen for left shoulder dislocation.  She reported 5 out of 10 aching intermittent pain in her left shoulder that is not relieved with Tylenol at this time.  She is agreeable to plan to try diclofenac gel for further pain management.  She noted that she has been having visual hallucinations overnight that she describes as intense dreams that do not bother her at this time.  She was agreeable to plan to further evaluate her hallucinations with laboratory monitoring tomorrow.  She denied any other concerns at this time.  The patient denied fever, chills, diaphoresis, changes in mood or appetite, sleep disturbances, nasal congestion, sore throat, lightheadedness, dizziness, breathing difficulty, chest pain, palpitations, constipation, urinary symptoms, and numbness or tingling in extremities. Nursing staff denied any new concerns for the patient at this time.      REVIEW OF SYSTEMS:  PROBLEMS  "AND REVIEW OF SYSTEMS:   Review of Systems  Today on ROS:   Currently, no fever, chills, or rigors. Decreased vision and hearing. Denies any chest pain, headaches, palpitations, lightheadedness, dizziness, no cough, no sob. Appetite is good.  Denies any abdominal pain. No active bleeding. Positive for urinary incontinence, leg swelling, left arm and shoulder swelling, sling in place, left shoulder incision, no nausea or vomiting, no constipation, pain controlled      Allergies   Allergen Reactions     Tramadol Anxiety and Other (See Comments)     Per patient, Thea developed psychosis and severe anxiety and agitation \"for three days\" after receiving tramadol in the past. She stated that she would \"never\" take it again and would be extremely upset if she receives it. She asked me to add this to her chart's allergy list specifically.      Codeine Hives     Codeine Phosphate (Bulk)      This allergy is per [facility] records.     Codeine Sulfate      This allergy is per [facility] records.     Codeine-Butalbital-Asa-Caff      This allergy is per [facility] records.     Codeine-Guaifenesin      This allergy is per [facility] records.     Lisinopril Cough     Penicillins Swelling     Current Outpatient Medications   Medication Sig     acetaminophen (TYLENOL) 500 MG tablet Take 2 tablets (1,000 mg total) by mouth 3 (three) times a day. (Patient taking differently: Take 1,000 mg by mouth 3 (three) times a day. And daily prn)     albuterol (PROAIR HFA;PROVENTIL HFA;VENTOLIN HFA) 90 mcg/actuation inhaler Inhale 2 puffs every 4 (four) hours as needed for wheezing.     aspirin 81 MG EC tablet Take 1 tablet (81 mg total) by mouth 2 (two) times a day.     atorvastatin (LIPITOR) 80 MG tablet TAKE 1 TABLET(80 MG) BY MOUTH DAILY     bumetanide (BUMEX) 2 MG tablet Take 1.5 tablets (3 mg total) by " mouth 2 (two) times a day at 9am and 6pm.     colchicine 0.6 mg tablet take two tablets (1.2 mg) by mouth at onset of gout symptoms, then repeat one tablet (0.6 mg) by mouth one hour later     diclofenac sodium (VOLTAREN) 1 % Gel Apply 2 g topically 4 (four) times a day as needed.      ferrous sulfate 325 (65 FE) MG tablet TAKE 1 TABLET(325 MG) BY MOUTH TWICE DAILY (Patient taking differently: Take 1 tablet by mouth 3 (three) times a day with meals. )     levothyroxine (SYNTHROID, LEVOTHROID) 150 MCG tablet Take 1 tablet (150 mcg total) by mouth daily before supper.     nystatin (MYCOSTATIN) powder Apply topically 4 (four) times a day.     omeprazole (PRILOSEC) 20 MG capsule Take 1 capsule (20 mg total) by mouth daily before breakfast.     polyethylene glycol (MIRALAX) 17 gram packet Take 17 g by mouth daily as needed.     potassium chloride (K-DUR,KLOR-CON) 20 MEQ tablet Take 20 mEq by mouth daily.      predniSONE (DELTASONE) 2.5 MG tablet Take 7.5 mg/d prednisone PO for 3 weeks.     senna (SENOKOT) 8.6 mg tablet Take 1 tablet by mouth daily.     white petrolatum (AQUAPHOR ORIGINAL) 41 % Oint Apply 1 application topically at bedtime.     Past Medical History:    Past Medical History:   Diagnosis Date     Acute kidney injury superimposed on CKD (H) 3/3/2017     Asthma      CAD (coronary artery disease)      Cataract      Chronic kidney disease     ckd3     COPD (chronic obstructive pulmonary disease) (H)      Diabetes mellitus (H)      Disease of thyroid gland      H/O heart valve replacement with bioprosthetic valve      History of transfusion      Hypertension      Normochromic normocytic anemia      Pulmonary hypertension (H) 09/27/2019    Noted on echocardiogram     Restless leg syndrome            PHYSICAL EXAMINATION  Vitals:    07/17/20 0241   Resp: 16       Today on physical exam:     GENERAL: Awake, Alert, obese,  not in any form of acute distress, answers questions appropriately, follows simple commands,  conversant  HEENT: Head is normocephalic with normal hair distribution. No evidence of trauma. Ears: No acute purulent discharge. Eyes: Conjunctivae pink with no scleral jaundice. Nose: Normal mucosa and septum. NECK: Supple with no cervical or supraclavicular lymphadenopathy. Trachea is midline. [place], decreased vision  CHEST: No tenderness or deformity, no crepitus, left chest PPM  LUNG: Lung sounds clear in all fields   no shortness of breath noted today, no cough noted  BACK: No kyphosis of the thoracic spine. Symmetric, no curvature, ROM normal, no CVA tenderness, no spinal tenderness   CVS: irregularly irregular rhythm, murmur,  2+ pulses symmetric in all extremities.  ABDOMEN: Rounded and soft, nontender to palpation, non distended, no masses, no organomegaly, good bowel sounds, no rebound or guarding, no peritoneal signs.   EXTREMITIES: 1+ ble nonpitting edema, left upper extremity 2+ edema, left shoulder incision c/d/i, left arm sling in place  SKIN: Warm and dry  NEUROLOGICAL: The patient is oriented to person, place and time. Confused at times, oriented on exam. Forgetful at times.  Positive for visual hallucinations.            LABS:   Recent Results (from the past 168 hour(s))   Basic Metabolic Panel   Result Value Ref Range    Sodium 141 136 - 145 mmol/L    Potassium 3.6 3.5 - 5.0 mmol/L    Chloride 98 98 - 107 mmol/L    CO2 35 (H) 22 - 31 mmol/L    Anion Gap, Calculation 8 5 - 18 mmol/L    Glucose 114 70 - 125 mg/dL    Calcium 9.2 8.5 - 10.5 mg/dL    BUN 37 (H) 8 - 28 mg/dL    Creatinine 1.47 (H) 0.60 - 1.10 mg/dL    GFR MDRD Af Amer 41 (L) >60 mL/min/1.73m2    GFR MDRD Non Af Amer 34 (L) >60 mL/min/1.73m2   Hemoglobin   Result Value Ref Range    Hemoglobin 8.2 (L) 12.0 - 16.0 g/dL   Thyroid Stimulating Hormone (TSH)   Result Value Ref Range    TSH 3.43 0.30 - 5.00 uIU/mL     Results for orders placed or performed during the hospital encounter of 07/12/20   Basic Metabolic Panel   Result Value Ref Range     Sodium 138 136 - 145 mmol/L    Potassium 3.6 3.5 - 5.0 mmol/L    Chloride 90 (L) 98 - 107 mmol/L    CO2 41 (HH) 22 - 31 mmol/L    Anion Gap, Calculation 7 5 - 18 mmol/L    Glucose 113 70 - 125 mg/dL    Calcium 9.1 8.5 - 10.5 mg/dL    BUN 38 (H) 8 - 28 mg/dL    Creatinine 1.41 (H) 0.60 - 1.10 mg/dL    GFR MDRD Af Amer 43 (L) >60 mL/min/1.73m2    GFR MDRD Non Af Amer 35 (L) >60 mL/min/1.73m2         Lab Results   Component Value Date    WBC 6.9 08/05/2020    HGB 8.2 (L) 08/17/2020    HCT 29.0 (L) 08/05/2020    MCV 98 08/05/2020     08/05/2020       Lab Results   Component Value Date    [id number] 333 07/21/2020     Lab Results   Component Value Date    HGBA1C 6.5 (H) 07/31/2020     Lab Results   Component Value Date    INR 0.99 07/28/2020    INR 1.30 (H) 02/07/2018    INR 1.51 (H) 03/06/2017     Vitamin D, Total (25-Hydroxy)   Date Value Ref Range Status   01/20/2020 45.9 30.0 - 80.0 ng/mL Final     Lab Results   Component Value Date    TSH 3.43 08/17/2020           ASSESSMENT/PLAN:    Start diclofenac 1% gel to left shoulder 3 times daily for osteoarthritis pain.  Otherwise continue current care plan for all other chronic medical conditions, as they are stable. Encouraged patient to engage in healthy lifestyle behaviors such as engaging in social activities, exercising (PT/OT), eating well, and following care plan. Follow up for routine check-up, or sooner if needed. Will continue to monitor patient and work with nursing staff collaboratively to work toward positive patient outcomes.        Electronically signed by: Shawna Crespo CNP

## 2021-06-10 NOTE — ANESTHESIA POSTPROCEDURE EVALUATION
Patient: Thea Acosta  Procedure(s):  LEFT REVERSE TOTAL SHOULDER ARTHROPLASTY (Left)  Anesthesia type: general    Patient location: PACU  Last vitals:   Vitals Value Taken Time   /61 7/31/2020 12:30 PM   Temp 36.2  C (97.1  F) 7/31/2020 12:19 PM   Pulse 60 7/31/2020 12:38 PM   Resp 11 7/31/2020 12:38 PM   SpO2 98 % 7/31/2020 12:38 PM   Vitals shown include unvalidated device data.  Post vital signs: stable  Level of consciousness: awake and responds to simple questions  Post-anesthesia pain: pain controlled  Post-anesthesia nausea and vomiting: no  Pulmonary: unassisted, return to baseline  Cardiovascular: stable and blood pressure at baseline  Hydration: adequate  Anesthetic events: no    QCDR Measures:  ASA# 11 - Connie-op Cardiac Arrest: ASA11B - Patient did NOT experience unanticipated cardiac arrest  ASA# 12 - Connie-op Mortality Rate: ASA12B - Patient did NOT die  ASA# 13 - PACU Re-Intubation Rate: ASA13B - Patient did NOT require a new airway mgmt  ASA# 10 - Composite Anes Safety: ASA10A - No serious adverse event    Additional Notes:

## 2021-06-10 NOTE — PROGRESS NOTES
[facility]    DATE: 2017  NAME: Thea Acosta  : 1934           MR# [id number]     CODE STATUS:  FULL CODE  VISIT TYPE: Admission  (Transfer to LTC)  FACILITY: [facility] [[id number]]    ROOM: 324  PRIMARY CARE PROVIDER: Gamal Hdz MD Phone: [phone] Fax:[phone]    History of Present Illness:   Thea Acosta is a 83 y.o. female with 3/1/17 open reduction internal fixation of right hip fracture. Her course was very much prolonged by COPD and recurrent bouts of respiratory distress. She is significantly more stabilized with chronic bronchodilators and now steroids on a regular basis. She reports her leg is doing fine it doesn't even hurt anymore. Unfortunately her body habitus makes it unlikely she will ever be able to leave the wheelchair for a significant period of time. Her diuretic has been reinstituted  And ultimately long-term follow-up for electrolytes weights and respiratory status will be necessary in long-term care. She is convinced that if she could make it home she would get up and walk on her own   and would do just fine. Nursing service and therapy do not agree she will be discharged eventually with 2 L of oxygen by nasal cannula  And significant in-home help. As mentioned I resume the Lasix 20 mg in the morning and 40 mg at noon resumed her prednisone 5 mg a day and have asked for follow-up electrolytes as I've seen some elevation in BUN and creatinine associated with diuretics in the past.    Past Medical History:  Past Medical History:   Diagnosis Date     Acute kidney injury superimposed on CKD 3/3/2017     Asthma      CAD (coronary artery disease)      Cataract      Diabetes mellitus      Disease of thyroid gland      H/O heart valve replacement with bioprosthetic valve      History of transfusion      Hypertension        Past Surgical History:  Past Surgical History:   Procedure Laterality Date     APPENDECTOMY        "CHOLECYSTECTOMY       EYE SURGERY Bilateral     Cataracts     HIP PINNING Right 3/1/2017    Procedure: RIGHT HIP TROCHANTERIC RODDING;  Surgeon: Moshe Davies MD;  Location: [facility] OR;  Service:      JOINT REPLACEMENT Left     knee     TISSUE AORTIC VALVE REPLACEMENT  06/25/2007            Allergies:  Allergies   Allergen Reactions     Codeine      Lisinopril Cough     Penicillins Swelling       Social History:  Social History     Social History     Marital status:      Spouse name: N/A     Number of children: N/A     Years of education: N/A     Occupational History      in operating room      retired     Social History Main Topics     Smoking status: Former Smoker     Smokeless tobacco: Never Used     Alcohol use No     Drug use: No     Sexual activity: No     Other Topics Concern     Not on file     Social History Narrative    She live in a single floor house.  Her son lives next door and her granddaughter is \"around a lot\"   3/2017       Family History:  Family History   Problem Relation Age of Onset     No Medical Problems Mother      age 86     No Medical Problems Father      MVA     Cancer Brother      lung     No Medical Problems Brother        Current Medications:  Current Outpatient Prescriptions   Medication Sig     acetaminophen (TYLENOL) 500 MG tablet Take 500 mg by mouth every 6 (six) hours as needed for pain.     aspirin 81 MG EC tablet Take 81 mg by mouth daily.     atorvastatin (LIPITOR) 80 MG tablet Take 80 mg by mouth bedtime.     cholecalciferol, vitamin D3, (VITAMIN D3) 2,000 unit Tab Take 2,000 Units by mouth once daily.     fluticasone-salmeterol (ADVAIR) 500-50 mcg/dose DISKUS Inhale 1 puff 2 (two) times a day.     folic acid (FOLVITE) 1 MG tablet Take 1 mg by mouth daily.     ipratropium-albuterol (COMBIVENT RESPIMAT)  mcg/actuation Aero inhaler Inhale 1 puff 4 (four) times a day.      ipratropium-albuterol (DUO-NEB) 0.5-2.5 mg/3 mL nebulizer 3 mL 4 (four) " times a day.     levothyroxine (SYNTHROID, LEVOTHROID) 125 MCG tablet Take 125 mcg by mouth daily.     loperamide (IMODIUM A-D) 2 mg tablet Take 2 mg by mouth 4 (four) times a day as needed for diarrhea.     losartan (COZAAR) 100 MG tablet Take 100 mg by mouth daily.     melatonin 3 mg Tab tablet Take 3 mg by mouth bedtime as needed.     metOLazone (ZAROXOLYN) 2.5 MG tablet Take 2.5 mg by mouth daily.     metoprolol tartrate (LOPRESSOR) 100 MG tablet Take 100 mg by mouth 2 (two) times a day.      nystatin (MYCOSTATIN) powder Apply topically 2 (two) times a day.     omeprazole (PRILOSEC) 20 MG capsule Take 20 mg by mouth Daily before breakfast.     senna-docusate (PERICOLACE) 8.6-50 mg tablet Take 1 tablet by mouth daily.     WARFARIN SODIUM (WARFARIN ORAL) Take 3 mg by mouth. hold     furosemide (LASIX) 40 MG tablet Take 40 mg by mouth daily.       Review of Systems:  History obtained from chart review, the patient and With some limitations by the patients insistent that she'll be fine since she's been finer whole lifetime  General ROS: positive for  - fatigue  negative for - chills or fever  Psychological ROS: negative for - disorientation, hallucinations or hostility  Ophthalmic ROS: negative for - blurry vision, decreased vision, double vision or loss of vision  ENT ROS: negative for - nasal congestion, nasal discharge or sore throat  Breast ROS: negative for breast lumps  Respiratory ROS: She persistently   denies wheezing coughing And shortness of breath but again nursing service does not support those estimations  Cardiovascular ROS: no chest pain or dyspnea on exertion  Gastrointestinal ROS: no abdominal pain, change in bowel habits, or black or bloody stools  Genito-Urinary ROS: no dysuria, trouble voiding, or hematuria  Musculoskeletal ROS: She says her operative hip on the right is painless and works just fine although she notes that she has symmetrical swelling of the lower extremities secondary to some  CHF kidney failure and peripheral vascular insufficiency  Neurological ROS: no TIA or stroke symptoms  Dermatological ROS: still some tenia corporis and gluteal crease       Physical Examination:  /52  Pulse 68  Temp 97.3  F (36.3  C)  Resp 19  Wt (!) 266 lb (120.7 kg)  SpO2 91%  BMI 41.66 kg/m2  General appearance: alert, appears stated age, cooperative, fatigued, flushed, pale and slowed mentation  Head: Normocephalic, without obvious abnormality, atraumatic  Eyes: conjunctivae/corneas clear. PERRL, EOM's intact. Fundi benign.  Nose: Nares normal. Septum midline. Mucosa normal. No drainage or sinus tenderness.  Throat: lips, mucosa, and tongue normal; teeth and gums normal  Neck: no adenopathy, no carotid bruit, no JVD, supple, symmetrical, trachea midline and thyroid not enlarged, symmetric, no tenderness/mass/nodules  Back: symmetric, no curvature. ROM normal. No CVA tenderness.  Lungs: she wheezes on the left side  intermittently although she persistently denies any wheezing  Breasts: normal appearance, no masses or tenderness  Heart: regular rate and rhythm, S1, S2 normal, no murmur, click, rub or gallop  Abdomen: soft, non-tender; bowel sounds normal; no masses,  no organomegaly  Extremities: 2+ edema both lower extremities but nontender and symmetrical  Pulses: 2+ and symmetric  Skin: Skin color, texture, turgor normal. No rashes or lesions  Neurologic: Mental status: she is fixated on returning home with the inappropriate concept that once she's home should really get off the oxygen and move around better we remain concerned about family feels she's probably right  Motor:symmetrical strength although barely able to rise from a chair or move in the room       Impression:  Thea Acosta is a 83 y.o. female with Recent open reduction internal fixation of a hip fracture but underlying comorbid COPD as well as chronic kidney disease stage III    1. Closed intertrochanteric fracture of hip, right,  with routine healing, subsequent encounter     2. S/P ORIF (open reduction internal fixation) fracture     3. Morbid obesity due to excess calories     4. CKD (chronic kidney disease), stage III     5. Essential hypertension with goal blood pressure less than 140/90     6. Paroxysmal atrial fibrillation     7. History of aortic stenosis     8. H/O heart valve replacement with bioprosthetic valve     9. Complete heart block     10. Cardiac pacemaker in situ     11. Vitamin D deficiency     12. Osteoarthritis Of The Knee     13. Atherosclerosis of coronary artery of native heart without angina pectoris, unspecified vessel or lesion type     14. Tinea corporis           Plan: As mentioned above I've modified her medication regimen and I believe transferred to long-term care is appropriate. I don't believe we have much more to contribute for rehabilitation for her hip and hope that in long-term care we can continue to keep her lung function stabilized.    Face-to-face encounter with Thea Acosta date of birth 4/12/1934 for a bariatric lightweight wheelchair for lifetime 99 use. Diagnosis - and noa of gate  R 26.89, COPD J 44.1, and lack of coordination R27.8. Please see a pendulum for qualifications for the bariatric lightweight wheelchair .    Face to face for oxygen supplementation.  COPD steroid and oxygen dependent.  Oxymetry 87% on the 15th of April off Oxygen.  Minimally ambulatory so should have portable as well as fixed supply of oxygen.  Permanent (99) use expected.I recommend 1-2 liters constantly per nasal canula    Electronically signed by: Gregg Panchal Sr., MD

## 2021-06-11 NOTE — PROGRESS NOTES
I met with Thea today in the clinic by the request of Dr. Hdz to discuss patient enrolling in Clinic Care Coordination/Health Care Home to find help in the home with ADL's. I met with this patient and we scheduled a phone PCAM visit with Meseret OROURKE RN on 7/19/17 @ 2:30 and scheduled the .[facility] goal setting visit for 7/25/17 @ 2:00.

## 2021-06-11 NOTE — PROGRESS NOTES
[facility]    DATE: 2020    NAME:  Thea Acosta             :  1934  MRN: [id number]  CODE STATUS:  DNR    VISIT TYPE: Problem Visit (dysuria)     FACILITY:  [facility] [[id number]]       CHIEF COMPLAIN/REASON FOR VISIT:    Chief Complaint   Patient presents with     Problem Visit     dysuria               HISTORY OF PRESENT ILLNESS: Thea Acosta is a 86 y.o. female who admitted - for acute metabolic encephalopathy. This was felt to be s/t hypothyroidism and noncompliance with levothyroxine dosing. She was readmitted - for left shoulder dislocation and underwent reverse total shoulder arthroplasty on . Postop course was uncomplicated and transferred back to [facility]. She has PMH of CAD, CKD, DM, s/p PPM, diastolic CHF, pulmonary artery hypertension, mitral valve stenosis, chronic a fib no anticoagulation, s/p AVR, asthma. Prior to this she lived at home with her grand daughter.     Today Ms. Acosta is seen for concerns of dysuria today. Staff report she has been having increased urinary frequency, hesitancy, urgency and dysuria. They were requesting UA on her today. She is seen in her room today sitting in recliner. Her legs are dependent position and noted to be more red and warm above her lymphedema wraps today. Her leg swelling is improved a little more today. She does not feel short of breath right now and thinks this has been improving. She is not noted to be wearing any oxygen. She says her bowels are moving fine. She is having frequency and just feels like she has to go to the bathroom all the time. She denies pain or burning with urination but says she does have some accidents because she is needing to go so urgently. She denies any fevers or chills. We did discuss she is on several diuretics right now that would make her go more frequently  And urgently so this may be the cause of her urinary concerns. However  "we will check a UA/UC today due to having multiple new or worsening symptoms. We also discussed her red and warm legs today but she denies pain in them and they were not tender to touch. There are no noted blisters or open areas. She says they ache when she walks but this is not new. She denies feeling like her legs are warm or on fire. We discussed to monitor her legs closely for further signs of cellulitis due to her recent CHF exacerbation and lymphedema worsening. Discussed with nursing as well to monitor her legs closely and update me if they become more warm, red, swollen, develop open areas, or become more painful.       REVIEW OF SYSTEMS:  PROBLEMS AND REVIEW OF SYSTEMS:   Review of Systems  Today on ROS:   Currently, no fever, chills, or rigors. Decreased vision and hearing. Denies any chest pain, headaches, palpitations, lightheadedness, dizziness, no cough. Appetite is good.  Denies any abdominal pain. No active bleeding. Positive for worsening urinary incontinence, urinary frequency, hesitancy, urgency, leg swelling, left arm swelling improved today, left shoulder incision, no nausea or vomiting, no constipation, pain controlled, lymphedema wraps, shortness of breath improved      Allergies   Allergen Reactions     Tramadol Anxiety and Other (See Comments)     Per patient, Thea developed psychosis and severe anxiety and agitation \"for three days\" after receiving tramadol in the past. She stated that she would \"never\" take it again and would be extremely upset if she receives it. She asked me to add this to her chart's allergy list specifically.      Codeine Hives     Codeine Phosphate (Bulk)      This allergy is per [facility] records.     Codeine Sulfate      This allergy is per [facility] records.     Codeine-Butalbital-Asa-Caff      This allergy is per [facility] records.     Codeine-Guaifenesin      " This allergy is per [facility] records.     Lisinopril Cough     Penicillins Swelling     Current Outpatient Medications   Medication Sig     acetaminophen (TYLENOL) 500 MG tablet Take 2 tablets (1,000 mg total) by mouth 3 (three) times a day. (Patient taking differently: Take 1,000 mg by mouth 3 (three) times a day. And daily prn)     albuterol (PROAIR HFA;PROVENTIL HFA;VENTOLIN HFA) 90 mcg/actuation inhaler Inhale 2 puffs every 4 (four) hours as needed for wheezing.     aspirin 81 MG EC tablet Take 1 tablet (81 mg total) by mouth 2 (two) times a day.     atorvastatin (LIPITOR) 80 MG tablet TAKE 1 TABLET(80 MG) BY MOUTH DAILY     bumetanide (BUMEX) 2 MG tablet Take 1.5 tablets (3 mg total) by mouth 2 (two) times a day at 9am and 6pm. (Patient taking differently: Take 4 mg by mouth 2 (two) times a day at 9am and 6pm. )     colchicine 0.6 mg tablet take two tablets (1.2 mg) by mouth at onset of gout symptoms, then repeat one tablet (0.6 mg) by mouth one hour later     diclofenac sodium (VOLTAREN) 1 % Gel Apply 2 g topically 4 (four) times a day as needed.      ferrous sulfate 325 (65 FE) MG tablet Take 1 tablet by mouth daily.     levothyroxine (SYNTHROID, LEVOTHROID) 150 MCG tablet Take 1 tablet (150 mcg total) by mouth daily before supper.     nystatin (MYCOSTATIN) powder Apply topically 4 (four) times a day.     omeprazole (PRILOSEC) 20 MG capsule Take 1 capsule (20 mg total) by mouth daily before breakfast.     polyethylene glycol (MIRALAX) 17 gram packet Take 17 g by mouth daily as needed.     potassium chloride (K-DUR,KLOR-CON) 20 MEQ tablet Take by mouth. (40meq Q AM and 20meq Q PM)     predniSONE (DELTASONE) 2.5 MG tablet Take 7.5 mg/d prednisone PO for 3 weeks.     senna (SENOKOT) 8.6 mg tablet Take 1 tablet by mouth daily.     white petrolatum (AQUAPHOR ORIGINAL) 41 % Oint Apply 1 application topically at bedtime.     Past Medical History:    Past Medical History:    Diagnosis Date     Acute kidney injury superimposed on CKD (H) 3/3/2017     Asthma      CAD (coronary artery disease)      Cataract      Chronic kidney disease     ckd3     COPD (chronic obstructive pulmonary disease) (H)      Diabetes mellitus (H)      Disease of thyroid gland      H/O heart valve replacement with bioprosthetic valve      History of transfusion      Hypertension      Normochromic normocytic anemia      Pulmonary hypertension (H) 09/27/2019    Noted on echocardiogram     Restless leg syndrome            PHYSICAL EXAMINATION  Vitals:    09/09/20 0700   BP: 106/57   Pulse: (!) 59   Resp: 18   Temp: 98  F (36.7  C)   SpO2: 95%   Weight: (!) 230 lb (104.3 kg)       Today on physical exam:     GENERAL: lethargic, obese,  not in any form of acute distress, answers questions appropriately, follows simple commands, conversant  HEENT: Head is normocephalic with normal hair distribution. No evidence of trauma. Ears: No acute purulent discharge. Eyes: Conjunctivae pink with no scleral jaundice. Nose: Normal mucosa and septum. NECK: Supple with no cervical or supraclavicular lymphadenopathy. Trachea is midline. [place], decreased vision  CHEST: No tenderness or deformity, no crepitus, left chest PPM  LUNG: Dim but clear today to auscultation.   Shortness of breath with exertion, no cough noted  BACK: No kyphosis of the thoracic spine. Symmetric, no curvature, ROM normal, no CVA tenderness, no spinal tenderness   CVS: irregularly irregular rhythm, murmur,  2+ pulses symmetric in all extremities.  ABDOMEN: Rounded and soft, nontender to palpation, non distended, no masses, no organomegaly, good bowel sounds, no rebound or guarding, no peritoneal signs.   EXTREMITIES: 2+ ble pitting edema, lymphedema wrapping, left upper extremity 1+ nonpitting edema no sleeve in place today, left shoulder incision c/d/i, no sling, ble erythema, some warmth noted today, nontender to palpation  SKIN: Warm and dry  NEUROLOGICAL: The  patient is oriented to person, place and time. Oriented today but a little lethargic, no recent confusion, Forgetful at times.             LABS:   Recent Results (from the past 168 hour(s))   Basic Metabolic Panel   Result Value Ref Range    Sodium 141 136 - 145 mmol/L    Potassium 3.4 (L) 3.5 - 5.0 mmol/L    Chloride 92 (L) 98 - 107 mmol/L    CO2 37 (H) 22 - 31 mmol/L    Anion Gap, Calculation 12 5 - 18 mmol/L    Glucose 98 70 - 125 mg/dL    Calcium 10.5 8.5 - 10.5 mg/dL    BUN 42 (H) 8 - 28 mg/dL    Creatinine 1.90 (H) 0.60 - 1.10 mg/dL    GFR MDRD Af Amer 30 (L) >60 mL/min/1.73m2    GFR MDRD Non Af Amer 25 (L) >60 mL/min/1.73m2   Hemoglobin   Result Value Ref Range    Hemoglobin 9.7 (L) 12.0 - 16.0 g/dL   Basic Metabolic Panel   Result Value Ref Range    Sodium 141 136 - 145 mmol/L    Potassium 3.3 (L) 3.5 - 5.0 mmol/L    Chloride 85 (L) 98 - 107 mmol/L    CO2 42 (HH) 22 - 31 mmol/L    Anion Gap, Calculation 14 5 - 18 mmol/L    Glucose 85 70 - 125 mg/dL    Calcium 10.3 8.5 - 10.5 mg/dL    BUN 45 (H) 8 - 28 mg/dL    Creatinine 1.76 (H) 0.60 - 1.10 mg/dL    GFR MDRD Af Amer 33 (L) >60 mL/min/1.73m2    GFR MDRD Non Af Amer 27 (L) >60 mL/min/1.73m2   Hemoglobin   Result Value Ref Range    Hemoglobin 10.2 (L) 12.0 - 16.0 g/dL     Results for orders placed or performed in visit on 09/09/20   Basic Metabolic Panel   Result Value Ref Range    Sodium 141 136 - 145 mmol/L    Potassium 3.3 (L) 3.5 - 5.0 mmol/L    Chloride 85 (L) 98 - 107 mmol/L    CO2 42 (HH) 22 - 31 mmol/L    Anion Gap, Calculation 14 5 - 18 mmol/L    Glucose 85 70 - 125 mg/dL    Calcium 10.3 8.5 - 10.5 mg/dL    BUN 45 (H) 8 - 28 mg/dL    Creatinine 1.76 (H) 0.60 - 1.10 mg/dL    GFR MDRD Af Amer 33 (L) >60 mL/min/1.73m2    GFR MDRD Non Af Amer 27 (L) >60 mL/min/1.73m2         Lab Results   Component Value Date    WBC 6.9 08/05/2020    HGB 10.2 (L) 09/09/2020    HCT 29.0 (L) 08/05/2020    MCV 98 08/05/2020     08/05/2020       Lab Results   Component  Value Date    [id number] 333 07/21/2020     Lab Results   Component Value Date    HGBA1C 6.5 (H) 07/31/2020     Lab Results   Component Value Date    INR 0.99 07/28/2020    INR 1.30 (H) 02/07/2018    INR 1.51 (H) 03/06/2017     Vitamin D, Total (25-Hydroxy)   Date Value Ref Range Status   01/20/2020 45.9 30.0 - 80.0 ng/mL Final     Lab Results   Component Value Date    TSH 3.43 08/17/2020           ASSESSMENT/PLAN:    Dysuria, urinary incontinence: denied burning or pain today but staff reported she complained overnight. Having increased frequency, hesitancy, urgency. Afebrile. Will order UA/UC but may be related to increased diuretics for CHF exacerbation.   Left shoulder dislocation, s/p reverse total shoulder arthroplasty: Incision c/d/i. Pain controlled on tylenol.  Ice prn. PT, OT following. voltaren gel prn. F/u with surgeon Dr. Ramirez on 8/14-f/u again in 4 weeks, limit external rotation, no bathing until 4 weeks postop, healing well, f/u again on 9/4-doing well, dc sling, AROM and PROM with therapy, may  Weight bear with walker, f/u again on 10/5.  lymphedema sleeve to LUE. Edema improved today. No concerns today.   Chronic CHF: Daily [phone] previously  Now running [phone]. Shortness of breath improved, edema improving.  On bumex and metolazone, encourage to elevate legs as well. Encouraged to sleep in recliner instead of wheelchair. Continue lymphedema wrapping. Cardiac diet.   continue metolazone until 9/9, bmp on 9/9. Appears improving so will dc metolazone after today. Clinically much improved. Some erythema and warmth on legs today, discussed with nursing to monitor closely  For further signs of cellulitis.   S/p PPM: follows with device clinic. No recent concerns.   Asthma:  Encourage cough and deep breathe. IS q1h while awake. Albuterol prn. Shortness of breath with exertion. Stable.   Hypothyroid: on levothyroxine.   CKD stage 3: Cr 1.39, gfr 36 on 8/1.  9/4 cr 1.9,  gfr 25, bmp today.   H/o Type 2 DM: No meds, no monitoring needed. Diet controlled. No recent concerns.   Dyslipidemia: On aspirin, atorvastatin.   HTN: SBP 130s, On lopressor, bumex.  hold parameters for lopressor. Stable.  GERD: On omeprazole. No concerns today.   Vitamin d deficiency: On vitamin d.  Hypokalemia: On kcl. Increased due to increase bumex and metolazone burst.   PAF, s/p AVR: Regular rate and rhythm today. On metoprolol. F/u with caridology. No concerns.   Anemia of CKD: on iron. Hg 10.9 on 7/24.   Hg 8.8 on 8/1. Hg 9 on 8/5. Recheck on 8/17 8.2, iron to three times a day. recheck on 8/24-hg 8.4. hg 9.7 on 9/4, repeat today.   Candidal intertrigo:  Nystatin.   Constipation: senna daily, miralax daily prn.     PT: trsf min-max A, walking 25ft with trang walker with Min A, OT: LE & hand lymph, mod A UB drsg, max A toileting. Lives with granddaughter. Yellow tag recommend 2 weeks    Electronically signed by: Jennifer Valdes NP

## 2021-06-11 NOTE — PROGRESS NOTES
Assessment:    1. Type 2 diabetes mellitus with complication, without long-term current use of insulin  CANCELED: Glycosylated Hemoglobin A1c   2. Hyperparathyroidism     3. Essential hypertension with goal blood pressure less than 140/90     4. Moderate persistent asthma without complication     5. Vitamin D deficiency  Vitamin D, Total (25-Hydroxy)   6. Paroxysmal atrial fibrillation     7. H/O heart valve replacement with bioprosthetic valve     8. Normochromic normocytic anemia  HM2(CBC w/o Differential)   9. Chronic kidney disease (CKD), stage 4 (severe)  Basic Metabolic Panel    Parathyroid Hormone Intact   10. Intertrigo  nystatin (MYCOSTATIN) powder   11. Edema  BNP(B-type Natriuretic Peptide)   12. Hypothyroidism  Thyroid Stimulating Hormone (TSH)   13. Type 2 diabetes mellitus  Glycosylated Hemoglobin A1c         Plan:    40 minutes total time with patient, > 50% with counseling and coordination of cares.  Reviewed multiple concerns following recent right hip fracture requiring trochanteric rodding procedure March 1, 2017 with subsequent TCU ×2 months.  We will continue to follow with Dr. Davies with [facility] orthopedics otherwise home exercises to continue.  Significant peripheral edema noted and will increase furosemide from 440 mg a.m. and 20 mg at noon up to 80 mg twice daily with reassessment in 1 week.  Check basic metabolic panel today to ensure stable electrolytes.  Repeat vitamin D with history of vitamin D deficiency.  CBC regarding normochromic normocytic anemia.  Refill on nystatin powder for intertrigo findings.  Repeat TSH with prior elevation around 9 last fall at prior office visit September 8, 2016.  Will notify with results of glycosylated hemoglobin with history of diet-controlled type 2 diabetes.        Subjective:    Thea Acosta is seen today for hospital follow-up status post right hip fracture requiring trochanteric rodding procedure March 1, 2017.  Reviewed events of  "recent hospitalization likely  - 2017 following right hip fracture requiring trochanteric rodding by Dr. Davies with [facility] orthopedics.  Subsequently discharged to transitional care facility ×2 months in which she was discharged May 7, 2017.  Had been doing better.  Has been at home since.  Has noted increased leg swelling.  No orthopnea or PND.  Hard to ambulate.  Physical therapy and home exercises noted.  Seen with her son can today in office.  Reviewed multiple concerns regarding history of type 2 diabetes, chronic kidney disease stage IV, coronary artery disease, hypothyroidism, etc.  Denies chest pain or palpitations.  Denies recent illness.  No fevers or chills.  Appetite stable.  Has resumed drinking 1 bottle of Mountain Dew daily as well as eating candy.  History of hyperparathyroidism secondary to CKD.  Needs refill on nystatin powder for intertrigo issues.  History of paroxysmal atrial fibrillation.  Prior attempts at warfarin anticoagulation however could not tolerate due to compliance and therefore just resumed aspirin.  Prior heart valve replacement with bioprosthetic valve noted.  States asthma has been stable recently.  Using furosemide 40 mg a.m. and 20 mg at noon.     (twice) now since 3/24/07 (\"Gal\" - renal cell CA) - remarried 3/8/82   3 sons - Nate Mckoy (); Edwin (head of work force center in [facility]; Amrit Mckoy works at the [employer] (I see him now as a patient)   Son-in-law Esdras Mo   2 daughters - breast cancer   Granddaughter - Terri - plays the clarinet   New granddaughter - Patrizia?   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[employer]   Surgeries: s/p gallbladder, bunion 06 left TKA ([facility]) 07 Aortic valve replacement (bioprostheitc) 07 " "pacemaker - dual chamber   Cardiovascular surgeon called 6/22/07 - patient is \"vasculopath\", prior Hb = 8.9, no Coumadin, will use ASA only 10-20-08: started back on Warfarin, 2/22/09 GI bleed, D/C Warfarin   11-10-08: Dexa order faxed to [facility] will schedule     Past Surgical History:   Procedure Laterality Date     APPENDECTOMY       CHOLECYSTECTOMY       EYE SURGERY Bilateral     Cataracts     HIP PINNING Right 3/1/2017    Procedure: RIGHT HIP TROCHANTERIC RODDING;  Surgeon: Moshe Davies MD;  Location: [facility];  Service:      JOINT REPLACEMENT Left     knee     TISSUE AORTIC VALVE REPLACEMENT  06/25/2007             Family History   Problem Relation Age of Onset     No Medical Problems Mother      age 86     No Medical Problems Father      MVA     Cancer Brother      lung     No Medical Problems Brother         Past Medical History:   Diagnosis Date     Acute kidney injury superimposed on CKD 3/3/2017     Asthma      CAD (coronary artery disease)      Cataract      Diabetes mellitus      Disease of thyroid gland      H/O heart valve replacement with bioprosthetic valve      History of transfusion      Hypertension         Social History   Substance Use Topics     Smoking status: Former Smoker     Smokeless tobacco: Never Used     Alcohol use No        Current Outpatient Prescriptions   Medication Sig Dispense Refill     acetaminophen (TYLENOL) 500 MG tablet Take 500 mg by mouth every 6 (six) hours as needed for pain.       aspirin 81 MG EC tablet Take 81 mg by mouth daily.       atorvastatin (LIPITOR) 80 MG tablet Take 80 mg by mouth bedtime.       cholecalciferol, vitamin D3, (VITAMIN D3) 2,000 unit Tab Take 2,000 Units by mouth once daily.       fluticasone-salmeterol (ADVAIR) 500-50 mcg/dose DISKUS Inhale 1 puff 2 (two) times a day.       folic acid (FOLVITE) 1 MG tablet Take 1 mg by mouth daily.       furosemide (LASIX) 40 MG tablet Take 40 mg by mouth daily.       " ipratropium-albuterol (COMBIVENT RESPIMAT)  mcg/actuation Aero inhaler Inhale 1 puff 4 (four) times a day.        ipratropium-albuterol (DUO-NEB) 0.5-2.5 mg/3 mL nebulizer 3 mL 4 (four) times a day.       levothyroxine (SYNTHROID, LEVOTHROID) 125 MCG tablet Take 125 mcg by mouth daily.       loperamide (IMODIUM A-D) 2 mg tablet Take 2 mg by mouth 4 (four) times a day as needed for diarrhea.       losartan (COZAAR) 100 MG tablet Take 100 mg by mouth daily.       melatonin 3 mg Tab tablet Take 3 mg by mouth bedtime as needed.       metOLazone (ZAROXOLYN) 2.5 MG tablet Take 2.5 mg by mouth daily.       metoprolol tartrate (LOPRESSOR) 100 MG tablet Take 100 mg by mouth 2 (two) times a day.        nystatin (MYCOSTATIN) powder Apply topically 2 (two) times a day. 60 g 2     omeprazole (PRILOSEC) 20 MG capsule Take 20 mg by mouth Daily before breakfast.       predniSONE (DELTASONE) 5 MG tablet Take 1 tablet (5 mg total) by mouth daily. 30 tablet 5     senna-docusate (PERICOLACE) 8.6-50 mg tablet Take 1 tablet by mouth daily.       WARFARIN SODIUM (WARFARIN ORAL) Take 3 mg by mouth. hold       No current facility-administered medications for this visit.           Objective:    Vitals:    06/01/17 1334   BP: 122/62   Patient Site: Left Arm   Patient Position: Sitting   Cuff Size: Adult Large   Pulse: 60   SpO2: (!) 84%   Weight: (!) 276 lb 4.8 oz (125.3 kg)      Body mass index is 43.27 kg/(m^2).    Alert.  No apparent distress.  Smiling.  Is in wheelchair.  Difficulty transferring independently.  Chest appears relatively clear with cardiac exam with bioprosthetic valve click and 3/6 systolic murmur throughout precordium.  Abdomen benign, obese.  Leg swelling 3+ edema to level of knees.  No calf tenderness.  No rash.  Neurologic exam otherwise nonfocal.

## 2021-06-11 NOTE — PROGRESS NOTES
[facility]    Facility:  [facility] [[id number]]    Code Status:  FULL CODE    Chief Complaint   Patient presents with     Problem Visit   :                    Patient Active Problem List   Diagnosis     Constipation     Sciatica     Dyspnea, unspecified type     Hypothyroidism     Type 2 diabetes mellitus with stage 3 chronic kidney disease, without long-term current use of insulin (H)     Hyperparathyroidism     Vitamin D deficiency     Mixed hyperlipidemia     Atherosclerosis of native coronary artery of native heart without angina pectoris     CKD (chronic kidney disease), stage III (H)     Osteoarthritis Of The Knee     Restless Legs Syndrome     H/O heart valve replacement with bioprosthetic valve     Cardiac pacemaker in situ     Complete heart block (H)     Normochromic normocytic anemia     Essential hypertension with goal blood pressure less than 140/90     Moderate persistent asthma without complication     PAF (paroxysmal atrial fibrillation) (H)     GERD (gastroesophageal reflux disease)     Primary insomnia     Hypokalemia     Physical deconditioning     Asthma     CHF (congestive heart failure), NYHA class I, acute, systolic (H)     Pulmonary hypertension (H)     Atrial fibrillation, unspecified type (H)     Status post aortic valve replacement     Non-rheumatic mitral valve stenosis     Nonrheumatic mitral valve stenosis     PVD (peripheral vascular disease) (H)     Lymphedema     S/p reverse total shoulder arthroplasty       History:  Thea Acosta  is an 86 year old female with history of coronary artery disease, CHF, paroxysmal atrial fibrillation, AVR, permanent pacemaker placement, COPD, CKD 3, DM 2, hypertension, GERD,  seen for review of her multiple medical issues    Hospital course #3/most recent:  Patient was hospitalized between July 28 and August 1 after I sent her from this TCU upon discovering her dislocated left shoulder on July 28.   Shoulder reduction was achieved but could not be maintained.  She was seen by orthopedics who diagnosed chronic instability and   recommended total reverse shoulder which was completed on July 31, 2020.  Surgery was complicated by mild perioperative hypoxia.  Patient was treated with hydrocodone (since discontinued).    Hospital course #1:  Patient was hospitalized at [facility] between July 12 and July 6 following a fall with left shoulder dislocation which was reduced in the emergency room.  However she was noted to be hypoxic and was diagnosed with acute hypoxic respiratory failure felt most likely secondary to atelectasis plus opiates which are being used for pain control.  She had weaned off oxygen by the time of discharge.  She also had acute kidney injury and was noted to have metabolic alkalosis that hospital stay.  She was discharged with a creatinine of 1.4 after holding her Bumex for a brief time.     She was discharged to [facility] where she stayed for fewer than 48 hours due to development of acute delirium.    Hospital course #2:     She was readmitted between July 18 and July 21 again at [facility].  Hypothyroidism was felt to be the primary cause of her delirium.  TSH 67 on July 18.  She has been noncompliant with her thyroid medication quite regularly for at least months.  She was again found to be hypoxic without certain cause as she was not on opiates this time.  She did have a chest x-ray on July 20 suggesting a chronic CHF type appearance but no acute problems noted.  She again had elevation of her creatinine to a peak of 1006 discharged 1.67 with a baseline of about 1.4.        Subjective/ROS:    -augmented by discussion with facility staff involved in direct care  -limited by: Memory     Patient continues to have swollen legs.  Edema therapist has been involved with compression dressings.  She is not feeling short of breath.  She has no chest pain.  She denies orthopnea PND.   Patient's Bumex has been increased to 4 mg twice daily and she has had additional metolazone now for the better part of a week due to phase out after tomorrow's dose on September 9.  Meanwhile her creatinine has bumped up to 1.9 and her potassium is down to 3.4 on September 4.    Patient has developed right shoulder pain and immobility at the worst time the even after her left total shoulder arthroplasty.  She recalls no specific injury but has had falls and has been using the arm a lot more compensating for the inability to use the left arm.  It hurts when she flexes or abduction it.  She has been seen by therapy.    Patient reports that she otherwise is feeling well.  She says she is sleeping well without orthopnea or PND she has no chest pain abdominal pain her appetite is good no fever sweats or chills melena diarrhea bright red blood per rectum constipation dysuria    Remainder negative        Past Medical History:   Diagnosis Date     Acute kidney injury superimposed on CKD (H) 3/3/2017     Asthma      CAD (coronary artery disease)      Cataract      Chronic kidney disease     ckd3     COPD (chronic obstructive pulmonary disease) (H)      Diabetes mellitus (H)      Disease of thyroid gland      H/O heart valve replacement with bioprosthetic valve      History of transfusion      Hypertension      Normochromic normocytic anemia      Pulmonary hypertension (H) 09/27/2019    Noted on echocardiogram     Restless leg syndrome      Past Surgical History:   Procedure Laterality Date     APPENDECTOMY       CHOLECYSTECTOMY       EYE SURGERY Bilateral     Cataracts     HIP PINNING Right 3/1/2017    Procedure: RIGHT HIP TROCHANTERIC RODDING;  Surgeon: Moshe Davies MD;  Location: [facility];  Service:      INSERT / REPLACE / REMOVE PACEMAKER  06/25/2007    guidant     INSERT / REPLACE / REMOVE PACEMAKER  02/07/2018    phoebe sci gen change     JOINT REPLACEMENT Left     knee     MI RECONSTR TOTAL SHOULDER IMPLANT  Left 2020    Procedure: LEFT REVERSE TOTAL SHOULDER ARTHROPLASTY;  Surgeon: Alvarez Palomino MD;  Location: [facility] OR;  Service: Orthopedics     TISSUE AORTIC VALVE REPLACEMENT  2007               Family History   Problem Relation Age of Onset     No Medical Problems Mother         age 86     No Medical Problems Father         MVA     Cancer Brother         lung     No Medical Problems Brother    :       Social History     Socioeconomic History     Marital status:      Spouse name: Not on file     Number of children: Not on file     Years of education: Not on file     Highest education level: Not on file   Occupational History     Occupation:  in operating room     Comment: retired   Social Needs     Financial resource strain: Not on file     Food insecurity     Worry: Not on file     Inability: Not on file     Transportation needs     Medical: Not on file     Non-medical: Not on file   Tobacco Use     Smoking status: Former Smoker     Packs/day: 0.00     Last attempt to quit: 1950     Years since quittin.0     Smokeless tobacco: Never Used   Substance and Sexual Activity     Alcohol use: No     Drug use: No     Sexual activity: Never   Lifestyle     Physical activity     Days per week: Not on file     Minutes per session: Not on file     Stress: Not on file   Relationships     Social connections     Talks on phone: Not on file     Gets together: Not on file     Attends Christianity service: Not on file     Active member of club or organization: Not on file     Attends meetings of clubs or organizations: Not on file     Relationship status: Not on file     Intimate partner violence     Fear of current or ex partner: Not on file     Emotionally abused: Not on file     Physically abused: Not on file     Forced sexual activity: Not on file   Other Topics Concern     Incontinent Not Asked     Toileting independently Not Asked     Cognitive impairment Not Asked     Vision  "impairment Not Asked     Hearing impairment Not Asked     Dentures Not Asked   Social History Narrative    She live in a single floor house.  Her son lives next door and her granddaughter is \"around a lot\"   3/2017   :    Patient says she lives on [address]  She lives with her granddaughter.  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    Current Outpatient Medications on File Prior to Visit   Medication Sig Dispense Refill     bumetanide (BUMEX) 2 MG tablet Take 1.5 tablets (3 mg total) by mouth 2 (two) times a day at 9am and 6pm. (Patient taking differently: Take 4 mg by mouth 2 (two) times a day at 9am and 6pm. ) 270 tablet 2     acetaminophen (TYLENOL) 500 MG tablet Take 2 tablets (1,000 mg total) by mouth 3 (three) times a day. (Patient taking differently: Take 1,000 mg by mouth 3 (three) times a day. And daily prn)  0     albuterol (PROAIR HFA;PROVENTIL HFA;VENTOLIN HFA) 90 mcg/actuation inhaler Inhale 2 puffs every 4 (four) hours as needed for wheezing.  0     aspirin 81 MG EC tablet Take 1 tablet (81 mg total) by mouth 2 (two) times a day.  0     atorvastatin (LIPITOR) 80 MG tablet TAKE 1 TABLET(80 MG) BY MOUTH DAILY 90 tablet 3     colchicine 0.6 mg tablet take two tablets (1.2 mg) by mouth at onset of gout symptoms, then repeat one tablet (0.6 mg) by mouth one hour later 24 tablet 2     diclofenac sodium (VOLTAREN) 1 % Gel Apply 2 g topically 4 (four) times a day as needed.        ferrous sulfate 325 (65 FE) MG tablet Take 1 tablet by mouth daily.       levothyroxine (SYNTHROID, LEVOTHROID) 150 MCG tablet Take 1 tablet (150 mcg total) by mouth daily before supper. 90 tablet 3     nystatin (MYCOSTATIN) powder Apply topically 4 (four) times a day. 60 g 2     omeprazole (PRILOSEC) 20 MG capsule Take 1 capsule (20 mg total) by mouth daily before breakfast. 90 capsule 3     polyethylene glycol (MIRALAX) 17 gram packet Take 17 g by mouth daily as needed.       potassium " chloride (K-DUR,KLOR-CON) 20 MEQ tablet Take 40 mEq by mouth daily.       predniSONE (DELTASONE) 2.5 MG tablet Take 7.5 mg/d prednisone PO for 3 weeks. 90 tablet 0     senna (SENOKOT) 8.6 mg tablet Take 1 tablet by mouth daily.       white petrolatum (AQUAPHOR ORIGINAL) 41 % Oint Apply 1 application topically at bedtime.       No current facility-administered medications on file prior to visit.    :      ALLERGIES:  Tramadol; Codeine; Codeine phosphate (bulk); Codeine sulfate; Codeine-butalbital-asa-caff; Codeine-guaifenesin; Lisinopril; and Penicillins    Vitals:  There were no vitals taken for this visit. except as noted below    DemographicsICD-10VitalsOrderseMARNotesCensus  Most Recent Vitals Date/Time Taken  Temperature: 94.7  F 09/08/2020 03:58 PM  Pulse: 58 per minute 09/08/2020 03:58 PM  Respirations: 16 per minute 09/07/2020 04:32 PM  Blood Pressure: 118 / 65 mmHg 09/08/2020 03:58 PM  O2 Saturation: 93 % 09/08/2020 03:58 PM  Blood Sugar: 114 mg/dL 03/20/2017 05:08 PM  Weight: 230.1 lbs / Routine       BMI: 36.03 09/07/2020 11:02 AM  Height: 5ft 7.0in 07/17/2020 12:01 PM    Regarding the patient's temperature it is highly unlikely to be accurate.  I saw another patient this morning who had a temperature recorded at 84 degrees!  Discussed with facility recommend retesting/recalibration/new equipment as necessary    Physical exam:    Patient looks good today.  She is bright alert talkative and appears quite comfortable.  She is moving air easily without accessory muscle use or tachypnea.  No rales rhonchi or wheezing but is with some minimal decrease in the breath sounds bilaterally posteriorly.  Heart is irregularly irregular in the 80s is soft murmur.  Abdomen is soft no HJR.  Her extremities show compression wraps in place so it is hard to  how much edema is in the pretibial areas.  However the tops of her feet are quite puffy.  There is no edema above the knee.       Due to the 2020 Covid 19 pandemic,  except as noted above, the patient was visually observed at a 6 foot plus distance.  An observational exam was performed in an effort to keep patient safe from Covid 19 and other communicable diseases.   Labs:  Lab Results   Component Value Date    WBC 6.9 08/05/2020    HGB 9.7 (L) 09/04/2020    HCT 29.0 (L) 08/05/2020    MCV 98 08/05/2020     08/05/2020     Results for orders placed or performed in visit on 09/04/20   Basic Metabolic Panel   Result Value Ref Range    Sodium 141 136 - 145 mmol/L    Potassium 3.4 (L) 3.5 - 5.0 mmol/L    Chloride 92 (L) 98 - 107 mmol/L    CO2 37 (H) 22 - 31 mmol/L    Anion Gap, Calculation 12 5 - 18 mmol/L    Glucose 98 70 - 125 mg/dL    Calcium 10.5 8.5 - 10.5 mg/dL    BUN 42 (H) 8 - 28 mg/dL    Creatinine 1.90 (H) 0.60 - 1.10 mg/dL    GFR MDRD Af Amer 30 (L) >60 mL/min/1.73m2    GFR MDRD Non Af Amer 25 (L) >60 mL/min/1.73m2         Lab Results   Component Value Date    TSH 3.43 08/17/2020     Lab Results   Component Value Date    HGBA1C 6.5 (H) 07/31/2020     [unfilled]  Lab Results   Component Value Date    FOWRTMXO88 333 07/21/2020     Lab Results   Component Value Date     07/18/2020     [unfilled]  Most Recent EKG     Units 07/28/20  1543   VENTRATE BPM 63   ATRIALRATE BPM 57   QRSDURATION ms 172   QTINTERVAL ms 496   QTCCALC ms 507   RAXIS degrees 106   TAXIS degrees 261   MUSEDX  Ventricular-paced rhythm with occasional Premature ventricular complexes  Abnormal ECG  When compared with ECG of 18-JUL-2020 11:23,  Electronic ventricular pacemaker has replaced Wide QRS rhythm  Confirmed by SEE ED PROVIDER NOTE FOR, ECG INTERPRETATION ([id number]),  VIV ELDER ([id number]) on 7/28/2020 10:25:45 PM           Assessment/Plan:      ICD-10-CM    1. CKD (chronic kidney disease), stage III (H)  N18.3    2. Status post aortic valve replacement  Z95.2    3. Lymphedema  I89.0    Left shoulder instability  Recurrent dislocation left shoulder  Status post reverse total  replacement of left shoulder   Right shoulder pain   Neglected to mention on exam above the patient has difficulty with flexion and abduction of her right shoulder.  With passive range of motion I can get the arm to 90 degrees but she will start to feel some discomfort at that point.  Question if she has rotator cuff tendinitis?  Could consider orthopedic evaluation of the shoulder as well if not getting better.  I have not ordered an x-ray  -Continue PT  -Poor candidate for NSAIDs, will have to stick with current pain control measures including acetaminophen and topicals.          Bilateral lower extremity edema   weight gain  Chronic diastolic CHF   I cannot explain her weight fluctuation.  She was as low as 205 on .  5 throughout the 250 pounds noted on  which I think must be an error, her peak would be 239 pounds and now back down to 230 pounds.  Despite the lymphedema of the lower legs and feet she otherwise does not look volume overloaded without JVD rales shortness of breath orthopnea etc.  I now worry about intravascular volume depletion in the face of total body water up/peripheral edema considering her creatinine climbing.   Results for orders placed or performed in visit on 20   Basic Metabolic Panel   Result Value Ref Range    Sodium 141 136 - 145 mmol/L    Potassium 3.4 (L) 3.5 - 5.0 mmol/L    Chloride 92 (L) 98 - 107 mmol/L    CO2 37 (H) 22 - 31 mmol/L    Anion Gap, Calculation 12 5 - 18 mmol/L    Glucose 98 70 - 125 mg/dL    Calcium 10.5 8.5 - 10.5 mg/dL    BUN 42 (H) 8 - 28 mg/dL    Creatinine 1.90 (H) 0.60 - 1.10 mg/dL    GFR MDRD Af Amer 30 (L) >60 mL/min/1.73m2    GFR MDRD Non Af Amer 25 (L) >60 mL/min/1.73m2       Echocardiogram reading:    Performing Date  Performing Department    2018   CARDIAC TESTING [[id number]]    Patient Information     Patient Name   Thea Acosta  MRN   [id number]  Sex   Female   1   1934 (83 y.o.)    Indications      Heart murmur; Fever    Summary       Normal left ventricular size and systolic function.    When compared to the previous study dated 3/1/2017, no significant change.    Left ventricle ejection fraction is normal. The estimated left ventricular ejection fraction is 55%.    Normal right ventricular size and systolic function.    Left Atrium: Left atrial volume is moderately increased.    Aortic Valve: Bioprosthetic valve is not well visualized. No evidence of paravalvular leak and mean gradient is 15 mmHg which is borderline elevated, however not significantly changed compared to prior.    Mitral Valve: There is severe mitral annular calcification. Moderate mitral Calcific stenosis. No mitral regurgitation.          -Despite ongoing peripheral edema I am concerned about her rising creatinine in the face of aggressive diuresis attempts.  Her Bumex is up to 4 mg twice daily plus now several days of metolazone daily.  - Communicated with Ms. Keisha CNP.  She has been managing and plans to do BMP tomorrow.  And less her renal function has returned to baseline, I would recommend backing off on diuretics and continue to manage with low-salt diet, compression, leg elevation, close clinical monitoring.  Pelvic obstruction unlikely.  Hypoalbuminemia has been mild 3.4 at last check on July 24.    Acute metabolic encephalopathy  Hallucinations  Hypothyroidism   Abnormal urinalysis   Patient doing quite well.  Likely at best mental baseline at this point.  Hypothyroidism was thought to be a potentially large contributor to her a.m. E however UTI and other contributing factors were present.   -TSH is normalized down to 3.43 on August 17.  -Continue current levothyroxine with recheck in another few weeks of TSH    Suspected chronic hypoxic respiratory failure   Patient has continued to do well off of oxygen.  This is despite the weight gain and concern for fluid as noted above.  This was likely multifactorial possible  "obesity/hypoventilation, concern for undiagnosed RANJIT, history of CHF in the past but no evidence of pulmonary edema now, COPD is also noted on her problem list although she states no awareness of that.    - Continue to monitor O2 sats, supplemental oxygen as need be  -Continue to avoid opiates and other sedating medications as possible  - No additional work-up anticipated          COPD   I see this in old notes and records.  I do not see complete pulmonary function tests on record however.  Patient says that she smoked in the past \"but did not inhale\".  She is skeptical of the diagnosis..  Nonetheless I suspect that there is some COPD present.  She does have albuterol if need be but not needed currently with no wheezing.  No wheezing heard today.    Coronary artery disease   Continue aspirin and atorvastatin.  Not sure why she is not on beta-blocker, can follow-up with primary MD on that.    Paroxysmal atrial fibrillation     Takes only aspirin for stroke prophylaxis, does not need rate controlling agent    Permanent pacemaker   Follows with device clinic.    DM 2   Blood sugar control adequate.    Lab Results   Component Value Date    HGBA1C 6.5 (H) 07/31/2020   Continue off medication.    Gout   Presumed.  Hand swelling has resolved.  She has no pain.  She has completed her prednisone taper.  -Consider preventive allopurinol?     She also has colchicine but is not currently taking it, just a PRN medication     CKD 3  Elevated creatinine, possible acute kidney injury     Results for orders placed or performed in visit on 09/04/20   Basic Metabolic Panel   Result Value Ref Range    Sodium 141 136 - 145 mmol/L    Potassium 3.4 (L) 3.5 - 5.0 mmol/L    Chloride 92 (L) 98 - 107 mmol/L    CO2 37 (H) 22 - 31 mmol/L    Anion Gap, Calculation 12 5 - 18 mmol/L    Glucose 98 70 - 125 mg/dL    Calcium 10.5 8.5 - 10.5 mg/dL    BUN 42 (H) 8 - 28 mg/dL    Creatinine 1.90 (H) 0.60 - 1.10 mg/dL    GFR MDRD Af Amer 30 (L) >60 " mL/min/1.73m2    GFR MDRD Non Af Amer 25 (L) >60 mL/min/1.73m2       See discussion above.  -Recheck blood work tomorrow under the care of Ms. Valdes  -Recommend moderation of thyroid doses and less patient has returned to her prior stable baseline which I think is less likely than continued elevation of her creatinine, we will see.    Hypertension   Formerly on beta-blocker which is been held for relatively soft blood pressures at times.  Continue to manage off of medications other than the diuretics.    GERD   Omeprazole    Intertrigo   Resolved for the time being  Vitamin D deficiency   On replacement  Case discussed with:    Facility staff             Rojelio Farnsworth MD

## 2021-06-11 NOTE — TELEPHONE ENCOUNTER
[facility] Nurse Triage Telephone Note      Provider: CHILO Quigley  Facility: [facility]    Facility Type: TCU    Caller: Malathi  Call Back Number:  [phone]    Allergies: Tramadol; Codeine; Codeine phosphate (bulk); Codeine sulfate; Codeine-butalbital-asa-caff; Codeine-guaifenesin; Lisinopril; and Penicillins    Reason for call: Nurse reporting BMP results.  Notable meds:  Bumex 2mg two times a day, potassium 40meq Q AM and 20meq Q PM, finishing a course of Bactrim today.  Nurse says patient is currently on O2 at 1-3L/min.  She also says that patient is weak and having trouble feeding herself and also c/o nausea, for which she has Zofran ordered.       Verbal Order/Direction given by Provider: No new orders.      Provider giving order: CHILO Quigley    Verbal order given to: Inez Stephen RN

## 2021-06-11 NOTE — PROGRESS NOTES
"Assessment:    1. Edema, unspecified type  metOLazone (ZAROXOLYN) 2.5 MG tablet    Basic Metabolic Panel   2. Chronic Kidney Disease, Stage 3  Basic Metabolic Panel   3. Essential hypertension with goal blood pressure less than 140/90     4. Hyperlipidemia  atorvastatin (LIPITOR) 80 MG tablet         Plan:    Noted edema, ongoing.  Noted 5 pound weight gain since June 8, 2017.  Continue furosemide 80 mg twice daily.  Addition of metolazone 2.5 mg each morning.  Reassessment no later than 2 weeks.  Check basic metabolic panel to ensure stable chronic kidney disease stage III as well as electrolytes.  Hypertension stable.  Refill on atorvastatin 80 mg daily provided.  Patient declines referral to vascular center currently for leg wraps however would consider a follow-up if persistent concerns noted.        Subjective:    Thea Acosta is seen today for follow-up evaluation.  Lower extremity edema.  Persistent concerns.  He cannot use compression stockings by herself.  Has had leg wraps in the past however that was when she was in transitional care and would rewrap every 2 days.  Does have a electronic chair at home that would allow her to recline and elevate legs however has not been doing this.  No significant orthopnea or PND described.  No shortness of breath.  Does have history of asthma which patient describes as stable.  Needs refill on atorvastatin.  Underlying hypertension.  Known history of CKD stage III.  Comprehensive review of systems as above otherwise all negative.     (twice) now since 3/24/07 (\"Gal\" - renal cell CA) - remarried 3/8/82   3 sons - Nate Mckoy (); Edwin (head of work force center in [facility]; mArit Mckoy works at the post [employer] (I see him now as a patient)   Son-in-law Esdras Mo   2 daughters - breast cancer   Granddaughter - Terri - plays the clarinet   New granddaughter - Patrizia?   Anglican  Dad - dec MVA age 52   Mom - dec 86 old age   2 bros - 1 bro " " due to lung cancer in Aug, 2010 (had quit smoking > 40-50 years ago)   Quit smoking    No EtOH   Work: laundry services at [employer]   Surgeries: s/p gallbladder, bunion 06 left TKA ([facility]) 07 Aortic valve replacement (bioprostheitc) 07 pacemaker - dual chamber   Cardiovascular surgeon called 07 - patient is \"vasculopath\", prior Hb = 8.9, no Coumadin, will use ASA only 10-20-08: started back on Warfarin, 09 GI bleed, D/C Warfarin   11-10-08: Dexa order faxed to Thea Ordaz will schedule     Past Surgical History:   Procedure Laterality Date     APPENDECTOMY       CHOLECYSTECTOMY       EYE SURGERY Bilateral     Cataracts     HIP PINNING Right 3/1/2017    Procedure: RIGHT HIP TROCHANTERIC RODDING;  Surgeon: Moshe Davies MD;  Location: [facility] Main OR;  Service:      JOINT REPLACEMENT Left     knee     TISSUE AORTIC VALVE REPLACEMENT  2007             Family History   Problem Relation Age of Onset     No Medical Problems Mother      age 86     No Medical Problems Father      MVA     Cancer Brother      lung     No Medical Problems Brother         Past Medical History:   Diagnosis Date     Acute kidney injury superimposed on CKD 3/3/2017     Asthma      CAD (coronary artery disease)      Cataract      Diabetes mellitus      Disease of thyroid gland      H/O heart valve replacement with bioprosthetic valve      History of transfusion      Hypertension         Social History   Substance Use Topics     Smoking status: Former Smoker     Smokeless tobacco: Never Used     Alcohol use No        Current Outpatient Prescriptions   Medication Sig Dispense Refill     acetaminophen (TYLENOL) 500 MG tablet Take 500 mg by mouth every 6 (six) hours as needed for pain.       aspirin 81 MG EC tablet Take 81 mg by mouth daily.       atorvastatin (LIPITOR) 80 MG tablet Take 1 tablet (80 mg total) by mouth at bedtime. 90 tablet 3     cholecalciferol, vitamin D3, " (VITAMIN D3) 2,000 unit Tab Take 2,000 Units by mouth once daily.       fluticasone-salmeterol (ADVAIR) 500-50 mcg/dose DISKUS Inhale 1 puff 2 (two) times a day.       folic acid (FOLVITE) 1 MG tablet Take 1 mg by mouth daily.       furosemide (LASIX) 40 MG tablet Take 2 tablets (80 mg total) by mouth 2 (two) times a day at 9am and 6pm. 120 tablet 2     ipratropium-albuterol (COMBIVENT RESPIMAT)  mcg/actuation Aero inhaler Inhale 1 puff 4 (four) times a day.        ipratropium-albuterol (DUO-NEB) 0.5-2.5 mg/3 mL nebulizer 3 mL 4 (four) times a day.       levothyroxine (SYNTHROID, LEVOTHROID) 125 MCG tablet Take 125 mcg by mouth daily.       loperamide (IMODIUM A-D) 2 mg tablet Take 2 mg by mouth 4 (four) times a day as needed for diarrhea.       losartan (COZAAR) 100 MG tablet Take 100 mg by mouth daily.       melatonin 3 mg Tab tablet Take 3 mg by mouth bedtime as needed.       metoprolol tartrate (LOPRESSOR) 100 MG tablet Take 100 mg by mouth 2 (two) times a day.        nystatin (MYCOSTATIN) powder Apply topically 2 (two) times a day. 60 g 2     omeprazole (PRILOSEC) 20 MG capsule Take 20 mg by mouth Daily before breakfast.       senna-docusate (PERICOLACE) 8.6-50 mg tablet Take 1 tablet by mouth daily.       WARFARIN SODIUM (WARFARIN ORAL) Take 3 mg by mouth. hold       metOLazone (ZAROXOLYN) 2.5 MG tablet Take 1 tablet (2.5 mg total) by mouth daily. 90 tablet 2     predniSONE (DELTASONE) 5 MG tablet Take 1 tablet (5 mg total) by mouth daily. 30 tablet 5     No current facility-administered medications for this visit.           Objective:    Vitals:    06/21/17 1334   BP: 110/60   Pulse: 60   SpO2: 91%   Weight: (!) 272 lb (123.4 kg)      Body mass index is 42.6 kg/(m^2).    Alert.  No apparent distress.  Chest clear.  Cardiac exam regular.  Extremities with 3+ edema to level of knees.  No calf tenderness.  Mild anterior shin erythema symmetric bilaterally with scaling however no signs of secondary  infection, cellulitis, etc.

## 2021-06-11 NOTE — TELEPHONE ENCOUNTER
[facility] Nurse Triage Telephone Note      Provider: CHILO Quigley  Facility: [facility]    Facility Type: TCU    Caller: Chandni  Call Back Number:  [phone]    Allergies: Tramadol; Codeine; Codeine phosphate (bulk); Codeine sulfate; Codeine-butalbital-asa-caff; Codeine-guaifenesin; Lisinopril; and Penicillins    Reason for call: Nurse reporting BMP and Hgb results.  Notable meds:  Ferrous sulfate 325mg three times a day, Bumex 4mg two times a day, Metolazone 5mg Q AM, potassium 20meq daily.       Verbal Order/Direction given by Provider: Decrease ferrous sulfate to 325mg daily.  Increase potassium to 40meq daily.  Check Hgb on 9/9/20.      Provider giving order: CHILO Quigley    Verbal order given to: Humphrey Stephen RN
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
## 2021-06-11 NOTE — PROGRESS NOTES
[facility]    DATE: 2020    NAME:  Thea Acosta             :  1934  MRN: [id number]  CODE STATUS:  DNR    VISIT TYPE: Problem Visit (abnormal labs, pain check, chf)     FACILITY:  [facility] [[id number]]       CHIEF COMPLAIN/REASON FOR VISIT:    Chief Complaint   Patient presents with     Problem Visit     abnormal labs, pain check, chf               HISTORY OF PRESENT ILLNESS: Thea Acosta is a 86 y.o. female who admitted - for acute metabolic encephalopathy. This was felt to be s/t hypothyroidism and noncompliance with levothyroxine dosing. She was readmitted - for left shoulder dislocation and underwent reverse total shoulder arthroplasty on . Postop course was uncomplicated and transferred back to [facility]. She has PMH of CAD, CKD, DM, s/p PPM, diastolic CHF, pulmonary artery hypertension, mitral valve stenosis, chronic a fib no anticoagulation, s/p AVR, asthma. Prior to this she lived at home with her grand daughter. She was readmitted 9/10- for altered mental status. She was treated for possible cellulitis and hypoxic respiratory failure. Her tsh was elevated so levothyroxine dose was increased. She was discharged back to u.     Today Ms. Acosta is seen for abnormal labs, pain check, chf. She is seen in her room sitting in wheelchair today. She says she has been doing well. She has had some restless legs at times and not always sleeping well. She says they were acting up last night. She says her legs were jumping at times. She is sleeping in the recliner and not the bed. She says she has had this problem for a long time. She is off oxygen and has been since yesterday. We discussed that her weights are trending down [phone] recently. She says her legs are the best they have been and has no shortness of breath today. Her labs were reviewed from today and renal function is worse. We discussed she is  "likely  Dry and will reduce her bumex dose. We reviewed her meds and she does not recall being on aspirin at home previously. She denies having history of clots or coronary artery disease, just the heart failure. We discussed this was stopped when she completed her dvt prophylaxis from shoulder and the risk of frequent falls and bleeding. She does have chronic anemia and we discussed risk versus benefit of this. She would like to stop this for now and will follow up with cardiology whether she needs to be on this or not. Her potassium was high today and will reduce her potassium supplement and will recheck bmp on 9/23. She says her arm pain is improving. She is hoping to return home soon.     REVIEW OF SYSTEMS:  PROBLEMS AND REVIEW OF SYSTEMS:   Review of Systems  Today on ROS:   Currently, no fever, chills, or rigors. Decreased vision and hearing. Denies any chest pain, headaches, palpitations, lightheadedness, dizziness, no cough. Appetite is good.  Denies any abdominal pain. No active bleeding. Positive for urinary incontinence, urinary frequency, leg swelling improved, left shoulder incision, no nausea, no constipation, pain controlled, velcro leg wraps, no shortness of breath recently, restless legs and insomnia at times      Allergies   Allergen Reactions     Tramadol Anxiety and Other (See Comments)     Per patient, Thea developed psychosis and severe anxiety and agitation \"for three days\" after receiving tramadol in the past. She stated that she would \"never\" take it again and would be extremely upset if she receives it. She asked me to add this to her chart's allergy list specifically.      Codeine Hives     Codeine Phosphate (Bulk)      This allergy is per [facility] records.     Codeine Sulfate      This allergy is per [facility] records.     Codeine-Butalbital-Asa-Caff      This allergy is per [facility] " records.     Codeine-Guaifenesin      This allergy is per [facility] records.     Lisinopril Cough     Penicillins Swelling     Current Outpatient Medications   Medication Sig     acetaminophen (TYLENOL) 500 MG tablet Take 2 tablets (1,000 mg total) by mouth 3 (three) times a day. (Patient taking differently: Take 1,000 mg by mouth 3 (three) times a day as needed. )     albuterol (PROAIR HFA;PROVENTIL HFA;VENTOLIN HFA) 90 mcg/actuation inhaler Inhale 2 puffs every 4 (four) hours as needed for wheezing.     atorvastatin (LIPITOR) 80 MG tablet TAKE 1 TABLET(80 MG) BY MOUTH DAILY     bumetanide (BUMEX) 2 MG tablet Take 1 tablet (2 mg total) by mouth 2 (two) times a day at 9am and 6pm. (Patient taking differently: Take 2 mg by mouth 2 (two) times a day at 9am and 6pm. 2mg in am and 1mg at noon)     colchicine 0.6 mg tablet take two tablets (1.2 mg) by mouth at onset of gout symptoms, then repeat one tablet (0.6 mg) by mouth one hour later     diclofenac sodium (VOLTAREN) 1 % Gel Apply 2 g topically 4 (four) times a day as needed.      dimethicone 5 % Crea Apply 1 application topically 2 (two) times a day as needed (to buttocks as needed for skin breakdown).     ferrous sulfate 325 (65 FE) MG tablet Take 1 tablet by mouth daily.     levothyroxine (SYNTHROID, LEVOTHROID) 200 MCG tablet Take 1 tablet (200 mcg total) by mouth daily before supper.     metoprolol tartrate (LOPRESSOR) 25 MG tablet Take 12.5 mg by mouth 2 (two) times a day. Hold for sbp<105  Hr <55     nystatin (MYCOSTATIN) powder Apply topically 4 (four) times a day.     omeprazole (PRILOSEC) 20 MG capsule Take 1 capsule (20 mg total) by mouth daily before breakfast.     ondansetron (ZOFRAN-ODT) 4 MG disintegrating tablet Take 4 mg by mouth every 6 (six) hours as needed for nausea.     polyethylene glycol (MIRALAX) 17 gram packet Take 17 g by mouth daily.      potassium chloride (K-DUR,KLOR-CON) 20 MEQ tablet Take 40 mEq by  mouth daily before breakfast.     senna (SENOKOT) 8.6 mg tablet Take 1 tablet by mouth daily. Hold for loose stools     white petrolatum (AQUAPHOR ORIGINAL) 41 % Oint Apply 1 application topically at bedtime.     Past Medical History:    Past Medical History:   Diagnosis Date     Acute kidney injury superimposed on CKD (H) 3/3/2017     Asthma      CAD (coronary artery disease)      Cataract      Chronic kidney disease     ckd3     COPD (chronic obstructive pulmonary disease) (H)      Diabetes mellitus (H)      Disease of thyroid gland      H/O heart valve replacement with bioprosthetic valve      History of transfusion      Hypertension      Normochromic normocytic anemia      Pulmonary hypertension (H) 09/27/2019    Noted on echocardiogram     Restless leg syndrome            PHYSICAL EXAMINATION  Vitals:    09/18/20 0700   BP: 131/73   Pulse: 60   Resp: 18   Temp: (!) 96.2  F (35.7  C)   SpO2: 94%   Weight: 205 lb (93 kg)       Today on physical exam:     GENERAL: lethargic, obese,  not in any form of acute distress, answers questions appropriately, follows simple commands, conversant  HEENT: Head is normocephalic with normal hair distribution. No evidence of trauma. Ears: No acute purulent discharge. Eyes: Conjunctivae pink with no scleral jaundice. Nose: Normal mucosa and septum. NECK: Supple with no cervical or supraclavicular lymphadenopathy. Trachea is midline. [place], decreased vision  CHEST: No tenderness or deformity, no crepitus, left chest PPM  LUNG: Dim but clear today to auscultation.   Shortness of breath with exertion, no cough noted  BACK: No kyphosis of the thoracic spine. Symmetric, no curvature, ROM normal, no CVA tenderness, no spinal tenderness   CVS: irregularly irregular rhythm, murmur,  2+ pulses symmetric in all extremities.  ABDOMEN: Rounded and soft, nontender to palpation, non distended, no masses, no organomegaly, good bowel sounds, no rebound or guarding, no peritoneal signs.    EXTREMITIES: 1+ ble nonpitting edema, velcro leg wraps, left shoulder incision healed, no sling, rl discoloration ble  SKIN: Warm and dry  NEUROLOGICAL: The patient is oriented to person, place and time.             LABS:   Recent Results (from the past 168 hour(s))   Basic Metabolic Panel   Result Value Ref Range    Sodium 142 136 - 145 mmol/L    Potassium 3.6 3.5 - 5.0 mmol/L    Chloride 93 (L) 98 - 107 mmol/L    CO2 40 (H) 22 - 31 mmol/L    Anion Gap, Calculation 9 5 - 18 mmol/L    Glucose 87 70 - 125 mg/dL    Calcium 10.0 8.5 - 10.5 mg/dL    BUN 39 (H) 8 - 28 mg/dL    Creatinine 1.77 (H) 0.60 - 1.10 mg/dL    GFR MDRD Af Amer 33 (L) >60 mL/min/1.73m2    GFR MDRD Non Af Amer 27 (L) >60 mL/min/1.73m2   Basic Metabolic Panel   Result Value Ref Range    Sodium 137 136 - 145 mmol/L    Potassium 5.2 (H) 3.5 - 5.0 mmol/L    Chloride 95 (L) 98 - 107 mmol/L    CO2 31 22 - 31 mmol/L    Anion Gap, Calculation 11 5 - 18 mmol/L    Glucose 96 70 - 125 mg/dL    Calcium 10.3 8.5 - 10.5 mg/dL    BUN 43 (H) 8 - 28 mg/dL    Creatinine 2.05 (H) 0.60 - 1.10 mg/dL    GFR MDRD Af Amer 28 (L) >60 mL/min/1.73m2    GFR MDRD Non Af Amer 23 (L) >60 mL/min/1.73m2   Hemoglobin   Result Value Ref Range    Hemoglobin 9.8 (L) 12.0 - 16.0 g/dL     Results for orders placed or performed in visit on 09/18/20   Basic Metabolic Panel   Result Value Ref Range    Sodium 137 136 - 145 mmol/L    Potassium 5.2 (H) 3.5 - 5.0 mmol/L    Chloride 95 (L) 98 - 107 mmol/L    CO2 31 22 - 31 mmol/L    Anion Gap, Calculation 11 5 - 18 mmol/L    Glucose 96 70 - 125 mg/dL    Calcium 10.3 8.5 - 10.5 mg/dL    BUN 43 (H) 8 - 28 mg/dL    Creatinine 2.05 (H) 0.60 - 1.10 mg/dL    GFR MDRD Af Amer 28 (L) >60 mL/min/1.73m2    GFR MDRD Non Af Amer 23 (L) >60 mL/min/1.73m2         Lab Results   Component Value Date    WBC 5.7 09/12/2020    HGB 9.8 (L) 09/18/2020    HCT 30.8 (L) 09/12/2020     (H) 09/12/2020     09/12/2020       Lab Results   Component Value  Date    [id number] 468 09/12/2020     Lab Results   Component Value Date    HGBA1C 6.5 (H) 07/31/2020     Lab Results   Component Value Date    INR 1.11 (H) 09/10/2020    INR 0.99 07/28/2020    INR 1.30 (H) 02/07/2018     Vitamin D, Total (25-Hydroxy)   Date Value Ref Range Status   01/20/2020 45.9 30.0 - 80.0 ng/mL Final     Lab Results   Component Value Date    TSH 9.38 (H) 09/11/2020           ASSESSMENT/PLAN:    Acute hypoxic respiratory failure: weaned off o2, resolved. Will dc o2 orders.   Left shoulder dislocation, s/p reverse total shoulder arthroplasty: Incision healed. Pain much improved and controlled on tylenol prn. PT, OT following. voltaren gel prn. F/u with surgeon Dr. Ramirez on 8/14-f/u again in 4 weeks, limit external rotation, no bathing until 4 weeks postop, healing well, f/u again on 9/4-doing well, dc sling, AROM and PROM with therapy, may  Weight bear with walker, f/u again on 10/5. Improving.   Chronic CHF: Daily [phone] then [phone], now [phone]. Shortness of breath much improved, edema improved.  On bumex, encourage leg elevation. Cardiac diet. velcro leg wraps.  notify for weight change for 2 lbs in 24 hours or 5lbs in 7 days. Renal function worse today, will reduce bumex to 2mg in am and 1mg at noon. Bmp on 9/23.   S/p PPM: follows with device clinic. No recent concerns.   Asthma:  Encourage cough and deep breathe. IS q1h while awake. Albuterol prn. Shortness of breath with exertion. Stable.   Hypothyroid: on levothyroxine. tsh 9.38 on 9/11, levothyroxine dose adjusted. recheck tsh in 6 weeks.   CKD stage 3: Cr 1.39, gfr 36 on 8/1.  9/4 cr 1.9, gfr 25. Bmp 9/18-cr 2.05 reduced bumex and recheck on 9/23.   H/o Type 2 DM: No meds, no monitoring needed. Diet controlled. No recent concerns.   Dyslipidemia: On atorvastatin.   HTN: SBP 130s, On bumex, metoprolol. Stable recently.   GERD: On omeprazole. No concerns today.   Vitamin d deficiency: On  vitamin d.  Hypokalemia: On kcl. Hyperkalemic today k 5.2 will reduce to 40meq daily and bmp on 9/23.   PAF, s/p AVR: Regular rate and rhythm recently. No recent a fib rvr. Controlled off metoprolol. F/u with caridology. Was on aspirin daily, completed dvt prophylaxis. Discussed trial off, does not recall being on previously. Will recheck hg on 9/23 if no change in anemia will restart.   Anemia of CKD: on iron. Hg 10.9 on 7/24.   Hg 8.8 on 8/1. Hg 9 on 8/5. Recheck on 8/17 8.2, iron to three times a day. recheck on 8/24-hg 8.4. hg 9.7 on 9/4, hg 9.5 on 9/12. Discussed trial off to see if this improves anemia.   Candidal intertrigo:  Nystatin.   Constipation: senna daily, miralax daily. Stable.   Nausea:  zofran prn.     Bmp, hg 9/23.     Therapy PT: trsf max assist from WC, mod from recliner, walking 10ft with 4WW CGA, OT: min A UB drsg, max A toileting, LB dressing max assist, . Feeding: mod. lcd 2-3 weeks. Yellow tag. Lives with granddaughter.     Electronically signed by: Jennifer Valdes NP

## 2021-06-11 NOTE — PROGRESS NOTES
[facility]    DATE: 2020    NAME:  Thea Acosta             :  1934  MRN: [id number]  CODE STATUS:  DNR    VISIT TYPE: Review Of Multiple Medical Conditions (chf )     FACILITY:  [facility] [[id number]]       CHIEF COMPLAIN/REASON FOR VISIT:    Chief Complaint   Patient presents with     Review Of Multiple Medical Conditions     chf                HISTORY OF PRESENT ILLNESS: Thea Acosta is a 86 y.o. female who admitted - for acute metabolic encephalopathy. This was felt to be s/t hypothyroidism and noncompliance with levothyroxine dosing. She was readmitted - for left shoulder dislocation and underwent reverse total shoulder arthroplasty on . Postop course was uncomplicated and transferred back to [facility]. She has PMH of CAD, CKD, DM, s/p PPM, diastolic CHF, pulmonary artery hypertension, mitral valve stenosis, chronic a fib no anticoagulation, s/p AVR, asthma. Prior to this she lived at home with her grand daughter.     Today Ms. Acosta is seen for follow up on chf. She is seen in her room sitting in wheelchair today. She says she went to ortho on Friday  And they took her sling off. They told her she can use her shoulder more but not sure how much she is supposed to use this. She is asking me for clarification on this today. We discussed this should be in her orders from ortho and that therapy would know best so to discuss with them. She says her bowels are moving fine and actually about to have one so needs to use the restroom after visit today. She says her breathing is better today but still has some shortness of breath. She thinks her legs are maybe a little better. Her weight she thought was down today. Her left shoulder is kind of sore today but is wondering if it is because she is using it more. She is taking tylenol if needed. She denies any other concerns today. Reviewed notes from ortho and ordered to stop  "sling, therapy to eval and start active and passive ROM, weight bear with walker but does not give other restrictions. F/u again on 10/5.         REVIEW OF SYSTEMS:  PROBLEMS AND REVIEW OF SYSTEMS:   Review of Systems  Today on ROS:   Currently, no fever, chills, or rigors. Decreased vision and hearing. Denies any chest pain, headaches, palpitations, lightheadedness, dizziness, no cough. Appetite is good.  Denies any abdominal pain. No active bleeding. Positive for urinary incontinence, leg swelling, left arm swelling improved today, left shoulder incision, no nausea or vomiting, no constipation, pain controlled, lymphedema wraps, shortness of breath improved      Allergies   Allergen Reactions     Tramadol Anxiety and Other (See Comments)     Per patient, Thea developed psychosis and severe anxiety and agitation \"for three days\" after receiving tramadol in the past. She stated that she would \"never\" take it again and would be extremely upset if she receives it. She asked me to add this to her chart's allergy list specifically.      Codeine Hives     Codeine Phosphate (Bulk)      This allergy is per [facility] records.     Codeine Sulfate      This allergy is per [facility] records.     Codeine-Butalbital-Asa-Caff      This allergy is per [facility] records.     Codeine-Guaifenesin      This allergy is per [facility] records.     Lisinopril Cough     Penicillins Swelling     Current Outpatient Medications   Medication Sig     acetaminophen (TYLENOL) 500 MG tablet Take 2 tablets (1,000 mg total) by mouth 3 (three) times a day. (Patient taking differently: Take 1,000 mg by mouth 3 (three) times a day. And daily prn)     albuterol (PROAIR HFA;PROVENTIL HFA;VENTOLIN HFA) 90 mcg/actuation inhaler Inhale 2 puffs every 4 (four) hours as needed for wheezing.     aspirin 81 MG EC tablet Take " 1 tablet (81 mg total) by mouth 2 (two) times a day.     atorvastatin (LIPITOR) 80 MG tablet TAKE 1 TABLET(80 MG) BY MOUTH DAILY     bumetanide (BUMEX) 2 MG tablet Take 1.5 tablets (3 mg total) by mouth 2 (two) times a day at 9am and 6pm.     colchicine 0.6 mg tablet take two tablets (1.2 mg) by mouth at onset of gout symptoms, then repeat one tablet (0.6 mg) by mouth one hour later     diclofenac sodium (VOLTAREN) 1 % Gel Apply 2 g topically 4 (four) times a day as needed.      ferrous sulfate 325 (65 FE) MG tablet Take 1 tablet by mouth daily.     levothyroxine (SYNTHROID, LEVOTHROID) 150 MCG tablet Take 1 tablet (150 mcg total) by mouth daily before supper.     nystatin (MYCOSTATIN) powder Apply topically 4 (four) times a day.     omeprazole (PRILOSEC) 20 MG capsule Take 1 capsule (20 mg total) by mouth daily before breakfast.     polyethylene glycol (MIRALAX) 17 gram packet Take 17 g by mouth daily as needed.     potassium chloride (K-DUR,KLOR-CON) 20 MEQ tablet Take 40 mEq by mouth daily.     predniSONE (DELTASONE) 2.5 MG tablet Take 7.5 mg/d prednisone PO for 3 weeks.     senna (SENOKOT) 8.6 mg tablet Take 1 tablet by mouth daily.     white petrolatum (AQUAPHOR ORIGINAL) 41 % Oint Apply 1 application topically at bedtime.     Past Medical History:    Past Medical History:   Diagnosis Date     Acute kidney injury superimposed on CKD (H) 3/3/2017     Asthma      CAD (coronary artery disease)      Cataract      Chronic kidney disease     ckd3     COPD (chronic obstructive pulmonary disease) (H)      Diabetes mellitus (H)      Disease of thyroid gland      H/O heart valve replacement with bioprosthetic valve      History of transfusion      Hypertension      Normochromic normocytic anemia      Pulmonary hypertension (H) 09/27/2019    Noted on echocardiogram     Restless leg syndrome            PHYSICAL EXAMINATION  Vitals:    09/07/20 0700   BP: 125/67   Pulse: 60   Resp: 16   Temp: 96.8  F (36  C)   SpO2: 92%    Weight: (!) 230 lb (104.3 kg)       Today on physical exam:     GENERAL: lethargic, obese,  not in any form of acute distress, answers questions appropriately, follows simple commands, conversant  HEENT: Head is normocephalic with normal hair distribution. No evidence of trauma. Ears: No acute purulent discharge. Eyes: Conjunctivae pink with no scleral jaundice. Nose: Normal mucosa and septum. NECK: Supple with no cervical or supraclavicular lymphadenopathy. Trachea is midline. [place], decreased vision  CHEST: No tenderness or deformity, no crepitus, left chest PPM  LUNG: Dim but clear today to auscultation.   Shortness of breath  With exertion but no longer at rest, no cough noted  BACK: No kyphosis of the thoracic spine. Symmetric, no curvature, ROM normal, no CVA tenderness, no spinal tenderness   CVS: irregularly irregular rhythm, murmur,  2+ pulses symmetric in all extremities.  ABDOMEN: Rounded and soft, nontender to palpation, non distended, no masses, no organomegaly, good bowel sounds, no rebound or guarding, no peritoneal signs.   EXTREMITIES: 2-3+ ble pitting edema, lymphedema wrapping, left upper extremity 1+ nonpitting edema no sleeve in place today, left shoulder incision c/d/i, no sling  SKIN: Warm and dry  NEUROLOGICAL: The patient is oriented to person, place and time. Oriented today but a little lethargic, no recent confusion, Forgetful at times.             LABS:   Recent Results (from the past 168 hour(s))   Basic Metabolic Panel   Result Value Ref Range    Sodium 141 136 - 145 mmol/L    Potassium 3.4 (L) 3.5 - 5.0 mmol/L    Chloride 92 (L) 98 - 107 mmol/L    CO2 37 (H) 22 - 31 mmol/L    Anion Gap, Calculation 12 5 - 18 mmol/L    Glucose 98 70 - 125 mg/dL    Calcium 10.5 8.5 - 10.5 mg/dL    BUN 42 (H) 8 - 28 mg/dL    Creatinine 1.90 (H) 0.60 - 1.10 mg/dL    GFR MDRD Af Amer 30 (L) >60 mL/min/1.73m2    GFR MDRD Non Af Amer 25 (L) >60 mL/min/1.73m2   Hemoglobin   Result Value Ref Range    Hemoglobin  9.7 (L) 12.0 - 16.0 g/dL     Results for orders placed or performed in visit on 09/04/20   Basic Metabolic Panel   Result Value Ref Range    Sodium 141 136 - 145 mmol/L    Potassium 3.4 (L) 3.5 - 5.0 mmol/L    Chloride 92 (L) 98 - 107 mmol/L    CO2 37 (H) 22 - 31 mmol/L    Anion Gap, Calculation 12 5 - 18 mmol/L    Glucose 98 70 - 125 mg/dL    Calcium 10.5 8.5 - 10.5 mg/dL    BUN 42 (H) 8 - 28 mg/dL    Creatinine 1.90 (H) 0.60 - 1.10 mg/dL    GFR MDRD Af Amer 30 (L) >60 mL/min/1.73m2    GFR MDRD Non Af Amer 25 (L) >60 mL/min/1.73m2         Lab Results   Component Value Date    WBC 6.9 08/05/2020    HGB 9.7 (L) 09/04/2020    HCT 29.0 (L) 08/05/2020    MCV 98 08/05/2020     08/05/2020       Lab Results   Component Value Date    [id number] 333 07/21/2020     Lab Results   Component Value Date    HGBA1C 6.5 (H) 07/31/2020     Lab Results   Component Value Date    INR 0.99 07/28/2020    INR 1.30 (H) 02/07/2018    INR 1.51 (H) 03/06/2017     Vitamin D, Total (25-Hydroxy)   Date Value Ref Range Status   01/20/2020 45.9 30.0 - 80.0 ng/mL Final     Lab Results   Component Value Date    TSH 3.43 08/17/2020           ASSESSMENT/PLAN:    Left shoulder dislocation, s/p reverse total shoulder arthroplasty: Incision c/d/i. Pain controlled on tylenol.  Ice prn. PT, OT following. voltaren gel prn. F/u with surgeon Dr. Ramirez on 8/14-f/u again in 4 weeks, limit external rotation, no bathing until 4 weeks postop, healing well, f/u again on 9/4-doing well, dc sling, AROM and PROM with therapy, may  Weight bear with walker, f/u again on 10/5.  lymphedema sleeve to LUE. Edema improved today. More soreness since using more.   Chronic CHF: Daily [phone] previously  Now running [phone]. Shortness of breath worse improved, edema improved. Weights are down. Previously  Increased bumex and added metolazone, needs to elevate legs as well. Encouraged to sleep in recliner instead of wheelchair. Continue  lymphedema wrapping. Cardiac diet.   continue metolazone until 9/9, bmp on 9/9. Will re evaluate for further metolazone at that time. Improved today.   S/p PPM: follows with device clinic. No recent concerns.   Asthma:  Encourage cough and deep breathe. IS q1h while awake. Albuterol prn. Shortness of breath with exertion. Stable.   Hypothyroid: on levothyroxine.   CKD stage 3: Cr 1.39, gfr 36 on 8/1.  Bmp today.    H/o Type 2 DM: No meds, no monitoring needed. Diet controlled. No recent concerns.   Dyslipidemia: On aspirin, atorvastatin.   HTN: SBP 120s, On lopressor, bumex.  hold parameters for lopressor. Stable.  GERD: On omeprazole. No concerns today.   Vitamin d deficiency: On vitamin d.  Hypokalemia: On kcl. Increased due to increase bumex and metolazone burst.   PAF, s/p AVR: Regular rate and rhythm today. On metoprolol. F/u with caridology. No concerns.   Anemia of CKD: on iron. Hg 10.9 on 7/24.   Hg 8.8 on 8/1. Hg 9 on 8/5. Recheck on 8/17 8.2, iron to three times a day. recheck on 8/24-hg 8.4.   Candidal intertrigo:  Nystatin.   Constipation: senna daily, miralax daily prn.     PT: trsf min-max A, walking 25ft with trang walker with Min A, OT: LE & hand lymph, mod A UB drsg, max A toileting. Lives with granddaughter. Yellow tag recommend 2 weeks    Electronically signed by: Jennifer Valdes NP

## 2021-06-11 NOTE — PROGRESS NOTES
[facility]    DATE: 2020    NAME:  Thea Acosta             :  1934  MRN: [id number]  CODE STATUS:  DNR    VISIT TYPE: Problem Visit (chf)     FACILITY:  [facility] [[id number]]       CHIEF COMPLAIN/REASON FOR VISIT:    Chief Complaint   Patient presents with     Problem Visit     chf               HISTORY OF PRESENT ILLNESS: Thea Acosta is a 86 y.o. female who admitted - for acute metabolic encephalopathy. This was felt to be s/t hypothyroidism and noncompliance with levothyroxine dosing. She was readmitted - for left shoulder dislocation and underwent reverse total shoulder arthroplasty on . Postop course was uncomplicated and transferred back to [facility]. She has PMH of CAD, CKD, DM, s/p PPM, diastolic CHF, pulmonary artery hypertension, mitral valve stenosis, chronic a fib no anticoagulation, s/p AVR, asthma. Prior to this she lived at home with her grand daughter.     Today Ms. Acosta is seen today for concerns of fluid overload and chf. Her weights are somehwat trending down [phone] but still has significantly worse edema and more shortness of breath. She has been refusing to sleep in her bed and even the recliner last night. She wanted to be completely upright in her wheelchair but appeared very uncomfortable and her legs are much more swollen today. She does have an appointment today with ortho. Her left arm is much more swollen today too. She is seen in her wheelchair today and is a bit groggy. She says she did not sleep well but did not want to be in the recliner because she felt like she couldn't breathe well. Her left arm and shoulder were pinned in her wheelchair and assisted to help  Her elevate this as it is more swollen today. She thinks her pain is ok but has noticed her legs have been increasingly  More swollen lately. Her appetite is ok and no issues with therapy. She does feel short of breath  "at rest today.         REVIEW OF SYSTEMS:  PROBLEMS AND REVIEW OF SYSTEMS:   Review of Systems  Today on ROS:   Currently, no fever, chills, or rigors. Decreased vision and hearing. Denies any chest pain, headaches, palpitations, lightheadedness, dizziness, no cough. Appetite is good.  Denies any abdominal pain. No active bleeding. Positive for urinary incontinence, leg swelling worse today, left arm swelling worse today, sling in place, left shoulder incision, no nausea or vomiting, no constipation, pain controlled, lymphedema wraps, sob      Allergies   Allergen Reactions     Tramadol Anxiety and Other (See Comments)     Per patient, Thea developed psychosis and severe anxiety and agitation \"for three days\" after receiving tramadol in the past. She stated that she would \"never\" take it again and would be extremely upset if she receives it. She asked me to add this to her chart's allergy list specifically.      Codeine Hives     Codeine Phosphate (Bulk)      This allergy is per [facility] records.     Codeine Sulfate      This allergy is per [facility] records.     Codeine-Butalbital-Asa-Caff      This allergy is per [facility] records.     Codeine-Guaifenesin      This allergy is per [facility] records.     Lisinopril Cough     Penicillins Swelling     Current Outpatient Medications   Medication Sig     acetaminophen (TYLENOL) 500 MG tablet Take 2 tablets (1,000 mg total) by mouth 3 (three) times a day. (Patient taking differently: Take 1,000 mg by mouth 3 (three) times a day. And daily prn)     albuterol (PROAIR HFA;PROVENTIL HFA;VENTOLIN HFA) 90 mcg/actuation inhaler Inhale 2 puffs every 4 (four) hours as needed for wheezing.     aspirin 81 MG EC tablet Take 1 tablet (81 mg total) by mouth 2 (two) times a day.     atorvastatin (LIPITOR) 80 MG tablet TAKE 1 TABLET(80 MG) BY MOUTH " DAILY     bumetanide (BUMEX) 2 MG tablet Take 1.5 tablets (3 mg total) by mouth 2 (two) times a day at 9am and 6pm.     colchicine 0.6 mg tablet take two tablets (1.2 mg) by mouth at onset of gout symptoms, then repeat one tablet (0.6 mg) by mouth one hour later     diclofenac sodium (VOLTAREN) 1 % Gel Apply 2 g topically 4 (four) times a day as needed.      ferrous sulfate 325 (65 FE) MG tablet Take 1 tablet by mouth daily.     levothyroxine (SYNTHROID, LEVOTHROID) 150 MCG tablet Take 1 tablet (150 mcg total) by mouth daily before supper.     nystatin (MYCOSTATIN) powder Apply topically 4 (four) times a day.     omeprazole (PRILOSEC) 20 MG capsule Take 1 capsule (20 mg total) by mouth daily before breakfast.     polyethylene glycol (MIRALAX) 17 gram packet Take 17 g by mouth daily as needed.     potassium chloride (K-DUR,KLOR-CON) 20 MEQ tablet Take 40 mEq by mouth daily.     predniSONE (DELTASONE) 2.5 MG tablet Take 7.5 mg/d prednisone PO for 3 weeks.     senna (SENOKOT) 8.6 mg tablet Take 1 tablet by mouth daily.     white petrolatum (AQUAPHOR ORIGINAL) 41 % Oint Apply 1 application topically at bedtime.     Past Medical History:    Past Medical History:   Diagnosis Date     Acute kidney injury superimposed on CKD (H) 3/3/2017     Asthma      CAD (coronary artery disease)      Cataract      Chronic kidney disease     ckd3     COPD (chronic obstructive pulmonary disease) (H)      Diabetes mellitus (H)      Disease of thyroid gland      H/O heart valve replacement with bioprosthetic valve      History of transfusion      Hypertension      Normochromic normocytic anemia      Pulmonary hypertension (H) 09/27/2019    Noted on echocardiogram     Restless leg syndrome            PHYSICAL EXAMINATION  Vitals:    09/04/20 0700   BP: 134/70   Pulse: (!) 59   Resp: 18   Temp: (!) 96.2  F (35.7  C)   SpO2: 91%   Weight: (!) 232 lb (105.2 kg)       Today on physical exam:     GENERAL: lethargic, obese,  not in any form of  acute distress, answers questions appropriately, follows simple commands, conversant  HEENT: Head is normocephalic with normal hair distribution. No evidence of trauma. Ears: No acute purulent discharge. Eyes: Conjunctivae pink with no scleral jaundice. Nose: Normal mucosa and septum. NECK: Supple with no cervical or supraclavicular lymphadenopathy. Trachea is midline. [place], decreased vision  CHEST: No tenderness or deformity, no crepitus, left chest PPM  LUNG: Dim but clear today to auscultation.   Shortness of breath  With exertion and at rest, no cough noted  BACK: No kyphosis of the thoracic spine. Symmetric, no curvature, ROM normal, no CVA tenderness, no spinal tenderness   CVS: irregularly irregular rhythm, murmur,  2+ pulses symmetric in all extremities.  ABDOMEN: Rounded and soft, nontender to palpation, non distended, no masses, no organomegaly, good bowel sounds, no rebound or guarding, no peritoneal signs.   EXTREMITIES: 3+ ble pitting edema, lymphedema wrapping, left upper extremity 2+ pitting edema no sleeve in place today, left shoulder incision c/d/i, left arm sling in place  SKIN: Warm and dry  NEUROLOGICAL: The patient is oriented to person, place and time. Oriented today but a little lethargic, no recent confusion, Forgetful at times.             LABS:   Recent Results (from the past 168 hour(s))   Basic Metabolic Panel   Result Value Ref Range    Sodium 141 136 - 145 mmol/L    Potassium 3.4 (L) 3.5 - 5.0 mmol/L    Chloride 92 (L) 98 - 107 mmol/L    CO2 37 (H) 22 - 31 mmol/L    Anion Gap, Calculation 12 5 - 18 mmol/L    Glucose 98 70 - 125 mg/dL    Calcium 10.5 8.5 - 10.5 mg/dL    BUN 42 (H) 8 - 28 mg/dL    Creatinine 1.90 (H) 0.60 - 1.10 mg/dL    GFR MDRD Af Amer 30 (L) >60 mL/min/1.73m2    GFR MDRD Non Af Amer 25 (L) >60 mL/min/1.73m2   Hemoglobin   Result Value Ref Range    Hemoglobin 9.7 (L) 12.0 - 16.0 g/dL     Results for orders placed or performed in visit on 09/04/20   Basic Metabolic Panel    Result Value Ref Range    Sodium 141 136 - 145 mmol/L    Potassium 3.4 (L) 3.5 - 5.0 mmol/L    Chloride 92 (L) 98 - 107 mmol/L    CO2 37 (H) 22 - 31 mmol/L    Anion Gap, Calculation 12 5 - 18 mmol/L    Glucose 98 70 - 125 mg/dL    Calcium 10.5 8.5 - 10.5 mg/dL    BUN 42 (H) 8 - 28 mg/dL    Creatinine 1.90 (H) 0.60 - 1.10 mg/dL    GFR MDRD Af Amer 30 (L) >60 mL/min/1.73m2    GFR MDRD Non Af Amer 25 (L) >60 mL/min/1.73m2         Lab Results   Component Value Date    WBC 6.9 08/05/2020    HGB 9.7 (L) 09/04/2020    HCT 29.0 (L) 08/05/2020    MCV 98 08/05/2020     08/05/2020       Lab Results   Component Value Date    [id number] 333 07/21/2020     Lab Results   Component Value Date    HGBA1C 6.5 (H) 07/31/2020     Lab Results   Component Value Date    INR 0.99 07/28/2020    INR 1.30 (H) 02/07/2018    INR 1.51 (H) 03/06/2017     Vitamin D, Total (25-Hydroxy)   Date Value Ref Range Status   01/20/2020 45.9 30.0 - 80.0 ng/mL Final     Lab Results   Component Value Date    TSH 3.43 08/17/2020           ASSESSMENT/PLAN:    Left shoulder dislocation, s/p reverse total shoulder arthroplasty: Incision c/d/i. Pain controlled on tylenol.  Ice prn. PT, OT following. voltaren gel prn. F/u with surgeon Dr. Ramirez on 8/14-f/u again in 4 weeks, limit external rotation, no bathing until 4 weeks postop, healing well. Follow up appt today. lymphedema sleeve to LUE. Remains NWB, may remove sling when resting. Assisted to elevate today due to worsening edema. Most likely positional as slept in wheelchair in awkward position.   Asthma:  Encourage cough and deep breathe. IS q1h while awake. Albuterol prn. More shortness of breath today.   Hypothyroid: on levothyroxine.   Chronic CHF: Daily [phone] previously  Now running [phone]. Shortness of breath worse today, edema worse recently. Previously  Increased bumex and added metolazone, needs to elevate legs as well. Encouraged to sleep in recliner instead of  wheelchair. Continue lymphedema wrapping. Cardiac diet.   Will extend metolazone until 9/9, bmp on 9/9.   S/p PPM: follows with device clinic. No recent concerns.   CKD stage 3: Cr 1.39, gfr 36 on 8/1.  Bmp today.    H/o Type 2 DM: No meds, no monitoring needed. Diet controlled. No recent concerns.   Dyslipidemia: On aspirin, atorvastatin.   HTN: SBP 130s, On lopressor, bumex.  hold parameters for lopressor. Stable.  GERD: On omeprazole. No concerns today.   Vitamin d deficiency: On vitamin d.  Hypokalemia: On kcl. Increased due to increase bumex and metolazone burst.   PAF, s/p AVR: Regular rate and rhythm today. On metoprolol. F/u with caridology. No concerns.   Anemia of CKD: on iron. Hg 10.9 on 7/24.   Hg 8.8 on 8/1. Hg 9 on 8/5. Recheck on 8/17 8.2, iron to three times a day. recheck on 8/24-hg 8.4.   Candidal intertrigo:  Nystatin.   Constipation: senna daily, miralax daily prn.     PT: trsf min-max A, walking 25ft with trang walker with Min A, OT: LE & hand lymph, mod A UB drsg, max A toileting. Lives with granddaughter. Yellow tag recommend 2 weeks    Electronically signed by: Jennifer Valdes NP

## 2021-06-11 NOTE — PROGRESS NOTES
"  [facility]      NAME:  Thea Acosta             :  1934    MRN: [id number]    CODE STATUS:  DNR    FACILITY: [facility] [[id number]]    CHIEF COMPLAIN/REASON FOR VISIT:  Chief Complaint   Patient presents with     Review Of Multiple Medical Conditions     rehab status       HISTORY OF PRESENT ILLNESS: Thea Acosta is a 86 y.o. female being seen for review of multiple medical conditions. She comes to the TCU after a stay at [facility]. Per her EMR \" with a past medical history of CAD, CKD, COPD, DM, hypothyroid state, history of bioprosthetic valve replacement, HTN, severe pulmonary artery hypertension, restless leg syndrome, asthma who was admitted on 9/10/2020 for acute change in mental status. Hospital course is notable for significant improvement in her overall condition. Initially patient was found to have evidence of hypoxia and currently she requires 1 L of oxygen to maintain saturations greater than 95%.   Additionally there was concerns of lower extremity cellulitis versus venous stasis. She does have chronic lower extremity edema for which she is on Bumex. Her TSH was elevated, levothyroxine dose increased.\"  Her wt is stable, at 20.1 lb ,noted a decrease but desired due to CHF and lymphedema. She wants to dc home soon, we discussed rehab goals and safety needs prior to her achieving her dc. Noted to have decreased swelling to her left arm than ealier this week, using lymph glove to hand.    Allergies   Allergen Reactions     Tramadol Anxiety and Other (See Comments)     Per patient, Thea developed psychosis and severe anxiety and agitation \"for three days\" after receiving tramadol in the past. She stated that she would \"never\" take it again and would be extremely upset if she receives it. She asked me to add this to her chart's allergy list specifically.      Codeine Hives     Codeine Phosphate (Bulk)      This allergy is per [facility] records.     Codeine Sulfate      This allergy is per [facility] records.     Codeine-Butalbital-Asa-Caff      This allergy is per [facility] records.     Codeine-Guaifenesin      This allergy is per [facility] records.     Lisinopril Cough     Penicillins Swelling   :     Current Outpatient Medications   Medication Sig     acetaminophen (TYLENOL) 500 MG tablet Take 2 tablets (1,000 mg total) by mouth 3 (three) times a day. (Patient taking differently: Take 1,000 mg by mouth 3 (three) times a day as needed. )     albuterol (PROAIR HFA;PROVENTIL HFA;VENTOLIN HFA) 90 mcg/actuation inhaler Inhale 2 puffs every 4 (four) hours as needed for wheezing.     aspirin 81 MG EC tablet Take 81 mg by mouth daily.     atorvastatin (LIPITOR) 80 MG tablet TAKE 1 TABLET(80 MG) BY MOUTH DAILY     bumetanide (BUMEX) 2 MG tablet Take 1 tablet (2 mg total) by mouth 2 (two) times a day at 9am and 6pm. (Patient taking differently: Take 2 mg by mouth 2 (two) times a day at 9am and 6pm. 2mg in am and 1mg at noon)     colchicine 0.6 mg tablet take two tablets (1.2 mg) by mouth at onset of gout symptoms, then repeat one tablet (0.6 mg) by mouth one hour later     diclofenac sodium (VOLTAREN) 1 % Gel Apply 2 g topically 4 (four) times a day as needed.      dimethicone 5 % Crea Apply 1 application topically 2 (two) times a day as needed (to buttocks as needed for skin breakdown).     ferrous sulfate 325 (65 FE) MG tablet Take 1 tablet by mouth daily.     levothyroxine (SYNTHROID, LEVOTHROID) 200 MCG tablet Take 1 tablet (200 mcg total) by mouth daily before supper.     metoprolol tartrate (LOPRESSOR) 25 MG tablet Take 12.5 mg by mouth 2 (two) times a day. Hold for sbp<105  Hr <55     nystatin (MYCOSTATIN) powder Apply topically 4 (four) times a day.     omeprazole (PRILOSEC) 20 MG capsule Take 1 capsule (20 mg total)  by mouth daily before breakfast.     ondansetron (ZOFRAN-ODT) 4 MG disintegrating tablet Take 4 mg by mouth every 6 (six) hours as needed for nausea.     polyethylene glycol (MIRALAX) 17 gram packet Take 17 g by mouth daily as needed.      potassium chloride (K-DUR,KLOR-CON) 20 MEQ tablet Take 40 mEq by mouth daily before breakfast.     rOPINIRole (REQUIP) 0.5 MG tablet Take 0.5 mg by mouth at bedtime.     senna (SENOKOT) 8.6 mg tablet Take 1 tablet by mouth daily. Hold for loose stools     white petrolatum (AQUAPHOR ORIGINAL) 41 % Oint Apply 1 application topically at bedtime.         REVIEW OF SYSTEMS:    Currently, no fever, chills, or rigors. Does not have any visual or hearing problems. Denies any chest pain, headaches, palpitations, lightheadedness, dizziness, shortness of breath, or cough. Appetite is good. Denies any GERD symptoms. Denies any difficulty with swallowing, nausea, or vomiting.  Denies any abdominal pain, diarrhea or constipation. Denies any urinary symptoms. No insomnia. No active bleeding. No rash.       PHYSICAL EXAMINATION:  Vitals:    10/01/20 1628   BP: 130/70   Pulse: (!) 55   Temp: (!) 96.1  F (35.6  C)         GENERAL: Awake, Alert, oriented x3, not in any form of acute distress, answers questions appropriately, follows simple commands, conversant  HEENT: Head is normocephalic with normal hair distribution. No evidence of trauma. Ears: No acute purulent discharge. Eyes: Conjunctivae pink with no scleral jaundice. Nose: Normal mucosa and septum. NECK: Supple with no cervical or supraclavicular lymphadenopathy. Trachea is midline.   EXTREMITIES: Aged  arthritic  joint swelling.Left arm with swelling but ROM UE WN, wearing lymph gloveL. No pedal edema  SKIN: Warm and dry, no erythema noted, no rashes or lesions.  NEUROLOGICAL: The patient is oriented to person, place and time. Strength and sensation are grossly intact. Face is symmetric.        Social distancing and PPE utilized due to  Covid 19            LABS:    Lab Results   Component Value Date    WBC 5.7 09/12/2020    HGB 9.8 (L) 09/25/2020    HCT 30.8 (L) 09/12/2020     (H) 09/12/2020     09/12/2020       Results for orders placed or performed in visit on 09/25/20   Basic Metabolic Panel   Result Value Ref Range    Sodium 137 136 - 145 mmol/L    Potassium 3.9 3.5 - 5.0 mmol/L    Chloride 92 (L) 98 - 107 mmol/L    CO2 32 (H) 22 - 31 mmol/L    Anion Gap, Calculation 13 5 - 18 mmol/L    Glucose 141 (H) 70 - 125 mg/dL    Calcium 9.9 8.5 - 10.5 mg/dL    BUN 52 (H) 8 - 28 mg/dL    Creatinine 1.68 (H) 0.60 - 1.10 mg/dL    GFR MDRD Af Amer 35 (L) >60 mL/min/1.73m2    GFR MDRD Non Af Amer 29 (L) >60 mL/min/1.73m2           Lab Results   Component Value Date    HGBA1C 6.5 (H) 07/31/2020     Vitamin D, Total (25-Hydroxy)   Date Value Ref Range Status   01/20/2020 45.9 30.0 - 80.0 ng/mL Final     Lab Results   Component Value Date    [id number] 468 09/12/2020       ASSESSMENT/PLAN:  1. Lymphedema    2. Physical deconditioning        1.Lymphedema: Wt down around 5 lbs, swelling decreased to left arm. Reports no pain.  2.Physical deconditioning: Continue rehabilitation services.She desires to go home but stll working on therapy goals, we discussed this and concurs it is better to be at her highest functioning level prior to dc.     Continue with current POC and rehab services.    Electronically signed by:  Asuncion Rangel CNP  This progress note was completed using Dragon software and there may be grammatical errors.

## 2021-06-11 NOTE — PROGRESS NOTES
[facility]    DATE: 2020    NAME:  Thea Acosta             :  1934  MRN: [id number]  CODE STATUS:  DNR    VISIT TYPE: Problem Visit (rest less legs, insomnia)     FACILITY:  [facility] [[id number]]       CHIEF COMPLAIN/REASON FOR VISIT:    Chief Complaint   Patient presents with     Problem Visit     rest less legs, insomnia               HISTORY OF PRESENT ILLNESS: Thea Acosta is a 86 y.o. female who admitted - for acute metabolic encephalopathy. This was felt to be s/t hypothyroidism and noncompliance with levothyroxine dosing. She was readmitted - for left shoulder dislocation and underwent reverse total shoulder arthroplasty on . Postop course was uncomplicated and [facility]. She has PMH of CAD, CKD, DM, s/p PPM, diastolic CHF, pulmonary artery hypertension, mitral valve stenosis, chronic a fib no anticoagulation, s/p AVR, asthma. Prior to this she lived at home with her grand daughter. She was readmitted 9/10- for altered mental status. She was treated for possible cellulitis and hypoxic respiratory failure. Her tsh was elevated so levothyroxine dose was increased. She was discharged [facility] to [facility].     Today Ms. Acosta is seen for restless legs and insomnia. She says she has not been sleeping the last few nights because her restless legs are so much worse than usual for her. She is requesting something be started to help her sleep. She says otherwise her breathing has been ok and legs still look good. They were unwrapped this morning and she is waiting for her velcro wraps to be put back on. She is moving her bowels regularly and appetite is good. She has no pain today. She did have blood drawn today and just had some last week so is wondering what is going on. We reviewed her lab results and reason for further lab orders, however discussed bmp was ordered for wed not today and some reason  "nunu today instead. We discussed will check hemoglobin then on Friday instead of Wednesday since they only did the bmp today  To see if she should remain off the aspirin or not. She denies other concerns.       REVIEW OF SYSTEMS:  PROBLEMS AND REVIEW OF SYSTEMS:   Review of Systems  Today on ROS:   Currently, no fever, chills, or rigors. Decreased vision and hearing. Denies any chest pain, headaches, palpitations, lightheadedness, dizziness, no cough. Appetite is good.  Denies any abdominal pain. No active bleeding. Positive for urinary incontinence, urinary frequency, leg swelling improved, left shoulder incision, no nausea, no constipation, pain controlled, velcro leg wraps, no shortness of breath recently, restless legs and insomnia      Allergies   Allergen Reactions     Tramadol Anxiety and Other (See Comments)     Per patient, Thea developed psychosis and severe anxiety and agitation \"for three days\" after receiving tramadol in the past. She stated that she would \"never\" take it again and would be extremely upset if she receives it. She asked me to add this to her chart's allergy list specifically.      Codeine Hives     Codeine Phosphate (Bulk)      This allergy is per [facility] records.     Codeine Sulfate      This allergy is per [facility] records.     Codeine-Butalbital-Asa-Caff      This allergy is per [facility] records.     Codeine-Guaifenesin      This allergy is per [facility] records.     Lisinopril Cough     Penicillins Swelling     Current Outpatient Medications   Medication Sig     rOPINIRole (REQUIP) 0.5 MG tablet Take 0.5 mg by mouth at bedtime.     acetaminophen (TYLENOL) 500 MG tablet Take 2 tablets (1,000 mg total) by mouth 3 (three) times a day. (Patient taking differently: Take 1,000 mg by mouth 3 (three) times a day as needed. )     albuterol (PROAIR " HFA;PROVENTIL HFA;VENTOLIN HFA) 90 mcg/actuation inhaler Inhale 2 puffs every 4 (four) hours as needed for wheezing.     atorvastatin (LIPITOR) 80 MG tablet TAKE 1 TABLET(80 MG) BY MOUTH DAILY     bumetanide (BUMEX) 2 MG tablet Take 1 tablet (2 mg total) by mouth 2 (two) times a day at 9am and 6pm. (Patient taking differently: Take 2 mg by mouth 2 (two) times a day at 9am and 6pm. 2mg in am and 1mg at noon)     colchicine 0.6 mg tablet take two tablets (1.2 mg) by mouth at onset of gout symptoms, then repeat one tablet (0.6 mg) by mouth one hour later     diclofenac sodium (VOLTAREN) 1 % Gel Apply 2 g topically 4 (four) times a day as needed.      dimethicone 5 % Crea Apply 1 application topically 2 (two) times a day as needed (to buttocks as needed for skin breakdown).     ferrous sulfate 325 (65 FE) MG tablet Take 1 tablet by mouth daily.     levothyroxine (SYNTHROID, LEVOTHROID) 200 MCG tablet Take 1 tablet (200 mcg total) by mouth daily before supper.     metoprolol tartrate (LOPRESSOR) 25 MG tablet Take 12.5 mg by mouth 2 (two) times a day. Hold for sbp<105  Hr <55     nystatin (MYCOSTATIN) powder Apply topically 4 (four) times a day.     omeprazole (PRILOSEC) 20 MG capsule Take 1 capsule (20 mg total) by mouth daily before breakfast.     ondansetron (ZOFRAN-ODT) 4 MG disintegrating tablet Take 4 mg by mouth every 6 (six) hours as needed for nausea.     polyethylene glycol (MIRALAX) 17 gram packet Take 17 g by mouth daily.      potassium chloride (K-DUR,KLOR-CON) 20 MEQ tablet Take 40 mEq by mouth daily before breakfast.     senna (SENOKOT) 8.6 mg tablet Take 1 tablet by mouth daily. Hold for loose stools     white petrolatum (AQUAPHOR ORIGINAL) 41 % Oint Apply 1 application topically at bedtime.     Past Medical History:    Past Medical History:   Diagnosis Date     Acute kidney injury superimposed on CKD (H) 3/3/2017     Asthma      CAD (coronary artery disease)      Cataract      Chronic kidney disease      ckd3     COPD (chronic obstructive pulmonary disease) (H)      Diabetes mellitus (H)      Disease of thyroid gland      H/O heart valve replacement with bioprosthetic valve      History of transfusion      Hypertension      Normochromic normocytic anemia      Pulmonary hypertension (H) 09/27/2019    Noted on echocardiogram     Restless leg syndrome            PHYSICAL EXAMINATION  Vitals:    09/21/20 0700   BP: 109/53   Pulse: (!) 52   Resp: 20   Temp: (!) 96.4  F (35.8  C)   SpO2: 94%   Weight: (!) 230 lb (104.3 kg)       Today on physical exam:     GENERAL: lethargic, obese,  not in any form of acute distress, answers questions appropriately, follows simple commands, conversant  HEENT: Head is normocephalic with normal hair distribution. No evidence of trauma. Ears: No acute purulent discharge. Eyes: Conjunctivae pink with no scleral jaundice. Nose: Normal mucosa and septum. NECK: Supple with no cervical or supraclavicular lymphadenopathy. Trachea is midline. [place], decreased vision  CHEST: No tenderness or deformity, no crepitus, left chest PPM  LUNG: Dim but clear today to auscultation.   Shortness of breath with exertion, no cough noted  BACK: No kyphosis of the thoracic spine. Symmetric, no curvature, ROM normal, no CVA tenderness, no spinal tenderness   CVS: irregularly irregular rhythm, murmur,  2+ pulses symmetric in all extremities.  ABDOMEN: Rounded and soft, nontender to palpation, non distended, no masses, no organomegaly, good bowel sounds, no rebound or guarding, no peritoneal signs.   EXTREMITIES: 1-2+ ble nonpitting edema, velcro leg wraps, left shoulder incision healed, rl discoloration ble  SKIN: Warm and dry  NEUROLOGICAL: The patient is oriented to person, place and time.             LABS:   Recent Results (from the past 168 hour(s))   Basic Metabolic Panel   Result Value Ref Range    Sodium 137 136 - 145 mmol/L    Potassium 5.2 (H) 3.5 - 5.0 mmol/L    Chloride 95 (L) 98 - 107 mmol/L    CO2 31  22 - 31 mmol/L    Anion Gap, Calculation 11 5 - 18 mmol/L    Glucose 96 70 - 125 mg/dL    Calcium 10.3 8.5 - 10.5 mg/dL    BUN 43 (H) 8 - 28 mg/dL    Creatinine 2.05 (H) 0.60 - 1.10 mg/dL    GFR MDRD Af Amer 28 (L) >60 mL/min/1.73m2    GFR MDRD Non Af Amer 23 (L) >60 mL/min/1.73m2   Hemoglobin   Result Value Ref Range    Hemoglobin 9.8 (L) 12.0 - 16.0 g/dL   Basic Metabolic Panel   Result Value Ref Range    Sodium 140 136 - 145 mmol/L    Potassium 3.2 (L) 3.5 - 5.0 mmol/L    Chloride 94 (L) 98 - 107 mmol/L    CO2 35 (H) 22 - 31 mmol/L    Anion Gap, Calculation 11 5 - 18 mmol/L    Glucose 118 70 - 125 mg/dL    Calcium 9.7 8.5 - 10.5 mg/dL    BUN 53 (H) 8 - 28 mg/dL    Creatinine 1.93 (H) 0.60 - 1.10 mg/dL    GFR MDRD Af Amer 30 (L) >60 mL/min/1.73m2    GFR MDRD Non Af Amer 25 (L) >60 mL/min/1.73m2     Results for orders placed or performed in visit on 09/21/20   Basic Metabolic Panel   Result Value Ref Range    Sodium 140 136 - 145 mmol/L    Potassium 3.2 (L) 3.5 - 5.0 mmol/L    Chloride 94 (L) 98 - 107 mmol/L    CO2 35 (H) 22 - 31 mmol/L    Anion Gap, Calculation 11 5 - 18 mmol/L    Glucose 118 70 - 125 mg/dL    Calcium 9.7 8.5 - 10.5 mg/dL    BUN 53 (H) 8 - 28 mg/dL    Creatinine 1.93 (H) 0.60 - 1.10 mg/dL    GFR MDRD Af Amer 30 (L) >60 mL/min/1.73m2    GFR MDRD Non Af Amer 25 (L) >60 mL/min/1.73m2         Lab Results   Component Value Date    WBC 5.7 09/12/2020    HGB 9.8 (L) 09/18/2020    HCT 30.8 (L) 09/12/2020     (H) 09/12/2020     09/12/2020       Lab Results   Component Value Date    [id number] 09/12/2020     Lab Results   Component Value Date    HGBA1C 6.5 (H) 07/31/2020     Lab Results   Component Value Date    INR 1.11 (H) 09/10/2020    INR 0.99 07/28/2020    INR 1.30 (H) 02/07/2018     Vitamin D, Total (25-Hydroxy)   Date Value Ref Range Status   01/20/2020 45.9 30.0 - 80.0 ng/mL Final     Lab Results   Component Value Date    TSH 9.38 (H) 09/11/2020           ASSESSMENT/PLAN:      Left  shoulder dislocation, s/p reverse total shoulder arthroplasty: Incision healed. Pain much improved and controlled on tylenol prn. PT, OT following. voltaren gel prn. F/u with surgeon Dr. Ramirez on 8/14-f/u again in 4 weeks, limit external rotation, no bathing until 4 weeks postop, healing well, f/u again on 9/4-doing well, dc sling, AROM and PROM with therapy, may  Next appt scheduled for 10/5. Weight bear with walker, Improving. No pain today. Edema resolved.  Chronic CHF: Daily [phone] then [phone], stable. Unclear why some weights in 205-203lbs range last week. Shortness of breath much improved, edema improved.  On bumex, encourage leg elevation. Cardiac diet. velcro leg wraps.  notify for weight change for 2 lbs in 24 hours or 5lbs in 7 days. Previously reduced bumex and creatinine improved to 1.98 today.   S/p PPM: follows with device clinic. No recent concerns.   Asthma:  Encourage cough and deep breathe. IS q1h while awake. Albuterol prn. Shortness of breath with exertion. Stable.   Hypothyroid: on levothyroxine. tsh 9.38 on 9/11, levothyroxine dose adjusted. recheck tsh in 6 weeks.   CKD stage 3: Cr 1.39, gfr 36 on 8/1.  9/4 cr 1.9, gfr 25. Bmp 9/18-cr 2.05 reduced bumex and recheck on 9/23-however bmp drawn today instead. Improved to 1.98. will recheck on 9/25.   H/o Type 2 DM: No meds, no monitoring needed. Diet controlled. No recent concerns.   Dyslipidemia: On atorvastatin.   HTN: SBP 100s, On bumex, metoprolol. Stable recently.   GERD: On omeprazole. No concerns today.   Vitamin d deficiency: On vitamin d.  Hypokalemia: On kcl. Hyperkalemic today k 5.2 will reduce to 40meq daily and bmp on 9/23-unfortunately was drawn today instead. k dropped to 3.2. will give one time 40kcl dose and recheck on 9/25. Unclear how dropped 2 points with only a 20meq dose reduction.    PAF, s/p AVR: Regular rate and rhythm recently. No recent a fib rvr. Controlled off metoprolol. [facility] daily, completed dvt prophylaxis. Trial off aspirin, recheck hg on 9/23 if no change in anemia will restart.   Anemia of CKD: on iron. Hg 10.9 on 7/24.   Hg 8.8 on 8/1. Hg 9 on 8/5. Recheck on 8/17 8.2, iron to three times a day. recheck on 8/24-hg 8.4. hg 9.7 on 9/4, hg 9.5 on 9/12. Discussed trial off to see if this improves anemia. Hg on 9/25.   Candidal intertrigo:  Nystatin.   Constipation: senna daily, miralax daily. Stable.   Restless legs, insomnia: ordered requip at bedtime.   Nausea:  zofran prn.     Therapy PT: trsf max assist from WC, mod from recliner, walking 10ft with 4WW CGA, OT: min A UB drsg, max A toileting, LB dressing max assist, . Feeding: mod. lcd 2-3 weeks. Yellow tag. Lives with granddaughter.     Electronically signed by: Jennifer Valdes NP

## 2021-06-11 NOTE — PROGRESS NOTES
[facility]    DATE: 2020    NAME:  Thea Acosta             :  1934  MRN: [id number]  CODE STATUS:  DNR    VISIT TYPE: Problem Visit (nausea, constipation)     FACILITY:  [facility] [[id number]]       CHIEF COMPLAIN/REASON FOR VISIT:    Chief Complaint   Patient presents with     Problem Visit     nausea, constipation               HISTORY OF PRESENT ILLNESS: Thea Acosta is a 86 y.o. female who admitted - for acute metabolic encephalopathy. This was felt to be s/t hypothyroidism and noncompliance with levothyroxine dosing. She was readmitted - for left shoulder dislocation and underwent reverse total shoulder arthroplasty on . Postop course was uncomplicated and transferred back to [facility]. She has PMH of CAD, CKD, DM, s/p PPM, diastolic CHF, pulmonary artery hypertension, mitral valve stenosis, chronic a fib no anticoagulation, s/p AVR, asthma. Prior to this she lived at home with her grand daughter. She was readmitted 9/10- for altered mental status. She was treated for possible cellulitis and hypoxic respiratory failure. Her tsh was elevated so levothyroxine dose was increased. She was discharged back to [facility]cu.     Today Ms. Acosta is seen today for nausea and constipation. She says she has not eaten much today because she has felt like she is going to throw up. She thinks they gave her something for it but it has only helped a little. She has no sore throat, heartburn, cough or breathing issues. She thinks she has a runny nose but no fever or chills. She is having constipation. She thinks she took some stool softeners today. She is not having any abdominal pain or bloating. She feels clear headed today and denies any hallucinations or confusion. She says her arms are both sore today. Her legs feel fine. She thinks her legs look the best they have in a long time. She has velcro wraps on today instead of the  "lymphedema wraps. Nursing had requested clarification of her code status and we confirmed she is DNR. Hospital had changed her back to full code but was DNR during last tcu stay. She is asking today if she can get her oxygen off. She thinks it might be contributing to her runny nose and nausea. Her o2 sat was 97% this morning so did turn her off and requested nursing to check her in a little bit.     REVIEW OF SYSTEMS:  PROBLEMS AND REVIEW OF SYSTEMS:   Review of Systems  Today on ROS:   Currently, no fever, chills, or rigors. Decreased vision and hearing. Denies any chest pain, headaches, palpitations, lightheadedness, dizziness, no cough. Appetite is good.  Denies any abdominal pain. No active bleeding. Positive for urinary incontinence, urinary frequency, leg swelling, left shoulder incision, nausea, no constipation, pain controlled, velcro leg wraps, shortness of breath improved      Allergies   Allergen Reactions     Tramadol Anxiety and Other (See Comments)     Per patient, Thea developed psychosis and severe anxiety and agitation \"for three days\" after receiving tramadol in the past. She stated that she would \"never\" take it again and would be extremely upset if she receives it. She asked me to add this to her chart's allergy list specifically.      Codeine Hives     Codeine Phosphate (Bulk)      This allergy is per [facility] records.     Codeine Sulfate      This allergy is per [facility] records.     Codeine-Butalbital-Asa-Caff      This allergy is per [facility] records.     Codeine-Guaifenesin      This allergy is per [facility] records.     Lisinopril Cough     Penicillins Swelling     Current Outpatient Medications   Medication Sig     acetaminophen (TYLENOL) 500 MG tablet Take 2 tablets (1,000 mg total) by mouth 3 (three) times a day. (Patient taking differently: Take " 1,000 mg by mouth 3 (three) times a day as needed. )     albuterol (PROAIR HFA;PROVENTIL HFA;VENTOLIN HFA) 90 mcg/actuation inhaler Inhale 2 puffs every 4 (four) hours as needed for wheezing.     atorvastatin (LIPITOR) 80 MG tablet TAKE 1 TABLET(80 MG) BY MOUTH DAILY     bumetanide (BUMEX) 2 MG tablet Take 1 tablet (2 mg total) by mouth 2 (two) times a day at 9am and 6pm.     colchicine 0.6 mg tablet take two tablets (1.2 mg) by mouth at onset of gout symptoms, then repeat one tablet (0.6 mg) by mouth one hour later     diclofenac sodium (VOLTAREN) 1 % Gel Apply 2 g topically 4 (four) times a day as needed.      dimethicone 5 % Crea Apply 1 application topically 2 (two) times a day as needed (to buttocks as needed for skin breakdown).     ferrous sulfate 325 (65 FE) MG tablet Take 1 tablet by mouth daily.     levothyroxine (SYNTHROID, LEVOTHROID) 200 MCG tablet Take 1 tablet (200 mcg total) by mouth daily before supper.     metoprolol tartrate (LOPRESSOR) 25 MG tablet Take 12.5 mg by mouth 2 (two) times a day. Hold for sbp<105  Hr <55     nystatin (MYCOSTATIN) powder Apply topically 4 (four) times a day.     omeprazole (PRILOSEC) 20 MG capsule Take 1 capsule (20 mg total) by mouth daily before breakfast.     ondansetron (ZOFRAN-ODT) 4 MG disintegrating tablet Take 4 mg by mouth every 6 (six) hours as needed for nausea.     polyethylene glycol (MIRALAX) 17 gram packet Take 17 g by mouth daily.      potassium chloride (K-DUR,KLOR-CON) 20 MEQ tablet Take 20 mEq by mouth daily. At 1400     potassium chloride (K-DUR,KLOR-CON) 20 MEQ tablet Take 40 mEq by mouth daily before breakfast.     senna (SENOKOT) 8.6 mg tablet Take 1 tablet by mouth daily. Hold for loose stools     sulfamethoxazole-trimethoprim (SEPTRA) 400-80 mg per tablet Take 1 tablet by mouth 2 (two) times a day for 2 days.     white petrolatum (AQUAPHOR ORIGINAL) 41 % Oint Apply 1 application topically at bedtime.     Past Medical History:    Past Medical  History:   Diagnosis Date     Acute kidney injury superimposed on CKD (H) 3/3/2017     Asthma      CAD (coronary artery disease)      Cataract      Chronic kidney disease     ckd3     COPD (chronic obstructive pulmonary disease) (H)      Diabetes mellitus (H)      Disease of thyroid gland      H/O heart valve replacement with bioprosthetic valve      History of transfusion      Hypertension      Normochromic normocytic anemia      Pulmonary hypertension (H) 09/27/2019    Noted on echocardiogram     Restless leg syndrome            PHYSICAL EXAMINATION  Vitals:    09/14/20 0700   BP: 138/75   Pulse: (!) 59   Resp: 18   Temp: 96.6  F (35.9  C)   SpO2: 97%   Weight: (!) 230 lb (104.3 kg)       Today on physical exam:     GENERAL: lethargic, obese,  not in any form of acute distress, answers questions appropriately, follows simple commands, conversant  HEENT: Head is normocephalic with normal hair distribution. No evidence of trauma. Ears: No acute purulent discharge. Eyes: Conjunctivae pink with no scleral jaundice. Nose: Normal mucosa and septum. NECK: Supple with no cervical or supraclavicular lymphadenopathy. Trachea is midline. [place], decreased vision, clear rhinorrhea  CHEST: No tenderness or deformity, no crepitus, left chest PPM  LUNG: Dim but clear today to auscultation.   Shortness of breath with exertion, no cough noted  BACK: No kyphosis of the thoracic spine. Symmetric, no curvature, ROM normal, no CVA tenderness, no spinal tenderness   CVS: irregularly irregular rhythm, murmur,  2+ pulses symmetric in all extremities.  ABDOMEN: Rounded and soft, nontender to palpation, non distended, no masses, no organomegaly, good bowel sounds, no rebound or guarding, no peritoneal signs.   EXTREMITIES: 1+ ble nonpitting edema, velcro leg wraps, left shoulder incision c/d/i, no sling, rl discoloration ble  SKIN: Warm and dry  NEUROLOGICAL: The patient is oriented to person, place and time. No confusion or  hallucinations            LABS:   Recent Results (from the past 168 hour(s))   Basic Metabolic Panel   Result Value Ref Range    Sodium 141 136 - 145 mmol/L    Potassium 3.3 (L) 3.5 - 5.0 mmol/L    Chloride 85 (L) 98 - 107 mmol/L    CO2 42 (HH) 22 - 31 mmol/L    Anion Gap, Calculation 14 5 - 18 mmol/L    Glucose 85 70 - 125 mg/dL    Calcium 10.3 8.5 - 10.5 mg/dL    BUN 45 (H) 8 - 28 mg/dL    Creatinine 1.76 (H) 0.60 - 1.10 mg/dL    GFR MDRD Af Amer 33 (L) >60 mL/min/1.73m2    GFR MDRD Non Af Amer 27 (L) >60 mL/min/1.73m2   Hemoglobin   Result Value Ref Range    Hemoglobin 10.2 (L) 12.0 - 16.0 g/dL   Urinalysis   Result Value Ref Range    Color, UA Straw Colorless, Yellow, Straw, Light Yellow    Clarity, UA Clear Clear    Glucose, UA Negative Negative    Bilirubin, UA Negative Negative    Ketones, UA Negative Negative    Specific Gravity, UA 1.007 1.001 - 1.030    Blood, UA Negative Negative    pH, UA 7.5 4.5 - 8.0    Protein, UA Negative Negative mg/dL    Urobilinogen, UA <2.0 E.U./dL <2.0 E.U./dL, 2.0 E.U./dL    Nitrite, UA Negative Negative    Leukocytes, UA Negative Negative   Comprehensive metabolic panel   Result Value Ref Range    Sodium 141 136 - 145 mmol/L    Potassium 3.5 3.5 - 5.0 mmol/L    Chloride 87 (L) 98 - 107 mmol/L    CO2 41 (HH) 22 - 31 mmol/L    Anion Gap, Calculation 13 5 - 18 mmol/L    Glucose 106 70 - 125 mg/dL    BUN 49 (H) 8 - 28 mg/dL    Creatinine 1.92 (H) 0.60 - 1.10 mg/dL    GFR MDRD Af Amer 30 (L) >60 mL/min/1.73m2    GFR MDRD Non Af Amer 25 (L) >60 mL/min/1.73m2    Bilirubin, Total 1.2 (H) 0.0 - 1.0 mg/dL    Calcium 10.1 8.5 - 10.5 mg/dL    Protein, Total 7.3 6.0 - 8.0 g/dL    Albumin 3.6 3.5 - 5.0 g/dL    Alkaline Phosphatase 88 45 - 120 U/L    AST 20 0 - 40 U/L    ALT 10 0 - 45 U/L   Protime-INR   Result Value Ref Range    INR 1.11 (H) 0.90 - 1.10   Troponin I   Result Value Ref Range    Troponin I 0.08 0.00 - 0.29 ng/mL   Blood Gases, Venous   Result Value Ref Range    pH, Venous  7.48 (H) 7.35 - 7.45    pCO2, Venous 63 (H) 35 - 50 mm Hg    pO2, Dandre 43 25 - 47 mm Hg    Base Excess, Venous 23.1 mmol/L    HCO3, Venous 44.1 (H) 24.0 - 30.0 mmol/L    Oxyhemoglobin 76.8 (H) 70.0 - 75.0 %    O2 Sat, Venous 78.8 (H) 70.0 - 75.0 %   Lactic Acid   Result Value Ref Range    Lactic Acid 1.5 0.7 - 2.0 mmol/L   Blood culture from PERIPHERAL SITE    Specimen: Vein, Peripheral; Blood   Result Value Ref Range    Anaerobic Blood Culture Bottle No Growth No Growth, No organisms seen, bottle returned to instrument, Specimen not received, No Growth at 24 hours, No Growth at 48 hours, No Growth at 72 hours, No Growth at 96 hours, No Growth at 120 hours    Aerobic Blood Culture Bottle No Growth No Growth, No organisms seen, bottle returned to instrument, Specimen not received, No Growth at 24 hours, No Growth at 120 hours, No Growth at 48 hours, No Growth at 72 hours, No Growth at 96 hours   C-Reactive Protein   Result Value Ref Range    CRP 0.8 0.0 - 0.8 mg/dL   D-dimer, Quantitative   Result Value Ref Range    D-Dimer, Quant 1.18 (H) <=0.50 FEU ug/mL   HM1 (CBC with Diff)   Result Value Ref Range    WBC 6.0 4.0 - 11.0 thou/uL    RBC 3.24 (L) 3.80 - 5.40 mill/uL    Hemoglobin 10.1 (L) 12.0 - 16.0 g/dL    Hematocrit 33.0 (L) 35.0 - 47.0 %     (H) 80 - 100 fL    MCH 31.2 27.0 - 34.0 pg    MCHC 30.6 (L) 32.0 - 36.0 g/dL    RDW 14.7 (H) 11.0 - 14.5 %    Platelets 170 140 - 440 thou/uL    MPV 10.6 8.5 - 12.5 fL    Neutrophils % 70 50 - 70 %    Lymphocytes % 16 (L) 20 - 40 %    Monocytes % 9 2 - 10 %    Eosinophils % 5 0 - 6 %    Basophils % 1 0 - 2 %    Immature Granulocyte % 0 <=0 %    Neutrophils Absolute 4.2 2.0 - 7.7 thou/uL    Lymphocytes Absolute 1.0 0.8 - 4.4 thou/uL    Monocytes Absolute 0.5 0.0 - 0.9 thou/uL    Eosinophils Absolute 0.3 0.0 - 0.4 thou/uL    Basophils Absolute 0.0 0.0 - 0.2 thou/uL    Immature Granulocyte Absolute 0.0 <=0.0 thou/uL   BNP(B-type Natriuretic Peptide)   Result Value Ref  Range     0 - 167 pg/mL   ECG 12 lead nursing unit performed   Result Value Ref Range    SYSTOLIC BLOOD PRESSURE 108 mmHg    DIASTOLIC BLOOD PRESSURE 52 mmHg    VENTRICULAR RATE 60 BPM    ATRIAL RATE 60 BPM    P-R INTERVAL      QRS DURATION 170 ms    Q-T INTERVAL 554 ms    QTC CALCULATION (BEZET) 554 ms    P Axis      R AXIS 98 degrees    T AXIS 260 degrees    MUSE DIAGNOSIS       Wide QRS rhythm  Rightward axis  Left bundle branch block  Abnormal ECG  When compared with ECG of 28-JUL-2020 15:43,  Wide QRS rhythm has replaced Electronic ventricular pacemaker  Confirmed by SEE ED PROVIDER NOTE FOR, ECG INTERPRETATION ([id number]),  Joana Bernard ([id number]) on 9/14/2020 1:01:54 PM     Urinalysis-UC if Indicated   Result Value Ref Range    Color, UA Yellow Colorless, Yellow, Straw, Light Yellow    Clarity, UA Clear Clear    Glucose, UA Negative Negative    Bilirubin, UA Negative Negative    Ketones, UA Negative Negative    Specific Gravity, UA 1.005 1.001 - 1.030    Blood, UA Small (!) Negative    pH, UA 7.0 4.5 - 8.0    Protein, UA Negative Negative mg/dL    Urobilinogen, UA <2.0 E.U./dL <2.0 E.U./dL, 2.0 E.U./dL    Nitrite, UA Negative Negative    Leukocytes, UA Negative Negative    Bacteria, UA None Seen None Seen hpf    RBC, UA 0-2 None Seen, 0-2 hpf    WBC, UA 0-5 None Seen, 0-5 hpf    Squam Epithel, UA 5-10 (!) None Seen, 0-5 lpf    Mucus, UA Few (!) None Seen lpf    Hyaline Casts, UA 0-5 0-5, None Seen lpf   COVID-19 Virus PCR MRF    Specimen: Respiratory   Result Value Ref Range    COVID-19 VIRUS SPECIMEN SOURCE Nasopharyngeal     2019-nCOV Not Detected    Blood Gases, Arterial   Result Value Ref Range    pH, Arterial 7.46 (H) 7.37 - 7.44    pCO2, Arterial 67 (H) 35 - 45 mm Hg    pO2, Arterial 78 75 - 85 mm Hg    Bicarbonate, Arterial Calc 44.9 (H) 23.0 - 29.0 mmol/L    O2 Sat, Arterial 96.2 (H) 95.0 - 96.0 %    Oxyhemoglobin 93.7 (L) 95.0 - 96.0 %    Base Excess, Arterial Calc 23.4 mmol/L     Ventilation Mode Nasal cannula     Flow 1.0 LPM    Sample Stabilized Temperature 37.0 degrees C   Troponin I   Result Value Ref Range    Troponin I 0.07 0.00 - 0.29 ng/mL   Basic metabolic panel   Result Value Ref Range    Sodium 141 136 - 145 mmol/L    Potassium 3.0 (L) 3.5 - 5.0 mmol/L    Chloride 89 (L) 98 - 107 mmol/L    CO2 39 (H) 22 - 31 mmol/L    Anion Gap, Calculation 13 5 - 18 mmol/L    Glucose 74 70 - 125 mg/dL    Calcium 9.4 8.5 - 10.5 mg/dL    BUN 46 (H) 8 - 28 mg/dL    Creatinine 1.59 (H) 0.60 - 1.10 mg/dL    GFR MDRD Af Amer 37 (L) >60 mL/min/1.73m2    GFR MDRD Non Af Amer 31 (L) >60 mL/min/1.73m2   Hemogram with PLT   Result Value Ref Range    WBC 4.1 4.0 - 11.0 thou/uL    RBC 3.03 (L) 3.80 - 5.40 mill/uL    Hemoglobin 9.5 (L) 12.0 - 16.0 g/dL    Hematocrit 31.2 (L) 35.0 - 47.0 %     (H) 80 - 100 fL    MCH 31.4 27.0 - 34.0 pg    MCHC 30.4 (L) 32.0 - 36.0 g/dL    RDW 14.8 (H) 11.0 - 14.5 %    Platelets 137 (L) 140 - 440 thou/uL    MPV 10.6 8.5 - 12.5 fL   Thyroid Cascade   Result Value Ref Range    TSH 9.38 (H) 0.30 - 5.00 uIU/mL   Reticulocytes   Result Value Ref Range    Retic Absolute Count 0.089 0.010 - 0.110 mill/uL    Retic Ct Pct 2.87 (H) 0.8 - 2.7 %   T4, Free   Result Value Ref Range    Free T4 1.0 0.7 - 1.8 ng/dL   Potassium   Result Value Ref Range    Potassium 3.3 (L) 3.5 - 5.0 mmol/L   Magnesium   Result Value Ref Range    Magnesium 2.1 1.8 - 2.6 mg/dL   Potassium   Result Value Ref Range    Potassium 3.8 3.5 - 5.0 mmol/L   Basic Metabolic Panel   Result Value Ref Range    Sodium 141 136 - 145 mmol/L    Potassium 3.6 3.5 - 5.0 mmol/L    Chloride 91 (L) 98 - 107 mmol/L    CO2 40 (H) 22 - 31 mmol/L    Anion Gap, Calculation 10 5 - 18 mmol/L    Glucose 99 70 - 125 mg/dL    Calcium 9.4 8.5 - 10.5 mg/dL    BUN 46 (H) 8 - 28 mg/dL    Creatinine 1.57 (H) 0.60 - 1.10 mg/dL    GFR MDRD Af Amer 38 (L) >60 mL/min/1.73m2    GFR MDRD Non Af Amer 31 (L) >60 mL/min/1.73m2   HM2(CBC w/o  Differential)   Result Value Ref Range    WBC 5.7 4.0 - 11.0 thou/uL    RBC 2.99 (L) 3.80 - 5.40 mill/uL    Hemoglobin 9.5 (L) 12.0 - 16.0 g/dL    Hematocrit 30.8 (L) 35.0 - 47.0 %     (H) 80 - 100 fL    MCH 31.8 27.0 - 34.0 pg    MCHC 30.8 (L) 32.0 - 36.0 g/dL    RDW 14.6 (H) 11.0 - 14.5 %    Platelets 159 140 - 440 thou/uL    MPV 10.8 8.5 - 12.5 fL   Hepatic Profile   Result Value Ref Range    Bilirubin, Total 0.7 0.0 - 1.0 mg/dL    Bilirubin, Direct 0.3 <=0.5 mg/dL    Protein, Total 6.9 6.0 - 8.0 g/dL    Albumin 3.3 (L) 3.5 - 5.0 g/dL    Alkaline Phosphatase 76 45 - 120 U/L    AST 22 0 - 40 U/L    ALT <9 0 - 45 U/L   Cortisol   Result Value Ref Range    Cortisol 10.9 ug/dL   Folate, Serum   Result Value Ref Range    Folate 13.3 >=3.5 ng/mL   Vitamin B12   Result Value Ref Range    Vitamin B-12 468 213 - 816 pg/mL   Magnesium   Result Value Ref Range    Magnesium 2.0 1.8 - 2.6 mg/dL   Echo Complete   Result Value Ref Range    LV volume diastolic 58.4 46 - 106 cm3    LV volume systolic 25.5 14 - 42 cm3    HR 60 bpm    IVSd 1.22 (!) 0.6 - 0.9 cm    LVIDd 4.66 3.8 - 5.2 cm    LVIDs 2.95 2.2 - 3.5 cm    LVOT diam 1.9 cm    LVOT mean gradient 3 mmHg    LVOT peak VTI 29.8 cm    LVOT mean elena 73.1 cm/s    LVOT peak elena 115 cm/s    LVOT peak gradient 5 mmHg    LV PWd 1.36 (!) 0.6 - 0.9 cm    MV E' lat elena 9.67 cm/s    MV E' med elena 6.19 cm/s    AV mean elena 124 cm/s    AV mean gradient 7 mmHg    AV VTI 50.1 cm    AV peak elena 190 cm/s    AO root 2.7 cm    AO ascending 3.1 cm    LA size 3.8 cm    MV decel slope 5,720 mm/s2    MV decel time 363 ms    MV P 1/2 time 106 ms    MV peak E elena 207 cm/s    MV mean elena 100 cm/s    MV mean gradient 6 mmHg    MV VTI 54.6 cm    MV peak Velocity 215 cm/s    TR peak elena 353 cm/s    Hiegjuanita 67 in    Weight 3,323.2 lbs    /57 mmHg    IVS/PW ratio 0.9     LV FS 36.7 28 - 44 %    Echo LVEF calculated 56 55 - 75 %    LA volume 123.0 mL    LV mass 232.2 g    MV area p 1/2 time 2.1  cm2    LVOT area 2.83 cm2    LVOT SV 84.4 cm3    LV CO 5.1 l/min    LA area 2 34.2 cm2    LA area 1 29.2 cm2    Height 67.0 in    Weight 208 lbs    LA length 6.9 cm    BSA 2.11 m2    TR peak gradent 49.8 mmHg    AV area 1.7 cm2    AV DIM IND elena 0.6     MV area cont eq 1.5 cm2    AV peak gradient 14.4 mmHg    MV peak gradient 18.5 mmHg    LV systolic volume index 12.1 8 - 24 cm3/m2    LV diastolic volume index 27.7 29 - 61 cm3/m2    LA volume index 58.3 mL/m2    LV mass index 110.0 g/m2    LV SVi 40.0 ml/m2    TAPSE 1.7 cm    MV med E/e' ratio 33.4     MV lat E/e' ratio 21.4     LV Ci 2.4 l/min/m2    MV Avg E/e' Ratio 26.1 cm/s    AV DIM IND VTI 0.6     MVA VTI 1.55 cm2   Potassium - Next AM   Result Value Ref Range    Potassium 3.8 3.5 - 5.0 mmol/L   Basic Metabolic Panel   Result Value Ref Range    Sodium 142 136 - 145 mmol/L    Potassium 3.6 3.5 - 5.0 mmol/L    Chloride 93 (L) 98 - 107 mmol/L    CO2 40 (H) 22 - 31 mmol/L    Anion Gap, Calculation 9 5 - 18 mmol/L    Glucose 87 70 - 125 mg/dL    Calcium 10.0 8.5 - 10.5 mg/dL    BUN 39 (H) 8 - 28 mg/dL    Creatinine 1.77 (H) 0.60 - 1.10 mg/dL    GFR MDRD Af Amer 33 (L) >60 mL/min/1.73m2    GFR MDRD Non Af Amer 27 (L) >60 mL/min/1.73m2     Results for orders placed or performed during the hospital encounter of 09/10/20   Basic Metabolic Panel   Result Value Ref Range    Sodium 141 136 - 145 mmol/L    Potassium 3.6 3.5 - 5.0 mmol/L    Chloride 91 (L) 98 - 107 mmol/L    CO2 40 (H) 22 - 31 mmol/L    Anion Gap, Calculation 10 5 - 18 mmol/L    Glucose 99 70 - 125 mg/dL    Calcium 9.4 8.5 - 10.5 mg/dL    BUN 46 (H) 8 - 28 mg/dL    Creatinine 1.57 (H) 0.60 - 1.10 mg/dL    GFR MDRD Af Amer 38 (L) >60 mL/min/1.73m2    GFR MDRD Non Af Amer 31 (L) >60 mL/min/1.73m2         Lab Results   Component Value Date    WBC 5.7 09/12/2020    HGB 9.5 (L) 09/12/2020    HCT 30.8 (L) 09/12/2020     (H) 09/12/2020     09/12/2020       Lab Results   Component Value Date     [id number] 468 09/12/2020     Lab Results   Component Value Date    HGBA1C 6.5 (H) 07/31/2020     Lab Results   Component Value Date    INR 1.11 (H) 09/10/2020    INR 0.99 07/28/2020    INR 1.30 (H) 02/07/2018     Vitamin D, Total (25-Hydroxy)   Date Value Ref Range Status   01/20/2020 45.9 30.0 - 80.0 ng/mL Final     Lab Results   Component Value Date    TSH 9.38 (H) 09/11/2020           ASSESSMENT/PLAN:    Acute hypoxic respiratory failure: improving, changed o2 from 2LNC to RA today, o2 sat 97% on 2LNC. Encourage IS q1h while awake. Clarified o2 orders today to titrate off for sat >88%. Discussed with her and nursing.   BLE Cellulitis: appears resolved today. Complete bactrim 9/15. Edema much improved today.   Left shoulder dislocation, s/p reverse total shoulder arthroplasty: Incision c/d/i. Pain controlled on tylenol, changed to prn.  Ice prn. PT, OT following. voltaren gel prn. F/u with surgeon Dr. Ramirez on 8/14-f/u again in 4 weeks, limit external rotation, no bathing until 4 weeks postop, healing well, f/u again on 9/4-doing well, dc sling, AROM and PROM with therapy, may  Weight bear with walker, f/u again on 10/5. No recent edema concerns. Some arm soreness today.  Chronic CHF: Daily [phone] previously  Now running [phone]. Shortness of breath with exertion, edema improved.  On bumex, encourage leg elevation. Cardiac diet. velcro leg wraps today.  Discussed with nursing and notify for weight change for 2 lbs in 24 hours or 5lbs in 7 days.   S/p PPM: follows with device clinic. No recent concerns.   Asthma:  Encourage cough and deep breathe. IS q1h while awake. Albuterol prn. Shortness of breath with exertion. Stable.   Hypothyroid: on levothyroxine. tsh 9.38 on 9/11, levothyroxine dose adjusted. Will need to recheck tsh in 6 weeks.   CKD stage 3: Cr 1.39, gfr 36 on 8/1.  9/4 cr 1.9, gfr 25. Bmp today.   H/o Type 2 DM: No meds, no monitoring needed. Diet controlled. No  recent concerns.   Dyslipidemia: On atorvastatin.   HTN: SBP 130s, On bumex. Stable.metoprolol previously stopped due to hypotension. Stable off this.   GERD: On omeprazole. No concerns today.   Vitamin d deficiency: On vitamin d.  Hypokalemia: On kcl. Increased due to increase bumex and metolazone burst.   PAF, s/p AVR: Regular rate and rhythm recently. No recent a fib rvr. Controlled off metoprolol. F/u with caridology. No concerns.   Anemia of CKD: on iron. Hg 10.9 on 7/24.   Hg 8.8 on 8/1. Hg 9 on 8/5. Recheck on 8/17 8.2, iron to three times a day. recheck on 8/24-hg 8.4. hg 9.7 on 9/4, hg 9.5 on 9/12. Stable.   Candidal intertrigo:  Nystatin.   Constipation: senna daily, miralax daily prn. More problems recently, changed miralax to daily  Scheduled.   Nausea: ordered zofran prn.     Bmp, hg ordered 9/18.     Therapy re eval today    Electronically signed by: Jennifer Valdes NP       Total floor/unit time spent 36 min with >50% time spent on counseling and coordination of care. Counseling regarding respiratory failure management. Coordinated care with nursing for management of chf, respiratory failure.

## 2021-06-11 NOTE — PROGRESS NOTES
Assessment:    1. Leg wound, right     2. Localized edema     3. Cellulitis of right lower extremity  clindamycin (CLEOCIN) 300 MG capsule   4. Leg weakness, bilateral           Plan:    40 minutes total time with patient, > 50% with counseling and coordination of cares.  Discussed at length wound care for right lower extremity ulceration as well as bilateral lower extremity peripheral edema.  Will continue leg wraps as noted with home health nurse every 2 days.  Did discuss trial of clindamycin 300 mg every 6 hours ×10 days for likely component of cellulitis involving right lower extremity.  Did provide foot soaks and debridement of bilateral lower extremity below the level of the knee as well as debridement of thickened callus on plantar aspect of left foot ×2.  Did complete paperwork for [facility] for manual wheelchair following prior right hip ORIF following intertrochanteric fracture following a fall earlier this year.  Follow-up as scheduled next Tuesday however will arrange for clinic care coordination to assist with home health services in order to provide care in her home with patient resistant to assisted living options.  Consider wound care clinic if persistent concern for lower extremity ulceration or if worsening.        Subjective:    Thea Acosta is seen today for right lower extremity wound check.  Question of infection.  Home health nurse with leg wraps every 2 days apparently.  Wanted this to be looked at.  No fevers or chills.  No nausea or vomiting.  Edema has improved with bilateral leg wraps however hard to imagine using compression stockings by herself in the future.  Resistant to leaving her home and using assisted living facility.  Legs remain weak.  Uses mechanical wheelchair to get around since prior hip fracture requiring ORIF procedure.  No chest pain.  No shortness of breath.  Comprehensive review of systems as above otherwise all negative.     (twice) now since 3/24/07  "(\"Gal\" - renal cell CA) - remarried 3/8/82   3 sons - Nate Ean (); Edwin (head of work force center in [facility]; Amrit Ean works at the [employer] (I see him now as a patient)   Son-in-law Esdras Mo   2 daughters - breast cancer   Granddaughter - Terri - plays the clarinet   New granddaughter - Patrizia?   Holiness  Dad - dec MVA age 52   Mom - dec 86 old age   2 bros - 1 bro  due to lung cancer in Aug, 2010 (had quit smoking > 40-50 years ago)   Quit smoking    No EtOH   Work: laundry services at [employer]   Surgeries: s/p gallbladder, bunion 06 left TKA ([facility]) 07 Aortic valve replacement (bioprostheitc) 07 pacemaker - dual chamber   Cardiovascular surgeon called 07 - patient is \"vasculopath\", prior Hb = 8.9, no Coumadin, will use ASA only 10-20-08: started back on Warfarin, 09 GI bleed, D/C Warfarin   11-10-08: Dexa order faxed to [place] [facility] will schedule     Past Surgical History:   Procedure Laterality Date     APPENDECTOMY       CHOLECYSTECTOMY       EYE SURGERY Bilateral     Cataracts     HIP PINNING Right 3/1/2017    Procedure: RIGHT HIP TROCHANTERIC RODDING;  Surgeon: Moshe Davies MD;  Location: [facility];  Service:      JOINT REPLACEMENT Left     knee     TISSUE AORTIC VALVE REPLACEMENT  2007             Family History   Problem Relation Age of Onset     No Medical Problems Mother      age 86     No Medical Problems Father      MVA     Cancer Brother      lung     No Medical Problems Brother         Past Medical History:   Diagnosis Date     Acute kidney injury superimposed on CKD 3/3/2017     Asthma      CAD (coronary artery disease)      Cataract      Diabetes mellitus      Disease of thyroid gland      H/O heart valve replacement with bioprosthetic valve      History of transfusion      Hypertension         Social History   Substance Use Topics     Smoking status: Former Smoker     Smokeless " tobacco: Never Used     Alcohol use No        Current Outpatient Prescriptions   Medication Sig Dispense Refill     acetaminophen (TYLENOL) 500 MG tablet Take 500 mg by mouth every 6 (six) hours as needed for pain.       aspirin 81 MG EC tablet Take 81 mg by mouth daily.       atorvastatin (LIPITOR) 80 MG tablet Take 1 tablet (80 mg total) by mouth at bedtime. 90 tablet 3     cholecalciferol, vitamin D3, (VITAMIN D3) 2,000 unit Tab Take 2,000 Units by mouth once daily.       fluticasone-salmeterol (ADVAIR) 500-50 mcg/dose DISKUS Inhale 1 puff 2 (two) times a day.       folic acid (FOLVITE) 1 MG tablet Take 1 mg by mouth daily.       furosemide (LASIX) 40 MG tablet Take 2 tablets (80 mg total) by mouth 2 (two) times a day at 9am and 6pm. 360 tablet 3     ipratropium-albuterol (COMBIVENT RESPIMAT)  mcg/actuation Aero inhaler Inhale 1 puff 4 (four) times a day.        ipratropium-albuterol (DUO-NEB) 0.5-2.5 mg/3 mL nebulizer 3 mL 4 (four) times a day.       levothyroxine (SYNTHROID, LEVOTHROID) 125 MCG tablet Take 125 mcg by mouth daily.       loperamide (IMODIUM A-D) 2 mg tablet Take 2 mg by mouth 4 (four) times a day as needed for diarrhea.       melatonin 3 mg Tab tablet Take 3 mg by mouth bedtime as needed.       metOLazone (ZAROXOLYN) 2.5 MG tablet Take 1 tablet (2.5 mg total) by mouth daily. 90 tablet 2     metoprolol tartrate (LOPRESSOR) 100 MG tablet Take 0.5 tablets (50 mg total) by mouth 2 (two) times a day. 90 tablet 3     nystatin (MYCOSTATIN) powder Apply topically 2 (two) times a day. 60 g 2     omeprazole (PRILOSEC) 20 MG capsule Take 20 mg by mouth Daily before breakfast.       potassium chloride SA (K-DUR,KLOR-CON) 20 MEQ tablet Take 1 tablet (20 mEq total) by mouth daily. 90 tablet 3     predniSONE (DELTASONE) 5 MG tablet Take 1 tablet (5 mg total) by mouth daily. 30 tablet 5     senna-docusate (PERICOLACE) 8.6-50 mg tablet Take 1 tablet by mouth daily.       WARFARIN SODIUM (WARFARIN ORAL) Take  3 mg by mouth. hold       clindamycin (CLEOCIN) 300 MG capsule Take 1 capsule (300 mg total) by mouth every 6 (six) hours for 10 days. 40 capsule 0     losartan (COZAAR) 100 MG tablet Take 0.5 tablets (50 mg total) by mouth daily. 45 tablet 3     No current facility-administered medications for this visit.           Objective:    Vitals:    07/18/17 1342   BP: 120/60   Pulse: 60   Temp: 97.8  F (36.6  C)   SpO2: 95%      There is no height or weight on file to calculate BMI.    Alert.  No apparent distress.  Is in wheelchair.  Unable to ambulate.  Bilateral lower extremity with leg wraps removed.  Shallow ulceration involving right anterolateral shin noted with surrounding erythema and serous drainage.  Significant desquamation noted and mattering throughout exam as well as between toes with callus formation lower extremity left foot plantar aspect near MTP joints.  Foot soaks removed and then gentle debridement with washcloths.  Able to clean between toes and remove significant debris around shins and ankles.  Dressings were placed following and patient will have legs rewrapped at home this evening by home health nurse.

## 2021-06-11 NOTE — PROGRESS NOTES
Assessment:    1. Edema  furosemide (LASIX) 40 MG tablet    BNP(B-type Natriuretic Peptide)   2. Chronic Kidney Disease, Stage 3  Basic Metabolic Panel   3. Hypothyroidism, unspecified type     4. Essential hypertension with goal blood pressure less than 140/90  Basic Metabolic Panel   5. Atherosclerosis of coronary artery of native heart without angina pectoris, unspecified vessel or lesion type           Plan:    Improvement noted.  9 pound weight loss since prior office visit June 1, 2017.  Will continue furosemide 80 mg twice daily and follow-up June 21, 2017 for reassessment.  Repeat basic metabolic panel to ensure stable renal function and electrolytes as well as repeat BMP level with history of mild elevation.  Continue remainder of home medication.  Will remain off prednisone which she is now completed after 30 day course since being in TCU.  Breathing remains stable without expiratory wheeze.  Recent vitamin D level normal as well as TSH improving from 9.65 down to 3.5 and parathyroid elevation resolving with PTH 80.  Stable normochromic normocytic anemia with hemoglobin 10.5 with recent creatinine 1.34 and GFR 38.  Continue compression stockings.  Leg elevation.  Increasing activity as tolerated status post right femoral rodding procedure after hip fracture.        Subjective:    Thea Acosta is seen today for follow-up evaluation.  Reviewed office note from June 1, 2017 for additional details of HPI otherwise did increase furosemide from 40 mg a.m. and 20 mg at noon up to 80 mg twice daily.  Increase urinary frequency however tolerating well.  Now using compression stockings.  Decreased leg swelling.  Can see her ankles again.  Sleeping upright with legs extended in a chair.  No orthopnea or PND symptoms.  No significant cough.  No recent illness.  Has recently completed prednisone which she had been taking over the last 30 days.  Wondering about continuation of this medication otherwise no  "respiratory concerns with history of moderate persistent asthma.  Underlying hypertension noted.  Secondary hyperparathyroidism secondary to CKD stage III previously identified.     (twice) now since 3/24/07 (\"Gal\" - renal cell CA) - remarried 3/8/82   3 sons - Nate Ean (); Edwin (head of work force center in [facility]; Amrit Mckoy works at the post office (I see him now as a patient)   Son-in-law Esdras Mo   2 daughters - breast cancer   Granddaughter - Terri - plays the [employer]   New granddaughter - Patrizia?   Religious  Dad - dec MVA age 52   Mom - dec 86 old age   2 bros - 1 bro  due to lung cancer in Aug, 2010 (had quit smoking > 40-50 years ago)   Quit smoking    No EtOH   Work: laundry services at [employer]   Surgeries: s/p gallbladder, bunion 06 left TKA ([facility]) 07 Aortic valve replacement (bioprostheitc) 07 pacemaker - dual chamber   Cardiovascular surgeon called 07 - patient is \"vasculopath\", prior Hb = 8.9, no Coumadin, will use ASA only 10-20-08: started back on Warfarin, 09 GI bleed, D/C Warfarin   11-10-08: Dexa order faxed to Thea [facility]az will schedule     Past Surgical History:   Procedure Laterality Date     APPENDECTOMY       CHOLECYSTECTOMY       EYE SURGERY Bilateral     Cataracts     HIP PINNING Right 3/1/2017    Procedure: RIGHT HIP TROCHANTERIC RODDING;  Surgeon: Moshe Davies MD;  Location: [facility];  Service:      JOINT REPLACEMENT Left     knee     TISSUE AORTIC VALVE REPLACEMENT  2007             Family History   Problem Relation Age of Onset     No Medical Problems Mother      age 86     No Medical Problems Father      MVA     Cancer Brother      lung     No Medical Problems Brother         Past Medical History:   Diagnosis Date     Acute kidney injury superimposed on CKD 3/3/2017     Asthma      CAD (coronary artery disease)      Cataract      Diabetes mellitus      Disease of " thyroid gland      H/O heart valve replacement with bioprosthetic valve      History of transfusion      Hypertension         Social History   Substance Use Topics     Smoking status: Former Smoker     Smokeless tobacco: Never Used     Alcohol use No        Current Outpatient Prescriptions   Medication Sig Dispense Refill     acetaminophen (TYLENOL) 500 MG tablet Take 500 mg by mouth every 6 (six) hours as needed for pain.       aspirin 81 MG EC tablet Take 81 mg by mouth daily.       atorvastatin (LIPITOR) 80 MG tablet Take 80 mg by mouth bedtime.       cholecalciferol, vitamin D3, (VITAMIN D3) 2,000 unit Tab Take 2,000 Units by mouth once daily.       fluticasone-salmeterol (ADVAIR) 500-50 mcg/dose DISKUS Inhale 1 puff 2 (two) times a day.       folic acid (FOLVITE) 1 MG tablet Take 1 mg by mouth daily.       furosemide (LASIX) 40 MG tablet Take 2 tablets (80 mg total) by mouth 2 (two) times a day at 9am and 6pm. 120 tablet 2     ipratropium-albuterol (COMBIVENT RESPIMAT)  mcg/actuation Aero inhaler Inhale 1 puff 4 (four) times a day.        ipratropium-albuterol (DUO-NEB) 0.5-2.5 mg/3 mL nebulizer 3 mL 4 (four) times a day.       levothyroxine (SYNTHROID, LEVOTHROID) 125 MCG tablet Take 125 mcg by mouth daily.       loperamide (IMODIUM A-D) 2 mg tablet Take 2 mg by mouth 4 (four) times a day as needed for diarrhea.       losartan (COZAAR) 100 MG tablet Take 100 mg by mouth daily.       melatonin 3 mg Tab tablet Take 3 mg by mouth bedtime as needed.       metOLazone (ZAROXOLYN) 2.5 MG tablet Take 2.5 mg by mouth daily.       metoprolol tartrate (LOPRESSOR) 100 MG tablet Take 100 mg by mouth 2 (two) times a day.        nystatin (MYCOSTATIN) powder Apply topically 2 (two) times a day. 60 g 2     omeprazole (PRILOSEC) 20 MG capsule Take 20 mg by mouth Daily before breakfast.       predniSONE (DELTASONE) 5 MG tablet Take 1 tablet (5 mg total) by mouth daily. 30 tablet 5     senna-docusate (PERICOLACE) 8.6-50 mg  tablet Take 1 tablet by mouth daily.       WARFARIN SODIUM (WARFARIN ORAL) Take 3 mg by mouth. hold       No current facility-administered medications for this visit.           Objective:    Vitals:    06/08/17 1351   BP: 100/60   Pulse: 60   SpO2: 91%   Weight: (!) 267 lb (121.1 kg)      Body mass index is 41.82 kg/(m^2).    Alert.  No apparent distress.  3+ edema just below the level of knees bilaterally, improved significantly however.  Serous drainage from small vesicle left anterior shin without signs of secondary infection with only mild erythema locally.  No calf tenderness.

## 2021-06-11 NOTE — TELEPHONE ENCOUNTER
[facility] Nurse Triage Telephone Note      Provider: CHILO Quigley  Facility: [facility]    Facility Type: TCU    Caller: Chandni  Call Back Number:  [phone]    Allergies: Tramadol; Codeine; Codeine phosphate (bulk); Codeine sulfate; Codeine-butalbital-asa-caff; Codeine-guaifenesin; Lisinopril; and Penicillins    Reason for call: Nurse reporting BMP results.  Notable meds:  Bumex 2mg Q AM and 1mg Q PM, Metoprolol 12.5mg two times a day, potassium 40meq daily.       Verbal Order/Direction given by Provider: Give an extra dose of potassium 40meq x 1 dose today.  Check BMP on 9/25/20.      Provider giving order: CHILO Quigley    Verbal order given to: Chandni Stephen RN

## 2021-06-11 NOTE — PROGRESS NOTES
[facility]    DATE: 2020    NAME:  Thea Acosta             :  1934  MRN: [id number]  CODE STATUS:  DNR    VISIT TYPE: Problem Visit (headache, diarrhea)     FACILITY:  [facility] [[id number]]       CHIEF COMPLAIN/REASON FOR VISIT:    Chief Complaint   Patient presents with     Problem Visit     headache, diarrhea               HISTORY OF PRESENT ILLNESS: Thea Acosta is a 86 y.o. female who admitted - for acute metabolic encephalopathy. This was felt to be s/t hypothyroidism and noncompliance with levothyroxine dosing. She was readmitted - for left shoulder dislocation and underwent reverse total shoulder arthroplasty on . Postop course was uncomplicated and transferred back to [facility]. She has PMH of CAD, CKD, DM, s/p PPM, diastolic CHF, pulmonary artery hypertension, mitral valve stenosis, chronic a fib no anticoagulation, s/p AVR, asthma. Prior to this she lived at home with her grand daughter. She was readmitted 9/10- for altered mental status. She was treated for possible cellulitis and hypoxic respiratory failure. Her tsh was elevated so levothyroxine dose was increased. She was discharged back to u.     Today Ms. Acosta is seen for concerns of headache and diarrhea today. She is seen in her room today in [facility]. She says she had loose stools all day yesterday and during the night. She is not sure why. She says she is not sure she still wants the regular stool softeners and we discussed changing them to as needed. She says she has also been having a headache and wondering if she could get some tylenol today. She says her legs are doing well and wears her velcro wraps during the day. Her breathing is at her baseline. She thinks they nunu labs on her this morning. We discussed we will see what her hemoglobin is and decide whether to restart aspirin or not. She denies other concerns today but would like  "some tylenol from the nurse if possible.       REVIEW OF SYSTEMS:  PROBLEMS AND REVIEW OF SYSTEMS:   Review of Systems  Today on ROS:   Currently, no fever, chills, or rigors. Decreased vision and hearing. Denies any chest pain, palpitations, lightheadedness, dizziness, no cough. Appetite is good.  Denies any abdominal pain. No active bleeding. Positive for urinary incontinence, urinary frequency, leg swelling improved, left shoulder incision, no nausea, loose stools, pain controlled, velcro leg wraps, no shortness of breath recently, restless legs and insomnia improved, headaches      Allergies   Allergen Reactions     Tramadol Anxiety and Other (See Comments)     Per patient, Thea developed psychosis and severe anxiety and agitation \"for three days\" after receiving tramadol in the past. She stated that she would \"never\" take it again and would be extremely upset if she receives it. She asked me to add this to her chart's allergy list specifically.      Codeine Hives     Codeine Phosphate (Bulk)      This allergy is per [facility] records.     Codeine Sulfate      This allergy is per [facility] records.     Codeine-Butalbital-Asa-Caff      This allergy is per [facility] records.     Codeine-Guaifenesin      This allergy is per [facility] records.     Lisinopril Cough     Penicillins Swelling     Current Outpatient Medications   Medication Sig     aspirin 81 MG EC tablet Take 81 mg by mouth daily.     acetaminophen (TYLENOL) 500 MG tablet Take 2 tablets (1,000 mg total) by mouth 3 (three) times a day. (Patient taking differently: Take 1,000 mg by mouth 3 (three) times a day as needed. )     albuterol (PROAIR HFA;PROVENTIL HFA;VENTOLIN HFA) 90 mcg/actuation inhaler Inhale 2 puffs every 4 (four) hours as needed for wheezing.     atorvastatin (LIPITOR) 80 MG tablet TAKE 1 TABLET(80 MG) " BY MOUTH DAILY     bumetanide (BUMEX) 2 MG tablet Take 1 tablet (2 mg total) by mouth 2 (two) times a day at 9am and 6pm. (Patient taking differently: Take 2 mg by mouth 2 (two) times a day at 9am and 6pm. 2mg in am and 1mg at noon)     colchicine 0.6 mg tablet take two tablets (1.2 mg) by mouth at onset of gout symptoms, then repeat one tablet (0.6 mg) by mouth one hour later     diclofenac sodium (VOLTAREN) 1 % Gel Apply 2 g topically 4 (four) times a day as needed.      dimethicone 5 % Crea Apply 1 application topically 2 (two) times a day as needed (to buttocks as needed for skin breakdown).     ferrous sulfate 325 (65 FE) MG tablet Take 1 tablet by mouth daily.     levothyroxine (SYNTHROID, LEVOTHROID) 200 MCG tablet Take 1 tablet (200 mcg total) by mouth daily before supper.     metoprolol tartrate (LOPRESSOR) 25 MG tablet Take 12.5 mg by mouth 2 (two) times a day. Hold for sbp<105  Hr <55     nystatin (MYCOSTATIN) powder Apply topically 4 (four) times a day.     omeprazole (PRILOSEC) 20 MG capsule Take 1 capsule (20 mg total) by mouth daily before breakfast.     ondansetron (ZOFRAN-ODT) 4 MG disintegrating tablet Take 4 mg by mouth every 6 (six) hours as needed for nausea.     polyethylene glycol (MIRALAX) 17 gram packet Take 17 g by mouth daily as needed.      potassium chloride (K-DUR,KLOR-CON) 20 MEQ tablet Take 40 mEq by mouth daily before breakfast.     rOPINIRole (REQUIP) 0.5 MG tablet Take 0.5 mg by mouth at bedtime.     senna (SENOKOT) 8.6 mg tablet Take 1 tablet by mouth daily. Hold for loose stools     white petrolatum (AQUAPHOR ORIGINAL) 41 % Oint Apply 1 application topically at bedtime.     Past Medical History:    Past Medical History:   Diagnosis Date     Acute kidney injury superimposed on CKD (H) 3/3/2017     Asthma      CAD (coronary artery disease)      Cataract      Chronic kidney disease     ckd3     COPD (chronic obstructive pulmonary disease) (H)      Diabetes mellitus (H)      Disease  of thyroid gland      H/O heart valve replacement with bioprosthetic valve      History of transfusion      Hypertension      Normochromic normocytic anemia      Pulmonary hypertension (H) 09/27/2019    Noted on echocardiogram     Restless leg syndrome            PHYSICAL EXAMINATION  Vitals:    09/25/20 0700   BP: 128/72   Pulse: 60   Resp: 18   Temp: (!) 96.1  F (35.6  C)   SpO2: 91%   Weight: 206 lb (93.4 kg)       Today on physical exam:     GENERAL: lethargic, obese,  not in any form of acute distress, answers questions appropriately, follows simple commands, conversant  HEENT: Head is normocephalic with normal hair distribution. No evidence of trauma. Ears: No acute purulent discharge. Eyes: Conjunctivae pink with no scleral jaundice. Nose: Normal mucosa and septum. NECK: Supple with no cervical or supraclavicular lymphadenopathy. Trachea is midline. [place], decreased vision  CHEST: No tenderness or deformity, no crepitus, left chest PPM  LUNG: Dim but clear today to auscultation.   Shortness of breath with exertion, no cough noted  BACK: No kyphosis of the thoracic spine. Symmetric, no curvature, ROM normal, no CVA tenderness, no spinal tenderness   CVS: irregularly irregular rhythm, murmur,  2+ pulses symmetric in all extremities.  ABDOMEN: Rounded and soft, nontender to palpation, non distended, no masses, no organomegaly, good bowel sounds, no rebound or guarding, no peritoneal signs.   EXTREMITIES: 1-2+ ble nonpitting edema, velcro leg wraps, left shoulder incision healed, rl discoloration ble  SKIN: Warm and dry  NEUROLOGICAL: The patient is oriented to person, place and time.             LABS:   Recent Results (from the past 168 hour(s))   Basic Metabolic Panel   Result Value Ref Range    Sodium 140 136 - 145 mmol/L    Potassium 3.2 (L) 3.5 - 5.0 mmol/L    Chloride 94 (L) 98 - 107 mmol/L    CO2 35 (H) 22 - 31 mmol/L    Anion Gap, Calculation 11 5 - 18 mmol/L    Glucose 118 70 - 125 mg/dL    Calcium 9.7  8.5 - 10.5 mg/dL    BUN 53 (H) 8 - 28 mg/dL    Creatinine 1.93 (H) 0.60 - 1.10 mg/dL    GFR MDRD Af Amer 30 (L) >60 mL/min/1.73m2    GFR MDRD Non Af Amer 25 (L) >60 mL/min/1.73m2   Hemoglobin   Result Value Ref Range    Hemoglobin 9.8 (L) 12.0 - 16.0 g/dL   Basic Metabolic Panel   Result Value Ref Range    Sodium 137 136 - 145 mmol/L    Potassium 3.9 3.5 - 5.0 mmol/L    Chloride 92 (L) 98 - 107 mmol/L    CO2 32 (H) 22 - 31 mmol/L    Anion Gap, Calculation 13 5 - 18 mmol/L    Glucose 141 (H) 70 - 125 mg/dL    Calcium 9.9 8.5 - 10.5 mg/dL    BUN 52 (H) 8 - 28 mg/dL    Creatinine 1.68 (H) 0.60 - 1.10 mg/dL    GFR MDRD Af Amer 35 (L) >60 mL/min/1.73m2    GFR MDRD Non Af Amer 29 (L) >60 mL/min/1.73m2     Results for orders placed or performed in visit on 09/25/20   Basic Metabolic Panel   Result Value Ref Range    Sodium 137 136 - 145 mmol/L    Potassium 3.9 3.5 - 5.0 mmol/L    Chloride 92 (L) 98 - 107 mmol/L    CO2 32 (H) 22 - 31 mmol/L    Anion Gap, Calculation 13 5 - 18 mmol/L    Glucose 141 (H) 70 - 125 mg/dL    Calcium 9.9 8.5 - 10.5 mg/dL    BUN 52 (H) 8 - 28 mg/dL    Creatinine 1.68 (H) 0.60 - 1.10 mg/dL    GFR MDRD Af Amer 35 (L) >60 mL/min/1.73m2    GFR MDRD Non Af Amer 29 (L) >60 mL/min/1.73m2         Lab Results   Component Value Date    WBC 5.7 09/12/2020    HGB 9.8 (L) 09/25/2020    HCT 30.8 (L) 09/12/2020     (H) 09/12/2020     09/12/2020       Lab Results   Component Value Date    [id number] 468 09/12/2020     Lab Results   Component Value Date    HGBA1C 6.5 (H) 07/31/2020     Lab Results   Component Value Date    INR 1.11 (H) 09/10/2020    INR 0.99 07/28/2020    INR 1.30 (H) 02/07/2018     Vitamin D, Total (25-Hydroxy)   Date Value Ref Range Status   01/20/2020 45.9 30.0 - 80.0 ng/mL Final     Lab Results   Component Value Date    TSH 9.38 (H) 09/11/2020           ASSESSMENT/PLAN:      Left shoulder dislocation, s/p reverse total shoulder arthroplasty: Incision healed.  PT, OT following.  voltaren gel prn. F/u with surgeon Dr. Ramirez on 8/14-f/u again in 4 weeks, limit external rotation, no bathing until 4 weeks postop, healing well, f/u again on 9/4-doing well, dc sling, AROM and PROM with therapy, may  Next appt scheduled for 10/5. Weight bear with walker, pain improving. Tylenol prn. Doing well with therapy.   Chronic CHF: Daily [phone] then [phone], stable. Unclear why some weights in 205-203 now 206lbs. Continues to have very inconsistent weights. Shortness of breath much improved, edema improved.  On bumex, encourage leg elevation. Cardiac diet. velcro leg wraps.  notify for weight change for 2 lbs in 24 hours or 5lbs in 7 days. Bmp today.   S/p PPM: follows with device clinic. No recent concerns.   Asthma:  Encourage cough and deep breathe. IS q1h while awake. Albuterol prn. Shortness of breath with exertion. Stable.   Hypothyroid: on levothyroxine. tsh 9.38 on 9/11, levothyroxine dose adjusted. recheck tsh in 6 weeks.   CKD stage 3: Cr 1.39, gfr 36 on 8/1.  9/4 cr 1.9, gfr 25. Bmp 9/18-cr 2.05 reduced bumex, 1.98 on 9/21. Cr 1.68 on 9/25. Improved. Continue current dosing.   H/o Type 2 DM: No meds, no monitoring needed. Diet controlled. No recent concerns.   Dyslipidemia: On atorvastatin.   HTN: SBP 120s, On bumex, metoprolol. Stable recently.   GERD: On omeprazole. No concerns today.   Vitamin d deficiency: On vitamin d.  Hypokalemia: On kcl. Hyperkalemic today k 5.2 will reduce to 40meq daily and bmp on 9/23-unfortunately was drawn today instead. k dropped to 3.2. will give one time 40kcl dose and recheck on 9/25. Unclear how dropped 2 points with only a 20meq dose reduction.  Then recheck 3.9 on 9/25. No further changes.   PAF, s/p AVR: Regular rate and rhythm recently. No recent a fib rvr. Controlled off metoprolol. F/u with caridology. On aspirin daily.   Anemia of CKD: on iron. Hg 10.9 on 7/24.   Hg 8.8 on 8/1. Hg 9 on 8/5. Recheck on 8/17 8.2, iron to  three times a day. recheck on 8/24-hg 8.4. hg 9.7 on 9/4, hg 9.5 on 9/12. Discussed trial off to see if this improves anemia. Hg on 9/25-no change 9.8. will restart aspirin.   Candidal intertrigo:  Nystatin.   Constipation: senna daily, miralax daily. Now having loose stools, change to miralax daily prn.   Restless legs, insomnia: requip at bedtime.   Nausea:  zofran prn.   Headache: tylenol prn.     Therapy PT: trsf max assist from WC, mod from recliner, walking 10ft with 4WW CGA, OT: min A UB drsg, max A toileting, LB dressing max assist, . Feeding: mod. lcd 2-3 weeks. Yellow tag. Lives with granddaughter.     Electronically signed by: Jennifer Valdes NP

## 2021-06-11 NOTE — PROGRESS NOTES
[facility]    DATE: 2020    NAME:  Thea Acosta             :  1934  MRN: [id number]  CODE STATUS:  DNR    VISIT TYPE: Problem Visit (weight gain, edema)     FACILITY:  [facility] [[id number]]       CHIEF COMPLAIN/REASON FOR VISIT:    Chief Complaint   Patient presents with     Problem Visit     weight gain, edema               HISTORY OF PRESENT ILLNESS: Thea Acosta is a 86 y.o. female who admitted - for acute metabolic encephalopathy. This was felt to be s/t hypothyroidism and noncompliance with levothyroxine dosing. She was readmitted - for left shoulder dislocation and underwent reverse total shoulder arthroplasty on . Postop course was uncomplicated and transferred back to [facility]. She has PMH of CAD, CKD, DM, s/p PPM, diastolic CHF, pulmonary artery hypertension, mitral valve stenosis, chronic a fib no anticoagulation, s/p AVR, asthma. Prior to this she lived at home with her grand daughter.     Today Ms. Acosta is seen today for concerns of weight gain, edema. Her weights were very erratic over weekend from [phone]. Requested staff to re weigh her today. She does appear to have more edema today. Staff also noted she complained of right elbow pain yesterday with therapy and xray was ordered but has not been done yet. She is seen in her recliner today. She says her breathing is about the same but has noticed her left arm and both legs are much more swollen today. We did discuss that she was sleeping in her recliner with legs down  on arrival in her room. We discussed the importance of elevating her legs especially at night. She says her legs are due to be rewrapped today. She denies any other concerns over the weekend. She does not complain of any  Pain in the right arm on exam.         REVIEW OF SYSTEMS:  PROBLEMS AND REVIEW OF SYSTEMS:   Review of Systems  Today on ROS:   Currently, no fever, chills, or  "rigors. Decreased vision and hearing. Denies any chest pain, headaches, palpitations, lightheadedness, dizziness, no cough, no sob. Appetite is good.  Denies any abdominal pain. No active bleeding. Positive for urinary incontinence, leg swelling worse today, left arm swelling worse today, sling in place, left shoulder incision, no nausea or vomiting, no constipation, pain controlled, lymphedema wraps      Allergies   Allergen Reactions     Tramadol Anxiety and Other (See Comments)     Per patient, Thea developed psychosis and severe anxiety and agitation \"for three days\" after receiving tramadol in the past. She stated that she would \"never\" take it again and would be extremely upset if she receives it. She asked me to add this to her chart's allergy list specifically.      Codeine Hives     Codeine Phosphate (Bulk)      This allergy is per [facility] records.     Codeine Sulfate      This allergy is per [facility] records.     Codeine-Butalbital-Asa-Caff      This allergy is per [facility] records.     Codeine-Guaifenesin      This allergy is per [facility] records.     Lisinopril Cough     Penicillins Swelling     Current Outpatient Medications   Medication Sig     acetaminophen (TYLENOL) 500 MG tablet Take 2 tablets (1,000 mg total) by mouth 3 (three) times a day. (Patient taking differently: Take 1,000 mg by mouth 3 (three) times a day. And daily prn)     albuterol (PROAIR HFA;PROVENTIL HFA;VENTOLIN HFA) 90 mcg/actuation inhaler Inhale 2 puffs every 4 (four) hours as needed for wheezing.     aspirin 81 MG EC tablet Take 1 tablet (81 mg total) by mouth 2 (two) times a day.     atorvastatin (LIPITOR) 80 MG tablet TAKE 1 TABLET(80 MG) BY MOUTH DAILY     bumetanide (BUMEX) 2 MG tablet Take 1.5 tablets (3 mg total) by mouth 2 (two) times a day at 9am and 6pm.     colchicine 0.6 mg " tablet take two tablets (1.2 mg) by mouth at onset of gout symptoms, then repeat one tablet (0.6 mg) by mouth one hour later     diclofenac sodium (VOLTAREN) 1 % Gel Apply 2 g topically 4 (four) times a day as needed.      ferrous sulfate 325 (65 FE) MG tablet TAKE 1 TABLET(325 MG) BY MOUTH TWICE DAILY (Patient taking differently: Take 1 tablet by mouth 3 (three) times a day with meals. )     levothyroxine (SYNTHROID, LEVOTHROID) 150 MCG tablet Take 1 tablet (150 mcg total) by mouth daily before supper.     nystatin (MYCOSTATIN) powder Apply topically 4 (four) times a day.     omeprazole (PRILOSEC) 20 MG capsule Take 1 capsule (20 mg total) by mouth daily before breakfast.     polyethylene glycol (MIRALAX) 17 gram packet Take 17 g by mouth daily as needed.     potassium chloride (K-DUR,KLOR-CON) 20 MEQ tablet Take 20 mEq by mouth daily.      predniSONE (DELTASONE) 2.5 MG tablet Take 7.5 mg/d prednisone PO for 3 weeks.     senna (SENOKOT) 8.6 mg tablet Take 1 tablet by mouth daily.     white petrolatum (AQUAPHOR ORIGINAL) 41 % Oint Apply 1 application topically at bedtime.     Past Medical History:    Past Medical History:   Diagnosis Date     Acute kidney injury superimposed on CKD (H) 3/3/2017     Asthma      CAD (coronary artery disease)      Cataract      Chronic kidney disease     ckd3     COPD (chronic obstructive pulmonary disease) (H)      Diabetes mellitus (H)      Disease of thyroid gland      H/O heart valve replacement with bioprosthetic valve      History of transfusion      Hypertension      Normochromic normocytic anemia      Pulmonary hypertension (H) 09/27/2019    Noted on echocardiogram     Restless leg syndrome            PHYSICAL EXAMINATION  Vitals:    08/31/20 0700   BP: 125/53   Pulse: (!) 57   Resp: 18   Temp: 98  F (36.7  C)   SpO2: 91%   Weight: (!) 236 lb (107 kg)       Today on physical exam:     GENERAL: Awake, Alert, obese,  not in any form of acute distress, answers questions  appropriately, follows simple commands, conversant  HEENT: Head is normocephalic with normal hair distribution. No evidence of trauma. Ears: No acute purulent discharge. Eyes: Conjunctivae pink with no scleral jaundice. Nose: Normal mucosa and septum. NECK: Supple with no cervical or supraclavicular lymphadenopathy. Trachea is midline. [place], decreased vision  CHEST: No tenderness or deformity, no crepitus, left chest PPM  LUNG: Dim but clear today to auscultation.   No shortness of breath noted today, no cough noted  BACK: No kyphosis of the thoracic spine. Symmetric, no curvature, ROM normal, no CVA tenderness, no spinal tenderness   CVS: irregularly irregular rhythm, murmur,  2+ pulses symmetric in all extremities.  ABDOMEN: Rounded and soft, nontender to palpation, non distended, no masses, no organomegaly, good bowel sounds, no rebound or guarding, no peritoneal signs.   EXTREMITIES: 3+ ble pitting edema, lymphedema wrapping, left upper extremity 2+ pitting edema no sleeve in place today, left shoulder incision c/d/i, left arm sling in place  SKIN: Warm and dry  NEUROLOGICAL: The patient is oriented to person, place and time. Oriented today, no recent confusion, Forgetful at times.             LABS:   No results found for this or any previous visit (from the past 168 hour(s)).  Results for orders placed or performed in visit on 08/17/20   Basic Metabolic Panel   Result Value Ref Range    Sodium 141 136 - 145 mmol/L    Potassium 3.6 3.5 - 5.0 mmol/L    Chloride 98 98 - 107 mmol/L    CO2 35 (H) 22 - 31 mmol/L    Anion Gap, Calculation 8 5 - 18 mmol/L    Glucose 114 70 - 125 mg/dL    Calcium 9.2 8.5 - 10.5 mg/dL    BUN 37 (H) 8 - 28 mg/dL    Creatinine 1.47 (H) 0.60 - 1.10 mg/dL    GFR MDRD Af Amer 41 (L) >60 mL/min/1.73m2    GFR MDRD Non Af Amer 34 (L) >60 mL/min/1.73m2         Lab Results   Component Value Date    WBC 6.9 08/05/2020    HGB 8.4 (L) 08/24/2020    HCT 29.0 (L) 08/05/2020    MCV 98 08/05/2020    PLT  175 08/05/2020       Lab Results   Component Value Date    [id number] 333 07/21/2020     Lab Results   Component Value Date    HGBA1C 6.5 (H) 07/31/2020     Lab Results   Component Value Date    INR 0.99 07/28/2020    INR 1.30 (H) 02/07/2018    INR 1.51 (H) 03/06/2017     Vitamin D, Total (25-Hydroxy)   Date Value Ref Range Status   01/20/2020 45.9 30.0 - 80.0 ng/mL Final     Lab Results   Component Value Date    TSH 3.43 08/17/2020           ASSESSMENT/PLAN:    Left shoulder dislocation, s/p reverse total shoulder arthroplasty: Incision c/d/i. Pain controlled on tylenol.  Ice prn. PT, OT following. voltaren gel prn. F/u with surgeon Dr. Ramirez on 8/14-f/u again in 4 weeks, limit external rotation, no bathing until 4 weeks postop, healing well.  lymphedema sleeve to LUE. Overall improving. Remains NWB, may remove sling when resting. Encourage to elevate and wear sleeve as edema worse today.  Asthma:  Encourage cough and deep breathe. IS q1h while awake. Albuterol prn. Shortness of breath at baseline today.   Hypothyroid: on levothyroxine.   Chronic CHF: Daily [phone] was stable then erratic over weekend with readings of 250 and 236lbs. Shortness of breath at baseline, but edema worse today 3+ pitting ble edema. On bumex 2mg two times a day, keep legs elevated when possible.  F/u with cardiology prn. Cardiac diet.  Lymphedema wrapping. Discussed with her and nursing Concerns for keeping legs in dependent position. Will increase bumex to 4mg two times a day today, metolazone daily x 5 days, bmp on 9/4, increase kcl to 40 x 5 days.   S/p PPM: follows with device clinic. No recent concerns.   CKD stage 3: Cr 1.39, gfr 36 on 8/1. Stable.   H/o Type 2 DM: No meds, no monitoring needed. Diet controlled. No recent concerns.   Dyslipidemia: On aspirin, atorvastatin.   HTN: SBP 120s, On lopressor, bumex.  hold parameters for lopressor. Stable.  GERD: On omeprazole. No concerns today.   Vitamin d deficiency: On  vitamin d.  Hypokalemia: On kcl.  PAF, s/p AVR: Regular rate and rhythm today. On metoprolol. F/u with caridology. No concerns.   Anemia of CKD: on iron. Hg 10.9 on 7/24.   Hg 8.8 on 8/1. Hg 9 on 8/5. Recheck on 8/17 8.2, iron to three times a day. recheck on 8/24-hg 8.4.   Candidal intertrigo:  Nystatin.   Constipation: senna daily, miralax daily prn.  RUE pain: per staff had  Right elbow pain in therapy yesterday and on call ordered xray. No edema, erythema or ecchymosis today. No complaints of pain today on exam.      PT: trsf min-max A, walking 25ft with trang walker with Min A, OT: LE & hand lymph, mod A UB drsg, max A toileting. Lives with granddaughter. Yellow tag recommend 2 weeks    Electronically signed by: Jennifer Valdes NP     Total floor/unit time spent 36 min with >50% time spent on counseling and coordination of care. Counseling regarding chf exacerbation. Coordinated care with nursing for management of rue pain, chf management.

## 2021-06-11 NOTE — PROGRESS NOTES
"  [facility]      NAME:  Thea Acosta             :  1934    MRN: [id number]    CODE STATUS:  DNR    FACILITY: ANSWER ROOMING ACTIVITY QUESTION    CHIEF COMPLAIN/REASON FOR VISIT:  Chief Complaint   Patient presents with     Review Of Multiple Medical Conditions     hypothyroidism       HISTORY OF PRESENT ILLNESS: Thea Acosta is a 86 y.o. female being seen for review of multiple medical conditions. She comes to the TCU after a stay at [facility]. Per her EMR \" with a past medical history of CAD, CKD, COPD, DM, hypothyroid state, history of bioprosthetic valve replacement, HTN, severe pulmonary artery hypertension, restless leg syndrome, asthma who was admitted on 9/10/2020 for acute change in mental status. Hospital course is notable for significant improvement in her overall condition. Initially patient was found to have evidence of hypoxia and currently she requires 1 L of oxygen to maintain saturations greater than 95%.   Additionally there was concerns of lower extremity cellulitis versus venous stasis. She does have chronic lower extremity edema for which she is on Bumex. Her TSH was elevated, levothyroxine dose increased.\"  Her wt is stable, up 1 pound in the past week. We reviewed her rehab stauts, her hypothyroidism as well as need for ongoing TSH level early October. Reports feeling more energy but residual fatigue.      Allergies   Allergen Reactions     Tramadol Anxiety and Other (See Comments)     Per patient, Thea developed psychosis and severe anxiety and agitation \"for three days\" after receiving tramadol in the past. She stated that she would \"never\" take it again and would be extremely upset if she receives it. She asked me to add this to her chart's allergy list specifically.      Codeine Hives     Codeine Phosphate (Bulk)      This allergy is per [facility] records.     Codeine Sulfate      This allergy is per [facility] records.     Codeine-Butalbital-Asa-Caff      This allergy is per [facility] records.     Codeine-Guaifenesin      This allergy is per [facility] records.     Lisinopril Cough     Penicillins Swelling   :     Current Outpatient Medications   Medication Sig     acetaminophen (TYLENOL) 500 MG tablet Take 2 tablets (1,000 mg total) by mouth 3 (three) times a day. (Patient taking differently: Take 1,000 mg by mouth 3 (three) times a day as needed. )     albuterol (PROAIR HFA;PROVENTIL HFA;VENTOLIN HFA) 90 mcg/actuation inhaler Inhale 2 puffs every 4 (four) hours as needed for wheezing.     aspirin 81 MG EC tablet Take 81 mg by mouth daily.     atorvastatin (LIPITOR) 80 MG tablet TAKE 1 TABLET(80 MG) BY MOUTH DAILY     bumetanide (BUMEX) 2 MG tablet Take 1 tablet (2 mg total) by mouth 2 (two) times a day at 9am and 6pm. (Patient taking differently: Take 2 mg by mouth 2 (two) times a day at 9am and 6pm. 2mg in am and 1mg at noon)     colchicine 0.6 mg tablet take two tablets (1.2 mg) by mouth at onset of gout symptoms, then repeat one tablet (0.6 mg) by mouth one hour later     diclofenac sodium (VOLTAREN) 1 % Gel Apply 2 g topically 4 (four) times a day as needed.      dimethicone 5 % Crea Apply 1 application topically 2 (two) times a day as needed (to buttocks as needed for skin breakdown).     ferrous sulfate 325 (65 FE) MG tablet Take 1 tablet by mouth daily.     levothyroxine (SYNTHROID, LEVOTHROID) 200 MCG tablet Take 1 tablet (200 mcg total) by mouth daily before supper.     metoprolol tartrate (LOPRESSOR) 25 MG tablet Take 12.5 mg by mouth 2 (two) times a day. Hold for sbp<105  Hr <55     nystatin (MYCOSTATIN) powder Apply topically 4 (four) times a day.     omeprazole (PRILOSEC) 20 MG capsule Take 1 capsule (20 mg total) by mouth daily before breakfast.     ondansetron (ZOFRAN-ODT) 4 MG disintegrating tablet Take  4 mg by mouth every 6 (six) hours as needed for nausea.     polyethylene glycol (MIRALAX) 17 gram packet Take 17 g by mouth daily as needed.      potassium chloride (K-DUR,KLOR-CON) 20 MEQ tablet Take 40 mEq by mouth daily before breakfast.     rOPINIRole (REQUIP) 0.5 MG tablet Take 0.5 mg by mouth at bedtime.     senna (SENOKOT) 8.6 mg tablet Take 1 tablet by mouth daily. Hold for loose stools     white petrolatum (AQUAPHOR ORIGINAL) 41 % Oint Apply 1 application topically at bedtime.         REVIEW OF SYSTEMS:    Currently, no fever, chills, or rigors. Does not have any visual or hearing problems. Denies any chest pain, headaches, palpitations, lightheadedness, dizziness, shortness of breath, or cough. Appetite is good. Denies any GERD symptoms. Denies any difficulty with swallowing, nausea, or vomiting.  Denies any abdominal pain, diarrhea or constipation. Denies any urinary symptoms. No insomnia. No active bleeding. No rash.       PHYSICAL EXAMINATION:  Vitals:    09/30/20 0613   BP: 116/76   Pulse: 97   Temp: 96.9  F (36.1  C)   Weight: 207 lb (93.9 kg)         GENERAL: Awake, Alert, oriented x3, not in any form of acute distress, answers questions appropriately, follows simple commands, conversant  HEENT: Head is normocephalic with normal hair distribution. No evidence of trauma. Ears: No acute purulent discharge. Eyes: Conjunctivae pink with no scleral jaundice. Nose: Normal mucosa and septum. NECK: Supple with no cervical or supraclavicular lymphadenopathy. Trachea is midline.   EXTREMITIES: Aged  arthritic  joint swelling.Left arm with swelling but ROM UE WNL. No pedal edema  SKIN: Warm and dry, no erythema noted, no rashes or lesions.  NEUROLOGICAL: The patient is oriented to person, place and time. Strength and sensation are grossly intact. Face is symmetric.        Social distancing and PPE utilized due to Covid 19            LABS:    Lab Results   Component Value Date    WBC 5.7 09/12/2020    HGB 9.8  (L) 09/25/2020    HCT 30.8 (L) 09/12/2020     (H) 09/12/2020     09/12/2020       Results for orders placed or performed in visit on 09/25/20   Basic Metabolic Panel   Result Value Ref Range    Sodium 137 136 - 145 mmol/L    Potassium 3.9 3.5 - 5.0 mmol/L    Chloride 92 (L) 98 - 107 mmol/L    CO2 32 (H) 22 - 31 mmol/L    Anion Gap, Calculation 13 5 - 18 mmol/L    Glucose 141 (H) 70 - 125 mg/dL    Calcium 9.9 8.5 - 10.5 mg/dL    BUN 52 (H) 8 - 28 mg/dL    Creatinine 1.68 (H) 0.60 - 1.10 mg/dL    GFR MDRD Af Amer 35 (L) >60 mL/min/1.73m2    GFR MDRD Non Af Amer 29 (L) >60 mL/min/1.73m2           Lab Results   Component Value Date    HGBA1C 6.5 (H) 07/31/2020     Vitamin D, Total (25-Hydroxy)   Date Value Ref Range Status   01/20/2020 45.9 30.0 - 80.0 ng/mL Final     Lab Results   Component Value Date    AISAKSRT33 468 09/12/2020       ASSESSMENT/PLAN:  1. Hypothyroidism, unspecified type    2. CHF (congestive heart failure), NYHA class I, acute, systolic (H)        1.Hypothyroidism: Med adjusted in acute care 9/11. We will look at TSH level next week and prn. Some fatigue but reports overall feeling well.    2. CHF: 02 02 at 1-2l, her wt is watched, up 1 pound in a week, no excessive edema observed.BMP on 9/25 with sliht elevation in C02 at 32, BNP on 9/10 at 167.  Continue with current POC and rehab services.    Electronically signed by:  Asuncion Rangel CNP  This progress note was completed using Dragon software and there may be grammatical errors.      45  minutes spent of which greater than 55 % was face to face communication with the patient about above plan of care including her rehab progress, med management and  Need for compliance with meds for her overall well being.

## 2021-06-11 NOTE — PROGRESS NOTES
Assessment:    1. Essential hypertension  losartan (COZAAR) 100 MG tablet    metoprolol tartrate (LOPRESSOR) 100 MG tablet    Basic Metabolic Panel   2. Hypotension     3. Edema, unspecified type  furosemide (LASIX) 40 MG tablet    Basic Metabolic Panel   4. Chronic kidney disease (CKD), stage 4 (severe)  Basic Metabolic Panel   5. Normochromic normocytic anemia  HM2(CBC w/o Differential)         Plan:    40 minutes total time with patient, > 50% with counseling and coordination of cares.  Did remove wrapping from right lower extremity to exposed right anterolateral shin with shallow ulceration only with surrounding erythema and scaling noted however significant improvement in peripheral edema management.  To check basic metabolic panel today while on furosemide 80 mg twice daily and metolazone 2.5 mg daily.  Will decrease losartan 100 mg down to half tablet daily as well as decreasing metoprolol tartrate 100 mg twice daily down to half tablet twice daily.  Reassess blood pressure at follow-up with blood pressure on recheck today 84/38 with concern regarding fatigue associated with hypotension otherwise currently asymptomatic.  Repeat CBC to ensure stable normochromic normocytic anemia as well.  Refill provided on furosemide per patient request.  Follow-up in office in 2 weeks, sooner with concerns.  Continue wound care, leg wrapping for lymphedema management, etc.  Did review advanced directives which were completed and submitted today.  Asthma control test 19 out of 25 with lungs otherwise clear to auscultation on exam.        Subjective:    Thea Acosta is seen today for follow-up evaluation.  Peripheral edema concerns.  Continues furosemide 80 mg twice daily.  Addition of metolazone 2.5 mg daily at prior office visit June 21, 2017.  Tolerating fine.  Weight decrease of 11 pounds noted.  Continues lower extremity wrapping for edema management.  Nurse wants leg looked at today otherwise Thea has not noticed  "any significant increased pain, drainage, etc.  No fevers or chills.  No calf tenderness described.  Denies orthopnea or PND symptoms.  Tries to elevate legs as she is able but typically not above level of heart.  History of CKD stage IV.  Also normochromic normocytic anemia likely secondary to CKD.  Blood pressures in the 60s over 40s at times when nurse checks outside of office.  Does admit to poor energy.  No recent illness.  No fevers or chills.  Comprehensive review of systems as above otherwise all negative.     (twice) now since 3/24/07 (\"Gal\" - renal cell CA) - remarried 3/8/82   3 sons - Nate Mckoy (); Edwin (head of work force center in [facility]; Amrit Mckoy works at the [employer] (I see him now as a patient)   Son-in-law Esdras Mo   2 daughters - breast cancer   Granddaughter - Terri - plays the clarinet   New granddaughter - Patrizia?   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[employer] [place]   Surgeries: s/p gallbladder, bunion 06 left TKA ([facility]) 07 Aortic valve replacement (bioprostheitc) 07 pacemaker - dual chamber   Cardiovascular surgeon called 07 - patient is \"vasculopath\", prior Hb = 8.9, no Coumadin, will use ASA only 10-20-08: started back on Warfarin, 09 GI bleed, D/C Warfarin   11-10-08: Dexa order faxed to Thea Ordaz will schedule     Past Surgical History:   Procedure Laterality Date     APPENDECTOMY       CHOLECYSTECTOMY       EYE SURGERY Bilateral     Cataracts     HIP PINNING Right 3/1/2017    Procedure: RIGHT HIP TROCHANTERIC RODDING;  Surgeon: Moshe Davies MD;  Location: [facility] Main OR;  Service:      JOINT REPLACEMENT Left     knee     TISSUE AORTIC VALVE REPLACEMENT  2007             Family History   Problem Relation Age of Onset     No " Medical Problems Mother      age 86     No Medical Problems Father      MVA     Cancer Brother      lung     No Medical Problems Brother         Past Medical History:   Diagnosis Date     Acute kidney injury superimposed on CKD 3/3/2017     Asthma      CAD (coronary artery disease)      Cataract      Diabetes mellitus      Disease of thyroid gland      H/O heart valve replacement with bioprosthetic valve      History of transfusion      Hypertension         Social History   Substance Use Topics     Smoking status: Former Smoker     Smokeless tobacco: Never Used     Alcohol use No        Current Outpatient Prescriptions   Medication Sig Dispense Refill     acetaminophen (TYLENOL) 500 MG tablet Take 500 mg by mouth every 6 (six) hours as needed for pain.       aspirin 81 MG EC tablet Take 81 mg by mouth daily.       atorvastatin (LIPITOR) 80 MG tablet Take 1 tablet (80 mg total) by mouth at bedtime. 90 tablet 3     cholecalciferol, vitamin D3, (VITAMIN D3) 2,000 unit Tab Take 2,000 Units by mouth once daily.       fluticasone-salmeterol (ADVAIR) 500-50 mcg/dose DISKUS Inhale 1 puff 2 (two) times a day.       folic acid (FOLVITE) 1 MG tablet Take 1 mg by mouth daily.       furosemide (LASIX) 40 MG tablet Take 2 tablets (80 mg total) by mouth 2 (two) times a day at 9am and 6pm. 360 tablet 3     ipratropium-albuterol (COMBIVENT RESPIMAT)  mcg/actuation Aero inhaler Inhale 1 puff 4 (four) times a day.        ipratropium-albuterol (DUO-NEB) 0.5-2.5 mg/3 mL nebulizer 3 mL 4 (four) times a day.       levothyroxine (SYNTHROID, LEVOTHROID) 125 MCG tablet Take 125 mcg by mouth daily.       loperamide (IMODIUM A-D) 2 mg tablet Take 2 mg by mouth 4 (four) times a day as needed for diarrhea.       losartan (COZAAR) 100 MG tablet Take 0.5 tablets (50 mg total) by mouth daily. 45 tablet 3     melatonin 3 mg Tab tablet Take 3 mg by mouth bedtime as needed.       metOLazone (ZAROXOLYN) 2.5 MG tablet Take 1 tablet (2.5 mg total)  by mouth daily. 90 tablet 2     metoprolol tartrate (LOPRESSOR) 100 MG tablet Take 0.5 tablets (50 mg total) by mouth 2 (two) times a day. 90 tablet 3     nystatin (MYCOSTATIN) powder Apply topically 2 (two) times a day. 60 g 2     omeprazole (PRILOSEC) 20 MG capsule Take 20 mg by mouth Daily before breakfast.       senna-docusate (PERICOLACE) 8.6-50 mg tablet Take 1 tablet by mouth daily.       WARFARIN SODIUM (WARFARIN ORAL) Take 3 mg by mouth. hold       predniSONE (DELTASONE) 5 MG tablet Take 1 tablet (5 mg total) by mouth daily. 30 tablet 5     No current facility-administered medications for this visit.           Objective:    Vitals:    07/11/17 1356   BP: 100/50   Pulse: 60   SpO2: 93%   Weight: (!) 261 lb (118.4 kg)      Body mass index is 40.88 kg/(m^2).    Alert.  No apparent distress.  HEENT exam with moist mucous membranes.  Neck supple without obvious JVD.  Chest clear to auscultation.  Cardiac exam regular currently without significant cardiac ectopy or murmur.  Abdomen soft, benign, obese.  Extremities warm and dry.  Lower extremity wrappings in place.  Right lower extremity wrapping was removed.  Improvement noted in peripheral edema, 2+ otherwise shallow ulceration right anterolateral shin with debridement from dressing change noted.  Surrounding erythema, mild without purulent drainage or signs of significant cellulitis, etc.

## 2021-06-12 NOTE — PROGRESS NOTES
Assessment:    1. Leg wound, right, subsequent encounter  HM2(CBC w/o Differential)   2. Chronic kidney disease (CKD), stage 4 (severe)  Basic Metabolic Panel    HM2(CBC w/o Differential)   3. Hypokalemia  Basic Metabolic Panel   4. Edema, unspecified type     5. Atherosclerosis of coronary artery of native heart without angina pectoris, unspecified vessel or lesion type     6. Essential hypertension with goal blood pressure less than 140/90           Plan:    40 minutes total time with patient, > 50% with counseling and coordination of cares.  Goalsetting visit was completed with clinic care coordination.  Will attempt to arrange for electric bed in order to help with position change etc. with known history of coronary artery disease with significant peripheral edema.  Difficulty with transfers noted.  Leg wound improved.  Continues leg wraps and has granddaughter living with her now to assist with this.  Recent profound hypokalemia potassium 2.6 July 25, 2017 did use 40 mEq potassium twice daily ×5 days now using 40 mEq once daily.  Was told to discontinue Zaroxolyn.  has a walk-in shower at home.  Does not get out of the house as much.  Declines referral for physical therapy for chronic neck pain etc.  Did suggest using acetaminophen thousand milligrams 3 times daily scheduled basis with known history of chronic kidney disease stage IV.  Repeat basic metabolic panel as well as CBC with normochromic normocytic anemia and prior right leg wound.  Anticipate follow-up in office no later than 4 weeks.  Remain off Zaroxolyn.  Continue remainder of home medication.        Subjective:    Thea Acosta is seen today for goalsetting visit.  History of CAD.  Peripheral edema concerns.  Right leg wound.  Seen through wound care clinic.  Wound care continues and improvement noted.  Continues a compression stocking use.  Remains on furosemide.  Did stop spironolactone at that time.  Significant hypokalemia recent potassium  of 2.6.  Did use potassium supplement 40 mEq twice daily ×5 days and 20 mg using 2 tablets once daily.  Has a hard time getting out of her bed.  Typically sleeps in a chair.  Tries to recline chair and elevate legs however still lower than her heart.  Weight has stabilized.  Has lost over 30 pounds with diuresis.  Neck pain bilateral continues.  Uncertain if she should use ice or heat.  Tried Aspercreme but told to avoid due to potential increase aspirin absorption with history of CKD stage IV.  Comprehensive review of systems as above otherwise all negative.  No orthopnea or PND.  No fevers or chills.    Reviewed office note from 7/25/17:  Thea Acosta is seen today for follow-up evaluation.  Initially meant to have goalsetting visit however this needs to be rescheduled.  Leg swelling has improved.  Has lost over 30 pounds since June 21, 2017 with current diuresis.  Continues furosemide 40 mg taking 2 tablets by mouth twice daily as well as Zaroxolyn 2.5 mg daily.  Potassium supplement 20 mEq once daily for potassium replacement.  Continues high intensity statin therapy with atorvastatin 80 mg daily.  Continues leg wrapping every other day.  Had been recommended to see wound care clinic due to right greater than left lower extremity ulceration however this does seem improved and continues clindamycin 300 mg every 6 hours to complete 10 day course currently.  No fevers or chills.  Some decreased appetite however.  No diarrhea.  Neck pain more posterior worse with using her walker, better in a wheelchair.  No arm weakness.  Plan:  Complete clindamycin 300 mg every 6 hours ×10 days as prescribed July 18, 2017.  Improvement noted.  Decreased peripheral edema.  Has lost 34 pounds since June 21, 2017 he continues diuresis.  Potassium supplement 20 mEq daily.  Continue leg wraps for peripheral edema management.  Prior referral to see wound care clinic however patient has declined at this time stating improvement noted  "without need for specialist eval.  Repeat basic metabolic panel to ensure stable CKD stage IV as well as hypokalemia.  Follow-up August 10, 2017 for goal setting visit with clinic care coordinator as well.  Symptomatic treatments for neck pain with avoidance of NSAIDs due to CKD 4.  Acetaminophen 1 g every 6-8 hours as needed.       (twice) now since 3/24/07 (\"Gal\" - renal cell CA) - remarried 3/8/82   3 sons - Nate Mckoy (); Edwin (head of work force center in [facility] [employer] [facility] [employer]; Amrit Mckoy works at the post office (I see him now as a patient)   Son-in-law Esdras Mo   2 daughters - breast cancer   Granddaughter - Terri - plays the clarinet   New granddaughter - Patrizia?   [employer]ov[employer]  Dad - dec MVA age 52   Mom - dec 86 old age   2 bros - 1 bro  due to lung cancer in Aug, 2010 (had quit smoking > 40-50 years ago)   Quit smoking    No EtOH   Work: laundry services at [employer]   Surgeries: s/p gallbladder, bunion 06 left TKA ([facility]) 07 Aortic valve replacement (bioprostheitc) 07 pacemaker - dual chamber   Cardiovascular surgeon called 07 - patient is \"vasculopath\", prior Hb = 8.9, no Coumadin, will use ASA only 10-20-08: started back on Warfarin, 09 GI bleed, D/C Warfarin   11-10-08: Dexa order faxed to Thea Ordaz will schedule     Past Surgical History:   Procedure Laterality Date     APPENDECTOMY       CHOLECYSTECTOMY       EYE SURGERY Bilateral     Cataracts     HIP PINNING Right 3/1/2017    Procedure: RIGHT HIP TROCHANTERIC RODDING;  Surgeon: Moshe Davies MD;  Location: [facility] Main OR;  Service:      JOINT REPLACEMENT Left     knee     TISSUE AORTIC VALVE REPLACEMENT  2007             Family History   Problem Relation Age of Onset     No Medical Problems Mother      age 86     No Medical Problems Father      MVA     Cancer Brother      lung     No Medical Problems Brother         Past Medical History:   Diagnosis Date     " Acute kidney injury superimposed on CKD 3/3/2017     Asthma      CAD (coronary artery disease)      Cataract      Diabetes mellitus      Disease of thyroid gland      H/O heart valve replacement with bioprosthetic valve      History of transfusion      Hypertension         Social History   Substance Use Topics     Smoking status: Former Smoker     Smokeless tobacco: Never Used     Alcohol use No        Current Outpatient Prescriptions   Medication Sig Dispense Refill     acetaminophen (TYLENOL) 500 MG tablet Take 500 mg by mouth every 6 (six) hours as needed for pain.       aspirin 81 MG EC tablet Take 81 mg by mouth daily.       atorvastatin (LIPITOR) 80 MG tablet Take 1 tablet (80 mg total) by mouth at bedtime. 90 tablet 3     cholecalciferol, vitamin D3, (VITAMIN D3) 2,000 unit Tab Take 2,000 Units by mouth once daily.       fluticasone-salmeterol (ADVAIR) 500-50 mcg/dose DISKUS Inhale 1 puff 2 (two) times a day.       folic acid (FOLVITE) 1 MG tablet Take 1 mg by mouth daily.       furosemide (LASIX) 40 MG tablet Take 2 tablets (80 mg total) by mouth 2 (two) times a day at 9am and 6pm. 360 tablet 3     ipratropium-albuterol (COMBIVENT RESPIMAT)  mcg/actuation Aero inhaler Inhale 1 puff 4 (four) times a day.        ipratropium-albuterol (DUO-NEB) 0.5-2.5 mg/3 mL nebulizer 3 mL 4 (four) times a day.       levothyroxine (SYNTHROID, LEVOTHROID) 125 MCG tablet Take 125 mcg by mouth daily.       levothyroxine (SYNTHROID, LEVOTHROID) 125 MCG tablet TAKE 1 TABLET BY MOUTH EVERY DAY 90 tablet 2     loperamide (IMODIUM A-D) 2 mg tablet Take 2 mg by mouth 4 (four) times a day as needed for diarrhea.       melatonin 3 mg Tab tablet Take 3 mg by mouth bedtime as needed.       metOLazone (ZAROXOLYN) 2.5 MG tablet Take 1 tablet (2.5 mg total) by mouth daily. 90 tablet 2     metoprolol tartrate (LOPRESSOR) 100 MG tablet Take 0.5 tablets (50 mg total) by mouth 2 (two) times a day. 90 tablet 3     nystatin (MYCOSTATIN)  "powder Apply topically 2 (two) times a day. 60 g 2     omeprazole (PRILOSEC) 20 MG capsule Take 20 mg by mouth Daily before breakfast.       omeprazole (PRILOSEC) 20 MG capsule TAKE 1 CAPSULE BY MOUTH EVERY DAY 90 capsule 3     potassium chloride SA (K-DUR,KLOR-CON) 20 MEQ tablet Take 1 tablet (20 mEq total) by mouth daily. 90 tablet 3     senna-docusate (PERICOLACE) 8.6-50 mg tablet Take 1 tablet by mouth daily.       No current facility-administered medications for this visit.           Objective:    Vitals:    08/24/17 1507   BP: 130/58   Patient Site: Right Arm   Patient Position: Sitting   Cuff Size: Adult Regular   Pulse: 60   SpO2: 93%   Weight: (!) 240 lb 1 oz (108.9 kg)   Height: 5' 7\" (1.702 m)      Body mass index is 37.6 kg/(m^2).    Alert.  No apparent distress at rest.  Sitting in wheelchair.  Describes neck pain with posterior neck discomfort bilateral posterolateral without midline tenderness.  Neck otherwise supple.  Chest clear.  Cardiac exam regular.  Abdomen benign, obese.  Extremities warm and dry with 1+ edema with compression stockings in place.  No evidence for drainage.       "

## 2021-06-12 NOTE — TELEPHONE ENCOUNTER
Refills sent to pharmacy for Requip 0.5 mg at bedtime, Bumex 2 mg use 2 tablets (4 mg) twice daily as directed, and levothyroxine 200 mcg daily.

## 2021-06-12 NOTE — TELEPHONE ENCOUNTER
1. Per my review patients TSH was elevated on 9/11/2020 therefore her TSH was increased.     2. I do not see that patient should be taking Bumex 4 mg two times a day. Per my review she should take Bumex 2 mg in the morning and 1 mg at noon.    3. Requip pended for approval if appropriate.

## 2021-06-12 NOTE — TELEPHONE ENCOUNTER
Medication Request  Medication name:    Disp Refills Start End    potassium chloride (K-DUR,KLOR-CON) 20 MEQ tablet        Sig - Route: Take 40 mEq by mouth daily before breakfast. - Oral    Class: Historical Med       Disp Refills Start End    metoprolol tartrate (LOPRESSOR) 25 MG tablet        Sig - Route: Take 12.5 mg by mouth 2 (two) times a day. Hold for sbp<105  Hr <55 - Oral    Class: Historical Med        [facility]ed [facility]  Reason for request: needing refills.   When did you use medication last?:  n/a  Patient offered appointment:  patient declined  Okay to leave a detailed message: yes

## 2021-06-12 NOTE — PROGRESS NOTES
Email:  Marital Status:   Children: Yes  Born and Raised:   Occupation: Retired  Living Situation: Living alone in [place]  Smoking, Alcohol, other:  Services receiving:  [facility] Follow up s: Weekly-To Thea and to miah Nate by email : [email or web address]  [facility] Enrollment: 7/19/17

## 2021-06-12 NOTE — PROGRESS NOTES
My Clinic Care Coordination Care Plan    [facility]  [address], [address] [place] [address]  [phone]      My Preferred Method of Contact:  Phone: [phone] and email miah [email or web address] @ [email or web address]    My Primary/Preferred Language:  English    Preferred Learning Style:  Hands on teaching    Emergency Contact: Extended Emergency Contact Information  Primary Emergency Contact: Surjit Heart   [facility]  Home Phone: [phone]  Relation: Child  Secondary Emergency Contact: EanVikki   [facility]  Home Phone: [phone]  Relation: Child     PCP:  Gamal Hdz MD  Specialists:    Care Team            Gamal Hdz MD PCP - General    [phone]     Lisette Zhu [facility] Care Coordination Care Guide, Clinic Care Coordination    [facility]. Phone: [phone]; Fax: [phone]          Hospitalizations and/or ED Visits  Most Recent: 3/1/17     Reason: Hip Pain    Concerns regarding my health being able to sleep in my bed    Advanced Directive/Living Will: On file with the clinic      Thea elected to enroll in the [facility] Care Coordination.  Thea was given a copy of the Clinic Care Coordination brochure and full description of how to access their care team both during clinic hours and after hours.     My Care Guide's Name and Phone Number: Lisette Zhu [phone]    The Care Guide and myself agreed to be in contact weekly.      Medication Update  The patient's medication list is up to date per Lizet Deleon    Health Maintenance and Immunization Update  The patient's preventive health screenings and immunizations are up to date per Lizet Deleon.    My Emergency Plan  Preventing Falls    Having a health problem can make you more likely to fall. Taking certain kinds of medicines may also increase your risk of falls. So, improving your health can help you avoid a fall. Work with your healthcare provider to manage health problems  and to review your medicines. If you have your health under control, your risk of falling is lessened.     When to call your healthcare provider  Be sure to call your healthcare provider if you fall. Also call if you have any of these signs or symptoms (someone else may need to point them out to you):    Feeling lightheaded or dizzy more than once a day    Losing your balance often or feeling unsteady on your feet    Feeling numbness in your legs or feet, or noticing a change in the way you walk    Having a steady decline in your memory or mental sharpness     Chronic Kidney Disease (CKD)  Chronic kidney disease can result from many causes including infections, diabetes, high blood pressure, kidney stones, circulation problems, and reactions to medication. Having kidney disease means making many changes in your life. Learn as much as you can about it so that you can better adjust to these changes. It is important to remember that the main goal of treatment is to stop chronic kidney disease (CKD) from progressing to complete kidney failure. Treatments may vary based on the progression of CKD, so always follow your doctor's instructions in the care and management of your condition.  When to seek medical care  Call your doctor right away if you have any of the following:    Trouble eating or drinking    Weight loss of more than 2 pounds in 24 hours or more than 5 pounds in 7 days    Little or no urine output    Trouble breathing    Muscle aches    Fever of 100.4 F or higher, or chills    Blood in your urine or stool    Bloody discharge from your nose, mouth, or ears    Severe headache or a seizure    Vomiting    Swelling of legs or ankles    Chest pain (call 911)    All [facility] clinic patients have access to a Nurse 24 hours a day, 7 days a week.  If you have questions or want advice from a Nurse, please know [facility] is here for you.  You can call your clinic and they will connect you or you can call Care  Connection at [phone].  [facility] also has Walk In Care clinics in multiple locations.  Call the number listed above for more information about our Walk In Care clinics or visit the [facility] website at [email or web address].    Patient Support  I will ask my emergency contact for help in supporting me in these goals.  Relationship to patient: Daughter

## 2021-06-12 NOTE — PROGRESS NOTES
[facility]    DATE: 10/9/2020    NAME:  Thea Acosta             :  1934  MRN: [id number]  CODE STATUS:  DNR    VISIT TYPE: Problem Visit (fluid overload, weight gain)     FACILITY:  [facility] [[id number]]       CHIEF COMPLAIN/REASON FOR VISIT:    Chief Complaint   Patient presents with     Problem Visit     fluid overload, weight gain               HISTORY OF PRESENT ILLNESS: Thea Acosta is a 86 y.o. female who admitted - for acute metabolic encephalopathy. This was felt to be s/t hypothyroidism and noncompliance with levothyroxine dosing. She was readmitted - for left shoulder dislocation and underwent reverse total shoulder arthroplasty on . Postop course was uncomplicated and [facility]. She has PMH of CAD, CKD, DM, s/p PPM, diastolic CHF, pulmonary artery hypertension, mitral valve stenosis, chronic a fib no anticoagulation, s/p AVR, asthma. Prior to this she lived at home with her grand daughter. She was readmitted 9/10- for altered mental status. She was treated for possible cellulitis and hypoxic respiratory failure. Her tsh was elevated so levothyroxine dose was increased. She was discharged back to [facility].     Today Ms. Acosta is seen today for concerns of fluid overload and weight gain. Her weights have recently increased from [phone]. Her legs are much more swollen today as well her left arm. She is not complaining of shortness of breath and is not requiring oxygen. She is seen in her recliner today. She says she does feel like her legs are more swollen today and that she is retaining more fluid. She is not feeling short of breath at rest but does get winded when she is up and moving around. She says she slept ok and is sleeping in her recliner. She doesn't think she has to sit more upright than usual. She denies any dizziness or other concerns recently. She says she is eating fine and  "no issues with her bowels. No fever or chills. Therapy did set a last day for her on 10/12 but she says she filed an appeal to see if she can stay longer. We discussed adding an additional diuretic for 5 days and check labs on Monday.       REVIEW OF SYSTEMS:  PROBLEMS AND REVIEW OF SYSTEMS:   Review of Systems  Today on ROS:   Currently, no fever, chills, or rigors. Decreased vision and hearing. Denies any chest pain, palpitations, lightheadedness, dizziness, no cough. Appetite is good.  Denies any abdominal pain. No active bleeding. Positive for urinary incontinence, urinary frequency, leg swelling worse, weight gain, left shoulder incision healed, no nausea, no diarrhea or constipation, pain controlled, velcro leg wraps, shortness of breath withe exertion, no insomnia, no headaches       Allergies   Allergen Reactions     Tramadol Anxiety and Other (See Comments)     Per patient, Thea developed psychosis and severe anxiety and agitation \"for three days\" after receiving tramadol in the past. She stated that she would \"never\" take it again and would be extremely upset if she receives it. She asked me to add this to her chart's allergy list specifically.      Codeine Hives     Codeine Phosphate (Bulk)      This allergy is per [facility] records.     Codeine Sulfate      This allergy is per [facility] records.     Codeine-Butalbital-Asa-Caff      This allergy is per [facility] records.     Codeine-Guaifenesin      This allergy is per [facility] records.     Lisinopril Cough     Penicillins Swelling     Current Outpatient Medications   Medication Sig     acetaminophen (TYLENOL) 500 MG tablet Take 2 tablets (1,000 mg total) by mouth 3 (three) times a day. (Patient taking differently: Take 1,000 mg by mouth 3 (three) times a day as needed. )     albuterol (PROAIR HFA;PROVENTIL " HFA;VENTOLIN HFA) 90 mcg/actuation inhaler Inhale 2 puffs every 4 (four) hours as needed for wheezing.     aspirin 81 MG EC tablet Take 81 mg by mouth daily.     atorvastatin (LIPITOR) 80 MG tablet TAKE 1 TABLET(80 MG) BY MOUTH DAILY     bumetanide (BUMEX) 2 MG tablet Take 1 tablet (2 mg total) by mouth 2 (two) times a day at 9am and 6pm. (Patient taking differently: Take 2 mg by mouth 2 (two) times a day at 9am and 6pm. 2mg in am and 1mg at noon)     colchicine 0.6 mg tablet take two tablets (1.2 mg) by mouth at onset of gout symptoms, then repeat one tablet (0.6 mg) by mouth one hour later     diclofenac sodium (VOLTAREN) 1 % Gel Apply 2 g topically 4 (four) times a day as needed.      dimethicone 5 % Crea Apply 1 application topically 2 (two) times a day as needed (to buttocks as needed for skin breakdown).     ferrous sulfate 325 (65 FE) MG tablet Take 1 tablet by mouth daily.     levothyroxine (SYNTHROID, LEVOTHROID) 200 MCG tablet Take 1 tablet (200 mcg total) by mouth daily before supper.     metoprolol tartrate (LOPRESSOR) 25 MG tablet Take 12.5 mg by mouth 2 (two) times a day. Hold for sbp<105  Hr <55     nystatin (MYCOSTATIN) powder Apply topically 4 (four) times a day.     omeprazole (PRILOSEC) 20 MG capsule Take 1 capsule (20 mg total) by mouth daily before breakfast.     ondansetron (ZOFRAN-ODT) 4 MG disintegrating tablet Take 4 mg by mouth every 6 (six) hours as needed for nausea.     polyethylene glycol (MIRALAX) 17 gram packet Take 17 g by mouth daily as needed.      potassium chloride (K-DUR,KLOR-CON) 20 MEQ tablet Take 40 mEq by mouth daily before breakfast.     rOPINIRole (REQUIP) 0.5 MG tablet Take 0.5 mg by mouth at bedtime.     senna (SENOKOT) 8.6 mg tablet Take 1 tablet by mouth daily. Hold for loose stools     white petrolatum (AQUAPHOR ORIGINAL) 41 % Oint Apply 1 application topically at bedtime.     Past Medical History:    Past Medical History:   Diagnosis Date     Acute kidney injury  superimposed on CKD (H) 3/3/2017     Asthma      CAD (coronary artery disease)      Cataract      Chronic kidney disease     ckd3     COPD (chronic obstructive pulmonary disease) (H)      Diabetes mellitus (H)      Disease of thyroid gland      H/O heart valve replacement with bioprosthetic valve      History of transfusion      Hypertension      Normochromic normocytic anemia      Pulmonary hypertension (H) 09/27/2019    Noted on echocardiogram     Restless leg syndrome            PHYSICAL EXAMINATION  Vitals:    10/09/20 0700   BP: 108/68   Pulse: (!) 56   Resp: 18   Temp: 97  F (36.1  C)   SpO2: 95%   Weight: 216 lb (98 kg)       Today on physical exam:     GENERAL: alert, obese,  not in any form of acute distress, answers questions appropriately, follows simple commands, conversant  HEENT: Head is normocephalic with normal hair distribution. No evidence of trauma. Ears: No acute purulent discharge. Eyes: Conjunctivae pink with no scleral jaundice. Nose: Normal mucosa and septum. NECK: Supple with no cervical or supraclavicular lymphadenopathy. Trachea is midline. Eek, decreased vision  CHEST: No tenderness or deformity, no crepitus, left chest PPM  LUNG: Dim but clear today to auscultation.   Shortness of breath with exertion, no cough noted  BACK: No kyphosis of the thoracic spine. Symmetric, no curvature, ROM normal, no CVA tenderness, no spinal tenderness   CVS: irregularly irregular rhythm, murmur,  2+ pulses symmetric in all extremities.  ABDOMEN: Rounded and soft, nontender to palpation, non distended, no masses, no organomegaly, good bowel sounds, no rebound or guarding, no peritoneal signs.   EXTREMITIES: 2-3+ ble pitting edema, velcro leg wraps, left shoulder incision healed, rl discoloration ble, right shoulder limited ROM and pain with movement, no erythema, warmth, or edema at shoulder joint  SKIN: Warm and dry  NEUROLOGICAL: The patient is oriented to person, place and time.             LABS:    Recent Results (from the past 168 hour(s))   Cell Ct/Diff, Body Fluid   Result Value Ref Range    Color, Fluid Pink     Appearance, Fluid Turbid     WBC, Fluid 8,998 (H) 0 - 99 /uL    RBC, Fluid <50,000 <50,000 /ul    Neutrophil % 52 (H) <=25 %    Lymphocyte % 29 <=78 %    Monocyte % 10 <=71 %    Macrophage % 8 <=71 %    Mesothelial %      Eosinophil % 1 <=1 %    Other Cells %     Culture/Gram Stain: Body Fluid    Specimen: Fluid, Body; Body Fluid   Result Value Ref Range    Culture No Growth     Gram Stain Result No polymorphonuclear leukocytes seen     Gram Stain Result No organisms seen    Culture, Anaerobic    Specimen: Body Fluid   Result Value Ref Range    Culture No anaerobic organisms isolated    Crystals, Body Fluid   Result Value Ref Range    Crystals, Fluid Uric Acid Crystals Seen      Results for orders placed or performed in visit on 09/25/20   Basic Metabolic Panel   Result Value Ref Range    Sodium 137 136 - 145 mmol/L    Potassium 3.9 3.5 - 5.0 mmol/L    Chloride 92 (L) 98 - 107 mmol/L    CO2 32 (H) 22 - 31 mmol/L    Anion Gap, Calculation 13 5 - 18 mmol/L    Glucose 141 (H) 70 - 125 mg/dL    Calcium 9.9 8.5 - 10.5 mg/dL    BUN 52 (H) 8 - 28 mg/dL    Creatinine 1.68 (H) 0.60 - 1.10 mg/dL    GFR MDRD Af Amer 35 (L) >60 mL/min/1.73m2    GFR MDRD Non Af Amer 29 (L) >60 mL/min/1.73m2         Lab Results   Component Value Date    WBC 5.7 09/12/2020    HGB 9.8 (L) 09/25/2020    HCT 30.8 (L) 09/12/2020     (H) 09/12/2020     09/12/2020       Lab Results   Component Value Date    [id number] 09/12/2020     Lab Results   Component Value Date    HGBA1C 6.5 (H) 07/31/2020     Lab Results   Component Value Date    INR 1.11 (H) 09/10/2020    INR 0.99 07/28/2020    INR 1.30 (H) 02/07/2018     Vitamin D, Total (25-Hydroxy)   Date Value Ref Range Status   01/20/2020 45.9 30.0 - 80.0 ng/mL Final     Lab Results   Component Value Date    TSH 9.38 (H) 09/11/2020           ASSESSMENT/PLAN:    Acute on  Chronic CHF: Daily [phone] then [phone], then dropped to 200-205 and now up to 213-216lbs, weight gain, increased shortness of breath, increased edema. On bumex, encourage leg elevation. Cardiac diet. velcro leg wraps.  notify for weight change for 2 lbs in 24 hours or 5lbs in 7 days. Will add metolazone daily x 5 days 30 min prior to am bumex. Bmp on 10/12. Increase kcl to 60meq daily x 5 days then reduce back to 40meq daily.   S/p reverse total shoulder arthroplasty: Incision healed.  PT, OT following. F/u with surgeon Dr. Ramirez on 8/14-f/u again in 4 weeks, limit external rotation, no bathing until 4 weeks postop, healing well, f/u again on 9/4-doing well, dc sling, AROM and PROM with therapy, last f/u 10/5-doing well, no concerns. Weight bear with walker, pain improving. Tylenol prn. No recent concerns.   S/p PPM: follows with device clinic. No recent concerns.   Asthma:  Encourage cough and deep breathe. Albuterol prn. Shortness of breath with exertion.  No recent cough, wheezing. Off o2. Treating for fluid overload.   Hypothyroid: on levothyroxine. tsh 9.38 on 9/11, levothyroxine dose adjusted. recheck tsh in 6 weeks-discharging later this week. Will recommend this at pcp follow up.   CKD stage 3: Cr 1.39, gfr 36 on 8/1.  9/4 cr 1.9, gfr 25. Bmp 9/18-cr 2.05 reduced bumex, 1.98 on 9/21. Cr 1.68 on 9/25 stable.   H/o Type 2 DM: No meds, no monitoring needed. Diet controlled. Encouraged to monitor carb intake.   Dyslipidemia: On atorvastatin.   HTN: SBP 100s, On bumex, metoprolol. Stable recently.   GERD: On omeprazole. No concerns today.   Vitamin d deficiency: On vitamin d.  Hypokalemia: On kcl. Stable.   PAF, s/p AVR: Regular rate and rhythm recently. No recent a fib rvr. Controlled off metoprolol. F/u with caridology. On aspirin daily.   Anemia of CKD: on iron. Hg 10.9 on 7/24.   Hg 8.8 on 8/1. Hg 9 on 8/5. Recheck on 8/17 8.2, iron to three times a day. recheck on 8/24-hg  8.4. hg 9.7 on 9/4, hg 9.5 on 9/12. Discussed trial off to see if this improves anemia. Hg on 9/25-no change 9.8. restarted aspirin as no change in anemia with trial off.    Candidal intertrigo:  Nystatin.   Constipation: senna daily, miralax daily prn no recent concerns.   Restless legs, insomnia: requip at bedtime. Symptoms improved.   Nausea:  zofran prn.       Therapy PT: trsf mod A, walking 10-40ft with 4WW CGA, had a good day yesterday      OT: min A UB drsg, max A toileting, LB dressing max assist, . Feeding: mod. Not elevating feet during the day and LE edema is increasing, hand and elbow swollen   Yellow tag. Lives with granddaughter. She will need 24 hour supervision and assist with all cares, home PT/OT. LCD 10/12. New ramp in at home. Filed appeal, awaiting results.     Electronically signed by: Jennifer Valdes NP            Head injuries, initial encounter

## 2021-06-12 NOTE — PROGRESS NOTES
Email:  Marital Status:   Children: Yes  Born and Raised:   Occupation: Retired  Living Situation: Living alone in [place]  Smoking, Alcohol, other:  Services receiving:  [facility] Follow up s: Weekly-To Thea and to miah Nate by email : [email or web address] @ [email or web address]  [facility] Enrollment: 7/19/17

## 2021-06-12 NOTE — PROGRESS NOTES
Clinic Care Coordination/Health Care Home Goal Setting Visit has been rescheduled for 8/24/17 @ 3:00.

## 2021-06-12 NOTE — PROGRESS NOTES
[facility]    DATE: 10/5/2020    NAME:  Thea Acosta             :  1934  MRN: [id number]  CODE STATUS:  DNR    VISIT TYPE: Review Of Multiple Medical Conditions (restless leg syndrome, chf)     FACILITY:  [facility] [[id number]]       CHIEF COMPLAIN/REASON FOR VISIT:    Chief Complaint   Patient presents with     Review Of Multiple Medical Conditions     restless leg syndrome, chf               HISTORY OF PRESENT ILLNESS: Thea Acosta is a 86 y.o. female who admitted - for acute metabolic encephalopathy. This was felt to be s/t hypothyroidism and noncompliance with levothyroxine dosing. She was readmitted - for left shoulder dislocation and underwent reverse total shoulder arthroplasty on . Postop course was uncomplicated and transferred back to [facility]. She has PMH of CAD, CKD, DM, s/p PPM, diastolic CHF, pulmonary artery hypertension, mitral valve stenosis, chronic a fib no anticoagulation, s/p AVR, asthma. Prior to this she lived at home with her grand daughter. She was readmitted 9/10- for altered mental status. She was treated for possible cellulitis and hypoxic respiratory failure. Her tsh was elevated so levothyroxine dose was increased. She was discharged back to [facility].     Today Ms. Acosta is seen today for review of systems for restless leg syndrome and chf. She is seen in her wheelchair today. She says she has been doing pretty well. She had some questions for me but now cannot remember what they were. She was encouraged to let the nurses know if she thought of it later. She says she goes to ortho again today  To see how her shoulder is doing. She feels like after she got her left shoulder fixed now her right shoulder is giving her so much trouble. She says she is planning on going home the end of this week. She lives with her granddaughter who works during the day. She was told by therapy here they were  "recommending 24 hour care but she cannot afford this plus she does not want someone in her home that much. She says she thinks she will be fine to be by  Herself during the day. Her son just installed a nice ramp and she has a wheelchair. We discussed the risks and benefits of following these recommendations from therapy and . We discussed the potential for her to fall or something happen during the day without help there. She says she doesn't think she has enough money to pay for these services and living expenses for the remainder of her life. She says her breathing has been good lately  And does not feel like she is retaining more fluid. She was excited that her weight got down to 199lbs but now is back up to 215lbs. She is not sure why it bounces around so much but does note they are weighing her in the wheelchair. We discussed there may be discrepancies in the scale based on her wheelchair then. She says  Her legs are looking fine and she is sleeping much better on the new medication for her restless legs. She would like to continue this at home. She denies any other concerns today.       REVIEW OF SYSTEMS:  PROBLEMS AND REVIEW OF SYSTEMS:   Review of Systems  Today on ROS:   Currently, no fever, chills, or rigors. Decreased vision and hearing. Denies any chest pain, palpitations, lightheadedness, dizziness, no cough. Appetite is good.  Denies any abdominal pain. No active bleeding. Positive for urinary incontinence, urinary frequency, leg swelling improved, left shoulder incision, no nausea, no diarrhea or constipation, pain controlled, velcro leg wraps, no shortness of breath recently, no insomnia, no headaches      Allergies   Allergen Reactions     Tramadol Anxiety and Other (See Comments)     Per patient, Thea developed psychosis and severe anxiety and agitation \"for three days\" after receiving tramadol in the past. She stated that she would \"never\" take it again and would be extremely " upset if she receives it. She asked me to add this to her chart's allergy list specifically.      Codeine Hives     Codeine Phosphate (Bulk)      This allergy is per [facility] records.     Codeine Sulfate      This allergy is per [facility] records.     Codeine-Butalbital-Asa-Caff      This allergy is per [facility] records.     Codeine-Guaifenesin      This allergy is per [facility] records.     Lisinopril Cough     Penicillins Swelling     Current Outpatient Medications   Medication Sig     acetaminophen (TYLENOL) 500 MG tablet Take 2 tablets (1,000 mg total) by mouth 3 (three) times a day. (Patient taking differently: Take 1,000 mg by mouth 3 (three) times a day as needed. )     albuterol (PROAIR HFA;PROVENTIL HFA;VENTOLIN HFA) 90 mcg/actuation inhaler Inhale 2 puffs every 4 (four) hours as needed for wheezing.     aspirin 81 MG EC tablet Take 81 mg by mouth daily.     atorvastatin (LIPITOR) 80 MG tablet TAKE 1 TABLET(80 MG) BY MOUTH DAILY     bumetanide (BUMEX) 2 MG tablet Take 1 tablet (2 mg total) by mouth 2 (two) times a day at 9am and 6pm. (Patient taking differently: Take 2 mg by mouth 2 (two) times a day at 9am and 6pm. 2mg in am and 1mg at noon)     colchicine 0.6 mg tablet take two tablets (1.2 mg) by mouth at onset of gout symptoms, then repeat one tablet (0.6 mg) by mouth one hour later     diclofenac sodium (VOLTAREN) 1 % Gel Apply 2 g topically 4 (four) times a day as needed.      dimethicone 5 % Crea Apply 1 application topically 2 (two) times a day as needed (to buttocks as needed for skin breakdown).     ferrous sulfate 325 (65 FE) MG tablet Take 1 tablet by mouth daily.     levothyroxine (SYNTHROID, LEVOTHROID) 200 MCG tablet Take 1 tablet (200 mcg total) by mouth daily before supper.     metoprolol tartrate (LOPRESSOR) 25 MG tablet Take 12.5 mg by mouth 2 (two)  times a day. Hold for sbp<105  Hr <55     nystatin (MYCOSTATIN) powder Apply topically 4 (four) times a day.     omeprazole (PRILOSEC) 20 MG capsule Take 1 capsule (20 mg total) by mouth daily before breakfast.     ondansetron (ZOFRAN-ODT) 4 MG disintegrating tablet Take 4 mg by mouth every 6 (six) hours as needed for nausea.     polyethylene glycol (MIRALAX) 17 gram packet Take 17 g by mouth daily as needed.      potassium chloride (K-DUR,KLOR-CON) 20 MEQ tablet Take 40 mEq by mouth daily before breakfast.     rOPINIRole (REQUIP) 0.5 MG tablet Take 0.5 mg by mouth at bedtime.     senna (SENOKOT) 8.6 mg tablet Take 1 tablet by mouth daily. Hold for loose stools     white petrolatum (AQUAPHOR ORIGINAL) 41 % Oint Apply 1 application topically at bedtime.     Past Medical History:    Past Medical History:   Diagnosis Date     Acute kidney injury superimposed on CKD (H) 3/3/2017     Asthma      CAD (coronary artery disease)      Cataract      Chronic kidney disease     ckd3     COPD (chronic obstructive pulmonary disease) (H)      Diabetes mellitus (H)      Disease of thyroid gland      H/O heart valve replacement with bioprosthetic valve      History of transfusion      Hypertension      Normochromic normocytic anemia      Pulmonary hypertension (H) 09/27/2019    Noted on echocardiogram     Restless leg syndrome            PHYSICAL EXAMINATION  Vitals:    10/05/20 0700   BP: 119/64   Pulse: 64   Resp: 18   Temp: 96.6  F (35.9  C)   SpO2: 94%   Weight: 215 lb (97.5 kg)       Today on physical exam:     GENERAL: lethargic, obese,  not in any form of acute distress, answers questions appropriately, follows simple commands, conversant  HEENT: Head is normocephalic with normal hair distribution. No evidence of trauma. Ears: No acute purulent discharge. Eyes: Conjunctivae pink with no scleral jaundice. Nose: Normal mucosa and septum. NECK: Supple with no cervical or supraclavicular lymphadenopathy. Trachea is midline. [place],  decreased vision  CHEST: No tenderness or deformity, no crepitus, left chest PPM  LUNG: Dim but clear today to auscultation.   Shortness of breath with exertion, no cough noted  BACK: No kyphosis of the thoracic spine. Symmetric, no curvature, ROM normal, no CVA tenderness, no spinal tenderness   CVS: irregularly irregular rhythm, murmur,  2+ pulses symmetric in all extremities.  ABDOMEN: Rounded and soft, nontender to palpation, non distended, no masses, no organomegaly, good bowel sounds, no rebound or guarding, no peritoneal signs.   EXTREMITIES: 1-2+ ble nonpitting edema, velcro leg wraps, left shoulder incision healed, rl discoloration ble, right shoulder limited ROM and pain with movement, no erythema, warmth, or edema at shoulder joint  SKIN: Warm and dry  NEUROLOGICAL: The patient is oriented to person, place and time.             LABS:   No results found for this or any previous visit (from the past 168 hour(s)).  Results for orders placed or performed in visit on 09/25/20   Basic Metabolic Panel   Result Value Ref Range    Sodium 137 136 - 145 mmol/L    Potassium 3.9 3.5 - 5.0 mmol/L    Chloride 92 (L) 98 - 107 mmol/L    CO2 32 (H) 22 - 31 mmol/L    Anion Gap, Calculation 13 5 - 18 mmol/L    Glucose 141 (H) 70 - 125 mg/dL    Calcium 9.9 8.5 - 10.5 mg/dL    BUN 52 (H) 8 - 28 mg/dL    Creatinine 1.68 (H) 0.60 - 1.10 mg/dL    GFR MDRD Af Amer 35 (L) >60 mL/min/1.73m2    GFR MDRD Non Af Amer 29 (L) >60 mL/min/1.73m2         Lab Results   Component Value Date    WBC 5.7 09/12/2020    HGB 9.8 (L) 09/25/2020    HCT 30.8 (L) 09/12/2020     (H) 09/12/2020     09/12/2020       Lab Results   Component Value Date    [id number] 468 09/12/2020     Lab Results   Component Value Date    HGBA1C 6.5 (H) 07/31/2020     Lab Results   Component Value Date    INR 1.11 (H) 09/10/2020    INR 0.99 07/28/2020    INR 1.30 (H) 02/07/2018     Vitamin D, Total (25-Hydroxy)   Date Value Ref Range Status   01/20/2020  45.9 30.0 - 80.0 ng/mL Final     Lab Results   Component Value Date    TSH 9.38 (H) 09/11/2020           ASSESSMENT/PLAN:      S/p reverse total shoulder arthroplasty: Incision healed.  PT, OT following. F/u with surgeon Dr. Ramirez on 8/14-f/u again in 4 weeks, limit external rotation, no bathing until 4 weeks postop, healing well, f/u again on 9/4-doing well, dc sling, AROM and PROM with therapy, may  Next appt scheduled for 10/5. Weight bear with walker, pain improving. Tylenol prn. Reports left shoulder much improved but now having issues with right shoulder. Encouraged her to discuss with ortho today and evaluate if steroid injection in right shoulder may be beneficial.   Chronic CHF: Daily [phone] then [phone], then dropped to 200-205lbs now at 215lbs, inconsistent weights, weighing in wheelchair may be discrepancy due to wheelchairs. no Shortness of breath today, continues with exertion, edema stable.  On bumex, encourage leg elevation. Cardiac diet. velcro leg wraps.  notify for weight change for 2 lbs in 24 hours or 5lbs in 7 days. Appears to be euvolemic today. Educated on importance of weighing daily and following low sodium and cardiac diet at home.   S/p PPM: follows with device clinic. No recent concerns.   Asthma:  Encourage cough and deep breathe. Albuterol prn. Shortness of breath with exertion.  No recent cough, wheezing. Off o2.  Hypothyroid: on levothyroxine. tsh 9.38 on 9/11, levothyroxine dose adjusted. recheck tsh in 6 weeks-discharging later this week. Will recommend this at pcp follow up.   CKD stage 3: Cr 1.39, gfr 36 on 8/1.  9/4 cr 1.9, gfr 25. Bmp 9/18-cr 2.05 reduced bumex, 1.98 on 9/21. Cr 1.68 on 9/25 stable.   H/o Type 2 DM: No meds, no monitoring needed. Diet controlled. Encouraged to monitor carb intake.   Dyslipidemia: On atorvastatin.   HTN: SBP 110s, On bumex, metoprolol. Stable recently.   GERD: On omeprazole. No concerns today.   Vitamin d  deficiency: On vitamin d.  Hypokalemia: On kcl. Stable.   PAF, s/p AVR: Regular rate and rhythm recently. No recent a fib rvr. Controlled off metoprolol. F/u with caridology. On aspirin daily.   Anemia of CKD: on iron. Hg 10.9 on 7/24.   Hg 8.8 on 8/1. Hg 9 on 8/5. Recheck on 8/17 8.2, iron to three times a day. recheck on 8/24-hg 8.4. hg 9.7 on 9/4, hg 9.5 on 9/12. Discussed trial off to see if this improves anemia. Hg on 9/25-no change 9.8. restarted aspirin as no change in anemia with trial off.    Candidal intertrigo:  Nystatin.   Constipation: senna daily, miralax daily prn no recent concerns.   Restless legs, insomnia: requip at bedtime. Symptoms improved.   Nausea:  zofran prn.       Therapy PT: trsf mod A, walking 10-40ft with 4WW CGA, had a good day yesterday      OT: min A UB drsg, max A toileting, LB dressing max assist, . Feeding: mod. Not elevating feet during the day and LE edema is increasing, hand and elbow swollen   Yellow tag. Lives with granddaughter. She will need 24 hour supervision and assist with all cares, home PT/OT. LCD 10/12. New ramp in at home.     Electronically signed by: Jennifer Valdes NP

## 2021-06-12 NOTE — TELEPHONE ENCOUNTER
Who is calling:  [facility]  Reason for Call: Patient needs refill of Requip.    Please clarify patients Synthroid as she has 125 mcg on bottle at home, versus 200 mcg in medication list.  Bumex should be changed to 4 mg twice a day.  Date of last appointment with primary care: 2/26/2020  Okay to leave a detailed message: Yes

## 2021-06-12 NOTE — PROGRESS NOTES
"Assessment:    1. Leg wound, right, subsequent encounter     2. Hypokalemia  Basic Metabolic Panel   3. Edema, unspecified type     4. Chronic kidney disease (CKD), stage 4 (severe)  Basic Metabolic Panel   5. Neck pain           Plan:    Complete clindamycin 300 mg every 6 hours ×10 days as prescribed July 18, 2017.  Improvement noted.  Decreased peripheral edema.  Has lost 34 pounds since June 21, 2017 he continues diuresis.  Potassium supplement 20 mEq daily.  Continue leg wraps for peripheral edema management.  Prior referral to see wound care clinic however patient has declined at this time stating improvement noted without need for specialist eval.  Repeat basic metabolic panel to ensure stable CKD stage IV as well as hypokalemia.  Follow-up August 10, 2017 for goal setting visit with clinic care coordinator as well.  Symptomatic treatments for neck pain with avoidance of NSAIDs due to CKD 4.  Acetaminophen 1 g every 6-8 hours as needed.        Subjective:    Thea Acosta is seen today for follow-up evaluation.  Initially meant to have goalsetting visit however this needs to be rescheduled.  Leg swelling has improved.  Has lost over 30 pounds since June 21, 2017 with current diuresis.  Continues furosemide 40 mg taking 2 tablets by mouth twice daily as well as Zaroxolyn 2.5 mg daily.  Potassium supplement 20 mEq once daily for potassium replacement.  Continues high intensity statin therapy with atorvastatin 80 mg daily.  Continues leg wrapping every other day.  Had been recommended to see wound care clinic due to right greater than left lower extremity ulceration however this does seem improved and continues clindamycin 300 mg every 6 hours to complete 10 day course currently.  No fevers or chills.  Some decreased appetite however.  No diarrhea.  Neck pain more posterior worse with using her walker, better in a wheelchair.  No arm weakness.     (twice) now since 3/24/07 (\"Gal\" - renal cell CA) - " "remarried 3/8/82   3 sons - Nate Mckoy (); Edwin (head of work force center in [facility] [employer] [facility]; Amrit Mckoy works at the [employer] (I see him now as a patient)   Son-in-law Esdras Mo   2 daughters - breast cancer   Granddaughter - Terri - plays the clarinet   New granddaughter - Patrizia?   Vood[employer]  Dad - dec MVA age 52   Mom - dec 86 old age   2 bros - 1 bro  due to lung cancer in Aug, 2010 (had quit smoking > 40-50 years ago)   Quit smoking    No EtOH   Work: laundry services at [employer]   Surgeries: s/p gallbladder, bunion 06 left TKA ([facility]) 07 Aortic valve replacement (bioprostheitc) 07 pacemaker - dual chamber   Cardiovascular surgeon called 07 - patient is \"vasculopath\", prior Hb = 8.9, no Coumadin, will use ASA only 10-20-08: started back on Warfarin, 09 GI bleed, D/C Warfarin   11-10-08: Dexa order faxed to Thea Ordaz will schedule     Past Surgical History:   Procedure Laterality Date     APPENDECTOMY       CHOLECYSTECTOMY       EYE SURGERY Bilateral     Cataracts     HIP PINNING Right 3/1/2017    Procedure: RIGHT HIP TROCHANTERIC RODDING;  Surgeon: Moshe Davies MD;  Location: [facility];  Service:      JOINT REPLACEMENT Left     knee     TISSUE AORTIC VALVE REPLACEMENT  2007             Family History   Problem Relation Age of Onset     No Medical Problems Mother      age 86     No Medical Problems Father      MVA     Cancer Brother      lung     No Medical Problems Brother         Past Medical History:   Diagnosis Date     Acute kidney injury superimposed on CKD 3/3/2017     Asthma      CAD (coronary artery disease)      Cataract      Diabetes mellitus      Disease of thyroid gland      H/O heart valve replacement with bioprosthetic valve      History of transfusion      Hypertension         Social History   Substance Use Topics     Smoking status: Former Smoker     Smokeless tobacco: Never Used     Alcohol " use No        Current Outpatient Prescriptions   Medication Sig Dispense Refill     acetaminophen (TYLENOL) 500 MG tablet Take 500 mg by mouth every 6 (six) hours as needed for pain.       aspirin 81 MG EC tablet Take 81 mg by mouth daily.       atorvastatin (LIPITOR) 80 MG tablet Take 1 tablet (80 mg total) by mouth at bedtime. 90 tablet 3     cholecalciferol, vitamin D3, (VITAMIN D3) 2,000 unit Tab Take 2,000 Units by mouth once daily.       clindamycin (CLEOCIN) 300 MG capsule Take 1 capsule (300 mg total) by mouth every 6 (six) hours for 10 days. 40 capsule 0     fluticasone-salmeterol (ADVAIR) 500-50 mcg/dose DISKUS Inhale 1 puff 2 (two) times a day.       folic acid (FOLVITE) 1 MG tablet Take 1 mg by mouth daily.       furosemide (LASIX) 40 MG tablet Take 2 tablets (80 mg total) by mouth 2 (two) times a day at 9am and 6pm. 360 tablet 3     ipratropium-albuterol (COMBIVENT RESPIMAT)  mcg/actuation Aero inhaler Inhale 1 puff 4 (four) times a day.        ipratropium-albuterol (DUO-NEB) 0.5-2.5 mg/3 mL nebulizer 3 mL 4 (four) times a day.       levothyroxine (SYNTHROID, LEVOTHROID) 125 MCG tablet Take 125 mcg by mouth daily.       loperamide (IMODIUM A-D) 2 mg tablet Take 2 mg by mouth 4 (four) times a day as needed for diarrhea.       losartan (COZAAR) 100 MG tablet Take 0.5 tablets (50 mg total) by mouth daily. 45 tablet 3     melatonin 3 mg Tab tablet Take 3 mg by mouth bedtime as needed.       metOLazone (ZAROXOLYN) 2.5 MG tablet Take 1 tablet (2.5 mg total) by mouth daily. 90 tablet 2     metoprolol tartrate (LOPRESSOR) 100 MG tablet Take 0.5 tablets (50 mg total) by mouth 2 (two) times a day. 90 tablet 3     nystatin (MYCOSTATIN) powder Apply topically 2 (two) times a day. 60 g 2     omeprazole (PRILOSEC) 20 MG capsule Take 20 mg by mouth Daily before breakfast.       potassium chloride SA (K-DUR,KLOR-CON) 20 MEQ tablet Take 1 tablet (20 mEq total) by mouth daily. 90 tablet 3     predniSONE (DELTASONE)  5 MG tablet Take 1 tablet (5 mg total) by mouth daily. 30 tablet 5     senna-docusate (PERICOLACE) 8.6-50 mg tablet Take 1 tablet by mouth daily.       WARFARIN SODIUM (WARFARIN ORAL) Take 3 mg by mouth. hold       No current facility-administered medications for this visit.           Objective:    Vitals:    07/25/17 1358   BP: 100/60   Pulse: 60   SpO2: 90%   Weight: (!) 238 lb (108 kg)      Body mass index is 37.28 kg/(m^2).    Alert.  No apparent distress.  Chest clear.  Cardiac exam regular.  Abdomen benign.  Extremities with desquamation noted of lower extremity and feet.  Superficial ulcerations without purulent drainage.  Slight serous drainage only with improvement in surrounding erythema.  No calf tenderness.

## 2021-06-12 NOTE — PROGRESS NOTES
"            [facility]  Video visit    Thea Acosta 87 yo. female who is being evaluated via a billable video visit.      The patient has been notified of following:     \"This video visit will be conducted via a call between you and your physician/provider. We have found that certain health care needs can be provided without the need for an in-person physical exam.  This service lets us provide the care you need with a video conversation.  If a prescription is necessary we can send it to the facility team.  If lab work is needed we can place an order through the facility team to have that test done at a later time.    If during the course of the call the physician/provider feels a video visit is not appropriate, you will not be charged for this service.\"     Physician/provider has received verbal consent for a Video Visit from the patient? Yes    Patient would like the video invitation sent by: me Send to : Nurse manager of [facility]    Video Start Time: 1130      DATE: 10/12/2020    NAME:  Thea Acosta             :  1934  MRN: [id number]  CODE STATUS:  DNR    VISIT TYPE: Problem Visit (loose stools)     FACILITY:  [facility] [[id number]]       CHIEF COMPLAIN/REASON FOR VISIT:    Chief Complaint   Patient presents with     Problem Visit     loose stools               HISTORY OF PRESENT ILLNESS: Thea Acosta is a 86 y.o. female who admitted - for acute metabolic encephalopathy. This was felt to be s/t hypothyroidism and noncompliance with levothyroxine dosing. She was readmitted - for left shoulder dislocation and underwent reverse total shoulder arthroplasty on . Postop course was uncomplicated and transferred back to [facility]. She has PMH of CAD, CKD, DM, s/p PPM, diastolic CHF, pulmonary artery hypertension, mitral valve stenosis, chronic a fib no anticoagulation, s/p AVR, asthma. Prior to this she lived at home with her grand " "daughter. She was readmitted 9/10-9/13 for altered mental status. She was treated for possible cellulitis and hypoxic respiratory failure. Her tsh was elevated so levothyroxine dose was increased. She was discharged [facility] to [facility].     Today Ms. Acosta is seen for concerns of loose stools. She was going to be discharged tomorrow as she finishes therapy today and insurance issued last day for today. She filed appeal and lost this. Her granddaughter has moved out so she says she is going to stay for another week until her son can come from [place] to stay with  Her. She has had two bouts of loose stools today. She is having some mild cramping and discomfort with this. She and nursing denied any blood in stool. She denies any nausea or stomach upset. No other concerns today. She thinks her leg swelling is the same. Her weights are noted to be down over weekend 215-204lbs.       REVIEW OF SYSTEMS:  PROBLEMS AND REVIEW OF SYSTEMS:   Review of Systems  Today on ROS:   Currently, no fever, chills, or rigors. Decreased vision and hearing. Denies any chest pain, palpitations, lightheadedness, dizziness, no cough. Appetite is good.   No active bleeding. Positive for urinary incontinence, urinary frequency, leg swelling, left shoulder incision healed, no nausea, loose stools, pain controlled, velcro leg wraps, shortness of breath withe exertion, no insomnia, no headaches       Allergies   Allergen Reactions     Tramadol Anxiety and Other (See Comments)     Per patient, Thea developed psychosis and severe anxiety and agitation \"for three days\" after receiving tramadol in the past. She stated that she would \"never\" take it again and would be extremely upset if she receives it. She asked me to add this to her chart's allergy list specifically.      Codeine Hives     Codeine Phosphate (Bulk)      This allergy is per [facility] records.     Codeine Sulfate      This allergy is per [facility].     Codeine-Butalbital-Asa-Caff      This allergy is per [facility] records.     Codeine-Guaifenesin      This allergy is per [facility] records.     Lisinopril Cough     Penicillins Swelling     Current Outpatient Medications   Medication Sig     acetaminophen (TYLENOL) 500 MG tablet Take 2 tablets (1,000 mg total) by mouth 3 (three) times a day. (Patient taking differently: Take 1,000 mg by mouth 3 (three) times a day as needed. )     albuterol (PROAIR HFA;PROVENTIL HFA;VENTOLIN HFA) 90 mcg/actuation inhaler Inhale 2 puffs every 4 (four) hours as needed for wheezing.     aspirin 81 MG EC tablet Take 81 mg by mouth daily.     atorvastatin (LIPITOR) 80 MG tablet TAKE 1 TABLET(80 MG) BY MOUTH DAILY     bumetanide (BUMEX) 2 MG tablet Take 1 tablet (2 mg total) by mouth 2 (two) times a day at 9am and 6pm. (Patient taking differently: Take 2 mg by mouth 2 (two) times a day at 9am and 6pm. 2mg in am and 1mg at noon)     colchicine 0.6 mg tablet take two tablets (1.2 mg) by mouth at onset of gout symptoms, then repeat one tablet (0.6 mg) by mouth one hour later     diclofenac sodium (VOLTAREN) 1 % Gel Apply 2 g topically 4 (four) times a day as needed.      dimethicone 5 % Crea Apply 1 application topically 2 (two) times a day as needed (to buttocks as needed for skin breakdown).     ferrous sulfate 325 (65 FE) MG tablet Take 1 tablet by mouth daily.     levothyroxine (SYNTHROID, LEVOTHROID) 200 MCG tablet Take 1 tablet (200 mcg total) by mouth daily before supper.     metoprolol tartrate (LOPRESSOR) 25 MG tablet Take 12.5 mg by mouth 2 (two) times a day. Hold for sbp<105  Hr <55     nystatin (MYCOSTATIN) powder Apply topically 4 (four) times a day.     omeprazole (PRILOSEC) 20 MG capsule Take 1 capsule (20 mg total) by mouth daily before breakfast.     ondansetron (ZOFRAN-ODT) 4 MG disintegrating tablet Take 4 mg by  mouth every 6 (six) hours as needed for nausea.     polyethylene glycol (MIRALAX) 17 gram packet Take 17 g by mouth daily as needed.      potassium chloride (K-DUR,KLOR-CON) 20 MEQ tablet Take 40 mEq by mouth daily before breakfast.     rOPINIRole (REQUIP) 0.5 MG tablet Take 0.5 mg by mouth at bedtime.     senna (SENOKOT) 8.6 mg tablet Take 1 tablet by mouth daily as needed. Hold for loose stools     white petrolatum (AQUAPHOR ORIGINAL) 41 % Oint Apply 1 application topically at bedtime.     Past Medical History:    Past Medical History:   Diagnosis Date     Acute kidney injury superimposed on CKD (H) 3/3/2017     Asthma      CAD (coronary artery disease)      Cataract      Chronic kidney disease     ckd3     COPD (chronic obstructive pulmonary disease) (H)      Diabetes mellitus (H)      Disease of thyroid gland      H/O heart valve replacement with bioprosthetic valve      History of transfusion      Hypertension      Normochromic normocytic anemia      Pulmonary hypertension (H) 09/27/2019    Noted on echocardiogram     Restless leg syndrome            PHYSICAL EXAMINATION  Vitals:    10/12/20 0700   BP: 104/57   Pulse: (!) 59   Resp: 18   Temp: 97.6  F (36.4  C)   SpO2: 95%   Weight: 203 lb (92.1 kg)           LABS:   Recent Results (from the past 168 hour(s))   Cell Ct/Diff, Body Fluid   Result Value Ref Range    Color, Fluid Pink     Appearance, Fluid Turbid     WBC, Fluid 8,998 (H) 0 - 99 /uL    RBC, Fluid <50,000 <50,000 /ul    Neutrophil % 52 (H) <=25 %    Lymphocyte % 29 <=78 %    Monocyte % 10 <=71 %    Macrophage % 8 <=71 %    Mesothelial %      Eosinophil % 1 <=1 %    Other Cells %     Culture/Gram Stain: Body Fluid    Specimen: Fluid, Body; Body Fluid   Result Value Ref Range    Culture No Growth     Gram Stain Result No polymorphonuclear leukocytes seen     Gram Stain Result No organisms seen    Culture, Anaerobic    Specimen: Body Fluid   Result Value Ref Range    Culture No anaerobic organisms  isolated    Crystals, Body Fluid   Result Value Ref Range    Crystals, Fluid Uric Acid Crystals Seen    Basic Metabolic Panel   Result Value Ref Range    Sodium 141 136 - 145 mmol/L    Potassium 4.0 3.5 - 5.0 mmol/L    Chloride 97 (L) 98 - 107 mmol/L    CO2 36 (H) 22 - 31 mmol/L    Anion Gap, Calculation 8 5 - 18 mmol/L    Glucose 96 70 - 125 mg/dL    Calcium 9.7 8.5 - 10.5 mg/dL    BUN 41 (H) 8 - 28 mg/dL    Creatinine 1.53 (H) 0.60 - 1.10 mg/dL    GFR MDRD Af Amer 39 (L) >60 mL/min/1.73m2    GFR MDRD Non Af Amer 32 (L) >60 mL/min/1.73m2     Results for orders placed or performed in visit on 10/12/20   Basic Metabolic Panel   Result Value Ref Range    Sodium 141 136 - 145 mmol/L    Potassium 4.0 3.5 - 5.0 mmol/L    Chloride 97 (L) 98 - 107 mmol/L    CO2 36 (H) 22 - 31 mmol/L    Anion Gap, Calculation 8 5 - 18 mmol/L    Glucose 96 70 - 125 mg/dL    Calcium 9.7 8.5 - 10.5 mg/dL    BUN 41 (H) 8 - 28 mg/dL    Creatinine 1.53 (H) 0.60 - 1.10 mg/dL    GFR MDRD Af Amer 39 (L) >60 mL/min/1.73m2    GFR MDRD Non Af Amer 32 (L) >60 mL/min/1.73m2         Lab Results   Component Value Date    WBC 5.7 09/12/2020    HGB 9.8 (L) 09/25/2020    HCT 30.8 (L) 09/12/2020     (H) 09/12/2020     09/12/2020       Lab Results   Component Value Date    [id number] 09/12/2020     Lab Results   Component Value Date    HGBA1C 6.5 (H) 07/31/2020     Lab Results   Component Value Date    INR 1.11 (H) 09/10/2020    INR 0.99 07/28/2020    INR 1.30 (H) 02/07/2018     Vitamin D, Total (25-Hydroxy)   Date Value Ref Range Status   01/20/2020 45.9 30.0 - 80.0 ng/mL Final     Lab Results   Component Value Date    TSH 9.38 (H) 09/11/2020           ASSESSMENT/PLAN:    Acute on Chronic CHF: Daily [phone] then [phone], then dropped to [phone]. On metolazone burst and weights improving. improved shortness of breath and edema. On bumex, encourage leg elevation. Cardiac diet. velcro leg  wraps.  notify for weight change for 2 lbs in 24 hours or 5lbs in 7 days.  Bmp on 10/12-reviewed and stable today, potassium 4.0. no changes today. Stable.   S/p reverse total shoulder arthroplasty: Incision healed.  PT, OT following. F/u with surgeon Dr. Ramirez on 8/14-f/u again in 4 weeks, limit external rotation, no bathing until 4 weeks postop, healing well, f/u again on 9/4-doing well, dc sling, AROM and PROM with therapy, last f/u 10/5-doing well, no concerns. Weight bear with walker, pain improving. Tylenol prn. No recent concerns.   S/p PPM: follows with device clinic. No recent concerns.   Asthma:  Encourage cough and deep breathe. Albuterol prn. Stable today.   Hypothyroid: on levothyroxine. tsh 9.38 on 9/11, levothyroxine dose adjusted. recheck tsh in 6 weeks-discharging later this week. Will recommend this at pcp follow up.   CKD stage 3: Cr 1.39, gfr 36 on 8/1.  9/4 cr 1.9, gfr 25. Bmp 9/18-cr 2.05 reduced bumex, 1.98 on 9/21. Cr 1.68 on 9/25 stable. Cr 1.53 on 10/12. Stable.   H/o Type 2 DM: No meds, no monitoring needed. Diet controlled. Encouraged to monitor carb intake.   Dyslipidemia: On atorvastatin.   HTN: SBP 100s, On bumex, metoprolol. Stable recently.   GERD: On omeprazole. No concerns today.   Vitamin d deficiency: On vitamin d.  Hypokalemia: On kcl. Stable.   PAF, s/p AVR: Regular rate and rhythm recently. No recent a fib rvr. Controlled off metoprolol. F/u with caridology. On aspirin daily.   Anemia of CKD: on iron. Hg 10.9 on 7/24.   Hg 8.8 on 8/1. Hg 9 on 8/5. Recheck on 8/17 8.2, iron to three times a day. recheck on 8/24-hg 8.4. hg 9.7 on 9/4, hg 9.5 on 9/12. Hg on 9/25-no change 9.8. stable.   Candidal intertrigo:  Nystatin.   Constipation: senna daily, miralax daily prn. Now having loose stools, change senna to daily prn. Hold today. Send stool for c diff. Start lactobacillus 2 tabs daily x 7 days.   Restless legs, insomnia: requip at bedtime. No concerns today.   Nausea:  zofran prn.      Therapy PT: trsf mod A, walking 10-40ft with 4WW CGA, had a good day yesterday      OT: min A UB drsg, max A toileting, LB dressing max assist, . Feeding: mod. Not elevating feet during the day and LE edema is increasing, hand and elbow swollen   Yellow tag. Lives with granddaughter. She will need 24 hour supervision and assist with all cares, home PT/OT. LCD 10/12. New ramp in at home. Filed appeal, awaiting results.     Video-Visit Details    Type of service:  Video Visit    Video End Time (time video stopped): 1155    Originating Location (pt. Location):[facility] [[id number]]    Distant Location (provider location):  [facility]     Mode of Communication:  Google Duo      Electronically signed by: Jennifer Valdes NP

## 2021-06-12 NOTE — PROGRESS NOTES
"            [facility]  Video visit    Thea Acosta 87 yo. female who is being evaluated via a billable video visit.      The patient has been notified of following:     \"This video visit will be conducted via a call between you and your physician/provider. We have found that certain health care needs can be provided without the need for an in-person physical exam.  This service lets us provide the care you need with a video conversation.  If a prescription is necessary we can send it to the facility team.  If lab work is needed we can place an order through the facility team to have that test done at a later time.    If during the course of the call the physician/provider feels a video visit is not appropriate, you will not be charged for this service.\"     Physician/provider has received verbal consent for a Video Visit from the patient? Yes    Patient would like the video invitation sent by: me Send to : Nurse manager of [facility]    Video Start Time: 1140      DATE: 10/19/020    NAME:  Thea Acosta             :  1934  MRN: [id number]  CODE STATUS:  DNR    VISIT TYPE: Discharge Summary     FACILITY:  [facility] [[id number]]       CHIEF COMPLAIN/REASON FOR VISIT:    Chief Complaint   Patient presents with     Discharge Summary               HISTORY OF PRESENT ILLNESS: Thea Acosta is a 86 y.o. female who admitted - for acute metabolic encephalopathy. This was felt to be s/t hypothyroidism and noncompliance with levothyroxine dosing. She was readmitted - for left shoulder dislocation and underwent reverse total shoulder arthroplasty on . Postop course was uncomplicated and transferred back to [facility]. She has PMH of CAD, CKD, DM, s/p PPM, diastolic CHF, pulmonary artery hypertension, mitral valve stenosis, chronic a fib no anticoagulation, s/p AVR, asthma. Prior to this she lived at home with her grand daughter. She was readmitted " 9/10-9/13 for altered mental status. She was treated for possible cellulitis and hypoxic respiratory failure. Her tsh was elevated so levothyroxine dose was increased. She was discharged back to tcu.     TCU course:   Ms. Acosta has made progress with therapy and is ambulating 40 feet with walker and contact guard assist. She is min to max for dressing, max for toileting. She is mod assist for feeding. She was recommended 24 hour care at home. During her tcu course she was treated for CHF exacerbation with metolazone burst. Her weights are now more stable. Her most recent bmp on 10/12 was stable. Her shoulder pain has been improving. She last saw ortho on 10/5. She will follow up prn. She is off restrictions. She did have worsening of thyroid function and levothyroxine dose was recently adjusted. She will be due for tsh recheck at pcp follow up. Her renal function has been stable. Her diabetes is controlled with diet. Her vitlas have been stable. Her A fib is controlled. Her anemia has been stable 9-10 recently. She was started on iron. We did trial off aspirin and this did not improve anemia so she was resumed on this. She did have some recent loose stools but this is now resolved. She was started on requip for restless leg syndrome. She completed therapy  On 10/12 and lost her appeal. She stayed one week private pay due to her granddaughter moving out and not having 24 hour assist at home. Her son is coming from [place] to stay with her for 6 Weeks. She will have HH PT, OT, HHA, RN. She will f/u with PCP in 5-7 days. She recently had ramp installed at home and will be using wheelchair and walker at home.         Review of Systems    Currently, no fever, chills, or rigors. Decreased vision and hearing. Denies any chest pain, palpitations, lightheadedness, dizziness, no cough. Appetite is good.   No active bleeding. Positive for urinary incontinence, urinary frequency, leg swelling, left shoulder incision healed,  "no nausea, no recent constipation or diarrhea, pain controlled, velcro leg wraps, shortness of breath withe exertion, no insomnia, no headaches       Allergies   Allergen Reactions     Tramadol Anxiety and Other (See Comments)     Per patient, Thea developed psychosis and severe anxiety and agitation \"for three days\" after receiving tramadol in the past. She stated that she would \"never\" take it again and would be extremely upset if she receives it. She asked me to add this to her chart's allergy list specifically.      Codeine Hives     Codeine Phosphate (Bulk)      This allergy is per [facility] records.     Codeine Sulfate      This allergy is per [facility] records.     Codeine-Butalbital-Asa-Caff      This allergy is per [facility] records.     Codeine-Guaifenesin      This allergy is per [facility] records.     Lisinopril Cough     Penicillins Swelling     Current Outpatient Medications   Medication Sig     acetaminophen (TYLENOL) 500 MG tablet Take 2 tablets (1,000 mg total) by mouth 3 (three) times a day. (Patient taking differently: Take 1,000 mg by mouth 3 (three) times a day as needed. )     albuterol (PROAIR HFA;PROVENTIL HFA;VENTOLIN HFA) 90 mcg/actuation inhaler Inhale 2 puffs every 4 (four) hours as needed for wheezing.     aspirin 81 MG EC tablet Take 81 mg by mouth daily.     atorvastatin (LIPITOR) 80 MG tablet TAKE 1 TABLET(80 MG) BY MOUTH DAILY     bumetanide (BUMEX) 2 MG tablet Take 1 tablet (2 mg total) by mouth 2 (two) times a day at 9am and 6pm. (Patient taking differently: Take 2 mg by mouth 2 (two) times a day at 9am and 6pm. 2mg in am and 1mg at noon)     colchicine 0.6 mg tablet take two tablets (1.2 mg) by mouth at onset of gout symptoms, then repeat one tablet (0.6 mg) by mouth one hour later     diclofenac sodium (VOLTAREN) 1 % Gel Apply 2 g topically 4 " (four) times a day as needed.      dimethicone 5 % Crea Apply 1 application topically 2 (two) times a day as needed (to buttocks as needed for skin breakdown).     ferrous sulfate 325 (65 FE) MG tablet Take 1 tablet by mouth daily.     levothyroxine (SYNTHROID, LEVOTHROID) 200 MCG tablet Take 1 tablet (200 mcg total) by mouth daily before supper.     metoprolol tartrate (LOPRESSOR) 25 MG tablet Take 12.5 mg by mouth 2 (two) times a day. Hold for sbp<105  Hr <55     nystatin (MYCOSTATIN) powder Apply topically 4 (four) times a day.     omeprazole (PRILOSEC) 20 MG capsule Take 1 capsule (20 mg total) by mouth daily before breakfast.     ondansetron (ZOFRAN-ODT) 4 MG disintegrating tablet Take 4 mg by mouth every 6 (six) hours as needed for nausea.     polyethylene glycol (MIRALAX) 17 gram packet Take 17 g by mouth daily as needed.      potassium chloride (K-DUR,KLOR-CON) 20 MEQ tablet Take 40 mEq by mouth daily before breakfast.     rOPINIRole (REQUIP) 0.5 MG tablet Take 0.5 mg by mouth at bedtime.     senna (SENOKOT) 8.6 mg tablet Take 1 tablet by mouth daily as needed. Hold for loose stools     white petrolatum (AQUAPHOR ORIGINAL) 41 % Oint Apply 1 application topically at bedtime.     Past Medical History:    Past Medical History:   Diagnosis Date     Acute kidney injury superimposed on CKD (H) 3/3/2017     Asthma      CAD (coronary artery disease)      Cataract      Chronic kidney disease     ckd3     COPD (chronic obstructive pulmonary disease) (H)      Diabetes mellitus (H)      Disease of thyroid gland      H/O heart valve replacement with bioprosthetic valve      History of transfusion      Hypertension      Normochromic normocytic anemia      Pulmonary hypertension (H) 09/27/2019    Noted on echocardiogram     Restless leg syndrome            PHYSICAL EXAMINATION  Vitals:    10/19/20 0700   BP: 102/64   Pulse: 64   Resp: 18   Temp: 96.6  F (35.9  C)   SpO2: 93%   Weight: 205 lb (93 kg)           LABS:   No  results found for this or any previous visit (from the past 168 hour(s)).  Results for orders placed or performed in visit on 10/12/20   Basic Metabolic Panel   Result Value Ref Range    Sodium 141 136 - 145 mmol/L    Potassium 4.0 3.5 - 5.0 mmol/L    Chloride 97 (L) 98 - 107 mmol/L    CO2 36 (H) 22 - 31 mmol/L    Anion Gap, Calculation 8 5 - 18 mmol/L    Glucose 96 70 - 125 mg/dL    Calcium 9.7 8.5 - 10.5 mg/dL    BUN 41 (H) 8 - 28 mg/dL    Creatinine 1.53 (H) 0.60 - 1.10 mg/dL    GFR MDRD Af Amer 39 (L) >60 mL/min/1.73m2    GFR MDRD Non Af Amer 32 (L) >60 mL/min/1.73m2         Lab Results   Component Value Date    WBC 5.7 09/12/2020    HGB 9.8 (L) 09/25/2020    HCT 30.8 (L) 09/12/2020     (H) 09/12/2020     09/12/2020       Lab Results   Component Value Date    AEEVYHHT06 468 09/12/2020     Lab Results   Component Value Date    HGBA1C 6.5 (H) 07/31/2020     Lab Results   Component Value Date    INR 1.11 (H) 09/10/2020    INR 0.99 07/28/2020    INR 1.30 (H) 02/07/2018     Vitamin D, Total (25-Hydroxy)   Date Value Ref Range Status   01/20/2020 45.9 30.0 - 80.0 ng/mL Final     Lab Results   Component Value Date    TSH 9.38 (H) 09/11/2020           ASSESSMENT/PLAN:    Acute on Chronic CHF: Daily weights- [phone]. stable shortness of breath and edema. On bumex, encourage leg elevation. Cardiac diet. velcro leg wraps.  notify for weight change for 2 lbs in 24 hours or 5lbs in 7 days. Stable today. Continue current regimen at home. Encouraged to weigh frequently at home and notify her primary for weight gain. Completed metolazone burst.   S/p reverse total shoulder arthroplasty: Incision healed.  Completed restrictions, last ortho visit 10/5. F/u prn.   S/p PPM: follows with device clinic. No recent concerns.   Asthma:  Encourage cough and deep breathe. Albuterol prn. No recent concerns.    Hypothyroid: on levothyroxine. tsh 9.38 on 9/11, levothyroxine dose adjusted. Needs tsh  recheck at pcp follow up.  CKD stage 3: Cr 1.39, gfr 36 on 8/1.  9/4 cr 1.9, gfr 25. Bmp 9/18-cr 2.05 reduced bumex, 1.98 on 9/21. Cr 1.68 on 9/25 stable. Cr 1.53 on 10/12. Stable.   H/o Type 2 DM: managed per diet.   Dyslipidemia: On atorvastatin.   HTN: SBP 100s, On bumex, metoprolol. Stable recently.   GERD: On omeprazole. No concerns today.   Vitamin d deficiency: On vitamin d.  Hypokalemia: On kcl. Stable.   PAF, s/p AVR: Regular rate and rhythm recently. No recent a fib rvr. Controlled off metoprolol. F/u with caridology. On aspirin daily.   Anemia of CKD: on iron.stable 9-10.  Candidal intertrigo:  Nystatin.    Restless legs, insomnia: requip at bedtime. No concerns today.   Nausea:  zofran prn.   C diff colitis: continue vanco until 10/22. Diarrhea improved. No recent concerns.     Video-Visit Details    Type of service:  Video Visit    Video End Time (time video stopped): 1150, additional 25 min spent on counseling and coordination of care for discharge, chf management.     Originating Location (pt. Location):[facility] [[id number]]    Distant Location (provider location):  [facility]     Mode of Communication: Face time      Electronically signed by: Jennifer Valdes NP     Please evaluate Thea Acosta for admission to Home Health.    Face to Face Attestation and Initial Plan of Care    The face-to-face encounter occurred on date: 10/19/20  Face to Face encounter was with: Jennifer Valdes    Please provide brief clinical summary of reason for visit and need for home care. Deconditioning after hospital course for chf    Please identify which of the following home health disciplines the patient will need AND describe the skilled services that you would like the home health agency to perform: SKILLED NURSING (RN): complex med management, PHYSICAL THERAPY: strength training and gait training, OCCUPATIONAL THERAPY: ADLs and home safety and HOME HEALTH  "AIDE    Homebound Status (describe the functional limitations that support this patient is confined to his/her home. Medicaid recipients are not required to be homebound.):assistive device needed:  4WW and Wheelchair    Name of physician who will be responsible for the ongoing home health plan of care (CMS requires the referring physician to provide the specific name of the community physician instead of a title, such as \"PCP\"): Dr. Gamal Hdz    Requested Start of Care Date: Within 48 hours    Other information to assist the home health agency in developing the initial Plan of Care:    I certify that services are/were furnished while this patient was under the care of a physician and that a physician or an allowed non-physician practitioner (NPP), had a face-to-face encounter that meets the physician face-to-face encounter requirements. The encounter was in whole, or in part, related to the primary reason for home health. The patient is confined to his/her home and needs intermittent skilled nursing, physical therapy, speech-language pathology, or the continued need for occupational therapy. A plan of care has been established by a physician and is periodically reviewed by a physician.    Post Discharge Medication Reconciliation Status: discharge medications reconciled, continue medications without change    "

## 2021-06-13 NOTE — TELEPHONE ENCOUNTER
Orders being requested:   Lab draw for labs requested by Provider on 11/18/2020  Reason service is needed/diagnosis:   Lab draw  When are orders needed by:   As soon as possible.  Where to send Orders: Fax: Attn: Christen with [facility], [phone]  Okay to leave detailed message?  Yes

## 2021-06-13 NOTE — PROGRESS NOTES
"Thea Acosta is a 86 y.o. female who is being evaluated via a billable telephone visit.      The patient has been notified of following:     \"This telephone visit will be conducted via a call between you and your physician/provider. We have found that certain health care needs can be provided without the need for a physical exam.  This service lets us provide the care you need with a short phone conversation.  If a prescription is necessary we can send it directly to your pharmacy.  If lab work is needed we can place an order for that and you can then stop by our lab to have the test done at a later time.    Telephone visits are billed at different rates depending on your insurance coverage. During this emergency period, for some insurers they may be billed the same as an in-person visit.  Please reach out to your insurance provider with any questions.    If during the course of the call the physician/provider feels a telephone visit is not appropriate, you will not be charged for this service.\"    Patient has given verbal consent to a Telephone visit? Yes    What phone number would you like to be contacted at? [phone]    Patient would like to receive their AVS by AVS Preference: [place].    Thea Acosta is a 86 y.o. female she is on the phone today with her son.  She is a complex medical history that includes chronic heart failure with preserved ejection fraction.  Most recent echocardiogram from September is reviewed.  Is reported that she has increased swelling, weight gain but no other significant symptoms.  Patient does have chronic wheezing which she reports to be no worse she denies shortness of breath.  She has not had fevers chills or any other significant concerns.  She is able to maintain oral intake.  She is on Bumex 4 mg twice daily.  Patient records weight diligently at home her weight has gone up from 208 pounds last week to 219 pounds as of today.  In the past increasing dose of Bumex " has worked well to decrease fluid.  No obvious inciting factor for fluid gain.  Reviewed recent labs obtained through [facility].  Patient's creatinine is 1.57 with a relatively stable GFR.  Sodium potassium and other electrolytes were normal.  She remains on potassium supplementation.    Assessment/Plan:      Diagnoses and all orders for this visit:    Acute on chronic heart failure with preserved ejection fraction (H)          Take extra dose of Bumex now.  Take usual evening dose this evening.  Begin tomorrow take Bumex 6 mg in the morning and 4 mg in the evening.  Call on Wednesday, December 23, 2020 with update on weight and status of swelling.  Call sooner if there are signs of worsening or development of new symptoms.  If significant respiratory distress develops proceed to the emergency department for further evaluation and treatment.      Phone call duration: 8 minutes    Vj Love MD

## 2021-06-13 NOTE — TELEPHONE ENCOUNTER
Spoke with home care nurse and notified her of the below message.  Patient will not see home care again until 12/17/2020 and she is wondering if the BMP can be drawn on 12/17/2020?     The INR actually was not for Thea but in regards to a different patient therefore will disregard reported INR as this was for the wrong patient.

## 2021-06-13 NOTE — TELEPHONE ENCOUNTER
Received a phone call from patient and her son Nate   Patient has bilateral leg edema from ankles to her knees with a history of CHF.   Edema has worsened over the last 3 days.  Patient takes Bumex 4 mg two times a day.     She denies any chest pain or worsening shortness of breath       Should we arrange a virtual visit for her? Increase her diuretic?   Call patient back with provider advise. Her telephone number is [phone]    Results for orders placed or performed in visit on 10/12/20   Basic Metabolic Panel   Result Value Ref Range    Sodium 141 136 - 145 mmol/L    Potassium 4.0 3.5 - 5.0 mmol/L    Chloride 97 (L) 98 - 107 mmol/L    CO2 36 (H) 22 - 31 mmol/L    Anion Gap, Calculation 8 5 - 18 mmol/L    Glucose 96 70 - 125 mg/dL    Calcium 9.7 8.5 - 10.5 mg/dL    BUN 41 (H) 8 - 28 mg/dL    Creatinine 1.53 (H) 0.60 - 1.10 mg/dL    GFR MDRD Af Amer 39 (L) >60 mL/min/1.73m2    GFR MDRD Non Af Amer 32 (L) >60 mL/min/1.73m2

## 2021-06-13 NOTE — PROGRESS NOTES
Assessment:    1. Type 2 diabetes mellitus  Glycosylated Hemoglobin A1c    Glycosylated Hemoglobin A1c   2. Need for immunization against influenza  Influenza High Dose, Seasonal 65+ yrs   3. Edema, unspecified type     4. Essential hypertension with goal blood pressure less than 140/90     5. Normochromic normocytic anemia  HM2(CBC w/o Differential)   6. Chronic Kidney Disease, Stage 3  Basic Metabolic Panel   7. Moderate persistent asthma  ipratropium-albuterol (COMBIVENT RESPIMAT)  mcg/actuation Mist inhaler         Plan:    Type 2 diabetes appears stable with A1c improving from 7.3% to 7.2% since June 1, 2017.  Continue current management.  History of CKD stage III and will check basic metabolic panel.  CBC regarding normochromic normocytic anemia.  Did refill Combivent Respimat inhaler 4 times daily as needed for underlying asthma continue use of Advair 500/51 puff twice daily.  Will increase furosemide from 80 mg every morning up to 80 mg with breakfast and lunch for 5 days and 80 mg a.m. and 40 mg at lunch with follow-up in office no later than 4 weeks.  High-dose flu shot given today.  Blood pressure 116/64 on recheck.        Subjective:    Thea Acosta is seen today for follow-up evaluation type 2 diabetes.  In general doing well.  Prior A1c is 7.3% noted.  Does not require insulin.  Not requiring oral diabetes medication.  Does have history of CKD stage III.  Increased peripheral edema.  Continues to have legs wrapped every other day for lymphedema management.  Using furosemide 80 mg in the morning only.  Had been on metolazone in the past however significant hypokalemia and no longer requiring this.  Leg swelling has increased over past week or so, symmetric.  No orthopnea or PND.  Needs flu shot today.  History normochromic normocytic anemia.  Asthma has been stable with Advair use.  Needs refill on Combivent however.  Comprehensive review of systems as above otherwise all negative.      "(twice) now since 3/24/07 (\"Gal\" - renal cell CA) - remarried 3/8/82   3 sons - Nate Mckoy (); Edwin (head of work force center in [facility]; Amrit Mckoy works at the [employer] (I see him now as a patient)   Son-in-law Esdras Mo   2 daughters - breast cancer   Granddaughter - Terri - plays the clarinet   New granddaughter - Patrizia?   Jew  Dad - dec MVA age 52   Mom - dec 86 old age   2 bros - 1 bro  due to lung cancer in Aug, 2010 (had quit smoking > 40-50 years ago)   Quit smoking    No EtOH   Work: laundry services at [employer]   Surgeries: s/p gallbladder, bunion 06 left TKA ([facility]) 07 Aortic valve replacement (bioprostheitc) 07 pacemaker - dual chamber   Cardiovascular surgeon called 07 - patient is \"vasculopath\", prior Hb = 8.9, no Coumadin, will use ASA only 10-20-08: started back on Warfarin, 09 GI bleed, D/C Warfarin   11-10-08: Dexa order faxed to Thea Ordaz will schedule     Past Surgical History:   Procedure Laterality Date     APPENDECTOMY       CHOLECYSTECTOMY       EYE SURGERY Bilateral     Cataracts     HIP PINNING Right 3/1/2017    Procedure: RIGHT HIP TROCHANTERIC RODDING;  Surgeon: Moshe Davies MD;  Location: [facility] Main OR;  Service:      JOINT REPLACEMENT Left     knee     TISSUE AORTIC VALVE REPLACEMENT  2007             Family History   Problem Relation Age of Onset     No Medical Problems Mother      age 86     No Medical Problems Father      MVA     Cancer Brother      lung     No Medical Problems Brother         Past Medical History:   Diagnosis Date     Acute kidney injury superimposed on CKD 3/3/2017     Asthma      CAD (coronary artery disease)      Cataract      Diabetes mellitus      Disease of thyroid gland      H/O heart valve replacement with bioprosthetic valve      History of transfusion      Hypertension         Social History   Substance Use Topics     Smoking status: Former " Smoker     Smokeless tobacco: Never Used     Alcohol use No        Current Outpatient Prescriptions   Medication Sig Dispense Refill     acetaminophen (TYLENOL) 500 MG tablet Take 500 mg by mouth every 6 (six) hours as needed for pain.       aspirin 81 MG EC tablet Take 81 mg by mouth daily.       atorvastatin (LIPITOR) 80 MG tablet Take 1 tablet (80 mg total) by mouth at bedtime. 90 tablet 3     cholecalciferol, vitamin D3, (VITAMIN D3) 2,000 unit Tab Take 2,000 Units by mouth once daily.       fluticasone-salmeterol (ADVAIR) 500-50 mcg/dose DISKUS Inhale 1 puff 2 (two) times a day.       folic acid (FOLVITE) 1 MG tablet Take 1 mg by mouth daily.       furosemide (LASIX) 40 MG tablet Take 2 tablets (80 mg total) by mouth 2 (two) times a day at 9am and 6pm. 360 tablet 3     ipratropium-albuterol (DUO-NEB) 0.5-2.5 mg/3 mL nebulizer 3 mL 4 (four) times a day.       levothyroxine (SYNTHROID, LEVOTHROID) 125 MCG tablet TAKE 1 TABLET BY MOUTH EVERY DAY 90 tablet 2     loperamide (IMODIUM A-D) 2 mg tablet Take 2 mg by mouth 4 (four) times a day as needed for diarrhea.       melatonin 3 mg Tab tablet Take 3 mg by mouth bedtime as needed.       metoprolol tartrate (LOPRESSOR) 100 MG tablet Take 0.5 tablets (50 mg total) by mouth 2 (two) times a day. 90 tablet 3     nystatin (MYCOSTATIN) powder Apply topically 2 (two) times a day. 60 g 2     omeprazole (PRILOSEC) 20 MG capsule TAKE 1 CAPSULE BY MOUTH EVERY DAY 90 capsule 3     potassium chloride SA (K-DUR,KLOR-CON) 20 MEQ tablet Take 1 tablet (20 mEq total) by mouth daily. 90 tablet 3     senna-docusate (PERICOLACE) 8.6-50 mg tablet Take 1 tablet by mouth daily.       ipratropium-albuterol (COMBIVENT RESPIMAT)  mcg/actuation Mist inhaler Inhale 1 puff 4 (four) times a day. 4 g 11     No current facility-administered medications for this visit.           Objective:    Vitals:    10/04/17 1427   BP: 140/70   Pulse: 60   SpO2: 90%   Weight: (!) 258 lb (117 kg)      Body  mass index is 40.41 kg/(m^2).    Alert.  No apparent distress.  Chest clear.  Cardiac exam regular.  Abdomen benign, obese.  2+ peripheral edema past the level of knees.  No calf tenderness.  Symmetric.

## 2021-06-13 NOTE — PROGRESS NOTES
"Thea Acosta is a 86 y.o. female who is being evaluated via a billable video visit.      The patient has been notified of following:     \"This video visit will be conducted via a call between you and your physician/provider. We have found that certain health care needs can be provided without the need for an in-person physical exam.  This service lets us provide the care you need with a video conversation.  If a prescription is necessary we can send it directly to your pharmacy.  If lab work is needed we can place an order for that and you can then stop by our lab to have the test done at a later time.    Video visits are billed at different rates depending on your insurance coverage. Please reach out to your insurance provider with any questions.    If during the course of the call the physician/provider feels a video visit is not appropriate, you will not be charged for this service.\"    Patient has given verbal consent to a Video visit? Yes  How would you like to obtain your AVS? AVS Preference: Mail a copy.  If dropped by the video visit, the video invitation should be sent to: Text to cell phone: [phone]  Will anyone else be joining your video visit? No        Video Start Time: 1:09 PM     (twice) now since 3/24/07 (\"Gal\" - renal cell CA) - remarried 3/8/82   3 sons - Nate Mckoy (); Edwin (head of work force center in [facility]; Amrit Mckoy works at the [employer] (I see him now as a patient)   Son-in-law Esdras Mo   2 daughters - breast cancer   Granddaughter - Terri - plays the clarinet   New granddaughter - Patrizia?   Gnosticism  Dad - dec MVA age 52   Mom - dec 86 old age   2 bros - 1 bro  due to lung cancer in Aug, 2010 (had quit smoking > 40-50 years ago)   Quit smoking    No EtOH   Work: laundry services at [employer]   Surgeries: s/p gallbladder, bunion 06 left TKA ([facility]) 07 Aortic valve replacement (bioprostheitc) 07 pacemaker " "- dual chamber   Cardiovascular surgeon called 6/22/07 - patient is \"vasculopath\", prior Hb = 8.9, no Coumadin, will use ASA only 10-20-08: started back on Warfarin, 2/22/09 GI bleed, D/C Warfarin     Additional provider notes: GENERAL: Healthy, alert and no distress  EYES: Eyes grossly normal to inspection. No discharge or erythema, or obvious scleral/conjunctival abnormalities.  RESP: No audible wheeze, cough, or visible cyanosis.  No visible retractions or increased work of breathing.    NEURO: Cranial nerves grossly intact. Mentation and speech appropriate for age.  PSYCH: Mentation appears normal, affect normal/bright, judgement and insight intact, normal speech and appearance well-groomed    Video visit completed today.  Recent hospital admission September 10 of September 13, 2020 for concerns with confusion consistent with acute encephalopathy.  Patient subsequently discharged to TCU through October 20, 2020 and now has been at home with services including nursing, PT and OT, nursing assistant for bathing twice a week etc.  No recurrent concerns for confusion.  History of asthma.  Was sent home on oxygen.  Not using currently but worried that she may need it again and wants to ensure that this can stay in her house.  Was using 1 L/min oxygen before.  Due to elevated TSH did have dose increase of levothyroxine to 200 mcg daily.  Tolerating this.  Known history of CAD.  Continues atorvastatin 80 mg daily.  Potassium supplement 20 mEq using 2 in the morning.  Somewhat hard to swallow.  Bumex 2 mg using 2 tablets twice daily.  History of hypertension pulmonary artery hypertension.  CKD stage IIIb.  Type 2 diabetes with prior A1c 6.5% July 31st 2020.  No significant orthopnea or PND symptoms described.  No fevers.  No significant cough.  Comprehensive review of systems as above otherwise all negative.    1. Confusion  Confusion concerns with recent hospitalization September 10 through September 13 with acute " encephalopathy, resolved.  No recurrent concerns at this time and continues to do well at home following TCU discharge October 20, 2020.    2. Chronic heart failure with preserved ejection fraction (H)  Continues use of Bumex 2 mg using 2 tablets twice daily.  BNP level at time of hospital discharge normal.  Patient states wants full code which is updated in chart.  Remains on metoprolol tartrate 25 mg using half tablet twice daily.    3. Hypothyroidism, unspecified type  We will repeat TSH at earliest convenience with higher dose increase of levothyroxine to 200 mcg daily.  Medication compliance noted.  - Thyroid Stimulating Hormone (TSH); Future    4. CKD (chronic kidney disease) stage 4, GFR 15-29 ml/min (H)  CKD stage IIIb or stage IV previously and will repeat basic metabolic panel to ensure stable renal function while on chronic diuretics.  - Basic Metabolic Panel; Future    5. Moderate persistent asthma, unspecified whether complicated  Continues management of underlying asthma without described exacerbation currently.  Has albuterol metered-dose inhaler on as-needed basis at this time.    6. Normochromic normocytic anemia  We will ensure stable or improved anemia at follow-up lab draw.  - HM2(CBC w/o Differential); Future    7. Pulmonary hypertension (H)  Known history of pulmonary hypertension, stable.    8. Type 2 diabetes mellitus with stage 3b chronic kidney disease, without long-term current use of insulin (H)  Anticipate diabetes recheck at follow-up office visit no later than 3 months.  - Glycosylated Hemoglobin A1c; Future    9. Neuralgia  Uses lidocaine cream topically to area of skin irritation with refill provided today.  - lidocaine (XYLOCAINE) 5 % ointment; Apply 1 application topically 3 (three) times a day as needed.  Dispense: 35.44 g; Refill: 0    10. Hypokalemia  Will ensure continued use of potassium chloride 20 mEq using 2 tablets each morning.       Echo 9/12/20 Summary    Severe  biatrial chamber enlargement    Left ventricle ejection fraction is normal. The calculated left ventricular ejection fraction is 56%.    Left ventricular diastolic dysfunction    Mild to moderate calcific mitral stenosis with moderate mitral regurgitation    Normal functioning of a bioprosthetic aortic valve without insufficiency    Moderate tricuspid insufficiency with moderate elevation in pulmonary artery pressures.    When compared to the previous study dated 9/27/2019, no interval change.           Video-Visit Details    Type of service:  Video Visit    Video End Time (time video stopped): 1:38 PM  Originating Location (pt. Location): Home    Distant Location (provider location):  [facility]     Platform used for Video Visit: Kili      Gamal Hdz MD

## 2021-06-13 NOTE — TELEPHONE ENCOUNTER
FYI - Status Update  Who is Calling: Home Care Christen nurse with [facility] home health  Update: Nurse was calling with update information for PCP.    Nurse states their home care with the patient is complete.  The patient will be continuing with private pay nursing.   Also faxing over recent lab results, attention to the Potassium results which were 5.2 and the INR result of 7  Home care is questioning the potassium medication dose and wondering about holding ASA?  Okay to leave a detailed message?:  Yes

## 2021-06-13 NOTE — PROGRESS NOTES
Email:  Marital Status:   Children: Yes  Born and Raised:   Occupation: Retired  Living Situation: Living alone in [place]  Smoking, Alcohol, other:  Services receiving:  [facility] Follow up s: Weekly-To Thea and to miah Nate by email : [email or web address]  CCC/[facility] Enrollment: 7/19/17

## 2021-06-13 NOTE — TELEPHONE ENCOUNTER
Who is calling:    Reason for Call:  The caller states the plan of care and a verbal order needs to be signed. Caller states she has been attempting to get these signed since 10/21/20. Caller states she will fax again today and the orders can be Faxed back to [facility]. Attention: [facility]#[phone]   Date of last appointment with primary care:   Okay to leave a detailed message: Yes

## 2021-06-13 NOTE — TELEPHONE ENCOUNTER
Received a phone call from patients daughter requesting a refill on patients lidocaine 5 % ointment.     Please send to [facility] on file.        FYI: Patient is going to continue home care weekly for 3 weeks.

## 2021-06-13 NOTE — PROGRESS NOTES
My Clinic Care Coordination Wellness Plan  This Maintenance Wellness Plan provides private information in regards to the work I have done with my Care Team from my Primary Care Clinic.  This document provides insight on the goals I have worked hard to achieve.  My Care Team congratulates me on my journey to become well.  With the assistance of my Clinic Care Guide and Primary Care Physician,  I have succeeded in improving areas of my health that I identified as barriers to becoming well.  I will continue to seek wellness and use the skills I have obtained to further my journey.  My Care Guide will follow up with me in 3 months.  In the meantime, if I should have any questions or concerns I will contact my Care Guide.     [facility]  [address], [address] [place] [address]  [phone]      My Preferred Method of Contact:  Phone: [phone]    My Primary/Preferred Language:  English    Preferred Learning Style:  Hands on teaching    Emergency Contact: Extended Emergency Contact Information  Primary Emergency Contact: Surjit Heart   [facility]  Home Phone: [phone]  Relation: Child  Secondary Emergency Contact: Vikki Mckoy   [facility]  Home Phone: [phone]  Relation: Child     PCP:  Gamal Hdz MD  Specialists:    Care Team            Gamal Hdz MD PCP - General    [phone]     [facility] Care Coordination Care Guide, Clinic Care Coordination    [facility]. Phone: [phone]; Fax: [phone]        Accomplishments:  Goals        Patient Stated      COMPLETED: I accomplished looking into resources to stay in my own home for as long as possible. (pt-stated)            Personal Plan  If I decided that I do want a hospital bed or any other resources in my home I will talk with my PCP and or call the Senior Linkage Line at [phone].            Advanced Directive/Living Will: On file with the clinic    Clinical  Emergency Plan    Preventing Falls    Having a health problem can make you more likely to fall. Taking certain kinds of medicines may also increase your risk of falls. So, improving your health can help you avoid a fall. Work with your healthcare provider to manage health problems and to review your medicines. If you have your health under control, your risk of falling is lessened.      When to call your healthcare provider  Be sure to call your healthcare provider if you fall. Also call if you have any of these signs or symptoms (someone else may need to point them out to you):    Feeling lightheaded or dizzy more than once a day    Losing your balance often or feeling unsteady on your feet    Feeling numbness in your legs or feet, or noticing a change in the way you walk    Having a steady decline in your memory or mental sharpness      Chronic Kidney Disease (CKD)  Chronic kidney disease can result from many causes including infections, diabetes, high blood pressure, kidney stones, circulation problems, and reactions to medication. Having kidney disease means making many changes in your life. Learn as much as you can about it so that you can better adjust to these changes. It is important to remember that the main goal of treatment is to stop chronic kidney disease (CKD) from progressing to complete kidney failure. Treatments may vary based on the progression of CKD, so always follow your doctor's instructions in the care and management of your condition.  When to seek medical care  Call your doctor right away if you have any of the following:    Trouble eating or drinking    Weight loss of more than 2 pounds in 24 hours or more than 5 pounds in 7 days    Little or no urine output    Trouble breathing    Muscle aches    Fever of 100.4 F or higher, or chills    Blood in your urine or stool    Bloody discharge from your nose, mouth, or ears    Severe headache or a seizure    Vomiting    Swelling of legs or  ankles    Chest pain (call 911)    All [facility] clinic patients have access to a Nurse 24 hours a day, 7 days a week.  If you have questions or want advice from a Nurse, please know [facility] is here for you.  You can call your clinic and they will connect you or you can call Care Connection at [phone].  [facility] also has Walk In Care clinics in multiple locations.  Call the number listed above for more information about our Walk In Care clinics or visit the [facility] website at [email or web address].

## 2021-06-13 NOTE — PROGRESS NOTES
I called and spoke with Thea today. She let me know that she does not want a hospital bed and is not in need of anyother resources at this time. We discussed putting Thea on CCC Maintenance and she agreed with that.

## 2021-06-13 NOTE — TELEPHONE ENCOUNTER
"Will monitor potassium level (reported as 5.2).  Patient should repeat BMP this week to ensure stable or resolution of mild elevation while on potassium supplement, diuretics, etc.  Who is managing her INR?  Please clarify.  Report state \"INR = 7\".  Med list doesn't list warfarin currently.  Please clarify.  "

## 2021-06-13 NOTE — TELEPHONE ENCOUNTER
Received refill request from [facility] for potassium.  Patient last saw PCP last week.  Last potassium level was normal.  Will refill for 1 year.    Tomasa Mg, Pharm.D., [facility]  Medication Therapy Management Pharmacist  [facility]

## 2021-06-14 NOTE — TELEPHONE ENCOUNTER
Received fax from [employer] stating Klor-con powder pkts is not covered under patient's plan. Please initiate prior authorization or send an alternative.

## 2021-06-14 NOTE — PROGRESS NOTES
"Thea Acosta is a 86 y.o. female who is being evaluated via a billable telephone visit.      The patient has been notified of following:     \"This telephone visit will be conducted via a call between you and your physician/provider. We have found that certain health care needs can be provided without the need for a physical exam.  This service lets us provide the care you need with a short phone conversation.  If a prescription is necessary we can send it directly to your pharmacy.  If lab work is needed we can place an order for that and you can then stop by our lab to have the test done at a later time.    Telephone visits are billed at different rates depending on your insurance coverage. During this emergency period, for some insurers they may be billed the same as an in-person visit.  Please reach out to your insurance provider with any questions.    If during the course of the call the physician/provider feels a telephone visit is not appropriate, you will not be charged for this service.\"    Patient has given verbal consent to a Telephone visit? Yes    What phone number would you like to be contacted at? [phone]    Patient would like to receive their AVS by AVS Preference: Mail a copy.    Additional provider notes: Telephone visit conducted between the patient's son (Nate) and the patient.  Delightful 86-year-old with chronic illnesses causing bilateral leg edema which appears to be increasing.  She has a history of congestive heart failure atrial fibrillation peripheral vascular disease chronic kidney disease hypothyroidism.  She is not any more short of breath she states than when she was seen here in primary care a week ago.  At that time she did have increasing bilateral leg edema and her Bumex was increased to 4 mg tablets 3 times in the morning and 4 mg tablets twice in the evening.  Apparently the edema is getting worse.  I discussed the situation and did a medication review and I understand " she is seeing Dr. Hdz tomorrow afternoon.  Her potassium before her visit of last week was normal as was the rest of her blood work but then her Bumex was increased.  My thoughts are that she does need a face-to-face examination before any change in medication is indicated but I would consider Demadex as a loop agent plus or minus a spironolactone.  I did not feel that she needed an emergency room visit.  Time will tell our success.    Assessment/Plan:  #1 increasing bilateral leg edema  Plan: May give evening dose of Bumex 8 mg tonight as planned; keep appointment with Dr. Gamal Hdz tomorrow #2 congestive heart failure  Plan: Continue beta-blockade #3 CKD stage IIIb  -Probable repeat her renal function tomorrow      Phone call duration:  13 minutes    Kendrick Gipson MA

## 2021-06-14 NOTE — TELEPHONE ENCOUNTER
Dr. Hdz,     Thea Acosta, : 1934 (MRN: [id number]) will sign on this morning with [facility] following discharge from [facility].  Thea' family is requesting that you continue to follow Thea and be her attending Hospice provider.  Below you will find additional details regarding what being a Hospice attending entails.      Hospice Dx: Sepsis    Related Dx: Cellulitis of lower abdomen and perineum; Severe Candida intertrigo; acute encephalopathy; MIGUEL on CKD 3 with anasarca, Type 2 diabetes mellitus; CHF,  pulmonary hypertension; diabetes    Do you agree to be the attending provider for this patient (that would include signing the certificate of terminal illness, receiving updates, signing orders and signing the death certificate at time of death)?     Do you certify that this patient has a prognosis of being terminally ill with a life expectancy of 6 months or less, if the patient's illness related to their terminal diagnosis runs it's normal course?     Please respond to this request within 24 business hours confirming agreement or declining the request to be Thea'  attending Hospice provider.  This is a Medicare time sensitive issue that needs attention as soon as possible.       Thank you for your consideration of the request to continue as Thea' attending provider,     Elise Nguyen, SHYAM  [facility] Admissions    Hospice Intake: [phone]

## 2021-06-14 NOTE — TELEPHONE ENCOUNTER
RN Triage:    Son Nate calling with 86 yr old Thea present during triage.    Pt c/o:    Weight increase to 227.6 lbs today.    Weight last Monday, 12/21/20  was 219 lbs and patient had a VV with Dr. Love.  Bumex increased to 6 mg in the morning and 4 mg in the evening.  Pt takes these at 11:00 am and 10:00 pm.     Pt has OV with Dr. Hdz tomorrow at 2:20 pm.    Pt weight started to increase over the past weekend.  223 lbs.    Diet unchanged.    Denies chest pain.    Bilateral knee swelling has increased to above the knee as well.  2 small spots of seeping fluid on the shin area of both legs.    No difference in wheezing or shortness of breath.  Uses inhaler as needed and nebs a couple times per day.    Tired.    O2 sats in the 90's.    No transportation available.  Only has transportation set up for tomorrow appointment at 2:20 pm with Dr. Hdz.    PLAN:  Will consult with PCP or covering MD for second level triage as protocol advises ED or OV with PCP approval.  VV or ED?  Please advise.    Advised to call back if symptoms are worsening.      Eva Conway RN   [facility] RN Triage      Additional Information    Negative: Sounds like a life-threatening emergency to the triager    Negative: Chest pain    Negative: Small area of swelling and followed an insect bite to the area    Negative: Followed a knee injury    Negative: Ankle or foot injury    Negative: Pregnant with leg swelling or edema    Negative: Difficulty breathing at rest    Negative: Entire foot is cool or blue in comparison to other side    SEVERE swelling (e.g., swelling extends above knee, entire leg is swollen, weeping fluid)    Protocols used: LEG SWELLING AND EDEMA-A-OH

## 2021-06-14 NOTE — PROGRESS NOTES
Thea Acosta is a 86 y.o. female who is being evaluated via a billable video visit.      How would you like to obtain your AVS? Mail a copy.  If dropped from the video visit, the video invitation should be resent by: Text to cell phone: [phone]  Will anyone else be joining your video visit? No      Video Start Time: 2:59 PM  Assessment & Plan     Hallucinations  Recent auditory and visual hallucinations associated with Zaroxolyn use.  Allergy list was updated today regarding this side effect.  No recurrent concerns since discontinuation of Zaroxolyn.  Will notify of further issues.  Consider neurology referral if recurrent findings.    Bilateral leg edema  Remains currently on Bumex 6 mg a.m. and 4 mg p.m. (max 10 mg/day).  Patient weight in fact has decreased from 222 pounds down to 215 pounds since stopping Zaroxolyn and continuing Bumex alone.  We will continue to monitor weights with peripheral edema findings persistent.  Will update next week to determine if further medication adjustment needed for diuresis.  Did recommend compression stockings in order to assist or lymphedema assessment.  - HM2(CBC w/o Differential)    Acute on chronic heart failure with preserved ejection fraction (H)  We will repeat BNP level with prior concerns for increase noted with diastolic heart failure.  Prior BNP level in increasing from 388 up to 497 does have home health nurse visit in a week or 2 in which these labs can be updated.  - BNP(B-type Natriuretic Peptide)    Hypothyroidism, unspecified type  Hypothyroidism.  Suppressed TSH of 0.06 decreased from 0.17 previously.  Will decrease levothyroxine from 200 mcg down to 175 mcg daily.  Recheck TSH in next 8 to 10 weeks.  - levothyroxine (SYNTHROID, LEVOTHROID) 175 MCG tablet  Dispense: 90 tablet; Refill: 3    Stage 3b chronic kidney disease  CKD stage IIIb with recent worsening likely prerenal azotemia associated with diuresis.  We will continue to monitor closely and  "recheck renal function at home nurse visit in the next 1 or 2 weeks.  - Basic Metabolic Panel    Intertrigo  Nystatin powder was updated and refilled today.  - nystatin (MYCOSTATIN) powder  Dispense: 120 g; Refill: 5           BMI:   Estimated body mass index is 36.02 kg/m  as calculated from the following:    Height as of 9/12/20: 5' 7\" (1.702 m).    Weight as of 1/9/21: 230 lb (104.3 kg).         No follow-ups on file.    Gamal Hdz MD  [facility]    Davie Acosta is 86 y.o. and presents to clinic today for the following health issues   HPI   Recent concerns for hallucinations following initiation of medication for worsening peripheral edema as discussed at office visit December 29, 2020.  Noted diastolic heart failure with continuation of Bumex 6 mg a.m. and 4 mg p.m.  Addition of Zaroxolyn 5 mg daily was provided.  Described auditory and visual hallucinations for which she was seen through emergency department January 9, 2021 and told to discontinue Zaroxolyn.  No recurrent concerns since this time.  Has remained on levothyroxine 200 mcg daily.  TSH remains suppressed and has decreased from 0.17 down to 0.06.  Patient's BNP level is also increasing from 368 up to 497.  No current orthopnea or PND type symptoms however does not sleep supine.  Sleeps in recliner typically.  Worsening chronic kidney disease present with creatinine of 2.24 and GFR 21 due to acute on chronic kidney failure with recent diuresis.  Denies fever or cough.  No current shortness of breath and does not note any worsening asthma management.  Comprehensive review of systems as above otherwise all negative.     (twice) now since 3/24/07 (\"Gal\" - renal cell CA) - remarried 3/8/82   3 sons - Nate Mckoy (); Edwin (head of work force center in [facility]; Amrit Mckoy works at the post [employer] (I see him now as a patient)   Son-in-law Esdras Mo   2 daughters - breast cancer " "  Granddaughter - Terri - plays the clarinet   New granddaughter - Patrizia?   Denominational  Dad - dec MVA age 52   Mom - dec 86 old age   2 bros - 1 bro  due to lung cancer in Aug, 2010 (had quit smoking > 40-50 years ago)   Quit smoking    No EtOH   Work: laundry services at [employer]   Surgeries: s/p gallbladder, bunion 06 left TKA ([facility]) 07 Aortic valve replacement (bioprostheitc) 07 pacemaker - dual chamber   Cardiovascular surgeon called 07 - patient is \"vasculopath\", prior Hb = 8.9, no Coumadin, will use ASA only 10-20-08: started back on Warfarin, 09 GI bleed, D/C Warfarin       Review of Systems  ROS as above otherwise all negative.      Objective       Vitals:  No vitals were obtained today due to virtual visit.    Physical Exam  Patient appears alert.  No apparent distress.  Sitting at table with video visit.  Cooperative and forthcoming.  No respiratory distress identified.  Patient's son can is also present to help with questions.          EXAM: XR CHEST 1 VIEW PORTABLE  LOCATION: [facility]  DATE/TIME: 2021 3:41 PM     INDICATION: hx of CHF, weight gain, leg swelling  COMPARISON: Portable AP view the chest 09/10/2020     IMPRESSION:      Left subclavian approach dual chamber pacemaker is right atrial appendage and right ventricular leads. Bioprosthetic aortic valve. Unchanged cardiac silhouette enlargement. Sternal wires are intact and aligned. Moderate atheromatous calcification of the   aorta. Name pulmonary artery is enlarged.     No peribronchial fluid cuffs around the tonny or subpleural edema along the interlobar fissures. No peripheral interlobular septal thickening to suggest interstitial lung edema. No lung consolidation or focal parenchymal volume loss.     No visible pleural fluid or pneumothorax.     Reverse left shoulder arthroplasty. Sternal wires are present. No new bone findings.        EXAM: CT HEAD WO " CONTRAST  LOCATION: [facility]  DATE/TIME: 1/9/2021 4:00 PM     INDICATION: Hallucinations  COMPARISON: CT head 09/10/2020  TECHNIQUE: Routine CT Head without IV contrast. Multiplanar reformats. Dose reduction techniques were used.     FINDINGS:  INTRACRANIAL CONTENTS: No intracranial hemorrhage, extraaxial collection, or mass effect.  No CT evidence of acute infarct. Mild presumed chronic small vessel ischemic changes. Mild generalized volume loss. No hydrocephalus.      VISUALIZED ORBITS/SINUSES/MASTOIDS: Prior bilateral cataract surgery. Visualized portions of the orbits are otherwise unremarkable. No paranasal sinus mucosal disease. No middle ear or mastoid effusion.     BONES/SOFT TISSUES: No acute abnormality.     IMPRESSION:   1.  No CT evidence for acute intracranial process or significant change compared to 09/10/2020.  2.  Brain atrophy and presumed chronic microvascular ischemic changes as above.      Video-Visit Details    Type of service:  Video Visit    Video End Time (time video stopped): 3:22 PM  Originating Location (pt. Location): Home    Distant Location (provider location):  [facility]     Platform used for Video Visit: Karis

## 2021-06-14 NOTE — PROGRESS NOTES
Heart Care Device Change-Out Checklist (ENZO Checklist)    Device Data  Pacemaker and Dual    : Guidant (Dentalink) by Dr. Quiroz  Model:  1291 Insignia Ultra DR  Serial Number:  [id number]    Implant Date: 6/25/2007  ENZO Date:  Between 11/16-12/21  Device Diagnosis:  CHB, AFIB    Device Alert(s):  No    Lead Data  (Print Device History and attach to this document. Enter each lead  and model number.)  Last Interrogation Date: 9/12/2017      Atrium: Cardiac pacemakers 4470   No testing available, due VVI (permanent AF)       Right Ventricle: Cardiac pacemakers 4471   Lead Imp:  500Ohms, Pacing Threshold:  1.4V @ 0.4ms and Sensing Threshold:  dependent      Old Leads Present/Abandoned: No and See scanned Device Summary Data sheets for Model & Serial Number    Lead Alert(s):  No    Diagnostic Information  Intrinsic Rhythm:  Atrial fibrillation, no R's at VVI-30bpm     Atrial Fibrillation:  Chronic Atrial-Fib  Takes Anticoagulant? No -- off warfarin per Dr. Irvin (in 2009) due to bleeding ulcer, takes 81mg Aspirin     Pacing Percentages  Atrial Pacing 0% and Ventricle Pacing 99%  Pacemaker Dependent? Yes       Ejection Fraction  Last EF Date:  3/1/2017    By Echocardiogram  Last EF Measurement:  62%        Special Instructions/Timeframe for change-out:  Routine. Per Kaylen Vanessa: Reports periodic lightheadedness with activity change (no rate response when ENZO)     Routed to EP:  Yes: Dr. Jorge A Arnold      Device RN:   Belinda Heredia, RN BSN  [facility]

## 2021-06-14 NOTE — TELEPHONE ENCOUNTER
Home care RN called in with concern for patient. Reporting patient with new confusion and restlessness at night and difficulty sleeping related to this. Patient's son was directed by home care RN to go to ED and RN reporting nothing was found at this visit. She is wondering if there is anything that can be suggested to help get this patient rest at night or if she needs to be seen. RN also reporting that O2 sats do dip at night, lowest 88%. Patient does have COPD but RN wondering if oxygen at night may be helpful. Caller would like a call back. Thank you.   Vy Stanley

## 2021-06-14 NOTE — PROGRESS NOTES
Assessment/Plan:    1. Bilateral leg edema  Bilateral lower extremity edema.  Utilizing Bumex 6 mg a.m. and 4 mg p.m. 215 pounds up to 228 pounds described over past 2 weeks.  Denies orthopnea.  No significant shortness of breath.  Or fatigue.  No chest pain or palpitations.  Addition of metolazone 5 mg every morning.  Anticipate use over next 1 to 2 weeks then decreasing dose as able to 2.5 mg daily.  Should update with weight check in next 1 to 2 weeks to ensure desired improvement otherwise has scheduled follow-up with cardiology January 19, 2021 for device check.  - metOLazone (ZAROXOLYN) 5 MG tablet; Take 1 tablet (5 mg total) by mouth daily.  Dispense: 30 tablet; Refill: 2    2. Morbid obesity (H)  Morbid obesity reviewed.  Ongoing attempts at weight loss as noted with significant component of fluid retention currently.  Unable to exercise.  Dietary restrictions to continue.    3. Acute on chronic heart failure with preserved ejection fraction (H)  Most recent EF 56%.  Fluid retention concerns as noted we will continue Bumex 6 mg a.m. and 4 mg p.m. with addition of metolazone 5 mg every morning initially with dose adjustment as able.  Paperwork completed for [employer] assistance with transportation needs.  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    4. Stage 3 chronic kidney disease, unspecified whether stage 3a or 3b CKD  Ensure stable renal function.  - Basic Metabolic Panel    5. Hypothyroidism, unspecified type  Continues thyroid replacement and will update TSH.  - Thyroid Stimulating Hormone (TSH)    6. Moderate persistent asthma, unspecified whether complicated  Moderate persistent asthma without current evidence for acute exacerbation.    7. CHF (congestive heart failure), NYHA class I, acute, systolic (H)  Refill on Bumex 6 mg a.m. and 4 mg p.m. provided.  - bumetanide (BUMEX) 2 MG tablet; take three tablets (6 mg) by mouth each AM and take two tablets (4 mg) by mouth at  "noon  Dispense: 150 tablet; Refill: 2  - BNP(B-type Natriuretic Peptide)    8. Normochromic normocytic anemia  Ensure stable normochromic normocytic anemia.  - HM2(CBC w/o Differential)    9. Neuralgia  Lidocaine 5% ointment topically to affected areas 3 times daily as needed.  - lidocaine (XYLOCAINE) 5 % ointment; Apply 1 application topically 3 (three) times a day as needed.  Dispense: 35.44 g; Refill: 0    10. Intertrigo  Nystatin powder refill provided.  - nystatin (MYCOSTATIN) powder; Apply topically 4 (four) times a day.  Dispense: 120 g; Refill: 5    11. Hypokalemia  Due to difficulty swallowing larger potassium tablets will try potassium chloride 20 mEq packet to be utilized twice daily.  - potassium chloride (KLOR-CON) 20 mEq packet; Take 20 mEq by mouth 2 (two) times a day.  Dispense: 60 packet; Refill: 5    40 minutes total time with patient, > 50% with counseling and coordination of cares.         Subjective:    Teha Acosta is seen today for multiple concerns.  Leg swelling persists.  Has increased Bumex to 6 mg a.m. 4 mg p.m.  No described orthopnea.  Utilizing wheelchair for transfers etc.  Unable to do a significant amount of activity at home and is describing herself as bored.  Wants her ears cleaned.  Has history of CKD stage IIIb.  Also has normochromic normocytic anemia history.  Lives in house along with her son uriel and her niece.  Denies dietary changes.  Has home health visits to assist with med and vitals etc.  Uses lidocaine topically to affected area of neuralgia left lateral chest question prior history of shingles etc.  Using nystatin powder for intertrigo management.  History of dual-chamber pacemaker placement also status post prior aortic valve replacement historically.  Comprehensive review of systems as above otherwise all negative.     (twice) now since 3/24/07 (\"Gal\" - renal cell CA) - remarried 3/8/82   3 sons - Nate Mckoy (); Edwin (head of work force " "center in [facility]; Amrit Mckoy works at the post office (I see him now as a patient)   Son-in-law Esdras Mo   2 daughters - breast cancer   Granddaughter - Terri - plays the clarinet   New granddaughter - Patrizia?   Worship  Dad - dec MVA age 52   Mom - dec 86 old age   2 bros - 1 bro  due to lung cancer in Aug, 2010 (had quit smoking > 40-50 years ago)   Quit smoking    No EtOH   Work: laundry services at [employer]   Surgeries: s/p gallbladder, bunion 06 left TKA ([facility]) 07 Aortic valve replacement (bioprostheitc) 07 pacemaker - dual chamber   Cardiovascular surgeon called 07 - patient is \"vasculopath\", prior Hb = 8.9, no Coumadin, will use ASA only 10-20-08: started back on Warfarin, 09 GI bleed, D/C Warfarin   11-10-08: Dexa order faxed to Thea Ordaz will schedule      Past Surgical History:   Procedure Laterality Date     APPENDECTOMY       CHOLECYSTECTOMY       EYE SURGERY Bilateral     Cataracts     HIP PINNING Right 3/1/2017    Procedure: RIGHT HIP TROCHANTERIC RODDING;  Surgeon: Moshe Davies MD;  Location: [facility];  Service:      INSERT / REPLACE / REMOVE PACEMAKER  2007    guidant     INSERT / REPLACE / REMOVE PACEMAKER  2018    phoebe sci gen change     JOINT REPLACEMENT Left     knee     NM RECONSTR TOTAL SHOULDER IMPLANT Left 2020    Procedure: LEFT REVERSE TOTAL SHOULDER ARTHROPLASTY;  Surgeon: Alvarez Palomino MD;  Location: [facility] OR;  Service: Orthopedics     TISSUE AORTIC VALVE REPLACEMENT  2007             Family History   Problem Relation Age of Onset     No Medical Problems Mother         age 86     No Medical Problems Father         MVA     Cancer Brother         lung     No Medical Problems Brother         Past Medical History:   Diagnosis Date     Acute kidney injury superimposed on CKD (H) 3/3/2017     Asthma      CAD (coronary artery disease)      Cataract      Chronic kidney " disease     ckd3     COPD (chronic obstructive pulmonary disease) (H)      Diabetes mellitus (H)      Disease of thyroid gland      H/O heart valve replacement with bioprosthetic valve      History of transfusion      Hypertension      Normochromic normocytic anemia      Pulmonary hypertension (H) 2019    Noted on echocardiogram     Restless leg syndrome         Social History     Tobacco Use     Smoking status: Former Smoker     Packs/day: 0.00     Quit date: 1950     Years since quittin.3     Smokeless tobacco: Never Used   Substance Use Topics     Alcohol use: No     Drug use: No        Current Outpatient Medications   Medication Sig Dispense Refill     acetaminophen (TYLENOL) 500 MG tablet Take 2 tablets (1,000 mg total) by mouth 3 (three) times a day. (Patient taking differently: Take 1,000 mg by mouth 3 (three) times a day as needed. )  0     aspirin 81 MG EC tablet Take 81 mg by mouth daily.       atorvastatin (LIPITOR) 80 MG tablet TAKE 1 TABLET(80 MG) BY MOUTH DAILY 90 tablet 3     bumetanide (BUMEX) 2 MG tablet take three tablets (6 mg) by mouth each AM and take two tablets (4 mg) by mouth at noon 150 tablet 2     cholecalciferol, vitamin D3, 50 mcg (2,000 unit) Tab Take by mouth.       COMBIVENT RESPIMAT  mcg/actuation Mist inhaler INL 1 PUFF PO QID       ferrous sulfate 325 (65 FE) MG tablet Take 1 tablet by mouth daily.       ipratropium-albuteroL (DUO-NEB) 0.5-2.5 mg/3 mL nebulizer        levothyroxine (SYNTHROID, LEVOTHROID) 200 MCG tablet Take 1 tablet (200 mcg total) by mouth daily before supper. 30 tablet 5     lidocaine (XYLOCAINE) 5 % ointment Apply 1 application topically 3 (three) times a day as needed. 35.44 g 0     metoprolol tartrate (LOPRESSOR) 25 MG tablet Take 0.5 tablets (12.5 mg total) by mouth 2 (two) times a day. Hold for sbp<105  Hr <55 90 tablet 0     nystatin (MYCOSTATIN) powder Apply topically 4 (four) times a day. 120 g 5     omeprazole (PRILOSEC) 20 MG  capsule Take 1 capsule (20 mg total) by mouth daily before breakfast. 90 capsule 3     rOPINIRole (REQUIP) 0.5 MG tablet Take 1 tablet (0.5 mg total) by mouth at bedtime. 30 tablet 5     metOLazone (ZAROXOLYN) 5 MG tablet Take 1 tablet (5 mg total) by mouth daily. 30 tablet 2     potassium chloride (KLOR-CON) 20 mEq packet Take 20 mEq by mouth 2 (two) times a day. 60 packet 5     No current facility-administered medications for this visit.           Objective:    Vitals:    12/29/20 1431   BP: 100/60   Pulse: 60   Temp: 97.7  F (36.5  C)   SpO2: 93%   Weight: (!) 228 lb (103.4 kg)      Body mass index is 35.71 kg/m .    Alert.  No apparent distress.  Pleasant smiling.  Chest with good air movement without expiratory wheeze.  Scattered inspiratory crackle perhaps left lung base only.  Cardiac exam appears regular without cardiac ectopy right now.  No significant murmur.  Abdomen benign, obese.  Peripheral edema concerns persist bilateral lower extremities relatively symmetric.  Neurologic exam appears nonfocal.      This note has been dictated using voice recognition software and as a result may contain minor grammatical errors and unintended word substitutions.

## 2021-06-14 NOTE — PROGRESS NOTES
Assessment:    1. Edema, unspecified type     2. Chronic Kidney Disease, Stage 3  Basic Metabolic Panel   3. Normochromic normocytic anemia  HM2(CBC w/o Differential)   4. Essential hypertension with goal blood pressure less than 140/90  Basic Metabolic Panel   5. Onychomycosis     6. Atherosclerosis of coronary artery of native heart without angina pectoris, unspecified vessel or lesion type     7. Type 2 diabetes mellitus with complication, without long-term current use of insulin            Plan:     40 minutes total time with patient, > 50% with counseling and coordination of cares.  Discussed at length peripheral edema management with chronic lymphedema associated with acute edema.  Will increase furosemide from 80 mg a.m. and 40 mg at lunch however poor compliance up to 80 mg a.m. and lunch consistently.  Repeat basic metabolic panel with history of CKD stage III with prior worsening renal insufficiency and higher dose furosemide however had tolerated it well otherwise without significant electrolyte abnormalities.  CBC as well regarding normochromic normocytic anemia findings.  CAD otherwise appears stable at this time.  Known type 2 diabetes with A1c of 7.2% October 4, 2017.  Continues thyroid replacement levothyroxine 125 mcg daily.  Continues metoprolol tartrate 100 mg using half tablet twice daily as well as atorvastatin 80 mg daily for cardiovascular risk benefits.  No recent asthma exacerbation.  Toenail trimming ×10 for dystrophic nails with onychomycosis also performed today and tolerated well without complication.  Patient will continue home dressing changes and wound care twice weekly currently with noted right anterolateral shin ulcer with serous drainage without significant ulceration noted nor surrounding acute cellulitis and only congested circulation due to edema findings.  Superficial ulceration distal second toe right foot without current signs of infection and will continue to avoid further  "irritation after having likely excoriated toe at home.  Anticipate follow-up in this office no later than 8 weeks.        Subjective:    Thea Acosta is seen today for follow-up evaluation.  Had been seen 2017.  Had decrease furosemide at that time from 80 mg a.m. and noon down to 80 mg a.m. and 40 mg at noon.  Does miss dose frequent enough at noon however.  Did have worsening of CKD stage III while on higher dose of diuretic.  Compression dressing and wound cares twice a week by nurse at home.  Continues remainder of medication for CAD and hyperlipidemia.  Levothyroxine 125 mcg daily for thyroid replacement.  History of normochromic normocytic anemia present.  Asthma has been stable.  Type 2 diabetes with prior A1c of 7.2% present.  Comprehensive review of systems as above otherwise all negative.     (twice) now since 3/24/07 (\"Gal\" - renal cell CA) - remarried 3/8/82   3 sons - Nate Mckoy (); Edwin (head of work force center in [employer]; Amrit Mckoy works at the [employer] (I see him now as a patient)   Son-in-law Esdras Mo   2 daughters - breast cancer   Granddaughter - Terri - plays the clarinet   New granddaughter - Patrizia?   Jain  Dad - dec MVA age 52   Mom - dec 86 old age   2 bros - 1 bro  due to lung cancer in Aug, 2010 (had quit smoking > 40-50 years ago)   Quit smoking    No EtOH   Work: laundry services at [employer]   Surgeries: s/p gallbladder, bunion 06 left TKA ([facility]) 07 Aortic valve replacement (bioprostheitc) 07 pacemaker - dual chamber   Cardiovascular surgeon called 07 - patient is \"vasculopath\", prior Hb = 8.9, no Coumadin, will use ASA only 10-20-08: started back on Warfarin, 09 GI bleed, D/C Warfarin   11-10-08: Dexa order faxed to Thea Ordaz will schedule     Past Surgical History:   Procedure Laterality Date     APPENDECTOMY       CHOLECYSTECTOMY       EYE SURGERY Bilateral     " Cataracts     HIP PINNING Right 3/1/2017    Procedure: RIGHT HIP TROCHANTERIC RODDING;  Surgeon: Moshe Davies MD;  Location: [facility] Main OR;  Service:      JOINT REPLACEMENT Left     knee     TISSUE AORTIC VALVE REPLACEMENT  06/25/2007             Family History   Problem Relation Age of Onset     No Medical Problems Mother      age 86     No Medical Problems Father      MVA     Cancer Brother      lung     No Medical Problems Brother         Past Medical History:   Diagnosis Date     Acute kidney injury superimposed on CKD 3/3/2017     Asthma      CAD (coronary artery disease)      Cataract      Diabetes mellitus      Disease of thyroid gland      H/O heart valve replacement with bioprosthetic valve      History of transfusion      Hypertension         Social History   Substance Use Topics     Smoking status: Former Smoker     Smokeless tobacco: Never Used     Alcohol use No        Current Outpatient Prescriptions   Medication Sig Dispense Refill     acetaminophen (TYLENOL) 500 MG tablet Take 500 mg by mouth every 6 (six) hours as needed for pain.       aspirin 81 MG EC tablet Take 81 mg by mouth daily.       atorvastatin (LIPITOR) 80 MG tablet Take 1 tablet (80 mg total) by mouth at bedtime. 90 tablet 3     cholecalciferol, vitamin D3, (VITAMIN D3) 2,000 unit Tab Take 2,000 Units by mouth once daily.       fluticasone-salmeterol (ADVAIR) 500-50 mcg/dose DISKUS Inhale 1 puff 2 (two) times a day.       folic acid (FOLVITE) 1 MG tablet Take 1 mg by mouth daily.       furosemide (LASIX) 40 MG tablet Take 2 tablets (80 mg total) by mouth 2 (two) times a day at 9am and 6pm. 360 tablet 3     ipratropium-albuterol (COMBIVENT RESPIMAT)  mcg/actuation Mist inhaler Inhale 1 puff 4 (four) times a day. 4 g 11     ipratropium-albuterol (DUO-NEB) 0.5-2.5 mg/3 mL nebulizer 3 mL 4 (four) times a day.       levothyroxine (SYNTHROID, LEVOTHROID) 125 MCG tablet TAKE 1 TABLET BY MOUTH EVERY DAY 90 tablet 2     loperamide  (IMODIUM A-D) 2 mg tablet Take 2 mg by mouth 4 (four) times a day as needed for diarrhea.       melatonin 3 mg Tab tablet Take 3 mg by mouth bedtime as needed.       metoprolol tartrate (LOPRESSOR) 100 MG tablet Take 0.5 tablets (50 mg total) by mouth 2 (two) times a day. 90 tablet 3     nystatin (MYCOSTATIN) powder Apply topically 2 (two) times a day. 60 g 2     omeprazole (PRILOSEC) 20 MG capsule TAKE 1 CAPSULE BY MOUTH EVERY DAY 90 capsule 3     potassium chloride SA (K-DUR,KLOR-CON) 20 MEQ tablet Take 1 tablet (20 mEq total) by mouth daily. 90 tablet 3     senna-docusate (PERICOLACE) 8.6-50 mg tablet Take 1 tablet by mouth daily.       No current facility-administered medications for this visit.           Objective:    Vitals:    11/17/17 1059 11/17/17 1204   BP: 152/68 116/68   Pulse: 60    SpO2: 91%    Weight: (!) 260 lb (117.9 kg)       Body mass index is 40.72 kg/(m^2).    Alert.  No apparent distress.  Is in wheelchair unable to transfer easily on her own.  Significant lower extremity lymphedema present with pitting noted.  Superficial ulcer healing well with serous drainage only otherwise no obvious cellulitis currently.  No calf pain.  Significant nail thickening throughout.  Toenail trimming required which was performed without complication.  Chest clear.  Cardiac exam regular.  Morbid obesity present.

## 2021-06-14 NOTE — TELEPHONE ENCOUNTER
Please schedule a virtual visit with Dr. Melendez to evaluate this and determine if she needs ER evaluation.  Thanks.

## 2021-06-14 NOTE — PROGRESS NOTES
TTM device check.  Please see link for full device report.  Patient was informed of results and next follow up during today's visit.

## 2021-06-14 NOTE — TELEPHONE ENCOUNTER
Received refill request from [facility] for Nystop.  Patient was recently seen.  Will refill for 1 year.     Tomasa Mg, PharmEsaD., [facility]  Medication Therapy Management Pharmacist  [facility]

## 2021-06-14 NOTE — TELEPHONE ENCOUNTER
Christen from [facility] is calling to notify Dr. Hdz patient is still restless at night.    Nate is having patient use Melatonin 10 mg at bedtime and this helps a little bit.      She is wanting to ensure it is ok with Dr. Hdz that patient takes melatonin?      If this is not ok we should contact patients son Nate.

## 2021-06-14 NOTE — TELEPHONE ENCOUNTER
Reason contacted:  Status update  Information relayed:    Spoke with patients son Abdoulaye who is calling with an update on patiens weight and bilateral lower extremity edema.      Weight readings:   12/30/2020: 227.6  12/31/2020: 225.9  1/1/2021: 223.9  1/2/2021: 223.3  1/3/2021: 222.6  1/4/2021: 220.9  1/5/2021: 221.8  1/6/2021: 222.4  1/7/2021: 222.7  1/8/2021: 222.9      Patient is currently taking Bumex 6 mg in the morning and and 4 mg at 12 pm and metolazone 5 mg in the morning (started on 12/30/2020) due to fluid retention in her legs.       Per Abdoulaye it is difficult to tell if there has been any changes in regards to patients bilateral leg edema with the addition of the metolazone.    Home care nurse came out on Wednesday 1/6/2021 and took patients vitals and noted that patient was wheezing but did not have any fluid on her lungs.     Per Abdoulaye patient was changed to potassium 20 meq powder in place of th tablets as the tablets were too difficult to swallow.       He reports last night patient was having bad dreams and had to call him to her room a few times. One time she thought there were other people in the home and Abdoulaye informed her there was no one else there.     Abdoulaye is wondering if her change in potassium from the tablets to the powders can cause bad dreams or if the addition of metolazone could cause bad dreams?     Abdoulaye would like home care nurse Christen to redraw patients TSH when she sees her again in three weeks. Order pended for approval.      Please review and advise.      Additional questions:  No  Further follow-up needed:  Yes  Okay to leave a detailed message:  No - [phone]      Would need an order for TSH faxed to Christen with [facility].  Fax number: [phone]

## 2021-06-14 NOTE — TELEPHONE ENCOUNTER
Spoke with Dr. Hdz who would recommend patient continue Bumex 6 mg in the morning and 4 mg in the evening. She should arrange a follow-up office visit next week.     Called Nate and notified him of this information. appointment scheduled on Tuesday at 2:20 pm with Dr. Hdz

## 2021-06-14 NOTE — TELEPHONE ENCOUNTER
Reason contacted:  Status update  Information relayed:    Spoke with patient and her son Nate.  Patient was instructed to call today with an update in regards to her bilateral lower extremity edema and weight.    Weight:   12/21/2020: 219 Lb  12/22/2020: 218 Lb  12/23/2020: 218.6 Lb    Patient completed a virtual visit with Dr. Love on 12/21/2020.  On 12/21/2020 she took an extra dose of Bumex 4 mg in addition to her usual morning and evening dose (bumex 4 mg). She was instructed to increase her Bumex to 6 mg in the morning and 4 mg in the evening and provide an update with her weight on Wednesday 12/23/2020.    Per patient bilateral lower extremity edema has not changed. She still reports ongoing edema from ankles to knees.   She is at her baseline in regards to wheezing and shortness of breath.     Denies chest pain, lightheadedness, dizziness, worsening edema.       patient and son are wondering what to do in regards to her ongoing edema?   Additional questions:  No  Further follow-up needed:  Yes  Okay to leave a detailed message:  Yes

## 2021-06-14 NOTE — PROGRESS NOTES
4/12/1934  [phone] (home)  [phone] (mobile)    +++Important patient information for [facility]/Cath Lab staff : None+++    [facility] EP Cath Lab Procedure Order     Device Implant/Revision:  Procedure: Generator Change Out  Device Type: Dual Pacemaker  Device Company/Device Rep Needed for Procedure: [employer]    Diagnosis:  Device at ENZO  Anticipated Case Duration:  Standard  Scheduling Needs/Timeframe:  ENZO 11-16-17;  needs replacement by 2-16-17  Anesthesia:  None  Research Protocol:  No    [facility] EP Cath Lab Prep   Ordering Provider: Dr Arnold  Ordering Date: 12/22/2017  Orders Status: Intial order placed and Order set placed  EP NC Contact: Tisha Golden RN    H&P:  PMD to Complete  PCP: Gamal Hdz MD, [phone]    Pre-op Labs: Ordered AM of procedure    Medical Records Pertinent for Procedure:  Echo 3-1-17 EF 62%, EKG 3-1-17 paced, Device Implant Record Implant 6-25-07 w/Quiroz and Surgery Record Aortic Valve replacement 6-22-07    Patient Education:    Teach with Patient: Completed via phone prior to procedure    Risks Reviewed:     Pacemaker Insertion    <1% for each of the following:  infection, bleeding, hematoma, pneumothorax, subclavian vein thrombosis, cardiac perforation, cardiac tamponade, arrhythmias, pectoral or diaphragmatic stimulation, air embolus, pocket erosion, device interactions.    <4% lead dislodgment, <1% lead fracture or generator  malfunction.    <0.5% CVA or MI.    <0.1% death.    If external defibrillation or CV is needed, 25% risk for superficial burn.    For patients on anticoagulation, the risk of bleeding, hematoma and tamponade are increased.      Consent: Will be obtained in [facility] day of procedure    Pre-Procedure Instructions that were Reviewed with Patient:  NPO after midnight, Notified patient of time and date of procedure by CV , Transportation arrangements needed s/p procedure, Post-procedure follow up process, Sedation plan/orders and Pre-procedure  letter was sent to pt by CV     Pre-Procedure Medication Instructions:  Instructions given to pt regarding anticoagulants: Pt is not on an anticoagulant- N/A  Instructions given to pt regarding antiarrhythmic medication: Beta Blocker; Pt instructed to continue medication prior to procedure  Instructions for medication, other than anticoagulants/antiarrhythmics listed above, given to pt: to take all morning medications with small sips of water, with the exception of OTC supplements and MVI      Allergies   Allergen Reactions     Codeine      Lisinopril Cough     Penicillins Swelling       Current Outpatient Prescriptions:      acetaminophen (TYLENOL) 500 MG tablet, Take 500 mg by mouth every 6 (six) hours as needed for pain., Disp: , Rfl:      aspirin 81 MG EC tablet, Take 81 mg by mouth daily., Disp: , Rfl:      atorvastatin (LIPITOR) 80 MG tablet, Take 1 tablet (80 mg total) by mouth at bedtime., Disp: 90 tablet, Rfl: 3     cholecalciferol, vitamin D3, (VITAMIN D3) 2,000 unit Tab, Take 2,000 Units by mouth once daily., Disp: , Rfl:      fluticasone-salmeterol (ADVAIR) 500-50 mcg/dose DISKUS, Inhale 1 puff 2 (two) times a day., Disp: , Rfl:      folic acid (FOLVITE) 1 MG tablet, Take 1 mg by mouth daily., Disp: , Rfl:      furosemide (LASIX) 40 MG tablet, Take 2 tablets (80 mg total) by mouth 2 (two) times a day at 9am and 6pm., Disp: 360 tablet, Rfl: 3     ipratropium-albuterol (COMBIVENT RESPIMAT)  mcg/actuation Mist inhaler, Inhale 1 puff 4 (four) times a day., Disp: 4 g, Rfl: 11     ipratropium-albuterol (DUO-NEB) 0.5-2.5 mg/3 mL nebulizer, 3 mL 4 (four) times a day., Disp: , Rfl:      levothyroxine (SYNTHROID, LEVOTHROID) 125 MCG tablet, TAKE 1 TABLET BY MOUTH EVERY DAY, Disp: 90 tablet, Rfl: 2     loperamide (IMODIUM A-D) 2 mg tablet, Take 2 mg by mouth 4 (four) times a day as needed for diarrhea., Disp: , Rfl:      melatonin 3 mg Tab tablet, Take 3 mg by mouth bedtime as needed., Disp: , Rfl:       metoprolol tartrate (LOPRESSOR) 100 MG tablet, Take 0.5 tablets (50 mg total) by mouth 2 (two) times a day., Disp: 90 tablet, Rfl: 3     nystatin (MYCOSTATIN) powder, Apply topically 2 (two) times a day., Disp: 60 g, Rfl: 2     omeprazole (PRILOSEC) 20 MG capsule, TAKE 1 CAPSULE BY MOUTH EVERY DAY, Disp: 90 capsule, Rfl: 3     potassium chloride SA (K-DUR,KLOR-CON) 20 MEQ tablet, Take 1 tablet (20 mEq total) by mouth daily., Disp: 90 tablet, Rfl: 3     senna-docusate (PERICOLACE) 8.6-50 mg tablet, Take 1 tablet by mouth daily., Disp: , Rfl:

## 2021-06-14 NOTE — TELEPHONE ENCOUNTER
Reason contacted:  Status update  Information relayed:    Spoke with Dr. Hdz who would recommend patient continue her current dose of Bumex and Metolazone.     He does not think patients bad dreams are due her medications and would recommend monitoring this and calling next week with an update.    Order for TSH signed and this will be faxed to patients home care nurse.       I called and notified Abdoulaye of this information.   Additional questions:  No  Further follow-up needed:  No  Okay to leave a detailed message:  No

## 2021-06-14 NOTE — TELEPHONE ENCOUNTER
Central PA team  [phone]  Pool: HE PA MED ([id number])          PA has been initiated.       PA form completed and faxed insurance via Cover My Meds     Key:  SAURABH KRUEGER Key: [id number] - PA Case ID: [id number]      Medication: Klor-Con 20MEQ packets    Insurance:  [employer] [place] Medicare Part D        Response will be received via fax and may take up to 5-10 business days depending on plan

## 2021-06-15 PROBLEM — Z95.0 CARDIAC PACEMAKER IN SITU: Status: ACTIVE | Noted: 2017-03-06

## 2021-06-15 PROBLEM — I44.2 COMPLETE HEART BLOCK (H): Status: ACTIVE | Noted: 2017-03-06

## 2021-06-15 PROBLEM — Z95.3 H/O HEART VALVE REPLACEMENT WITH BIOPROSTHETIC VALVE: Status: ACTIVE | Noted: 2017-03-06

## 2021-06-15 NOTE — PROGRESS NOTES
EP MD/LUÍS NC ENZO Review  Dr Arnold reviewed ENZO checklist  Reviewed by Tisha Golden    Order for procedure placed in EPIC and  [place]  Tisha

## 2021-06-15 NOTE — PROGRESS NOTES
Preoperative Exam    Scheduled Procedure: Dual chamber pacemaker generator replacement  Surgery Date:  2/7/18  Surgery Location: [facility], fax [phone]    Surgeon:  Dr. Arnold    Assessment/Plan:     1. Preop cardiovascular exam  Preoperative cardiovascular exam completed.  Electrocardiogram performed with ventricular paced rhythm noted 60 bpm without acute findings.  - Electrocardiogram Perform and Read    2. Complete heart block  Dual-chamber pacemaker greater than 10 years requiring replacement currently scheduled for February 7, 2018 is noted.    3. Atherosclerosis of coronary artery of native heart without angina pectoris, unspecified vessel or lesion type  Known history of coronary artery disease without current concerns for angina, congestive heart failure etc.    4. Essential hypertension with goal blood pressure less than 140/90  Hypertension, well controlled.  Check basic metabolic panel for medication monitoring.  - Basic Metabolic Panel    5. Chronic Kidney Disease, Stage 3  History of CKD stage III with prior creatinine 1.47 and GFR 34.  Repeat basic metabolic panel.  - Basic Metabolic Panel    6. Type 2 diabetes mellitus with complication, without long-term current use of insulin  Type 2 diabetes, diet controlled.  Most recent A1c of 7.2% and will recheck repeat A1c today with medication initiation is indicated.  - Glycosylated Hemoglobin A1c  - Basic Metabolic Panel    7. Moderate persistent asthma without complication  Moderate persistent asthma stable.  Continues use of Advair 500/50 using 1 puff twice daily plus Combivent 1 puff 4 times daily.    8. Normochromic normocytic anemia  Repeat CBC regarding history normochromic normocytic anemia.  - HM2(CBC w/o Differential)        Surgical Procedure Risk: Low (reported cardiac risk generally < 1%)  Have you had prior anesthesia?: Yes  Have you or any family members had a previous anesthesia reaction:  No  Do you or any family members have a  "history of a clotting or bleeding disorder?: No  Cardiac Risk Assessment: no increased risk for major cardiac complications    Patient approved for surgery with general or local anesthesia.    Please Note:  Patient has an implanted device.  Device Type:        Device Vendor:       Functional Status: Partially Dependent: using walker to assist with ambulation  Patient plans to recover at home with family.     Subjective:      Thea Acosta is a 83 y.o. female who presents for a preoperative consultation.  Has had current pacemaker > 10 years.  H/O CAD, HTN, hyperlipidemia, CKD-3, type 2 DM, moderate persistent asthma, and NC/NC anemia.  Had been seen 2017.  Had decrease furosemide at that time from 80 mg a.m. and noon down to 80 mg a.m. and 40 mg at noon but has now resumed 80 mg AM and noon.   Did have worsening of CKD stage III while on higher dose of diuretic.  Compression dressing and wound cares twice a week by nurse at home.  Continues remainder of medication for CAD and hyperlipidemia.  Levothyroxine 125 mcg daily for thyroid replacement.  History of normochromic normocytic anemia present.  Asthma has been stable.  Type 2 diabetes with prior A1c of 7.2% previously.  Comprehensive review of systems as above otherwise all negative.    Echo 3-1-17 EF 62%, EKG 3-1-17 paced, Device Implant Record Implant 07 w/[employer] and Surgery Record Aortic Valve replacement 07    All other systems reviewed and are negative, other than those listed in the HPI.     (twice) now since 3/24/07 (\"Gal\" - renal cell CA) - remarried 3/8/82   3 sons - Nate Mckoy (); Edwin (head of work force center in [facility]; Amrit Mckoy works at the post [employer] (I see him now as a patient)   Son-in-law Esdras Mo   2 daughters - breast cancer   Granddaughter - Terri - plays the clarinet   New granddaughter - Patrizia?   Sikh  Dad - dec MVA age 52   Mom - dec 86 old age   2 bros - 1 bro  due " "to lung cancer in Aug, 2010 (had quit smoking > 40-50 years ago)   Quit smoking 1990   No EtOH   Work: laundry services at [employer]   Surgeries: s/p gallbladder, bunion 8/1/06 left TKA ([facility]) 6/21/07 Aortic valve replacement (bioprostheitc) 6/25/07 pacemaker - dual chamber   Cardiovascular surgeon called 6/22/07 - patient is \"vasculopath\", prior Hb = 8.9, no Coumadin, will use ASA only 10-20-08: started back on Warfarin, 2/22/09 GI bleed, D/C Warfarin   11-10-08: Dexa order faxed to Thea Ordaz will schedule     Pertinent History  Do you have difficulty breathing or chest pain after walking up a flight of stairs: No  History of obstructive sleep apnea: No  Steroid use in the last 6 months: No  Frequent Aspirin/NSAID use: Yes: ASA 81 mg daily  Prior Blood Transfusion: No  Prior Blood Transfusion Reaction: No  If for some reason prior to, during or after the procedure, if it is medically indicated, would you be willing to have a blood transfusion?:  There is no transfusion refusal.    Current Outpatient Prescriptions   Medication Sig Dispense Refill     acetaminophen (TYLENOL) 500 MG tablet Take 500 mg by mouth every 6 (six) hours as needed for pain.       aspirin 81 MG EC tablet Take 81 mg by mouth daily.       atorvastatin (LIPITOR) 80 MG tablet Take 1 tablet (80 mg total) by mouth at bedtime. 90 tablet 3     cholecalciferol, vitamin D3, (VITAMIN D3) 2,000 unit Tab Take 2,000 Units by mouth once daily.       fluticasone-salmeterol (ADVAIR) 500-50 mcg/dose DISKUS Inhale 1 puff 2 (two) times a day.       folic acid (FOLVITE) 1 MG tablet Take 1 mg by mouth daily.       furosemide (LASIX) 40 MG tablet Take 2 tablets (80 mg total) by mouth 2 (two) times a day at 9am and 6pm. 360 tablet 3     ipratropium-albuterol (COMBIVENT RESPIMAT)  mcg/actuation Mist inhaler Inhale 1 puff 4 (four) times a day. 4 g 11     ipratropium-albuterol (DUO-NEB) 0.5-2.5 mg/3 mL nebulizer 3 mL 4 (four) times a " day.       levothyroxine (SYNTHROID, LEVOTHROID) 125 MCG tablet TAKE 1 TABLET BY MOUTH EVERY DAY 90 tablet 2     loperamide (IMODIUM A-D) 2 mg tablet Take 2 mg by mouth 4 (four) times a day as needed for diarrhea.       melatonin 3 mg Tab tablet Take 3 mg by mouth bedtime as needed.       metoprolol tartrate (LOPRESSOR) 100 MG tablet Take 0.5 tablets (50 mg total) by mouth 2 (two) times a day. 90 tablet 3     nystatin (MYCOSTATIN) powder Apply topically 2 (two) times a day. 60 g 2     omeprazole (PRILOSEC) 20 MG capsule TAKE 1 CAPSULE BY MOUTH EVERY DAY 90 capsule 3     potassium chloride SA (K-DUR,KLOR-CON) 20 MEQ tablet Take 1 tablet (20 mEq total) by mouth daily. 90 tablet 3     senna-docusate (PERICOLACE) 8.6-50 mg tablet Take 1 tablet by mouth daily.       metoprolol tartrate (LOPRESSOR) 100 MG tablet TAKE 1 AND 1/2 TABLETS(150 MG) BY MOUTH TWICE DAILY 270 tablet 0     No current facility-administered medications for this visit.         Allergies   Allergen Reactions     Codeine      Lisinopril Cough     Penicillins Swelling       Patient Active Problem List   Diagnosis     Paroxysmal Atrial Fibrillation     Sciatica     Shortness Of Breath     Type 2 diabetes mellitus     Hyperparathyroidism     Vitamin D Deficiency     Hypercholesterolemia     Essential hypertension     Coronary Artery Disease     Moderate persistent asthma     CKD (chronic kidney disease), stage III     Osteoarthritis Of The Knee     Restless Legs Syndrome     History of aortic stenosis     History of gastrointestinal bleeding     Closed intertrochanteric fracture of hip     Elevated brain natriuretic peptide (BNP) level     Acute kidney injury superimposed on CKD     S/P ORIF (open reduction internal fixation) fracture     Morbid obesity due to excess calories     H/O heart valve replacement with bioprosthetic valve     Cardiac pacemaker in situ     Complete heart block     Exposure to influenza     Fluid overload     Tinea corporis      "Normochromic normocytic anemia     Essential hypertension with goal blood pressure less than 140/90     Moderate persistent asthma without complication     Pacemaker battery depletion       Past Medical History:   Diagnosis Date     Acute kidney injury superimposed on CKD 3/3/2017     Asthma      CAD (coronary artery disease)      Cataract      Diabetes mellitus      Disease of thyroid gland      H/O heart valve replacement with bioprosthetic valve      History of transfusion      Hypertension        Past Surgical History:   Procedure Laterality Date     APPENDECTOMY       CHOLECYSTECTOMY       EYE SURGERY Bilateral     Cataracts     HIP PINNING Right 3/1/2017    Procedure: RIGHT HIP TROCHANTERIC RODDING;  Surgeon: Moshe Davies MD;  Location: [facility] OR;  Service:      JOINT REPLACEMENT Left     knee     TISSUE AORTIC VALVE REPLACEMENT  06/25/2007            Social History     Social History     Marital status:      Spouse name: N/A     Number of children: N/A     Years of education: N/A     Occupational History      in operating room      retired     Social History Main Topics     Smoking status: Former Smoker     Smokeless tobacco: Never Used     Alcohol use No     Drug use: No     Sexual activity: No     Other Topics Concern     Not on file     Social History Narrative    She live in a single floor house.  Her son lives next door and her granddaughter is \"around a lot\"   3/2017       Patient Care Team:  Gamal Hdz MD as PCP - General          Objective:     Vitals:    01/31/18 1248   BP: 120/70   Pulse: 60   Weight: (!) 252 lb (114.3 kg)   Height: 5' 7\" (1.702 m)         Physical Exam:    General Appearance:    Alert, cooperative, no distress, appears stated age.  Utilizing wheelchair while in exam room, otherwise transfers with walker typically.  Obesity with BMI 39.47   Head:    Normocephalic, without obvious abnormality, atraumatic   Eyes:    PERRL, conjunctiva/corneas " clear, EOM's intact, fundi     benign, both eyes        Ears:    Normal TM's and external ear canals, both ears   Nose:   Nares normal, septum midline, mucosa normal, no drainage    or sinus tenderness   Throat:   Lips, mucosa, and tongue normal; teeth and gums normal   Neck:   Supple, symmetrical, trachea midline, no adenopathy;        thyroid:  No enlargement/tenderness/nodules; no carotid    bruit or JVD   Back:     Symmetric, no curvature, ROM normal, no CVA tenderness   Lungs:     Clear to auscultation bilaterally, respirations unlabored   Chest wall:    No tenderness or deformity   Heart:    Regular rate and rhythm, S1 and S2 normal, no murmur, rub   or gallop   Abdomen:     Soft, non-tender, bowel sounds active all four quadrants,     no masses, no organomegaly.  Obese.   Genitalia:    deferred   Rectal:    deferred   Extremities:   Extremities normal, atraumatic, no cyanosis or edema   Pulses:   2+ and symmetric all extremities   Skin:   Skin color, texture, turgor normal, no rashes or lesions   Lymph nodes:   Cervical, supraclavicular, and axillary nodes normal   Neurologic:   CNII-XII intact. Normal strength, sensation and reflexes       throughout        There are no Patient Instructions on file for this visit.    EKG:  Ventricular-based rhythm.  Abnormal EKG.    Labs:  Recent Results (from the past 120 hour(s))   Electrocardiogram Perform and Read    Collection Time: 01/31/18 12:56 PM   Result Value Ref Range    SYSTOLIC BLOOD PRESSURE  mmHg    DIASTOLIC BLOOD PRESSURE  mmHg    VENTRICULAR RATE 60 BPM    ATRIAL RATE 30 BPM    P-R INTERVAL  ms    QRS DURATION 180 ms    Q-T INTERVAL 568 ms    QTC CALCULATION (BEZET) 568 ms    P Axis  degrees    R AXIS 158 degrees    T AXIS 165 degrees    MUSE DIAGNOSIS       Ventricular-paced rhythm  Abnormal ECG  When compared with ECG of 01-MAR-2017 01:35,  Electronic ventricular pacemaker has replaced Atrial fibrillation         Immunization History   Administered  Date(s) Administered     Influenza V8m0-81, 01/14/2010     Influenza high dose, seasonal 08/29/2012, 09/08/2016, 10/04/2017     Influenza, inj, historic,unspecified 11/03/2008     Influenza, seasonal,quad inj 6-35 mos 10/01/2009, 10/07/2010, 10/21/2011     Influenza,seasonal quad, PF 10/10/2013     Pneumo Conj 13-V (2010&after) 05/05/2015     Pneumo Polysac 23-V 01/10/2006     Td,adult,historic,unspecified 07/28/2005     Tdap 05/04/2016     ZOSTER 11/08/2007           Electronically signed by Gamal Hdz MD 01/31/18 12:49 PM

## 2021-06-15 NOTE — PROGRESS NOTES
My Clinic Care Coordination Wellness Plan    This Graduation Wellness Plan provides private information in regards to the work I have done with my Care Team from my Primary Care Clinic.  This document provides insight on the goals I have accomplished.  My Care Team congratulates me on my journey to maintain wellness.  This document will help guide me on my journey to maintain a healthy lifestyle.  I will use this to help me overcome any barriers I may encounter.  If I should have any questions or concerns, I will continue to contact the members of my Care Team or contact my Primary Care Clinic for assistance.      [facility], [place]  [address], [address] [place] [address]  [phone]      My Preferred Method of Contact:  Phone: [phone]    My Primary/Preferred Language:  English    Preferred Learning Style:  Hands on teaching    Emergency Contact: Extended Emergency Contact Information  Primary Emergency Contact: Surjit Heart   [facility]  Home Phone: [phone]  Relation: Child  Secondary Emergency Contact: Vikki Mckoy   [facility]  Home Phone: [phone]  Relation: Child     PCP:  Gamal Hdz MD  Specialists:    Care Team            Gamal Hdz MD PCP - General    [phone]         Accomplishments:  Goals        Patient Stated      COMPLETED: I accomplished looking into resources to stay in my own home for as long as possible. (pt-stated)            Personal Plan  If I decided that I do want a hospital bed or any other resources in my home I will talk with my PCP and or call the Senior Linkage Line at [phone].            Advanced Directive/Living Will: Completed    Clinical Emergency Plan    Preventing Falls    Having a health problem can make you more likely to fall. Taking certain kinds of medicines may also increase your risk of falls. So, improving your health can help you avoid a fall. Work with your healthcare provider to manage  health problems and to review your medicines. If you have your health under control, your risk of falling is lessened.     When to call your healthcare provider  Be sure to call your healthcare provider if you fall. Also call if you have any of these signs or symptoms (someone else may need to point them out to you):    Feeling lightheaded or dizzy more than once a day    Losing your balance often or feeling unsteady on your feet    Feeling numbness in your legs or feet, or noticing a change in the way you walk    Having a steady decline in your memory or mental sharpness     Chronic Kidney Disease (CKD)  Chronic kidney disease can result from many causes including infections, diabetes, high blood pressure, kidney stones, circulation problems, and reactions to medication. Having kidney disease means making many changes in your life. Learn as much as you can about it so that you can better adjust to these changes. It is important to remember that the main goal of treatment is to stop chronic kidney disease (CKD) from progressing to complete kidney failure. Treatments may vary based on the progression of CKD, so always follow your doctor's instructions in the care and management of your condition.  When to seek medical care  Call your doctor right away if you have any of the following:    Trouble eating or drinking    Weight loss of more than 2 pounds in 24 hours or more than 5 pounds in 7 days    Little or no urine output    Trouble breathing    Muscle aches    Fever of 100.4 F or higher, or chills    Blood in your urine or stool    Bloody discharge from your nose, mouth, or ears    Severe headache or a seizure    Vomiting    Swelling of legs or ankles    Chest pain (call 911)    All [facility] clinic patients have access to a Nurse 24 hours a day, 7 days a week.  If you have questions or want advice from a Nurse, please know [facility] is here for you.  You can call your clinic and they will connect you or you can  call Care Connection at [phone].  [facility] also has Walk In Care clinics in multiple locations.  Call the number listed above for more information about our Walk In Care clinics or visit the [facility] website at [email or web address].

## 2021-06-15 NOTE — TELEPHONE ENCOUNTER
Super sorry.  Not sure why this hadn't been taken care of previously.  I reviewed forms to be signed.  Will fax forms first thing 2/9/21 in AM once I arrive at clinic.

## 2021-06-15 NOTE — TELEPHONE ENCOUNTER
Reason for Call:  Form, our goal is to have forms completed with 72 hours, however, some forms may require a visit or additional information.    Type of letter, form or note:  Home Care orders    Who is the form from?: Home care    Where did the form come from: form was faxed in    What clinic location was the form placed at?: [place]    Where the form was placed: refaxed on 2/8/21    What number is listed as a contact on the form?: Becky @ [phone]        Additional comments: Ok to LM, confidential VM.    Call taken on 2/8/2021 at 9:50 AM by Leonarda Salter

## 2021-06-15 NOTE — PROGRESS NOTES
Son Edwin called the floor.  His mom got suddenly sick yesterday.  Very weak unable to get out of bed.  Told him to call 911.  Dr Arnold was updated by the cath lab.  Left message for Elise LAWSON RN in clinic to call son because son was concerned about the limited life of the battery.

## 2021-06-16 PROBLEM — I48.0 PAF (PAROXYSMAL ATRIAL FIBRILLATION) (H): Status: ACTIVE | Noted: 2018-02-15

## 2021-06-16 PROBLEM — Z96.619 S/P REVERSE TOTAL SHOULDER ARTHROPLASTY: Status: ACTIVE | Noted: 2020-01-01

## 2021-06-16 PROBLEM — I95.9 HYPOTENSION, UNSPECIFIED HYPOTENSION TYPE: Status: ACTIVE | Noted: 2021-01-01

## 2021-06-16 PROBLEM — R19.5 LOOSE STOOLS: Status: ACTIVE | Noted: 2020-01-01

## 2021-06-16 PROBLEM — J45.40 MODERATE PERSISTENT ASTHMA WITHOUT COMPLICATION: Status: ACTIVE | Noted: 2017-06-01

## 2021-06-16 PROBLEM — E87.5 HYPERKALEMIA: Status: ACTIVE | Noted: 2021-01-01

## 2021-06-16 PROBLEM — I50.22 CHRONIC SYSTOLIC CONGESTIVE HEART FAILURE (H): Chronic | Status: ACTIVE | Noted: 2021-01-01

## 2021-06-16 PROBLEM — R53.81 PHYSICAL DECONDITIONING: Status: ACTIVE | Noted: 2018-02-26

## 2021-06-16 PROBLEM — D64.9 ANEMIA: Chronic | Status: ACTIVE | Noted: 2021-01-01

## 2021-06-16 PROBLEM — I27.20 PULMONARY HYPERTENSION (H): Status: ACTIVE | Noted: 2019-09-28

## 2021-06-16 PROBLEM — I73.9 PVD (PERIPHERAL VASCULAR DISEASE) (H): Status: ACTIVE | Noted: 2020-01-20

## 2021-06-16 PROBLEM — T68.XXXA HYPOTHERMIA: Status: ACTIVE | Noted: 2021-01-01

## 2021-06-16 PROBLEM — E66.01 MORBID OBESITY (H): Status: ACTIVE | Noted: 2020-01-01

## 2021-06-16 PROBLEM — D64.9 NORMOCHROMIC NORMOCYTIC ANEMIA: Status: ACTIVE | Noted: 2017-06-01

## 2021-06-16 PROBLEM — I50.21 CHF (CONGESTIVE HEART FAILURE), NYHA CLASS I, ACUTE, SYSTOLIC (H): Status: ACTIVE | Noted: 2019-09-25

## 2021-06-16 PROBLEM — K21.9 GERD (GASTROESOPHAGEAL REFLUX DISEASE): Status: ACTIVE | Noted: 2018-02-15

## 2021-06-16 PROBLEM — I89.0 LYMPHEDEMA: Status: ACTIVE | Noted: 2020-01-01

## 2021-06-16 PROBLEM — A04.72 C. DIFFICILE COLITIS: Status: ACTIVE | Noted: 2020-01-01

## 2021-06-16 PROBLEM — F51.01 PRIMARY INSOMNIA: Status: ACTIVE | Noted: 2018-02-15

## 2021-06-16 PROBLEM — I10 ESSENTIAL HYPERTENSION WITH GOAL BLOOD PRESSURE LESS THAN 140/90: Status: ACTIVE | Noted: 2017-06-01

## 2021-06-16 PROBLEM — J45.909 ASTHMA: Status: ACTIVE | Noted: 2018-02-28

## 2021-06-16 PROBLEM — E87.6 HYPOKALEMIA: Status: ACTIVE | Noted: 2018-02-15

## 2021-06-16 NOTE — PROGRESS NOTES
Assessment/Plan:     1. Heart failure with preserved ejection fraction, NYHA class II: Thea Acosta appears well compensated.  She continues to have fatigue and lower extremity edema.  She is wearing her Ace bandages today.  Her weights have been stable at her TCU.  She is on a low-sodium diet.  I reinforced the importance of wearing her Ace bandages daily.  We discussed monitoring weights when she returns home.  No changes to medications.  She will continue Bumex 2 mg in the morning and 1 mg in the afternoon.  We discussed reasons to follow-up in the heart failure clinic.     Follow-up with Dr. Irvin on April 17 as scheduled and in the heart failure clinic as needed    Subjective:     Thea Acosta is seen at [facility] heart failure clinic today for continued follow-up.  She follows up for heart failure with preserved ejection fraction.  Her most recent echocardiogram was done February 7, 2018 which showed an ejection fraction of 55% along with moderate mitral stenosis.  He was recently in the hospital at [facility] from March 7 - March 12, 2018 due to dehydration.  She returned back to her TCU for continued rehabilitation.  She has a past medical history significant for hypertension, hyperlipidemia, paroxysmal atrial fibrillation, chronic kidney disease stage III, and diabetes.  She also has a history of aortic valve replacement in June 2007.  She also had a permanent pacemaker implanted in June 2007 with a generator change on February 12, 2018.    Today, she comes in with complaints of continued fatigue and lower extremity edema.  She denies any dyspnea on exertion, orthopnea, or PND.  She denies any dizziness or lightheadedness.  Based on records at her TCU she has not been wearing the Ace bandages.  The nurse practitioner at the facility has reinforced the importance of this.  She denies lightheadedness, shortness of breath, dyspnea on exertion, orthopnea, PND, palpitations, chest  pain and abdominal fullness/bloating.      Her TCU is monitoring her daily weights and have been around 224 pounds.  She is following a low sodium diet.  She participates in regular physical activity including physical therapy.      Review of Systems:   General: WNL  Eyes: WNL  Ears/Nose/Throat: WNL  Lungs: WNL  Heart: WNL  Stomach: WNL  Bladder: WNL  Muscle/Joints: WNL  Skin: WNL  Nervous System: WNL  Mental Health: WNL     Blood: WNL     Patient Active Problem List   Diagnosis     Permanent atrial fibrillation     Constipation     Sciatica     Hypothyroidism     Type 2 diabetes mellitus with stage 3 chronic kidney disease, without long-term current use of insulin     Hyperparathyroidism     Vitamin D deficiency     Mixed hyperlipidemia     Atherosclerosis of native coronary artery of native heart without angina pectoris     CKD (chronic kidney disease), stage III     Osteoarthritis Of The Knee     Restless Legs Syndrome     Morbid obesity due to excess calories     H/O heart valve replacement with bioprosthetic valve     Cardiac pacemaker in situ     Complete heart block     Normochromic normocytic anemia     Essential hypertension with goal blood pressure less than 140/90     Moderate persistent asthma without complication     PAF (paroxysmal atrial fibrillation)     GERD (gastroesophageal reflux disease)     Primary insomnia     Hypokalemia     Open wound     Heart failure with preserved ejection fraction     Physical deconditioning     Asthma     Acute renal failure superimposed on stage 3 chronic kidney disease       Past Medical History:   Diagnosis Date     Acute kidney injury superimposed on CKD 3/3/2017     Asthma      CAD (coronary artery disease)      Cataract      Chronic kidney disease     ckd3     Diabetes mellitus      Disease of thyroid gland      H/O heart valve replacement with bioprosthetic valve      History of transfusion      Hypertension      Normochromic normocytic anemia      Restless leg  "syndrome        Past Surgical History:   Procedure Laterality Date     APPENDECTOMY       CHOLECYSTECTOMY       EYE SURGERY Bilateral     Cataracts     HIP PINNING Right 3/1/2017    Procedure: RIGHT HIP TROCHANTERIC RODDING;  Surgeon: Moshe Davies MD;  Location: [facility] Main OR;  Service:      INSERT / REPLACE / REMOVE PACEMAKER  06/25/2007    guidant     INSERT / REPLACE / REMOVE PACEMAKER  02/07/2018    phoebe sci gen change     JOINT REPLACEMENT Left     knee     TISSUE AORTIC VALVE REPLACEMENT  06/25/2007            Family History   Problem Relation Age of Onset     No Medical Problems Mother      age 86     No Medical Problems Father      MVA     Cancer Brother      lung     No Medical Problems Brother        Social History     Social History     Marital status:      Spouse name: N/A     Number of children: N/A     Years of education: N/A     Occupational History      in operating room      retired     Social History Main Topics     Smoking status: Former Smoker     Smokeless tobacco: Never Used     Alcohol use No     Drug use: No     Sexual activity: No     Other Topics Concern     Not on file     Social History Narrative    She live in a single floor house.  Her son lives next door and her granddaughter is \"around a lot\"   3/2017       Current Outpatient Prescriptions   Medication Sig Dispense Refill     acetaminophen (TYLENOL) 500 MG tablet Take 2 tablets (1,000 mg total) by mouth 3 (three) times a day as needed for pain. Not to exceed 4000 mg in 24 hours.  0     aluminum-magnesium hydroxide-simethicone (MAALOX ADVANCED) 200-200-20 mg/5 mL Susp Take 30 mL by mouth every 6 (six) hours as needed.       aspirin 81 MG EC tablet Take 81 mg by mouth daily.       atorvastatin (LIPITOR) 80 MG tablet Take 1 tablet (80 mg total) by mouth at bedtime. 90 tablet 3     bumetanide (BUMEX) 1 MG tablet Take 2 mg in the morning and 1 mg in the afternoon 60 tablet 0     cholecalciferol, vitamin D3, " (VITAMIN D3) 2,000 unit Tab Take 2,000 Units by mouth once daily.       fluticasone-salmeterol (ADVAIR) 500-50 mcg/dose DISKUS Inhale 1 puff 2 (two) times a day.       folic acid (FOLVITE) 1 MG tablet Take 1 mg by mouth daily.       ipratropium-albuterol (COMBIVENT RESPIMAT)  mcg/actuation Mist inhaler Inhale 1 puff 4 (four) times a day. 4 g 11     levothyroxine (SYNTHROID, LEVOTHROID) 125 MCG tablet Take 125 mcg by mouth Daily at 6:00 am.        melatonin 3 mg Tab tablet Take 3 mg by mouth at bedtime.        menthol-zinc oxide (CALMOSEPTINE) 0.44-20.6 % Oint ointment Apply 1 application topically 4 (four) times a day as needed (to denuded areas of skin). 113 g 0     metoprolol tartrate (LOPRESSOR) 25 MG tablet Take 4 tablets (100 mg total) by mouth 2 (two) times a day. 60 tablet 0     nystatin (MYCOSTATIN) cream Apply 1 application topically 2 (two) times a day. Apply to thighs abd folds until resolved.       omeprazole (PRILOSEC) 40 MG capsule Take 20 mg by mouth daily before breakfast.        ondansetron (ZOFRAN) 4 MG tablet Take 4 mg by mouth every 8 (eight) hours as needed for nausea.       potassium chloride 40 mEq/15 mL Liqd Take 7.5 mL (20 mEq total) by mouth daily. daily 225 mL 0     white petrolatum-mineral oil (EUCERIN) Crea Apply 1 application topically every 4 (four) hours as needed. Apply to bilateral legs.       potassium chloride SA (K-DUR,KLOR-CON) 20 MEQ tablet Take 20 mEq by mouth daily.       traMADol (ULTRAM) 50 mg tablet Take 1 tablet (50 mg total) by mouth every 6 (six) hours as needed for pain. 5 tablet 0     white petrolatum-mineral oil (EUCERIN) Crea Apply 1 application topically 2 (two) times a day. Apply to bilateral legs.       No current facility-administered medications for this visit.        Allergies   Allergen Reactions     Codeine Hives     Codeine Phosphate (Bulk)      This allergy is per [facility] records.     Codeine Sulfate      This  allergy is per [facility] records.     Codeine-Butalbital-Asa-Caff      This allergy is per [facility] records.     Codeine-Guaifenesin      This allergy is per [facility] records.     Lisinopril Cough     Penicillins Swelling       Objective:     Vitals:    03/21/18 1545   BP: 102/66   Pulse: 60   Resp: 18     Wt Readings from Last 3 Encounters:   03/18/18 (!) 224 lb (101.6 kg)   03/14/18 217 lb 11.2 oz (98.7 kg)   03/14/18 (!) 227 lb (103 kg)       General Appearance:   Alert, cooperative and in no acute distress.   HEENT:  No scleral icterus; the mucous membranes were pink and moist.   Neck: neck vein assessment limited as patient was examined in the chair due to safety concerns of getting on the exam table   Chest: The spine was straight. The chest was symmetric.   Lungs:   Respirations unlabored; the lungs are clear to auscultation.   Cardiovascular:   Regular rhythm. S1 and S2 without murmur, clicks or rubs. Radial and posterior tibial pulses are intact and symmetrical.    Abdomen:  Soft, nontender, nondistended, bowel sounds present   Extremities: No cyanosis, clubbing. Mild to moderate lower extremity edema. Wearing ACE bandages   Skin: No xanthelasma.   Neurologic: Mood and affect are appropriate.         Lab Review   Lab Results   Component Value Date    CREATININE 1.86 (H) 03/19/2018    BUN 46 (H) 03/19/2018     (L) 03/19/2018    K 3.9 03/19/2018    CL 93 (L) 03/19/2018    CO2 35 (H) 03/19/2018     Lab Results   Component Value Date     (H) 03/07/2018     BNP (pg/mL)   Date Value   03/07/2018 392 (H)   02/28/2018 660 (H)   06/08/2017 324 (H)     Creatinine (mg/dL)   Date Value   03/19/2018 1.86 (H)   03/12/2018 1.49 (H)   03/11/2018 1.64 (H)   03/10/2018 1.97 (H)       Cardiographics  Echocardiogram: 2/7/2018  Summary     Normal left ventricular size and systolic function.    When compared to  the previous study dated 3/1/2017, no significant change.    Left ventricle ejection fraction is normal. The estimated left ventricular ejection fraction is 55%.    Normal right ventricular size and systolic function.    Left Atrium: Left atrial volume is moderately increased.    Aortic Valve: Bioprosthetic valve is not well visualized. No evidence of paravalvular leak and mean gradient is 15 mmHg which is borderline elevated, however not significantly changed compared to prior.    Mitral Valve: There is severe mitral annular calcification. Moderate mitral Calcific stenosis. No mitral regurgitation.           20 minutes were spent face to face with the patient with greater than 50% spent on education and counseling.      Lupe Almanza, [facility]   Heart Failure Clinic

## 2021-06-16 NOTE — PROGRESS NOTES
[facility]    Facility:   [facility] [[id number]]   Code Status: FULL CODE      CHIEF COMPLAINT/REASON FOR VISIT:  Chief Complaint   Patient presents with     Review Of Multiple Medical Conditions       HISTORY:      HPI: Thea is a 83 y.o. female who presented to the emergency room with such profound weakness that she was unable to stand up from her living room chair.  At the time of admission she was found to be dyspneic, febrile, and had increased respiratory rate.  She had nausea and an episode of emesis and she also had right foot wound after striking her right foot against furniture but it was not of concern to her at the time.  However with further evaluation it was determined that she had a significant cellulitis of the right foot contributing to her current problems and that she also had fluid overload consistent with acute on chronic diastolic heart failure.  It was also noted that her pacemaker battery was in need of replacement and this was done during the hospital stay as well.  She did have positive blood cultures consistent with sepsis with likely source from the right foot cellulitis but could not exclude UTI, bioprosthetic heart valve lesion and for this reason broad-spectrum IV antibiotics were initiated and there was an initial MARKO.  It was determined that it was due to the cellulitis of the foot.    Currently on review of systems she has had improvement of the leg edema and her skin.  She has not had any problems with diarrhea despite the antibiotics that she is receiving.  She has had some upset stomach for which she receives omeprazole now and also liquid antacid as needed.  No fevers or chills.  No chest pain or shortness of breath.  No headache.    Past Medical History:   Diagnosis Date     Acute kidney injury superimposed on CKD 3/3/2017     Asthma      CAD (coronary artery disease)      Cataract      Chronic kidney disease     ckd3     Diabetes  "mellitus      Disease of thyroid gland      H/O heart valve replacement with bioprosthetic valve      History of transfusion      Hypertension      Normochromic normocytic anemia      Restless leg syndrome              Family History   Problem Relation Age of Onset     No Medical Problems Mother      age 86     No Medical Problems Father      MVA     Cancer Brother      lung     No Medical Problems Brother      Social History     Social History     Marital status:      Spouse name: N/A     Number of children: N/A     Years of education: N/A     Occupational History      in operating room      retired     Social History Main Topics     Smoking status: Former Smoker     Smokeless tobacco: Never Used     Alcohol use No     Drug use: No     Sexual activity: No     Other Topics Concern     Not on file     Social History Narrative    She live in a single floor house.  Her son lives next door and her granddaughter is \"around a lot\"   3/2017         Review of Systems    .  Vitals:    02/27/18 1323   BP: 119/61   Pulse: 60   Resp: 20   Temp: 97.4  F (36.3  C)   Weight: (!) 242 lb 4.8 oz (109.9 kg)       Physical Exam   Constitutional: No distress.   Eyes: Right eye exhibits no discharge. Left eye exhibits no discharge.   Cardiovascular: Normal rate.    Pulmonary/Chest: Breath sounds normal.   Abdominal: She exhibits no distension. There is no tenderness.   Musculoskeletal:   Edema of right leg compared to left.   Neurological: She is alert.   Skin:   Improved appearance of skin   Psychiatric: She has a normal mood and affect.   Nursing note and vitals reviewed.        LABS:   No new laboratory testing    ASSESSMENT:      ICD-10-CM    1. Cellulitis of right lower extremity L03.115    2. Heart failure with preserved ejection fraction I50.30    3. Physical deconditioning R53.81        PLAN:    Continue to monitor her electrolytes with the increase diuretics recently.  Continue current antibiotics.  Continue " the omeprazole for GERD symptoms and liquid antacid as needed.  Continue therapies.  She is anxious to return home with her granddaughter.      Electronically signed by: Sami Palma MD

## 2021-06-16 NOTE — PROGRESS NOTES
DEVICE CLINIC RN POST-OP NOTE    Reason for visit: 1 week (due to hospitalizations, this visit is actually 1 month) PO      Device: [employer] L100 ESSENTIO  Procedure date: 02/12/2018  Implant Diagnosis: 3rd degree AVB  Implanting Physician: Dr. Jorge A Arnold      Assessment  Subjective: Patient reports feeling ok and denies any pain  Vitals:   Vitals:    03/14/18 1127   BP: 98/50   Pulse:    Resp:    Temp:      Incision/device pocket: Steri-strips were absent. The majority of the incision appears well healed. There is a scab that is noted on the most outer edge of the incision. No redness or drainage noted. No swelling.   Other: Very difficult to obtain a temperature on patient today. Tried several times without success, patient reports that they get temperatures on her at TCU and they've been fine.       Device Findings  Interrogation of device reveals normal sensing and capture thresholds  See the P[employer] Report for a full summary of the device information.        Patient Education  Thea Acosta was alone for today's visit     [facility]'s Partnership Agreement for Device Patients and Post-operative Checklist were presented and reviewed with patient at today's appointment.    Signs and symptoms of infection, poor wound healing, and device function were reviewed. She was issued a Latitude remote monitor and instructed on its set-up and use for remote monitoring by the [employer] representative prior to hospital discharge. These instructions were reviewed with the patient at today s visit.       Plan  - 3 month remote scheduled 06/14/2018

## 2021-06-16 NOTE — PROGRESS NOTES
[facility]      Facility:    [facility] [[id number]]  Code Status: FULL CODE      Chief Complaint/Reason for Visit:  Chief Complaint   Patient presents with     H & P       HPI:   Thea is a 83 y.o. female who presented to the emergency room with such profound weakness that she was unable to stand up from her living room chair.  At the time of admission she was found to be dyspneic, febrile, and had increased respiratory rate.  She had nausea and an episode of emesis and she also had right foot wound after striking her right foot against furniture but it was not of concern to her at the time.  However with further evaluation it was determined that she had a significant cellulitis of the right foot contributing to her current problems and that she also had fluid overload consistent with acute on chronic diastolic heart failure.  It was also noted that her pacemaker battery was in need of replacement and this was done during the hospital stay as well.  She did have positive blood cultures consistent with sepsis with likely source from the right foot cellulitis but could not exclude UTI, bioprosthetic heart valve lesion and for this reason broad-spectrum IV antibiotics were initiated and there was an initial MARKO.  It was determined that it was due to the cellulitis of the foot.    Current review of systems, she had continued worsening of the symptoms of the right foot cellulitis and so in transitional care unit she was switched to clindamycin and within 1 day of that antibiotic she has noticed improvement.  Also in regards to her symptoms of failure metolazone was added and she has had improvement of swelling as well.  She is now without fevers or chills.  She does not have sore throat.  There is no cough or shortness of breath or chest pain.  No abdominal pain or nausea.  No headache.  No dysuria.      Past Medical History:  Past Medical History:   Diagnosis Date      "Acute kidney injury superimposed on CKD 3/3/2017     Asthma      CAD (coronary artery disease)      Cataract      Chronic kidney disease     ckd3     Diabetes mellitus      Disease of thyroid gland      H/O heart valve replacement with bioprosthetic valve      History of transfusion      Hypertension      Normochromic normocytic anemia      Restless leg syndrome            Surgical History:  Past Surgical History:   Procedure Laterality Date     APPENDECTOMY       CHOLECYSTECTOMY       EYE SURGERY Bilateral     Cataracts     HIP PINNING Right 3/1/2017    Procedure: RIGHT HIP TROCHANTERIC RODDING;  Surgeon: Moshe Davies MD;  Location: [facility] Main OR;  Service:      INSERT / REPLACE / REMOVE PACEMAKER  06/25/2007    guidant     INSERT / REPLACE / REMOVE PACEMAKER  02/07/2018    phoebe sci gen change     JOINT REPLACEMENT Left     knee     TISSUE AORTIC VALVE REPLACEMENT  06/25/2007            Family History:   Family History   Problem Relation Age of Onset     No Medical Problems Mother      age 86     No Medical Problems Father      MVA     Cancer Brother      lung     No Medical Problems Brother        Social History:    Social History     Social History     Marital status:      Spouse name: N/A     Number of children: N/A     Years of education: N/A     Occupational History      in operating room      retired     Social History Main Topics     Smoking status: Former Smoker     Smokeless tobacco: Never Used     Alcohol use No     Drug use: No     Sexual activity: No     Other Topics Concern     Not on file     Social History Narrative    She live in a single floor house.  Her son lives next door and her granddaughter is \"around a lot\"   3/2017          Review of Systems   All other systems reviewed and are negative.      Vitals:    02/20/18 1956   BP: 129/60   Pulse: (!) 55   Resp: 19   Temp: 97.1  F (36.2  C)   SpO2: 92%   Weight: (!) 253 lb 3.2 oz (114.9 kg)       Physical Exam "   Constitutional: No distress.   HENT:   Mouth/Throat: Oropharynx is clear and moist.   Eyes: Right eye exhibits no discharge. Left eye exhibits no discharge.   Neck: No thyromegaly present.   Cardiovascular: Regular rhythm.    Pulmonary/Chest: Effort normal and breath sounds normal.   Abdominal: Soft. Bowel sounds are normal. There is no tenderness.   Musculoskeletal:   1+ edema bilaterally   Lymphadenopathy:     She has no cervical adenopathy.   Neurological: She is alert.   Skin:   Right foot bandage present.  No further surrounding redness or warmth   Psychiatric: She has a normal mood and affect.   Nursing note and vitals reviewed.      Medication List:  Current Outpatient Prescriptions   Medication Sig     acetaminophen (TYLENOL) 500 MG tablet Take 1,000 mg by mouth 2 (two) times a day. And 500mg q6h prn pain     aspirin 81 MG EC tablet Take 81 mg by mouth daily.     atorvastatin (LIPITOR) 80 MG tablet Take 1 tablet (80 mg total) by mouth at bedtime.     cholecalciferol, vitamin D3, (VITAMIN D3) 2,000 unit Tab Take 2,000 Units by mouth once daily.     clindamycin (CLEOCIN) 300 MG capsule Take 300 mg by mouth 3 (three) times a day. 02/9/2018-03/01/2018 for Cellulitis of right lower limb     fluticasone-salmeterol (ADVAIR) 500-50 mcg/dose DISKUS Inhale 1 puff 2 (two) times a day.     folic acid (FOLVITE) 1 MG tablet Take 1 mg by mouth daily.     furosemide (LASIX) 40 MG tablet Take 2 tablets (80 mg total) by mouth 2 (two) times a day at 9am and 6pm.     ipratropium-albuterol (COMBIVENT RESPIMAT)  mcg/actuation Mist inhaler Inhale 1 puff 4 (four) times a day.     Lactobacillus rhamnosus GG (CULTURELLE) 10-15 Billion cell capsule Take 1 capsule by mouth daily.     levothyroxine (SYNTHROID, LEVOTHROID) 125 MCG tablet Take 125 mcg by mouth daily.     melatonin 3 mg Tab tablet Take 3 mg by mouth at bedtime.      metOLazone (ZAROXOLYN) 2.5 MG tablet Take 2.5 mg by mouth daily. Once daily on Tue, Thu. Sat      metoprolol tartrate (LOPRESSOR) 100 MG tablet Take 150 mg by mouth 2 (two) times a day.     nystatin (MYCOSTATIN) powder Apply 1 application topically 2 (two) times a day as needed.     omeprazole (PRILOSEC) 20 MG capsule Take 20 mg by mouth daily before breakfast.     polyethylene glycol (MIRALAX) 17 gram packet Take 17 g by mouth daily.     potassium chloride SA (K-DUR,KLOR-CON) 20 MEQ tablet Take 1 tablet (20 mEq total) by mouth daily.     senna-docusate (PERICOLACE) 8.6-50 mg tablet Take 1 tablet by mouth daily as needed for constipation.      traMADol (ULTRAM) 50 mg tablet Take 50 mg by mouth every 6 (six) hours as needed for pain.       Labs:  No new laboratory testing.    Assessment:    ICD-10-CM    1. Cellulitis of right lower extremity L03.115    2. Cardiac pacemaker in situ Z95.0    3. CKD (chronic kidney disease), stage III N18.3    4. Heart failure with preserved ejection fraction I50.30        Plan:  Monitoring of her medical conditions especially with the change of antibiotic and the increase of diuresis.  Physical and Occupational Therapy for evaluation and treatment.      Electronically signed by: Sami Palma MD

## 2021-06-16 NOTE — PROGRESS NOTES
[facility]    DATE: 2018    NAME:  Thea Acosta             :  1934  MRN: [id number]  CODE STATUS:  FULL CODE    VISIT TYPE: Review Of Multiple Medical Conditions     FACILITY:  [facility] [[id number]]       CHIEF COMPLAIN/REASON FOR VISIT:    Chief Complaint   Patient presents with     Review Of Multiple Medical Conditions               HISTORY OF PRESENT ILLNESS: Thea Acosta is a 83 y.o. female who was admitted  to  for weakness. She was found to be dyspneic, febrile, and tachypneic. She was diagnosed with sepsis s/t right foot cellulitis. Blood cultures grew Strep group B. She was treated with vanco and later ancef. She was also treated for acute CHF. During her stay she also underwent pacemaker battery change on . She has PMH of CAD, CKD, DM, s/p PPM. Prior to this she lived at home.     Today Mr. Acosta states her leg is still very swollen and red. The pain is still significant. She says she did finally get a recliner and is sleeping better and trying to keep her feet up. Her foot/leg has several open areas now that are weeping fluid. She says it is difficult to walk on that foot. Her bowels are moving ok and everything else is fine. She is off the oxygen now. Her shortness of breath is at baseline. Per nursing complaining of a lot of pain, concerned about edema and redness.     REVIEW OF SYSTEMS:  PROBLEMS AND REVIEW OF SYSTEMS:   Review of Systems  Today on ROS:   Currently, no fever, chills, or rigors. Does not have any visual or hearing problems. Denies any chest pain, headaches, palpitations, lightheadedness, dizziness, shortness of breath, or cough. Appetite is good. Denies any GERD symptoms. Denies any difficulty with swallowing, nausea, or vomiting.  Denies any abdominal pain. Denies any urinary symptoms. No active bleeding. Positive for redness, pain, edema right leg/foot      Allergies   Allergen Reactions      Codeine Hives     Lisinopril Cough     Penicillins Swelling     Current Outpatient Prescriptions   Medication Sig     acetaminophen (TYLENOL) 500 MG tablet Take 1,000 mg by mouth 2 (two) times a day. And 500mg q6h prn pain     aspirin 81 MG EC tablet Take 81 mg by mouth daily.     atorvastatin (LIPITOR) 80 MG tablet Take 1 tablet (80 mg total) by mouth at bedtime.     cephalexin (KEFLEX) 500 MG capsule Take 1 capsule (500 mg total) by mouth 2 (two) times a day for 7 days. (Patient taking differently: Take 500 mg by mouth 4 (four) times a day. )     cholecalciferol, vitamin D3, (VITAMIN D3) 2,000 unit Tab Take 2,000 Units by mouth once daily.     fluticasone-salmeterol (ADVAIR) 500-50 mcg/dose DISKUS Inhale 1 puff 2 (two) times a day.     folic acid (FOLVITE) 1 MG tablet Take 1 mg by mouth daily.     furosemide (LASIX) 40 MG tablet Take 2 tablets (80 mg total) by mouth 2 (two) times a day at 9am and 6pm.     ipratropium-albuterol (COMBIVENT RESPIMAT)  mcg/actuation Mist inhaler Inhale 1 puff 4 (four) times a day.     levothyroxine (SYNTHROID, LEVOTHROID) 125 MCG tablet Take 125 mcg by mouth daily.     melatonin 3 mg Tab tablet Take 3 mg by mouth at bedtime.      metoprolol tartrate (LOPRESSOR) 100 MG tablet Take 150 mg by mouth 2 (two) times a day.     nystatin (MYCOSTATIN) powder Apply 1 application topically 2 (two) times a day as needed.     omeprazole (PRILOSEC) 20 MG capsule Take 20 mg by mouth daily before breakfast.     polyethylene glycol (MIRALAX) 17 gram packet Take 17 g by mouth daily.     potassium chloride SA (K-DUR,KLOR-CON) 20 MEQ tablet Take 1 tablet (20 mEq total) by mouth daily.     senna-docusate (PERICOLACE) 8.6-50 mg tablet Take 1 tablet by mouth daily as needed for constipation.      Past Medical History:    Past Medical History:   Diagnosis Date     Acute kidney injury superimposed on CKD 3/3/2017     Asthma      CAD (coronary artery disease)      Cataract      Chronic kidney disease      ckd3     Diabetes mellitus      Disease of thyroid gland      H/O heart valve replacement with bioprosthetic valve      History of transfusion      Hypertension      Normochromic normocytic anemia      Restless leg syndrome            PHYSICAL EXAMINATION  Vitals:    02/18/18 1959   BP: 101/51   Pulse: 60   Resp: 20   Temp: 97  F (36.1  C)   SpO2: 91%   Weight: (!) 253 lb (114.8 kg)       Today on physical exam:     GENERAL: Awake, Alert, obese, oriented x3, not in any form of acute distress, answers questions appropriately, follows simple commands, conversant  HEENT: Head is normocephalic with normal hair distribution. No evidence of trauma. Ears: No acute purulent discharge. Eyes: Conjunctivae pink with no scleral jaundice. Nose: Normal mucosa and septum. NECK: Supple with no cervical or supraclavicular lymphadenopathy. Trachea is midline.   CHEST: No tenderness or deformity, no crepitus, left chest incision for pacer c/d/i  LUNG: Clear to auscultation with good chest expansion. There are no crackles or wheezes, normal AP diameter.  BACK: No kyphosis of the thoracic spine. Symmetric, no curvature, ROM normal, no CVA tenderness, no spinal tenderness   CVS: There is good S1  S2, regular rhythm, there are no murmurs, rubs, gallops, or heaves,  2+ pulses symmetric in all extremities.  ABDOMEN: Rounded and soft, nontender to palpation, non distended, no masses, no organomegaly, good bowel sounds, no rebound or guarding, no peritoneal signs.   EXTREMITIES: 2-3+ RLE pedal edema, bright erythema extends from foot up almost to knee, skin is taught, dry flaking skin, marked outline of the erythema for monitoring, erythema does not extend beyond marking but erythema up to marking line, continues to be very edemaotus and now few more small open areas weeping, ruptured blister on right foot, serous weeping drainage from foot and right shin  SKIN: Warm and dry  NEUROLOGICAL: The patient is oriented to person, place and time.  Strength and sensation are grossly intact. Face is symmetric.            LABS:   Recent Results (from the past 168 hour(s))   POCT Glucose   Result Value Ref Range    Glucose,  mg/dL   Basic Metabolic Panel   Result Value Ref Range    Sodium 139 136 - 145 mmol/L    Potassium 3.7 3.5 - 5.0 mmol/L    Chloride 98 98 - 107 mmol/L    CO2 31 22 - 31 mmol/L    Anion Gap, Calculation 10 5 - 18 mmol/L    Glucose 101 70 - 125 mg/dL    Calcium 9.1 8.5 - 10.5 mg/dL    BUN 27 8 - 28 mg/dL    Creatinine 1.12 (H) 0.60 - 1.10 mg/dL    GFR MDRD Af Amer 56 (L) >60 mL/min/1.73m2    GFR MDRD Non Af Amer 46 (L) >60 mL/min/1.73m2   HM1 (CBC with Diff)   Result Value Ref Range    WBC 9.4 4.0 - 11.0 thou/uL    RBC 3.32 (L) 3.80 - 5.40 mill/uL    Hemoglobin 9.3 (L) 12.0 - 16.0 g/dL    Hematocrit 30.9 (L) 35.0 - 47.0 %    MCV 93 80 - 100 fL    MCH 28.0 27.0 - 34.0 pg    MCHC 30.1 (L) 32.0 - 36.0 g/dL    RDW 15.2 (H) 11.0 - 14.5 %    Platelets 196 140 - 440 thou/uL    MPV 10.2 8.5 - 12.5 fL    Neutrophils % 78 (H) 50 - 70 %    Lymphocytes % 11 (L) 20 - 40 %    Monocytes % 8 2 - 10 %    Eosinophils % 3 0 - 6 %    Basophils % 0 0 - 2 %    Neutrophils Absolute 7.2 2.0 - 7.7 thou/uL    Lymphocytes Absolute 1.0 0.8 - 4.4 thou/uL    Monocytes Absolute 0.8 0.0 - 0.9 thou/uL    Eosinophils Absolute 0.3 0.0 - 0.4 thou/uL    Basophils Absolute 0.0 0.0 - 0.2 thou/uL   POCT Glucose   Result Value Ref Range    Glucose, POC 90 mg/dL   POCT Glucose   Result Value Ref Range    Glucose, POC 83 mg/dL   POCT Glucose   Result Value Ref Range    Glucose, POC 90 mg/dL   POCT Glucose   Result Value Ref Range    Glucose,  mg/dL   Basic Metabolic Panel   Result Value Ref Range    Sodium 141 136 - 145 mmol/L    Potassium 4.2 3.5 - 5.0 mmol/L    Chloride 98 98 - 107 mmol/L    CO2 34 (H) 22 - 31 mmol/L    Anion Gap, Calculation 9 5 - 18 mmol/L    Glucose 113 70 - 125 mg/dL    Calcium 9.1 8.5 - 10.5 mg/dL    BUN 24 8 - 28 mg/dL    Creatinine 1.12 (H) 0.60 -  1.10 mg/dL    GFR MDRD Af Amer 56 (L) >60 mL/min/1.73m2    GFR MDRD Non Af Amer 46 (L) >60 mL/min/1.73m2   Magnesium   Result Value Ref Range    Magnesium 1.8 1.8 - 2.6 mg/dL   Platelet Count - every other day x 3   Result Value Ref Range    Platelets 209 140 - 440 thou/uL   POCT Glucose   Result Value Ref Range    Glucose,  mg/dL   POCT Glucose   Result Value Ref Range    Glucose,  mg/dL     Results for orders placed or performed during the hospital encounter of 02/07/18   Basic Metabolic Panel   Result Value Ref Range    Sodium 141 136 - 145 mmol/L    Potassium 4.2 3.5 - 5.0 mmol/L    Chloride 98 98 - 107 mmol/L    CO2 34 (H) 22 - 31 mmol/L    Anion Gap, Calculation 9 5 - 18 mmol/L    Glucose 113 70 - 125 mg/dL    Calcium 9.1 8.5 - 10.5 mg/dL    BUN 24 8 - 28 mg/dL    Creatinine 1.12 (H) 0.60 - 1.10 mg/dL    GFR MDRD Af Amer 56 (L) >60 mL/min/1.73m2    GFR MDRD Non Af Amer 46 (L) >60 mL/min/1.73m2         Lab Results   Component Value Date    WBC 9.4 02/12/2018    HGB 9.3 (L) 02/12/2018    HCT 30.9 (L) 02/12/2018    MCV 93 02/12/2018     02/13/2018       No results found for: [id number]  Lab Results   Component Value Date    HGBA1C 6.9 (H) 01/31/2018     Lab Results   Component Value Date    INR 1.30 (H) 02/07/2018    INR 1.51 (H) 03/06/2017    INR 1.27 (H) 03/05/2017     Vitamin D, Total (25-Hydroxy)   Date Value Ref Range Status   06/01/2017 38.8 30.0 - 80.0 ng/mL Final     Lab Results   Component Value Date    TSH 3.50 06/01/2017           ASSESSMENT/PLAN:    1. Sepsis, s/t right foot cellulitis: Increased Keflex to QID last week with little improvement, will dc keflex and start clindamycin. If fails PO treatment may need IV antibiotics. Positive for strep sepsis in hospital but no wound cultures from leg in chart. Increased tylenol to 1000mg TID, tramadol q6h prn. Open, weeping areas-wound care orders given. Keep legs elevated in recliner when possible.   2. Acute on chronic CHF: Daily  [phone]. On lasix 80mg bid. F/u with cardiology on 2/22. Weights trending up, will add metolazone three times weekly. Off O2.   3. S/p PPM: Battery change on 2/12. Incision c/d/i. No concerns. F/u with device clinic 2/22.   4. Mod persistent Asthma: On advair, combivent. No sob today.   5. MIGUEL on CKD: Cr 1.12 on 2/12. Recheck 2/19-Cr 1.74, will recheck one week.   6. Type 2 DM: Diet controlled. Monitor on labs.   7. Dyslipidemia: On aspirin, atorvastatin  8. HTN: SBP 100s. On lopressor, lasix.   9. GERD: On omeprazole.   10. Hypothyroid: On levothyroxine.   11. Insomnia: On melatonin qhs. Ordered recliner to help with sleep-finally received over weekend so is sleeping better now.   12. Hypokalemia: On kcl. BMP on 2/19-K 4.5. Stable.   13. Constipation: On pericolace daily prn. Will add miralax daily.   14. Vitamin d deficiency: On vitamin d. Level pending from 2/19.   15. Anemia of CKD: Hg 9.3 on 2/12. HM2 on 2/19-9.6.   16. PAF: Regular rate and rhythm. On metoprolol rate controlled 60s.     BMP, HM2, vit d 2/19-reviewed and BMP, HM 2 ordered for 2/26  Electronically signed by: Jennifer Valdes NP    Total 35 minutes of which 75% was spent in counseling and coordination of care of the above plan    Time spent in interview and examination of patient, review of available records, and discussion with nursing staff. Continue care plan, efforts at therapy, and monitor nutritional status.

## 2021-06-16 NOTE — PROGRESS NOTES
[facility]    DATE: 2018    NAME:  Thea Acosta             :  1934  MRN: [id number]  CODE STATUS:  FULL CODE    VISIT TYPE: Review Of Multiple Medical Conditions     FACILITY:  [facility] [[id number]]       CHIEF COMPLAIN/REASON FOR VISIT:    Chief Complaint   Patient presents with     Review Of Multiple Medical Conditions               HISTORY OF PRESENT ILLNESS: Thea Acosta is a 83 y.o. female who was admitted  to  for weakness. She was found to be dyspneic, febrile, and tachypneic. She was diagnosed with sepsis s/t right foot cellulitis. Blood cultures grew Strep group B. She was treated with vanco and later ancef. She was also treated for acute CHF. During her stay she also underwent pacemaker battery change on . She has PMH of CAD, CKD, DM, s/p PPM. Prior to this she lived at home with her grand daughter.     Today Mr. Acosta states she thinks her leg is looking a little better each day, at least the redness is better. It is still weeping fluid and the skin is still very flaky and peeling in parts. She says it is still swollen. She sleeps in the recliner but doesn't always have her feet up because she moves around a lot. She says her pain is doing better though. She has been having issues with indigestion and heartburn the last few days. Per staff has been complaining of indigestion and needing maalox but also has some excoriated, reddened skin on her thighs.     REVIEW OF SYSTEMS:  PROBLEMS AND REVIEW OF SYSTEMS:   Review of Systems  Today on ROS:   Currently, no fever, chills, or rigors. Does not have any visual or hearing problems. Denies any chest pain, headaches, palpitations, lightheadedness, dizziness, shortness of breath, or cough. Appetite is fair.  Denies any abdominal pain. Denies any urinary symptoms. No active bleeding. Positive for redness of RLE is improving, pain-improved, edema right leg/foot, weeping  wounds on right leg, excoriated, red skin on thighs, abdominal folds, indigestion      Allergies   Allergen Reactions     Codeine Hives     Lisinopril Cough     Penicillins Swelling     Current Outpatient Prescriptions   Medication Sig     acetaminophen (TYLENOL) 500 MG tablet Take 1,000 mg by mouth 2 (two) times a day. And 500mg q6h prn pain     aspirin 81 MG EC tablet Take 81 mg by mouth daily.     atorvastatin (LIPITOR) 80 MG tablet Take 1 tablet (80 mg total) by mouth at bedtime.     cholecalciferol, vitamin D3, (VITAMIN D3) 2,000 unit Tab Take 2,000 Units by mouth once daily.     clindamycin (CLEOCIN) 300 MG capsule Take 300 mg by mouth 3 (three) times a day. 02/9/2018-03/01/2018 for Cellulitis of right lower limb     fluticasone-salmeterol (ADVAIR) 500-50 mcg/dose DISKUS Inhale 1 puff 2 (two) times a day.     folic acid (FOLVITE) 1 MG tablet Take 1 mg by mouth daily.     furosemide (LASIX) 40 MG tablet Take 2 tablets (80 mg total) by mouth 2 (two) times a day at 9am and 6pm.     ipratropium-albuterol (COMBIVENT RESPIMAT)  mcg/actuation Mist inhaler Inhale 1 puff 4 (four) times a day.     Lactobacillus rhamnosus GG (CULTURELLE) 10-15 Billion cell capsule Take 1 capsule by mouth daily.     levothyroxine (SYNTHROID, LEVOTHROID) 125 MCG tablet Take 125 mcg by mouth daily.     melatonin 3 mg Tab tablet Take 3 mg by mouth at bedtime.      metOLazone (ZAROXOLYN) 2.5 MG tablet Take 2.5 mg by mouth daily. Once daily on Tue, Thu. Sat     metoprolol tartrate (LOPRESSOR) 100 MG tablet Take 150 mg by mouth 2 (two) times a day.     nystatin (MYCOSTATIN) powder Apply 1 application topically 2 (two) times a day as needed.     omeprazole (PRILOSEC) 20 MG capsule Take 20 mg by mouth daily before breakfast.     polyethylene glycol (MIRALAX) 17 gram packet Take 17 g by mouth daily.     potassium chloride SA (K-DUR,KLOR-CON) 20 MEQ tablet Take 1 tablet (20 mEq total) by mouth daily.     senna-docusate (PERICOLACE) 8.6-50 mg  tablet Take 1 tablet by mouth daily as needed for constipation.      traMADol (ULTRAM) 50 mg tablet Take 50 mg by mouth every 6 (six) hours as needed for pain.     Past Medical History:    Past Medical History:   Diagnosis Date     Acute kidney injury superimposed on CKD 3/3/2017     Asthma      CAD (coronary artery disease)      Cataract      Chronic kidney disease     ckd3     Diabetes mellitus      Disease of thyroid gland      H/O heart valve replacement with bioprosthetic valve      History of transfusion      Hypertension      Normochromic normocytic anemia      Restless leg syndrome            PHYSICAL EXAMINATION  Vitals:    02/25/18 1912   BP: 107/51   Pulse: 68   Resp: 20   Temp: (!) 96  F (35.6  C)   SpO2: 94%       Today on physical exam:     GENERAL: Awake, Alert, obese, oriented x3, not in any form of acute distress, answers questions appropriately, follows simple commands, conversant  HEENT: Head is normocephalic with normal hair distribution. No evidence of trauma. Ears: No acute purulent discharge. Eyes: Conjunctivae pink with no scleral jaundice. Nose: Normal mucosa and septum. NECK: Supple with no cervical or supraclavicular lymphadenopathy. Trachea is midline.   CHEST: No tenderness or deformity, no crepitus, left chest incision for pacer c/d/i  LUNG: Clear to auscultation with good chest expansion. There are no crackles or wheezes, normal AP diameter.  BACK: No kyphosis of the thoracic spine. Symmetric, no curvature, ROM normal, no CVA tenderness, no spinal tenderness   CVS: There is good S1  S2, regular rhythm, there are no murmurs, rubs, gallops, or heaves,  2+ pulses symmetric in all extremities.  ABDOMEN: Rounded and soft, nontender to palpation, non distended, no masses, no organomegaly, good bowel sounds, no rebound or guarding, no peritoneal signs.   EXTREMITIES: 2-3+ RLE pedal edema, bright erythema extends from foot up almost to knee, skin is taught, dry flaking skin, marked outline of  the erythema for monitoring, erythema is much improved, remains edematous and weeping in areas  SKIN: Warm and dry  NEUROLOGICAL: The patient is oriented to person, place and time. Strength and sensation are grossly intact. Face is symmetric.            LABS:   Recent Results (from the past 168 hour(s))   Basic Metabolic Panel   Result Value Ref Range    Sodium 136 136 - 145 mmol/L    Potassium 4.5 3.5 - 5.0 mmol/L    Chloride 90 (L) 98 - 107 mmol/L    CO2 36 (H) 22 - 31 mmol/L    Anion Gap, Calculation 10 5 - 18 mmol/L    Glucose 125 70 - 125 mg/dL    Calcium 9.7 8.5 - 10.5 mg/dL    BUN 33 (H) 8 - 28 mg/dL    Creatinine 1.74 (H) 0.60 - 1.10 mg/dL    GFR MDRD Af Amer 34 (L) >60 mL/min/1.73m2    GFR MDRD Non Af Amer 28 (L) >60 mL/min/1.73m2   HM2(CBC w/o Differential)   Result Value Ref Range    WBC 8.5 4.0 - 11.0 thou/uL    RBC 3.44 (L) 3.80 - 5.40 mill/uL    Hemoglobin 9.6 (L) 12.0 - 16.0 g/dL    Hematocrit 32.4 (L) 35.0 - 47.0 %    MCV 94 80 - 100 fL    MCH 27.9 27.0 - 34.0 pg    MCHC 29.6 (L) 32.0 - 36.0 g/dL    RDW 15.6 (H) 11.0 - 14.5 %    Platelets 320 140 - 440 thou/uL    MPV 9.8 8.5 - 12.5 fL   Vitamin D, Total (25-Hydroxy)   Result Value Ref Range    Vitamin D, Total (25-Hydroxy) 56.7 30.0 - 80.0 ng/mL     Results for orders placed or performed in visit on 02/19/18   Basic Metabolic Panel   Result Value Ref Range    Sodium 136 136 - 145 mmol/L    Potassium 4.5 3.5 - 5.0 mmol/L    Chloride 90 (L) 98 - 107 mmol/L    CO2 36 (H) 22 - 31 mmol/L    Anion Gap, Calculation 10 5 - 18 mmol/L    Glucose 125 70 - 125 mg/dL    Calcium 9.7 8.5 - 10.5 mg/dL    BUN 33 (H) 8 - 28 mg/dL    Creatinine 1.74 (H) 0.60 - 1.10 mg/dL    GFR MDRD Af Amer 34 (L) >60 mL/min/1.73m2    GFR MDRD Non Af Amer 28 (L) >60 mL/min/1.73m2         Lab Results   Component Value Date    WBC 8.5 02/19/2018    HGB 9.6 (L) 02/19/2018    HCT 32.4 (L) 02/19/2018    MCV 94 02/19/2018     02/19/2018       No results found for: [id number]  Lab  Results   Component Value Date    HGBA1C 6.9 (H) 01/31/2018     Lab Results   Component Value Date    INR 1.30 (H) 02/07/2018    INR 1.51 (H) 03/06/2017    INR 1.27 (H) 03/05/2017     Vitamin D, Total (25-Hydroxy)   Date Value Ref Range Status   02/19/2018 56.7 30.0 - 80.0 ng/mL Final     Lab Results   Component Value Date    TSH 3.50 06/01/2017           ASSESSMENT/PLAN:    1. Sepsis, s/t right foot cellulitis: On clindamycin and appears mildly improved today. Positive for strep sepsis in hospital but no wound cultures from leg in chart. pain controlled on tylenol to 1000mg TID, tramadol q6h prn. Open, weeping areas-wound care orders given. Keep legs elevated in recliner when possible. Erythema significantly improved since last week and switching antibiotic. Continue to elevate and continue wound care.   2. Acute on chronic CHF: Daily [id number]. On lasix 80mg bid. F/u with cardiology on 2/22-stable, will f/u again in 6 weeks. Weights are still up from baseline although trending down slightly during stay. Added metolazone three times weekly last week but will change to daily. Need improved edema management for right leg to heal.   3. S/p PPM: Battery change on 2/12. Incision c/d/i. No concerns. F/u with device clinic 2/22-stable, f/u again 12 weeks.   4. Mod persistent Asthma: On advair, combivent. No sob today.   5. MIGUEL on CKD: Cr 1.12 on 2/12. Recheck 2/19-Cr 1.74, will recheck one week.   6. Type 2 DM: Diet controlled. Monitor on labs.   7. Dyslipidemia: On aspirin, atorvastatin  8. HTN: SBP 100s. On lopressor, lasix.   9. GERD: On omeprazole. Increased dose to 40mg, [place] prn indigestion.   10. Hypothyroid: On levothyroxine.   11. Insomnia: On melatonin qhs. Ordered recliner to help with sleep-finally received over weekend so is sleeping better now.   12. Hypokalemia: On kcl. BMP on 2/19-K 4.5. Stable.   13. Constipation: On pericolace daily prn. Will add miralax daily.   14. Vitamin d  deficiency: On vitamin d. Level 2/19-56.7, stable.    15. Anemia of CKD: Hg 9.3 on 2/12. HM2 on 2/19-9.6.   16. PAF: Regular rate and rhythm. On metoprolol rate controlled 60s.   17. Excoriated skin, candidiasis: Ordered nystatin cream for thighs and abdominal folds.     BMP, HM2, vit d 2/19-reviewed and BMP, HM 2 ordered for 2/26    Per therapy non ambulatory, sit to stand with assist of 2 for therapy and EZ stand for staff with 1. Max/total care for ADLs. She lives with her granddaughter. Recommending 2-3 weeks     Electronically signed by: Jennifer Valdes NP    Total 25 minutes of which 75% was spent in counseling and coordination of care of the above plan    Time spent in interview and examination of patient, review of available records, and discussion with nursing staff. Continue care plan, efforts at therapy, and monitor nutritional status.

## 2021-06-16 NOTE — PROGRESS NOTES
ASSESSMENT: Right great toenail onycholysis    PLAN: No sign of infection today.  I would expect the nail to regrow slowly over the next several months.  We talked about strategies for managing the swelling, including elevating, Ace wraps and exercise.  Return to clinic as needed.      SUBJECTIVE: New patient visit at the [facility] regarding her right great toenail, which fell off about a week ago.  She is not aware of any trauma.  She is in transitional care, and really just wants to go home.  She has not seen much bleeding or any pus from the nail bed.  Other toenails are dystrophic.  Lower extremity edema has been difficult since her hospitalization.  She has Ace wraps around the legs today.  Toes tend to swell.    PHYSICAL EXAM:  General: Pleasant 83 y.o. female in no acute distress.  Vascular: DP pulses are diminished. PT pulses are diminished. Pedal hair is diminished. Feet are warm to the touch.  Cardiac: Pulse is regular.  Lymphatic: Edema at the ankles; ace wraps are also causing toes to swell.  Neuro: Sensation in the feet is grossly intact to light touch.  Derm: The right great toenail is absent.  Nailbed is clean and dry.  Other toenails are dystrophic.  Musculoskeletal: She is in a wheelchair today.    Past Medical History:   Diagnosis Date     Acute kidney injury superimposed on CKD 3/3/2017     Asthma      CAD (coronary artery disease)      Cataract      Chronic kidney disease      Diabetes mellitus      Disease of thyroid gland      H/O heart valve replacement with bioprosthetic valve      History of transfusion      Hypertension      Normochromic normocytic anemia      Restless leg syndrome        She has a past surgical history that includes Cholecystectomy; Appendectomy; Hip pinning (Right, 3/1/2017); Joint replacement (Left); Eye surgery (Bilateral); Tissue aortic valve replacement (06/25/2007); Insert / replace / remove pacemaker (06/25/2007); and Insert / replace / remove pacemaker  (02/07/2018).    Allergies   Allergen Reactions     Codeine Hives     Codeine Phosphate (Bulk)      This allergy is per [facility] records.     Codeine Sulfate      This allergy is per [facility] records.     Codeine-Butalbital-Asa-Caff      This allergy is per [facility] records.     Codeine-Guaifenesin      This allergy is per [facility] records.     Lisinopril Cough     Penicillins Swelling       Current Outpatient Prescriptions   Medication Sig Dispense Refill     acetaminophen (TYLENOL) 500 MG tablet Take 2 tablets (1,000 mg total) by mouth 3 (three) times a day as needed for pain. Not to exceed 4000 mg in 24 hours.  0     aluminum-magnesium hydroxide-simethicone (MAALOX ADVANCED) 200-200-20 mg/5 mL Susp Take 30 mL by mouth every 6 (six) hours as needed.       aspirin 81 MG EC tablet Take 81 mg by mouth daily.       atorvastatin (LIPITOR) 80 MG tablet Take 1 tablet (80 mg total) by mouth at bedtime. 90 tablet 3     bumetanide (BUMEX) 2 MG tablet Take 2 mg by mouth 2 (two) times a day at 9am and 6pm.       cholecalciferol, vitamin D3, (VITAMIN D3) 2,000 unit Tab Take 2,000 Units by mouth once daily.       fluticasone-salmeterol (ADVAIR) 500-50 mcg/dose DISKUS Inhale 1 puff 2 (two) times a day.       folic acid (FOLVITE) 1 MG tablet Take 1 mg by mouth daily.       ipratropium-albuterol (COMBIVENT RESPIMAT)  mcg/actuation Mist inhaler Inhale 1 puff 4 (four) times a day. 4 g 11     levothyroxine (SYNTHROID, LEVOTHROID) 125 MCG tablet Take 125 mcg by mouth Daily at 6:00 am.        melatonin 3 mg Tab tablet Take 3 mg by mouth at bedtime.        menthol-zinc oxide (CALMOSEPTINE) 0.44-20.6 % Oint ointment Apply 1 application topically 4 (four) times a day as needed (to denuded areas of skin). 113 g 0     metoprolol tartrate (LOPRESSOR) 25 MG tablet Take 4 tablets (100 mg total) by  mouth 2 (two) times a day. 60 tablet 0     nystatin (MYCOSTATIN) cream Apply 1 application topically 2 (two) times a day. Apply to thighs abd folds until resolved.       omeprazole (PRILOSEC) 40 MG capsule Take 20 mg by mouth daily before breakfast.        ondansetron (ZOFRAN) 4 MG tablet Take 4 mg by mouth every 8 (eight) hours as needed for nausea.       potassium chloride 40 mEq/15 mL Liqd Take 7.5 mL (20 mEq total) by mouth daily. daily 225 mL 0     potassium chloride SA (K-DUR,KLOR-CON) 20 MEQ tablet Take 20 mEq by mouth daily.       traMADol (ULTRAM) 50 mg tablet Take 1 tablet (50 mg total) by mouth every 6 (six) hours as needed for pain. 5 tablet 0     white petrolatum-mineral oil (EUCERIN) Crea Apply 1 application topically every 4 (four) hours as needed. Apply to bilateral legs.       white petrolatum-mineral oil (EUCERIN) Crea Apply 1 application topically 2 (two) times a day. Apply to bilateral legs.       No current facility-administered medications for this visit.        Family History:  family history includes Cancer in her brother; No Medical Problems in her brother, father, and mother.    Social History:  Reviewed, and she reports that she has quit smoking. She has never used smokeless tobacco. She reports that she does not drink alcohol or use illicit drugs.    Review of Systems:  A 12 point comprehensive review of systems was negative except as noted.

## 2021-06-16 NOTE — PROGRESS NOTES
[facility]    DATE: 2018    NAME:  Thea Acosta             :  1934  MRN: [id number]  CODE STATUS:  FULL CODE    VISIT TYPE: Review Of Multiple Medical Conditions     FACILITY:  [facility] [[id number]]       CHIEF COMPLAIN/REASON FOR VISIT:    Chief Complaint   Patient presents with     Review Of Multiple Medical Conditions               HISTORY OF PRESENT ILLNESS: Thea Acosta is a 83 y.o. female who was admitted  to  for weakness. She was found to be dyspneic, febrile, and tachypneic. She was diagnosed with sepsis s/t right foot cellulitis. Blood cultures grew Strep group B. She was treated with vanco and later ancef. She was also treated for acute CHF. During her stay she also underwent pacemaker battery change on . She has PMH of CAD, CKD, DM, s/p PPM. Prior to this she lived at home.     Today Mr. Acosta states her right leg is still very swollen and red today. She is still having a lot of pain and the tylenol just isn't helping much. She says she didn't sleep at all again last night because they still have not gotten her a recliner. She says she is not sure if the redness is worse or better it just still looks very red. She says her bowels are moving. It is difficult to do therapy because of the swelling and pain in her foot/leg. Her daughter is at bedside and also very concerned about her leg and foot. She is not pleased that a recliner has not been obtained for her mom yet. Per nursing she had an area open and start to weep on her shin today and then on top of her right foot she developed a blister that ruptured earlier today and is weeping.     Discussed with unit manager and recliner was found and place in her room this afternoon.     REVIEW OF SYSTEMS:  PROBLEMS AND REVIEW OF SYSTEMS:   Review of Systems  Today on ROS:   Currently, no fever, chills, or rigors. Does not have any visual or hearing problems. Denies any  chest pain, headaches, palpitations, lightheadedness, dizziness, shortness of breath, or cough. Appetite is good. Denies any GERD symptoms. Denies any difficulty with swallowing, nausea, or vomiting.  Denies any abdominal pain. Denies any urinary symptoms. No active bleeding. Positive for redness, pain, edema right leg/foot, insomnia      Allergies   Allergen Reactions     Codeine Hives     Lisinopril Cough     Penicillins Swelling     Current Outpatient Prescriptions   Medication Sig     acetaminophen (TYLENOL) 500 MG tablet Take 1,000 mg by mouth 2 (two) times a day. And 500mg q6h prn pain     aspirin 81 MG EC tablet Take 81 mg by mouth daily.     atorvastatin (LIPITOR) 80 MG tablet Take 1 tablet (80 mg total) by mouth at bedtime.     cephalexin (KEFLEX) 500 MG capsule Take 1 capsule (500 mg total) by mouth 2 (two) times a day for 7 days. (Patient taking differently: Take 500 mg by mouth 4 (four) times a day. )     cholecalciferol, vitamin D3, (VITAMIN D3) 2,000 unit Tab Take 2,000 Units by mouth once daily.     fluticasone-salmeterol (ADVAIR) 500-50 mcg/dose DISKUS Inhale 1 puff 2 (two) times a day.     folic acid (FOLVITE) 1 MG tablet Take 1 mg by mouth daily.     furosemide (LASIX) 40 MG tablet Take 2 tablets (80 mg total) by mouth 2 (two) times a day at 9am and 6pm.     ipratropium-albuterol (COMBIVENT RESPIMAT)  mcg/actuation Mist inhaler Inhale 1 puff 4 (four) times a day.     levothyroxine (SYNTHROID, LEVOTHROID) 125 MCG tablet Take 125 mcg by mouth daily.     melatonin 3 mg Tab tablet Take 3 mg by mouth at bedtime.      metoprolol tartrate (LOPRESSOR) 100 MG tablet Take 150 mg by mouth 2 (two) times a day.     nystatin (MYCOSTATIN) powder Apply 1 application topically 2 (two) times a day as needed.     omeprazole (PRILOSEC) 20 MG capsule Take 20 mg by mouth daily before breakfast.     polyethylene glycol (MIRALAX) 17 gram packet Take 17 g by mouth daily.     potassium chloride SA (K-DUR,KLOR-CON) 20  MEQ tablet Take 1 tablet (20 mEq total) by mouth daily.     senna-docusate (PERICOLACE) 8.6-50 mg tablet Take 1 tablet by mouth daily as needed for constipation.      Past Medical History:    Past Medical History:   Diagnosis Date     Acute kidney injury superimposed on CKD 3/3/2017     Asthma      CAD (coronary artery disease)      Cataract      Chronic kidney disease     ckd3     Diabetes mellitus      Disease of thyroid gland      H/O heart valve replacement with bioprosthetic valve      History of transfusion      Hypertension      Normochromic normocytic anemia      Restless leg syndrome            PHYSICAL EXAMINATION  Vitals:    02/15/18 2211   BP: 114/57   Pulse: 60   Resp: 20   Temp: 97.7  F (36.5  C)   SpO2: 96%       Today on physical exam:     GENERAL: Awake, Alert, obese, oriented x3, not in any form of acute distress, answers questions appropriately, follows simple commands, conversant  HEENT: Head is normocephalic with normal hair distribution. No evidence of trauma. Ears: No acute purulent discharge. Eyes: Conjunctivae pink with no scleral jaundice. Nose: Normal mucosa and septum. NECK: Supple with no cervical or supraclavicular lymphadenopathy. Trachea is midline.   CHEST: No tenderness or deformity, no crepitus, left chest incision for pacer c/d/i  LUNG: Clear to auscultation with good chest expansion. There are no crackles or wheezes, normal AP diameter.  BACK: No kyphosis of the thoracic spine. Symmetric, no curvature, ROM normal, no CVA tenderness, no spinal tenderness   CVS: There is good S1  S2, regular rhythm, there are no murmurs, rubs, gallops, or heaves,  2+ pulses symmetric in all extremities.  ABDOMEN: Rounded and soft, nontender to palpation, non distended, no masses, no organomegaly, good bowel sounds, no rebound or guarding, no peritoneal signs.   EXTREMITIES: 2-3+ RLE pedal edema, bright erythema extends from foot up almost to knee, skin is taught, dry flaking skin, marked outline of  the erythema for monitoring, erythema does not extend beyond barbie and is even mildly regressed from line today, ruptured blister on right foot, serous weeping drainage from foot and right shin  SKIN: Warm and dry  NEUROLOGICAL: The patient is oriented to person, place and time. Strength and sensation are grossly intact. Face is symmetric.            LABS:   Recent Results (from the past 168 hour(s))   Basic Metabolic Panel   Result Value Ref Range    Sodium 140 136 - 145 mmol/L    Potassium 3.4 (L) 3.5 - 5.0 mmol/L    Chloride 102 98 - 107 mmol/L    CO2 29 22 - 31 mmol/L    Anion Gap, Calculation 9 5 - 18 mmol/L    Glucose 82 70 - 125 mg/dL    Calcium 9.0 8.5 - 10.5 mg/dL    BUN 33 (H) 8 - 28 mg/dL    Creatinine 1.28 (H) 0.60 - 1.10 mg/dL    GFR MDRD Af Amer 48 (L) >60 mL/min/1.73m2    GFR MDRD Non Af Amer 40 (L) >60 mL/min/1.73m2   Platelet Count - every other day x 3   Result Value Ref Range    Platelets 149 140 - 440 thou/uL   POCT Glucose   Result Value Ref Range    Glucose, POC 80 mg/dL   POCT Glucose   Result Value Ref Range    Glucose, POC 90 mg/dL   POCT Glucose   Result Value Ref Range    Glucose, POC 84 mg/dL   POCT Glucose   Result Value Ref Range    Glucose,  mg/dL   Basic Metabolic Panel   Result Value Ref Range    Sodium 141 136 - 145 mmol/L    Potassium 3.4 (L) 3.5 - 5.0 mmol/L    Chloride 100 98 - 107 mmol/L    CO2 32 (H) 22 - 31 mmol/L    Anion Gap, Calculation 9 5 - 18 mmol/L    Glucose 105 70 - 125 mg/dL    Calcium 9.0 8.5 - 10.5 mg/dL    BUN 32 (H) 8 - 28 mg/dL    Creatinine 1.25 (H) 0.60 - 1.10 mg/dL    GFR MDRD Af Amer 50 (L) >60 mL/min/1.73m2    GFR MDRD Non Af Amer 41 (L) >60 mL/min/1.73m2   Magnesium   Result Value Ref Range    Magnesium 1.7 (L) 1.8 - 2.6 mg/dL   POCT Glucose   Result Value Ref Range    Glucose, POC 96 mg/dL   POCT Glucose   Result Value Ref Range    Glucose,  mg/dL   POCT Glucose   Result Value Ref Range    Glucose,  mg/dL   POCT Glucose   Result Value  Ref Range    Glucose,  mg/dL   Basic Metabolic Panel   Result Value Ref Range    Sodium 138 136 - 145 mmol/L    Potassium 4.0 3.5 - 5.0 mmol/L    Chloride 99 98 - 107 mmol/L    CO2 30 22 - 31 mmol/L    Anion Gap, Calculation 9 5 - 18 mmol/L    Glucose 111 70 - 125 mg/dL    Calcium 9.1 8.5 - 10.5 mg/dL    BUN 32 (H) 8 - 28 mg/dL    Creatinine 1.37 (H) 0.60 - 1.10 mg/dL    GFR MDRD Af Amer 45 (L) >60 mL/min/1.73m2    GFR MDRD Non Af Amer 37 (L) >60 mL/min/1.73m2   Magnesium   Result Value Ref Range    Magnesium 1.7 (L) 1.8 - 2.6 mg/dL   HM1 (CBC with Diff)   Result Value Ref Range    WBC 9.5 4.0 - 11.0 thou/uL    RBC 3.36 (L) 3.80 - 5.40 mill/uL    Hemoglobin 9.6 (L) 12.0 - 16.0 g/dL    Hematocrit 31.5 (L) 35.0 - 47.0 %    MCV 94 80 - 100 fL    MCH 28.6 27.0 - 34.0 pg    MCHC 30.5 (L) 32.0 - 36.0 g/dL    RDW 15.1 (H) 11.0 - 14.5 %    Platelets 162 140 - 440 thou/uL    MPV 10.3 8.5 - 12.5 fL    Neutrophils % 79 (H) 50 - 70 %    Lymphocytes % 11 (L) 20 - 40 %    Monocytes % 8 2 - 10 %    Eosinophils % 2 0 - 6 %    Basophils % 0 0 - 2 %    Neutrophils Absolute 7.4 2.0 - 7.7 thou/uL    Lymphocytes Absolute 1.0 0.8 - 4.4 thou/uL    Monocytes Absolute 0.8 0.0 - 0.9 thou/uL    Eosinophils Absolute 0.2 0.0 - 0.4 thou/uL    Basophils Absolute 0.0 0.0 - 0.2 thou/uL   POCT Glucose   Result Value Ref Range    Glucose,  mg/dL   POCT Glucose   Result Value Ref Range    Glucose,  mg/dL   POCT Glucose   Result Value Ref Range    Glucose,  mg/dL   POCT Glucose   Result Value Ref Range    Glucose,  mg/dL   Basic Metabolic Panel   Result Value Ref Range    Sodium 139 136 - 145 mmol/L    Potassium 3.7 3.5 - 5.0 mmol/L    Chloride 98 98 - 107 mmol/L    CO2 31 22 - 31 mmol/L    Anion Gap, Calculation 10 5 - 18 mmol/L    Glucose 101 70 - 125 mg/dL    Calcium 9.1 8.5 - 10.5 mg/dL    BUN 27 8 - 28 mg/dL    Creatinine 1.12 (H) 0.60 - 1.10 mg/dL    GFR MDRD Af Amer 56 (L) >60 mL/min/1.73m2    GFR MDRD Non Af  Amer 46 (L) >60 mL/min/1.73m2   HM1 (CBC with Diff)   Result Value Ref Range    WBC 9.4 4.0 - 11.0 thou/uL    RBC 3.32 (L) 3.80 - 5.40 mill/uL    Hemoglobin 9.3 (L) 12.0 - 16.0 g/dL    Hematocrit 30.9 (L) 35.0 - 47.0 %    MCV 93 80 - 100 fL    MCH 28.0 27.0 - 34.0 pg    MCHC 30.1 (L) 32.0 - 36.0 g/dL    RDW 15.2 (H) 11.0 - 14.5 %    Platelets 196 140 - 440 thou/uL    MPV 10.2 8.5 - 12.5 fL    Neutrophils % 78 (H) 50 - 70 %    Lymphocytes % 11 (L) 20 - 40 %    Monocytes % 8 2 - 10 %    Eosinophils % 3 0 - 6 %    Basophils % 0 0 - 2 %    Neutrophils Absolute 7.2 2.0 - 7.7 thou/uL    Lymphocytes Absolute 1.0 0.8 - 4.4 thou/uL    Monocytes Absolute 0.8 0.0 - 0.9 thou/uL    Eosinophils Absolute 0.3 0.0 - 0.4 thou/uL    Basophils Absolute 0.0 0.0 - 0.2 thou/uL   POCT Glucose   Result Value Ref Range    Glucose, POC 90 mg/dL   POCT Glucose   Result Value Ref Range    Glucose, POC 83 mg/dL   POCT Glucose   Result Value Ref Range    Glucose, POC 90 mg/dL   POCT Glucose   Result Value Ref Range    Glucose,  mg/dL   Basic Metabolic Panel   Result Value Ref Range    Sodium 141 136 - 145 mmol/L    Potassium 4.2 3.5 - 5.0 mmol/L    Chloride 98 98 - 107 mmol/L    CO2 34 (H) 22 - 31 mmol/L    Anion Gap, Calculation 9 5 - 18 mmol/L    Glucose 113 70 - 125 mg/dL    Calcium 9.1 8.5 - 10.5 mg/dL    BUN 24 8 - 28 mg/dL    Creatinine 1.12 (H) 0.60 - 1.10 mg/dL    GFR MDRD Af Amer 56 (L) >60 mL/min/1.73m2    GFR MDRD Non Af Amer 46 (L) >60 mL/min/1.73m2   Magnesium   Result Value Ref Range    Magnesium 1.8 1.8 - 2.6 mg/dL   Platelet Count - every other day x 3   Result Value Ref Range    Platelets 209 140 - 440 thou/uL   POCT Glucose   Result Value Ref Range    Glucose,  mg/dL   POCT Glucose   Result Value Ref Range    Glucose,  mg/dL     Results for orders placed or performed during the hospital encounter of 02/07/18   Basic Metabolic Panel   Result Value Ref Range    Sodium 141 136 - 145 mmol/L    Potassium 4.2 3.5  - 5.0 mmol/L    Chloride 98 98 - 107 mmol/L    CO2 34 (H) 22 - 31 mmol/L    Anion Gap, Calculation 9 5 - 18 mmol/L    Glucose 113 70 - 125 mg/dL    Calcium 9.1 8.5 - 10.5 mg/dL    BUN 24 8 - 28 mg/dL    Creatinine 1.12 (H) 0.60 - 1.10 mg/dL    GFR MDRD Af Amer 56 (L) >60 mL/min/1.73m2    GFR MDRD Non Af Amer 46 (L) >60 mL/min/1.73m2         Lab Results   Component Value Date    WBC 9.4 02/12/2018    HGB 9.3 (L) 02/12/2018    HCT 30.9 (L) 02/12/2018    MCV 93 02/12/2018     02/13/2018       No results found for: [id number]  Lab Results   Component Value Date    HGBA1C 6.9 (H) 01/31/2018     Lab Results   Component Value Date    INR 1.30 (H) 02/07/2018    INR 1.51 (H) 03/06/2017    INR 1.27 (H) 03/05/2017     Vitamin D, Total (25-Hydroxy)   Date Value Ref Range Status   06/01/2017 38.8 30.0 - 80.0 ng/mL Final     Lab Results   Component Value Date    TSH 3.50 06/01/2017           ASSESSMENT/PLAN:    1. Sepsis, s/t right foot cellulitis: On keflex x 7 days. Tylenol prn for pain. Will increase keflex to QID instead of bid and monitoring over next few days. Marked area of erythema and nursing to monitor qshift for extension and notify immediately if extending. On scheduled tylenol 1000mg bid and 500mg q6h prn pain. Add tramadol 1 tab po q6h prn pain. Erythema is not worse today, mildly regressed from market outline but still very red. Need to keep legs elevated as much as possible, wound care orders given for open areas.   2. Acute on chronic CHF: Daily weights-244lbs. On lasix 80mg bid. F/u with cardiology on 2/22.   3. S/p PPM: Battery change on 2/12. Incision c/d/i. No concerns. F/u with device clinic 2/22.   4. Mod persistent Asthma: On advair, combivent. No sob today.   5. MIGUEL on CKD: Cr 1.12 on 2/12. Recheck 2/19  6. Type 2 DM: Diet controlled. Monitor on labs.   7. Dyslipidemia: On aspirin, atorvastatin  8. HTN: SBP 110s. On lopressor, lasix.   9. GERD: On omeprazole.   10. Hypothyroid: On levothyroxine.    11. Insomnia: On melatonin qhs prn, changed to qhs. Ordered recliner to help with sleep.   12. Hypokalemia: On kcl. BMP on 2/19  13. Constipation: On pericolace daily prn. Will add miralax daily.   14. Vitamin d deficiency: On vitamin d.   15. Anemia of CKD: Hg 9.3 on 2/12. HM2 on 2/19  16. PAF: Regular rate and rhythm. On metoprolol rate controlled 60s.     BMP, HM2, vit d 2/19  Electronically signed by: Jennifer Valdes NP    Total 25 minutes of which 75% was spent in counseling and coordination of care of the above plan    Time spent in interview and examination of patient, review of available records, and discussion with nursing staff. Continue care plan, efforts at therapy, and monitor nutritional status.

## 2021-06-16 NOTE — PROGRESS NOTES
[facility]    DATE: 3/19/2018    NAME:  Thea Acosta             :  1934  MRN: [id number]  CODE STATUS:  FULL CODE    VISIT TYPE: Review Of Multiple Medical Conditions     FACILITY:  [facility] [[id number]]       CHIEF COMPLAIN/REASON FOR VISIT:    Chief Complaint   Patient presents with     Review Of Multiple Medical Conditions               HISTORY OF PRESENT ILLNESS: Thea Acosta is a 83 y.o. female who was admitted  to  for weakness. She was found to be dyspneic, febrile, and tachypneic. She was diagnosed with sepsis s/t right foot cellulitis. Blood cultures grew Strep group B. She was treated with vanco and later ancef. She was also treated for acute CHF. During her stay she also underwent pacemaker battery change on . She has PMH of CAD, CKD, DM, s/p PPM. Prior to this she lived at home with her grand daughter. She was readmitted 3/7-3/12 for Acute encephalopathy, MIGUEL, CHF exacerbation, hypotension. She was treated for UTI and palliative care did see her during her stay. She wished to remain full code.     Today Ms. Acosta states she does not like to wear her leg wraps because they feel tight. She thinks her legs look better than before so she doesn't need them. She says her weights were down from before so she is ok. She says she is not having much pain and overall feeling pretty good. She is sleeping fine and doing her therapy. Her appetite is good and no stomach problems. She is not feeling short of breath or coughing and she has been off the oxygen for a little while now. Per nursing weight is trending up since readmission (but down from previous admission) [phone]. Educated patient on importance of wraps for legs and then she was willing to let nursing apply them. Her blood pressures are somewhat improved.       REVIEW OF SYSTEMS:  PROBLEMS AND REVIEW OF SYSTEMS:   Review of Systems  Today on ROS:   Currently, no  fever, chills, or rigors. Does not have any visual or hearing problems. Denies any chest pain, headaches, palpitations, lightheadedness, dizziness. Appetite is fair.  Denies any abdominal pain. Denies any urinary symptoms. No active bleeding. Positive for pain intermittently, edema ble, shortness of breath at baseline      Allergies   Allergen Reactions     Codeine Hives     Codeine Phosphate (Bulk)      This allergy is per [facility] records.     Codeine Sulfate      This allergy is per [facility] records.     Codeine-Butalbital-Asa-Caff      This allergy is per [facility] records.     Codeine-Guaifenesin      This allergy is per [facility] records.     Lisinopril Cough     Penicillins Swelling     Current Outpatient Prescriptions   Medication Sig     acetaminophen (TYLENOL) 500 MG tablet Take 2 tablets (1,000 mg total) by mouth 3 (three) times a day as needed for pain. Not to exceed 4000 mg in 24 hours.     aluminum-magnesium hydroxide-simethicone (MAALOX ADVANCED) 200-200-20 mg/5 mL Susp Take 30 mL by mouth every 6 (six) hours as needed.     aspirin 81 MG EC tablet Take 81 mg by mouth daily.     atorvastatin (LIPITOR) 80 MG tablet Take 1 tablet (80 mg total) by mouth at bedtime.     bumetanide (BUMEX) 1 MG tablet Take 1 tablet (1 mg total) by mouth 2 (two) times a day at 9am and 6pm.     cholecalciferol, vitamin D3, (VITAMIN D3) 2,000 unit Tab Take 2,000 Units by mouth once daily.     fluticasone-salmeterol (ADVAIR) 500-50 mcg/dose DISKUS Inhale 1 puff 2 (two) times a day.     folic acid (FOLVITE) 1 MG tablet Take 1 mg by mouth daily.     ipratropium-albuterol (COMBIVENT RESPIMAT)  mcg/actuation Mist inhaler Inhale 1 puff 4 (four) times a day.     levothyroxine (SYNTHROID, LEVOTHROID) 125 MCG tablet Take 125 mcg by mouth Daily at 6:00 am.      melatonin 3 mg Tab tablet Take 3  mg by mouth at bedtime.      menthol-zinc oxide (CALMOSEPTINE) 0.44-20.6 % Oint ointment Apply 1 application topically 4 (four) times a day as needed (to denuded areas of skin).     metoprolol tartrate (LOPRESSOR) 25 MG tablet Take 4 tablets (100 mg total) by mouth 2 (two) times a day.     nystatin (MYCOSTATIN) cream Apply 1 application topically 2 (two) times a day. Apply to thighs abd folds until resolved.     omeprazole (PRILOSEC) 40 MG capsule Take 20 mg by mouth daily before breakfast.      ondansetron (ZOFRAN) 4 MG tablet Take 4 mg by mouth every 8 (eight) hours as needed for nausea.     potassium chloride 40 mEq/15 mL Liqd Take 7.5 mL (20 mEq total) by mouth daily. daily     potassium chloride SA (K-DUR,KLOR-CON) 20 MEQ tablet Take 20 mEq by mouth daily.     traMADol (ULTRAM) 50 mg tablet Take 1 tablet (50 mg total) by mouth every 6 (six) hours as needed for pain.     white petrolatum-mineral oil (EUCERIN) Crea Apply 1 application topically every 4 (four) hours as needed. Apply to bilateral legs.     white petrolatum-mineral oil (EUCERIN) Crea Apply 1 application topically 2 (two) times a day. Apply to bilateral legs.     Past Medical History:    Past Medical History:   Diagnosis Date     Acute kidney injury superimposed on CKD 3/3/2017     Asthma      CAD (coronary artery disease)      Cataract      Chronic kidney disease     ckd3     Diabetes mellitus      Disease of thyroid gland      H/O heart valve replacement with bioprosthetic valve      History of transfusion      Hypertension      Normochromic normocytic anemia      Restless leg syndrome            PHYSICAL EXAMINATION  Vitals:    03/18/18 2130   BP: 109/54   Pulse: 89   Resp: 16   Temp: 96.9  F (36.1  C)   SpO2: 93%   Weight: (!) 224 lb (101.6 kg)       Today on physical exam:     GENERAL: Awake, Alert, obese, oriented x3, not in any form of acute distress, answers questions appropriately, follows simple commands, conversant  HEENT: Head is  normocephalic with normal hair distribution. No evidence of trauma. Ears: No acute purulent discharge. Eyes: Conjunctivae pink with no scleral jaundice. Nose: Normal mucosa and septum. NECK: Supple with no cervical or supraclavicular lymphadenopathy. Trachea is midline.   CHEST: No tenderness or deformity, no crepitus, left chest incision for pacer c/d/i  LUNG: Dim to auscultation with good chest expansion. There are no crackles or wheezes, normal AP diameter.  BACK: No kyphosis of the thoracic spine. Symmetric, no curvature, ROM normal, no CVA tenderness, no spinal tenderness   CVS: There is good S1  S2, regular rhythm, there are no murmurs, rubs, gallops, or heaves,  2+ pulses symmetric in all extremities.  ABDOMEN: Rounded and soft, nontender to palpation, non distended, no masses, no organomegaly, good bowel sounds, no rebound or guarding, no peritoneal signs.   EXTREMITIES: 2+ ble pitting edema, dry flaking skin, erythema resolved from previous, no weeping areas  SKIN: Warm and dry  NEUROLOGICAL: The patient is oriented to person, place and time. Strength and sensation are grossly intact. Face is symmetric.            LABS:   Recent Results (from the past 168 hour(s))   Basic Metabolic Panel   Result Value Ref Range    Sodium 135 (L) 136 - 145 mmol/L    Potassium 4.0 3.5 - 5.0 mmol/L    Chloride 90 (L) 98 - 107 mmol/L    CO2 37 (H) 22 - 31 mmol/L    Anion Gap, Calculation 8 5 - 18 mmol/L    Glucose 82 70 - 125 mg/dL    Calcium 10.2 8.5 - 10.5 mg/dL    BUN 55 (H) 8 - 28 mg/dL    Creatinine 1.49 (H) 0.60 - 1.10 mg/dL    GFR MDRD Af Amer 41 (L) >60 mL/min/1.73m2    GFR MDRD Non Af Amer 33 (L) >60 mL/min/1.73m2   Platelet Count - every other day x 3   Result Value Ref Range    Platelets 240 140 - 440 thou/uL   POCT Glucose   Result Value Ref Range    Glucose, POC 95 mg/dL   POCT Glucose   Result Value Ref Range    Glucose,  mg/dL   CLINIC Device Check   Result Value Ref Range    Comments/Summary       Type:  1 week PO pacemaker gen change  Presenting Rhythm: , 60bpm  Lead/Battery status: Stable lead and battery measurements.  Arrhythmias: No VHR detected.   Anticoagulant: None  Comments: Normal device function. Accelerometer threshold changed to 10 from 8 and activity threshold changed to low from medium due to blunted histograms. YY       Results for orders placed or performed during the hospital encounter of 03/07/18   Basic Metabolic Panel   Result Value Ref Range    Sodium 135 (L) 136 - 145 mmol/L    Potassium 4.0 3.5 - 5.0 mmol/L    Chloride 90 (L) 98 - 107 mmol/L    CO2 37 (H) 22 - 31 mmol/L    Anion Gap, Calculation 8 5 - 18 mmol/L    Glucose 82 70 - 125 mg/dL    Calcium 10.2 8.5 - 10.5 mg/dL    BUN 55 (H) 8 - 28 mg/dL    Creatinine 1.49 (H) 0.60 - 1.10 mg/dL    GFR MDRD Af Amer 41 (L) >60 mL/min/1.73m2    GFR MDRD Non Af Amer 33 (L) >60 mL/min/1.73m2         Lab Results   Component Value Date    WBC 8.5 03/08/2018    HGB 8.1 (L) 03/11/2018    HCT 24.2 (L) 03/08/2018    MCV 91 03/08/2018     03/12/2018       No results found for: [id number]  Lab Results   Component Value Date    HGBA1C 6.0 03/08/2018     Lab Results   Component Value Date    INR 1.30 (H) 02/07/2018    INR 1.51 (H) 03/06/2017    INR 1.27 (H) 03/05/2017     Vitamin D, Total (25-Hydroxy)   Date Value Ref Range Status   02/19/2018 56.7 30.0 - 80.0 ng/mL Final     Lab Results   Component Value Date    TSH 3.50 06/01/2017           ASSESSMENT/PLAN:    1. Sepsis, s/t right foot cellulitis: Completed antibiotics and appears resolved.    2. Acute on chronic CHF: Daily [phone] [phone]. On bumex 1mg bid. F/u with cardiology on 3/21. Keep legs elevated, ACE wrap legs. Increased bumex to 2mg in am and continue 1mg at noon last week. Weights have been steady since, but edema does look worse today. Pt has been refusing leg wraps, educated on importance today.   3. S/p PPM: Battery change on 2/12. Incision c/d/i. No  concerns. F/u with device clinic 5/15.   4. Mod persistent Asthma: On advair, combivent. SOB at baseline, off O2 now.  5. MIGUEL on CKD: Cr 1.49 on 3/12. BMP 3/19-Cr 1.86. BMP on 4/2.   6. Type 2 DM: Diet controlled. Monitor on labs.   7. Dyslipidemia: On aspirin, atorvastatin  8. HTN: SBP 100s. On lopressor, bumex.   9. GERD: On omeprazole. mylanta prn indigestion.   10. Hypothyroid: On levothyroxine.   11. Insomnia: On melatonin qhs.   12. Hypokalemia: On kcl.  K 4.0 on 3/12.   13. Constipation: On pericolace daily prn, miralax daily.    14. Vitamin d deficiency: On vitamin d. Level 2/19-56.7, stable.    15. Anemia of CKD: Hg 8.1 on 3/11. HM1 on 3/19-8.6. Recheck hm1 on 4/2.    16. PAF: Regular rate and rhythm. On metoprolol rate controlled 60s.   17. Onychomycosis: Pulled off socks to look at right foot edema and previous cellulitis, toenail on big toe is falling off. Other nails need trimmed, appear fungal. Consulted podiatry to see patient, paint big toenail with betadine daily and notify if falls off.    18. Chronic pain: Pain in legs mostly, tylenol tid prn and tramadol prn.         Electronically signed by: Jennifer Valdes NP    Total 25 minutes of which 75% was spent in counseling and coordination of care of the above plan    Time spent in interview and examination of patient, review of available records, and discussion with nursing staff. Continue care plan, efforts at therapy, and monitor nutritional status.

## 2021-06-16 NOTE — PROGRESS NOTES
[facility]    Facility:   [facility] [[id number]]   Code Status: FULL CODE      CHIEF COMPLAINT/REASON FOR VISIT:  Chief Complaint   Patient presents with     Review Of Multiple Medical Conditions       HISTORY:      HPI: Thea is a 83 y.o. female currently undergoing physical and occupational therapy at [facility].  She has a past medical history of chronic kidney disease stage III, left AS,status  post AVR, complete heart block, status post PPM with the recent generator change done on 2/12/2018.  She tells me after 11 years her battery no longer worked, moderate asthma, diabetes., chronic diastolic congestive heart failure, chronic anemia, chronic lower extremity venous stasis, recent admission with cellulitis treated with clindamycin. admitted with acute encephalopathy and acute kidney injury.  The acute kidney injury was thought to be hemodynamic due to poor renal autoregulation, CHF, hypotension,n diuretics and poor oral intake. Her renal function did improve after IV fluids were given and her diuretics were held during her hospitalization. She was also treated for a positive urine culture for Klebsiella and completed ciprofloxacin course. Her encephalopathy improved prior to starting her UTI treatment.  Also during her hospitalization palliative care was involved and at this time the patient wishes to remain a full code    Today Thea is seen in her room as a first routine visit.  I am told that she had a fall today. She turned  to the side to wipe herself and the toilet seat came off. She sustained a skin tear on her left elbow and had Steri-Strips in place and she also report hitting her head. She was found to have a bruise above her right eyelid and eyebrow. She denies any vision changes she denies any headaches.  When I asked her if she was having pain in any of her extremities due to her fall she reported the only thing that  "hurts is her pride.  She was able to move all extremities today. she tells me she did not do much in therapy due to her fall but she did do some standing.  She denies any chest pain, shortness of breath her lung sounds are clear and she will see podiatry today for her toenails.  She was alert and oriented ×4.  She also has +2 pitting edema lower extremities with some blistering on the right shin. She sleeps in a recliner. Her weights were reviewed and she is up 7 pounds over the last week. SHe was given extra bumex over the weekend. Her weights are also being done at all different times throughout the day and have written orders to weigh in the morning before breakfast each day for better accuracy.     Past Medical History:   Diagnosis Date     Acute kidney injury superimposed on CKD 3/3/2017     Asthma      CAD (coronary artery disease)      Cataract      Chronic kidney disease     ckd3     Diabetes mellitus      Disease of thyroid gland      H/O heart valve replacement with bioprosthetic valve      History of transfusion      Hypertension      Normochromic normocytic anemia      Restless leg syndrome              Family History   Problem Relation Age of Onset     No Medical Problems Mother      age 86     No Medical Problems Father      MVA     Cancer Brother      lung     No Medical Problems Brother      Social History     Social History     Marital status:      Spouse name: N/A     Number of children: N/A     Years of education: N/A     Occupational History      in operating room      retired     Social History Main Topics     Smoking status: Former Smoker     Smokeless tobacco: Never Used     Alcohol use No     Drug use: No     Sexual activity: No     Other Topics Concern     Not on file     Social History Narrative    She live in a single floor house.  Her son lives next door and her granddaughter is \"around a lot\"   3/2017         Review of Systems   Constitutional: Positive for fatigue. " Negative for appetite change, chills and fever.   HENT: Negative for congestion and sore throat.    Eyes: Negative for visual disturbance.        Bruise right eyelid   Respiratory: Positive for shortness of breath. Negative for cough and wheezing.         With activity   Cardiovascular: Negative for chest pain and leg swelling.   Gastrointestinal: Negative for abdominal distention, abdominal pain, constipation, diarrhea and nausea.   Genitourinary: Negative for dysuria.   Musculoskeletal: Negative for arthralgias and myalgias.   Skin: Negative for color change, rash and wound.        Venous changes lower extremities  Blistering right shin- small amount of drainage.    Neurological: Negative for dizziness and weakness.   Psychiatric/Behavioral: Negative for agitation, behavioral problems, confusion and sleep disturbance.       .  Vitals:    03/27/18 1837   BP: 136/60   Pulse: 60   Resp: 20   Temp: (!) 96.3  F (35.7  C)   SpO2: 95%   Weight: (!) 234 lb 14.4 oz (106.5 kg)       Physical Exam   Constitutional: She is oriented to person, place, and time. She appears well-developed and well-nourished.   HENT:   Head: Normocephalic.   Eyes: Conjunctivae are normal.   Neck: Normal range of motion.   Cardiovascular: Normal rate, regular rhythm and normal heart sounds.    No murmur heard.  New pacemaker exchanged   Pulmonary/Chest: Breath sounds normal. No respiratory distress. She has no wheezes. She has no rales.   Abdominal: Soft. Bowel sounds are normal. She exhibits no distension. There is no tenderness.   Musculoskeletal: Normal range of motion. She exhibits edema.   2+ lower extremities   Neurological: She is alert and oriented to person, place, and time.   Skin:   Small amount of weeping right lower extremity   Psychiatric: She has a normal mood and affect. Her behavior is normal.         LABS:   Recent Results (from the past 240 hour(s))   Basic Metabolic Panel   Result Value Ref Range    Sodium 139 136 - 145 mmol/L     Potassium 4.6 3.5 - 5.0 mmol/L    Chloride 100 98 - 107 mmol/L    CO2 30 22 - 31 mmol/L    Anion Gap, Calculation 9 5 - 18 mmol/L    Glucose 117 70 - 125 mg/dL    Calcium 9.5 8.5 - 10.5 mg/dL    BUN 36 (H) 8 - 28 mg/dL    Creatinine 1.47 (H) 0.60 - 1.10 mg/dL    GFR MDRD Af Amer 41 (L) >60 mL/min/1.73m2    GFR MDRD Non Af Amer 34 (L) >60 mL/min/1.73m2     Current Outpatient Prescriptions   Medication Sig     acetaminophen (TYLENOL) 500 MG tablet Take 2 tablets (1,000 mg total) by mouth 3 (three) times a day as needed for pain. Not to exceed 4000 mg in 24 hours.     aluminum-magnesium hydroxide-simethicone (MAALOX ADVANCED) 200-200-20 mg/5 mL Susp Take 30 mL by mouth every 6 (six) hours as needed.     aspirin 81 MG EC tablet Take 81 mg by mouth daily.     atorvastatin (LIPITOR) 80 MG tablet Take 1 tablet (80 mg total) by mouth at bedtime.     bumetanide (BUMEX) 2 MG tablet Take 2 mg by mouth 2 (two) times a day at 9am and 6pm.     cholecalciferol, vitamin D3, (VITAMIN D3) 2,000 unit Tab Take 2,000 Units by mouth once daily.     fluticasone-salmeterol (ADVAIR) 500-50 mcg/dose DISKUS Inhale 1 puff 2 (two) times a day.     folic acid (FOLVITE) 1 MG tablet Take 1 mg by mouth daily.     ipratropium-albuterol (COMBIVENT RESPIMAT)  mcg/actuation Mist inhaler Inhale 1 puff 4 (four) times a day.     levothyroxine (SYNTHROID, LEVOTHROID) 125 MCG tablet Take 125 mcg by mouth Daily at 6:00 am.      melatonin 3 mg Tab tablet Take 3 mg by mouth at bedtime.      menthol-zinc oxide (CALMOSEPTINE) 0.44-20.6 % Oint ointment Apply 1 application topically 4 (four) times a day as needed (to denuded areas of skin).     metoprolol tartrate (LOPRESSOR) 25 MG tablet Take 4 tablets (100 mg total) by mouth 2 (two) times a day.     nystatin (MYCOSTATIN) cream Apply 1 application topically 2 (two) times a day. Apply to thighs abd folds until resolved.     omeprazole (PRILOSEC) 40 MG capsule Take 20 mg by mouth daily before breakfast.       ondansetron (ZOFRAN) 4 MG tablet Take 4 mg by mouth every 8 (eight) hours as needed for nausea.     oseltamivir (TAMIFLU) 30 MG capsule Take 30 mg by mouth daily.     potassium chloride 40 mEq/15 mL Liqd Take 7.5 mL (20 mEq total) by mouth daily. daily     potassium chloride SA (K-DUR,KLOR-CON) 20 MEQ tablet Take 20 mEq by mouth daily.     white petrolatum-mineral oil (EUCERIN) Crea Apply 1 application topically every 4 (four) hours as needed. Apply to bilateral legs.     white petrolatum-mineral oil (EUCERIN) Crea Apply 1 application topically 2 (two) times a day. Apply to bilateral legs.     ASSESSMENT:      ICD-10-CM    1. Permanent atrial fibrillation I48.2    2. H/O heart valve replacement with bioprosthetic valve Z95.3    3. Gastroesophageal reflux disease without esophagitis K21.9    4. Injury, superficial, face, neck or scalp with infection S00.80XA     L08.9     S00.00XA     S10.90XA        PLAN:    1.  Acute on chronic congestive heart failure- daily weights continue Bumex Ace wraps on during the day off at night  2 fall -continue neuro as per facility protocol  3 diabetes mellitus -diet controlled  5 hypothyroidism- continue levothyroxine I  6 A. fib -regular rate and rhythm continue metoprolol  7 right foot cellulitis she completed a course of antibiotics    Electronically signed by: Shana Regalado CNP

## 2021-06-16 NOTE — PROGRESS NOTES
Assessment/Plan:     1. Heart failure with preserved ejection fraction, NYHA class II: Thea Acosta appears well compensated.  She continues to have fatigue and lower right extremity edema.  Her weights have remained stable at her TCU.  We discussed heart failure, following a low salt diet, monitoring weights, and heart failure treatment.  She does not have a scale at home and I recommended for her to purchase one.  No changes to medications.  She will continue Lasix 80 mg twice a day.     Follow-up with Dr. Irvin in 6 weeks and in the heart failure clinic in 12 weeks or sooner if needed    Subjective:     Thea Acosta is seen at [facility] heart failure clinic today for post-hospitalization follow-up.  She was hospitalized at [facility] from February 7 - February 13, 2018 with weakness.  She was also dyspneic and febrile with increased respiratory rate.  She was found to be septic with right foot cellulitis as a source.  Blood cultures showed Streptococcus group G.  She also has an acute heart failure.  She was given IV diuretics during hospitalization which improved symptoms.  She also had a generator change of her pacemaker on February 12, 2018.  She was discharged to TCU.  The most recent evaluation of Her ejection fraction was 55% from an  echo on 2/7/2018.  Her echocardiogram also showed moderate mitral stenosis.  Past medical history is also significant for hypertension, atrial fibrillation, dyslipidemia, diabetes and chronic kidney disease.  She had a pacemaker that was implanted in June 2007 with a recent generator change on February 12, 2018.    Since being discharged from the hospital, Thae feels that she is improving.  She has fatigue and mild right lower extremity edema.  She denies any other acute heart failure symptoms.  She denies any orthopnea or PND.  Denies lightheadedness, shortness of breath, dyspnea on exertion, orthopnea, PND, palpitations, chest pain and abdominal  fullness/bloating.      Her weights are being monitored at her transitional care unit and have been around 244 pounds.  She is following a low sodium diet.      Medication reconciliation was done today.    Review of Systems:   General: WNL  Eyes: WNL  Ears/Nose/Throat: WNL  Lungs: WNL  Heart: WNL  Stomach: WNL  Bladder: WNL  Muscle/Joints: WNL  Skin: WNL  Nervous System: WNL  Mental Health: WNL     Blood: WNL    Patient Active Problem List   Diagnosis     Permanent atrial fibrillation     Constipation     Sciatica     Hypothyroidism     Type 2 diabetes mellitus with stage 3 chronic kidney disease, without long-term current use of insulin     Hyperparathyroidism     Vitamin D deficiency     Mixed hyperlipidemia     Atherosclerosis of native coronary artery of native heart without angina pectoris     CKD (chronic kidney disease), stage III     Osteoarthritis Of The Knee     Restless Legs Syndrome     Morbid obesity due to excess calories     H/O heart valve replacement with bioprosthetic valve     Cardiac pacemaker in situ     Complete heart block     Normochromic normocytic anemia     Essential hypertension with goal blood pressure less than 140/90     Moderate persistent asthma without complication     Pacemaker battery depletion     Cellulitis of right lower extremity     PAF (paroxysmal atrial fibrillation)     GERD (gastroesophageal reflux disease)     Primary insomnia     Hypokalemia     Open wound     Heart failure with preserved ejection fraction       Past Medical History:   Diagnosis Date     Acute kidney injury superimposed on CKD 3/3/2017     Asthma      CAD (coronary artery disease)      Cataract      Chronic kidney disease     ckd3     Diabetes mellitus      Disease of thyroid gland      H/O heart valve replacement with bioprosthetic valve      History of transfusion      Hypertension      Normochromic normocytic anemia      Restless leg syndrome        Past Surgical History:   Procedure Laterality Date  "    APPENDECTOMY       CHOLECYSTECTOMY       EYE SURGERY Bilateral     Cataracts     HIP PINNING Right 3/1/2017    Procedure: RIGHT HIP TROCHANTERIC RODDING;  Surgeon: Moshe Davies MD;  Location: [facility] Main OR;  Service:      INSERT / REPLACE / REMOVE PACEMAKER  06/25/2007    guidant     INSERT / REPLACE / REMOVE PACEMAKER  02/07/2018    phoebe sci gen change     JOINT REPLACEMENT Left     knee     TISSUE AORTIC VALVE REPLACEMENT  06/25/2007            Family History   Problem Relation Age of Onset     No Medical Problems Mother      age 86     No Medical Problems Father      MVA     Cancer Brother      lung     No Medical Problems Brother        Social History     Social History     Marital status:      Spouse name: N/A     Number of children: N/A     Years of education: N/A     Occupational History      in operating room      retired     Social History Main Topics     Smoking status: Former Smoker     Smokeless tobacco: Never Used     Alcohol use No     Drug use: No     Sexual activity: No     Other Topics Concern     Not on file     Social History Narrative    She live in a single floor house.  Her son lives next door and her granddaughter is \"around a lot\"   3/2017       Current Outpatient Prescriptions   Medication Sig Dispense Refill     acetaminophen (TYLENOL) 500 MG tablet Take 1,000 mg by mouth 2 (two) times a day. And 500mg q6h prn pain       aspirin 81 MG EC tablet Take 81 mg by mouth daily.       atorvastatin (LIPITOR) 80 MG tablet Take 1 tablet (80 mg total) by mouth at bedtime. 90 tablet 3     cholecalciferol, vitamin D3, (VITAMIN D3) 2,000 unit Tab Take 2,000 Units by mouth once daily.       clindamycin (CLEOCIN) 300 MG capsule Take 300 mg by mouth 3 (three) times a day. 02/9/2018-03/01/2018 for Cellulitis of right lower limb       fluticasone-salmeterol (ADVAIR) 500-50 mcg/dose DISKUS Inhale 1 puff 2 (two) times a day.       folic acid (FOLVITE) 1 MG tablet Take 1 mg by mouth " daily.       furosemide (LASIX) 40 MG tablet Take 2 tablets (80 mg total) by mouth 2 (two) times a day at 9am and 6pm. 360 tablet 3     ipratropium-albuterol (COMBIVENT RESPIMAT)  mcg/actuation Mist inhaler Inhale 1 puff 4 (four) times a day. 4 g 11     Lactobacillus rhamnosus GG (CULTURELLE) 10-15 Billion cell capsule Take 1 capsule by mouth daily.       levothyroxine (SYNTHROID, LEVOTHROID) 125 MCG tablet Take 125 mcg by mouth daily.       melatonin 3 mg Tab tablet Take 3 mg by mouth at bedtime.        metOLazone (ZAROXOLYN) 2.5 MG tablet Take 2.5 mg by mouth daily. Once daily on Tue, Thu. Sat       metoprolol tartrate (LOPRESSOR) 100 MG tablet Take 150 mg by mouth 2 (two) times a day.       omeprazole (PRILOSEC) 20 MG capsule Take 20 mg by mouth daily before breakfast.       polyethylene glycol (MIRALAX) 17 gram packet Take 17 g by mouth daily.       potassium chloride SA (K-DUR,KLOR-CON) 20 MEQ tablet Take 1 tablet (20 mEq total) by mouth daily. 90 tablet 3     senna-docusate (PERICOLACE) 8.6-50 mg tablet Take 1 tablet by mouth daily as needed for constipation.        traMADol (ULTRAM) 50 mg tablet Take 50 mg by mouth every 6 (six) hours as needed for pain.       nystatin (MYCOSTATIN) powder Apply 1 application topically 2 (two) times a day as needed.       No current facility-administered medications for this visit.        Allergies   Allergen Reactions     Codeine Hives     Lisinopril Cough     Penicillins Swelling       Objective:     Vitals:    02/22/18 1450   BP: 110/58   Pulse: 64   Resp: 16     Wt Readings from Last 3 Encounters:   02/22/18 (!) 244 lb (110.7 kg)   02/18/18 (!) 253 lb (114.8 kg)   02/13/18 (!) 270 lb 1 oz (122.5 kg)       General Appearance:   Alert, cooperative and in no acute distress.   HEENT:  No scleral icterus; the mucous membranes were pink and moist.   Neck: neck vein assessment limited as patient was examined in the chair due to safety concerns of getting on the exam table    Chest: The spine was straight. The chest was symmetric.   Lungs:   Respirations unlabored; the lungs are clear to auscultation.   Cardiovascular:   Regular rhythm. S1 and S2 without murmur, clicks or rubs. Radial and posterior tibial pulses are intact and symmetrical.    Abdomen:  Soft, nontender, nondistended, bowel sounds present   Extremities: No cyanosis. Mild right lower extremity edema.   Skin: No xanthelasma.  Incision clean, dry and intact.  Steri-Strips removed   Neurologic: Mood and affect are appropriate.         Lab Review   Lab Results   Component Value Date    CREATININE 1.74 (H) 02/19/2018    BUN 33 (H) 02/19/2018     02/19/2018    K 4.5 02/19/2018    CL 90 (L) 02/19/2018    CO2 36 (H) 02/19/2018     Lab Results   Component Value Date     (H) 06/08/2017     BNP (pg/mL)   Date Value   06/08/2017 324 (H)   06/01/2017 295 (H)   04/18/2017 394 (H)     Creatinine (mg/dL)   Date Value   02/19/2018 1.74 (H)   02/13/2018 1.12 (H)   02/12/2018 1.12 (H)   02/11/2018 1.37 (H)       Cardiographics  Echocardiogram: 2/7/2018  Summary     Normal left ventricular size and systolic function.    When compared to the previous study dated 3/1/2017, no significant change.    Left ventricle ejection fraction is normal. The estimated left ventricular ejection fraction is 55%.    Normal right ventricular size and systolic function.    Left Atrium: Left atrial volume is moderately increased.    Aortic Valve: Bioprosthetic valve is not well visualized. No evidence of paravalvular leak and mean gradient is 15 mmHg which is borderline elevated, however not significantly changed compared to prior.    Mitral Valve: There is severe mitral annular calcification. Moderate mitral Calcific stenosis. No mitral regurgitation.           25 minutes were face to face spent with the patient with greater than 50% spent on education and counseling.      Lupe Almanza, [facility]   Heart Failure Clinic

## 2021-06-16 NOTE — PROGRESS NOTES
[facility]    DATE: 2018    NAME:  Thea Acosta             :  1934  MRN: [id number]  CODE STATUS:  FULL CODE    VISIT TYPE: Review Of Multiple Medical Conditions     FACILITY:  [facility] [[id number]]       CHIEF COMPLAIN/REASON FOR VISIT:    Chief Complaint   Patient presents with     Review Of Multiple Medical Conditions               HISTORY OF PRESENT ILLNESS: Thea Acosta is a 83 y.o. female who was admitted  to  for weakness. She was found to be dyspneic, febrile, and tachypneic. She was diagnosed with sepsis s/t right foot cellulitis. Blood cultures grew Strep group B. She was treated with vanco and later ancef. She was also treated for acute CHF. During her stay she also underwent pacemaker battery change on . She has PMH of CAD, CKD, DM, s/p PPM. Prior to this she lived at home with her grand daughter.     Today Mr. Acosta states her leg is finally doing a little better. She says the swelling is a little improved and the pain is not so severe. She thinks the redness is improved as well. She says she is not having chest pain, shortness of breath. She has been off the oxygen and is working with therapy, although she is not able to do much. She admits she is very weak. She is sleeping better now that she has the recliner and she tries to sit in the recliner during the day to elevate her legs. Otherwise she has no new concerns today. Per nursing still has weeping areas on legs but overall leg is improving.     REVIEW OF SYSTEMS:  PROBLEMS AND REVIEW OF SYSTEMS:   Review of Systems  Today on ROS:   Currently, no fever, chills, or rigors. Does not have any visual or hearing problems. Denies any chest pain, headaches, palpitations, lightheadedness, dizziness, shortness of breath, or cough. Appetite is good. Denies any GERD symptoms. Denies any difficulty with swallowing, nausea, or vomiting.  Denies any abdominal pain. Denies  any urinary symptoms. No active bleeding. Positive for redness, pain, edema right leg/foot, weeping wounds on leg      Allergies   Allergen Reactions     Codeine Hives     Lisinopril Cough     Penicillins Swelling     Current Outpatient Prescriptions   Medication Sig     acetaminophen (TYLENOL) 500 MG tablet Take 1,000 mg by mouth 2 (two) times a day. And 500mg q6h prn pain     aspirin 81 MG EC tablet Take 81 mg by mouth daily.     atorvastatin (LIPITOR) 80 MG tablet Take 1 tablet (80 mg total) by mouth at bedtime.     cholecalciferol, vitamin D3, (VITAMIN D3) 2,000 unit Tab Take 2,000 Units by mouth once daily.     fluticasone-salmeterol (ADVAIR) 500-50 mcg/dose DISKUS Inhale 1 puff 2 (two) times a day.     folic acid (FOLVITE) 1 MG tablet Take 1 mg by mouth daily.     furosemide (LASIX) 40 MG tablet Take 2 tablets (80 mg total) by mouth 2 (two) times a day at 9am and 6pm.     ipratropium-albuterol (COMBIVENT RESPIMAT)  mcg/actuation Mist inhaler Inhale 1 puff 4 (four) times a day.     levothyroxine (SYNTHROID, LEVOTHROID) 125 MCG tablet Take 125 mcg by mouth daily.     melatonin 3 mg Tab tablet Take 3 mg by mouth at bedtime.      metoprolol tartrate (LOPRESSOR) 100 MG tablet Take 150 mg by mouth 2 (two) times a day.     nystatin (MYCOSTATIN) powder Apply 1 application topically 2 (two) times a day as needed.     omeprazole (PRILOSEC) 20 MG capsule Take 20 mg by mouth daily before breakfast.     polyethylene glycol (MIRALAX) 17 gram packet Take 17 g by mouth daily.     potassium chloride SA (K-DUR,KLOR-CON) 20 MEQ tablet Take 1 tablet (20 mEq total) by mouth daily.     senna-docusate (PERICOLACE) 8.6-50 mg tablet Take 1 tablet by mouth daily as needed for constipation.      Past Medical History:    Past Medical History:   Diagnosis Date     Acute kidney injury superimposed on CKD 3/3/2017     Asthma      CAD (coronary artery disease)      Cataract      Chronic kidney disease     ckd3     Diabetes mellitus       Disease of thyroid gland      H/O heart valve replacement with bioprosthetic valve      History of transfusion      Hypertension      Normochromic normocytic anemia      Restless leg syndrome            PHYSICAL EXAMINATION  Vitals:    02/21/18 1940   BP: 116/54   Pulse: 60   Resp: 18   Temp: (!) 96.5  F (35.8  C)   SpO2: 91%       Today on physical exam:     GENERAL: Awake, Alert, obese, oriented x3, not in any form of acute distress, answers questions appropriately, follows simple commands, conversant  HEENT: Head is normocephalic with normal hair distribution. No evidence of trauma. Ears: No acute purulent discharge. Eyes: Conjunctivae pink with no scleral jaundice. Nose: Normal mucosa and septum. NECK: Supple with no cervical or supraclavicular lymphadenopathy. Trachea is midline.   CHEST: No tenderness or deformity, no crepitus, left chest incision for pacer c/d/i  LUNG: Clear to auscultation with good chest expansion. There are no crackles or wheezes, normal AP diameter.  BACK: No kyphosis of the thoracic spine. Symmetric, no curvature, ROM normal, no CVA tenderness, no spinal tenderness   CVS: There is good S1  S2, regular rhythm, there are no murmurs, rubs, gallops, or heaves,  2+ pulses symmetric in all extremities.  ABDOMEN: Rounded and soft, nontender to palpation, non distended, no masses, no organomegaly, good bowel sounds, no rebound or guarding, no peritoneal signs.   EXTREMITIES: 2-3+ RLE pedal edema, bright erythema extends from foot up almost to knee, skin is taught, dry flaking skin, marked outline of the erythema for monitoring, erythema does not extend beyond marking and is regressed some since last visit, continues to be very edematous with some weeping  SKIN: Warm and dry  NEUROLOGICAL: The patient is oriented to person, place and time. Strength and sensation are grossly intact. Face is symmetric.            LABS:   Recent Results (from the past 168 hour(s))   Basic Metabolic Panel    Result Value Ref Range    Sodium 136 136 - 145 mmol/L    Potassium 4.5 3.5 - 5.0 mmol/L    Chloride 90 (L) 98 - 107 mmol/L    CO2 36 (H) 22 - 31 mmol/L    Anion Gap, Calculation 10 5 - 18 mmol/L    Glucose 125 70 - 125 mg/dL    Calcium 9.7 8.5 - 10.5 mg/dL    BUN 33 (H) 8 - 28 mg/dL    Creatinine 1.74 (H) 0.60 - 1.10 mg/dL    GFR MDRD Af Amer 34 (L) >60 mL/min/1.73m2    GFR MDRD Non Af Amer 28 (L) >60 mL/min/1.73m2   HM2(CBC w/o Differential)   Result Value Ref Range    WBC 8.5 4.0 - 11.0 thou/uL    RBC 3.44 (L) 3.80 - 5.40 mill/uL    Hemoglobin 9.6 (L) 12.0 - 16.0 g/dL    Hematocrit 32.4 (L) 35.0 - 47.0 %    MCV 94 80 - 100 fL    MCH 27.9 27.0 - 34.0 pg    MCHC 29.6 (L) 32.0 - 36.0 g/dL    RDW 15.6 (H) 11.0 - 14.5 %    Platelets 320 140 - 440 thou/uL    MPV 9.8 8.5 - 12.5 fL   Vitamin D, Total (25-Hydroxy)   Result Value Ref Range    Vitamin D, Total (25-Hydroxy) 56.7 30.0 - 80.0 ng/mL     Results for orders placed or performed in visit on 02/19/18   Basic Metabolic Panel   Result Value Ref Range    Sodium 136 136 - 145 mmol/L    Potassium 4.5 3.5 - 5.0 mmol/L    Chloride 90 (L) 98 - 107 mmol/L    CO2 36 (H) 22 - 31 mmol/L    Anion Gap, Calculation 10 5 - 18 mmol/L    Glucose 125 70 - 125 mg/dL    Calcium 9.7 8.5 - 10.5 mg/dL    BUN 33 (H) 8 - 28 mg/dL    Creatinine 1.74 (H) 0.60 - 1.10 mg/dL    GFR MDRD Af Amer 34 (L) >60 mL/min/1.73m2    GFR MDRD Non Af Amer 28 (L) >60 mL/min/1.73m2         Lab Results   Component Value Date    WBC 8.5 02/19/2018    HGB 9.6 (L) 02/19/2018    HCT 32.4 (L) 02/19/2018    MCV 94 02/19/2018     02/19/2018       No results found for: [id number]  Lab Results   Component Value Date    HGBA1C 6.9 (H) 01/31/2018     Lab Results   Component Value Date    INR 1.30 (H) 02/07/2018    INR 1.51 (H) 03/06/2017    INR 1.27 (H) 03/05/2017     Vitamin D, Total (25-Hydroxy)   Date Value Ref Range Status   02/19/2018 56.7 30.0 - 80.0 ng/mL Final     Lab Results   Component Value Date    TSH  3.50 06/01/2017           ASSESSMENT/PLAN:    1. Sepsis, s/t right foot cellulitis: On clindamycin and appears mildly improved today. Positive for strep sepsis in hospital but no wound cultures from leg in chart. pain controlled on tylenol to 1000mg TID, tramadol q6h prn. Open, weeping areas-wound care orders given. Keep legs elevated in recliner when possible.   2. Acute on chronic CHF: Daily [phone]. On lasix 80mg bid. F/u with cardiology on 2/22. Weights are trending down now, edema very mildly improved, but no sob and off o2 still. Added metolazone three times weekly, monitor closely.   3. S/p PPM: Battery change on 2/12. Incision c/d/i. No concerns. F/u with device clinic 2/22.   4. Mod persistent Asthma: On advair, combivent. No sob today.   5. MIGUEL on CKD: Cr 1.12 on 2/12. Recheck 2/19-Cr 1.74, will recheck one week.   6. Type 2 DM: Diet controlled. Monitor on labs.   7. Dyslipidemia: On aspirin, atorvastatin  8. HTN: SBP 100s. On lopressor, lasix.   9. GERD: On omeprazole.   10. Hypothyroid: On levothyroxine.   11. Insomnia: On melatonin qhs. Ordered recliner to help with sleep-finally received over weekend so is sleeping better now.   12. Hypokalemia: On kcl. BMP on 2/19-K 4.5. Stable.   13. Constipation: On pericolace daily prn. Will add miralax daily.   14. Vitamin d deficiency: On vitamin d. Level 2/19-56.7, stable.    15. Anemia of CKD: Hg 9.3 on 2/12. HM2 on 2/19-9.6.   16. PAF: Regular rate and rhythm. On metoprolol rate controlled 60s.     BMP, HM2, vit d 2/19-reviewed and BMP, HM 2 ordered for 2/26    Per therapy non ambulatory, sit to stand with assist of 2 for therapy and EZ stand for staff with 1. Max/total care for ADLs. She lives with her granddaughter. Recommending 2-3 weeks     Electronically signed by: Jennifer Valdes NP    Total 25 minutes of which 75% was spent in counseling and coordination of care of the above plan    Time spent in interview and examination of  patient, review of available records, and discussion with nursing staff. Continue care plan, efforts at therapy, and monitor nutritional status.

## 2021-06-16 NOTE — PROGRESS NOTES
[facility]      Facility:    [facility] [[id number]]  Code Status: FULL CODE      Chief Complaint/Reason for Visit:  Chief Complaint   Patient presents with     H & P       HPI:   Thea is a 83 y.o. female who is a readmission from the hospital after she had become cyanotic and had sudden worsening of her congestive heart failure requiring hospitalization.  She had been at transitional care unit monitoring the healing process of cellulitis of her right leg.  She continues to take the clindamycin until 3/7/18.  She is not having any diarrhea while taking clindamycin.  In regards to her heart problems she is not experiencing chest pain or palpitations or worsening shortness of breath, however she has had 2.5 pound weight gain.  She has had low blood pressures with one recorded at 80/38, and she is symptomatic with the low blood pressures with lightheadedness.  She also does have GERD symptoms and ongoing nausea and has a difficult time swallowing the large potassium pills and would like a liquid form.    Upon further current review of systems, she is not feeling feverish or chilled, no nasal congestion or sore throat, no dysuria, leg swelling has improved significantly.  The cellulitis has improved significantly.  No headache.    Past Medical History:  Past Medical History:   Diagnosis Date     Acute kidney injury superimposed on CKD 3/3/2017     Asthma      CAD (coronary artery disease)      Cataract      Chronic kidney disease     ckd3     Diabetes mellitus      Disease of thyroid gland      H/O heart valve replacement with bioprosthetic valve      History of transfusion      Hypertension      Normochromic normocytic anemia      Restless leg syndrome            Surgical History:  Past Surgical History:   Procedure Laterality Date     APPENDECTOMY       CHOLECYSTECTOMY       EYE SURGERY Bilateral     Cataracts     HIP PINNING Right 3/1/2017    Procedure: RIGHT HIP  "TROCHANTERIC RODDING;  Surgeon: Moshe Davies MD;  Location: [facility] Main OR;  Service:      INSERT / REPLACE / REMOVE PACEMAKER  06/25/2007    guidant     INSERT / REPLACE / REMOVE PACEMAKER  02/07/2018    phoebe sci gen change     JOINT REPLACEMENT Left     knee     TISSUE AORTIC VALVE REPLACEMENT  06/25/2007            Family History:   Family History   Problem Relation Age of Onset     No Medical Problems Mother      age 86     No Medical Problems Father      MVA     Cancer Brother      lung     No Medical Problems Brother        Social History:    Social History     Social History     Marital status:      Spouse name: N/A     Number of children: N/A     Years of education: N/A     Occupational History      in operating room      retired     Social History Main Topics     Smoking status: Former Smoker     Smokeless tobacco: Never Used     Alcohol use No     Drug use: No     Sexual activity: No     Other Topics Concern     Not on file     Social History Narrative    She live in a single floor house.  Her son lives next door and her granddaughter is \"around a lot\"   3/2017          Review of Systems   All other systems reviewed and are negative.      Vitals:    03/05/18 0631   BP: 107/51   Pulse: 85   Resp: 18   Temp: (!) 96.5  F (35.8  C)   SpO2: 97%   Weight: (!) 231 lb 3.2 oz (104.9 kg)       Physical Exam   Constitutional: No distress.   HENT:   Mouth/Throat: Oropharynx is clear and moist.   Eyes: Right eye exhibits no discharge. Left eye exhibits no discharge.   Neck: No thyromegaly present.   Cardiovascular: Normal rate and normal heart sounds.    Pulmonary/Chest: Effort normal and breath sounds normal.   Abdominal: Soft. Bowel sounds are normal. She exhibits no distension. There is no tenderness.   Musculoskeletal:   Trace edema of the right leg compared to left   Lymphadenopathy:     She has no cervical adenopathy.   Neurological: She is alert.   Skin:   There is no.  Flaking of the " skin consistent with healing.  No purulent drainage.   Psychiatric: She has a normal mood and affect.   Nursing note and vitals reviewed.      Medication List:  Current Outpatient Prescriptions   Medication Sig     potassium chloride 40 mEq/15 mL Liqd Take 40 mEq by mouth. daily     acetaminophen (TYLENOL) 500 MG tablet Take 1,000 mg by mouth 3 (three) times a day. Not to exceed 4000 mg in 24 hours.     aluminum-magnesium hydroxide-simethicone (MAALOX ADVANCED) 200-200-20 mg/5 mL Susp Take 30 mL by mouth every 6 (six) hours as needed.     aspirin 81 MG EC tablet Take 81 mg by mouth daily.     atorvastatin (LIPITOR) 80 MG tablet Take 1 tablet (80 mg total) by mouth at bedtime.     bumetanide (BUMEX) 2 MG tablet Take 1 tablet (2 mg total) by mouth 2 (two) times a day.     cholecalciferol, vitamin D3, (VITAMIN D3) 2,000 unit Tab Take 2,000 Units by mouth once daily.     clindamycin (CLEOCIN) 150 MG capsule Take 2 capsules (300 mg total) by mouth 4 (four) times a day for 14 doses.     fluticasone-salmeterol (ADVAIR) 500-50 mcg/dose DISKUS Inhale 1 puff 2 (two) times a day.     folic acid (FOLVITE) 1 MG tablet Take 1 mg by mouth daily.     ipratropium-albuterol (COMBIVENT RESPIMAT)  mcg/actuation Mist inhaler Inhale 1 puff 4 (four) times a day.     Lactobacillus rhamnosus GG (CULTURELLE) 10-15 Billion cell capsule Take 1 capsule by mouth see administration instructions. Twice a day with an end date of 3/2/2018.     levothyroxine (SYNTHROID, LEVOTHROID) 125 MCG tablet Take 125 mcg by mouth Daily at 6:00 am.      melatonin 3 mg Tab tablet Take 3 mg by mouth at bedtime.      menthol-zinc oxide (CALMOSEPTINE) 0.44-20.6 % Oint ointment Apply 1 application topically 4 (four) times a day as needed (to denuded areas of skin).     metOLazone (ZAROXOLYN) 2.5 MG tablet Take 1 tablet (2.5 mg total) by mouth daily. Before breakfast     metoprolol tartrate (LOPRESSOR) 100 MG tablet Take 150 mg by mouth 2 (two) times a day.      nystatin (MYCOSTATIN) cream Apply 1 application topically 2 (two) times a day. Apply to thighs abd folds until resolved.     omeprazole (PRILOSEC) 40 MG capsule Take 40 mg by mouth daily before breakfast.     polyethylene glycol (MIRALAX) 17 gram packet Take 17 g by mouth daily. Hold for loose stools.     senna-docusate (PERICOLACE) 8.6-50 mg tablet Take 1 tablet by mouth daily as needed for constipation.      traMADol (ULTRAM) 50 mg tablet Take 1 tablet (50 mg total) by mouth every 6 (six) hours as needed for pain.     white petrolatum-mineral oil (EUCERIN) Crea Apply 1 application topically every 4 (four) hours as needed. Apply to bilateral legs.     white petrolatum-mineral oil (EUCERIN) Crea Apply 1 application topically 2 (two) times a day. Apply to bilateral legs.       Labs:  No new laboratory testing    Assessment:    ICD-10-CM    1. Acute on chronic diastolic congestive heart failure I50.33    2. Cellulitis of right lower extremity L03.115    3. Hypokalemia E87.6    4. Essential hypertension with goal blood pressure less than 140/90 I10        Plan:  Hold metoprolol if systolic blood pressure is less than 110.  The cellulitis looks fully resolved.  Even though there is weight gain the leg edema has dramatically improved.  Plan to check basic metabolic profile in about 1 week.  Switch from pill form to liquid form for the potassium chloride but maintain the same dose.      Electronically signed by: Sami Palma MD

## 2021-06-16 NOTE — PROGRESS NOTES
[facility]    DATE: 2/15/2018    NAME:  Thea Acosta             :  1934  MRN: [id number]  CODE STATUS:  FULL CODE    VISIT TYPE: Review Of Multiple Medical Conditions     FACILITY:  [facility] [[id number]]       CHIEF COMPLAIN/REASON FOR VISIT:    Chief Complaint   Patient presents with     Review Of Multiple Medical Conditions               HISTORY OF PRESENT ILLNESS: Thea Acosta is a 83 y.o. female who was admitted  to  for weakness. She was found to be dyspneic, febrile, and tachypneic. She was diagnosed with sepsis s/t right foot cellulitis. Blood cultures grew Strep group B. She was treated with vanco and later ancef. She was also treated for acute CHF. During her stay she also underwent pacemaker battery change on . She has PMH of CAD, CKD, DM, s/p PPM. Prior to this she lived at home.     Today Mr. Acosta states she is having pain in her right foot and leg. She thinks the redness may have spread more since yesterday and that it is more swollen. She is concerned about this. She says her pacemaker site does not hurt much, mainly just her foot. Her appetite is better than in the hospital as she did not like the food choices in the hospital. She is feeling constipated and needs some assist with that. She has not gone for a few days. She says she did not sleep last night because she usually sleeps in a recliner and they did not have one for her. She does not have any trouble breathing, she is just concerned about her foot. Therapy present in room and states they saw her yesterday and her foot was not this red and swollen. Per nursing no concerns, but different nurse from yesterday to today.     REVIEW OF SYSTEMS:  PROBLEMS AND REVIEW OF SYSTEMS:   Review of Systems  Today on ROS:   Currently, no fever, chills, or rigors. Does not have any visual or hearing problems. Denies any chest pain, headaches, palpitations, lightheadedness,  dizziness, shortness of breath, or cough. Appetite is good. Denies any GERD symptoms. Denies any difficulty with swallowing, nausea, or vomiting.  Denies any abdominal pain. Denies any urinary symptoms. No active bleeding. Positive for redness, pain, edema right leg/foot, insomnia, constipation      Allergies   Allergen Reactions     Codeine Hives     Lisinopril Cough     Penicillins Swelling     Current Outpatient Prescriptions   Medication Sig     acetaminophen (TYLENOL) 500 MG tablet Take 1,000 mg by mouth 2 (two) times a day. And 500mg q6h prn pain     aspirin 81 MG EC tablet Take 81 mg by mouth daily.     atorvastatin (LIPITOR) 80 MG tablet Take 1 tablet (80 mg total) by mouth at bedtime.     cephalexin (KEFLEX) 500 MG capsule Take 1 capsule (500 mg total) by mouth 2 (two) times a day for 7 days. (Patient taking differently: Take 500 mg by mouth 4 (four) times a day. )     cholecalciferol, vitamin D3, (VITAMIN D3) 2,000 unit Tab Take 2,000 Units by mouth once daily.     fluticasone-salmeterol (ADVAIR) 500-50 mcg/dose DISKUS Inhale 1 puff 2 (two) times a day.     folic acid (FOLVITE) 1 MG tablet Take 1 mg by mouth daily.     furosemide (LASIX) 40 MG tablet Take 2 tablets (80 mg total) by mouth 2 (two) times a day at 9am and 6pm.     ipratropium-albuterol (COMBIVENT RESPIMAT)  mcg/actuation Mist inhaler Inhale 1 puff 4 (four) times a day.     levothyroxine (SYNTHROID, LEVOTHROID) 125 MCG tablet Take 125 mcg by mouth daily.     melatonin 3 mg Tab tablet Take 3 mg by mouth at bedtime.      metoprolol tartrate (LOPRESSOR) 100 MG tablet Take 150 mg by mouth 2 (two) times a day.     nystatin (MYCOSTATIN) powder Apply 1 application topically 2 (two) times a day as needed.     omeprazole (PRILOSEC) 20 MG capsule Take 20 mg by mouth daily before breakfast.     polyethylene glycol (MIRALAX) 17 gram packet Take 17 g by mouth daily.     potassium chloride SA (K-DUR,KLOR-CON) 20 MEQ tablet Take 1 tablet (20 mEq total)  by mouth daily.     senna-docusate (PERICOLACE) 8.6-50 mg tablet Take 1 tablet by mouth daily as needed for constipation.      Past Medical History:    Past Medical History:   Diagnosis Date     Acute kidney injury superimposed on CKD 3/3/2017     Asthma      CAD (coronary artery disease)      Cataract      Chronic kidney disease     ckd3     Diabetes mellitus      Disease of thyroid gland      H/O heart valve replacement with bioprosthetic valve      History of transfusion      Hypertension      Normochromic normocytic anemia      Restless leg syndrome            PHYSICAL EXAMINATION  Vitals:    02/14/18 2138   BP: 114/58   Pulse: 60   Resp: 20   Temp: 96.7  F (35.9  C)   SpO2: 98%       Today on physical exam:     GENERAL: Awake, Alert, obese, oriented x3, not in any form of acute distress, answers questions appropriately, follows simple commands, conversant  HEENT: Head is normocephalic with normal hair distribution. No evidence of trauma. Ears: No acute purulent discharge. Eyes: Conjunctivae pink with no scleral jaundice. Nose: Normal mucosa and septum. NECK: Supple with no cervical or supraclavicular lymphadenopathy. Trachea is midline.   CHEST: No tenderness or deformity, no crepitus, left chest incision for pacer c/d/i  LUNG: Clear to auscultation with good chest expansion. There are no crackles or wheezes, normal AP diameter.  BACK: No kyphosis of the thoracic spine. Symmetric, no curvature, ROM normal, no CVA tenderness, no spinal tenderness   CVS: There is good S1  S2, regular rhythm, there are no murmurs, rubs, gallops, or heaves,  2+ pulses symmetric in all extremities.  ABDOMEN: Rounded and soft, nontender to palpation, non distended, no masses, no organomegaly, good bowel sounds, no rebound or guarding, no peritoneal signs.   EXTREMITIES: 2-3+ RLE pedal edema, bright erythema extends from foot up almost to knee, skin is taught, dry flaking skin, marked outline of the erythema for monitoring  SKIN: Warm  and dry, no erythema noted.  Skin color, texture, no rashes or lesions.  NEUROLOGICAL: The patient is oriented to person, place and time. Strength and sensation are grossly intact. Face is symmetric.            LABS:   Recent Results (from the past 168 hour(s))   POCT Glucose   Result Value Ref Range    Glucose,  mg/dL   Basic Metabolic Panel   Result Value Ref Range    Sodium 140 136 - 145 mmol/L    Potassium 3.4 (L) 3.5 - 5.0 mmol/L    Chloride 102 98 - 107 mmol/L    CO2 29 22 - 31 mmol/L    Anion Gap, Calculation 9 5 - 18 mmol/L    Glucose 82 70 - 125 mg/dL    Calcium 9.0 8.5 - 10.5 mg/dL    BUN 33 (H) 8 - 28 mg/dL    Creatinine 1.28 (H) 0.60 - 1.10 mg/dL    GFR MDRD Af Amer 48 (L) >60 mL/min/1.73m2    GFR MDRD Non Af Amer 40 (L) >60 mL/min/1.73m2   Platelet Count - every other day x 3   Result Value Ref Range    Platelets 149 140 - 440 thou/uL   POCT Glucose   Result Value Ref Range    Glucose, POC 80 mg/dL   POCT Glucose   Result Value Ref Range    Glucose, POC 90 mg/dL   POCT Glucose   Result Value Ref Range    Glucose, POC 84 mg/dL   POCT Glucose   Result Value Ref Range    Glucose,  mg/dL   Basic Metabolic Panel   Result Value Ref Range    Sodium 141 136 - 145 mmol/L    Potassium 3.4 (L) 3.5 - 5.0 mmol/L    Chloride 100 98 - 107 mmol/L    CO2 32 (H) 22 - 31 mmol/L    Anion Gap, Calculation 9 5 - 18 mmol/L    Glucose 105 70 - 125 mg/dL    Calcium 9.0 8.5 - 10.5 mg/dL    BUN 32 (H) 8 - 28 mg/dL    Creatinine 1.25 (H) 0.60 - 1.10 mg/dL    GFR MDRD Af Amer 50 (L) >60 mL/min/1.73m2    GFR MDRD Non Af Amer 41 (L) >60 mL/min/1.73m2   Magnesium   Result Value Ref Range    Magnesium 1.7 (L) 1.8 - 2.6 mg/dL   POCT Glucose   Result Value Ref Range    Glucose, POC 96 mg/dL   POCT Glucose   Result Value Ref Range    Glucose,  mg/dL   POCT Glucose   Result Value Ref Range    Glucose,  mg/dL   POCT Glucose   Result Value Ref Range    Glucose,  mg/dL   Basic Metabolic Panel   Result Value  Ref Range    Sodium 138 136 - 145 mmol/L    Potassium 4.0 3.5 - 5.0 mmol/L    Chloride 99 98 - 107 mmol/L    CO2 30 22 - 31 mmol/L    Anion Gap, Calculation 9 5 - 18 mmol/L    Glucose 111 70 - 125 mg/dL    Calcium 9.1 8.5 - 10.5 mg/dL    BUN 32 (H) 8 - 28 mg/dL    Creatinine 1.37 (H) 0.60 - 1.10 mg/dL    GFR MDRD Af Amer 45 (L) >60 mL/min/1.73m2    GFR MDRD Non Af Amer 37 (L) >60 mL/min/1.73m2   Magnesium   Result Value Ref Range    Magnesium 1.7 (L) 1.8 - 2.6 mg/dL   HM1 (CBC with Diff)   Result Value Ref Range    WBC 9.5 4.0 - 11.0 thou/uL    RBC 3.36 (L) 3.80 - 5.40 mill/uL    Hemoglobin 9.6 (L) 12.0 - 16.0 g/dL    Hematocrit 31.5 (L) 35.0 - 47.0 %    MCV 94 80 - 100 fL    MCH 28.6 27.0 - 34.0 pg    MCHC 30.5 (L) 32.0 - 36.0 g/dL    RDW 15.1 (H) 11.0 - 14.5 %    Platelets 162 140 - 440 thou/uL    MPV 10.3 8.5 - 12.5 fL    Neutrophils % 79 (H) 50 - 70 %    Lymphocytes % 11 (L) 20 - 40 %    Monocytes % 8 2 - 10 %    Eosinophils % 2 0 - 6 %    Basophils % 0 0 - 2 %    Neutrophils Absolute 7.4 2.0 - 7.7 thou/uL    Lymphocytes Absolute 1.0 0.8 - 4.4 thou/uL    Monocytes Absolute 0.8 0.0 - 0.9 thou/uL    Eosinophils Absolute 0.2 0.0 - 0.4 thou/uL    Basophils Absolute 0.0 0.0 - 0.2 thou/uL   POCT Glucose   Result Value Ref Range    Glucose,  mg/dL   POCT Glucose   Result Value Ref Range    Glucose,  mg/dL   POCT Glucose   Result Value Ref Range    Glucose,  mg/dL   POCT Glucose   Result Value Ref Range    Glucose,  mg/dL   Basic Metabolic Panel   Result Value Ref Range    Sodium 139 136 - 145 mmol/L    Potassium 3.7 3.5 - 5.0 mmol/L    Chloride 98 98 - 107 mmol/L    CO2 31 22 - 31 mmol/L    Anion Gap, Calculation 10 5 - 18 mmol/L    Glucose 101 70 - 125 mg/dL    Calcium 9.1 8.5 - 10.5 mg/dL    BUN 27 8 - 28 mg/dL    Creatinine 1.12 (H) 0.60 - 1.10 mg/dL    GFR MDRD Af Amer 56 (L) >60 mL/min/1.73m2    GFR MDRD Non Af Amer 46 (L) >60 mL/min/1.73m2   HM1 (CBC with Diff)   Result Value Ref Range     WBC 9.4 4.0 - 11.0 thou/uL    RBC 3.32 (L) 3.80 - 5.40 mill/uL    Hemoglobin 9.3 (L) 12.0 - 16.0 g/dL    Hematocrit 30.9 (L) 35.0 - 47.0 %    MCV 93 80 - 100 fL    MCH 28.0 27.0 - 34.0 pg    MCHC 30.1 (L) 32.0 - 36.0 g/dL    RDW 15.2 (H) 11.0 - 14.5 %    Platelets 196 140 - 440 thou/uL    MPV 10.2 8.5 - 12.5 fL    Neutrophils % 78 (H) 50 - 70 %    Lymphocytes % 11 (L) 20 - 40 %    Monocytes % 8 2 - 10 %    Eosinophils % 3 0 - 6 %    Basophils % 0 0 - 2 %    Neutrophils Absolute 7.2 2.0 - 7.7 thou/uL    Lymphocytes Absolute 1.0 0.8 - 4.4 thou/uL    Monocytes Absolute 0.8 0.0 - 0.9 thou/uL    Eosinophils Absolute 0.3 0.0 - 0.4 thou/uL    Basophils Absolute 0.0 0.0 - 0.2 thou/uL   POCT Glucose   Result Value Ref Range    Glucose, POC 90 mg/dL   POCT Glucose   Result Value Ref Range    Glucose, POC 83 mg/dL   POCT Glucose   Result Value Ref Range    Glucose, POC 90 mg/dL   POCT Glucose   Result Value Ref Range    Glucose,  mg/dL   Basic Metabolic Panel   Result Value Ref Range    Sodium 141 136 - 145 mmol/L    Potassium 4.2 3.5 - 5.0 mmol/L    Chloride 98 98 - 107 mmol/L    CO2 34 (H) 22 - 31 mmol/L    Anion Gap, Calculation 9 5 - 18 mmol/L    Glucose 113 70 - 125 mg/dL    Calcium 9.1 8.5 - 10.5 mg/dL    BUN 24 8 - 28 mg/dL    Creatinine 1.12 (H) 0.60 - 1.10 mg/dL    GFR MDRD Af Amer 56 (L) >60 mL/min/1.73m2    GFR MDRD Non Af Amer 46 (L) >60 mL/min/1.73m2   Magnesium   Result Value Ref Range    Magnesium 1.8 1.8 - 2.6 mg/dL   Platelet Count - every other day x 3   Result Value Ref Range    Platelets 209 140 - 440 thou/uL   POCT Glucose   Result Value Ref Range    Glucose,  mg/dL   POCT Glucose   Result Value Ref Range    Glucose,  mg/dL     Results for orders placed or performed during the hospital encounter of 02/07/18   Basic Metabolic Panel   Result Value Ref Range    Sodium 141 136 - 145 mmol/L    Potassium 4.2 3.5 - 5.0 mmol/L    Chloride 98 98 - 107 mmol/L    CO2 34 (H) 22 - 31 mmol/L     Anion Gap, Calculation 9 5 - 18 mmol/L    Glucose 113 70 - 125 mg/dL    Calcium 9.1 8.5 - 10.5 mg/dL    BUN 24 8 - 28 mg/dL    Creatinine 1.12 (H) 0.60 - 1.10 mg/dL    GFR MDRD Af Amer 56 (L) >60 mL/min/1.73m2    GFR MDRD Non Af Amer 46 (L) >60 mL/min/1.73m2         Lab Results   Component Value Date    WBC 9.4 02/12/2018    HGB 9.3 (L) 02/12/2018    HCT 30.9 (L) 02/12/2018    MCV 93 02/12/2018     02/13/2018       No results found for: [id number]  Lab Results   Component Value Date    HGBA1C 6.9 (H) 01/31/2018     Lab Results   Component Value Date    INR 1.30 (H) 02/07/2018    INR 1.51 (H) 03/06/2017    INR 1.27 (H) 03/05/2017     Vitamin D, Total (25-Hydroxy)   Date Value Ref Range Status   06/01/2017 38.8 30.0 - 80.0 ng/mL Final     Lab Results   Component Value Date    TSH 3.50 06/01/2017           ASSESSMENT/PLAN:    1. Sepsis, s/t right foot cellulitis: On keflex x 7 days. Tylenol prn for pain. Will increase keflex to QID instead of bid and monitoring over next few days. Marked area of erythema and nursing to monitor qshift for extension and notify immediately if extending. Will schedule tylenol 1000mg bid and 500mg q6h prn pain. Monitor closely.   2. Acute on chronic CHF: Daily weights-244lbs. On lasix 80mg bid. F/u with cardiology on 2/22.   3. S/p PPM: Battery change on 2/12. Incision c/d/i. No concerns. F/u with device clinic 2/22.   4. Mod persistent Asthma: On advair, combivent. No sob today.   5. MIGUEL on CKD: Cr 1.12 on 2/12. Recheck 2/19  6. Type 2 DM: Diet controlled. Monitor on labs.   7. Dyslipidemia: On aspirin, atorvastatin  8. HTN: SBP 110s. On lopressor, lasix.   9. GERD: On omeprazole.   10. Hypothyroid: On levothyroxine.   11. Insomnia: On melatonin qhs prn, changed to qhs. Ordered recliner to help with sleep.   12. Hypokalemia: On kcl. BMP on 2/19  13. Constipation: On pericolace daily prn. Will add miralax daily.   14. Vitamin d deficiency: On vitamin d.   15. Anemia of CKD: Hg 9.3 on  2/12. HM2 on 2/19  16. PAF: Regular rate and rhythm. On metoprolol rate controlled 60s.     BMP, HM2, vit d 2/19  Electronically signed by: Jennifer Valdes NP    Total 35 minutes of which 75% was spent in counseling and coordination of care of the above plan    Time spent in interview and examination of patient, review of available records, and discussion with nursing staff. Continue care plan, efforts at therapy, and monitor nutritional status.

## 2021-06-16 NOTE — PROGRESS NOTES
[facility]    DATE: 3/15/2018    NAME:  Thea Acosta             :  1934  MRN: [id number]  CODE STATUS:  FULL CODE    VISIT TYPE: Problem Visit (hospital visit)     FACILITY:  [facility] [[id number]]       CHIEF COMPLAIN/REASON FOR VISIT:    Chief Complaint   Patient presents with     Problem Visit     hospital visit               HISTORY OF PRESENT ILLNESS: Thea Acosta is a 83 y.o. female who was admitted  to  for weakness. She was found to be dyspneic, febrile, and tachypneic. She was diagnosed with sepsis s/t right foot cellulitis. Blood cultures grew Strep group B. She was treated with vanco and later ancef. She was also treated for acute CHF. During her stay she also underwent pacemaker battery change on . She has PMH of CAD, CKD, DM, s/p PPM. Prior to this she lived at home with her grand daughter. She was readmitted 3/7-3/12 for Acute encephalopathy, MIGUEL, CHF exacerbation, hypotension. She was treated for UTI and palliative care did see her during her stay. She wished to remain full code.     Today Ms. Acosta states her pain is doing well. She says she slept pretty good in the recliner last night. She thinks the swelling in her legs is so much better than before. She says therapy is going well. She thinks her shortness of breath is at baseline today and they are turning her oxygen down. She says her appetite has been getting better. Per staff her blood pressures have been on the lower side running 100s yesterday, 94/52 today. Her weights have been [phone].     REVIEW OF SYSTEMS:  PROBLEMS AND REVIEW OF SYSTEMS:   Review of Systems  Today on ROS:   Currently, no fever, chills, or rigors. Does not have any visual or hearing problems. Denies any chest pain, headaches, palpitations, lightheadedness, dizziness. Appetite is fair.  Denies any abdominal pain. Denies any urinary symptoms. No active bleeding. Positive for pain,  edema ble, shortness of breath at baseline, no cough, hypotension      Allergies   Allergen Reactions     Codeine Hives     Codeine Phosphate (Bulk)      This allergy is per [facility] records.     Codeine Sulfate      This allergy is per [facility] records.     Codeine-Butalbital-Asa-Caff      This allergy is per [facility] records.     Codeine-Guaifenesin      This allergy is per [facility] records.     Lisinopril Cough     Penicillins Swelling     Current Outpatient Prescriptions   Medication Sig     acetaminophen (TYLENOL) 500 MG tablet Take 2 tablets (1,000 mg total) by mouth 3 (three) times a day as needed for pain. Not to exceed 4000 mg in 24 hours.     aluminum-magnesium hydroxide-simethicone (MAALOX ADVANCED) 200-200-20 mg/5 mL Susp Take 30 mL by mouth every 6 (six) hours as needed.     aspirin 81 MG EC tablet Take 81 mg by mouth daily.     atorvastatin (LIPITOR) 80 MG tablet Take 1 tablet (80 mg total) by mouth at bedtime.     bumetanide (BUMEX) 1 MG tablet Take 1 tablet (1 mg total) by mouth 2 (two) times a day at 9am and 6pm.     cholecalciferol, vitamin D3, (VITAMIN D3) 2,000 unit Tab Take 2,000 Units by mouth once daily.     ciprofloxacin HCl (CIPRO) 250 MG tablet Take 1 tablet (250 mg total) by mouth 2 (two) times a day for 3 days.     fluticasone-salmeterol (ADVAIR) 500-50 mcg/dose DISKUS Inhale 1 puff 2 (two) times a day.     folic acid (FOLVITE) 1 MG tablet Take 1 mg by mouth daily.     ipratropium-albuterol (COMBIVENT RESPIMAT)  mcg/actuation Mist inhaler Inhale 1 puff 4 (four) times a day.     levothyroxine (SYNTHROID, LEVOTHROID) 125 MCG tablet Take 125 mcg by mouth Daily at 6:00 am.      melatonin 3 mg Tab tablet Take 3 mg by mouth at bedtime.      menthol-zinc oxide (CALMOSEPTINE) 0.44-20.6 % Oint ointment Apply 1 application topically 4 (four) times a day  as needed (to denuded areas of skin).     metoprolol tartrate (LOPRESSOR) 25 MG tablet Take 4 tablets (100 mg total) by mouth 2 (two) times a day.     nystatin (MYCOSTATIN) cream Apply 1 application topically 2 (two) times a day. Apply to thighs abd folds until resolved.     omeprazole (PRILOSEC) 40 MG capsule Take 40 mg by mouth daily before breakfast.     ondansetron (ZOFRAN) 4 MG tablet Take 4 mg by mouth every 8 (eight) hours as needed for nausea.     potassium chloride 40 mEq/15 mL Liqd Take 7.5 mL (20 mEq total) by mouth daily. daily     potassium chloride SA (K-DUR,KLOR-CON) 20 MEQ tablet Take 20 mEq by mouth daily.     traMADol (ULTRAM) 50 mg tablet Take 1 tablet (50 mg total) by mouth every 6 (six) hours as needed for pain.     white petrolatum-mineral oil (EUCERIN) Crea Apply 1 application topically every 4 (four) hours as needed. Apply to bilateral legs.     white petrolatum-mineral oil (EUCERIN) Crea Apply 1 application topically 2 (two) times a day. Apply to bilateral legs.     Past Medical History:    Past Medical History:   Diagnosis Date     Acute kidney injury superimposed on CKD 3/3/2017     Asthma      CAD (coronary artery disease)      Cataract      Chronic kidney disease     ckd3     Diabetes mellitus      Disease of thyroid gland      H/O heart valve replacement with bioprosthetic valve      History of transfusion      Hypertension      Normochromic normocytic anemia      Restless leg syndrome            PHYSICAL EXAMINATION  Vitals:    03/14/18 2210   BP: 103/51   Pulse: 60   Resp: 18   Temp: (!) 96  F (35.6  C)   SpO2: 94%   Weight: 217 lb 11.2 oz (98.7 kg)       Today on physical exam:     GENERAL: Awake, Alert, obese, oriented x3, not in any form of acute distress, answers questions appropriately, follows simple commands, conversant  HEENT: Head is normocephalic with normal hair distribution. No evidence of trauma. Ears: No acute purulent discharge. Eyes: Conjunctivae pink with no scleral  jaundice. Nose: Normal mucosa and septum. NECK: Supple with no cervical or supraclavicular lymphadenopathy. Trachea is midline.   CHEST: No tenderness or deformity, no crepitus, left chest incision for pacer c/d/i  LUNG: Dim to auscultation with good chest expansion. There are no crackles or wheezes, normal AP diameter.  BACK: No kyphosis of the thoracic spine. Symmetric, no curvature, ROM normal, no CVA tenderness, no spinal tenderness   CVS: There is good S1  S2, regular rhythm, there are no murmurs, rubs, gallops, or heaves,  2+ pulses symmetric in all extremities.  ABDOMEN: Rounded and soft, nontender to palpation, non distended, no masses, no organomegaly, good bowel sounds, no rebound or guarding, no peritoneal signs.   EXTREMITIES: 1-2+ ble edema, dry flaking skin, erythema resolved from previous, no weeping areas  SKIN: Warm and dry  NEUROLOGICAL: The patient is oriented to person, place and time. Strength and sensation are grossly intact. Face is symmetric.            LABS:   Recent Results (from the past 168 hour(s))   Basic Metabolic Panel   Result Value Ref Range    Sodium 130 (L) 136 - 145 mmol/L    Potassium 4.6 3.5 - 5.0 mmol/L    Chloride 85 (L) 98 - 107 mmol/L    CO2 33 (H) 22 - 31 mmol/L    Anion Gap, Calculation 12 5 - 18 mmol/L    Glucose 117 70 - 125 mg/dL    Calcium 9.2 8.5 - 10.5 mg/dL    BUN 96 (H) 8 - 28 mg/dL    Creatinine 3.16 (H) 0.60 - 1.10 mg/dL    GFR MDRD Af Amer 17 (L) >60 mL/min/1.73m2    GFR MDRD Non Af Amer 14 (L) >60 mL/min/1.73m2   Magnesium   Result Value Ref Range    Magnesium 1.9 1.8 - 2.6 mg/dL   Phosphorus   Result Value Ref Range    Phosphorus 3.3 2.5 - 4.5 mg/dL   Glycosylated Hemoglobin A1C   Result Value Ref Range    Hemoglobin A1c 6.0 4.2 - 6.1 %   Iron and Transferrin Iron Binding Capacity   Result Value Ref Range    Iron 121 42 - 175 ug/dL    Transferrin 202 (L) 212 - 360 mg/dL    Transferrin Saturation, Calculated 48 20 - 50 %    Transferrin IBC, Calculated 253 (L)  313 - 563 ug/dL   HM1 (CBC with Diff)   Result Value Ref Range    WBC 8.5 4.0 - 11.0 thou/uL    RBC 2.65 (L) 3.80 - 5.40 mill/uL    Hemoglobin 7.6 (L) 12.0 - 16.0 g/dL    Hematocrit 24.2 (L) 35.0 - 47.0 %    MCV 91 80 - 100 fL    MCH 28.7 27.0 - 34.0 pg    MCHC 31.4 (L) 32.0 - 36.0 g/dL    RDW 17.4 (H) 11.0 - 14.5 %    Platelets 93 (L) 140 - 440 thou/uL    MPV 11.3 8.5 - 12.5 fL    Neutrophils % 70 50 - 70 %    Lymphocytes % 14 (L) 20 - 40 %    Monocytes % 6 2 - 10 %    Eosinophils % 10 (H) 0 - 6 %    Basophils % 0 0 - 2 %    Neutrophils Absolute 5.8 2.0 - 7.7 thou/uL    Lymphocytes Absolute 1.2 0.8 - 4.4 thou/uL    Monocytes Absolute 0.5 0.0 - 0.9 thou/uL    Eosinophils Absolute 0.8 (H) 0.0 - 0.4 thou/uL    Basophils Absolute 0.0 0.0 - 0.2 thou/uL   POCT Glucose   Result Value Ref Range    Glucose,  mg/dL   POCT Glucose   Result Value Ref Range    Glucose, POC 92 mg/dL   Ova and parasite, stool   Result Value Ref Range    Ova + Parasite Exam No ova or parasites seen No Ova or Parasites seen   POCT Glucose   Result Value Ref Range    Glucose, POC 87 mg/dL   POCT Glucose   Result Value Ref Range    Glucose, POC 99 mg/dL   Urinalysis-UC if Indicated   Result Value Ref Range    Color, UA Yellow Colorless, Yellow, Straw, Light Yellow    Clarity, UA Turbid (!) Clear    Glucose, UA Negative Negative    Bilirubin, UA Negative Negative    Ketones, UA Negative Negative    Specific Gravity, UA 1.020 1.001 - 1.030    Blood, UA Moderate (!) Negative    pH, UA 6.5 4.5 - 8.0    Protein, UA 30 mg/dL (!) Negative mg/dL    Urobilinogen, UA <2.0 E.U./dL <2.0 E.U./dL, 2.0 E.U./dL    Nitrite, UA Negative Negative    Leukocytes, UA Large (!) Negative    Bacteria, UA Many (!) None Seen hpf    RBC, UA 3-5 (!) None Seen, 0-2 hpf    WBC, UA >100 (!) None Seen, 0-5 hpf    Squam Epithel, UA 10-25 (!) None Seen, 0-5 lpf    WBC Clumps Present (!) None Seen   Protein/Creatinine Ratio, Urine   Result Value Ref Range     Protein, Random  Urine 22 mg/dL    Creatinine, Urine 59.5 mg/dL    Protein/Creatinine Ratio, Random Urine 0.37    Culture, Urine   Result Value Ref Range    Culture >100,000 col/ml Klebsiella species (!)        Susceptibility    Klebsiella species - SHARI     Amoxicillin + Clavulanate 8/4 Sensitive      Ampicillin >16 Resistant      Cefazolin 4 Sensitive      Cefepime <=1 Sensitive      Ceftriaxone <=1 Sensitive      Ciprofloxacin <=0.5 Sensitive      Gentamicin <=2 Sensitive      Levofloxacin <=1 Sensitive      Meropenem <=0.25 Sensitive      Nitrofurantoin >64 Resistant      Tetracycline >8 Resistant      Tobramycin <=2 Sensitive      Trimethoprim + Sulfamethoxazole <=0.5 Sensitive    Basic Metabolic Panel   Result Value Ref Range    Sodium 133 (L) 136 - 145 mmol/L    Potassium 4.1 3.5 - 5.0 mmol/L    Chloride 88 (L) 98 - 107 mmol/L    CO2 36 (H) 22 - 31 mmol/L    Anion Gap, Calculation 9 5 - 18 mmol/L    Glucose 78 70 - 125 mg/dL    Calcium 9.2 8.5 - 10.5 mg/dL    BUN 94 (H) 8 - 28 mg/dL    Creatinine 2.86 (H) 0.60 - 1.10 mg/dL    GFR MDRD Af Amer 19 (L) >60 mL/min/1.73m2    GFR MDRD Non Af Amer 16 (L) >60 mL/min/1.73m2   Ferritin   Result Value Ref Range    Ferritin 1305 (H) 10 - 130 ng/mL   POCT Glucose   Result Value Ref Range    Glucose, POC 85 mg/dL   C. Diff Toxin By PCR   Result Value Ref Range    C.Difficile Toxigenic by PCR Negative Negative    Ribotype 027/NAP1/B1 Presumptive Negative Presumptive Negative   Culture, Stool   Result Value Ref Range    Culture       No Salmonella, Shigella, Yersinia, or Campylobacter.   EnteroHaemorrhagic E. coli Work Up   Result Value Ref Range    Shiga Toxin 1 Negative Negative    Shiga Toxin 2 Negative Negative   POCT Glucose   Result Value Ref Range    Glucose, POC 95 mg/dL   POCT Glucose   Result Value Ref Range    Glucose, POC 90 mg/dL   Eosinophil Smear   Result Value Ref Range    Eosinophils, Fluid Rare None Seen, Rare, Few   Basic Metabolic Panel   Result Value Ref Range    Sodium  134 (L) 136 - 145 mmol/L    Potassium 3.7 3.5 - 5.0 mmol/L    Chloride 92 (L) 98 - 107 mmol/L    CO2 34 (H) 22 - 31 mmol/L    Anion Gap, Calculation 8 5 - 18 mmol/L    Glucose 83 70 - 125 mg/dL    Calcium 9.7 8.5 - 10.5 mg/dL    BUN 79 (H) 8 - 28 mg/dL    Creatinine 1.97 (H) 0.60 - 1.10 mg/dL    GFR MDRD Af Amer 29 (L) >60 mL/min/1.73m2    GFR MDRD Non Af Amer 24 (L) >60 mL/min/1.73m2   Platelet Count - every other day x 3   Result Value Ref Range    Platelets 215 140 - 440 thou/uL   POCT Glucose   Result Value Ref Range    Glucose, POC 79 mg/dL   POCT Glucose   Result Value Ref Range    Glucose,  mg/dL   POCT Glucose   Result Value Ref Range    Glucose,  mg/dL   POCT Glucose   Result Value Ref Range    Glucose, POC 97 mg/dL   Basic Metabolic Panel   Result Value Ref Range    Sodium 135 (L) 136 - 145 mmol/L    Potassium 3.9 3.5 - 5.0 mmol/L    Chloride 91 (L) 98 - 107 mmol/L    CO2 35 (H) 22 - 31 mmol/L    Anion Gap, Calculation 9 5 - 18 mmol/L    Glucose 79 70 - 125 mg/dL    Calcium 9.9 8.5 - 10.5 mg/dL    BUN 64 (H) 8 - 28 mg/dL    Creatinine 1.64 (H) 0.60 - 1.10 mg/dL    GFR MDRD Af Amer 36 (L) >60 mL/min/1.73m2    GFR MDRD Non Af Amer 30 (L) >60 mL/min/1.73m2   Hemoglobin   Result Value Ref Range    Hemoglobin 8.1 (L) 12.0 - 16.0 g/dL   POCT Glucose   Result Value Ref Range    Glucose,  mg/dL   POCT Glucose   Result Value Ref Range    Glucose,  mg/dL   POCT Glucose   Result Value Ref Range    Glucose,  mg/dL   Basic Metabolic Panel   Result Value Ref Range    Sodium 135 (L) 136 - 145 mmol/L    Potassium 4.0 3.5 - 5.0 mmol/L    Chloride 90 (L) 98 - 107 mmol/L    CO2 37 (H) 22 - 31 mmol/L    Anion Gap, Calculation 8 5 - 18 mmol/L    Glucose 82 70 - 125 mg/dL    Calcium 10.2 8.5 - 10.5 mg/dL    BUN 55 (H) 8 - 28 mg/dL    Creatinine 1.49 (H) 0.60 - 1.10 mg/dL    GFR MDRD Af Amer 41 (L) >60 mL/min/1.73m2    GFR MDRD Non Af Amer 33 (L) >60 mL/min/1.73m2   Platelet Count - every other  day x 3   Result Value Ref Range    Platelets 240 140 - 440 thou/uL   POCT Glucose   Result Value Ref Range    Glucose, POC 95 mg/dL   POCT Glucose   Result Value Ref Range    Glucose,  mg/dL   CLINIC Device Check   Result Value Ref Range    Comments/Summary       Type: 1 week PO pacemaker gen change  Presenting Rhythm: , 60bpm  Lead/Battery status: Stable lead and battery measurements.  Arrhythmias: No VHR detected.   Anticoagulant: None  Comments: Normal device function. Accelerometer threshold changed to 10 from 8 and activity threshold changed to low from medium due to blunted histograms. YY       Results for orders placed or performed during the hospital encounter of 03/07/18   Basic Metabolic Panel   Result Value Ref Range    Sodium 135 (L) 136 - 145 mmol/L    Potassium 4.0 3.5 - 5.0 mmol/L    Chloride 90 (L) 98 - 107 mmol/L    CO2 37 (H) 22 - 31 mmol/L    Anion Gap, Calculation 8 5 - 18 mmol/L    Glucose 82 70 - 125 mg/dL    Calcium 10.2 8.5 - 10.5 mg/dL    BUN 55 (H) 8 - 28 mg/dL    Creatinine 1.49 (H) 0.60 - 1.10 mg/dL    GFR MDRD Af Amer 41 (L) >60 mL/min/1.73m2    GFR MDRD Non Af Amer 33 (L) >60 mL/min/1.73m2         Lab Results   Component Value Date    WBC 8.5 03/08/2018    HGB 8.1 (L) 03/11/2018    HCT 24.2 (L) 03/08/2018    MCV 91 03/08/2018     03/12/2018       No results found for: [id number]  Lab Results   Component Value Date    HGBA1C 6.0 03/08/2018     Lab Results   Component Value Date    INR 1.30 (H) 02/07/2018    INR 1.51 (H) 03/06/2017    INR 1.27 (H) 03/05/2017     Vitamin D, Total (25-Hydroxy)   Date Value Ref Range Status   02/19/2018 56.7 30.0 - 80.0 ng/mL Final     Lab Results   Component Value Date    TSH 3.50 06/01/2017           ASSESSMENT/PLAN:    1. Sepsis, s/t right foot cellulitis: Completed antibiotics and appears resolved.    2. Acute on chronic CHF: Daily [phone] [phone]. On bumex 1mg bid. F/u with cardiology on 3/21. Keep legs  elevated, ACE wrap legs.   3. S/p PPM: Battery change on 2/12. Incision c/d/i. No concerns. F/u with device clinic 5/15.   4. Mod persistent Asthma, hypoxia: On advair, combivent. SOB at baseline, 1LNC 99%. Titrate off for sat >=88%.   5. MIGUEL on CKD: Cr 1.49 on 3/12. BMP 3/19.   6. Type 2 DM: Diet controlled. Monitor on labs.   7. Dyslipidemia: On aspirin, atorvastatin  8. HTN: SBP 90-100s. On lopressor-added hold parameters, bumex.   9. GERD: On omeprazole. mylanta prn indigestion.   10. Hypothyroid: On levothyroxine.   11. Insomnia: On melatonin qhs.   12. Hypokalemia: On kcl.  K 4.0 on 3/12.   13. Constipation: On pericolace daily prn. Will add miralax daily.   14. Vitamin d deficiency: On vitamin d. Level 2/19-56.7, stable.    15. Anemia of CKD: Hg 8.1 on 3/11. HM1 on 3/19.   16. PAF: Regular rate and rhythm. On metoprolol rate controlled 60s.   17. Onychomycosis: Pulled off socks to look at right foot edema and previous cellulitis, toenail on big toe is falling off. Other nails need trimmed, appear fungal. Consulted podiatry to see patient, paint big toenail with betadine daily and notify if falls off.    18. Chronic pain: Pain in legs mostly, tylenol tid prn and tramadol prn. Will evaluate need for tramadol now that cellulitis is resolved.       BMP, 1 3/19    Re-eval for therapy     Electronically signed by: Jennifer Valdes NP    Total 35 minutes of which 75% was spent in counseling and coordination of care of the above plan    Time spent in interview and examination of patient, review of available records, and discussion with nursing staff. Continue care plan, efforts at therapy, and monitor nutritional status.

## 2021-06-16 NOTE — PROGRESS NOTES
[facility]    DATE: 3/22/2018    NAME:  Thea Acosta             :  1934  MRN: [id number]  CODE STATUS:  FULL CODE    VISIT TYPE: Review Of Multiple Medical Conditions     FACILITY:  [facility] [[id number]]       CHIEF COMPLAIN/REASON FOR VISIT:    Chief Complaint   Patient presents with     Review Of Multiple Medical Conditions               HISTORY OF PRESENT ILLNESS: Thea Acosta is a 83 y.o. female who was admitted  to  for weakness. She was found to be dyspneic, febrile, and tachypneic. She was diagnosed with sepsis s/t right foot cellulitis. Blood cultures grew Strep group B. She was treated with vanco and later ancef. She was also treated for acute CHF. During her stay she also underwent pacemaker battery change on . She has PMH of CAD, CKD, DM, s/p PPM. Prior to this she lived at home with her grand daughter. She was readmitted 3/7-3/12 for Acute encephalopathy, MIGUEL, CHF exacerbation, hypotension. She was treated for UTI and palliative care did see her during her stay. She wished to remain full code.     Today Ms. Acosta states she is feeling really good today. She saw the cardiology NP yesterday who thought she was doing pretty well but wanted her to come back every week and she cannot afford that she says. She does still have some fatigue but today feels a little more energetic. She is sleeping well in recliner and last night slept so well she didn't realize she had wet herself. She says her weight was up again today and she is a little frustrated by that. She says her appetite is finally back to normal and she is happy to be eating well again. She thinks the swelling in her legs is doing ok and denies feeling more short of breath today. She does say that they forgot to take her wraps off 2 nights ago and she slept with them on all night. She did make sure they took them off last night. She has another appt with cardiology  on 4/17. She says otherwise she has not had any pain and is doing very well. She admits that she has been crabby over the last few weeks to months because she has not felt well, but yesterday and today she says she is finally feeling more like herself and has been much more upbeat. She is working hard in therapy so she can go home soon. She is in much more positive spirits today. Per nursing concerned that weights are still trending up.       REVIEW OF SYSTEMS:  PROBLEMS AND REVIEW OF SYSTEMS:   Review of Systems  Today on ROS:   Currently, no fever, chills, or rigors. Does not have any visual or hearing problems. Denies any chest pain, headaches, palpitations, lightheadedness, dizziness. Appetite is fair.  Denies any abdominal pain. Denies any urinary symptoms. No active bleeding. Positive for edema ble, shortness of breath at baseline, sleeps in recliner, no pain today      Allergies   Allergen Reactions     Codeine Hives     Codeine Phosphate (Bulk)      This allergy is per [facility] records.     Codeine Sulfate      This allergy is per [facility] records.     Codeine-Butalbital-Asa-Caff      This allergy is per [facility] records.     Codeine-Guaifenesin      This allergy is per [facility] records.     Lisinopril Cough     Penicillins Swelling     Current Outpatient Prescriptions   Medication Sig     acetaminophen (TYLENOL) 500 MG tablet Take 2 tablets (1,000 mg total) by mouth 3 (three) times a day as needed for pain. Not to exceed 4000 mg in 24 hours.     aluminum-magnesium hydroxide-simethicone (MAALOX ADVANCED) 200-200-20 mg/5 mL Susp Take 30 mL by mouth every 6 (six) hours as needed.     aspirin 81 MG EC tablet Take 81 mg by mouth daily.     atorvastatin (LIPITOR) 80 MG tablet Take 1 tablet (80 mg total) by mouth at bedtime.     bumetanide (BUMEX) 1 MG tablet Take 2 mg in the  morning and 1 mg in the afternoon     cholecalciferol, vitamin D3, (VITAMIN D3) 2,000 unit Tab Take 2,000 Units by mouth once daily.     fluticasone-salmeterol (ADVAIR) 500-50 mcg/dose DISKUS Inhale 1 puff 2 (two) times a day.     folic acid (FOLVITE) 1 MG tablet Take 1 mg by mouth daily.     ipratropium-albuterol (COMBIVENT RESPIMAT)  mcg/actuation Mist inhaler Inhale 1 puff 4 (four) times a day.     levothyroxine (SYNTHROID, LEVOTHROID) 125 MCG tablet Take 125 mcg by mouth Daily at 6:00 am.      melatonin 3 mg Tab tablet Take 3 mg by mouth at bedtime.      menthol-zinc oxide (CALMOSEPTINE) 0.44-20.6 % Oint ointment Apply 1 application topically 4 (four) times a day as needed (to denuded areas of skin).     metoprolol tartrate (LOPRESSOR) 25 MG tablet Take 4 tablets (100 mg total) by mouth 2 (two) times a day.     nystatin (MYCOSTATIN) cream Apply 1 application topically 2 (two) times a day. Apply to thighs abd folds until resolved.     omeprazole (PRILOSEC) 40 MG capsule Take 20 mg by mouth daily before breakfast.      ondansetron (ZOFRAN) 4 MG tablet Take 4 mg by mouth every 8 (eight) hours as needed for nausea.     potassium chloride 40 mEq/15 mL Liqd Take 7.5 mL (20 mEq total) by mouth daily. daily     potassium chloride SA (K-DUR,KLOR-CON) 20 MEQ tablet Take 20 mEq by mouth daily.     traMADol (ULTRAM) 50 mg tablet Take 1 tablet (50 mg total) by mouth every 6 (six) hours as needed for pain.     white petrolatum-mineral oil (EUCERIN) Crea Apply 1 application topically every 4 (four) hours as needed. Apply to bilateral legs.     white petrolatum-mineral oil (EUCERIN) Crea Apply 1 application topically 2 (two) times a day. Apply to bilateral legs.     Past Medical History:    Past Medical History:   Diagnosis Date     Acute kidney injury superimposed on CKD 3/3/2017     Asthma      CAD (coronary artery disease)      Cataract      Chronic kidney disease     ckd3     Diabetes mellitus      Disease of thyroid  gland      H/O heart valve replacement with bioprosthetic valve      History of transfusion      Hypertension      Normochromic normocytic anemia      Restless leg syndrome            PHYSICAL EXAMINATION  Vitals:    03/21/18 2131   BP: 131/59   Pulse: 96   Resp: 20   Temp: (!) 96  F (35.6  C)   SpO2: 99%   Weight: (!) 228 lb (103.4 kg)       Today on physical exam:     GENERAL: Awake, Alert, obese, oriented x3, not in any form of acute distress, answers questions appropriately, follows simple commands, conversant  HEENT: Head is normocephalic with normal hair distribution. No evidence of trauma. Ears: No acute purulent discharge. Eyes: Conjunctivae pink with no scleral jaundice. Nose: Normal mucosa and septum. NECK: Supple with no cervical or supraclavicular lymphadenopathy. Trachea is midline.   CHEST: No tenderness or deformity, no crepitus, left chest incision for pacer c/d/i  LUNG: Dim to auscultation with good chest expansion. There are no crackles or wheezes, normal AP diameter.  BACK: No kyphosis of the thoracic spine. Symmetric, no curvature, ROM normal, no CVA tenderness, no spinal tenderness   CVS: There is good S1  S2, regular rhythm, there are no murmurs, rubs, gallops, or heaves,  2+ pulses symmetric in all extremities.  ABDOMEN: Rounded and soft, nontender to palpation, non distended, no masses, no organomegaly, good bowel sounds, no rebound or guarding, no peritoneal signs.   EXTREMITIES: 2+ ble pitting edema, dry flaking skin, erythema resolved from previous, no weeping areas  SKIN: Warm and dry  NEUROLOGICAL: The patient is oriented to person, place and time. Strength and sensation are grossly intact. Face is symmetric. More pleasant today            LABS:   Recent Results (from the past 168 hour(s))   Basic Metabolic Panel   Result Value Ref Range    Sodium 135 (L) 136 - 145 mmol/L    Potassium 3.9 3.5 - 5.0 mmol/L    Chloride 93 (L) 98 - 107 mmol/L    CO2 35 (H) 22 - 31 mmol/L    Anion Gap,  Calculation 7 5 - 18 mmol/L    Glucose 135 (H) 70 - 125 mg/dL    Calcium 9.8 8.5 - 10.5 mg/dL    BUN 46 (H) 8 - 28 mg/dL    Creatinine 1.86 (H) 0.60 - 1.10 mg/dL    GFR MDRD Af Amer 31 (L) >60 mL/min/1.73m2    GFR MDRD Non Af Amer 26 (L) >60 mL/min/1.73m2   HM1 (CBC with Diff)   Result Value Ref Range    WBC 5.1 4.0 - 11.0 thou/uL    RBC 2.55 (L) 3.80 - 5.40 mill/uL    Hemoglobin 8.6 (L) 12.0 - 16.0 g/dL    Hematocrit 25.7 (L) 35.0 - 47.0 %     (H) 80 - 100 fL    MCH 33.7 27.0 - 34.0 pg    MCHC 33.5 32.0 - 36.0 g/dL    RDW 19.9 (H) 11.0 - 14.5 %    Platelets 201 140 - 440 thou/uL    MPV 10.8 8.5 - 12.5 fL    Neutrophils % 61 50 - 70 %    Lymphocytes % 26 20 - 40 %    Monocytes % 8 2 - 10 %    Eosinophils % 4 0 - 6 %    Basophils % 1 0 - 2 %    Neutrophils Absolute 3.1 2.0 - 7.7 thou/uL    Lymphocytes Absolute 1.3 0.8 - 4.4 thou/uL    Monocytes Absolute 0.4 0.0 - 0.9 thou/uL    Eosinophils Absolute 0.2 0.0 - 0.4 thou/uL    Basophils Absolute 0.0 0.0 - 0.2 thou/uL     Results for orders placed or performed in visit on 03/19/18   Basic Metabolic Panel   Result Value Ref Range    Sodium 135 (L) 136 - 145 mmol/L    Potassium 3.9 3.5 - 5.0 mmol/L    Chloride 93 (L) 98 - 107 mmol/L    CO2 35 (H) 22 - 31 mmol/L    Anion Gap, Calculation 7 5 - 18 mmol/L    Glucose 135 (H) 70 - 125 mg/dL    Calcium 9.8 8.5 - 10.5 mg/dL    BUN 46 (H) 8 - 28 mg/dL    Creatinine 1.86 (H) 0.60 - 1.10 mg/dL    GFR MDRD Af Amer 31 (L) >60 mL/min/1.73m2    GFR MDRD Non Af Amer 26 (L) >60 mL/min/1.73m2         Lab Results   Component Value Date    WBC 5.1 03/19/2018    HGB 8.6 (L) 03/19/2018    HCT 25.7 (L) 03/19/2018     (H) 03/19/2018     03/19/2018       No results found for: [id number]  Lab Results   Component Value Date    HGBA1C 6.0 03/08/2018     Lab Results   Component Value Date    INR 1.30 (H) 02/07/2018    INR 1.51 (H) 03/06/2017    INR 1.27 (H) 03/05/2017     Vitamin D, Total (25-Hydroxy)   Date Value Ref Range Status    02/19/2018 56.7 30.0 - 80.0 ng/mL Final     Lab Results   Component Value Date    TSH 3.50 06/01/2017           ASSESSMENT/PLAN:    1. Sepsis, s/t right foot cellulitis: Completed antibiotics and appears resolved.    2. Acute on chronic CHF: Daily [id number] [phone]. On bumex 2mg in am, 1mg at noon. F/u with cardiology on 3/21-no recommended changes, f/u cardiology again 4/17. Keep legs elevated, ACE wrap legs. ACE wraps have been worn past few days and edema is improved some, continues to have fatigue, sob at baseline. However weights still trending up will increase bumex to 2mg bid for few days and monitor. BMP ordered 4/2.   3. S/p PPM: Battery change on 2/12. Incision c/d/i. No concerns. F/u with device clinic 5/15.   4. Mod persistent Asthma: On advair, combivent. SOB at baseline, off O2 now.  5. MIGUEL on CKD: Cr 1.49 on 3/12. BMP 3/19-Cr 1.86. BMP on 4/2.   6. Type 2 DM: Diet controlled. Monitor on labs.   7. Dyslipidemia: On aspirin, atorvastatin  8. HTN: SBP 130s. On lopressor, bumex.   9. GERD: On omeprazole. mylanta prn indigestion.   10. Hypothyroid: On levothyroxine.   11. Insomnia: On melatonin qhs.   12. Hypokalemia: On kcl.  K 4.0 on 3/12.   13. Constipation: On pericolace daily prn, miralax daily.    14. Vitamin d deficiency: On vitamin d. Level 2/19-56.7, stable.    15. Anemia of CKD: Hg 8.1 on 3/11. HM1 on 3/19-8.6. Recheck hm1 on 4/2.    16. PAF: Regular rate and rhythm. On metoprolol rate controlled 60s.   17. Onychomycosis: Pulled off socks to look at right foot edema and previous cellulitis, toenail on big toe is falling off. Other nails need trimmed, appear fungal. Consulted podiatry to see patient, paint big toenail with betadine daily and notify if falls off.  Has an appt 3/27.   18. Chronic pain: Pain in legs mostly, tylenol tid prn and tramadol prn.    Per therapy ambulating 25 ft with CGA, 4 ww, sit to stand max assist, max for toileting, set up for  upper body adls, max for lower. She plans to return home with grand daughter.      Electronically signed by: Jennifer Valdes NP    Total 25 minutes of which 75% was spent in counseling and coordination of care of the above plan    Time spent in interview and examination of patient, review of available records, and discussion with nursing staff. Continue care plan, efforts at therapy, and monitor nutritional status.

## 2021-06-16 NOTE — PROGRESS NOTES
[facility]      Facility:    [facility] [[id number]]  Code Status: FULL CODE      Chief Complaint/Reason for Visit:  Chief Complaint   Patient presents with     H & P       HPI:   Thea is a 83 y.o. female who was recently readmitted due to worsening of her general condition including hypotension, dehydration, and worsening kidney function as well as shortness of breath.  She does have underlying chronic congestive heart failure.  He has been difficult to balance the adequate fluid requirements as well as diuresis for congestive heart failure.  She is recovering from cellulitis of the leg.    Current review of systems is without fever or chills nor nasal congestion or sore throat nor chest pain or cough or shortness of breath.  No abdominal pain or nausea.  No diarrhea or constipation currently.  No dysuria.  The leg infection has resolved.    Past Medical History:  Past Medical History:   Diagnosis Date     Acute kidney injury superimposed on CKD 3/3/2017     Asthma      CAD (coronary artery disease)      Cataract      Chronic kidney disease     ckd3     Diabetes mellitus      Disease of thyroid gland      H/O heart valve replacement with bioprosthetic valve      History of transfusion      Hypertension      Normochromic normocytic anemia      Restless leg syndrome            Surgical History:  Past Surgical History:   Procedure Laterality Date     APPENDECTOMY       CHOLECYSTECTOMY       EYE SURGERY Bilateral     Cataracts     HIP PINNING Right 3/1/2017    Procedure: RIGHT HIP TROCHANTERIC RODDING;  Surgeon: Moshe Davies MD;  Location: [facility];  Service:      INSERT / REPLACE / REMOVE PACEMAKER  06/25/2007    guidant     INSERT / REPLACE / REMOVE PACEMAKER  02/07/2018    phoebe sci gen change     JOINT REPLACEMENT Left     knee     TISSUE AORTIC VALVE REPLACEMENT  06/25/2007            Family History:   Family History   Problem Relation Age of Onset     No  "Medical Problems Mother      age 86     No Medical Problems Father      MVA     Cancer Brother      lung     No Medical Problems Brother        Social History:    Social History     Social History     Marital status:      Spouse name: N/A     Number of children: N/A     Years of education: N/A     Occupational History      in operating room      retired     Social History Main Topics     Smoking status: Former Smoker     Smokeless tobacco: Never Used     Alcohol use No     Drug use: No     Sexual activity: No     Other Topics Concern     Not on file     Social History Narrative    She live in a single floor house.  Her son lives next door and her granddaughter is \"around a lot\"   3/2017          Review of Systems   All other systems reviewed and are negative.      Vitals:    03/13/18 2230   BP: 111/55   Pulse: 60   Resp: 20   Temp: (!) 96.2  F (35.7  C)   SpO2: 94%       Physical Exam   Constitutional: No distress.   HENT:   Mouth/Throat: Oropharynx is clear and moist.   Eyes: Right eye exhibits no discharge. Left eye exhibits no discharge.   Neck: No JVD present. No thyromegaly present.   Cardiovascular: Normal rate.    Pulmonary/Chest: Effort normal and breath sounds normal.   Abdominal: Soft. Bowel sounds are normal. She exhibits no distension. There is no tenderness.   Musculoskeletal:   Trace edema of legs   Lymphadenopathy:     She has no cervical adenopathy.   Neurological: She is alert.   Skin:   There is some redness of the leg but no purulent drainage.  No warmth.   Psychiatric: She has a normal mood and affect.   Nursing note and vitals reviewed.      Medication List:  Current Outpatient Prescriptions   Medication Sig     acetaminophen (TYLENOL) 500 MG tablet Take 2 tablets (1,000 mg total) by mouth 3 (three) times a day as needed for pain. Not to exceed 4000 mg in 24 hours.     aluminum-magnesium hydroxide-simethicone (MAALOX ADVANCED) 200-200-20 mg/5 mL Susp Take 30 mL by mouth every 6 " (six) hours as needed.     aspirin 81 MG EC tablet Take 81 mg by mouth daily.     atorvastatin (LIPITOR) 80 MG tablet Take 1 tablet (80 mg total) by mouth at bedtime.     bumetanide (BUMEX) 1 MG tablet Take 1 tablet (1 mg total) by mouth 2 (two) times a day at 9am and 6pm.     cholecalciferol, vitamin D3, (VITAMIN D3) 2,000 unit Tab Take 2,000 Units by mouth once daily.     ciprofloxacin HCl (CIPRO) 250 MG tablet Take 1 tablet (250 mg total) by mouth 2 (two) times a day for 3 days.     fluticasone-salmeterol (ADVAIR) 500-50 mcg/dose DISKUS Inhale 1 puff 2 (two) times a day.     folic acid (FOLVITE) 1 MG tablet Take 1 mg by mouth daily.     ipratropium-albuterol (COMBIVENT RESPIMAT)  mcg/actuation Mist inhaler Inhale 1 puff 4 (four) times a day.     levothyroxine (SYNTHROID, LEVOTHROID) 125 MCG tablet Take 125 mcg by mouth Daily at 6:00 am.      melatonin 3 mg Tab tablet Take 3 mg by mouth at bedtime.      menthol-zinc oxide (CALMOSEPTINE) 0.44-20.6 % Oint ointment Apply 1 application topically 4 (four) times a day as needed (to denuded areas of skin).     metoprolol tartrate (LOPRESSOR) 25 MG tablet Take 4 tablets (100 mg total) by mouth 2 (two) times a day.     nystatin (MYCOSTATIN) cream Apply 1 application topically 2 (two) times a day. Apply to thighs abd folds until resolved.     omeprazole (PRILOSEC) 40 MG capsule Take 20 mg by mouth daily before breakfast.      ondansetron (ZOFRAN) 4 MG tablet Take 4 mg by mouth every 8 (eight) hours as needed for nausea.     potassium chloride 40 mEq/15 mL Liqd Take 7.5 mL (20 mEq total) by mouth daily. daily     potassium chloride SA (K-DUR,KLOR-CON) 20 MEQ tablet Take 20 mEq by mouth daily.     traMADol (ULTRAM) 50 mg tablet Take 1 tablet (50 mg total) by mouth every 6 (six) hours as needed for pain.     white petrolatum-mineral oil (EUCERIN) Crea Apply 1 application topically every 4 (four) hours as needed. Apply to bilateral legs.     white petrolatum-mineral oil  (EUCERIN) Crea Apply 1 application topically 2 (two) times a day. Apply to bilateral legs.       Labs:  No new laboratory testing     Assessment:    ICD-10-CM    1. Chronic diastolic CHF (congestive heart failure) I50.32    2. PAF (paroxysmal atrial fibrillation) I48.0    3. MIGUEL (acute kidney injury) N17.9    4. CKD (chronic kidney disease), stage III N18.3        Plan:  Continue with the reduced dose of metoprolol at 25 mg twice daily compared to previous 150 mg twice daily.  Continue with the lower dose of diuretic and monitoring of fluid status and blood testings.  Continue with therapy for strengthening and stability.      Electronically signed by: Sami Palma MD

## 2021-06-17 NOTE — PROGRESS NOTES
[facility]    DATE: 2018    NAME:  Thea Acosta             :  1934  MRN: [id number]  CODE STATUS:  FULL CODE    VISIT TYPE: Review Of Multiple Medical Conditions     FACILITY:  [facility] [[id number]]       CHIEF COMPLAIN/REASON FOR VISIT:    Chief Complaint   Patient presents with     Review Of Multiple Medical Conditions               HISTORY OF PRESENT ILLNESS: Thea Acosta is a 84 y.o. female who was admitted  to  for weakness. She was found to be dyspneic, febrile, and tachypneic. She was diagnosed with sepsis s/t right foot cellulitis. Blood cultures grew Strep group B. She was treated with vanco and later ancef. She was also treated for acute CHF. During her stay she also underwent pacemaker battery change on . She has PMH of CAD, CKD, DM, s/p PPM. Prior to this she lived at home with her grand daughter. She was readmitted 3/7-3/12 for Acute encephalopathy, MIGUEL, CHF exacerbation, hypotension. She was treated for UTI and palliative care did see her during her stay. She wished to remain full code.     Today Ms. Acosta is seen as OT is re applying her leg lymphedema wraps. Per OT there was a small red bump on her left ankle area she was concerned about but it is no larger today and looks stable. On her right foot she had a few blisters on her toes but they have all ruptured and appear to be healing. Ms. Acosta states she can see her ankles and knees again and is very pleased with the wraps. She thinks they are working well for her. She says her bowels are moving well and appetite is good. She has no other concerns today and feels good. She is ready to go home. Per staff vitals are stable and weight doing well 241lbs today. She did have labs drawn today.       REVIEW OF SYSTEMS:  PROBLEMS AND REVIEW OF SYSTEMS:   Review of Systems  Today on ROS:   Currently, no fever, chills, or rigors. Does not have any visual or hearing  problems. Denies any chest pain, headaches, palpitations, lightheadedness, dizziness. Appetite is fair.  Denies any abdominal pain. Denies any urinary symptoms. No active bleeding. Positive for edema ble, shortness of breath at baseline, sleeps in recliner      Allergies   Allergen Reactions     Codeine Hives     Codeine Phosphate (Bulk)      This allergy is per [facility] records.     Codeine Sulfate      This allergy is per [facility] records.     Codeine-Butalbital-Asa-Caff      This allergy is per [facility] records.     Codeine-Guaifenesin      This allergy is per [facility] records.     Lisinopril Cough     Penicillins Swelling     Current Outpatient Prescriptions   Medication Sig     acetaminophen (TYLENOL) 500 MG tablet Take 2 tablets (1,000 mg total) by mouth 3 (three) times a day as needed for pain. Not to exceed 4000 mg in 24 hours.     aluminum-magnesium hydroxide-simethicone (MAALOX ADVANCED) 200-200-20 mg/5 mL Susp Take 30 mL by mouth every 6 (six) hours as needed.     aspirin 81 MG EC tablet Take 81 mg by mouth daily.     atorvastatin (LIPITOR) 80 MG tablet Take 1 tablet (80 mg total) by mouth at bedtime.     bumetanide (BUMEX) 2 MG tablet Take 2 mg by mouth 2 (two) times a day at 9am and 6pm.      cholecalciferol, vitamin D3, (VITAMIN D3) 2,000 unit Tab Take 2,000 Units by mouth once daily.     ferrous sulfate 325 (65 FE) MG tablet Take 1 tablet by mouth 2 (two) times a day.     fluticasone-salmeterol (ADVAIR) 500-50 mcg/dose DISKUS Inhale 1 puff 2 (two) times a day.     folic acid (FOLVITE) 1 MG tablet Take 1 mg by mouth daily.     ipratropium-albuterol (COMBIVENT RESPIMAT)  mcg/actuation Mist inhaler Inhale 1 puff 4 (four) times a day.     levothyroxine (SYNTHROID, LEVOTHROID) 125 MCG tablet Take 125 mcg by mouth Daily at 6:00 am.      melatonin 3 mg Tab tablet  Take 3 mg by mouth at bedtime.      menthol-zinc oxide (CALMOSEPTINE) 0.44-20.6 % Oint ointment Apply 1 application topically 4 (four) times a day as needed (to denuded areas of skin).     metOLazone (ZAROXOLYN) 2.5 MG tablet Take 2.5 mg by mouth as needed (If weight rises 5 pounds from baseline).     metoprolol tartrate (LOPRESSOR) 50 MG tablet Take 50 mg by mouth 2 (two) times a day.     nystatin (MYCOSTATIN) cream Apply 1 application topically 2 (two) times a day. Apply to thighs abd folds until resolved.     omeprazole (PRILOSEC) 40 MG capsule Take 20 mg by mouth daily before breakfast.      ondansetron (ZOFRAN) 4 MG tablet Take 4 mg by mouth every 8 (eight) hours as needed for nausea.     potassium chloride SA (K-DUR,KLOR-CON) 20 MEQ tablet Take 20 mEq by mouth daily.     white petrolatum-mineral oil (EUCERIN) Crea Apply 1 application topically every 4 (four) hours as needed. Apply to bilateral legs.     white petrolatum-mineral oil (EUCERIN) Crea Apply 1 application topically 2 (two) times a day. Apply to bilateral legs.     Past Medical History:    Past Medical History:   Diagnosis Date     Acute kidney injury superimposed on CKD 3/3/2017     Asthma      CAD (coronary artery disease)      Cataract      Chronic kidney disease     ckd3     Diabetes mellitus      Disease of thyroid gland      H/O heart valve replacement with bioprosthetic valve      History of transfusion      Hypertension      Normochromic normocytic anemia      Restless leg syndrome            PHYSICAL EXAMINATION  Vitals:    04/22/18 2007   BP: 123/59   Pulse: 60   Resp: 20   Temp: 97.8  F (36.6  C)   SpO2: 91%   Weight: (!) 241 lb (109.3 kg)       Today on physical exam:     GENERAL: Awake, Alert, obese, oriented x3, not in any form of acute distress, answers questions appropriately, follows simple commands, conversant  HEENT: Head is normocephalic with normal hair distribution. No evidence of trauma. Ears: No acute purulent discharge. Eyes:  Conjunctivae pink with no scleral jaundice. Nose: Normal mucosa and septum. NECK: Supple with no cervical or supraclavicular lymphadenopathy. Trachea is midline.   CHEST: No tenderness or deformity, no crepitus, left chest incision for pacer c/d/i  LUNG: Dim to auscultation with good chest expansion. There are no crackles or wheezes, normal AP diameter.  BACK: No kyphosis of the thoracic spine. Symmetric, no curvature, ROM normal, no CVA tenderness, no spinal tenderness   CVS: There is good S1  S2, regular rhythm, there are no murmurs, rubs, gallops, or heaves,  2+ pulses symmetric in all extremities.  ABDOMEN: Rounded and soft, nontender to palpation, non distended, no masses, no organomegaly, good bowel sounds, no rebound or guarding, no peritoneal signs.   EXTREMITIES: 1-2+ ble nonpitting edema, dry flaking skin  SKIN: Warm and dry  NEUROLOGICAL: The patient is oriented to person, place and time. Strength and sensation are grossly intact. Face is symmetric. More pleasant today            LABS:   Recent Results (from the past 168 hour(s))   Basic Metabolic Panel   Result Value Ref Range    Sodium 138 136 - 145 mmol/L    Potassium 3.1 (L) 3.5 - 5.0 mmol/L    Chloride 89 (L) 98 - 107 mmol/L    CO2 40 (H) 22 - 31 mmol/L    Anion Gap, Calculation 9 5 - 18 mmol/L    Glucose 100 70 - 125 mg/dL    Calcium 9.4 8.5 - 10.5 mg/dL    BUN 47 (H) 8 - 28 mg/dL    Creatinine 1.66 (H) 0.60 - 1.10 mg/dL    GFR MDRD Af Amer 36 (L) >60 mL/min/1.73m2    GFR MDRD Non Af Amer 29 (L) >60 mL/min/1.73m2   HM1 (CBC with Diff)   Result Value Ref Range    WBC 5.2 4.0 - 11.0 thou/uL    RBC 2.74 (L) 3.80 - 5.40 mill/uL    Hemoglobin 8.5 (L) 12.0 - 16.0 g/dL    Hematocrit 28.0 (L) 35.0 - 47.0 %     (H) 80 - 100 fL    MCH 31.0 27.0 - 34.0 pg    MCHC 30.4 (L) 32.0 - 36.0 g/dL    RDW 17.0 (H) 11.0 - 14.5 %    Platelets 170 140 - 440 thou/uL    MPV 10.4 8.5 - 12.5 fL    Neutrophils % 63 50 - 70 %    Lymphocytes % 22 20 - 40 %    Monocytes % 9  2 - 10 %    Eosinophils % 5 0 - 6 %    Basophils % 0 0 - 2 %    Neutrophils Absolute 3.3 2.0 - 7.7 thou/uL    Lymphocytes Absolute 1.2 0.8 - 4.4 thou/uL    Monocytes Absolute 0.5 0.0 - 0.9 thou/uL    Eosinophils Absolute 0.3 0.0 - 0.4 thou/uL    Basophils Absolute 0.0 0.0 - 0.2 thou/uL     Results for orders placed or performed in visit on 04/16/18   Basic Metabolic Panel   Result Value Ref Range    Sodium 138 136 - 145 mmol/L    Potassium 3.1 (L) 3.5 - 5.0 mmol/L    Chloride 89 (L) 98 - 107 mmol/L    CO2 40 (H) 22 - 31 mmol/L    Anion Gap, Calculation 9 5 - 18 mmol/L    Glucose 100 70 - 125 mg/dL    Calcium 9.4 8.5 - 10.5 mg/dL    BUN 47 (H) 8 - 28 mg/dL    Creatinine 1.66 (H) 0.60 - 1.10 mg/dL    GFR MDRD Af Amer 36 (L) >60 mL/min/1.73m2    GFR MDRD Non Af Amer 29 (L) >60 mL/min/1.73m2         Lab Results   Component Value Date    WBC 5.2 04/16/2018    HGB 8.5 (L) 04/16/2018    HCT 28.0 (L) 04/16/2018     (H) 04/16/2018     04/16/2018       No results found for: [id number]  Lab Results   Component Value Date    HGBA1C 6.0 03/08/2018     Lab Results   Component Value Date    INR 1.30 (H) 02/07/2018    INR 1.51 (H) 03/06/2017    INR 1.27 (H) 03/05/2017     Vitamin D, Total (25-Hydroxy)   Date Value Ref Range Status   02/19/2018 56.7 30.0 - 80.0 ng/mL Final     Lab Results   Component Value Date    TSH 3.50 06/01/2017           ASSESSMENT/PLAN:     1 Acute on chronic CHF: Daily [id number]. On bumex 2mg in am and 2mg qpm. F/u with cardiology on 3/21-no recommended changes, f/u cardiology again 4/17-changed metolazone to three times weekly prn. Doing well. Keep legs elevated. Sleeps in recliner. OT lymphedema wraps. 1-2+ nonpitting edema.   2. Fall: Two weeks ago now, periorbital ecchymosis-resolved. Resolved arm pain.    3. S/p PPM: Battery change on 2/12. Incision c/d/i. No concerns. F/u with device clinic 5/15.   4. Mod  persistent Asthma: On advair, combivent. SOB at baseline, off O2 now.  5. MIGUEL on CKD: Cr 1.49 on 3/12. BMP 3/19-Cr 1.86. BMP on 4/2-1.38, much improved. Recheck 4/9-Cr 1.4. Cr 1.66 on 4/16. Cr 1.5 on 4/23, stable.   6. Type 2 DM: Diet controlled. Monitor on labs.   7. Dyslipidemia: On aspirin, atorvastatin  8. HTN: SBP 130s. On lopressor, bumex.   9. GERD: On omeprazole. mylanta prn indigestion.   10. Hypothyroid: On levothyroxine.   11. Insomnia: On melatonin qhs.   12. Hypokalemia: On kcl.  K 4.0 on 3/12.   13. Constipation: On pericolace daily prn, miralax daily.    14. Vitamin d deficiency: On vitamin d. Level 2/19-56.7, stable.    15. Anemia of CKD: Hg 8.1 on 3/11. HM1 on 3/19-8.6. Recheck hm1 on 4/2-7.8.  Recheck on 4/9-Hg 8.1. HM1 on 4/16-Hg 8.5. Increased iron bid. Hg 9.2 on 4/23.   16. PAF: Regular rate and rhythm. On metoprolol   17. Onychomycosis: Saw podiatry 3/27-right great toenail fell off, no new orders and continue to monitor.   18. Chronic pain: Pain in legs mostly, tylenol tid prn and tramadol prn.  19. Hemorrhoids: anusol cream prn.     Per therapy ambulating 30-80 ft with CGA, 4 ww, transfers min to mod, setup for upper body dressing, min to mod for lower body dressing. 4 steps with therapy, home eval later this week. She plans to return home with grand daughter. Tentative 1-2 weeks.        Electronically signed by: Jennifer Valdes NP    Total 25 minutes of which 75% was spent in counseling and coordination of care of the above plan    Time spent in interview and examination of patient, review of available records, and discussion with nursing staff. Continue care plan, efforts at therapy, and monitor nutritional status.

## 2021-06-17 NOTE — PROGRESS NOTES
[facility]    DATE: 2018    NAME:  Thea Acosta             :  1934  MRN: [id number]  CODE STATUS:  FULL CODE    VISIT TYPE: Problem Visit (weight gain)     FACILITY:  [facility] [[id number]]       CHIEF COMPLAIN/REASON FOR VISIT:    Chief Complaint   Patient presents with     Problem Visit     weight gain               HISTORY OF PRESENT ILLNESS: Thea Acosta is a 84 y.o. female who was admitted  to  for weakness. She was found to be dyspneic, febrile, and tachypneic. She was diagnosed with sepsis s/t right foot cellulitis. Blood cultures grew Strep group B. She was treated with vanco and later ancef. She was also treated for acute CHF. During her stay she also underwent pacemaker battery change on . She has PMH of CAD, CKD, DM, s/p PPM. Prior to this she lived at home with her grand daughter. She was readmitted 3/7-3/12 for Acute encephalopathy, MIGUEL, CHF exacerbation, hypotension. She was treated for UTI and palliative care did see her during her stay. She wished to remain full code.     Today Ms. Acosta says she is worried since she has had a weight gain of 6-7 lbs the past few days. She says her legs are more swollen but her shortness of breath seems stable. She says the nurse brought in an extra water pill for her today. She thought she was doing really well so upset about this set back. She says otherwise she is sleeping fine and says no change to her appetite recently. She is not coughing or having a sore throat at all. She denies other concerns today. Per staff weight 247lbs today was 240lbs a few days ago.       REVIEW OF SYSTEMS:  PROBLEMS AND REVIEW OF SYSTEMS:   Review of Systems  Today on ROS:   Currently, no fever, chills, or rigors. Does not have any visual or hearing problems. Denies any chest pain, headaches, palpitations, lightheadedness, dizziness. Appetite is fair.  Denies any abdominal pain. Denies any  urinary symptoms. No active bleeding. Positive for edema ble, shortness of breath at baseline, sleeps in recliner      Allergies   Allergen Reactions     Codeine Hives     Codeine Phosphate (Bulk)      This allergy is per [facility] records.     Codeine Sulfate      This allergy is per [facility] records.     Codeine-Butalbital-Asa-Caff      This allergy is per [facility] records.     Codeine-Guaifenesin      This allergy is per [facility] records.     Lisinopril Cough     Penicillins Swelling     Current Outpatient Prescriptions   Medication Sig     acetaminophen (TYLENOL) 500 MG tablet Take 2 tablets (1,000 mg total) by mouth 3 (three) times a day as needed for pain. Not to exceed 4000 mg in 24 hours.     aluminum-magnesium hydroxide-simethicone (MAALOX ADVANCED) 200-200-20 mg/5 mL Susp Take 30 mL by mouth every 6 (six) hours as needed.     aspirin 81 MG EC tablet Take 81 mg by mouth daily.     atorvastatin (LIPITOR) 80 MG tablet Take 1 tablet (80 mg total) by mouth at bedtime.     bumetanide (BUMEX) 2 MG tablet Take 2 mg by mouth 2 (two) times a day at 9am and 6pm.      cholecalciferol, vitamin D3, (VITAMIN D3) 2,000 unit Tab Take 2,000 Units by mouth once daily.     ferrous sulfate 325 (65 FE) MG tablet Take 1 tablet by mouth 2 (two) times a day.     fluticasone-salmeterol (ADVAIR) 500-50 mcg/dose DISKUS Inhale 1 puff 2 (two) times a day.     folic acid (FOLVITE) 1 MG tablet Take 1 mg by mouth daily.     ipratropium-albuterol (COMBIVENT RESPIMAT)  mcg/actuation Mist inhaler Inhale 1 puff 4 (four) times a day.     levothyroxine (SYNTHROID, LEVOTHROID) 125 MCG tablet Take 125 mcg by mouth Daily at 6:00 am.      melatonin 3 mg Tab tablet Take 3 mg by mouth at bedtime.      menthol-zinc oxide (CALMOSEPTINE) 0.44-20.6 % Oint ointment Apply 1 application topically 4 (four) times a  day as needed (to denuded areas of skin).     metOLazone (ZAROXOLYN) 2.5 MG tablet Take 2.5 mg by mouth 2 (two) times a week.      metoprolol tartrate (LOPRESSOR) 50 MG tablet Take 50 mg by mouth 2 (two) times a day.     nystatin (MYCOSTATIN) cream Apply 1 application topically 2 (two) times a day. Apply to thighs abd folds until resolved.     omeprazole (PRILOSEC) 40 MG capsule Take 20 mg by mouth daily before breakfast.      potassium chloride SA (K-DUR,KLOR-CON) 20 MEQ tablet Take 20 mEq by mouth daily.     white petrolatum-mineral oil (EUCERIN) Crea Apply 1 application topically every 4 (four) hours as needed. Apply to bilateral legs.     white petrolatum-mineral oil (EUCERIN) Crea Apply 1 application topically 2 (two) times a day. Apply to bilateral legs.     Past Medical History:    Past Medical History:   Diagnosis Date     Acute kidney injury superimposed on CKD 3/3/2017     Asthma      CAD (coronary artery disease)      Cataract      Chronic kidney disease     ckd3     Diabetes mellitus      Disease of thyroid gland      H/O heart valve replacement with bioprosthetic valve      History of transfusion      Hypertension      Normochromic normocytic anemia      Restless leg syndrome            PHYSICAL EXAMINATION  Vitals:    04/26/18 2213   BP: 112/55   Pulse: 60   Resp: 17   Temp: 97  F (36.1  C)   SpO2: 97%   Weight: (!) 247 lb (112 kg)       Today on physical exam:     GENERAL: Awake, Alert, obese, oriented x3, not in any form of acute distress, answers questions appropriately, follows simple commands, conversant  HEENT: Head is normocephalic with normal hair distribution. No evidence of trauma. Ears: No acute purulent discharge. Eyes: Conjunctivae pink with no scleral jaundice. Nose: Normal mucosa and septum. NECK: Supple with no cervical or supraclavicular lymphadenopathy. Trachea is midline.   CHEST: No tenderness or deformity, no crepitus, left chest incision for pacer c/d/i  LUNG: Dim to auscultation  with good chest expansion. There are no crackles or wheezes, normal AP diameter.  BACK: No kyphosis of the thoracic spine. Symmetric, no curvature, ROM normal, no CVA tenderness, no spinal tenderness   CVS: There is good S1  S2, regular rhythm, there are no murmurs, rubs, gallops, or heaves,  2+ pulses symmetric in all extremities.  ABDOMEN: Rounded and soft, nontender to palpation, non distended, no masses, no organomegaly, good bowel sounds, no rebound or guarding, no peritoneal signs.   EXTREMITIES: 1-2+ ble nonpitting edema, dry flaking skin  SKIN: Warm and dry  NEUROLOGICAL: The patient is oriented to person, place and time. Strength and sensation are grossly intact. Face is symmetric. More pleasant today            LABS:   Recent Results (from the past 168 hour(s))   Basic Metabolic Panel   Result Value Ref Range    Sodium 139 136 - 145 mmol/L    Potassium 3.5 3.5 - 5.0 mmol/L    Chloride 94 (L) 98 - 107 mmol/L    CO2 32 (H) 22 - 31 mmol/L    Anion Gap, Calculation 13 5 - 18 mmol/L    Glucose 106 70 - 125 mg/dL    Calcium 9.9 8.5 - 10.5 mg/dL    BUN 45 (H) 8 - 28 mg/dL    Creatinine 1.50 (H) 0.60 - 1.10 mg/dL    GFR MDRD Af Amer 40 (L) >60 mL/min/1.73m2    GFR MDRD Non Af Amer 33 (L) >60 mL/min/1.73m2   HM1 (CBC with Diff)   Result Value Ref Range    WBC 6.8 4.0 - 11.0 thou/uL    RBC 3.08 (L) 3.80 - 5.40 mill/uL    Hemoglobin 9.2 (L) 12.0 - 16.0 g/dL    Hematocrit 31.7 (L) 35.0 - 47.0 %     (H) 80 - 100 fL    MCH 29.9 27.0 - 34.0 pg    MCHC 29.0 (L) 32.0 - 36.0 g/dL    RDW 16.4 (H) 11.0 - 14.5 %    Platelets 160 140 - 440 thou/uL    MPV 10.7 8.5 - 12.5 fL    Neutrophils % 75 (H) 50 - 70 %    Lymphocytes % 14 (L) 20 - 40 %    Monocytes % 7 2 - 10 %    Eosinophils % 4 0 - 6 %    Basophils % 0 0 - 2 %    Neutrophils Absolute 5.1 2.0 - 7.7 thou/uL    Lymphocytes Absolute 0.9 0.8 - 4.4 thou/uL    Monocytes Absolute 0.5 0.0 - 0.9 thou/uL    Eosinophils Absolute 0.3 0.0 - 0.4 thou/uL    Basophils Absolute 0.0  0.0 - 0.2 thou/uL     Results for orders placed or performed in visit on 04/23/18   Basic Metabolic Panel   Result Value Ref Range    Sodium 139 136 - 145 mmol/L    Potassium 3.5 3.5 - 5.0 mmol/L    Chloride 94 (L) 98 - 107 mmol/L    CO2 32 (H) 22 - 31 mmol/L    Anion Gap, Calculation 13 5 - 18 mmol/L    Glucose 106 70 - 125 mg/dL    Calcium 9.9 8.5 - 10.5 mg/dL    BUN 45 (H) 8 - 28 mg/dL    Creatinine 1.50 (H) 0.60 - 1.10 mg/dL    GFR MDRD Af Amer 40 (L) >60 mL/min/1.73m2    GFR MDRD Non Af Amer 33 (L) >60 mL/min/1.73m2         Lab Results   Component Value Date    WBC 6.8 04/23/2018    HGB 9.2 (L) 04/23/2018    HCT 31.7 (L) 04/23/2018     (H) 04/23/2018     04/23/2018       No results found for: [id number]  Lab Results   Component Value Date    HGBA1C 6.0 03/08/2018     Lab Results   Component Value Date    INR 1.30 (H) 02/07/2018    INR 1.51 (H) 03/06/2017    INR 1.27 (H) 03/05/2017     Vitamin D, Total (25-Hydroxy)   Date Value Ref Range Status   02/19/2018 56.7 30.0 - 80.0 ng/mL Final     Lab Results   Component Value Date    TSH 3.50 06/01/2017           ASSESSMENT/PLAN:     1 Acute on chronic CHF: Daily [id number]. On bumex 2mg in am and 2mg qpm. F/u with cardiology on 3/21-no recommended changes, f/u cardiology again 4/17-changed metolazone to three times weekly prn. Doing well. Keep legs elevated. Sleeps in recliner. OT lymphedema wraps. 1-2+ nonpitting edema. Staff has not been giving prn metolazone, will change to scheduled mondays and Fridays and monitor. Give one dose today.   2. Fall: Resolved ecchymosis on face, arm pain.    3. S/p PPM: Battery change on 2/12. Incision c/d/i. No concerns. F/u with device clinic 5/15.   4. Mod persistent Asthma: On advair, combivent. SOB at baseline, off O2 now.  5. MIGUEL on CKD: Cr 1.49 on 3/12. BMP 3/19-Cr 1.86. BMP on 4/2-1.38, much improved. Recheck 4/9-Cr 1.4. Cr 1.66  on 4/16. Cr 1.5 on 4/23, stable.   6. Type 2 DM: Diet controlled. Monitor on labs.   7. Dyslipidemia: On aspirin, atorvastatin  8. HTN: SBP 110s. On lopressor, bumex.   9. GERD: On omeprazole. mylanta prn indigestion.   10. Hypothyroid: On levothyroxine.   11. Insomnia: On melatonin qhs.   12. Hypokalemia: On kcl.  K 4.0 on 3/12.   13. Constipation: On pericolace daily prn, miralax daily.    14. Vitamin d deficiency: On vitamin d. Level 2/19-56.7, stable.    15. Anemia of CKD: Hg 8.1 on 3/11. HM1 on 3/19-8.6. Recheck hm1 on 4/2-7.8.  Recheck on 4/9-Hg 8.1. HM1 on 4/16-Hg 8.5. Increased iron bid. Hg 9.2 on 4/23.   16. PAF: Regular rate and rhythm. On metoprolol   17. Onychomycosis: Saw podiatry 3/27-right great toenail fell off, no new orders and continue to monitor.   18. Chronic pain: Pain in legs mostly, tylenol tid prn  19. Hemorrhoids: anusol cream prn.     Per therapy ambulating 30-80 ft with CGA, 4 ww, transfers min to mod, setup for upper body dressing, min to mod for lower body dressing. 4 steps with therapy, home eval later this week. She plans to return home with grand daughter. Tentative 1-2 weeks.        Electronically signed by: Jennifer Valdes NP    Total 25 minutes of which 75% was spent in counseling and coordination of care of the above plan    Time spent in interview and examination of patient, review of available records, and discussion with nursing staff. Continue care plan, efforts at therapy, and monitor nutritional status.

## 2021-06-17 NOTE — PROGRESS NOTES
[facility]    Facility:   [facility] [[id number]]   Code Status: FULL CODE  PCP: Gamal Hdz MD   Phone: [phone]   Fax: [phone]      CHIEF COMPLAINT/REASON FOR VISIT:  Chief Complaint   Patient presents with     Discharge Summary       HISTORY COURSE:  Thea is a 83 y.o. female who presented to the emergency room with such profound weakness that she was unable to stand up from her living room chair.  At the time of admission she was found to be dyspneic, febrile, and had increased respiratory rate.  She had nausea and an episode of emesis and she also had right foot wound after striking her right foot against furniture but it was not of concern to her at the time.  However with further evaluation it was determined that she had a significant cellulitis of the right foot contributing to her current problems and that she also had fluid overload consistent with acute on chronic diastolic heart failure.  It was also noted that her pacemaker battery was in need of replacement and this was done during the hospital stay as well.  She did have positive blood cultures consistent with sepsis with likely source from the right foot cellulitis but could not exclude UTI, bioprosthetic heart valve lesion and for this reason broad-spectrum IV antibiotics were initiated and there was an initial MARKO.  It was determined that it was due to the cellulitis of the foot.    During the time the transitional care unit, she had 2 readmissions.  Her balance between congestive heart failure and chronic kidney disease is difficult.  Eventually it was determined that taking metolazone twice weekly seemed to balance her weight and not get her into troubles either direction.    Review of Systems   Constitutional: Negative for chills and fever.   HENT: Negative for congestion and sore throat.    Respiratory: Negative for cough and shortness of breath.    Cardiovascular: Negative for chest  pain.        Leg swelling has improved to the point that she says she can see her kneecaps again.   Genitourinary: Negative for dysuria.       Vitals:    05/09/18 0633   BP: 149/69   Pulse: 62   Resp: 18   Temp: 97  F (36.1  C)   SpO2: 96%   Weight: (!) 237 lb 6.4 oz (107.7 kg)       Physical Exam   Constitutional: No distress.   Eyes: Right eye exhibits no discharge. Left eye exhibits no discharge.   Cardiovascular: Normal rate and normal heart sounds.    Pulmonary/Chest: Effort normal and breath sounds normal. No respiratory distress. She has no wheezes. She has no rales.   Neurological: She is alert.   Psychiatric: She has a normal mood and affect.       MEDICATION LIST:  Current Outpatient Prescriptions   Medication Sig     acetaminophen (TYLENOL) 500 MG tablet Take 2 tablets (1,000 mg total) by mouth 3 (three) times a day as needed for pain. Not to exceed 4000 mg in 24 hours.     aluminum-magnesium hydroxide-simethicone (MAALOX ADVANCED) 200-200-20 mg/5 mL Susp Take 30 mL by mouth every 6 (six) hours as needed.     aspirin 81 MG EC tablet Take 81 mg by mouth daily.     atorvastatin (LIPITOR) 80 MG tablet Take 1 tablet (80 mg total) by mouth at bedtime.     bumetanide (BUMEX) 2 MG tablet Take 2 mg by mouth 2 (two) times a day at 9am and 6pm.      cholecalciferol, vitamin D3, (VITAMIN D3) 2,000 unit Tab Take 2,000 Units by mouth once daily.     ferrous sulfate 325 (65 FE) MG tablet Take 1 tablet by mouth 2 (two) times a day.     fluticasone-salmeterol (ADVAIR) 500-50 mcg/dose DISKUS Inhale 1 puff 2 (two) times a day.     folic acid (FOLVITE) 1 MG tablet Take 1 mg by mouth daily.     ipratropium-albuterol (COMBIVENT RESPIMAT)  mcg/actuation Mist inhaler Inhale 1 puff 4 (four) times a day.     levothyroxine (SYNTHROID, LEVOTHROID) 125 MCG tablet Take 125 mcg by mouth Daily at 6:00 am.      melatonin 3 mg Tab tablet Take 3 mg by mouth at bedtime.      menthol-zinc oxide (CALMOSEPTINE) 0.44-20.6 % Oint ointment  Apply 1 application topically 4 (four) times a day as needed (to denuded areas of skin).     metOLazone (ZAROXOLYN) 2.5 MG tablet Take 2.5 mg by mouth 2 (two) times a week.      metoprolol tartrate (LOPRESSOR) 50 MG tablet Take 50 mg by mouth 2 (two) times a day.     nystatin (MYCOSTATIN) cream Apply 1 application topically 2 (two) times a day. Apply to thighs abd folds until resolved.     omeprazole (PRILOSEC) 40 MG capsule Take 20 mg by mouth daily before breakfast.      potassium chloride SA (K-DUR,KLOR-CON) 20 MEQ tablet Take 20 mEq by mouth daily.     white petrolatum-mineral oil (EUCERIN) Crea Apply 1 application topically every 4 (four) hours as needed. Apply to bilateral legs.     white petrolatum-mineral oil (EUCERIN) Crea Apply 1 application topically 2 (two) times a day. Apply to bilateral legs.       DISCHARGE DIAGNOSIS:    ICD-10-CM    1. Chronic diastolic CHF (congestive heart failure) I50.32    2. CKD (chronic kidney disease), stage III N18.3    3. PAF (paroxysmal atrial fibrillation) I48.0    4. H/O heart valve replacement with bioprosthetic valve Z95.3    5. Atherosclerosis of native coronary artery of native heart without angina pectoris I25.10        MEDICAL EQUIPMENT NEEDS:  No new durable medical equipment    DISCHARGE PLAN/FACE TO FACE:  I certify that services are/were furnished while this patient was under the care of a physician and that a physician or an allowed non-physician practitioner (NPP), had a face-to-face encounter that meets the physician face-to-face encounter requirements. The encounter was in whole, or in part, related to the primary reason for home health. The patient is confined to his/her home and needs intermittent skilled nursing, physical therapy, speech-language pathology, or the continued need for occupational therapy. A plan of care has been established by a physician and is periodically reviewed by a physician.  Date of Face-to-Face Encounter: 5/8/18    I certify  that, based on my findings, the following services are medically necessary home health services: Home occupational therapy, home physical therapy, skilled nursing, home health aide.    My clinical findings support the need for the above skilled services because: (Please write a brief narrative summary that describes what the RN, PT, SLP, or other services will be doing in the home. A list of diagnoses in this section does not meet the CMS requirements.)  Home physical therapy is to evaluate and treat for strengthening, balance, endurance, and safety with mobility and ambulation.  Home occupational therapy is to evaluate and treat for strengthening, ADL needs, adaptive equipment and safety.  Skilled nursing visits are for medication set up and teaching, symptom and disease process monitoring and education, and specifically for monitoring of the leg wraps for edema.  Home health aide services are for bathing and ADL needs.    This patient is homebound because: (Please write a brief narrative summary describing the functional limitations as to why this patient is homebound and specifically what makes this patient homebound.)  She has significant weakness and shortness of breath with exertion due to her underlying congestive heart failure and she does have underlying osteoarthritis of the knee which limits her mobility.    The patient is, or has been, under my care and I have initiated the establishment of the plan of care. This patient will be followed by a physician who will periodically review the plan of care.    Schedule follow up visit with primary care provider within 7 days to reestablish care.    Electronically signed by: Sami Palma MD

## 2021-06-17 NOTE — PROGRESS NOTES
[facility]    Facility:   [facility] [[id number]]   Code Status: FULL CODE      CHIEF COMPLAINT/REASON FOR VISIT:  Chief Complaint   Patient presents with     Review Of Multiple Medical Conditions       HISTORY:      HPI: Thea is a 84 y.o. female who has a complicated medical history including chronic diastolic congestive heart failure as well as stage III chronic kidney disease.  She has fluctuated between worsening of either 1 of these conditions depending on the amount of diuresis that is being done for her.  She has had problems with respiratory failure requiring oxygen but has not had to require oxygen for many weeks now.  She originally was hospitalized with cellulitis and transferred to transitional care unit for recovery of strength from this and she has had slow progress.  The cellulitis has resolved.  She states that edema of her legs has improved.  However she does continue to have wraps to her legs for lymphedema.    Upon current review of systems, she has had a steady rise of her weight so that she has now exceeded the 5 pounds from baseline.  It is for this reason that metolazone was restarted on a scheduled basis of twice per week on Monday and Friday.  She is not feeling ill with fevers or chills or respiratory congestion or sore throat.  She does not have productive cough.  She does not have chest pain.  She does not have abdominal pain or nausea.    Past Medical History:   Diagnosis Date     Acute kidney injury superimposed on CKD 3/3/2017     Asthma      CAD (coronary artery disease)      Cataract      Chronic kidney disease     ckd3     Diabetes mellitus      Disease of thyroid gland      H/O heart valve replacement with bioprosthetic valve      History of transfusion      Hypertension      Normochromic normocytic anemia      Restless leg syndrome              Family History   Problem Relation Age of Onset     No Medical Problems Mother      age 86  "    No Medical Problems Father      MVA     Cancer Brother      lung     No Medical Problems Brother      Social History     Social History     Marital status:      Spouse name: N/A     Number of children: N/A     Years of education: N/A     Occupational History      in operating room      retired     Social History Main Topics     Smoking status: Former Smoker     Smokeless tobacco: Never Used     Alcohol use No     Drug use: No     Sexual activity: No     Other Topics Concern     Not on file     Social History Narrative    She live in a single floor house.  Her son lives next door and her granddaughter is \"around a lot\"   3/2017         Review of Systems   Constitutional: Positive for fatigue.       .  Vitals:    05/01/18 0505   BP: 119/60   Pulse: 60   Resp: 19   Temp: 97.5  F (36.4  C)   SpO2: 91%   Weight: (!) 242 lb 1.6 oz (109.8 kg)       Physical Exam   Constitutional: No distress.   Eyes: Right eye exhibits no discharge. Left eye exhibits no discharge.   Neck: No JVD present.   Cardiovascular: Normal rate and normal heart sounds.    Pulmonary/Chest: Breath sounds normal. No respiratory distress. She has no wheezes. She has no rales.   Abdominal: Bowel sounds are normal. She exhibits no distension. There is no tenderness.   Musculoskeletal:   Legs are in wraps.   Neurological: She is alert.   Psychiatric: She has a normal mood and affect.   Nursing note and vitals reviewed.        LABS:   No new laboratory testing today.    ASSESSMENT:      ICD-10-CM    1. Chronic diastolic CHF (congestive heart failure) I50.32    2. H/O heart valve replacement with bioprosthetic valve Z95.3    3. PAF (paroxysmal atrial fibrillation) I48.0    4. CKD (chronic kidney disease), stage III N18.3        PLAN:    Continue with the plan of close observation of fluid status by monitoring weights and also her respiratory status.    Electronically signed by: Sami Palma MD  "

## 2021-06-17 NOTE — PROGRESS NOTES
[facility]    DATE: 2018    NAME:  Thea Acosta             :  1934  MRN: [id number]  CODE STATUS:  FULL CODE    VISIT TYPE: Problem Visit (weight gain)     FACILITY:  [facility] [[id number]]       CHIEF COMPLAIN/REASON FOR VISIT:    Chief Complaint   Patient presents with     Problem Visit     weight gain               HISTORY OF PRESENT ILLNESS: Thea Acosta is a 83 y.o. female who was admitted  to  for weakness. She was found to be dyspneic, febrile, and tachypneic. She was diagnosed with sepsis s/t right foot cellulitis. Blood cultures grew Strep group B. She was treated with vanco and later ancef. She was also treated for acute CHF. During her stay she also underwent pacemaker battery change on . She has PMH of CAD, CKD, DM, s/p PPM. Prior to this she lived at home with her grand daughter. She was readmitted 3/7-3/12 for Acute encephalopathy, MIGUEL, CHF exacerbation, hypotension. She was treated for UTI and palliative care did see her during her stay. She wished to remain full code.     Today Ms. Acosta states she is doing pretty well. She was told her weight was up again and she says she is not sure why. She does admit that her legs have been more swollen this week and are feeling heavier. She thinks her shortness of breath is about the same and that she continues to have sob with exertion. She says otherwise her bowels are moving ok and she is sleeping well. Her appetite is fine. Per nursing requested she be seen today because she had 3 lb weight gain and no new orders were given, but today she gained another 1 lb. Her weight was 253 lbs today.       REVIEW OF SYSTEMS:  PROBLEMS AND REVIEW OF SYSTEMS:   Review of Systems  Today on ROS:   Currently, no fever, chills, or rigors. Does not have any visual or hearing problems. Denies any chest pain, headaches, palpitations, lightheadedness, dizziness. Appetite is fair.  Denies any  abdominal pain. Denies any urinary symptoms. No active bleeding. Positive for edema ble, shortness of breath at baseline, sleeps in recliner, pain left upper arm, ecchymosis periorbital right eye, fall last week, increasing weights      Allergies   Allergen Reactions     Codeine Hives     Codeine Phosphate (Bulk)      This allergy is per [facility] records.     Codeine Sulfate      This allergy is per [facility] records.     Codeine-Butalbital-Asa-Caff      This allergy is per [facility] records.     Codeine-Guaifenesin      This allergy is per [facility] records.     Lisinopril Cough     Penicillins Swelling     Current Outpatient Prescriptions   Medication Sig     metOLazone (ZAROXOLYN) 2.5 MG tablet Take 2.5 mg by mouth 3 (three) times a week.     acetaminophen (TYLENOL) 500 MG tablet Take 2 tablets (1,000 mg total) by mouth 3 (three) times a day as needed for pain. Not to exceed 4000 mg in 24 hours.     aluminum-magnesium hydroxide-simethicone (MAALOX ADVANCED) 200-200-20 mg/5 mL Susp Take 30 mL by mouth every 6 (six) hours as needed.     aspirin 81 MG EC tablet Take 81 mg by mouth daily.     atorvastatin (LIPITOR) 80 MG tablet Take 1 tablet (80 mg total) by mouth at bedtime.     bumetanide (BUMEX) 2 MG tablet Take 3 mg by mouth 2 (two) times a day at 9am and 6pm. 3 mg in the AM and 2 mg at noon     cholecalciferol, vitamin D3, (VITAMIN D3) 2,000 unit Tab Take 2,000 Units by mouth once daily.     fluticasone-salmeterol (ADVAIR) 500-50 mcg/dose DISKUS Inhale 1 puff 2 (two) times a day.     folic acid (FOLVITE) 1 MG tablet Take 1 mg by mouth daily.     ipratropium-albuterol (COMBIVENT RESPIMAT)  mcg/actuation Mist inhaler Inhale 1 puff 4 (four) times a day.     levothyroxine (SYNTHROID, LEVOTHROID) 125 MCG tablet Take 125 mcg by mouth Daily at 6:00 am.      melatonin 3 mg Tab  tablet Take 3 mg by mouth at bedtime.      menthol-zinc oxide (CALMOSEPTINE) 0.44-20.6 % Oint ointment Apply 1 application topically 4 (four) times a day as needed (to denuded areas of skin).     metoprolol tartrate (LOPRESSOR) 25 MG tablet Take 4 tablets (100 mg total) by mouth 2 (two) times a day.     nystatin (MYCOSTATIN) cream Apply 1 application topically 2 (two) times a day. Apply to thighs abd folds until resolved.     omeprazole (PRILOSEC) 40 MG capsule Take 20 mg by mouth daily before breakfast.      ondansetron (ZOFRAN) 4 MG tablet Take 4 mg by mouth every 8 (eight) hours as needed for nausea.     potassium chloride SA (K-DUR,KLOR-CON) 20 MEQ tablet Take 20 mEq by mouth daily.     white petrolatum-mineral oil (EUCERIN) Crea Apply 1 application topically every 4 (four) hours as needed. Apply to bilateral legs.     white petrolatum-mineral oil (EUCERIN) Crea Apply 1 application topically 2 (two) times a day. Apply to bilateral legs.     Past Medical History:    Past Medical History:   Diagnosis Date     Acute kidney injury superimposed on CKD 3/3/2017     Asthma      CAD (coronary artery disease)      Cataract      Chronic kidney disease     ckd3     Diabetes mellitus      Disease of thyroid gland      H/O heart valve replacement with bioprosthetic valve      History of transfusion      Hypertension      Normochromic normocytic anemia      Restless leg syndrome            PHYSICAL EXAMINATION  Vitals:    04/06/18 0700   BP: 116/66   Pulse: 60   Resp: 18   Temp: (!) 96  F (35.6  C)   SpO2: 90%   Weight: (!) 253 lb (114.8 kg)       Today on physical exam:     GENERAL: Awake, Alert, obese, oriented x3, not in any form of acute distress, answers questions appropriately, follows simple commands, conversant  HEENT: Head is normocephalic with normal hair distribution. No evidence of trauma. Ears: No acute purulent discharge. Eyes: Conjunctivae pink with no scleral jaundice. Nose: Normal mucosa and septum. NECK:  Supple with no cervical or supraclavicular lymphadenopathy. Trachea is midline.   CHEST: No tenderness or deformity, no crepitus, left chest incision for pacer c/d/i  LUNG: Dim to auscultation with good chest expansion. There are no crackles or wheezes, normal AP diameter.  BACK: No kyphosis of the thoracic spine. Symmetric, no curvature, ROM normal, no CVA tenderness, no spinal tenderness   CVS: There is good S1  S2, regular rhythm, there are no murmurs, rubs, gallops, or heaves,  2+ pulses symmetric in all extremities.  ABDOMEN: Rounded and soft, nontender to palpation, non distended, no masses, no organomegaly, good bowel sounds, no rebound or guarding, no peritoneal signs.   EXTREMITIES: 2+ ble pitting edema, dry flaking skin, erythema resolved from previous, no weeping areas  SKIN: Warm and dry  NEUROLOGICAL: The patient is oriented to person, place and time. Strength and sensation are grossly intact. Face is symmetric. More pleasant today            LABS:   Recent Results (from the past 168 hour(s))   Basic Metabolic Panel   Result Value Ref Range    Sodium 137 136 - 145 mmol/L    Potassium 3.6 3.5 - 5.0 mmol/L    Chloride 98 98 - 107 mmol/L    CO2 29 22 - 31 mmol/L    Anion Gap, Calculation 10 5 - 18 mmol/L    Glucose 103 70 - 125 mg/dL    Calcium 9.1 8.5 - 10.5 mg/dL    BUN 31 (H) 8 - 28 mg/dL    Creatinine 1.38 (H) 0.60 - 1.10 mg/dL    GFR MDRD Af Amer 44 (L) >60 mL/min/1.73m2    GFR MDRD Non Af Amer 37 (L) >60 mL/min/1.73m2   HM1 (CBC with Diff)   Result Value Ref Range    WBC 4.7 4.0 - 11.0 thou/uL    RBC 2.58 (L) 3.80 - 5.40 mill/uL    Hemoglobin 7.8 (L) 12.0 - 16.0 g/dL    Hematocrit 26.0 (L) 35.0 - 47.0 %     (H) 80 - 100 fL    MCH 30.2 27.0 - 34.0 pg    MCHC 30.0 (L) 32.0 - 36.0 g/dL    RDW 17.7 (H) 11.0 - 14.5 %    Platelets 137 (L) 140 - 440 thou/uL    MPV 10.7 8.5 - 12.5 fL    Neutrophils % 58 50 - 70 %    Lymphocytes % 25 20 - 40 %    Monocytes % 9 2 - 10 %    Eosinophils % 8 (H) 0 - 6 %     Basophils % 0 0 - 2 %    Neutrophils Absolute 2.7 2.0 - 7.7 thou/uL    Lymphocytes Absolute 1.2 0.8 - 4.4 thou/uL    Monocytes Absolute 0.4 0.0 - 0.9 thou/uL    Eosinophils Absolute 0.4 0.0 - 0.4 thou/uL    Basophils Absolute 0.0 0.0 - 0.2 thou/uL     Results for orders placed or performed in visit on 04/02/18   Basic Metabolic Panel   Result Value Ref Range    Sodium 137 136 - 145 mmol/L    Potassium 3.6 3.5 - 5.0 mmol/L    Chloride 98 98 - 107 mmol/L    CO2 29 22 - 31 mmol/L    Anion Gap, Calculation 10 5 - 18 mmol/L    Glucose 103 70 - 125 mg/dL    Calcium 9.1 8.5 - 10.5 mg/dL    BUN 31 (H) 8 - 28 mg/dL    Creatinine 1.38 (H) 0.60 - 1.10 mg/dL    GFR MDRD Af Amer 44 (L) >60 mL/min/1.73m2    GFR MDRD Non Af Amer 37 (L) >60 mL/min/1.73m2         Lab Results   Component Value Date    WBC 4.7 04/02/2018    HGB 7.8 (L) 04/02/2018    HCT 26.0 (L) 04/02/2018     (H) 04/02/2018     (L) 04/02/2018       No results found for: [id number]  Lab Results   Component Value Date    HGBA1C 6.0 03/08/2018     Lab Results   Component Value Date    INR 1.30 (H) 02/07/2018    INR 1.51 (H) 03/06/2017    INR 1.27 (H) 03/05/2017     Vitamin D, Total (25-Hydroxy)   Date Value Ref Range Status   02/19/2018 56.7 30.0 - 80.0 ng/mL Final     Lab Results   Component Value Date    TSH 3.50 06/01/2017           ASSESSMENT/PLAN:     1 Acute on chronic CHF: Daily [id number]. On bumex 3mg in am and 2mg qpm. F/u with cardiology on 3/21-no recommended changes, f/u cardiology again 4/17. Keep legs elevated, ACE wrap legs. Sleeps in recliner. OT started lymphedema wraps yesterday. 2-3+ pitting edema today, weights up 4 lbs in last 2 days. Add metolazone three times weekly starting today.   2. Fall: Last week, periorbital ecchymosis, aching pains left upper arm. No ecchymosis or wounds on left arm.   3. S/p PPM: Battery change on 2/12. Incision c/d/i. No concerns. F/u with device clinic 5/15.   4.  Mod persistent Asthma: On advair, combivent. SOB at baseline, off O2 now.  5. MIGUEL on CKD: Cr 1.49 on 3/12. BMP 3/19-Cr 1.86. BMP on 4/2-1.38, much improved. Recheck 4/9.   6. Type 2 DM: Diet controlled. Monitor on labs.   7. Dyslipidemia: On aspirin, atorvastatin  8. HTN: SBP 110s. On lopressor, bumex.   9. GERD: On omeprazole. [place] prn indigestion.   10. Hypothyroid: On levothyroxine.   11. Insomnia: On melatonin qhs.   12. Hypokalemia: On kcl.  K 4.0 on 3/12.   13. Constipation: On pericolace daily prn, miralax daily.    14. Vitamin d deficiency: On vitamin d. Level 2/19-56.7, stable.    15. Anemia of CKD: Hg 8.1 on 3/11. HM1 on 3/19-8.6. Recheck hm1 on 4/2-7.8.  Recheck on 4/9.   16. PAF: Regular rate and rhythm. On metoprolol rate controlled 60s.   17. Onychomycosis: Saw podiatry 3/27-right great toenail fell off, no new orders and continue to monitor.   18. Chronic pain: Pain in legs mostly, tylenol tid prn and tramadol prn.    Per therapy ambulating 25-30 ft with CGA, 4 ww, sit to stand max assist, max for toileting, set up for upper body adls, max for lower. She plans to return home with grand daughter.      Electronically signed by: Jennifer Valdes NP    Total 25 minutes of which 75% was spent in counseling and coordination of care of the above plan    Time spent in interview and examination of patient, review of available records, and discussion with nursing staff. Continue care plan, efforts at therapy, and monitor nutritional status.

## 2021-06-17 NOTE — PROGRESS NOTES
"[facility]    Facility:   [facility] [[id number]]   Code Status: FULL CODE      CHIEF COMPLAINT/REASON FOR VISIT:  Chief Complaint   Patient presents with     Problem Visit     weight gain       HISTORY:      HPI: Thea is a 83 y.o. female currently undergoing physical and occupational therapy at [facility].  She has a past medical history of chronic kidney disease stage III, left AS,status  post AVR, complete heart block, status post PPM with the recent generator change done on 2/12/2018.  She tells me after 11 years her battery no longer worked, moderate asthma, diabetes., chronic diastolic congestive heart failure, chronic anemia, chronic lower extremity venous stasis, recent admission with cellulitis treated with clindamycin. admitted with acute encephalopathy and acute kidney injury.  The acute kidney injury was thought to be hemodynamic due to poor renal autoregulation, CHF, hypotension,n diuretics and poor oral intake. Her renal function did improve after IV fluids were given and her diuretics were held during her hospitalization. She was also treated for a positive urine culture for Klebsiella and completed ciprofloxacin course. Her encephalopathy improved prior to starting her UTI treatment.  Also during her hospitalization palliative care was involved and at this time the patient wishes to remain a full code    Today Thea is seen in her room after reports of a 3 pound weight gain over the last day. Her lung sounds were clear throughout. She denied SOB or CP.  She does have 3+ edema in her right leg and 2+ in her left. SHde is wearing lymphedema wraps. Her Bumex was recently increased over the last couple of days to 3 mg in the AM and 2 mg at noon. She tells me she has been  \"peeing a lot today, at least 6 times\". I have decided to monitor her  today and continue the daily weights since she is asymptomatic and voiding in large " "amounts. She is aware to notify the nurse if she develops any SOB.     Her right eye bruise is fading from her fall last week. She continues to deny vision changes.     She denies any chest pain, shortness of breath her lung sounds are clear.  She was alert and oriented ×3. She also has +2 pitting edema left lower ext and 3+ right  lower extremity. She sleeps in a recliner. She had no concerns.     Past Medical History:   Diagnosis Date     Acute kidney injury superimposed on CKD 3/3/2017     Asthma      CAD (coronary artery disease)      Cataract      Chronic kidney disease     ckd3     Diabetes mellitus      Disease of thyroid gland      H/O heart valve replacement with bioprosthetic valve      History of transfusion      Hypertension      Normochromic normocytic anemia      Restless leg syndrome              Family History   Problem Relation Age of Onset     No Medical Problems Mother      age 86     No Medical Problems Father      MVA     Cancer Brother      lung     No Medical Problems Brother      Social History     Social History     Marital status:      Spouse name: N/A     Number of children: N/A     Years of education: N/A     Occupational History      in operating room      retired     Social History Main Topics     Smoking status: Former Smoker     Smokeless tobacco: Never Used     Alcohol use No     Drug use: No     Sexual activity: No     Other Topics Concern     Not on file     Social History Narrative    She live in a single floor house.  Her son lives next door and her granddaughter is \"around a lot\"   3/2017         Review of Systems   Constitutional: Positive for fatigue. Negative for appetite change, chills and fever.   HENT: Negative for congestion and sore throat.    Eyes: Negative for visual disturbance.        Bruise right eyelid   Respiratory: Positive for shortness of breath. Negative for cough and wheezing.         With activity- denied SOB with rest   Cardiovascular: " Negative for chest pain and leg swelling.   Gastrointestinal: Negative for abdominal distention, abdominal pain, constipation, diarrhea and nausea.   Genitourinary: Negative for dysuria.   Musculoskeletal: Negative for arthralgias and myalgias.   Skin: Negative for color change, rash and wound.        lymphedema wraps lower extremities.    Neurological: Negative for dizziness and weakness.   Psychiatric/Behavioral: Negative for agitation, behavioral problems, confusion and sleep disturbance.       .  Vitals:    04/05/18 1644   BP: 141/61   Pulse: 60   Resp: 16   Temp: 97.1  F (36.2  C)   SpO2: 92%   Weight: (!) 252 lb 3.2 oz (114.4 kg)       Physical Exam   Constitutional: She is oriented to person, place, and time. She appears well-developed and well-nourished.   HENT:   Head: Normocephalic.   Eyes: Conjunctivae are normal.   Neck: Normal range of motion.   Cardiovascular: Normal rate, regular rhythm and normal heart sounds.    No murmur heard.  New pacemaker exchanged   Pulmonary/Chest: Breath sounds normal. No respiratory distress. She has no wheezes. She has no rales.   Abdominal: Soft. Bowel sounds are normal. She exhibits no distension. There is no tenderness.   Musculoskeletal: Normal range of motion. She exhibits edema.   2+  LLE and 3+ RLE   Neurological: She is alert and oriented to person, place, and time.   Skin:   Lymphedema wraps   Psychiatric: She has a normal mood and affect. Her behavior is normal.         LABS:   Recent Results (from the past 240 hour(s))   Basic Metabolic Panel   Result Value Ref Range    Sodium 139 136 - 145 mmol/L    Potassium 4.6 3.5 - 5.0 mmol/L    Chloride 100 98 - 107 mmol/L    CO2 30 22 - 31 mmol/L    Anion Gap, Calculation 9 5 - 18 mmol/L    Glucose 117 70 - 125 mg/dL    Calcium 9.5 8.5 - 10.5 mg/dL    BUN 36 (H) 8 - 28 mg/dL    Creatinine 1.47 (H) 0.60 - 1.10 mg/dL    GFR MDRD Af Amer 41 (L) >60 mL/min/1.73m2    GFR MDRD Non Af Amer 34 (L) >60 mL/min/1.73m2   Basic  Metabolic Panel   Result Value Ref Range    Sodium 137 136 - 145 mmol/L    Potassium 3.6 3.5 - 5.0 mmol/L    Chloride 98 98 - 107 mmol/L    CO2 29 22 - 31 mmol/L    Anion Gap, Calculation 10 5 - 18 mmol/L    Glucose 103 70 - 125 mg/dL    Calcium 9.1 8.5 - 10.5 mg/dL    BUN 31 (H) 8 - 28 mg/dL    Creatinine 1.38 (H) 0.60 - 1.10 mg/dL    GFR MDRD Af Amer 44 (L) >60 mL/min/1.73m2    GFR MDRD Non Af Amer 37 (L) >60 mL/min/1.73m2   HM1 (CBC with Diff)   Result Value Ref Range    WBC 4.7 4.0 - 11.0 thou/uL    RBC 2.58 (L) 3.80 - 5.40 mill/uL    Hemoglobin 7.8 (L) 12.0 - 16.0 g/dL    Hematocrit 26.0 (L) 35.0 - 47.0 %     (H) 80 - 100 fL    MCH 30.2 27.0 - 34.0 pg    MCHC 30.0 (L) 32.0 - 36.0 g/dL    RDW 17.7 (H) 11.0 - 14.5 %    Platelets 137 (L) 140 - 440 thou/uL    MPV 10.7 8.5 - 12.5 fL    Neutrophils % 58 50 - 70 %    Lymphocytes % 25 20 - 40 %    Monocytes % 9 2 - 10 %    Eosinophils % 8 (H) 0 - 6 %    Basophils % 0 0 - 2 %    Neutrophils Absolute 2.7 2.0 - 7.7 thou/uL    Lymphocytes Absolute 1.2 0.8 - 4.4 thou/uL    Monocytes Absolute 0.4 0.0 - 0.9 thou/uL    Eosinophils Absolute 0.4 0.0 - 0.4 thou/uL    Basophils Absolute 0.0 0.0 - 0.2 thou/uL     Current Outpatient Prescriptions   Medication Sig     acetaminophen (TYLENOL) 500 MG tablet Take 2 tablets (1,000 mg total) by mouth 3 (three) times a day as needed for pain. Not to exceed 4000 mg in 24 hours.     aluminum-magnesium hydroxide-simethicone (MAALOX ADVANCED) 200-200-20 mg/5 mL Susp Take 30 mL by mouth every 6 (six) hours as needed.     aspirin 81 MG EC tablet Take 81 mg by mouth daily.     atorvastatin (LIPITOR) 80 MG tablet Take 1 tablet (80 mg total) by mouth at bedtime.     bumetanide (BUMEX) 2 MG tablet Take 3 mg by mouth 2 (two) times a day at 9am and 6pm. 3 mg in the AM and 2 mg at noon     cholecalciferol, vitamin D3, (VITAMIN D3) 2,000 unit Tab Take 2,000 Units by mouth once daily.     fluticasone-salmeterol (ADVAIR) 500-50 mcg/dose DISKUS  Inhale 1 puff 2 (two) times a day.     folic acid (FOLVITE) 1 MG tablet Take 1 mg by mouth daily.     ipratropium-albuterol (COMBIVENT RESPIMAT)  mcg/actuation Mist inhaler Inhale 1 puff 4 (four) times a day.     levothyroxine (SYNTHROID, LEVOTHROID) 125 MCG tablet Take 125 mcg by mouth Daily at 6:00 am.      melatonin 3 mg Tab tablet Take 3 mg by mouth at bedtime.      menthol-zinc oxide (CALMOSEPTINE) 0.44-20.6 % Oint ointment Apply 1 application topically 4 (four) times a day as needed (to denuded areas of skin).     metoprolol tartrate (LOPRESSOR) 25 MG tablet Take 4 tablets (100 mg total) by mouth 2 (two) times a day.     nystatin (MYCOSTATIN) cream Apply 1 application topically 2 (two) times a day. Apply to thighs abd folds until resolved.     omeprazole (PRILOSEC) 40 MG capsule Take 20 mg by mouth daily before breakfast.      ondansetron (ZOFRAN) 4 MG tablet Take 4 mg by mouth every 8 (eight) hours as needed for nausea.     potassium chloride 40 mEq/15 mL Liqd Take 7.5 mL (20 mEq total) by mouth daily. daily     potassium chloride SA (K-DUR,KLOR-CON) 20 MEQ tablet Take 20 mEq by mouth daily.     white petrolatum-mineral oil (EUCERIN) Crea Apply 1 application topically every 4 (four) hours as needed. Apply to bilateral legs.     white petrolatum-mineral oil (EUCERIN) Crea Apply 1 application topically 2 (two) times a day. Apply to bilateral legs.     ASSESSMENT:      ICD-10-CM    1. Weight gain R63.5    2. CKD (chronic kidney disease), stage III N18.3    3. Permanent atrial fibrillation I48.2    4. Heart failure with preserved ejection fraction I50.30        PLAN:     Acute on chronic congestive heart failure- daily weights, bumex recently increased to 3 mg in the AM and 2 mg at noon  diabetes mellitus -diet controlled  hypothyroidism- continue levothyroxine I  A. fib -regular rate and rhythm continue metoprolol        Electronically signed by: Shana Regalado CNP

## 2021-06-17 NOTE — PROGRESS NOTES
[facility]    DATE: 2018    NAME:  Thea Acosta             :  1934  MRN: [id number]  CODE STATUS:  FULL CODE    VISIT TYPE: Review Of Multiple Medical Conditions     FACILITY:  [facility] [[id number]]       CHIEF COMPLAIN/REASON FOR VISIT:    Chief Complaint   Patient presents with     Review Of Multiple Medical Conditions               HISTORY OF PRESENT ILLNESS: Thea Acosta is a 84 y.o. female who was admitted  to  for weakness. She was found to be dyspneic, febrile, and tachypneic. She was diagnosed with sepsis s/t right foot cellulitis. Blood cultures grew Strep group B. She was treated with vanco and later ancef. She was also treated for acute CHF. During her stay she also underwent pacemaker battery change on . She has PMH of CAD, CKD, DM, s/p PPM. Prior to this she lived at home with her grand daughter. She was readmitted 3/7-3/12 for Acute encephalopathy, MIGUEL, CHF exacerbation, hypotension. She was treated for UTI and palliative care did see her during her stay. She wished to remain full code.     Today Ms. Acosta states she is happy her weight is down again 3 more lbs. She says she thinks she has knees again and is very happy about this. She is getting stir crazy and is bored. She says at home she keeps busy around the house and here she sits most of the day and really wants to go home. She is hoping therapy will give her a date soon. She denies pain or other concerns at this time. Therapy is going well she just wants to go home. Per nursing weight 243lbs and vitals stable. She has f/u with cardiology today and did have labs drawn today.       REVIEW OF SYSTEMS:  PROBLEMS AND REVIEW OF SYSTEMS:   Review of Systems  Today on ROS:   Currently, no fever, chills, or rigors. Does not have any visual or hearing problems. Denies any chest pain, headaches, palpitations, lightheadedness, dizziness. Appetite is fair.  Denies any  abdominal pain. Denies any urinary symptoms. No active bleeding. Positive for edema ble, shortness of breath at baseline, sleeps in recliner      Allergies   Allergen Reactions     Codeine Hives     Codeine Phosphate (Bulk)      This allergy is per [facility] records.     Codeine Sulfate      This allergy is per [facility] records.     Codeine-Butalbital-Asa-Caff      This allergy is per [facility] records.     Codeine-Guaifenesin      This allergy is per [facility] records.     Lisinopril Cough     Penicillins Swelling     Current Outpatient Prescriptions   Medication Sig     ferrous sulfate 325 (65 FE) MG tablet Take 1 tablet by mouth 2 (two) times a day.     acetaminophen (TYLENOL) 500 MG tablet Take 2 tablets (1,000 mg total) by mouth 3 (three) times a day as needed for pain. Not to exceed 4000 mg in 24 hours.     aluminum-magnesium hydroxide-simethicone (MAALOX ADVANCED) 200-200-20 mg/5 mL Susp Take 30 mL by mouth every 6 (six) hours as needed.     aspirin 81 MG EC tablet Take 81 mg by mouth daily.     atorvastatin (LIPITOR) 80 MG tablet Take 1 tablet (80 mg total) by mouth at bedtime.     bumetanide (BUMEX) 2 MG tablet Take 2 mg by mouth 2 (two) times a day at 9am and 6pm.      cholecalciferol, vitamin D3, (VITAMIN D3) 2,000 unit Tab Take 2,000 Units by mouth once daily.     fluticasone-salmeterol (ADVAIR) 500-50 mcg/dose DISKUS Inhale 1 puff 2 (two) times a day.     folic acid (FOLVITE) 1 MG tablet Take 1 mg by mouth daily.     ipratropium-albuterol (COMBIVENT RESPIMAT)  mcg/actuation Mist inhaler Inhale 1 puff 4 (four) times a day.     levothyroxine (SYNTHROID, LEVOTHROID) 125 MCG tablet Take 125 mcg by mouth Daily at 6:00 am.      melatonin 3 mg Tab tablet Take 3 mg by mouth at bedtime.      menthol-zinc oxide (CALMOSEPTINE) 0.44-20.6 % Oint ointment Apply 1 application  topically 4 (four) times a day as needed (to denuded areas of skin).     metOLazone (ZAROXOLYN) 2.5 MG tablet Take 2.5 mg by mouth 3 (three) times a week.     metoprolol tartrate (LOPRESSOR) 25 MG tablet Take 4 tablets (100 mg total) by mouth 2 (two) times a day.     nystatin (MYCOSTATIN) cream Apply 1 application topically 2 (two) times a day. Apply to thighs abd folds until resolved.     omeprazole (PRILOSEC) 40 MG capsule Take 20 mg by mouth daily before breakfast.      ondansetron (ZOFRAN) 4 MG tablet Take 4 mg by mouth every 8 (eight) hours as needed for nausea.     potassium chloride SA (K-DUR,KLOR-CON) 20 MEQ tablet Take 20 mEq by mouth daily.     white petrolatum-mineral oil (EUCERIN) Crea Apply 1 application topically every 4 (four) hours as needed. Apply to bilateral legs.     white petrolatum-mineral oil (EUCERIN) Crea Apply 1 application topically 2 (two) times a day. Apply to bilateral legs.     Past Medical History:    Past Medical History:   Diagnosis Date     Acute kidney injury superimposed on CKD 3/3/2017     Asthma      CAD (coronary artery disease)      Cataract      Chronic kidney disease     ckd3     Diabetes mellitus      Disease of thyroid gland      H/O heart valve replacement with bioprosthetic valve      History of transfusion      Hypertension      Normochromic normocytic anemia      Restless leg syndrome            PHYSICAL EXAMINATION  Vitals:    04/15/18 2217   BP: 137/62   Pulse: 60   Resp: 16   Temp: 96.6  F (35.9  C)   SpO2: 91%   Weight: (!) 243 lb (110.2 kg)       Today on physical exam:     GENERAL: Awake, Alert, obese, oriented x3, not in any form of acute distress, answers questions appropriately, follows simple commands, conversant  HEENT: Head is normocephalic with normal hair distribution. No evidence of trauma. Ears: No acute purulent discharge. Eyes: Conjunctivae pink with no scleral jaundice. Nose: Normal mucosa and septum. NECK: Supple with no cervical or supraclavicular  lymphadenopathy. Trachea is midline.   CHEST: No tenderness or deformity, no crepitus, left chest incision for pacer c/d/i  LUNG: Dim to auscultation with good chest expansion. There are no crackles or wheezes, normal AP diameter.  BACK: No kyphosis of the thoracic spine. Symmetric, no curvature, ROM normal, no CVA tenderness, no spinal tenderness   CVS: There is good S1  S2, regular rhythm, there are no murmurs, rubs, gallops, or heaves,  2+ pulses symmetric in all extremities.  ABDOMEN: Rounded and soft, nontender to palpation, non distended, no masses, no organomegaly, good bowel sounds, no rebound or guarding, no peritoneal signs.   EXTREMITIES: 1-2+ ble nonpitting edema, dry flaking skin  SKIN: Warm and dry  NEUROLOGICAL: The patient is oriented to person, place and time. Strength and sensation are grossly intact. Face is symmetric. More pleasant today            LABS:   Recent Results (from the past 168 hour(s))   Basic Metabolic Panel   Result Value Ref Range    Sodium 138 136 - 145 mmol/L    Potassium 3.1 (L) 3.5 - 5.0 mmol/L    Chloride 89 (L) 98 - 107 mmol/L    CO2 40 (H) 22 - 31 mmol/L    Anion Gap, Calculation 9 5 - 18 mmol/L    Glucose 100 70 - 125 mg/dL    Calcium 9.4 8.5 - 10.5 mg/dL    BUN 47 (H) 8 - 28 mg/dL    Creatinine 1.66 (H) 0.60 - 1.10 mg/dL    GFR MDRD Af Amer 36 (L) >60 mL/min/1.73m2    GFR MDRD Non Af Amer 29 (L) >60 mL/min/1.73m2   HM1 (CBC with Diff)   Result Value Ref Range    WBC 5.2 4.0 - 11.0 thou/uL    RBC 2.74 (L) 3.80 - 5.40 mill/uL    Hemoglobin 8.5 (L) 12.0 - 16.0 g/dL    Hematocrit 28.0 (L) 35.0 - 47.0 %     (H) 80 - 100 fL    MCH 31.0 27.0 - 34.0 pg    MCHC 30.4 (L) 32.0 - 36.0 g/dL    RDW 17.0 (H) 11.0 - 14.5 %    Platelets 170 140 - 440 thou/uL    MPV 10.4 8.5 - 12.5 fL    Neutrophils % 63 50 - 70 %    Lymphocytes % 22 20 - 40 %    Monocytes % 9 2 - 10 %    Eosinophils % 5 0 - 6 %    Basophils % 0 0 - 2 %    Neutrophils Absolute 3.3 2.0 - 7.7 thou/uL    Lymphocytes  Absolute 1.2 0.8 - 4.4 thou/uL    Monocytes Absolute 0.5 0.0 - 0.9 thou/uL    Eosinophils Absolute 0.3 0.0 - 0.4 thou/uL    Basophils Absolute 0.0 0.0 - 0.2 thou/uL     Results for orders placed or performed in visit on 04/16/18   Basic Metabolic Panel   Result Value Ref Range    Sodium 138 136 - 145 mmol/L    Potassium 3.1 (L) 3.5 - 5.0 mmol/L    Chloride 89 (L) 98 - 107 mmol/L    CO2 40 (H) 22 - 31 mmol/L    Anion Gap, Calculation 9 5 - 18 mmol/L    Glucose 100 70 - 125 mg/dL    Calcium 9.4 8.5 - 10.5 mg/dL    BUN 47 (H) 8 - 28 mg/dL    Creatinine 1.66 (H) 0.60 - 1.10 mg/dL    GFR MDRD Af Amer 36 (L) >60 mL/min/1.73m2    GFR MDRD Non Af Amer 29 (L) >60 mL/min/1.73m2         Lab Results   Component Value Date    WBC 5.2 04/16/2018    HGB 8.5 (L) 04/16/2018    HCT 28.0 (L) 04/16/2018     (H) 04/16/2018     04/16/2018       No results found for: [id number]  Lab Results   Component Value Date    HGBA1C 6.0 03/08/2018     Lab Results   Component Value Date    INR 1.30 (H) 02/07/2018    INR 1.51 (H) 03/06/2017    INR 1.27 (H) 03/05/2017     Vitamin D, Total (25-Hydroxy)   Date Value Ref Range Status   02/19/2018 56.7 30.0 - 80.0 ng/mL Final     Lab Results   Component Value Date    TSH 3.50 06/01/2017           ASSESSMENT/PLAN:     1 Acute on chronic CHF: Daily [phone]. On bumex 3mg in am and 2mg qpm, metolazone three times weekly. F/u with cardiology on 3/21-no recommended changes, f/u cardiology again 4/17. Keep legs elevated, ACE wrap legs. Sleeps in recliner. OT lymphedema wraps. 2+ pitting edema. Weights now improved since starting metolazone. Reduced bumex to 2mg bid.   2. Fall: Two weeks ago now, periorbital ecchymosis-resolved. Resolved arm pain.    3. S/p PPM: Battery change on 2/12. Incision c/d/i. No concerns. F/u with device clinic 5/15.   4. Mod persistent Asthma: On advair, combivent. SOB at baseline, off O2 now.  5. MIGUEL on  CKD: Cr 1.49 on 3/12. BMP 3/19-Cr 1.86. BMP on 4/2-1.38, much improved. Recheck 4/9-Cr 1.4.   6. Type 2 DM: Diet controlled. Monitor on labs.   7. Dyslipidemia: On aspirin, atorvastatin  8. HTN: SBP 130s. On lopressor, bumex.   9. GERD: On omeprazole. mylanta prn indigestion.   10. Hypothyroid: On levothyroxine.   11. Insomnia: On melatonin qhs.   12. Hypokalemia: On kcl.  K 4.0 on 3/12.   13. Constipation: On pericolace daily prn, miralax daily.    14. Vitamin d deficiency: On vitamin d. Level 2/19-56.7, stable.    15. Anemia of CKD: Hg 8.1 on 3/11. HM1 on 3/19-8.6. Recheck hm1 on 4/2-7.8.  Recheck on 4/9-Hg 8.1. HM1 on 4/16-Hg 8.5. Increased iron bid.   16. PAF: Regular rate and rhythm. On metoprolol   17. Onychomycosis: Saw podiatry 3/27-right great toenail fell off, no new orders and continue to monitor.   18. Chronic pain: Pain in legs mostly, tylenol tid prn and tramadol prn.  19. Hemorrhoids: anusol cream prn.     Per therapy ambulating 30-80 ft with CGA, 4 ww, transfers min to mod, setup for upper body dressing, min to mod for lower body dressing. She plans to return home with grand daughter.        Electronically signed by: Jennifer Valdes NP    Total 25 minutes of which 75% was spent in counseling and coordination of care of the above plan    Time spent in interview and examination of patient, review of available records, and discussion with nursing staff. Continue care plan, efforts at therapy, and monitor nutritional status.

## 2021-06-17 NOTE — PROGRESS NOTES
[facility] Progress Note    Assessment:    1.  Chronic diastolic congestive heart failure currently compensated  2.  Normal functioning bioprosthetic aortic valve  3.  Sinus node dysfunction with permanent pacemaker in place  4.  Persistent atrial fibrillation on device interrogation  5.  Mild coronary artery disease by coronary angiography 2007 without angina    Plan:    Would use metolazone on a as needed basis for weight gain.  Would continue Bumex at 2 mg twice a day.  Have made no other changes in her current medical regiment.  Would like to see Thea in follow-up in 6 months    An After Visit Summary was printed and given to the patient.    Subjective:    84-year-old female who developed congestive heart failure felt to be due to diastolic dysfunction in February.  Echocardiogram done on February 7 revealed an ejection fraction of 55% with normal bioprosthetic aortic valve in place.  Patient did have her valve replaced for severe aortic stenosis in 2007 and developed complete heart block postoperatively with subsequent permanent pacemaker placed.  She underwent a generator change on February 12.  Patient has chronic leg edema that has improved with diuretic therapy.  Her weight is monitored on a daily basis at the transitional care unit.  Patient denies any orthopnea or increased shortness of breath with activity    Problem List:    Patient Active Problem List   Diagnosis     Permanent atrial fibrillation     Constipation     Sciatica     Hypothyroidism     Type 2 diabetes mellitus with stage 3 chronic kidney disease, without long-term current use of insulin     Hyperparathyroidism     Vitamin D deficiency     Mixed hyperlipidemia     Atherosclerosis of native coronary artery of native heart without angina pectoris     CKD (chronic kidney disease), stage III     Osteoarthritis Of The Knee     Restless Legs Syndrome     Morbid obesity due to excess calories     H/O heart valve replacement  with bioprosthetic valve     Cardiac pacemaker in situ     Complete heart block     Normochromic normocytic anemia     Essential hypertension with goal blood pressure less than 140/90     Moderate persistent asthma without complication     PAF (paroxysmal atrial fibrillation)     GERD (gastroesophageal reflux disease)     Primary insomnia     Hypokalemia     Heart failure with preserved ejection fraction     Physical deconditioning     Asthma     Fall         Current Outpatient Prescriptions   Medication Sig Dispense Refill     acetaminophen (TYLENOL) 500 MG tablet Take 2 tablets (1,000 mg total) by mouth 3 (three) times a day as needed for pain. Not to exceed 4000 mg in 24 hours.  0     aluminum-magnesium hydroxide-simethicone (MAALOX ADVANCED) 200-200-20 mg/5 mL Susp Take 30 mL by mouth every 6 (six) hours as needed.       aspirin 81 MG EC tablet Take 81 mg by mouth daily.       atorvastatin (LIPITOR) 80 MG tablet Take 1 tablet (80 mg total) by mouth at bedtime. 90 tablet 3     bumetanide (BUMEX) 2 MG tablet Take 2 mg by mouth 2 (two) times a day at 9am and 6pm.        cholecalciferol, vitamin D3, (VITAMIN D3) 2,000 unit Tab Take 2,000 Units by mouth once daily.       ferrous sulfate 325 (65 FE) MG tablet Take 1 tablet by mouth 2 (two) times a day.       fluticasone-salmeterol (ADVAIR) 500-50 mcg/dose DISKUS Inhale 1 puff 2 (two) times a day.       folic acid (FOLVITE) 1 MG tablet Take 1 mg by mouth daily.       ipratropium-albuterol (COMBIVENT RESPIMAT)  mcg/actuation Mist inhaler Inhale 1 puff 4 (four) times a day. 4 g 11     levothyroxine (SYNTHROID, LEVOTHROID) 125 MCG tablet Take 125 mcg by mouth Daily at 6:00 am.        melatonin 3 mg Tab tablet Take 3 mg by mouth at bedtime.        menthol-zinc oxide (CALMOSEPTINE) 0.44-20.6 % Oint ointment Apply 1 application topically 4 (four) times a day as needed (to denuded areas of skin). 113 g 0     metOLazone (ZAROXOLYN) 2.5 MG tablet Take 2.5 mg by mouth 3  "(three) times a week.       metoprolol tartrate (LOPRESSOR) 25 MG tablet Take 4 tablets (100 mg total) by mouth 2 (two) times a day. 60 tablet 0     nystatin (MYCOSTATIN) cream Apply 1 application topically 2 (two) times a day. Apply to thighs abd folds until resolved.       omeprazole (PRILOSEC) 40 MG capsule Take 20 mg by mouth daily before breakfast.        ondansetron (ZOFRAN) 4 MG tablet Take 4 mg by mouth every 8 (eight) hours as needed for nausea.       potassium chloride SA (K-DUR,KLOR-CON) 20 MEQ tablet Take 20 mEq by mouth daily.       white petrolatum-mineral oil (EUCERIN) Crea Apply 1 application topically every 4 (four) hours as needed. Apply to bilateral legs.       white petrolatum-mineral oil (EUCERIN) Crea Apply 1 application topically 2 (two) times a day. Apply to bilateral legs.       No current facility-administered medications for this visit.        .  Past Surgical History:   Procedure Laterality Date     APPENDECTOMY       CHOLECYSTECTOMY       EYE SURGERY Bilateral     Cataracts     HIP PINNING Right 3/1/2017    Procedure: RIGHT HIP TROCHANTERIC RODDING;  Surgeon: Moshe Davies MD;  Location: [facility];  Service:      INSERT / REPLACE / REMOVE PACEMAKER  06/25/2007    guidant     INSERT / REPLACE / REMOVE PACEMAKER  02/07/2018    phoebe sci gen change     JOINT REPLACEMENT Left     knee     TISSUE AORTIC VALVE REPLACEMENT  06/25/2007            .  Social History     Social History     Marital status:      Spouse name: N/A     Number of children: N/A     Years of education: N/A     Occupational History      in operating room      retired     Social History Main Topics     Smoking status: Former Smoker     Smokeless tobacco: Never Used     Alcohol use No     Drug use: No     Sexual activity: No     Other Topics Concern     Not on file     Social History Narrative    She live in a single floor house.  Her son lives next door and her granddaughter is \"around a lot\"   " 3/2017       .  Family History   Problem Relation Age of Onset     No Medical Problems Mother      age 86     No Medical Problems Father      MVA     Cancer Brother      lung     No Medical Problems Brother      Review of Systems:  General: WNL  Eyes: WNL  Ears/Nose/Throat: WNL  Lungs: WNL  Heart: WNL  Stomach: WNL  Bladder: WNL  Muscle/Joints: WNL  Skin: WNL  Nervous System: WNL  Mental Health: WNL     Blood: WNL    Objective:     /56 (Patient Site: Right Arm, Patient Position: Sitting, Cuff Size: Adult Regular)  Pulse 60  Resp 18      [unfilled]    Physical Exam:    GENERAL APPEARANCE: alert, no apparent distress  HEENT: no scleral icterus or xanthelasma  NECK: jugular venous pressure normal  CHEST: symmetric, the lungs were clear to auscultation  CARDIOVASCULAR: regular rhythm with 1/6 systolic ejection murmur; no carotid bruits  ABDOMEN: nondistended, nontender, bowel sounds present  EXTREMITIES: no cyanosis, clubbing 2-3+ edema to the level of the knees with compression wraps in place    Cardiac Testing:    echocardiogram from February 7, 2018 results reviewed as above      Lab Results:    LIPIDS:  Lab Results   Component Value Date    CHOL 164 09/08/2016    CHOL 191 05/04/2016    CHOL 215 (H) 07/15/2015     Lab Results   Component Value Date    HDL 50 09/08/2016    HDL 55 05/04/2016    HDL 54 07/15/2015     Lab Results   Component Value Date    LDLCALC 100 09/08/2016    LDLCALC 118 05/04/2016    LDLCALC 141 (H) 07/15/2015     Lab Results   Component Value Date    TRIG 68 09/08/2016    TRIG 92 05/04/2016    TRIG 102 07/15/2015     No components found for: CHOLHDL    BMP:  Lab Results   Component Value Date    CREATININE 1.66 (H) 04/16/2018    BUN 47 (H) 04/16/2018     04/16/2018    K 3.1 (L) 04/16/2018    CL 89 (L) 04/16/2018    CO2 40 (H) 04/16/2018         Brandt Irvin MD,F.A.C.C.  [facility]  [phone]

## 2021-06-17 NOTE — PROGRESS NOTES
[facility]    DATE: 2018    NAME:  Thea Acosta             :  1934  MRN: [id number]  CODE STATUS:  FULL CODE    VISIT TYPE: Review Of Multiple Medical Conditions     FACILITY:  [facility] [[id number]]       CHIEF COMPLAIN/REASON FOR VISIT:    Chief Complaint   Patient presents with     Review Of Multiple Medical Conditions               HISTORY OF PRESENT ILLNESS: Thea Acosta is a 83 y.o. female who was admitted  to  for weakness. She was found to be dyspneic, febrile, and tachypneic. She was diagnosed with sepsis s/t right foot cellulitis. Blood cultures grew Strep group B. She was treated with vanco and later ancef. She was also treated for acute CHF. During her stay she also underwent pacemaker battery change on . She has PMH of CAD, CKD, DM, s/p PPM. Prior to this she lived at home with her grand daughter. She was readmitted 3/7-3/12 for Acute encephalopathy, MIGUEL, CHF exacerbation, hypotension. She was treated for UTI and palliative care did see her during her stay. She wished to remain full code.     Today Ms. Acosta states she is doing well today and her weight was down another 3lbs. She thinks the new diuretic is working well for her. She says her bowels continue to move well. She is sleeping fine in the recliner. She thinks her legs are about the same to a little better. OT is wrapping them and changes the wraps about every other day, however she will change them Friday and then not again until Monday. Otherwise she has no pain or concerns today. Per nursing weight was 245lbs today was 248 yesterday. Her vitals are stable.       REVIEW OF SYSTEMS:  PROBLEMS AND REVIEW OF SYSTEMS:   Review of Systems  Today on ROS:   Currently, no fever, chills, or rigors. Does not have any visual or hearing problems. Denies any chest pain, headaches, palpitations, lightheadedness, dizziness. Appetite is fair.  Denies any abdominal pain.  Denies any urinary symptoms. No active bleeding. Positive for edema ble, shortness of breath at baseline, sleeps in recliner, hemorrhoids-now resolved      Allergies   Allergen Reactions     Codeine Hives     Codeine Phosphate (Bulk)      This allergy is per [facility] records.     Codeine Sulfate      This allergy is per [facility] records.     Codeine-Butalbital-Asa-Caff      This allergy is per [facility] records.     Codeine-Guaifenesin      This allergy is per [facility] records.     Lisinopril Cough     Penicillins Swelling     Current Outpatient Prescriptions   Medication Sig     acetaminophen (TYLENOL) 500 MG tablet Take 2 tablets (1,000 mg total) by mouth 3 (three) times a day as needed for pain. Not to exceed 4000 mg in 24 hours.     aluminum-magnesium hydroxide-simethicone (MAALOX ADVANCED) 200-200-20 mg/5 mL Susp Take 30 mL by mouth every 6 (six) hours as needed.     aspirin 81 MG EC tablet Take 81 mg by mouth daily.     atorvastatin (LIPITOR) 80 MG tablet Take 1 tablet (80 mg total) by mouth at bedtime.     bumetanide (BUMEX) 2 MG tablet Take 3 mg by mouth 2 (two) times a day at 9am and 6pm. 3 mg in the AM and 2 mg at noon     cholecalciferol, vitamin D3, (VITAMIN D3) 2,000 unit Tab Take 2,000 Units by mouth once daily.     fluticasone-salmeterol (ADVAIR) 500-50 mcg/dose DISKUS Inhale 1 puff 2 (two) times a day.     folic acid (FOLVITE) 1 MG tablet Take 1 mg by mouth daily.     ipratropium-albuterol (COMBIVENT RESPIMAT)  mcg/actuation Mist inhaler Inhale 1 puff 4 (four) times a day.     levothyroxine (SYNTHROID, LEVOTHROID) 125 MCG tablet Take 125 mcg by mouth Daily at 6:00 am.      melatonin 3 mg Tab tablet Take 3 mg by mouth at bedtime.      menthol-zinc oxide (CALMOSEPTINE) 0.44-20.6 % Oint ointment Apply 1 application topically 4 (four) times a day as needed (to  denuded areas of skin).     metOLazone (ZAROXOLYN) 2.5 MG tablet Take 2.5 mg by mouth 3 (three) times a week.     metoprolol tartrate (LOPRESSOR) 25 MG tablet Take 4 tablets (100 mg total) by mouth 2 (two) times a day.     nystatin (MYCOSTATIN) cream Apply 1 application topically 2 (two) times a day. Apply to thighs abd folds until resolved.     omeprazole (PRILOSEC) 40 MG capsule Take 20 mg by mouth daily before breakfast.      ondansetron (ZOFRAN) 4 MG tablet Take 4 mg by mouth every 8 (eight) hours as needed for nausea.     potassium chloride SA (K-DUR,KLOR-CON) 20 MEQ tablet Take 20 mEq by mouth daily.     white petrolatum-mineral oil (EUCERIN) Crea Apply 1 application topically every 4 (four) hours as needed. Apply to bilateral legs.     white petrolatum-mineral oil (EUCERIN) Crea Apply 1 application topically 2 (two) times a day. Apply to bilateral legs.     Past Medical History:    Past Medical History:   Diagnosis Date     Acute kidney injury superimposed on CKD 3/3/2017     Asthma      CAD (coronary artery disease)      Cataract      Chronic kidney disease     ckd3     Diabetes mellitus      Disease of thyroid gland      H/O heart valve replacement with bioprosthetic valve      History of transfusion      Hypertension      Normochromic normocytic anemia      Restless leg syndrome            PHYSICAL EXAMINATION  Vitals:    04/11/18 2214   BP: 118/58   Pulse: 60   Resp: 18   Temp: 97  F (36.1  C)   SpO2: 92%   Weight: (!) 248 lb (112.5 kg)       Today on physical exam:     GENERAL: Awake, Alert, obese, oriented x3, not in any form of acute distress, answers questions appropriately, follows simple commands, conversant  HEENT: Head is normocephalic with normal hair distribution. No evidence of trauma. Ears: No acute purulent discharge. Eyes: Conjunctivae pink with no scleral jaundice. Nose: Normal mucosa and septum. NECK: Supple with no cervical or supraclavicular lymphadenopathy. Trachea is midline.   CHEST:  No tenderness or deformity, no crepitus, left chest incision for pacer c/d/i  LUNG: Dim to auscultation with good chest expansion. There are no crackles or wheezes, normal AP diameter.  BACK: No kyphosis of the thoracic spine. Symmetric, no curvature, ROM normal, no CVA tenderness, no spinal tenderness   CVS: There is good S1  S2, regular rhythm, there are no murmurs, rubs, gallops, or heaves,  2+ pulses symmetric in all extremities.  ABDOMEN: Rounded and soft, nontender to palpation, non distended, no masses, no organomegaly, good bowel sounds, no rebound or guarding, no peritoneal signs.   EXTREMITIES: 2+ ble pitting edema, dry flaking skin, erythema resolved from previous, no weeping areas  SKIN: Warm and dry  NEUROLOGICAL: The patient is oriented to person, place and time. Strength and sensation are grossly intact. Face is symmetric. More pleasant today            LABS:   Recent Results (from the past 168 hour(s))   Basic Metabolic Panel   Result Value Ref Range    Sodium 137 136 - 145 mmol/L    Potassium 3.9 3.5 - 5.0 mmol/L    Chloride 98 98 - 107 mmol/L    CO2 32 (H) 22 - 31 mmol/L    Anion Gap, Calculation 7 5 - 18 mmol/L    Glucose 100 70 - 125 mg/dL    Calcium 9.3 8.5 - 10.5 mg/dL    BUN 34 (H) 8 - 28 mg/dL    Creatinine 1.40 (H) 0.60 - 1.10 mg/dL    GFR MDRD Af Amer 44 (L) >60 mL/min/1.73m2    GFR MDRD Non Af Amer 36 (L) >60 mL/min/1.73m2   HM1 (CBC with Diff)   Result Value Ref Range    WBC 4.7 4.0 - 11.0 thou/uL    RBC 2.72 (L) 3.80 - 5.40 mill/uL    Hemoglobin 8.1 (L) 12.0 - 16.0 g/dL    Hematocrit 27.0 (L) 35.0 - 47.0 %    MCV 99 80 - 100 fL    MCH 29.8 27.0 - 34.0 pg    MCHC 30.0 (L) 32.0 - 36.0 g/dL    RDW 17.8 (H) 11.0 - 14.5 %    Platelets 154 140 - 440 thou/uL    MPV 10.2 8.5 - 12.5 fL    Neutrophils % 64 50 - 70 %    Lymphocytes % 24 20 - 40 %    Monocytes % 7 2 - 10 %    Eosinophils % 4 0 - 6 %    Basophils % 1 0 - 2 %    Neutrophils Absolute 3.0 2.0 - 7.7 thou/uL    Lymphocytes Absolute 1.1  0.8 - 4.4 thou/uL    Monocytes Absolute 0.3 0.0 - 0.9 thou/uL    Eosinophils Absolute 0.2 0.0 - 0.4 thou/uL    Basophils Absolute 0.0 0.0 - 0.2 thou/uL     Results for orders placed or performed in visit on 04/09/18   Basic Metabolic Panel   Result Value Ref Range    Sodium 137 136 - 145 mmol/L    Potassium 3.9 3.5 - 5.0 mmol/L    Chloride 98 98 - 107 mmol/L    CO2 32 (H) 22 - 31 mmol/L    Anion Gap, Calculation 7 5 - 18 mmol/L    Glucose 100 70 - 125 mg/dL    Calcium 9.3 8.5 - 10.5 mg/dL    BUN 34 (H) 8 - 28 mg/dL    Creatinine 1.40 (H) 0.60 - 1.10 mg/dL    GFR MDRD Af Amer 44 (L) >60 mL/min/1.73m2    GFR MDRD Non Af Amer 36 (L) >60 mL/min/1.73m2         Lab Results   Component Value Date    WBC 4.7 04/09/2018    HGB 8.1 (L) 04/09/2018    HCT 27.0 (L) 04/09/2018    MCV 99 04/09/2018     04/09/2018       No results found for: [id number]  Lab Results   Component Value Date    HGBA1C 6.0 03/08/2018     Lab Results   Component Value Date    INR 1.30 (H) 02/07/2018    INR 1.51 (H) 03/06/2017    INR 1.27 (H) 03/05/2017     Vitamin D, Total (25-Hydroxy)   Date Value Ref Range Status   02/19/2018 56.7 30.0 - 80.0 ng/mL Final     Lab Results   Component Value Date    TSH 3.50 06/01/2017           ASSESSMENT/PLAN:     1 Acute on chronic CHF: Daily [id number]. On bumex 3mg in am and 2mg qpm, metolazone three times weekly. F/u with cardiology on 3/21-no recommended changes, f/u cardiology again 4/17. Keep legs elevated, ACE wrap legs. Sleeps in recliner. OT lymphedema wraps. 2+ pitting edema. Weights now improved since starting metolazone.   2. Fall: Two weeks ago now, periorbital ecchymosis-resolved. Resolved arm pain.    3. S/p PPM: Battery change on 2/12. Incision c/d/i. No concerns. F/u with device clinic 5/15.   4. Mod persistent Asthma: On advair, combivent. SOB at baseline, off O2 now.  5. MIGUEL on CKD: Cr 1.49 on 3/12. BMP 3/19-Cr 1.86. BMP on 4/2-1.38,  much improved. Recheck 4/9-Cr 1.4.   6. Type 2 DM: Diet controlled. Monitor on labs.   7. Dyslipidemia: On aspirin, atorvastatin  8. HTN: SBP 120s. On lopressor, bumex.   9. GERD: On omeprazole. mylanta prn indigestion.   10. Hypothyroid: On levothyroxine.   11. Insomnia: On melatonin qhs.   12. Hypokalemia: On kcl.  K 4.0 on 3/12.   13. Constipation: On pericolace daily prn, miralax daily.    14. Vitamin d deficiency: On vitamin d. Level 2/19-56.7, stable.    15. Anemia of CKD: Hg 8.1 on 3/11. HM1 on 3/19-8.6. Recheck hm1 on 4/2-7.8.  Recheck on 4/9-Hg 8.1. Started iron daily. HM1 on 4/16.   16. PAF: Regular rate and rhythm. On metoprolol rate controlled 60s.   17. Onychomycosis: Saw podiatry 3/27-right great toenail fell off, no new orders and continue to monitor.   18. Chronic pain: Pain in legs mostly, tylenol tid prn and tramadol prn.  19. Hemorrhoids: Ordered anusol cream prn.     Per therapy ambulating 25-30 ft with CGA, 4 ww, sit to stand max assist, max for toileting, set up for upper body adls, max for lower. She plans to return home with grand daughter.      Electronically signed by: Jennifer Valdes NP    Total 25 minutes of which 75% was spent in counseling and coordination of care of the above plan    Time spent in interview and examination of patient, review of available records, and discussion with nursing staff. Continue care plan, efforts at therapy, and monitor nutritional status.

## 2021-06-17 NOTE — PROGRESS NOTES
[facility]    DATE: 2018    NAME:  Thea Acosta             :  1934  MRN: [id number]  CODE STATUS:  FULL CODE    VISIT TYPE: Review Of Multiple Medical Conditions     FACILITY:  [facility] [[id number]]       CHIEF COMPLAIN/REASON FOR VISIT:    Chief Complaint   Patient presents with     Review Of Multiple Medical Conditions               HISTORY OF PRESENT ILLNESS: Thea Acosta is a 83 y.o. female who was admitted  to  for weakness. She was found to be dyspneic, febrile, and tachypneic. She was diagnosed with sepsis s/t right foot cellulitis. Blood cultures grew Strep group B. She was treated with vanco and later ancef. She was also treated for acute CHF. During her stay she also underwent pacemaker battery change on . She has PMH of CAD, CKD, DM, s/p PPM. Prior to this she lived at home with her grand daughter. She was readmitted 3/7-3/12 for Acute encephalopathy, MIGUEL, CHF exacerbation, hypotension. She was treated for UTI and palliative care did see her during her stay. She wished to remain full code.     Today Ms. Acosta states she thinks her legs are about the same. She says her weight was 299lbs today but that was with the wheelchair so she isn't sure what is with subtracting the wheelchair weight. She says her legs still feel heavy. She does not feel short of breath. She says her bowels are moving fine and she is still sleeping well. She says she wishes her appetite had not returned because it is too good now. She says otherwise she is doing well. Therapy is doing leg wraps for her swelling now. Per nursing earlier today she had some blood in her brief but denied pain. She did tell nurses she had a history of hemorrhoids. She had some blood on her toilet paper when she wiped this morning too. Nurses are concerned she may have actively bleeding hemorrhoids. She did have labs today and Hg was improved from previous labs. Her  weights are steady but not trending down. [phone].       REVIEW OF SYSTEMS:  PROBLEMS AND REVIEW OF SYSTEMS:   Review of Systems  Today on ROS:   Currently, no fever, chills, or rigors. Does not have any visual or hearing problems. Denies any chest pain, headaches, palpitations, lightheadedness, dizziness. Appetite is fair.  Denies any abdominal pain. Denies any urinary symptoms. No active bleeding. Positive for edema ble, shortness of breath at baseline, sleeps in recliner, hemorrhoids      Allergies   Allergen Reactions     Codeine Hives     Codeine Phosphate (Bulk)      This allergy is per [facility] records.     Codeine Sulfate      This allergy is per [facility] records.     Codeine-Butalbital-Asa-Caff      This allergy is per [facility] records.     Codeine-Guaifenesin      This allergy is per [facility] records.     Lisinopril Cough     Penicillins Swelling     Current Outpatient Prescriptions   Medication Sig     acetaminophen (TYLENOL) 500 MG tablet Take 2 tablets (1,000 mg total) by mouth 3 (three) times a day as needed for pain. Not to exceed 4000 mg in 24 hours.     aluminum-magnesium hydroxide-simethicone (MAALOX ADVANCED) 200-200-20 mg/5 mL Susp Take 30 mL by mouth every 6 (six) hours as needed.     aspirin 81 MG EC tablet Take 81 mg by mouth daily.     atorvastatin (LIPITOR) 80 MG tablet Take 1 tablet (80 mg total) by mouth at bedtime.     bumetanide (BUMEX) 2 MG tablet Take 3 mg by mouth 2 (two) times a day at 9am and 6pm. 3 mg in the AM and 2 mg at noon     cholecalciferol, vitamin D3, (VITAMIN D3) 2,000 unit Tab Take 2,000 Units by mouth once daily.     fluticasone-salmeterol (ADVAIR) 500-50 mcg/dose DISKUS Inhale 1 puff 2 (two) times a day.     folic acid (FOLVITE) 1 MG tablet Take 1 mg by mouth daily.     ipratropium-albuterol (COMBIVENT RESPIMAT)   mcg/actuation Mist inhaler Inhale 1 puff 4 (four) times a day.     levothyroxine (SYNTHROID, LEVOTHROID) 125 MCG tablet Take 125 mcg by mouth Daily at 6:00 am.      melatonin 3 mg Tab tablet Take 3 mg by mouth at bedtime.      menthol-zinc oxide (CALMOSEPTINE) 0.44-20.6 % Oint ointment Apply 1 application topically 4 (four) times a day as needed (to denuded areas of skin).     metOLazone (ZAROXOLYN) 2.5 MG tablet Take 2.5 mg by mouth 3 (three) times a week.     metoprolol tartrate (LOPRESSOR) 25 MG tablet Take 4 tablets (100 mg total) by mouth 2 (two) times a day.     nystatin (MYCOSTATIN) cream Apply 1 application topically 2 (two) times a day. Apply to thighs abd folds until resolved.     omeprazole (PRILOSEC) 40 MG capsule Take 20 mg by mouth daily before breakfast.      ondansetron (ZOFRAN) 4 MG tablet Take 4 mg by mouth every 8 (eight) hours as needed for nausea.     potassium chloride SA (K-DUR,KLOR-CON) 20 MEQ tablet Take 20 mEq by mouth daily.     white petrolatum-mineral oil (EUCERIN) Crea Apply 1 application topically every 4 (four) hours as needed. Apply to bilateral legs.     white petrolatum-mineral oil (EUCERIN) Crea Apply 1 application topically 2 (two) times a day. Apply to bilateral legs.     Past Medical History:    Past Medical History:   Diagnosis Date     Acute kidney injury superimposed on CKD 3/3/2017     Asthma      CAD (coronary artery disease)      Cataract      Chronic kidney disease     ckd3     Diabetes mellitus      Disease of thyroid gland      H/O heart valve replacement with bioprosthetic valve      History of transfusion      Hypertension      Normochromic normocytic anemia      Restless leg syndrome            PHYSICAL EXAMINATION  Vitals:    04/08/18 1611   BP: 129/60   Pulse: 60   Resp: 20   Temp: 96.7  F (35.9  C)   SpO2: 96%       Today on physical exam:     GENERAL: Awake, Alert, obese, oriented x3, not in any form of acute distress, answers questions appropriately, follows  simple commands, conversant  HEENT: Head is normocephalic with normal hair distribution. No evidence of trauma. Ears: No acute purulent discharge. Eyes: Conjunctivae pink with no scleral jaundice. Nose: Normal mucosa and septum. NECK: Supple with no cervical or supraclavicular lymphadenopathy. Trachea is midline.   CHEST: No tenderness or deformity, no crepitus, left chest incision for pacer c/d/i  LUNG: Dim to auscultation with good chest expansion. There are no crackles or wheezes, normal AP diameter.  BACK: No kyphosis of the thoracic spine. Symmetric, no curvature, ROM normal, no CVA tenderness, no spinal tenderness   CVS: There is good S1  S2, regular rhythm, there are no murmurs, rubs, gallops, or heaves,  2+ pulses symmetric in all extremities.  ABDOMEN: Rounded and soft, nontender to palpation, non distended, no masses, no organomegaly, good bowel sounds, no rebound or guarding, no peritoneal signs.   EXTREMITIES: 2+ ble pitting edema, dry flaking skin, erythema resolved from previous, no weeping areas  SKIN: Warm and dry  NEUROLOGICAL: The patient is oriented to person, place and time. Strength and sensation are grossly intact. Face is symmetric. More pleasant today            LABS:   Recent Results (from the past 168 hour(s))   Basic Metabolic Panel   Result Value Ref Range    Sodium 137 136 - 145 mmol/L    Potassium 3.6 3.5 - 5.0 mmol/L    Chloride 98 98 - 107 mmol/L    CO2 29 22 - 31 mmol/L    Anion Gap, Calculation 10 5 - 18 mmol/L    Glucose 103 70 - 125 mg/dL    Calcium 9.1 8.5 - 10.5 mg/dL    BUN 31 (H) 8 - 28 mg/dL    Creatinine 1.38 (H) 0.60 - 1.10 mg/dL    GFR MDRD Af Amer 44 (L) >60 mL/min/1.73m2    GFR MDRD Non Af Amer 37 (L) >60 mL/min/1.73m2   HM1 (CBC with Diff)   Result Value Ref Range    WBC 4.7 4.0 - 11.0 thou/uL    RBC 2.58 (L) 3.80 - 5.40 mill/uL    Hemoglobin 7.8 (L) 12.0 - 16.0 g/dL    Hematocrit 26.0 (L) 35.0 - 47.0 %     (H) 80 - 100 fL    MCH 30.2 27.0 - 34.0 pg    MCHC 30.0  (L) 32.0 - 36.0 g/dL    RDW 17.7 (H) 11.0 - 14.5 %    Platelets 137 (L) 140 - 440 thou/uL    MPV 10.7 8.5 - 12.5 fL    Neutrophils % 58 50 - 70 %    Lymphocytes % 25 20 - 40 %    Monocytes % 9 2 - 10 %    Eosinophils % 8 (H) 0 - 6 %    Basophils % 0 0 - 2 %    Neutrophils Absolute 2.7 2.0 - 7.7 thou/uL    Lymphocytes Absolute 1.2 0.8 - 4.4 thou/uL    Monocytes Absolute 0.4 0.0 - 0.9 thou/uL    Eosinophils Absolute 0.4 0.0 - 0.4 thou/uL    Basophils Absolute 0.0 0.0 - 0.2 thou/uL     Results for orders placed or performed in visit on 04/02/18   Basic Metabolic Panel   Result Value Ref Range    Sodium 137 136 - 145 mmol/L    Potassium 3.6 3.5 - 5.0 mmol/L    Chloride 98 98 - 107 mmol/L    CO2 29 22 - 31 mmol/L    Anion Gap, Calculation 10 5 - 18 mmol/L    Glucose 103 70 - 125 mg/dL    Calcium 9.1 8.5 - 10.5 mg/dL    BUN 31 (H) 8 - 28 mg/dL    Creatinine 1.38 (H) 0.60 - 1.10 mg/dL    GFR MDRD Af Amer 44 (L) >60 mL/min/1.73m2    GFR MDRD Non Af Amer 37 (L) >60 mL/min/1.73m2         Lab Results   Component Value Date    WBC 4.7 04/02/2018    HGB 7.8 (L) 04/02/2018    HCT 26.0 (L) 04/02/2018     (H) 04/02/2018     (L) 04/02/2018       No results found for: [id number]  Lab Results   Component Value Date    HGBA1C 6.0 03/08/2018     Lab Results   Component Value Date    INR 1.30 (H) 02/07/2018    INR 1.51 (H) 03/06/2017    INR 1.27 (H) 03/05/2017     Vitamin D, Total (25-Hydroxy)   Date Value Ref Range Status   02/19/2018 56.7 30.0 - 80.0 ng/mL Final     Lab Results   Component Value Date    TSH 3.50 06/01/2017           ASSESSMENT/PLAN:     1 Acute on chronic CHF: Daily [id number]. On bumex 3mg in am and 2mg qpm. F/u with cardiology on 3/21-no recommended changes, f/u cardiology again 4/17. Keep legs elevated, ACE wrap legs. Sleeps in recliner. OT started lymphedema wraps yesterday. 2+ pitting edema. Stable, started motolazone three times weekly last week.    2. Fall: Two weeks ago now, periorbital ecchymosis-resolved. Resolved arm pain.    3. S/p PPM: Battery change on 2/12. Incision c/d/i. No concerns. F/u with device clinic 5/15.   4. Mod persistent Asthma: On advair, combivent. SOB at baseline, off O2 now.  5. MIGUEL on CKD: Cr 1.49 on 3/12. BMP 3/19-Cr 1.86. BMP on 4/2-1.38, much improved. Recheck 4/9-Cr 1.4.   6. Type 2 DM: Diet controlled. Monitor on labs.   7. Dyslipidemia: On aspirin, atorvastatin  8. HTN: SBP 120s. On lopressor, bumex.   9. GERD: On omeprazole. mylanta prn indigestion.   10. Hypothyroid: On levothyroxine.   11. Insomnia: On melatonin qhs.   12. Hypokalemia: On kcl.  K 4.0 on 3/12.   13. Constipation: On pericolace daily prn, miralax daily.    14. Vitamin d deficiency: On vitamin d. Level 2/19-56.7, stable.    15. Anemia of CKD: Hg 8.1 on 3/11. HM1 on 3/19-8.6. Recheck hm1 on 4/2-7.8.  Recheck on 4/9-Hg 8.1. Started iron daily. HM1 on 4/16.   16. PAF: Regular rate and rhythm. On metoprolol rate controlled 60s.   17. Onychomycosis: Saw podiatry 3/27-right great toenail fell off, no new orders and continue to monitor.   18. Chronic pain: Pain in legs mostly, tylenol tid prn and tramadol prn.  19. Hemorrhoids: Ordered anusol cream prn.     Per therapy ambulating 25-30 ft with CGA, 4 ww, sit to stand max assist, max for toileting, set up for upper body adls, max for lower. She plans to return home with grand daughter.      Electronically signed by: Jennifer Valdes NP    Total 25 minutes of which 75% was spent in counseling and coordination of care of the above plan    Time spent in interview and examination of patient, review of available records, and discussion with nursing staff. Continue care plan, efforts at therapy, and monitor nutritional status.

## 2021-06-17 NOTE — PROGRESS NOTES
[facility]    Facility:   [facility] [[id number]]   Code Status: FULL CODE      CHIEF COMPLAINT/REASON FOR VISIT:  Chief Complaint   Patient presents with     Review Of Multiple Medical Conditions       HISTORY:      HPI: Thea is a 84 y.o. female who has chronic diastolic congestive heart failure, and was recently hospitalized for that.  She has had a steady increase of her weight of 25 pounds even though she is not experiencing increased shortness of breath and in fact has been able to wean off of the oxygen.  She was started on Zaroxolyn 3 times per week recently.    On other current review of systems, she is not having chest pain or palpitations nor cough nor sore throat or upper respiratory infection symptoms.  The cellulitis that was the original problem has resolved.  She still is in a weakened state although there has been steady progress with therapies.    Past Medical History:   Diagnosis Date     Acute kidney injury superimposed on CKD 3/3/2017     Asthma      CAD (coronary artery disease)      Cataract      Chronic kidney disease     ckd3     Diabetes mellitus      Disease of thyroid gland      H/O heart valve replacement with bioprosthetic valve      History of transfusion      Hypertension      Normochromic normocytic anemia      Restless leg syndrome              Family History   Problem Relation Age of Onset     No Medical Problems Mother      age 86     No Medical Problems Father      MVA     Cancer Brother      lung     No Medical Problems Brother      Social History     Social History     Marital status:      Spouse name: N/A     Number of children: N/A     Years of education: N/A     Occupational History      in operating room      retired     Social History Main Topics     Smoking status: Former Smoker     Smokeless tobacco: Never Used     Alcohol use No     Drug use: No     Sexual activity: No     Other Topics Concern     Not on  "file     Social History Narrative    She live in a single floor house.  Her son lives next door and her granddaughter is \"around a lot\"   3/2017         Review of Systems   Constitutional: Negative for chills and fever.       .  Vitals:    04/10/18 1727   BP: 107/69   Pulse: 60   Resp: 16   Temp: 98  F (36.7  C)   SpO2: 92%       Physical Exam   Constitutional: No distress.   Eyes: Right eye exhibits no discharge. Left eye exhibits no discharge.   Neck: No JVD present.   Cardiovascular: Normal rate and normal heart sounds.    Pulmonary/Chest: Effort normal and breath sounds normal. No respiratory distress. She has no wheezes.   Musculoskeletal:   Ace wraps are present   Neurological: She is alert.   Psychiatric: She has a normal mood and affect.   Nursing note and vitals reviewed.        LABS:   No new laboratory testing    ASSESSMENT:      ICD-10-CM    1. Chronic diastolic CHF (congestive heart failure) I50.32    2. Permanent atrial fibrillation I48.2        PLAN:    Continue with the current plan with periodic monitoring of laboratory testing.      Electronically signed by: Sami Palma MD  "

## 2021-06-17 NOTE — PROGRESS NOTES
[facility]    DATE: 5/3/2018    NAME:  Thea Acosta             :  1934  MRN: [id number]  CODE STATUS:  FULL CODE    VISIT TYPE: Review Of Multiple Medical Conditions     FACILITY:  [facility] [[id number]]       CHIEF COMPLAIN/REASON FOR VISIT:    Chief Complaint   Patient presents with     Review Of Multiple Medical Conditions               HISTORY OF PRESENT ILLNESS: Thea Acosta is a 84 y.o. female who was admitted  to  for weakness. She was found to be dyspneic, febrile, and tachypneic. She was diagnosed with sepsis s/t right foot cellulitis. Blood cultures grew Strep group B. She was treated with vanco and later ancef. She was also treated for acute CHF. During her stay she also underwent pacemaker battery change on . She has PMH of CAD, CKD, DM, s/p PPM. Prior to this she lived at home with her grand daughter. She was readmitted 3/7-3/12 for Acute encephalopathy, MIGUEL, CHF exacerbation, hypotension. She was treated for UTI and palliative care did see her during her stay. She wished to remain full code.     Today Ms. Acosta is seen in her room sitting in her wheelchair. She has already had one therapy today and says it went well. She is very pleased because therapy finally gave her a discharge date of next Wednesday. She says after three months she is more than ready to go home. She denies pain or concerns today and is ready to bust out of here. Her weight today was 240lbs and was 240 yesterday. Per staff vitals are stable.       REVIEW OF SYSTEMS:  PROBLEMS AND REVIEW OF SYSTEMS:   Review of Systems  Today on ROS:   Currently, no fever, chills, or rigors. Does not have any visual or hearing problems. Denies any chest pain, headaches, palpitations, lightheadedness, dizziness. Appetite is fair.  Denies any abdominal pain. Denies any urinary symptoms. No active bleeding. Positive for edema ble, shortness of breath at baseline,  sleeps in recliner      Allergies   Allergen Reactions     Codeine Hives     Codeine Phosphate (Bulk)      This allergy is per [facility] records.     Codeine Sulfate      This allergy is per [facility] records.     Codeine-Butalbital-Asa-Caff      This allergy is per [facility] records.     Codeine-Guaifenesin      This allergy is per [facility] records.     Lisinopril Cough     Penicillins Swelling     Current Outpatient Prescriptions   Medication Sig     acetaminophen (TYLENOL) 500 MG tablet Take 2 tablets (1,000 mg total) by mouth 3 (three) times a day as needed for pain. Not to exceed 4000 mg in 24 hours.     aluminum-magnesium hydroxide-simethicone (MAALOX ADVANCED) 200-200-20 mg/5 mL Susp Take 30 mL by mouth every 6 (six) hours as needed.     aspirin 81 MG EC tablet Take 81 mg by mouth daily.     atorvastatin (LIPITOR) 80 MG tablet Take 1 tablet (80 mg total) by mouth at bedtime.     bumetanide (BUMEX) 2 MG tablet Take 2 mg by mouth 2 (two) times a day at 9am and 6pm.      cholecalciferol, vitamin D3, (VITAMIN D3) 2,000 unit Tab Take 2,000 Units by mouth once daily.     ferrous sulfate 325 (65 FE) MG tablet Take 1 tablet by mouth 2 (two) times a day.     fluticasone-salmeterol (ADVAIR) 500-50 mcg/dose DISKUS Inhale 1 puff 2 (two) times a day.     folic acid (FOLVITE) 1 MG tablet Take 1 mg by mouth daily.     ipratropium-albuterol (COMBIVENT RESPIMAT)  mcg/actuation Mist inhaler Inhale 1 puff 4 (four) times a day.     levothyroxine (SYNTHROID, LEVOTHROID) 125 MCG tablet Take 125 mcg by mouth Daily at 6:00 am.      melatonin 3 mg Tab tablet Take 3 mg by mouth at bedtime.      menthol-zinc oxide (CALMOSEPTINE) 0.44-20.6 % Oint ointment Apply 1 application topically 4 (four) times a day as needed (to denuded areas of skin).     metOLazone (ZAROXOLYN) 2.5 MG tablet Take 2.5 mg  by mouth 2 (two) times a week.      metoprolol tartrate (LOPRESSOR) 50 MG tablet Take 50 mg by mouth 2 (two) times a day.     nystatin (MYCOSTATIN) cream Apply 1 application topically 2 (two) times a day. Apply to thighs abd folds until resolved.     omeprazole (PRILOSEC) 40 MG capsule Take 20 mg by mouth daily before breakfast.      potassium chloride SA (K-DUR,KLOR-CON) 20 MEQ tablet Take 20 mEq by mouth daily.     white petrolatum-mineral oil (EUCERIN) Crea Apply 1 application topically every 4 (four) hours as needed. Apply to bilateral legs.     white petrolatum-mineral oil (EUCERIN) Crea Apply 1 application topically 2 (two) times a day. Apply to bilateral legs.     Past Medical History:    Past Medical History:   Diagnosis Date     Acute kidney injury superimposed on CKD 3/3/2017     Asthma      CAD (coronary artery disease)      Cataract      Chronic kidney disease     ckd3     Diabetes mellitus      Disease of thyroid gland      H/O heart valve replacement with bioprosthetic valve      History of transfusion      Hypertension      Normochromic normocytic anemia      Restless leg syndrome            PHYSICAL EXAMINATION  Vitals:    05/02/18 2145   BP: 121/59   Pulse: 92   Resp: 18   Temp: 97  F (36.1  C)   SpO2: 97%   Weight: (!) 240 lb (108.9 kg)       Today on physical exam:     GENERAL: Awake, Alert, obese, oriented x3, not in any form of acute distress, answers questions appropriately, follows simple commands, conversant  HEENT: Head is normocephalic with normal hair distribution. No evidence of trauma. Ears: No acute purulent discharge. Eyes: Conjunctivae pink with no scleral jaundice. Nose: Normal mucosa and septum. NECK: Supple with no cervical or supraclavicular lymphadenopathy. Trachea is midline.   CHEST: No tenderness or deformity, no crepitus, left chest incision for pacer c/d/i  LUNG: Dim to auscultation with good chest expansion. There are no crackles or wheezes, normal AP diameter.  BACK: No  kyphosis of the thoracic spine. Symmetric, no curvature, ROM normal, no CVA tenderness, no spinal tenderness   CVS: There is good S1  S2, regular rhythm, there are no murmurs, rubs, gallops, or heaves,  2+ pulses symmetric in all extremities.  ABDOMEN: Rounded and soft, nontender to palpation, non distended, no masses, no organomegaly, good bowel sounds, no rebound or guarding, no peritoneal signs.   EXTREMITIES: 1-2+ ble nonpitting edema, dry flaking skin  SKIN: Warm and dry  NEUROLOGICAL: The patient is oriented to person, place and time. Strength and sensation are grossly intact. Face is symmetric. More pleasant today            LABS:   Recent Results (from the past 168 hour(s))   Basic Metabolic Panel   Result Value Ref Range    Sodium 138 136 - 145 mmol/L    Potassium 2.9 (L) 3.5 - 5.0 mmol/L    Chloride 93 (L) 98 - 107 mmol/L    CO2 36 (H) 22 - 31 mmol/L    Anion Gap, Calculation 9 5 - 18 mmol/L    Glucose 114 70 - 125 mg/dL    Calcium 9.7 8.5 - 10.5 mg/dL    BUN 53 (H) 8 - 28 mg/dL    Creatinine 1.61 (H) 0.60 - 1.10 mg/dL    GFR MDRD Af Amer 37 (L) >60 mL/min/1.73m2    GFR MDRD Non Af Amer 30 (L) >60 mL/min/1.73m2     Results for orders placed or performed in visit on 05/02/18   Basic Metabolic Panel   Result Value Ref Range    Sodium 138 136 - 145 mmol/L    Potassium 2.9 (L) 3.5 - 5.0 mmol/L    Chloride 93 (L) 98 - 107 mmol/L    CO2 36 (H) 22 - 31 mmol/L    Anion Gap, Calculation 9 5 - 18 mmol/L    Glucose 114 70 - 125 mg/dL    Calcium 9.7 8.5 - 10.5 mg/dL    BUN 53 (H) 8 - 28 mg/dL    Creatinine 1.61 (H) 0.60 - 1.10 mg/dL    GFR MDRD Af Amer 37 (L) >60 mL/min/1.73m2    GFR MDRD Non Af Amer 30 (L) >60 mL/min/1.73m2         Lab Results   Component Value Date    WBC 6.8 04/23/2018    HGB 9.2 (L) 04/23/2018    HCT 31.7 (L) 04/23/2018     (H) 04/23/2018     04/23/2018       No results found for: [id number]  Lab Results   Component Value Date    HGBA1C 6.0 03/08/2018     Lab Results   Component  Value Date    INR 1.30 (H) 02/07/2018    INR 1.51 (H) 03/06/2017    INR 1.27 (H) 03/05/2017     Vitamin D, Total (25-Hydroxy)   Date Value Ref Range Status   02/19/2018 56.7 30.0 - 80.0 ng/mL Final     Lab Results   Component Value Date    TSH 3.50 06/01/2017           ASSESSMENT/PLAN:     1 Acute on chronic CHF: Daily [id number]. On bumex 2mg in am and 2mg qpm. F/u with cardiology on 3/21-no recommended changes, f/u cardiology again 4/17-changed metolazone to three times weekly prn. Doing well. Keep legs elevated. Sleeps in recliner. OT lymphedema wraps. 1-2+ nonpitting edema. Staff has not been giving prn metolazone, now on mondays and Fridays metolazone.   2. Fall: Resolved ecchymosis on face, arm pain.    3. S/p PPM: Battery change on 2/12. Incision c/d/i. No concerns. F/u with device clinic 5/15.   4. Mod persistent Asthma: On advair, combivent. SOB at baseline, off O2 now.  5. MIGUEL on CKD: Cr 1.49 on 3/12. BMP 3/19-Cr 1.86. BMP on 4/2-1.38, much improved. Recheck 4/9-Cr 1.4. Cr 1.66 on 4/16. Cr 1.5 on 4/23, stable.   6. Type 2 DM: Diet controlled. Monitor on labs.   7. Dyslipidemia: On aspirin, atorvastatin  8. HTN: SBP 120s. On lopressor, bumex.   9. GERD: On omeprazole. mylanta prn indigestion.   10. Hypothyroid: On levothyroxine.   11. Insomnia: On melatonin qhs.   12. Hypokalemia: On kcl.  K 4.0 on 3/12. K 2.9 on 5/2, orderd 40 q8h x 4 doses, then start 40 daily. Recheck 5/7.   13. Constipation: On pericolace daily prn, miralax daily.    14. Vitamin d deficiency: On vitamin d. Level 2/19-56.7, stable.    15. Anemia of CKD: Hg 8.1 on 3/11. HM1 on 3/19-8.6. Recheck hm1 on 4/2-7.8.  Recheck on 4/9-Hg 8.1. HM1 on 4/16-Hg 8.5. Increased iron bid. Hg 9.2 on 4/23.   16. PAF: Regular rate and rhythm. On metoprolol   17. Onychomycosis: Saw podiatry 3/27-right great toenail fell off, no new orders and continue to  monitor.   18. Chronic pain: Pain in legs mostly, tylenol tid prn      Per therapy ambulating  ft with CGA, 4 ww, transfers min to mod, setup for upper body dressing, min to mod for lower body dressing. 4 steps with therapy, home eval later this week. She plans to return home with grand daughter. Planning for dc on 5/9       Electronically signed by: Jennifer Valdes NP    Total 25 minutes of which 75% was spent in counseling and coordination of care of the above plan    Time spent in interview and examination of patient, review of available records, and discussion with nursing staff. Continue care plan, efforts at therapy, and monitor nutritional status.

## 2021-06-17 NOTE — PROGRESS NOTES
[facility]    DATE: 2018    NAME:  Thea Acosta             :  1934  MRN: [id number]  CODE STATUS:  FULL CODE    VISIT TYPE: Problem Visit (yeast)     FACILITY:  [facility] [[id number]]       CHIEF COMPLAIN/REASON FOR VISIT:    Chief Complaint   Patient presents with     Problem Visit     yeast               HISTORY OF PRESENT ILLNESS: Thea Acosta is a 84 y.o. female who was admitted  to  for weakness. She was found to be dyspneic, febrile, and tachypneic. She was diagnosed with sepsis s/t right foot cellulitis. Blood cultures grew Strep group B. She was treated with vanco and later ancef. She was also treated for acute CHF. During her stay she also underwent pacemaker battery change on . She has PMH of CAD, CKD, DM, s/p PPM. Prior to this she lived at home with her grand daughter. She was readmitted 3/7-3/12 for Acute encephalopathy, MIGUEL, CHF exacerbation, hypotension. She was treated for UTI and palliative care did see her during her stay. She wished to remain full code.     Today Ms. Acosta is seen today for redness under her left breast. Her other vitals are stable and weight 242lbs today, so did gain 2 lbs since yesterday.   She reports that she feels fine today and no concerns, just itching and redness under her left breast. She gets this from time to time. She says her weights are up and down but does not feel any more fluid overloaded today than usual. She denies other complaints at this time.        REVIEW OF SYSTEMS:  PROBLEMS AND REVIEW OF SYSTEMS:   Review of Systems  Today on ROS:   Currently, no fever, chills, or rigors. Does not have any visual or hearing problems. Denies any chest pain, headaches, palpitations, lightheadedness, dizziness. Appetite is fair.  Denies any abdominal pain. Denies any urinary symptoms. No active bleeding. Positive for edema ble, shortness of breath at baseline, sleeps in recliner,  redness and itching under left breast      Allergies   Allergen Reactions     Codeine Hives     Codeine Phosphate (Bulk)      This allergy is per [facility] records.     Codeine Sulfate      This allergy is per [facility] records.     Codeine-Butalbital-Asa-Caff      This allergy is per [facility] records.     Codeine-Guaifenesin      This allergy is per [facility] records.     Lisinopril Cough     Penicillins Swelling     Current Outpatient Prescriptions   Medication Sig     acetaminophen (TYLENOL) 500 MG tablet Take 2 tablets (1,000 mg total) by mouth 3 (three) times a day as needed for pain. Not to exceed 4000 mg in 24 hours.     aluminum-magnesium hydroxide-simethicone (MAALOX ADVANCED) 200-200-20 mg/5 mL Susp Take 30 mL by mouth every 6 (six) hours as needed.     aspirin 81 MG EC tablet Take 81 mg by mouth daily.     atorvastatin (LIPITOR) 80 MG tablet Take 1 tablet (80 mg total) by mouth at bedtime.     bumetanide (BUMEX) 2 MG tablet Take 2 mg by mouth 2 (two) times a day at 9am and 6pm.      cholecalciferol, vitamin D3, (VITAMIN D3) 2,000 unit Tab Take 2,000 Units by mouth once daily.     ferrous sulfate 325 (65 FE) MG tablet Take 1 tablet by mouth 2 (two) times a day.     fluticasone-salmeterol (ADVAIR) 500-50 mcg/dose DISKUS Inhale 1 puff 2 (two) times a day.     folic acid (FOLVITE) 1 MG tablet Take 1 mg by mouth daily.     ipratropium-albuterol (COMBIVENT RESPIMAT)  mcg/actuation Mist inhaler Inhale 1 puff 4 (four) times a day.     levothyroxine (SYNTHROID, LEVOTHROID) 125 MCG tablet Take 125 mcg by mouth Daily at 6:00 am.      melatonin 3 mg Tab tablet Take 3 mg by mouth at bedtime.      menthol-zinc oxide (CALMOSEPTINE) 0.44-20.6 % Oint ointment Apply 1 application topically 4 (four) times a day as needed (to denuded areas of skin).     metOLazone (ZAROXOLYN) 2.5 MG  tablet Take 2.5 mg by mouth 2 (two) times a week.      metoprolol tartrate (LOPRESSOR) 50 MG tablet Take 50 mg by mouth 2 (two) times a day.     nystatin (MYCOSTATIN) cream Apply 1 application topically 2 (two) times a day. Apply to thighs abd folds until resolved.     omeprazole (PRILOSEC) 40 MG capsule Take 20 mg by mouth daily before breakfast.      potassium chloride SA (K-DUR,KLOR-CON) 20 MEQ tablet Take 20 mEq by mouth daily.     white petrolatum-mineral oil (EUCERIN) Crea Apply 1 application topically every 4 (four) hours as needed. Apply to bilateral legs.     white petrolatum-mineral oil (EUCERIN) Crea Apply 1 application topically 2 (two) times a day. Apply to bilateral legs.     Past Medical History:    Past Medical History:   Diagnosis Date     Acute kidney injury superimposed on CKD 3/3/2017     Asthma      CAD (coronary artery disease)      Cataract      Chronic kidney disease     ckd3     Diabetes mellitus      Disease of thyroid gland      H/O heart valve replacement with bioprosthetic valve      History of transfusion      Hypertension      Normochromic normocytic anemia      Restless leg syndrome            PHYSICAL EXAMINATION  Vitals:    05/04/18 1518   BP: 137/64   Pulse: 60   Resp: 19   Temp: (!) 96  F (35.6  C)   SpO2: 96%       Today on physical exam:     GENERAL: Awake, Alert, obese, oriented x3, not in any form of acute distress, answers questions appropriately, follows simple commands, conversant  HEENT: Head is normocephalic with normal hair distribution. No evidence of trauma. Ears: No acute purulent discharge. Eyes: Conjunctivae pink with no scleral jaundice. Nose: Normal mucosa and septum. NECK: Supple with no cervical or supraclavicular lymphadenopathy. Trachea is midline.   CHEST: No tenderness or deformity, no crepitus, left chest incision for pacer c/d/i  LUNG: Dim to auscultation with good chest expansion. There are no crackles or wheezes, normal AP diameter.  BACK: No kyphosis of  the thoracic spine. Symmetric, no curvature, ROM normal, no CVA tenderness, no spinal tenderness   CVS: There is good S1  S2, regular rhythm, there are no murmurs, rubs, gallops, or heaves,  2+ pulses symmetric in all extremities.  ABDOMEN: Rounded and soft, nontender to palpation, non distended, no masses, no organomegaly, good bowel sounds, no rebound or guarding, no peritoneal signs.   EXTREMITIES: 1-2+ ble nonpitting edema, dry flaking skin  SKIN: Warm and dry, erythema, white drainage, pruritus under left breast  NEUROLOGICAL: The patient is oriented to person, place and time. Strength and sensation are grossly intact. Face is symmetric. More pleasant today            LABS:   Recent Results (from the past 168 hour(s))   Basic Metabolic Panel   Result Value Ref Range    Sodium 138 136 - 145 mmol/L    Potassium 2.9 (L) 3.5 - 5.0 mmol/L    Chloride 93 (L) 98 - 107 mmol/L    CO2 36 (H) 22 - 31 mmol/L    Anion Gap, Calculation 9 5 - 18 mmol/L    Glucose 114 70 - 125 mg/dL    Calcium 9.7 8.5 - 10.5 mg/dL    BUN 53 (H) 8 - 28 mg/dL    Creatinine 1.61 (H) 0.60 - 1.10 mg/dL    GFR MDRD Af Amer 37 (L) >60 mL/min/1.73m2    GFR MDRD Non Af Amer 30 (L) >60 mL/min/1.73m2     Results for orders placed or performed in visit on 05/02/18   Basic Metabolic Panel   Result Value Ref Range    Sodium 138 136 - 145 mmol/L    Potassium 2.9 (L) 3.5 - 5.0 mmol/L    Chloride 93 (L) 98 - 107 mmol/L    CO2 36 (H) 22 - 31 mmol/L    Anion Gap, Calculation 9 5 - 18 mmol/L    Glucose 114 70 - 125 mg/dL    Calcium 9.7 8.5 - 10.5 mg/dL    BUN 53 (H) 8 - 28 mg/dL    Creatinine 1.61 (H) 0.60 - 1.10 mg/dL    GFR MDRD Af Amer 37 (L) >60 mL/min/1.73m2    GFR MDRD Non Af Amer 30 (L) >60 mL/min/1.73m2         Lab Results   Component Value Date    WBC 6.8 04/23/2018    HGB 9.2 (L) 04/23/2018    HCT 31.7 (L) 04/23/2018     (H) 04/23/2018     04/23/2018       No results found for: [id number]  Lab Results   Component Value Date    HGBA1C 6.0  03/08/2018     Lab Results   Component Value Date    INR 1.30 (H) 02/07/2018    INR 1.51 (H) 03/06/2017    INR 1.27 (H) 03/05/2017     Vitamin D, Total (25-Hydroxy)   Date Value Ref Range Status   02/19/2018 56.7 30.0 - 80.0 ng/mL Final     Lab Results   Component Value Date    TSH 3.50 06/01/2017           ASSESSMENT/PLAN:     1 Acute on chronic CHF: Daily [id number]. On bumex 2mg in am and 2mg qpm. F/u with cardiology on 3/21-no recommended changes, f/u cardiology again 4/17-changed metolazone to three times weekly prn. Doing well. Keep legs elevated. Sleeps in recliner. OT lymphedema wraps. 1-2+ nonpitting edema. Staff has not been giving prn metolazone, now on mondays and Fridays metolazone.   2. Fall: Resolved ecchymosis on face, arm pain.    3. S/p PPM: Battery change on 2/12. Incision c/d/i. No concerns. F/u with device clinic 5/15.   4. Mod persistent Asthma: On advair, combivent. SOB at baseline, off O2 now.  5. MIGUEL on CKD: Cr 1.49 on 3/12. BMP 3/19-Cr 1.86. BMP on 4/2-1.38, much improved. Recheck 4/9-Cr 1.4. Cr 1.66 on 4/16. Cr 1.5 on 4/23, stable.   6. Type 2 DM: Diet controlled. Monitor on labs.   7. Dyslipidemia: On aspirin, atorvastatin  8. HTN: SBP 120s. On lopressor, bumex.   9. GERD: On omeprazole. mylanta prn indigestion.   10. Hypothyroid: On levothyroxine.   11. Insomnia: On melatonin qhs.   12. Hypokalemia: On kcl.  K 4.0 on 3/12. K 2.9 on 5/2, orderd 40 q8h x 4 doses, then start 40 daily. Recheck 5/7.   13. Constipation: On pericolace daily prn, miralax daily.    14. Vitamin d deficiency: On vitamin d. Level 2/19-56.7, stable.    15. Anemia of CKD: Hg 8.1 on 3/11. HM1 on 3/19-8.6. Recheck hm1 on 4/2-7.8.  Recheck on 4/9-Hg 8.1. HM1 on 4/16-Hg 8.5. Increased iron bid. Hg 9.2 on 4/23.   16. PAF: Regular rate and rhythm. On metoprolol   17. Onychomycosis: Saw podiatry 3/27-right great  toenail fell off, no new orders and continue to monitor.   18. Chronic pain: Pain in legs mostly, tylenol tid prn  19. Candidiasis: Ordered nystatin.       Per therapy ambulating  ft with CGA, 4 ww, transfers min to mod, setup for upper body dressing, min to mod for lower body dressing. 4 steps with therapy, home eval later this week. She plans to return home with grand daughter. Planning for dc on 5/9       Electronically signed by: Jennifer Valdes NP    Total 15 minutes of which 75% was spent in counseling and coordination of care of the above plan    Time spent in interview and examination of patient, review of available records, and discussion with nursing staff. Continue care plan, efforts at therapy, and monitor nutritional status.

## 2021-06-17 NOTE — PROGRESS NOTES
[facility]    DATE: 2018    NAME:  Thea Acosta             :  1934  MRN: [id number]  CODE STATUS:  FULL CODE    VISIT TYPE: Review Of Multiple Medical Conditions     FACILITY:  [facility] [[id number]]       CHIEF COMPLAIN/REASON FOR VISIT:    Chief Complaint   Patient presents with     Review Of Multiple Medical Conditions               HISTORY OF PRESENT ILLNESS: Thea Acosta is a 83 y.o. female who was admitted  to  for weakness. She was found to be dyspneic, febrile, and tachypneic. She was diagnosed with sepsis s/t right foot cellulitis. Blood cultures grew Strep group B. She was treated with vanco and later ancef. She was also treated for acute CHF. During her stay she also underwent pacemaker battery change on . She has PMH of CAD, CKD, DM, s/p PPM. Prior to this she lived at home with her grand daughter. She was readmitted 3/7-3/12 for Acute encephalopathy, MIGUEL, CHF exacerbation, hypotension. She was treated for UTI and palliative care did see her during her stay. She wished to remain full code.     Today Ms. Acosta states she is making very slow progress with therapy. She says her shortness of breath is improved but her legs continue to swell. She does sleep with them elevated and tries to keep them up during the day. She says they are wrapping them every day. She says her weight stayed the same yesterday to today but it is up almost 20 lbs from when she returned from the hospital. She did have a fall last week and hit her left arm and her right face/eye. She has bruising around her eye but denies any pain or visual changes. She does not have a headache either, however her left upper arm does ache. She does not have any bruising or wounds but it has been sore and therapy is trying the diathermia on it to see if this helps. She says her bowels are moving fine and sleeping well in the recliner. She just wishes she would  make faster progress so she can go home. Per nursing weights continue to trend up now up to 245 lbs, was 228lbs on readmission.       REVIEW OF SYSTEMS:  PROBLEMS AND REVIEW OF SYSTEMS:   Review of Systems  Today on ROS:   Currently, no fever, chills, or rigors. Does not have any visual or hearing problems. Denies any chest pain, headaches, palpitations, lightheadedness, dizziness. Appetite is fair.  Denies any abdominal pain. Denies any urinary symptoms. No active bleeding. Positive for edema ble, shortness of breath at baseline, sleeps in recliner, pain left upper arm, ecchymosis periorbital right eye, fall last week      Allergies   Allergen Reactions     Codeine Hives     Codeine Phosphate (Bulk)      This allergy is per [facility] records.     Codeine Sulfate      This allergy is per [facility] records.     Codeine-Butalbital-Asa-Caff      This allergy is per [facility] records.     Codeine-Guaifenesin      This allergy is per [facility] records.     Lisinopril Cough     Penicillins Swelling     Current Outpatient Prescriptions   Medication Sig     acetaminophen (TYLENOL) 500 MG tablet Take 2 tablets (1,000 mg total) by mouth 3 (three) times a day as needed for pain. Not to exceed 4000 mg in 24 hours.     aluminum-magnesium hydroxide-simethicone (MAALOX ADVANCED) 200-200-20 mg/5 mL Susp Take 30 mL by mouth every 6 (six) hours as needed.     aspirin 81 MG EC tablet Take 81 mg by mouth daily.     atorvastatin (LIPITOR) 80 MG tablet Take 1 tablet (80 mg total) by mouth at bedtime.     bumetanide (BUMEX) 2 MG tablet Take 2 mg by mouth 2 (two) times a day at 9am and 6pm.     cholecalciferol, vitamin D3, (VITAMIN D3) 2,000 unit Tab Take 2,000 Units by mouth once daily.     fluticasone-salmeterol (ADVAIR) 500-50 mcg/dose DISKUS Inhale 1 puff 2 (two) times a day.     folic acid (FOLVITE) 1  MG tablet Take 1 mg by mouth daily.     ipratropium-albuterol (COMBIVENT RESPIMAT)  mcg/actuation Mist inhaler Inhale 1 puff 4 (four) times a day.     levothyroxine (SYNTHROID, LEVOTHROID) 125 MCG tablet Take 125 mcg by mouth Daily at 6:00 am.      melatonin 3 mg Tab tablet Take 3 mg by mouth at bedtime.      menthol-zinc oxide (CALMOSEPTINE) 0.44-20.6 % Oint ointment Apply 1 application topically 4 (four) times a day as needed (to denuded areas of skin).     metoprolol tartrate (LOPRESSOR) 25 MG tablet Take 4 tablets (100 mg total) by mouth 2 (two) times a day.     nystatin (MYCOSTATIN) cream Apply 1 application topically 2 (two) times a day. Apply to thighs abd folds until resolved.     omeprazole (PRILOSEC) 40 MG capsule Take 20 mg by mouth daily before breakfast.      ondansetron (ZOFRAN) 4 MG tablet Take 4 mg by mouth every 8 (eight) hours as needed for nausea.     oseltamivir (TAMIFLU) 30 MG capsule Take 30 mg by mouth daily.     potassium chloride 40 mEq/15 mL Liqd Take 7.5 mL (20 mEq total) by mouth daily. daily     potassium chloride SA (K-DUR,KLOR-CON) 20 MEQ tablet Take 20 mEq by mouth daily.     white petrolatum-mineral oil (EUCERIN) Crea Apply 1 application topically every 4 (four) hours as needed. Apply to bilateral legs.     white petrolatum-mineral oil (EUCERIN) Crea Apply 1 application topically 2 (two) times a day. Apply to bilateral legs.     Past Medical History:    Past Medical History:   Diagnosis Date     Acute kidney injury superimposed on CKD 3/3/2017     Asthma      CAD (coronary artery disease)      Cataract      Chronic kidney disease     ckd3     Diabetes mellitus      Disease of thyroid gland      H/O heart valve replacement with bioprosthetic valve      History of transfusion      Hypertension      Normochromic normocytic anemia      Restless leg syndrome            PHYSICAL EXAMINATION  Vitals:    04/01/18 2107   BP: 110/53   Pulse: 62   Resp: 16   Temp: 97.6  F (36.4  C)    SpO2: 95%   Weight: (!) 245 lb (111.1 kg)       Today on physical exam:     GENERAL: Awake, Alert, obese, oriented x3, not in any form of acute distress, answers questions appropriately, follows simple commands, conversant  HEENT: Head is normocephalic with normal hair distribution. No evidence of trauma. Ears: No acute purulent discharge. Eyes: Conjunctivae pink with no scleral jaundice. Nose: Normal mucosa and septum. NECK: Supple with no cervical or supraclavicular lymphadenopathy. Trachea is midline.   CHEST: No tenderness or deformity, no crepitus, left chest incision for pacer c/d/i  LUNG: Dim to auscultation with good chest expansion. There are no crackles or wheezes, normal AP diameter.  BACK: No kyphosis of the thoracic spine. Symmetric, no curvature, ROM normal, no CVA tenderness, no spinal tenderness   CVS: There is good S1  S2, regular rhythm, there are no murmurs, rubs, gallops, or heaves,  2+ pulses symmetric in all extremities.  ABDOMEN: Rounded and soft, nontender to palpation, non distended, no masses, no organomegaly, good bowel sounds, no rebound or guarding, no peritoneal signs.   EXTREMITIES: 2+ ble pitting edema, dry flaking skin, erythema resolved from previous, no weeping areas  SKIN: Warm and dry  NEUROLOGICAL: The patient is oriented to person, place and time. Strength and sensation are grossly intact. Face is symmetric. More pleasant today            LABS:   Recent Results (from the past 168 hour(s))   Basic Metabolic Panel   Result Value Ref Range    Sodium 139 136 - 145 mmol/L    Potassium 4.6 3.5 - 5.0 mmol/L    Chloride 100 98 - 107 mmol/L    CO2 30 22 - 31 mmol/L    Anion Gap, Calculation 9 5 - 18 mmol/L    Glucose 117 70 - 125 mg/dL    Calcium 9.5 8.5 - 10.5 mg/dL    BUN 36 (H) 8 - 28 mg/dL    Creatinine 1.47 (H) 0.60 - 1.10 mg/dL    GFR MDRD Af Amer 41 (L) >60 mL/min/1.73m2    GFR MDRD Non Af Amer 34 (L) >60 mL/min/1.73m2     Results for orders placed or performed in visit on  03/28/18   Basic Metabolic Panel   Result Value Ref Range    Sodium 139 136 - 145 mmol/L    Potassium 4.6 3.5 - 5.0 mmol/L    Chloride 100 98 - 107 mmol/L    CO2 30 22 - 31 mmol/L    Anion Gap, Calculation 9 5 - 18 mmol/L    Glucose 117 70 - 125 mg/dL    Calcium 9.5 8.5 - 10.5 mg/dL    BUN 36 (H) 8 - 28 mg/dL    Creatinine 1.47 (H) 0.60 - 1.10 mg/dL    GFR MDRD Af Amer 41 (L) >60 mL/min/1.73m2    GFR MDRD Non Af Amer 34 (L) >60 mL/min/1.73m2         Lab Results   Component Value Date    WBC 5.1 03/19/2018    HGB 8.6 (L) 03/19/2018    HCT 25.7 (L) 03/19/2018     (H) 03/19/2018     03/19/2018       No results found for: [id number]  Lab Results   Component Value Date    HGBA1C 6.0 03/08/2018     Lab Results   Component Value Date    INR 1.30 (H) 02/07/2018    INR 1.51 (H) 03/06/2017    INR 1.27 (H) 03/05/2017     Vitamin D, Total (25-Hydroxy)   Date Value Ref Range Status   02/19/2018 56.7 30.0 - 80.0 ng/mL Final     Lab Results   Component Value Date    TSH 3.50 06/01/2017           ASSESSMENT/PLAN:     1 Acute on chronic CHF: Daily [id number]-[id number]. On bumex 2mg bid. F/u with cardiology on 3/21-no recommended changes, f/u cardiology again 4/17. Keep legs elevated, ACE wrap legs. Sleeps in recliner. Will order OT to eval for lymphedema wraps. Increase bumex to 3mg in am, continue 2 in pm. Will consider adding zaroxolyn at next visit if continues to trend up. 2+ pitting edema today.   2. Fall: Last week, periorbital ecchymosis, aching pains left upper arm. No ecchymosis or wounds on left arm.   3. S/p PPM: Battery change on 2/12. Incision c/d/i. No concerns. F/u with device clinic 5/15.   4. Mod persistent Asthma: On advair, combivent. SOB at baseline, off O2 now.  5. MIGUEL on CKD: Cr 1.49 on 3/12. BMP 3/19-Cr 1.86. BMP on 4/2-1.38, much improved. Recheck 4/9.   6. Type 2 DM: Diet controlled. Monitor on labs.   7. Dyslipidemia: On aspirin, atorvastatin  8. HTN: SBP 110s. On  lopressor, bumex.   9. GERD: On omeprazole. mylanta prn indigestion.   10. Hypothyroid: On levothyroxine.   11. Insomnia: On melatonin qhs.   12. Hypokalemia: On kcl.  K 4.0 on 3/12.   13. Constipation: On pericolace daily prn, miralax daily.    14. Vitamin d deficiency: On vitamin d. Level 2/19-56.7, stable.    15. Anemia of CKD: Hg 8.1 on 3/11. HM1 on 3/19-8.6. Recheck hm1 on 4/2-7.8.  Recheck on 4/9.   16. PAF: Regular rate and rhythm. On metoprolol rate controlled 60s.   17. Onychomycosis: Saw podiatry 3/27-right great toenail fell off, no new orders and continue to monitor.   18. Chronic pain: Pain in legs mostly, tylenol tid prn and tramadol prn.    Per therapy ambulating 25 ft with CGA, 4 ww, sit to stand max assist, max for toileting, set up for upper body adls, max for lower. She plans to return home with grand daughter.      Electronically signed by: Jennifer Valdes NP    Total 35 minutes of which 75% was spent in counseling and coordination of care of the above plan    Time spent in interview and examination of patient, review of available records, and discussion with nursing staff. Continue care plan, efforts at therapy, and monitor nutritional status.

## 2021-06-17 NOTE — PROGRESS NOTES
[facility]    DATE: 2018    NAME:  Thea Acosta             :  1934  MRN: [id number]  CODE STATUS:  FULL CODE    VISIT TYPE: Review Of Multiple Medical Conditions     FACILITY:  [facility] [[id number]]       CHIEF COMPLAIN/REASON FOR VISIT:    Chief Complaint   Patient presents with     Review Of Multiple Medical Conditions               HISTORY OF PRESENT ILLNESS: Thea Acosta is a 84 y.o. female who was admitted  to  for weakness. She was found to be dyspneic, febrile, and tachypneic. She was diagnosed with sepsis s/t right foot cellulitis. Blood cultures grew Strep group B. She was treated with vanco and later ancef. She was also treated for acute CHF. During her stay she also underwent pacemaker battery change on . She has PMH of CAD, CKD, DM, s/p PPM. Prior to this she lived at home with her grand daughter. She was readmitted 3/7-3/12 for Acute encephalopathy, MIGUEL, CHF exacerbation, hypotension. She was treated for UTI and palliative care did see her during her stay. She wished to remain full code.     Today Ms. Acosta states she is doing well today and feels she is continuing to lose weight. She says her clothes are more baggy than before and she can see her knee joints and ankles more than before. She says her appetite is good and she did see cardiology. They changed one of her water pills to as needed only. She thinks this will be fine as she is doing well. She really has no concerns just wants to go home soon. Per staff vitals stable and weight 239-240lbs recently.       REVIEW OF SYSTEMS:  PROBLEMS AND REVIEW OF SYSTEMS:   Review of Systems  Today on ROS:   Currently, no fever, chills, or rigors. Does not have any visual or hearing problems. Denies any chest pain, headaches, palpitations, lightheadedness, dizziness. Appetite is fair.  Denies any abdominal pain. Denies any urinary symptoms. No active bleeding. Positive  for edema ble, shortness of breath at baseline, sleeps in recliner      Allergies   Allergen Reactions     Codeine Hives     Codeine Phosphate (Bulk)      This allergy is per [facility] records.     Codeine Sulfate      This allergy is per [facility] records.     Codeine-Butalbital-Asa-Caff      This allergy is per [facility] records.     Codeine-Guaifenesin      This allergy is per [facility] records.     Lisinopril Cough     Penicillins Swelling     Current Outpatient Prescriptions   Medication Sig     acetaminophen (TYLENOL) 500 MG tablet Take 2 tablets (1,000 mg total) by mouth 3 (three) times a day as needed for pain. Not to exceed 4000 mg in 24 hours.     aluminum-magnesium hydroxide-simethicone (MAALOX ADVANCED) 200-200-20 mg/5 mL Susp Take 30 mL by mouth every 6 (six) hours as needed.     aspirin 81 MG EC tablet Take 81 mg by mouth daily.     atorvastatin (LIPITOR) 80 MG tablet Take 1 tablet (80 mg total) by mouth at bedtime.     bumetanide (BUMEX) 2 MG tablet Take 2 mg by mouth 2 (two) times a day at 9am and 6pm.      cholecalciferol, vitamin D3, (VITAMIN D3) 2,000 unit Tab Take 2,000 Units by mouth once daily.     ferrous sulfate 325 (65 FE) MG tablet Take 1 tablet by mouth 2 (two) times a day.     fluticasone-salmeterol (ADVAIR) 500-50 mcg/dose DISKUS Inhale 1 puff 2 (two) times a day.     folic acid (FOLVITE) 1 MG tablet Take 1 mg by mouth daily.     ipratropium-albuterol (COMBIVENT RESPIMAT)  mcg/actuation Mist inhaler Inhale 1 puff 4 (four) times a day.     levothyroxine (SYNTHROID, LEVOTHROID) 125 MCG tablet Take 125 mcg by mouth Daily at 6:00 am.      melatonin 3 mg Tab tablet Take 3 mg by mouth at bedtime.      menthol-zinc oxide (CALMOSEPTINE) 0.44-20.6 % Oint ointment Apply 1 application topically 4 (four) times a day as needed (to denuded areas of skin).      metOLazone (ZAROXOLYN) 2.5 MG tablet Take 2.5 mg by mouth as needed (If weight rises 5 pounds from baseline).     metoprolol tartrate (LOPRESSOR) 50 MG tablet Take 50 mg by mouth 2 (two) times a day.     nystatin (MYCOSTATIN) cream Apply 1 application topically 2 (two) times a day. Apply to thighs abd folds until resolved.     omeprazole (PRILOSEC) 40 MG capsule Take 20 mg by mouth daily before breakfast.      ondansetron (ZOFRAN) 4 MG tablet Take 4 mg by mouth every 8 (eight) hours as needed for nausea.     potassium chloride SA (K-DUR,KLOR-CON) 20 MEQ tablet Take 20 mEq by mouth daily.     white petrolatum-mineral oil (EUCERIN) Crea Apply 1 application topically every 4 (four) hours as needed. Apply to bilateral legs.     white petrolatum-mineral oil (EUCERIN) Crea Apply 1 application topically 2 (two) times a day. Apply to bilateral legs.     Past Medical History:    Past Medical History:   Diagnosis Date     Acute kidney injury superimposed on CKD 3/3/2017     Asthma      CAD (coronary artery disease)      Cataract      Chronic kidney disease     ckd3     Diabetes mellitus      Disease of thyroid gland      H/O heart valve replacement with bioprosthetic valve      History of transfusion      Hypertension      Normochromic normocytic anemia      Restless leg syndrome            PHYSICAL EXAMINATION  Vitals:    04/18/18 2209   BP: 122/59   Pulse: 69   Resp: 17   Temp: 97.2  F (36.2  C)   SpO2: 94%   Weight: (!) 239 lb (108.4 kg)       Today on physical exam:     GENERAL: Awake, Alert, obese, oriented x3, not in any form of acute distress, answers questions appropriately, follows simple commands, conversant  HEENT: Head is normocephalic with normal hair distribution. No evidence of trauma. Ears: No acute purulent discharge. Eyes: Conjunctivae pink with no scleral jaundice. Nose: Normal mucosa and septum. NECK: Supple with no cervical or supraclavicular lymphadenopathy. Trachea is midline.   CHEST: No tenderness or  deformity, no crepitus, left chest incision for pacer c/d/i  LUNG: Dim to auscultation with good chest expansion. There are no crackles or wheezes, normal AP diameter.  BACK: No kyphosis of the thoracic spine. Symmetric, no curvature, ROM normal, no CVA tenderness, no spinal tenderness   CVS: There is good S1  S2, regular rhythm, there are no murmurs, rubs, gallops, or heaves,  2+ pulses symmetric in all extremities.  ABDOMEN: Rounded and soft, nontender to palpation, non distended, no masses, no organomegaly, good bowel sounds, no rebound or guarding, no peritoneal signs.   EXTREMITIES: 1-2+ ble nonpitting edema, dry flaking skin  SKIN: Warm and dry  NEUROLOGICAL: The patient is oriented to person, place and time. Strength and sensation are grossly intact. Face is symmetric. More pleasant today            LABS:   Recent Results (from the past 168 hour(s))   Basic Metabolic Panel   Result Value Ref Range    Sodium 138 136 - 145 mmol/L    Potassium 3.1 (L) 3.5 - 5.0 mmol/L    Chloride 89 (L) 98 - 107 mmol/L    CO2 40 (H) 22 - 31 mmol/L    Anion Gap, Calculation 9 5 - 18 mmol/L    Glucose 100 70 - 125 mg/dL    Calcium 9.4 8.5 - 10.5 mg/dL    BUN 47 (H) 8 - 28 mg/dL    Creatinine 1.66 (H) 0.60 - 1.10 mg/dL    GFR MDRD Af Amer 36 (L) >60 mL/min/1.73m2    GFR MDRD Non Af Amer 29 (L) >60 mL/min/1.73m2   HM1 (CBC with Diff)   Result Value Ref Range    WBC 5.2 4.0 - 11.0 thou/uL    RBC 2.74 (L) 3.80 - 5.40 mill/uL    Hemoglobin 8.5 (L) 12.0 - 16.0 g/dL    Hematocrit 28.0 (L) 35.0 - 47.0 %     (H) 80 - 100 fL    MCH 31.0 27.0 - 34.0 pg    MCHC 30.4 (L) 32.0 - 36.0 g/dL    RDW 17.0 (H) 11.0 - 14.5 %    Platelets 170 140 - 440 thou/uL    MPV 10.4 8.5 - 12.5 fL    Neutrophils % 63 50 - 70 %    Lymphocytes % 22 20 - 40 %    Monocytes % 9 2 - 10 %    Eosinophils % 5 0 - 6 %    Basophils % 0 0 - 2 %    Neutrophils Absolute 3.3 2.0 - 7.7 thou/uL    Lymphocytes Absolute 1.2 0.8 - 4.4 thou/uL    Monocytes Absolute 0.5 0.0 -  0.9 thou/uL    Eosinophils Absolute 0.3 0.0 - 0.4 thou/uL    Basophils Absolute 0.0 0.0 - 0.2 thou/uL     Results for orders placed or performed in visit on 04/16/18   Basic Metabolic Panel   Result Value Ref Range    Sodium 138 136 - 145 mmol/L    Potassium 3.1 (L) 3.5 - 5.0 mmol/L    Chloride 89 (L) 98 - 107 mmol/L    CO2 40 (H) 22 - 31 mmol/L    Anion Gap, Calculation 9 5 - 18 mmol/L    Glucose 100 70 - 125 mg/dL    Calcium 9.4 8.5 - 10.5 mg/dL    BUN 47 (H) 8 - 28 mg/dL    Creatinine 1.66 (H) 0.60 - 1.10 mg/dL    GFR MDRD Af Amer 36 (L) >60 mL/min/1.73m2    GFR MDRD Non Af Amer 29 (L) >60 mL/min/1.73m2         Lab Results   Component Value Date    WBC 5.2 04/16/2018    HGB 8.5 (L) 04/16/2018    HCT 28.0 (L) 04/16/2018     (H) 04/16/2018     04/16/2018       No results found for: [id number]  Lab Results   Component Value Date    HGBA1C 6.0 03/08/2018     Lab Results   Component Value Date    INR 1.30 (H) 02/07/2018    INR 1.51 (H) 03/06/2017    INR 1.27 (H) 03/05/2017     Vitamin D, Total (25-Hydroxy)   Date Value Ref Range Status   02/19/2018 56.7 30.0 - 80.0 ng/mL Final     Lab Results   Component Value Date    TSH 3.50 06/01/2017           ASSESSMENT/PLAN:     1 Acute on chronic CHF: Daily [id number]. On bumex 2mg in am and 2mg qpm. F/u with cardiology on 3/21-no recommended changes, f/u cardiology again 4/17-changed metolazone to three times weekly prn. Doing well. Keep legs elevated, ACE wrap legs. Sleeps in recliner. OT lymphedema wraps. 1-2+ nonpitting edema.   2. Fall: Two weeks ago now, periorbital ecchymosis-resolved. Resolved arm pain.    3. S/p PPM: Battery change on 2/12. Incision c/d/i. No concerns. F/u with device clinic 5/15.   4. Mod persistent Asthma: On advair, combivent. SOB at baseline, off O2 now.  5. MIGUEL on CKD: Cr 1.49 on 3/12. BMP 3/19-Cr 1.86. BMP on 4/2-1.38, much improved. Recheck 4/9-Cr 1.4. Cr 1.66  on 4/16.   6. Type 2 DM: Diet controlled. Monitor on labs.   7. Dyslipidemia: On aspirin, atorvastatin  8. HTN: SBP 130s. On lopressor, bumex.   9. GERD: On omeprazole. mylanta prn indigestion.   10. Hypothyroid: On levothyroxine.   11. Insomnia: On melatonin qhs.   12. Hypokalemia: On kcl.  K 4.0 on 3/12.   13. Constipation: On pericolace daily prn, miralax daily.    14. Vitamin d deficiency: On vitamin d. Level 2/19-56.7, stable.    15. Anemia of CKD: Hg 8.1 on 3/11. HM1 on 3/19-8.6. Recheck hm1 on 4/2-7.8.  Recheck on 4/9-Hg 8.1. HM1 on 4/16-Hg 8.5. Increased iron bid.   16. PAF: Regular rate and rhythm. On metoprolol   17. Onychomycosis: Saw podiatry 3/27-right great toenail fell off, no new orders and continue to monitor.   18. Chronic pain: Pain in legs mostly, tylenol tid prn and tramadol prn.  19. Hemorrhoids: anusol cream prn.     Per therapy ambulating 30-80 ft with CGA, 4 ww, transfers min to mod, setup for upper body dressing, min to mod for lower body dressing. She plans to return home with grand daughter.        Electronically signed by: Jennifer Valdes NP    Total 25 minutes of which 75% was spent in counseling and coordination of care of the above plan    Time spent in interview and examination of patient, review of available records, and discussion with nursing staff. Continue care plan, efforts at therapy, and monitor nutritional status.

## 2021-06-17 NOTE — PROGRESS NOTES
[facility]    Facility:   [facility] [[id number]]   Code Status: FULL CODE      CHIEF COMPLAINT/REASON FOR VISIT:  Chief Complaint   Patient presents with     Review Of Multiple Medical Conditions       HISTORY:      HPI: Thea is a 84 y.o. female who has a significant history of diastolic congestive heart failure, and was recently having a steady gain of weight consistent with fluid overload rather than calorie intake.  Metolazone 2.5 mg 3 times weekly was started and she has had significant improvement of weight.  She has not had problems of shortness of breath, and in fact has been able to remain off of oxygen supplementation.  She has not had chest pain nor palpitations.  She does have history of atrial  fibrillation.  She has returned from her cardiologist today who discontinued her metolazone, the explanation was out of concern for worsening of kidney failure.  However there was the offered to use the metolazone on a as needed basis.  There also has been recommendation from pharmacy that the dose of metoprolol be decreased due to the fact that many doses have been held in regards to parameters of heart rate and blood pressure.    On review of systems currently, she is not having fevers or chills, no upper respiratory infection symptoms, no cough, no abdominal pain or nausea, no dysuria, no worsening of leg edema which is chronic in nature.  She also states that there has not been any recurrence of cellulitis which she had originally.    Past Medical History:   Diagnosis Date     Acute kidney injury superimposed on CKD 3/3/2017     Asthma      CAD (coronary artery disease)      Cataract      Chronic kidney disease     ckd3     Diabetes mellitus      Disease of thyroid gland      H/O heart valve replacement with bioprosthetic valve      History of transfusion      Hypertension      Normochromic normocytic anemia      Restless leg syndrome              Family History  "  Problem Relation Age of Onset     No Medical Problems Mother      age 86     No Medical Problems Father      MVA     Cancer Brother      lung     No Medical Problems Brother      Social History     Social History     Marital status:      Spouse name: N/A     Number of children: N/A     Years of education: N/A     Occupational History      in operating room      retired     Social History Main Topics     Smoking status: Former Smoker     Smokeless tobacco: Never Used     Alcohol use No     Drug use: No     Sexual activity: No     Other Topics Concern     Not on file     Social History Narrative    She live in a single floor house.  Her son lives next door and her granddaughter is \"around a lot\"   3/2017         Review of Systems   Constitutional: Negative for chills and fever.       .  Vitals:    04/17/18 0906   BP: 117/70   Pulse: 60   Resp: 18   Temp: 97.1  F (36.2  C)   SpO2: 91%       Physical Exam   Constitutional: No distress.   Eyes: Right eye exhibits no discharge. Left eye exhibits no discharge.   Neck: No JVD present.   Cardiovascular:   Irregularly irregular   Pulmonary/Chest: Effort normal and breath sounds normal. No respiratory distress. She has no wheezes. She has no rales.   Musculoskeletal:   Lymphedema leg wraps present   Neurological: She is alert.   Nursing note and vitals reviewed.        LABS:   No new laboratory testing.    ASSESSMENT:      ICD-10-CM    1. Chronic diastolic CHF (congestive heart failure) I50.32    2. PAF (paroxysmal atrial fibrillation) I48.0    3. CKD (chronic kidney disease), stage III N18.3        PLAN:    Metolazone was changed to to 2.5 mg to be given daily when weight increases by 5 pounds from baseline.  Discontinue again when the weight returns to baseline.  Metoprolol dosing was changed from current 100 mg twice daily down to 50 mg twice daily, but the same parameters for holding the medication were continued which include heart rate less than 60 " and blood pressure systolic less than 100.      Electronically signed by: Sami Palma MD

## 2021-06-17 NOTE — PROGRESS NOTES
[facility]    Facility:   [facility] [[id number]]   Code Status: FULL CODE      CHIEF COMPLAINT/REASON FOR VISIT:  Chief Complaint   Patient presents with     Review Of Multiple Medical Conditions       HISTORY:      HPI: Thea is a 84 y.o. female who has significant problems with diastolic congestive heart failure as well as underlying chronic kidney disease and history of asthma, who was originally admitted because of cellulitis of her leg which has resolved.  She has had continued monitoring and recommendations from her cardiologist and Zaroxolyn was discontinued and here weight has been fairly stable.  She has not had any worsening of her shortness of breath nor is she experiencing chest pain or cough.  She does not have fevers or chills.  Does not have abdominal pain or nausea.  She has chronic constipation.  She is not experiencing more edema than her usual for her lower extremities.    Past Medical History:   Diagnosis Date     Acute kidney injury superimposed on CKD 3/3/2017     Asthma      CAD (coronary artery disease)      Cataract      Chronic kidney disease     ckd3     Diabetes mellitus      Disease of thyroid gland      H/O heart valve replacement with bioprosthetic valve      History of transfusion      Hypertension      Normochromic normocytic anemia      Restless leg syndrome              Family History   Problem Relation Age of Onset     No Medical Problems Mother      age 86     No Medical Problems Father      MVA     Cancer Brother      lung     No Medical Problems Brother      Social History     Social History     Marital status:      Spouse name: N/A     Number of children: N/A     Years of education: N/A     Occupational History      in operating room      retired     Social History Main Topics     Smoking status: Former Smoker     Smokeless tobacco: Never Used     Alcohol use No     Drug use: No     Sexual activity: No     Other  "Topics Concern     Not on file     Social History Narrative    She live in a single floor house.  Her son lives next door and her granddaughter is \"around a lot\"   3/2017         Review of Systems   Constitutional: Negative for chills and fever.       .  Vitals:    04/23/18 1843   BP: 128/61   Pulse: 60   Resp: 18   Temp: 97.1  F (36.2  C)   SpO2: 90%       Physical Exam   Constitutional: No distress.   Eyes: Right eye exhibits no discharge. Left eye exhibits no discharge.   Cardiovascular: Normal rate.    Pulmonary/Chest: Effort normal and breath sounds normal. No respiratory distress. She has no wheezes.   Abdominal: She exhibits no distension. There is no tenderness.   Musculoskeletal:   Ace wraps present.   Neurological: She is alert.   Psychiatric: She has a normal mood and affect.   Nursing note and vitals reviewed.        LABS:   No new laboratory testing    ASSESSMENT:      ICD-10-CM    1. Chronic diastolic CHF (congestive heart failure) I50.32    2. CKD (chronic kidney disease), stage III N18.3    3. Uncomplicated asthma, unspecified asthma severity, unspecified whether persistent J45.909        PLAN:    Continue to monitor her weights and treat with Zaroxolyn as needed when weight is rising more than 5 pounds above baseline.  Continue to monitor her status of breathing and her vital signs in terms of her heart.  Continue with therapies      Electronically signed by: Sami Palma MD  "

## 2021-06-17 NOTE — PROGRESS NOTES
[facility]    DATE: 2018    NAME:  Thea Acosta             :  1934  MRN: [id number]  CODE STATUS:  FULL CODE    VISIT TYPE: Review Of Multiple Medical Conditions     FACILITY:  [facility] [[id number]]       CHIEF COMPLAIN/REASON FOR VISIT:    Chief Complaint   Patient presents with     Review Of Multiple Medical Conditions               HISTORY OF PRESENT ILLNESS: Thea Acosta is a 84 y.o. female who was admitted  to  for weakness. She was found to be dyspneic, febrile, and tachypneic. She was diagnosed with sepsis s/t right foot cellulitis. Blood cultures grew Strep group B. She was treated with vanco and later ancef. She was also treated for acute CHF. During her stay she also underwent pacemaker battery change on . She has PMH of CAD, CKD, DM, s/p PPM. Prior to this she lived at home with her grand daughter. She was readmitted 3/7-3/12 for Acute encephalopathy, MIGUEL, CHF exacerbation, hypotension. She was treated for UTI and palliative care did see her during her stay. She wished to remain full code.     Today Ms. Acosta states she wishes her weights would go down more, but she is at least happy her weights have been stable and not going up. She says she does not have a scale at home, but we had long discussion regarding weighing herself daily at home and keeping track in a notebook. Discussed weighing herself every morning at same time on same scale. She says she will have her granddaughter go get one for her. She is very much looking forward to going home on Wednesday. She has no breathing problems or trouble with her bowels. She has no other concerns today just anxious to get home. Per staff weight today 239lbs. Her vitals are stable. She has an appt with cardiology on 5/10 and discharges home on .       REVIEW OF SYSTEMS:  PROBLEMS AND REVIEW OF SYSTEMS:   Review of Systems  Today on ROS:   Currently, no fever, chills,  or rigors. Does not have any visual or hearing problems. Denies any chest pain, headaches, palpitations, lightheadedness, dizziness. Appetite is fair.  Denies any abdominal pain. Denies any urinary symptoms. No active bleeding. Positive for edema ble, shortness of breath at baseline, sleeps in recliner      Allergies   Allergen Reactions     Codeine Hives     Codeine Phosphate (Bulk)      This allergy is per [facility] records.     Codeine Sulfate      This allergy is per [facility] records.     Codeine-Butalbital-Asa-Caff      This allergy is per [facility] records.     Codeine-Guaifenesin      This allergy is per [facility] records.     Lisinopril Cough     Penicillins Swelling     Current Outpatient Prescriptions   Medication Sig     acetaminophen (TYLENOL) 500 MG tablet Take 2 tablets (1,000 mg total) by mouth 3 (three) times a day as needed for pain. Not to exceed 4000 mg in 24 hours.     aluminum-magnesium hydroxide-simethicone (MAALOX ADVANCED) 200-200-20 mg/5 mL Susp Take 30 mL by mouth every 6 (six) hours as needed.     aspirin 81 MG EC tablet Take 81 mg by mouth daily.     atorvastatin (LIPITOR) 80 MG tablet Take 1 tablet (80 mg total) by mouth at bedtime.     bumetanide (BUMEX) 2 MG tablet Take 2 mg by mouth 2 (two) times a day at 9am and 6pm.      cholecalciferol, vitamin D3, (VITAMIN D3) 2,000 unit Tab Take 2,000 Units by mouth once daily.     ferrous sulfate 325 (65 FE) MG tablet Take 1 tablet by mouth 2 (two) times a day.     fluticasone-salmeterol (ADVAIR) 500-50 mcg/dose DISKUS Inhale 1 puff 2 (two) times a day.     folic acid (FOLVITE) 1 MG tablet Take 1 mg by mouth daily.     ipratropium-albuterol (COMBIVENT RESPIMAT)  mcg/actuation Mist inhaler Inhale 1 puff 4 (four) times a day.     levothyroxine (SYNTHROID, LEVOTHROID) 125 MCG tablet Take 125 mcg by mouth  Daily at 6:00 am.      melatonin 3 mg Tab tablet Take 3 mg by mouth at bedtime.      menthol-zinc oxide (CALMOSEPTINE) 0.44-20.6 % Oint ointment Apply 1 application topically 4 (four) times a day as needed (to denuded areas of skin).     metOLazone (ZAROXOLYN) 2.5 MG tablet Take 2.5 mg by mouth 2 (two) times a week.      metoprolol tartrate (LOPRESSOR) 50 MG tablet Take 50 mg by mouth 2 (two) times a day.     nystatin (MYCOSTATIN) cream Apply 1 application topically 2 (two) times a day. Apply to thighs abd folds until resolved.     omeprazole (PRILOSEC) 40 MG capsule Take 20 mg by mouth daily before breakfast.      potassium chloride SA (K-DUR,KLOR-CON) 20 MEQ tablet Take 20 mEq by mouth daily.     white petrolatum-mineral oil (EUCERIN) Crea Apply 1 application topically every 4 (four) hours as needed. Apply to bilateral legs.     white petrolatum-mineral oil (EUCERIN) Crea Apply 1 application topically 2 (two) times a day. Apply to bilateral legs.     Past Medical History:    Past Medical History:   Diagnosis Date     Acute kidney injury superimposed on CKD 3/3/2017     Asthma      CAD (coronary artery disease)      Cataract      Chronic kidney disease     ckd3     Diabetes mellitus      Disease of thyroid gland      H/O heart valve replacement with bioprosthetic valve      History of transfusion      Hypertension      Normochromic normocytic anemia      Restless leg syndrome            PHYSICAL EXAMINATION  Vitals:    05/06/18 1946   BP: 124/59   Pulse: 60   Resp: 18   Temp: 96.9  F (36.1  C)   SpO2: 97%   Weight: (!) 241 lb (109.3 kg)       Today on physical exam:     GENERAL: Awake, Alert, obese, oriented x3, not in any form of acute distress, answers questions appropriately, follows simple commands, conversant  HEENT: Head is normocephalic with normal hair distribution. No evidence of trauma. Ears: No acute purulent discharge. Eyes: Conjunctivae pink with no scleral jaundice. Nose: Normal mucosa and septum.  NECK: Supple with no cervical or supraclavicular lymphadenopathy. Trachea is midline.   CHEST: No tenderness or deformity, no crepitus, left chest incision for pacer c/d/i  LUNG: Dim to auscultation with good chest expansion. There are no crackles or wheezes, normal AP diameter.  BACK: No kyphosis of the thoracic spine. Symmetric, no curvature, ROM normal, no CVA tenderness, no spinal tenderness   CVS: There is good S1  S2, regular rhythm, there are no murmurs, rubs, gallops, or heaves,  2+ pulses symmetric in all extremities.  ABDOMEN: Rounded and soft, nontender to palpation, non distended, no masses, no organomegaly, good bowel sounds, no rebound or guarding, no peritoneal signs.   EXTREMITIES: 1-2+ ble nonpitting edema, dry flaking skin  SKIN: Warm and dry  NEUROLOGICAL: The patient is oriented to person, place and time. Strength and sensation are grossly intact. Face is symmetric. More pleasant today            LABS:   Recent Results (from the past 168 hour(s))   Basic Metabolic Panel   Result Value Ref Range    Sodium 138 136 - 145 mmol/L    Potassium 2.9 (L) 3.5 - 5.0 mmol/L    Chloride 93 (L) 98 - 107 mmol/L    CO2 36 (H) 22 - 31 mmol/L    Anion Gap, Calculation 9 5 - 18 mmol/L    Glucose 114 70 - 125 mg/dL    Calcium 9.7 8.5 - 10.5 mg/dL    BUN 53 (H) 8 - 28 mg/dL    Creatinine 1.61 (H) 0.60 - 1.10 mg/dL    GFR MDRD Af Amer 37 (L) >60 mL/min/1.73m2    GFR MDRD Non Af Amer 30 (L) >60 mL/min/1.73m2     Results for orders placed or performed in visit on 05/02/18   Basic Metabolic Panel   Result Value Ref Range    Sodium 138 136 - 145 mmol/L    Potassium 2.9 (L) 3.5 - 5.0 mmol/L    Chloride 93 (L) 98 - 107 mmol/L    CO2 36 (H) 22 - 31 mmol/L    Anion Gap, Calculation 9 5 - 18 mmol/L    Glucose 114 70 - 125 mg/dL    Calcium 9.7 8.5 - 10.5 mg/dL    BUN 53 (H) 8 - 28 mg/dL    Creatinine 1.61 (H) 0.60 - 1.10 mg/dL    GFR MDRD Af Amer 37 (L) >60 mL/min/1.73m2    GFR MDRD Non Af Amer 30 (L) >60 mL/min/1.73m2          Lab Results   Component Value Date    WBC 6.8 04/23/2018    HGB 9.2 (L) 04/23/2018    HCT 31.7 (L) 04/23/2018     (H) 04/23/2018     04/23/2018       No results found for: [id number]  Lab Results   Component Value Date    HGBA1C 6.0 03/08/2018     Lab Results   Component Value Date    INR 1.30 (H) 02/07/2018    INR 1.51 (H) 03/06/2017    INR 1.27 (H) 03/05/2017     Vitamin D, Total (25-Hydroxy)   Date Value Ref Range Status   02/19/2018 56.7 30.0 - 80.0 ng/mL Final     Lab Results   Component Value Date    TSH 3.50 06/01/2017           ASSESSMENT/PLAN:     1 Acute on chronic CHF: Daily [id number]. On bumex 2mg in am and 2mg qpm. F/u with cardiology on 3/21-no recommended changes, f/u cardiology again 4/17-changed metolazone to three times weekly prn, however staff cannot do prn doses, changed to q mondays and fridays. F/u again on 5/10. Doing well. Keep legs elevated. Sleeps in recliner. OT lymphedema wraps. 1-2+ nonpitting edema.   2. Fall: Resolved ecchymosis on face, arm pain.    3. S/p PPM: Battery change on 2/12. Incision c/d/i. No concerns. F/u with device clinic 5/15.   4. Mod persistent Asthma: On advair, combivent. SOB at baseline, off O2 now.  5. MIGUEL on CKD: Cr 1.49 on 3/12. BMP 3/19-Cr 1.86. BMP on 4/2-1.38, much improved. Recheck 4/9-Cr 1.4. Cr 1.66 on 4/16. Cr 1.5 on 4/23, stable. Cr 1.61 on 5/4, renal function at baseline.   6. Type 2 DM: Diet controlled. Monitor on labs.   7. Dyslipidemia: On aspirin, atorvastatin  8. HTN: SBP 120s. On lopressor, bumex.   9. GERD: On omeprazole. [place] prn indigestion.   10. Hypothyroid: On levothyroxine.   11. Insomnia: On melatonin qhs.   12. Hypokalemia: On kcl.  K 4.0 on 3/12. K 2.9 on 5/2, orderd 40 q8h x 4 doses, then start 40 daily. Recheck 5/7-K 3.4 stable.   13. Constipation: On pericolace daily prn, miralax daily.    14.  Vitamin d deficiency: On vitamin d. Level 2/19-56.7, stable.    15. Anemia of CKD: Hg 8.1 on 3/11. HM1 on 3/19-8.6. Recheck hm1 on 4/2-7.8.  Recheck on 4/9-Hg 8.1. HM1 on 4/16-Hg 8.5. Increased iron bid. Hg 9.2 on 4/23.   16. PAF: Regular rate and rhythm. On metoprolol   17. Onychomycosis: Saw podiatry 3/27-right great toenail fell off, no new orders and continue to monitor.   18. Chronic pain: Pain in legs mostly, tylenol tid prn  19. Candidiasis:  nystatin.       Per therapy ambulating  ft with CGA, 4 ww, transfers min to mod, setup for upper body dressing, min to mod for lower body dressing. 4 steps with therapy, home eval later this week. She plans to return home with grand daughter. Planning for dc on 5/9       Electronically signed by: Jennifer Valdes NP    Total 25 minutes of which 75% was spent in counseling and coordination of care of the above plan    Time spent in interview and examination of patient, review of available records, and discussion with nursing staff. Continue care plan, efforts at therapy, and monitor nutritional status.

## 2021-06-17 NOTE — PROGRESS NOTES
[facility]    DATE: 2018    NAME:  Thea Acosta             :  1934  MRN: [id number]  CODE STATUS:  FULL CODE    VISIT TYPE: Review Of Multiple Medical Conditions     FACILITY:  [facility] [[id number]]       CHIEF COMPLAIN/REASON FOR VISIT:    Chief Complaint   Patient presents with     Review Of Multiple Medical Conditions               HISTORY OF PRESENT ILLNESS: Thea Acosta is a 84 y.o. female who was admitted  to  for weakness. She was found to be dyspneic, febrile, and tachypneic. She was diagnosed with sepsis s/t right foot cellulitis. Blood cultures grew Strep group B. She was treated with vanco and later ancef. She was also treated for acute CHF. During her stay she also underwent pacemaker battery change on . She has PMH of CAD, CKD, DM, s/p PPM. Prior to this she lived at home with her grand daughter. She was readmitted 3/7-3/12 for Acute encephalopathy, MIGUEL, CHF exacerbation, hypotension. She was treated for UTI and palliative care did see her during her stay. She wished to remain full code.     Today Ms. Acosta says she is doing well and has no pain or concerns today. Her weight was down a lot over the weekend but then went up a little today. She does not feel that she is more swollen today but overall thinks her legs are better. She says she is sleeping fine in the recliner and has a good appetite. She just really wants to go home soon. Per staff weight [phone] over weekend but today 242lbs. Her vitals are stable and she is scheduled for a pacemaker check on 5/15.       REVIEW OF SYSTEMS:  PROBLEMS AND REVIEW OF SYSTEMS:   Review of Systems  Today on ROS:   Currently, no fever, chills, or rigors. Does not have any visual or hearing problems. Denies any chest pain, headaches, palpitations, lightheadedness, dizziness. Appetite is fair.  Denies any abdominal pain. Denies any urinary symptoms. No active bleeding.  Positive for edema ble, shortness of breath at baseline, sleeps in recliner      Allergies   Allergen Reactions     Codeine Hives     Codeine Phosphate (Bulk)      This allergy is per [facility] [place] records.     Codeine Sulfate      This allergy is per [facility] [place] records.     Codeine-Butalbital-Asa-Caff      This allergy is per [facility] [place] records.     Codeine-Guaifenesin      This allergy is per [facility] [place] records.     Lisinopril Cough     Penicillins Swelling     Current Outpatient Prescriptions   Medication Sig     acetaminophen (TYLENOL) 500 MG tablet Take 2 tablets (1,000 mg total) by mouth 3 (three) times a day as needed for pain. Not to exceed 4000 mg in 24 hours.     aluminum-magnesium hydroxide-simethicone (MAALOX ADVANCED) 200-200-20 mg/5 mL Susp Take 30 mL by mouth every 6 (six) hours as needed.     aspirin 81 MG EC tablet Take 81 mg by mouth daily.     atorvastatin (LIPITOR) 80 MG tablet Take 1 tablet (80 mg total) by mouth at bedtime.     bumetanide (BUMEX) 2 MG tablet Take 2 mg by mouth 2 (two) times a day at 9am and 6pm.      cholecalciferol, vitamin D3, (VITAMIN D3) 2,000 unit Tab Take 2,000 Units by mouth once daily.     ferrous sulfate 325 (65 FE) MG tablet Take 1 tablet by mouth 2 (two) times a day.     fluticasone-salmeterol (ADVAIR) 500-50 mcg/dose DISKUS Inhale 1 puff 2 (two) times a day.     folic acid (FOLVITE) 1 MG tablet Take 1 mg by mouth daily.     ipratropium-albuterol (COMBIVENT RESPIMAT)  mcg/actuation Mist inhaler Inhale 1 puff 4 (four) times a day.     levothyroxine (SYNTHROID, LEVOTHROID) 125 MCG tablet Take 125 mcg by mouth Daily at 6:00 am.      melatonin 3 mg Tab tablet Take 3 mg by mouth at bedtime.      menthol-zinc oxide (CALMOSEPTINE) 0.44-20.6 % Oint ointment Apply 1 application topically 4 (four) times a day as needed (to denuded areas of  skin).     metOLazone (ZAROXOLYN) 2.5 MG tablet Take 2.5 mg by mouth 2 (two) times a week.      metoprolol tartrate (LOPRESSOR) 50 MG tablet Take 50 mg by mouth 2 (two) times a day.     nystatin (MYCOSTATIN) cream Apply 1 application topically 2 (two) times a day. Apply to thighs abd folds until resolved.     omeprazole (PRILOSEC) 40 MG capsule Take 20 mg by mouth daily before breakfast.      potassium chloride SA (K-DUR,KLOR-CON) 20 MEQ tablet Take 20 mEq by mouth daily.     white petrolatum-mineral oil (EUCERIN) Crea Apply 1 application topically every 4 (four) hours as needed. Apply to bilateral legs.     white petrolatum-mineral oil (EUCERIN) Crea Apply 1 application topically 2 (two) times a day. Apply to bilateral legs.     Past Medical History:    Past Medical History:   Diagnosis Date     Acute kidney injury superimposed on CKD 3/3/2017     Asthma      CAD (coronary artery disease)      Cataract      Chronic kidney disease     ckd3     Diabetes mellitus      Disease of thyroid gland      H/O heart valve replacement with bioprosthetic valve      History of transfusion      Hypertension      Normochromic normocytic anemia      Restless leg syndrome            PHYSICAL EXAMINATION  Vitals:    04/29/18 2110   BP: 127/61   Pulse: 60   Resp: 18   Temp: 97.6  F (36.4  C)   SpO2: 91%   Weight: (!) 240 lb (108.9 kg)       Today on physical exam:     GENERAL: Awake, Alert, obese, oriented x3, not in any form of acute distress, answers questions appropriately, follows simple commands, conversant  HEENT: Head is normocephalic with normal hair distribution. No evidence of trauma. Ears: No acute purulent discharge. Eyes: Conjunctivae pink with no scleral jaundice. Nose: Normal mucosa and septum. NECK: Supple with no cervical or supraclavicular lymphadenopathy. Trachea is midline.   CHEST: No tenderness or deformity, no crepitus, left chest incision for pacer c/d/i  LUNG: Dim to auscultation with good chest expansion.  There are no crackles or wheezes, normal AP diameter.  BACK: No kyphosis of the thoracic spine. Symmetric, no curvature, ROM normal, no CVA tenderness, no spinal tenderness   CVS: There is good S1  S2, regular rhythm, there are no murmurs, rubs, gallops, or heaves,  2+ pulses symmetric in all extremities.  ABDOMEN: Rounded and soft, nontender to palpation, non distended, no masses, no organomegaly, good bowel sounds, no rebound or guarding, no peritoneal signs.   EXTREMITIES: 1-2+ ble nonpitting edema, dry flaking skin  SKIN: Warm and dry  NEUROLOGICAL: The patient is oriented to person, place and time. Strength and sensation are grossly intact. Face is symmetric. More pleasant today            LABS:   Recent Results (from the past 168 hour(s))   Basic Metabolic Panel   Result Value Ref Range    Sodium 139 136 - 145 mmol/L    Potassium 3.5 3.5 - 5.0 mmol/L    Chloride 94 (L) 98 - 107 mmol/L    CO2 32 (H) 22 - 31 mmol/L    Anion Gap, Calculation 13 5 - 18 mmol/L    Glucose 106 70 - 125 mg/dL    Calcium 9.9 8.5 - 10.5 mg/dL    BUN 45 (H) 8 - 28 mg/dL    Creatinine 1.50 (H) 0.60 - 1.10 mg/dL    GFR MDRD Af Amer 40 (L) >60 mL/min/1.73m2    GFR MDRD Non Af Amer 33 (L) >60 mL/min/1.73m2   HM1 (CBC with Diff)   Result Value Ref Range    WBC 6.8 4.0 - 11.0 thou/uL    RBC 3.08 (L) 3.80 - 5.40 mill/uL    Hemoglobin 9.2 (L) 12.0 - 16.0 g/dL    Hematocrit 31.7 (L) 35.0 - 47.0 %     (H) 80 - 100 fL    MCH 29.9 27.0 - 34.0 pg    MCHC 29.0 (L) 32.0 - 36.0 g/dL    RDW 16.4 (H) 11.0 - 14.5 %    Platelets 160 140 - 440 thou/uL    MPV 10.7 8.5 - 12.5 fL    Neutrophils % 75 (H) 50 - 70 %    Lymphocytes % 14 (L) 20 - 40 %    Monocytes % 7 2 - 10 %    Eosinophils % 4 0 - 6 %    Basophils % 0 0 - 2 %    Neutrophils Absolute 5.1 2.0 - 7.7 thou/uL    Lymphocytes Absolute 0.9 0.8 - 4.4 thou/uL    Monocytes Absolute 0.5 0.0 - 0.9 thou/uL    Eosinophils Absolute 0.3 0.0 - 0.4 thou/uL    Basophils Absolute 0.0 0.0 - 0.2 thou/uL      Results for orders placed or performed in visit on 04/23/18   Basic Metabolic Panel   Result Value Ref Range    Sodium 139 136 - 145 mmol/L    Potassium 3.5 3.5 - 5.0 mmol/L    Chloride 94 (L) 98 - 107 mmol/L    CO2 32 (H) 22 - 31 mmol/L    Anion Gap, Calculation 13 5 - 18 mmol/L    Glucose 106 70 - 125 mg/dL    Calcium 9.9 8.5 - 10.5 mg/dL    BUN 45 (H) 8 - 28 mg/dL    Creatinine 1.50 (H) 0.60 - 1.10 mg/dL    GFR MDRD Af Amer 40 (L) >60 mL/min/1.73m2    GFR MDRD Non Af Amer 33 (L) >60 mL/min/1.73m2         Lab Results   Component Value Date    WBC 6.8 04/23/2018    HGB 9.2 (L) 04/23/2018    HCT 31.7 (L) 04/23/2018     (H) 04/23/2018     04/23/2018       No results found for: VLHOBJBZ49  Lab Results   Component Value Date    HGBA1C 6.0 03/08/2018     Lab Results   Component Value Date    INR 1.30 (H) 02/07/2018    INR 1.51 (H) 03/06/2017    INR 1.27 (H) 03/05/2017     Vitamin D, Total (25-Hydroxy)   Date Value Ref Range Status   02/19/2018 56.7 30.0 - 80.0 ng/mL Final     Lab Results   Component Value Date    TSH 3.50 06/01/2017           ASSESSMENT/PLAN:     1 Acute on chronic CHF: Daily [phone]. On bumex 2mg in am and 2mg qpm. F/u with cardiology on 3/21-no recommended changes, f/u cardiology again 4/17-changed metolazone to three times weekly prn. Doing well. Keep legs elevated. Sleeps in recliner. OT lymphedema wraps. 1-2+ nonpitting edema. Staff has not been giving prn metolazone, will change to scheduled mondays and Fridays. Weights are back down overall, did increase today but had dose of metolazone today.   2. Fall: Resolved ecchymosis on face, arm pain.    3. S/p PPM: Battery change on 2/12. Incision c/d/i. No concerns. F/u with device clinic 5/15.   4. Mod persistent Asthma: On advair, combivent. SOB at baseline, off O2 now.  5. MIGUEL on CKD: Cr 1.49 on 3/12. BMP 3/19-Cr 1.86. BMP on 4/2-1.38,  much improved. Recheck 4/9-Cr 1.4. Cr 1.66 on 4/16. Cr 1.5 on 4/23, stable.   6. Type 2 DM: Diet controlled. Monitor on labs.   7. Dyslipidemia: On aspirin, atorvastatin  8. HTN: SBP 120s. On lopressor, bumex.   9. GERD: On omeprazole. mylanta prn indigestion.   10. Hypothyroid: On levothyroxine.   11. Insomnia: On melatonin qhs.   12. Hypokalemia: On kcl.  K 4.0 on 3/12.   13. Constipation: On pericolace daily prn, miralax daily.    14. Vitamin d deficiency: On vitamin d. Level 2/19-56.7, stable.    15. Anemia of CKD: Hg 8.1 on 3/11. HM1 on 3/19-8.6. Recheck hm1 on 4/2-7.8.  Recheck on 4/9-Hg 8.1. HM1 on 4/16-Hg 8.5. Increased iron bid. Hg 9.2 on 4/23.   16. PAF: Regular rate and rhythm. On metoprolol   17. Onychomycosis: Saw podiatry 3/27-right great toenail fell off, no new orders and continue to monitor.   18. Chronic pain: Pain in legs mostly, tylenol tid prn  19. Hemorrhoids: anusol cream prn.     Per therapy ambulating 30-80 ft with CGA, 4 ww, transfers min to mod, setup for upper body dressing, min to mod for lower body dressing. 4 steps with therapy, home eval later this week. She plans to return home with grand daughter. Tentative 1-2 weeks.        Electronically signed by: Jennifer Valdes NP    Total 25 minutes of which 75% was spent in counseling and coordination of care of the above plan    Time spent in interview and examination of patient, review of available records, and discussion with nursing staff. Continue care plan, efforts at therapy, and monitor nutritional status.

## 2021-06-17 NOTE — TELEPHONE ENCOUNTER
Telephone Encounter by Lokesh Stephen RN at 8/31/2020  2:51 PM     Author: Lokesh Stephen RN Service: -- Author Type: Registered Nurse    Filed: 8/31/2020  2:53 PM Encounter Date: 8/31/2020 Status: Signed    : Lokesh Stephen RN (Registered Nurse)       [facility] Nurse Triage Telephone Note      Provider: CHILO Quigley  Facility: [facility]    Facility Type: TCU    Caller: Chandni  Call Back Number:  [phone]    Allergies: Tramadol; Codeine; Codeine phosphate (bulk); Codeine sulfate; Codeine-butalbital-asa-caff; Codeine-guaifenesin; Lisinopril; and Penicillins    Reason for call: Nurse calling to report right forearm x-ray results that were ordered per on-call due to patient c/o pain.  Patient is no longer endorsing pain.  Refused ice today.  Notable meds:  Tylenol 1000mg three times a day and daily PRN, Voltaren gel 1%--apply 2g to left shoulder four times a day PRN.  See x-ray results below:         Verbal Order/Direction given by Provider: No new orders.      Provider giving order: CHILO Quigley    Verbal order given to: Malathi Stephen RN

## 2021-06-17 NOTE — PROGRESS NOTES
[facility]    Facility:   [facility] [[id number]]   Code Status: FULL CODE      CHIEF COMPLAINT/REASON FOR VISIT:  Chief Complaint   Patient presents with     Review Of Multiple Medical Conditions       HISTORY:      HPI: Thea is a 83 y.o. female currently undergoing physical and occupational therapy at [facility].  She has a past medical history of chronic kidney disease stage III, left AS,status  post AVR, complete heart block, status post PPM with the recent generator change done on 2/12/2018.  She tells me after 11 years her battery no longer worked, moderate asthma, diabetes., chronic diastolic congestive heart failure, chronic anemia, chronic lower extremity venous stasis, recent admission with cellulitis treated with clindamycin. admitted with acute encephalopathy and acute kidney injury.  The acute kidney injury was thought to be hemodynamic due to poor renal autoregulation, CHF, hypotension,n diuretics and poor oral intake. Her renal function did improve after IV fluids were given and her diuretics were held during her hospitalization. She was also treated for a positive urine culture for Klebsiella and completed ciprofloxacin course. Her encephalopathy improved prior to starting her UTI treatment.  Also during her hospitalization palliative care was involved and at this time the patient wishes to remain a full code    Today Thea is seen in her room.  Her right eye bruise appears bigger than ion my prior visit. She continues to deny vision changes.     She denies any chest pain, shortness of breath her lung sounds are clear. a She was alert and oriented ×4.  She also has +2 pitting edema lower extremities with some blistering on the right shin. She sleeps in a recliner. No new changes today from when last seen. She had no concerns. She continues to have a small amount of weeping right shin.  Past Medical History:   Diagnosis Date  "    Acute kidney injury superimposed on CKD 3/3/2017     Asthma      CAD (coronary artery disease)      Cataract      Chronic kidney disease     ckd3     Diabetes mellitus      Disease of thyroid gland      H/O heart valve replacement with bioprosthetic valve      History of transfusion      Hypertension      Normochromic normocytic anemia      Restless leg syndrome              Family History   Problem Relation Age of Onset     No Medical Problems Mother      age 86     No Medical Problems Father      MVA     Cancer Brother      lung     No Medical Problems Brother      Social History     Social History     Marital status:      Spouse name: N/A     Number of children: N/A     Years of education: N/A     Occupational History      in operating room      retired     Social History Main Topics     Smoking status: Former Smoker     Smokeless tobacco: Never Used     Alcohol use No     Drug use: No     Sexual activity: No     Other Topics Concern     Not on file     Social History Narrative    She live in a single floor house.  Her son lives next door and her granddaughter is \"around a lot\"   3/2017         Review of Systems   Constitutional: Positive for fatigue. Negative for appetite change, chills and fever.   HENT: Negative for congestion and sore throat.    Eyes: Negative for visual disturbance.        Bruise right eyelid   Respiratory: Positive for shortness of breath. Negative for cough and wheezing.         With activity   Cardiovascular: Negative for chest pain and leg swelling.   Gastrointestinal: Negative for abdominal distention, abdominal pain, constipation, diarrhea and nausea.   Genitourinary: Negative for dysuria.   Musculoskeletal: Negative for arthralgias and myalgias.   Skin: Negative for color change, rash and wound.        Venous changes lower extremities  Blistering right shin- small amount of drainage.    Neurological: Negative for dizziness and weakness.   Psychiatric/Behavioral: " Negative for agitation, behavioral problems, confusion and sleep disturbance.       .  Vitals:    03/29/18 1630   BP: 108/54   Pulse: 60   Resp: 19   Temp: (!) 96.4  F (35.8  C)   SpO2: 94%   Weight: (!) 242 lb 3.2 oz (109.9 kg)       Physical Exam   Constitutional: She is oriented to person, place, and time. She appears well-developed and well-nourished.   HENT:   Head: Normocephalic.   Eyes: Conjunctivae are normal.   Neck: Normal range of motion.   Cardiovascular: Normal rate, regular rhythm and normal heart sounds.    No murmur heard.  New pacemaker exchanged   Pulmonary/Chest: Breath sounds normal. No respiratory distress. She has no wheezes. She has no rales.   Abdominal: Soft. Bowel sounds are normal. She exhibits no distension. There is no tenderness.   Musculoskeletal: Normal range of motion. She exhibits edema.   2+ lower extremities   Neurological: She is alert and oriented to person, place, and time.   Skin:   Small amount of weeping right lower extremity   Psychiatric: She has a normal mood and affect. Her behavior is normal.         LABS:   Recent Results (from the past 240 hour(s))   Basic Metabolic Panel   Result Value Ref Range    Sodium 139 136 - 145 mmol/L    Potassium 4.6 3.5 - 5.0 mmol/L    Chloride 100 98 - 107 mmol/L    CO2 30 22 - 31 mmol/L    Anion Gap, Calculation 9 5 - 18 mmol/L    Glucose 117 70 - 125 mg/dL    Calcium 9.5 8.5 - 10.5 mg/dL    BUN 36 (H) 8 - 28 mg/dL    Creatinine 1.47 (H) 0.60 - 1.10 mg/dL    GFR MDRD Af Amer 41 (L) >60 mL/min/1.73m2    GFR MDRD Non Af Amer 34 (L) >60 mL/min/1.73m2     Current Outpatient Prescriptions   Medication Sig     acetaminophen (TYLENOL) 500 MG tablet Take 2 tablets (1,000 mg total) by mouth 3 (three) times a day as needed for pain. Not to exceed 4000 mg in 24 hours.     aluminum-magnesium hydroxide-simethicone (MAALOX ADVANCED) 200-200-20 mg/5 mL Susp Take 30 mL by mouth every 6 (six) hours as needed.     aspirin 81 MG EC tablet Take 81 mg by  mouth daily.     atorvastatin (LIPITOR) 80 MG tablet Take 1 tablet (80 mg total) by mouth at bedtime.     bumetanide (BUMEX) 2 MG tablet Take 2 mg by mouth 2 (two) times a day at 9am and 6pm.     cholecalciferol, vitamin D3, (VITAMIN D3) 2,000 unit Tab Take 2,000 Units by mouth once daily.     fluticasone-salmeterol (ADVAIR) 500-50 mcg/dose DISKUS Inhale 1 puff 2 (two) times a day.     folic acid (FOLVITE) 1 MG tablet Take 1 mg by mouth daily.     ipratropium-albuterol (COMBIVENT RESPIMAT)  mcg/actuation Mist inhaler Inhale 1 puff 4 (four) times a day.     levothyroxine (SYNTHROID, LEVOTHROID) 125 MCG tablet Take 125 mcg by mouth Daily at 6:00 am.      melatonin 3 mg Tab tablet Take 3 mg by mouth at bedtime.      menthol-zinc oxide (CALMOSEPTINE) 0.44-20.6 % Oint ointment Apply 1 application topically 4 (four) times a day as needed (to denuded areas of skin).     metoprolol tartrate (LOPRESSOR) 25 MG tablet Take 4 tablets (100 mg total) by mouth 2 (two) times a day.     nystatin (MYCOSTATIN) cream Apply 1 application topically 2 (two) times a day. Apply to thighs abd folds until resolved.     omeprazole (PRILOSEC) 40 MG capsule Take 20 mg by mouth daily before breakfast.      ondansetron (ZOFRAN) 4 MG tablet Take 4 mg by mouth every 8 (eight) hours as needed for nausea.     oseltamivir (TAMIFLU) 30 MG capsule Take 30 mg by mouth daily.     potassium chloride 40 mEq/15 mL Liqd Take 7.5 mL (20 mEq total) by mouth daily. daily     potassium chloride SA (K-DUR,KLOR-CON) 20 MEQ tablet Take 20 mEq by mouth daily.     white petrolatum-mineral oil (EUCERIN) Crea Apply 1 application topically every 4 (four) hours as needed. Apply to bilateral legs.     white petrolatum-mineral oil (EUCERIN) Crea Apply 1 application topically 2 (two) times a day. Apply to bilateral legs.     ASSESSMENT:      ICD-10-CM    1. CKD (chronic kidney disease), stage III N18.3    2. Type 2 diabetes mellitus with stage 3 chronic kidney disease,  without long-term current use of insulin E11.22     N18.3    3. Heart failure with preserved ejection fraction I50.30    4. Permanent atrial fibrillation I48.2    5. PAF (paroxysmal atrial fibrillation) I48.0        PLAN:    1.  Acute on chronic congestive heart failure- daily weights continue Bumex Ace wraps on during the day off at night  2 fall -continue neuro as per facility protocol  3 diabetes mellitus -diet controlled  5 hypothyroidism- continue levothyroxine I  6 A. fib -regular rate and rhythm continue metoprolol  7 right foot cellulitis she completed a course of antibiotics    Electronically signed by: Shana Regalado, CNP

## 2021-06-17 NOTE — PROGRESS NOTES
"[facility] Patient Outreach:     Discharge Date::  5/9/18      Reason for TCU stay (discharge diagnosis)::  CHF, cellulitis, sepsis, paroxysmal a-fib      Are you feeling better, the same or worse since your discharge?:  Patient is feeling the same (coughing a lot; patient states she \"caught a cold\".)          As part of your discharge plan, did they discuss home care with you?: Yes        Have your seen them yet, or are they scheduled to visit?: No        Did you receive any new medications, or was there a change to your medications?: No            Do you have any follow up visits scheduled with your PCP or Specialist?:  No          I'm glad to hear you're doing well and we want you to continue to do well. Your PCP would like to see you for a follow-up visit. Can we help set that up for your today?: No (Patient needs to coordinate transportation with her children first before she schedules appt.  )        (RN) Provided patient the PCP's phone number to call if they have any questions or concerns?: Yes            "

## 2021-06-17 NOTE — TELEPHONE ENCOUNTER
Telephone Encounter by Carolyn Shearer at 12/30/2020  3:30 PM     Author: Carolyn Shearer Service: -- Author Type: Patient Access    Filed: 12/30/2020  3:36 PM Encounter Date: 12/30/2020 Status: Signed    : Carolyn Shearer (Patient Access)       Prior Authorization Not Needed     Insurance details: Approved - No restriction.Dismissed. Prior authorization not required..This generic medication is on formulary. If there is a clinical reason the generic cannot be tried, please resubmit stating brand is necessary. Please also indicate how the generic was failed.    Pharmacy details: [id number] - [place], [place] - [address] AT [facility]  Phone: [phone]  Details: I called the pharmacy to relay the information to them that was provide in the fax. The pharmacy was able to find a generic that did process through and they will need to order in the product for the patient. The patient should be notified when it is filled and ready to do.

## 2021-06-18 NOTE — LETTER
Letter by Brandt Irvin MD at      Author: Brandt Irvin MD Service: -- Author Type: --    Filed:  Encounter Date: 2/20/2019 Status: (Other)       Thea Acosta  [address] [place] [address]      February 20, 2019      Dear Thea,    This letter is to remind you that you will be due for your follow up appointment with Dr. Brandt Irvin . To help ensure you are in the best health possible, a regular follow-up with your cardiologist is essential.     Please call our Patient Scheduling Line at [phone] to schedule your appointment at your earliest convenience.  If you have recently scheduled an appointment, please disregard this letter.    We look forward to seeing you again. As always, we are available at the number  above for any questions or concerns you may have.      Sincerely,     The Physicians and Staff of [facility]

## 2021-06-18 NOTE — LETTER
Letter by Inés Barnes EPS at      Author: Inés Barnes EPS Service: -- Author Type: --    Filed:  Encounter Date: 1/15/2019 Status: (Other)       Thea Acosta  [address] [place] [address]      January 15, 2019      Dear Ms. Tracejosi,    RE: Remote Results    We are writing to you regarding your recent Remote Pacemaker check from home. Your transmission was received successfully. Battery status is satisfactory at this time.     Your results are within normal limits.    Your next device appointment will be a clinic visit.  Please call in January to schedule.      To schedule or reschedule, please call [phone] and press 1.    NOTE: If you would like to do an extra transmission, please call [phone] and press 3 to speak to a nurse BEFORE transmitting. This ensures that the Device Clinic staff is aware of the reason you are sending a transmission, and can follow-up with you after it has been reviewed.    We will be checking your implanted device from home (remotely) every three months unless otherwise instructed. We will need to see you in the clinic at least once a year. You may need to be seen in the clinic sooner depending on the results of your check.    Please be aware:    The follow-up schedule is like a Physician prescription.    Your remote monitor is paired to your specific implanted device.      Sincerely,    [facility]

## 2021-06-18 NOTE — PROGRESS NOTES
Assessment/Plan:    1. Essential hypertension with goal blood pressure less than 140/90  Hypertension, well controlled.  Ensure stable renal function while on metoprolol 25 mg twice daily.  - Basic Metabolic Panel    2. Chronic Kidney Disease, Stage 3  Known history of CKD stage III with most recent creatinine 1.59 GFR 31.  Repeat basic metabolic panel to ensure stable without evidence for recurrent hypokalemia etc.  - Basic Metabolic Panel    3. Type 2 diabetes mellitus with complication, without long-term current use of insulin  Type 2 diabetes history.  Prior A1c of 6% March 8, 2018.  Continues dietary modifications for management.  - Basic Metabolic Panel    4. PAF (paroxysmal atrial fibrillation)  History of paroxysmal atrial fibrillation appears to be in regular rhythm currently with pacemaker in place.    5. Heart failure with preserved ejection fraction  History of heart failure with preserved ejection fraction.  Continues metolazone 2.5 mg on Monday and Friday otherwise Bumex 2 mg twice daily.  [employer] paperwork was completed today due to heart failure history as well as underlying history of moderate persistent asthma.  - metOLazone (ZAROXOLYN) 2.5 MG tablet; Take 1 tablet (2.5 mg total) by mouth 2 (two) times a week.  Dispense: 24 tablet; Refill: 3  - bumetanide (BUMEX) 2 MG tablet; Take 1 tablet (2 mg total) by mouth 2 (two) times a day at 9am and 6pm.  Dispense: 180 tablet; Refill: 3    6. Cough  Did discuss cough management.  Tessalon Perles as needed.  - benzonatate (TESSALON PERLES) 100 MG capsule; Take 1 capsule (100 mg total) by mouth 3 (three) times a day as needed for cough.  Dispense: 30 capsule; Refill: 0  - XR Chest 2 Views; Future    7. Healthcare-associated pneumonia  Question of healthcare associated pneumonia.  Levofloxacin 750 mg every other day ×4 doses with reduced renal function noted.  Chest x-ray without significant infiltrate or consolidation and will have radiologist review  however persistent cough since prior to discharge from [facility].  - levoFLOXacin (LEVAQUIN) 750 MG tablet; Take 1 tablet (750 mg total) by mouth every other day for 4 doses.  Dispense: 4 tablet; Refill: 0  - XR Chest 2 Views; Future    8. Macrocytic anemia  History of chronic anemia with more recent macrocytic indices with  and hemoglobin 9.2 April 2, 2018.  Repeat CBC today.  - HM2(CBC w/o Differential)    9. Hyperlipidemia, unspecified hyperlipidemia type  Refill on atorvastatin 80 mg daily for lipid management.  - atorvastatin (LIPITOR) 80 MG tablet; Take 1 tablet (80 mg total) by mouth at bedtime.  Dispense: 90 tablet; Refill: 3    10. Hypothyroidism  Refill on levothyroxine 125 mcg daily for thyroid replacement.  Check TSH.  - levothyroxine (SYNTHROID, LEVOTHROID) 125 MCG tablet; Take 1 tablet (125 mcg total) by mouth Daily at 6:00 am.  Dispense: 90 tablet; Refill: 3  - Thyroid Stimulating Hormone (TSH)    40 minutes total time with patient, > 50% with counseling and coordination of cares.         Subjective:    Thea Acosta is seen today for hospital follow-up.  Admitted to Hospital March 7 - March 12, 2018 with abnormal labs and encephalopathy.  Chronic kidney disease noted.  Heart failure with preserved ejection fraction.  Normochromic normocytic anemia.  History of moderate persistent asthma.  Has had a cough since prior to discharge from TCU.  Vitamin D deficiency.  Prior UTI with Klebsiella treated with Cipro.  Pacemaker in place.  Continues metoprolol tartrate 25 mg twice daily for paroxysmal atrial fibrillation history.  Currently using Bumex 2 mg twice daily as well as metolazone 2.5 mg on Monday and Friday.  Has lost perhaps 30 pounds since April through diuresis etc.  Continues Advair 500/51 inhalation twice daily and Combivent 1 puff 4 times daily for moderate persistent asthma.  Is on levothyroxine 125 mcg daily for thyroid replacement.  Comprehensive review of system as  above otherwise all negative.      Reviewed hospital discharge summary from 3/12/18:  Thea Acosta is a 82 yo female with PMH of CKD 3, AS s/p AVR, complete heart block s/p PPM with recent generator change 2/12/18, moderate MS, chronic diastolic CHF, chronic anemia, chronic LE venous stasis, recent admission with cellulitis treated with clindamycin admitted with acute encephalopathy and MIGUEL. MIGUEL was thought to be hemodynamic due to poor renal autoregulation, CHF, hypotension, diuretics, poor oral intake. Renal function improved after IV fluids and holding diuretics. Oral intake seems to be improved. Patient was encouraged to consume 40-48 oz of fluids daily. Bumex was resumed at a lower dose.  Urine culture came back positive for Klebsiella and patient will be discharged on 3 days ciprofloxacin course. Her encephalopathy improved even prior to starting UTI treatment.   Hemoglobin has been in a 7.6 - 8.9 range without obvious signs of bleeding. Iron stores adequate. If anemia is not improving, patient might need to be referred to Nephrology to consider epoetin.   Palliative care was involved for goals of care discussion. Patient wishes to remain a full code.   Mrs. Acosta will be discharged back to SNF to continue her rehabilitation      3/13/18 FYI: Mrs. Acosta was admitted with an acute kidney injury. Renal function improved after IV fluids and holding diuretics. She also had an encephalopathy which improved significantly. She has UTI and was started on Ciprofloxacin. Mrs. Acosta was discharged back to TCU to continue her rehabilitation. For any additional concerns please call our services. thank you.     2/14/18 FYI: Mrs. Acosta was admitted for evaluation of weakness. Patient was found to be septic on presentation with sores from right foot cellulitis which great Strep Group G. Patient was then started on Ancef and switched to Keflex upon discharge. Patient was also treated for acute CHF treated with IV  "diuretics. Patient was initially planned for pacer battery change as outpatient which was also done on this hospital stay on 18. Patient is discharged to TCU for further therapy. Thank you.       (twice) now since 3/24/07 (\"Gal\" - renal cell CA) - remarried 3/8/82   3 sons - Nate Ean (); Edwin (head of work force center in [facility]; Amrit Mckoy works at the post office (I see him now as a patient)   Son-in-law Esdras Mo   2 daughters - breast cancer   Granddaughter - Terri - plays the clarinet   New granddaughter - Patrizia?   Buddhism  Dad - dec MVA age 52   Mom - dec 86 old age   2 bros - 1 bro  due to lung cancer in Aug, 2010 (had quit smoking > 40-50 years ago)   Quit smoking    No EtOH   Work: laundry services at [employer]   Surgeries: s/p gallbladder, bunion 06 left TKA ([facility]) 07 Aortic valve replacement (bioprostheitc) 07 pacemaker - dual chamber   Cardiovascular surgeon called 07 - patient is \"vasculopath\", prior Hb = 8.9, no Coumadin, will use ASA only 10-20-08: started back on Warfarin, 09 GI bleed, D/C Warfarin   11-10-08: Dexa order faxed to Thea Ordaz will schedule    Past Surgical History:   Procedure Laterality Date     APPENDECTOMY       CHOLECYSTECTOMY       EYE SURGERY Bilateral     Cataracts     HIP PINNING Right 3/1/2017    Procedure: RIGHT HIP TROCHANTERIC RODDING;  Surgeon: Moshe Davies MD;  Location: [facility] Main OR;  Service:      INSERT / REPLACE / REMOVE PACEMAKER  2007    guidant     INSERT / REPLACE / REMOVE PACEMAKER  2018    phoebe sci gen change     JOINT REPLACEMENT Left     knee     TISSUE AORTIC VALVE REPLACEMENT  2007             Family History   Problem Relation Age of Onset     No Medical Problems Mother      age 86     No Medical Problems Father      MVA     Cancer Brother      lung     No Medical Problems Brother         Past Medical History:   Diagnosis Date     " Acute kidney injury superimposed on CKD 3/3/2017     Asthma      CAD (coronary artery disease)      Cataract      Chronic kidney disease     ckd3     Diabetes mellitus      Disease of thyroid gland      H/O heart valve replacement with bioprosthetic valve      History of transfusion      Hypertension      Normochromic normocytic anemia      Restless leg syndrome         Social History   Substance Use Topics     Smoking status: Former Smoker     Smokeless tobacco: Never Used     Alcohol use No        Current Outpatient Prescriptions   Medication Sig Dispense Refill     acetaminophen (TYLENOL) 500 MG tablet Take 2 tablets (1,000 mg total) by mouth 3 (three) times a day as needed for pain. Not to exceed 4000 mg in 24 hours.  0     aspirin 81 MG EC tablet Take 81 mg by mouth daily.       atorvastatin (LIPITOR) 80 MG tablet Take 1 tablet (80 mg total) by mouth at bedtime. 90 tablet 3     bumetanide (BUMEX) 2 MG tablet Take 1 tablet (2 mg total) by mouth 2 (two) times a day at 9am and 6pm. 180 tablet 3     cholecalciferol, vitamin D3, (VITAMIN D3) 2,000 unit Tab Take 2,000 Units by mouth once daily.       ferrous sulfate 325 (65 FE) MG tablet Take 1 tablet by mouth 2 (two) times a day.       fluticasone-salmeterol (ADVAIR) 500-50 mcg/dose DISKUS Inhale 1 puff 2 (two) times a day.       folic acid (FOLVITE) 1 MG tablet Take 1 mg by mouth daily.       ipratropium-albuterol (COMBIVENT RESPIMAT)  mcg/actuation Mist inhaler Inhale 1 puff 4 (four) times a day. 4 g 11     levothyroxine (SYNTHROID, LEVOTHROID) 125 MCG tablet Take 1 tablet (125 mcg total) by mouth Daily at 6:00 am. 90 tablet 3     melatonin 3 mg Tab tablet Take 3 mg by mouth at bedtime.        menthol-zinc oxide (CALMOSEPTINE) 0.44-20.6 % Oint ointment Apply 1 application topically 4 (four) times a day as needed (to denuded areas of skin). 113 g 0     [START ON 5/31/2018] metOLazone (ZAROXOLYN) 2.5 MG tablet Take 1 tablet (2.5 mg total) by mouth 2 (two)  times a week. 24 tablet 3     metoprolol tartrate (LOPRESSOR) 50 MG tablet Take 50 mg by mouth 2 (two) times a day.       nystatin (MYCOSTATIN) cream Apply 1 application topically 2 (two) times a day. Apply to thighs abd folds until resolved.       omeprazole (PRILOSEC) 40 MG capsule Take 20 mg by mouth daily before breakfast.        potassium chloride SA (K-DUR,KLOR-CON) 20 MEQ tablet Take 20 mEq by mouth daily.       white petrolatum-mineral oil (EUCERIN) Crea Apply 1 application topically every 4 (four) hours as needed. Apply to bilateral legs.       aluminum-magnesium hydroxide-simethicone (MAALOX ADVANCED) 200-200-20 mg/5 mL Susp Take 30 mL by mouth every 6 (six) hours as needed.       benzonatate (TESSALON PERLES) 100 MG capsule Take 1 capsule (100 mg total) by mouth 3 (three) times a day as needed for cough. 30 capsule 0     levoFLOXacin (LEVAQUIN) 750 MG tablet Take 1 tablet (750 mg total) by mouth every other day for 4 doses. 4 tablet 0     No current facility-administered medications for this visit.           Objective:    Vitals:    05/29/18 1203   BP: 100/50   Pulse: 60   SpO2: 92%   Weight: 217 lb (98.4 kg)      Body mass index is 33.99 kg/(m^2).    Alert.  No apparent distress.  Does have harsh wet cough frequently.  No respiratory distress.  Rhonchi noted without inspiratory crackle or expiratory wheeze on chest exam.  Cardiac exam appears regular.  Abdomen benign, obese.  Extremities with 1+ edema which has improved significantly since prior office visit.  No calf tenderness.  Neurologic exam otherwise appears nonfocal.  Is in wheelchair currently however states is able to ambulate short distances with use of walker.      This note has been dictated using voice recognition software and as a result may contain minor grammatical errors and unintended word substitutions.

## 2021-06-19 NOTE — LETTER
Letter by Gamal Hdz MD at      Author: Gamal Hdz MD Service: -- Author Type: --    Filed:  Encounter Date: 3/20/2019 Status: (Other)         Thea Acosta  [address] [place] [address]             March 20, 2019         Dear Ms. Acosta,    Below are the results from your recent visit:    Resulted Orders   Glycosylated Hemoglobin A1c   Result Value Ref Range    Hemoglobin A1c 6.7 (H) 3.5 - 6.0 %   Basic Metabolic Panel   Result Value Ref Range    Sodium 141 136 - 145 mmol/L    Potassium 4.4 3.5 - 5.0 mmol/L    Chloride 101 98 - 107 mmol/L    CO2 29 22 - 31 mmol/L    Anion Gap, Calculation 11 5 - 18 mmol/L    Glucose 109 70 - 125 mg/dL    Calcium 9.8 8.5 - 10.5 mg/dL    BUN 31 (H) 8 - 28 mg/dL    Creatinine 1.45 (H) 0.60 - 1.10 mg/dL    GFR MDRD Af Amer 42 (L) >60 mL/min/1.73m2    GFR MDRD Non Af Amer 34 (L) >60 mL/min/1.73m2    Narrative    Fasting Glucose reference range is 70-99 mg/dL per  American Diabetes Association (ADA) guidelines.   HM2(CBC w/o Differential)   Result Value Ref Range    WBC 5.5 4.0 - 11.0 thou/uL    RBC 3.63 (L) 3.80 - 5.40 mill/uL    Hemoglobin 11.2 (L) 12.0 - 16.0 g/dL    Hematocrit 34.4 (L) 35.0 - 47.0 %    MCV 95 80 - 100 fL    MCH 30.7 27.0 - 34.0 pg    MCHC 32.4 32.0 - 36.0 g/dL    RDW 13.4 11.0 - 14.5 %    Platelets 168 140 - 440 thou/uL    MPV 8.3 7.0 - 10.0 fL   Thyroid Stimulating Hormone (TSH)   Result Value Ref Range    TSH 7.06 (H) 0.30 - 5.00 uIU/mL       Continue your current diabetes management as discussed in order to further improve.  Goal A1c remains < 7.0% ideally.     Your kidney function remains reduced.  It appears slightly improved.  We will continue to follow closely.     Your complete blood count results show mild anemia.  It remains stable.    Your thyroid screening test (i.e. TSH) has improved following your recent dose adjustment.  It is going the right direction.  Continue your current medication as discussed.  We will recheck at your  office visit in early June, 2019.    Please call with questions or contact us using [employer].    Sincerely,        Electronically signed by Gamal Hdz MD

## 2021-06-19 NOTE — PROGRESS NOTES
Assessment/Plan:    1. Essential hypertension with goal blood pressure less than 140/90  Hypertension, stable.  Continue home medication as noted per  - Basic Metabolic Panel    2. Chronic Kidney Disease, Stage 3  History of CKD stage III with recent creatinine of 2.07 and GFR 23.  Ensure adequate hydration otherwise repeat renal function while on chronic diuretics.  - Basic Metabolic Panel    3. Type 2 diabetes mellitus with complication, without long-term current use of insulin (H)  Type 2 diabetes had significantly improved from 6.9% on the 6% March 8, 2018 will reassess A1c today as well as fasting glucose.  Anticipate recheck 3-4 months.  - Basic Metabolic Panel    4. PAF (paroxysmal atrial fibrillation) (H)  Described history of paroxysmal atrial fibrillation with noted pacemaker in place with appropriate rate control.    5. Hypothyroidism, unspecified type  Prior hypothyroidism with TSH 24.55.  Patient does not recall specific medications however states is taking everything that has been prescribed which would include levothyroxine 125 mcg daily.  - Thyroid Stimulating Hormone (TSH)        Subjective:    Thea Acosta is seen today for follow-up evaluation.  History of CHF with preserved ejection fraction.  Paroxysmal atrial fibrillation status post pacemaker placement previously.  Known CKD stage III however recent worsening of creatinine of 2.07 and GFR 23 while on chronic diuresis including metolazone 2.5 mg on Monday and Friday otherwise Bumex 2 mg twice daily.  Prior A1c of 7.3% with subsequent improvement down to 6.0% March 8, 2018 and continues dietary modifications.  Thyroid replacement with levothyroxine 125 mcg daily however somewhat poor historian on which medication she is using however states that if it was prescribed she is taking it as requested.  Known hypertension.  Continues metoprolol tartrate 100 mg using half tablet twice daily.  Comprehensive review of systems as above otherwise all  "negative.     (twice) now since 3/24/07 (\"Gal\" - renal cell CA) - remarried 3/8/82   3 sons - Nate Ean (); Edwin (head of work force center in [facility]; Amrit Mckoy works at the post office (I see him now as a patient)   Son-in-law Esdras Mo   2 daughters - breast cancer   Granddaughter - Terri - plays the clarinet   New granddaughter - Patrizia?   Episcopalian  Dad - dec MVA age 52   Mom - dec 86 old age   2 bros - 1 bro  due to lung cancer in Aug, 2010 (had quit smoking > 40-50 years ago)   Quit smoking    No EtOH   Work: laundry services at [employer]   Surgeries: s/p gallbladder, bunion 06 left TKA ([facility]) 07 Aortic valve replacement (bioprostheitc) 07 pacemaker - dual chamber   Cardiovascular surgeon called 07 - patient is \"vasculopath\", prior Hb = 8.9, no Coumadin, will use ASA only 10-20-08: started back on Warfarin, 09 GI bleed, D/C Warfarin   11-10-08: Dexa order faxed to Thea Ordaz will schedule    3/13/18 FYI: Mrs. Acosta was admitted with an acute kidney injury. Renal function improved after IV fluids and holding diuretics. She also had an encephalopathy which improved significantly. She has UTI and was started on Ciprofloxacin. Mrs. Acosta was discharged back to TCU to continue her rehabilitation. For any additional concerns please call our services. thank you.     Past Surgical History:   Procedure Laterality Date     APPENDECTOMY       CHOLECYSTECTOMY       EYE SURGERY Bilateral     Cataracts     HIP PINNING Right 3/1/2017    Procedure: RIGHT HIP TROCHANTERIC RODDING;  Surgeon: Moshe Davies MD;  Location: [facility] Main OR;  Service:      INSERT / REPLACE / REMOVE PACEMAKER  2007    guidant     INSERT / REPLACE / REMOVE PACEMAKER  2018    phoebe sci gen change     JOINT REPLACEMENT Left     knee     TISSUE AORTIC VALVE REPLACEMENT  2007             Family History   Problem Relation Age of Onset     " No Medical Problems Mother      age 86     No Medical Problems Father      MVA     Cancer Brother      lung     No Medical Problems Brother         Past Medical History:   Diagnosis Date     Acute kidney injury superimposed on CKD (H) 3/3/2017     Asthma      CAD (coronary artery disease)      Cataract      Chronic kidney disease     ckd3     Diabetes mellitus (H)      Disease of thyroid gland      H/O heart valve replacement with bioprosthetic valve      History of transfusion      Hypertension      Normochromic normocytic anemia      Restless leg syndrome         Social History   Substance Use Topics     Smoking status: Former Smoker     Smokeless tobacco: Never Used     Alcohol use No        Current Outpatient Prescriptions   Medication Sig Dispense Refill     acetaminophen (TYLENOL) 500 MG tablet Take 2 tablets (1,000 mg total) by mouth 3 (three) times a day as needed for pain. Not to exceed 4000 mg in 24 hours.  0     aluminum-magnesium hydroxide-simethicone (MAALOX ADVANCED) 200-200-20 mg/5 mL Susp Take 30 mL by mouth every 6 (six) hours as needed.       aspirin 81 MG EC tablet Take 81 mg by mouth daily.       atorvastatin (LIPITOR) 80 MG tablet Take 1 tablet (80 mg total) by mouth at bedtime. 90 tablet 3     bumetanide (BUMEX) 2 MG tablet Take 1 tablet (2 mg total) by mouth 2 (two) times a day at 9am and 6pm. 180 tablet 3     cholecalciferol, vitamin D3, (VITAMIN D3) 2,000 unit Tab Take 2,000 Units by mouth once daily.       ferrous sulfate 325 (65 FE) MG tablet Take 1 tablet by mouth 2 (two) times a day.       fluticasone-salmeterol (ADVAIR) 500-50 mcg/dose DISKUS Inhale 1 puff 2 (two) times a day.       folic acid (FOLVITE) 1 MG tablet Take 1 mg by mouth daily.       ipratropium-albuterol (COMBIVENT RESPIMAT)  mcg/actuation Mist inhaler Inhale 1 puff 4 (four) times a day. 4 g 11     levothyroxine (SYNTHROID, LEVOTHROID) 125 MCG tablet Take 1 tablet (125 mcg total) by mouth Daily at 6:00 am. 90 tablet  3     melatonin 3 mg Tab tablet Take 3 mg by mouth at bedtime.        menthol-zinc oxide (CALMOSEPTINE) 0.44-20.6 % Oint ointment Apply 1 application topically 4 (four) times a day as needed (to denuded areas of skin). 113 g 0     metOLazone (ZAROXOLYN) 2.5 MG tablet Take 1 tablet (2.5 mg total) by mouth 2 (two) times a week. 24 tablet 3     metoprolol tartrate (LOPRESSOR) 50 MG tablet Take 50 mg by mouth 2 (two) times a day.       nystatin (MYCOSTATIN) cream Apply 1 application topically 2 (two) times a day. Apply to thighs abd folds until resolved.       omeprazole (PRILOSEC) 40 MG capsule Take 20 mg by mouth daily before breakfast.        potassium chloride SA (K-DUR,KLOR-CON) 20 MEQ tablet Take 20 mEq by mouth daily.       white petrolatum-mineral oil (EUCERIN) Crea Apply 1 application topically every 4 (four) hours as needed. Apply to bilateral legs.       benzonatate (TESSALON PERLES) 100 MG capsule Take 1 capsule (100 mg total) by mouth 3 (three) times a day as needed for cough. 30 capsule 0     No current facility-administered medications for this visit.           Objective:    Vitals:    07/05/18 1216   BP: 110/60   Pulse: 60   SpO2: 90%   Weight: 211 lb (95.7 kg)      Body mass index is 33.05 kg/(m^2).    Alert.  No apparent distress.  Chest clear.  Cardiac exam appears regular.  Abdomen benign, obese.  Extremities warm and dry.  1+ edema, improved.  Utilizing wheelchair to assist with transfers.      This note has been dictated using voice recognition software and as a result may contain minor grammatical errors and unintended word substitutions.

## 2021-06-19 NOTE — LETTER
Letter by Gamal Hdz MD at      Author: Gamal Hdz MD Service: -- Author Type: --    Filed:  Encounter Date: 6/9/2019 Status: (Other)         Thea Acosta  [address] [place] [address]             June 9, 2019         Dear Ms. Acosta,    Below are the results from your recent visit:    Resulted Orders   Basic Metabolic Panel   Result Value Ref Range    Sodium 140 136 - 145 mmol/L    Potassium 4.2 3.5 - 5.0 mmol/L    Chloride 99 98 - 107 mmol/L    CO2 30 22 - 31 mmol/L    Anion Gap, Calculation 11 5 - 18 mmol/L    Glucose 127 (H) 70 - 125 mg/dL    Calcium 10.0 8.5 - 10.5 mg/dL    BUN 40 (H) 8 - 28 mg/dL    Creatinine 1.65 (H) 0.60 - 1.10 mg/dL    GFR MDRD Af Amer 36 (L) >60 mL/min/1.73m2    GFR MDRD Non Af Amer 30 (L) >60 mL/min/1.73m2    Narrative    Fasting Glucose reference range is 70-99 mg/dL per  American Diabetes Association (ADA) guidelines.   HM2(CBC w/o Differential)   Result Value Ref Range    WBC 6.8 4.0 - 11.0 thou/uL    RBC 3.56 (L) 3.80 - 5.40 mill/uL    Hemoglobin 10.8 (L) 12.0 - 16.0 g/dL    Hematocrit 34.0 (L) 35.0 - 47.0 %    MCV 96 80 - 100 fL    MCH 30.3 27.0 - 34.0 pg    MCHC 31.7 (L) 32.0 - 36.0 g/dL    RDW 13.5 11.0 - 14.5 %    Platelets 189 140 - 440 thou/uL    MPV 8.7 7.0 - 10.0 fL   Thyroid Stimulating Hormone (TSH)   Result Value Ref Range    TSH 5.60 (H) 0.30 - 5.00 uIU/mL   Lipid Cascade   Result Value Ref Range    Cholesterol 155 <=199 mg/dL    Triglycerides 74 <=149 mg/dL    HDL Cholesterol 47 (L) >=50 mg/dL    LDL Calculated 93 <=129 mg/dL    Patient Fasting > 8hrs? Yes    Ferritin   Result Value Ref Range    Ferritin 102 10 - 130 ng/mL   Iron and Transferrin Iron Binding Capacity   Result Value Ref Range    Iron 34 (L) 42 - 175 ug/dL    Transferrin 235 212 - 360 mg/dL    Transferrin Saturation, Calculated 12 (L) 20 - 50 %    Transferrin IBC, Calculated 294 (L) 313 - 563 ug/dL       Your kidney function remains reduced.  It appears stable.  We will continue to  follow closely.     Your complete blood count results show mild anemia.  Use your iron supplement 65 mg daily as discussed.  We will continue to follow closely as well.    Your thyroid screening test (i.e. TSH) was mildly elevated, however, it continues to improve.  Continue your current medication as discussed.  We will plan on rechecking at your next office visit.       Your cholesterol results were near goal.  Continue your current medication as discussed.   Ensure regular exercise, healthy diet, and weight loss modifications in order to further improve.  We will plan to recheck your labs while fasting in the next 6-12 months to ensure desired improvement.        Please call with questions or contact us using [employer].    Sincerely,        Electronically signed by Gamal Hdz MD

## 2021-06-19 NOTE — LETTER
Letter by Ivy Rock RN at      Author: Ivy Rock RN Service: -- Author Type: --    Filed:  Encounter Date: 10/1/2019 Status: Signed          October 1, 2019       Thea Acosta   [address] [place] [address]         Dear Thea Acosta,     At [facility], your well-being is important to us. To help you reach your best health possible, we are now offering MyHealth Tracker - a free daily health monitoring program.      It's easy to participate in MyHealth Tracker using a telephone, computer, or smartphone. Every morning, you simply call a designated telephone number, login to MyHealth Tracker via a computer, or use our smartphone application to answer a few health questions. That's it!      If your health problem has changed, a registered nurse (RN) will connect with you for further assessment and next steps that could prevent an emergency or hospitalization.  If needed, the RN connects with your provider and care team for further advice.      Spending a few minutes each day monitoring your health can avoid unnecessary complications and identify ways that can improve your health and quality of life!      Within the next two weeks, you will receive a telephone call from a MyHealth Tracker registered nurse who will help you enroll in the program. She will answer any questions you have and help you complete your first brief questionnaire (or survey).     An informational brochure is included with this invitation. If you have questions or would like to start right away, call [employer] at [phone] weekdays between 8 a.m. and 4:30 p.m.      Thank you again for choosing us as your healthcare partner. We are committed to helping you live a healthier life.        Sincerely,  Your [facility] Care team

## 2021-06-19 NOTE — LETTER
Letter by Candy Vanessa RDCS at      Author: Candy Vanessa RDCS Service: -- Author Type: --    Filed:  Encounter Date: 8/5/2019 Status: (Other)         Thea Acosta  [address] [place] [address]      August 5, 2019      Dear Ms. Acosta,    RE: Remote Results    We are writing to you regarding your recent Remote Pacemaker check from home. Your transmission was received successfully. Battery status is satisfactory at this time.     Your results are showing no significant changes.    You are overdue for your clinic visit.  Please call now to schedule.    To schedule or reschedule, please call [phone] and press 1.    NOTE: If you would like to do an extra transmission, please call [phone] and press 3 to speak to a nurse BEFORE transmitting. This ensures that the [facility] staff is aware of the reason you are sending a transmission, and can follow-up with you after it has been reviewed.    We will be checking your implanted device from home (remotely) every three months unless otherwise instructed. We will need to see you in the clinic at least once a year. You may need to be seen in the clinic sooner depending on the results of your check.    Please be aware:    The follow-up schedule is like a Physician prescription.    Your remote monitor is paired to your specific implanted device.      Sincerely,    [facility]

## 2021-06-20 NOTE — LETTER
Letter by Shawna Crespo CNP at      Author: Shawna Crespo CNP Service: -- Author Type: --    Filed:  Encounter Date: 2020 Status: (Other)         Patient: Thea Acosta   MR Number: [id number]   YOB: 1934   Date of Visit: 2020                 [facility]    DATE: 8/3/2020    NAME:  Thea Acosta             :  1934  MRN: [id number]  CODE STATUS:  DNR    VISIT TYPE: Problem Visit (Left shoulder dislocation)     FACILITY:  [facility] [[id number]]       CHIEF COMPLAIN/REASON FOR VISIT:    Chief Complaint   Patient presents with   ? Problem Visit     Left shoulder dislocation               HISTORY OF PRESENT ILLNESS: Thea Acosta is a 86 y.o. female who admitted - for acute metabolic encephalopathy. This was felt to be s/t hypothyroidism and noncompliance with levothyroxine dosing. She was readmitted - for left shoulder dislocation and underwent reverse total shoulder arthroplasty on . Postop course was uncomplicated and transferred back to [facility]. She has PMH of CAD, CKD, DM, s/p PPM, diastolic CHF, pulmonary artery hypertension, mitral valve stenosis, chronic a fib no anticoagulation, s/p AVR, asthma. Prior to this she lived at home with her grand daughter.     Today Ms. Acosta is seen for left shoulder dislocation.  She reported 5 out of 10 aching intermittent pain in her left shoulder that is not relieved with Tylenol at this time.  She is agreeable to plan to try diclofenac gel for further pain management.  She noted that she has been having visual hallucinations overnight that she describes as intense dreams that do not bother her at this time.  She was agreeable to plan to further evaluate her hallucinations with laboratory monitoring tomorrow.  She denied any other concerns at this time.  The patient denied fever, chills, diaphoresis, changes in mood or appetite, sleep disturbances, nasal  "congestion, sore throat, lightheadedness, dizziness, breathing difficulty, chest pain, palpitations, constipation, urinary symptoms, and numbness or tingling in extremities. Nursing staff denied any new concerns for the patient at this time.      REVIEW OF SYSTEMS:  PROBLEMS AND REVIEW OF SYSTEMS:   Review of Systems  Today on ROS:   Currently, no fever, chills, or rigors. Decreased vision and hearing. Denies any chest pain, headaches, palpitations, lightheadedness, dizziness, no cough, no sob. Appetite is good.  Denies any abdominal pain. No active bleeding. Positive for urinary incontinence, leg swelling, left arm and shoulder swelling, sling in place, left shoulder incision, no nausea or vomiting, no constipation, pain controlled      Allergies   Allergen Reactions   ? Tramadol Anxiety and Other (See Comments)     Per patient, Thea developed psychosis and severe anxiety and agitation \"for three days\" after receiving tramadol in the past. She stated that she would \"never\" take it again and would be extremely upset if she receives it. She asked me to add this to her chart's allergy list specifically.    ? Codeine Hives   ? Codeine Phosphate (Bulk)      This allergy is per [facility] [place] records.   ? Codeine Sulfate      This allergy is per [facility] [place] records.   ? Codeine-Butalbital-Asa-Caff      This allergy is per [facility] [place] records.   ? Codeine-Guaifenesin      This allergy is per [facility] [place] records.   ? Lisinopril Cough   ? Penicillins Swelling     Current Outpatient Medications   Medication Sig   ? acetaminophen (TYLENOL) 500 MG tablet Take 2 tablets (1,000 mg total) by mouth 3 (three) times a day. (Patient taking differently: Take 1,000 mg by mouth 3 (three) times a day. And daily prn)   ? albuterol (PROAIR HFA;PROVENTIL HFA;VENTOLIN HFA) 90 mcg/actuation inhaler Inhale 2 puffs " every 4 (four) hours as needed for wheezing.   ? aspirin 81 MG EC tablet Take 1 tablet (81 mg total) by mouth 2 (two) times a day.   ? atorvastatin (LIPITOR) 80 MG tablet TAKE 1 TABLET(80 MG) BY MOUTH DAILY   ? bumetanide (BUMEX) 2 MG tablet Take 1.5 tablets (3 mg total) by mouth 2 (two) times a day at 9am and 6pm.   ? colchicine 0.6 mg tablet take two tablets (1.2 mg) by mouth at onset of gout symptoms, then repeat one tablet (0.6 mg) by mouth one hour later   ? diclofenac sodium (VOLTAREN) 1 % Gel Apply 2 g topically 4 (four) times a day as needed.    ? ferrous sulfate 325 (65 FE) MG tablet TAKE 1 TABLET(325 MG) BY MOUTH TWICE DAILY (Patient taking differently: Take 1 tablet by mouth 3 (three) times a day with meals. )   ? levothyroxine (SYNTHROID, LEVOTHROID) 150 MCG tablet Take 1 tablet (150 mcg total) by mouth daily before supper.   ? nystatin (MYCOSTATIN) powder Apply topically 4 (four) times a day.   ? omeprazole (PRILOSEC) 20 MG capsule Take 1 capsule (20 mg total) by mouth daily before breakfast.   ? polyethylene glycol (MIRALAX) 17 gram packet Take 17 g by mouth daily as needed.   ? potassium chloride (K-DUR,KLOR-CON) 20 MEQ tablet Take 20 mEq by mouth daily.    ? predniSONE (DELTASONE) 2.5 MG tablet Take 7.5 mg/d prednisone PO for 3 weeks.   ? senna (SENOKOT) 8.6 mg tablet Take 1 tablet by mouth daily.   ? white petrolatum (AQUAPHOR ORIGINAL) 41 % Oint Apply 1 application topically at bedtime.     Past Medical History:    Past Medical History:   Diagnosis Date   ? Acute kidney injury superimposed on CKD (H) 3/3/2017   ? Asthma    ? CAD (coronary artery disease)    ? Cataract    ? Chronic kidney disease     ckd3   ? COPD (chronic obstructive pulmonary disease) (H)    ? Diabetes mellitus (H)    ? Disease of thyroid gland    ? H/O heart valve replacement with bioprosthetic valve    ? History of transfusion    ? Hypertension    ? Normochromic normocytic anemia    ? Pulmonary hypertension (H) 09/27/2019     Noted on echocardiogram   ? Restless leg syndrome            PHYSICAL EXAMINATION  Vitals:    07/17/20 0241   Resp: 16       Today on physical exam:     GENERAL: Awake, Alert, obese,  not in any form of acute distress, answers questions appropriately, follows simple commands, conversant  HEENT: Head is normocephalic with normal hair distribution. No evidence of trauma. Ears: No acute purulent discharge. Eyes: Conjunctivae pink with no scleral jaundice. Nose: Normal mucosa and septum. NECK: Supple with no cervical or supraclavicular lymphadenopathy. Trachea is midline. [place], decreased vision  CHEST: No tenderness or deformity, no crepitus, left chest PPM  LUNG: Lung sounds clear in all fields   no shortness of breath noted today, no cough noted  BACK: No kyphosis of the thoracic spine. Symmetric, no curvature, ROM normal, no CVA tenderness, no spinal tenderness   CVS: irregularly irregular rhythm, murmur,  2+ pulses symmetric in all extremities.  ABDOMEN: Rounded and soft, nontender to palpation, non distended, no masses, no organomegaly, good bowel sounds, no rebound or guarding, no peritoneal signs.   EXTREMITIES: 1+ ble nonpitting edema, left upper extremity 2+ edema, left shoulder incision c/d/i, left arm sling in place  SKIN: Warm and dry  NEUROLOGICAL: The patient is oriented to person, place and time. Confused at times, oriented on exam. Forgetful at times.  Positive for visual hallucinations.            LABS:   Recent Results (from the past 168 hour(s))   Basic Metabolic Panel   Result Value Ref Range    Sodium 141 136 - 145 mmol/L    Potassium 3.6 3.5 - 5.0 mmol/L    Chloride 98 98 - 107 mmol/L    CO2 35 (H) 22 - 31 mmol/L    Anion Gap, Calculation 8 5 - 18 mmol/L    Glucose 114 70 - 125 mg/dL    Calcium 9.2 8.5 - 10.5 mg/dL    BUN 37 (H) 8 - 28 mg/dL    Creatinine 1.47 (H) 0.60 - 1.10 mg/dL    GFR MDRD Af Amer 41 (L) >60 mL/min/1.73m2    GFR MDRD Non Af Amer 34 (L) >60 mL/min/1.73m2   Hemoglobin   Result  Value Ref Range    Hemoglobin 8.2 (L) 12.0 - 16.0 g/dL   Thyroid Stimulating Hormone (TSH)   Result Value Ref Range    TSH 3.43 0.30 - 5.00 uIU/mL     Results for orders placed or performed during the hospital encounter of 07/12/20   Basic Metabolic Panel   Result Value Ref Range    Sodium 138 136 - 145 mmol/L    Potassium 3.6 3.5 - 5.0 mmol/L    Chloride 90 (L) 98 - 107 mmol/L    CO2 41 (HH) 22 - 31 mmol/L    Anion Gap, Calculation 7 5 - 18 mmol/L    Glucose 113 70 - 125 mg/dL    Calcium 9.1 8.5 - 10.5 mg/dL    BUN 38 (H) 8 - 28 mg/dL    Creatinine 1.41 (H) 0.60 - 1.10 mg/dL    GFR MDRD Af Amer 43 (L) >60 mL/min/1.73m2    GFR MDRD Non Af Amer 35 (L) >60 mL/min/1.73m2         Lab Results   Component Value Date    WBC 6.9 08/05/2020    HGB 8.2 (L) 08/17/2020    HCT 29.0 (L) 08/05/2020    MCV 98 08/05/2020     08/05/2020       Lab Results   Component Value Date    SXECUDWN68 333 07/21/2020     Lab Results   Component Value Date    HGBA1C 6.5 (H) 07/31/2020     Lab Results   Component Value Date    INR 0.99 07/28/2020    INR 1.30 (H) 02/07/2018    INR 1.51 (H) 03/06/2017     Vitamin D, Total (25-Hydroxy)   Date Value Ref Range Status   01/20/2020 45.9 30.0 - 80.0 ng/mL Final     Lab Results   Component Value Date    TSH 3.43 08/17/2020           ASSESSMENT/PLAN:    Start diclofenac 1% gel to left shoulder 3 times daily for osteoarthritis pain.  Otherwise continue current care plan for all other chronic medical conditions, as they are stable. Encouraged patient to engage in healthy lifestyle behaviors such as engaging in social activities, exercising (PT/OT), eating well, and following care plan. Follow up for routine check-up, or sooner if needed. Will continue to monitor patient and work with nursing staff collaboratively to work toward positive patient outcomes.        Electronically signed by: Shawna Crespo, CNP

## 2021-06-20 NOTE — LETTER
Letter by Jennifer Valdes NP at      Author: Jennifer Valdes NP Service: -- Author Type: --    Filed:  Encounter Date: 2020 Status: (Other)         Patient: Thea Acosta   MR Number: [id number]   YOB: 1934   Date of Visit: 2020                 [facility]    DATE: 2020    NAME:  Thea Acosta             :  1934  MRN: [id number]  CODE STATUS:  DNR    VISIT TYPE: Discharge Summary     FACILITY:  [facility] [[id number]]       CHIEF COMPLAIN/REASON FOR VISIT:    Chief Complaint   Patient presents with   ? Discharge Summary               HISTORY OF PRESENT ILLNESS: Thea Acosta is a 85 y.o. female who was admitted - for dyspnea and fluid overload, acute on chronic diastolic CHF. She was treated with IV diuresis and later transitioned to PO. Then she developed MIGUEL and held bumex and metolazone. During stay she developed herpes zoster outbreak on left chest. She also had left leg laceration and treated with clindamycin. Left leg negative for DVT on doppler. She completed IV ceftezole and po keflex for cellulitis. She did have some orthostatic hypotension that required med changes. TSH stable and ACTH stim test not indicated. She did have some epigastric pain and was treated with PPI and maalox. She was recommended TCU stay for deconditioning. She has PMH of CAD, CKD, DM, s/p PPM, diastolic CHF, pulmonary artery hypertension, mitral valve stenosis, chronic a fib no anticoagulation, s/p AVR, asthma. Prior to this she lived at home with her grand daughter.     TCU course:   Ms. Acosta has made progress with therapy and is ambulating 150 feet with walker and supervision. She is able to navigate up to 3 stairs with cga. She is toileting with contact guard assist. She is still high fall risk with tinnetti score of 18/28. Wheelchair was ordered for her. During her tcu course she completed treatment for herpes zoster, ble  cellulitis, acute on chronic chf. She received several doses of metolazone and recently has been stable. She was instructed on importance of monitoring daily weights at home and when to notify primary care provider. She completed treatment for viral uri and asthma exacerbation. She has been weaned off her oxygen. Her labs and vitals were stable. She completed treatment for right elbow gout exacerbation. She did have lymphedema wrapping for worsening edema but now is back on renan hose. She will be returning home today with  PT, OT, CARLO, RN. She will need to follow up with PCP in 5-7 days.         REVIEW OF SYSTEMS:  PROBLEMS AND REVIEW OF SYSTEMS:   Review of Systems  Today on ROS:   Currently, no fever, chills, or rigors. Decreased vision and hearing. Denies any chest pain, headaches, palpitations, lightheadedness, dizziness. Appetite is good.  Denies any abdominal pain. No active bleeding. Positive for urinary incontinence, no pain, constipation improved, cough improved, shortness of breath improved, leg swelling improving, renan hose      Allergies   Allergen Reactions   ? Codeine Hives   ? Codeine Phosphate (Bulk)      This allergy is per [facility] records.   ? Codeine Sulfate      This allergy is per [facility] records.   ? Codeine-Butalbital-Asa-Caff      This allergy is per [facility] records.   ? Codeine-Guaifenesin      This allergy is per [facility] records.   ? Lisinopril Cough   ? Penicillins Swelling     Current Outpatient Medications   Medication Sig   ? acetaminophen (TYLENOL) 500 MG tablet Take 2 tablets (1,000 mg total) by mouth 3 (three) times a day as needed for pain. Not to exceed 4000 mg in 24 hours.   ? aspirin 81 MG EC tablet Take 81 mg by mouth daily.   ? atorvastatin (LIPITOR) 80 MG tablet TAKE 1 TABLET(80 MG) BY MOUTH AT BEDTIME   ? bumetanide (BUMEX) 2 MG  tablet TAKE 1 TABLET(2 MG) BY MOUTH TWICE DAILY AT 9 AM AND AT 6 PM   ? cholecalciferol, vitamin D3, (VITAMIN D3) 2,000 unit Tab Take 2,000 Units by mouth once daily.   ? ferrous sulfate 325 (65 FE) MG tablet TAKE 1 TABLET(325 MG) BY MOUTH TWICE DAILY (Patient taking differently: Take 1 tablet by mouth daily with breakfast. )   ? ipratropium-albuterol (COMBIVENT RESPIMAT)  mcg/actuation Mist inhaler INHALE 1 PUFF BY MOUTH FOUR TIMES DAILY (Patient taking differently: 4 (four) times a day as needed. )   ? levothyroxine (SYNTHROID, LEVOTHROID) 150 MCG tablet TAKE 1 TABLET BY MOUTH DAILY AT 6:00 AM   ? lidocaine (XYLOCAINE) 5 % ointment Apply to painful areas   ? metoprolol tartrate (LOPRESSOR) 25 MG tablet Take 0.5 tablets (12.5 mg total) by mouth 2 (two) times a day.   ? omeprazole (PRILOSEC) 20 MG capsule Take 1 capsule (20 mg total) by mouth daily.   ? polyethylene glycol (MIRALAX) 17 gram packet Take 17 g by mouth daily.   ? potassium chloride (K-DUR,KLOR-CON) 20 MEQ tablet Take 40 mEq by mouth 2 (two) times a day.    ? senna (SENOKOT) 8.6 mg tablet Take 1 tablet by mouth daily as needed for constipation.   ? white petrolatum (AQUAPHOR ORIGINAL) 41 % Oint Apply 1 application topically at bedtime.     Past Medical History:    Past Medical History:   Diagnosis Date   ? Acute kidney injury superimposed on CKD (H) 3/3/2017   ? Asthma    ? CAD (coronary artery disease)    ? Cataract    ? Chronic kidney disease     ckd3   ? Diabetes mellitus (H)    ? Disease of thyroid gland    ? H/O heart valve replacement with bioprosthetic valve    ? History of transfusion    ? Hypertension    ? Normochromic normocytic anemia    ? Pulmonary hypertension (H) 09/27/2019    Noted on echocardiogram   ? Restless leg syndrome            PHYSICAL EXAMINATION  Vitals:    02/17/20 1942   BP: 114/59   Pulse: (!) 55   Resp: 18   Temp: (!) 96  F (35.6  C)   SpO2: 97%   Weight: 203 lb (92.1 kg)       Today on physical exam:     GENERAL:  Awake, Alert, obese, oriented x3, not in any form of acute distress, answers questions appropriately, follows simple commands, conversant  HEENT: Head is normocephalic with normal hair distribution. No evidence of trauma. Ears: No acute purulent discharge. Eyes: Conjunctivae pink with no scleral jaundice. Nose: Normal mucosa and septum. NECK: Supple with no cervical or supraclavicular lymphadenopathy. Trachea is midline. [place], decreased vision  CHEST: No tenderness or deformity, no crepitus, left chest PPM  LUNG: Dim but clear today to auscultation.   Shortness of breath with exertion improving  BACK: No kyphosis of the thoracic spine. Symmetric, no curvature, ROM normal, no CVA tenderness, no spinal tenderness   CVS: irregularly irregular rhythm, murmur,  2+ pulses symmetric in all extremities.  ABDOMEN: Rounded and soft, nontender to palpation, non distended, no masses, no organomegaly, good bowel sounds, no rebound or guarding, no peritoneal signs.   EXTREMITIES: 1+ ble nonpitting edema,  Dean hose  SKIN: Warm and dry  NEUROLOGICAL: The patient is oriented to person, place and time. Strength and sensation are grossly intact. Face is symmetric.            LABS:   No results found for this or any previous visit (from the past 168 hour(s)).  Results for orders placed or performed in visit on 02/07/20   Basic Metabolic Panel   Result Value Ref Range    Sodium 135 (L) 136 - 145 mmol/L    Potassium 3.6 3.5 - 5.0 mmol/L    Chloride 84 (L) 98 - 107 mmol/L    CO2 37 (H) 22 - 31 mmol/L    Anion Gap, Calculation 14 5 - 18 mmol/L    Glucose 138 (H) 70 - 125 mg/dL    Calcium 10.0 8.5 - 10.5 mg/dL    BUN 82 (H) 8 - 28 mg/dL    Creatinine 2.07 (H) 0.60 - 1.10 mg/dL    GFR MDRD Af Amer 28 (L) >60 mL/min/1.73m2    GFR MDRD Non Af Amer 23 (L) >60 mL/min/1.73m2         Lab Results   Component Value Date    WBC 6.5 01/20/2020    HGB 11.0 (L) 01/20/2020    HCT 35.4 01/20/2020    MCV 99 01/20/2020     01/20/2020       No results  found for: [id number]  Lab Results   Component Value Date    HGBA1C 6.9 (H) 09/25/2019     Lab Results   Component Value Date    INR 1.30 (H) 02/07/2018    INR 1.51 (H) 03/06/2017    INR 1.27 (H) 03/05/2017     Vitamin D, Total (25-Hydroxy)   Date Value Ref Range Status   01/20/2020 45.9 30.0 - 80.0 ng/mL Final     Lab Results   Component Value Date    TSH 4.22 09/25/2019           ASSESSMENT/PLAN:     1 Viral URI with cough, Acute on chronic moderate persistent asthma exacerbation: Now resolved. Shortness of breath improved, cough improved. Completed prednisone. Stable. Off o2.   2. Acute on chronic CHF: Daily [phone], reports weighing over 300lbs a year or so ago. Shortness of breath continues improved,  1+ nonpitting ble edema. On bumex 2mg two times a day, keep legs elevated when possible.  F/u with cardiology prn. PT, OT for conditioning. Cardiac, low sodium diet. Daily weights. Dean short. Instructed importance of monitoring weights and salt intake at home and when to notify primary care provider.   3. BLE Cellulitis: resolved.   4. S/p PPM: follows with device clinic. No recent concerns.   5. Herpes Zoster: resolved.   6. MIGUEL on CKD stage 3: Cr 1.75 on 1/14. Cr 1.71 on 1/20. Stable. Bmp on 1/27-cr 1.91. stable. Bmp on 1/31.   7. Type 2 DM: No meds, no monitoring needed. Diet controlled. Weight loss recently. Glucose on bmp 1/20 was 112.   8. Dyslipidemia: On aspirin, atorvastatin.   9. HTN: SBP 120s, On lopressor, bumex.  hold parameters for lopressor. Stable.  10. GERD: On omeprazole. No concerns today.   11. Hypothyroid: On levothyroxine.   12. Vitamin d deficiency: On vitamin d. Vit d 45 on 1/20. Stable.   13. Hypokalemia: On kcl.  14. PAF, s/p AVR: Regular rate and rhythm today. On metoprolol. F/u with caridology. No recent palpitations or chest pain.   15. Right elbow gout exacerbation: resolved.   16. Anemia of CKD: on iron daily.. Hg  11.3 on  1/14. Hg 11 on 1/20.   17. Constipation:  scheduled miralax daily and senna daily prn. Now stable.   18. PVD, venous stasis: aquaphor two times a day. maintain dry and supple skin barrier. No open wounds on legs. No numb/tingling in legs. No leg pain. Lymphedema wrapping now completed today and renan hose in place.   19. Candidal intertrigo:  nystatin powder two times a day until resolved.      Electronically signed by: Jennifer Valdes NP       Total floor/unit time spent 35 min with 25 min spent on counseling and coordination of care. Counseling regarding discharge instructions, chf monitoring and management for home. Coordinated care with nursing, therapy,  for discharge planning, wheelchair orders, home care services, primary care and cardiology follow up       Please evaluate Thea Acosta for admission to Home Health.    Face to Face Attestation and Initial Plan of Care    The face-to-face encounter occurred on date: 2/18/20  Face to Face encounter was with: Jennifer Valdes    Please provide brief clinical summary of reason for visit and need for home care. Deconditioning after hospital course for acute on chronic asthma, acute on chronic chf, cellulitis, herpes zoster, gout    Please identify which of the following home health disciplines the patient will need AND describe the skilled services that you would like the home health agency to perform: SKILLED NURSING (RN): complex med management, PHYSICAL THERAPY: strength training and gait training, OCCUPATIONAL THERAPY: ADLs and home safety and HOME HEALTH AIDE    Homebound Status (describe the functional limitations that support this patient is confined to his/her home. Medicaid recipients are not required to be homebound.):assistive device needed:  4WW    Name of physician who will be responsible for the ongoing home health plan of care (CMS requires the referring physician to provide the specific name of the community physician instead of a  "title, such as \"PCP\"): Gamal Hdz MD    Requested Start of Care Date: Within 48 hours    Other information to assist the home health agency in developing the initial Plan of Care:    I certify that services are/were furnished while this patient was under the care of a physician and that a physician or an allowed non-physician practitioner (NPP), had a face-to-face encounter that meets the physician face-to-face encounter requirements. The encounter was in whole, or in part, related to the primary reason for home health. The patient is confined to his/her home and needs intermittent skilled nursing, physical therapy, speech-language pathology, or the continued need for occupational therapy. A plan of care has been established by a physician and is periodically reviewed by a physician.    Post Discharge Medication Reconciliation Status: discharge medications reconciled, continue medications without change           "

## 2021-06-20 NOTE — LETTER
"Letter by Asuncion Rangel CNP at      Author: Asuncion Rangel CNP Service: -- Author Type: --    Filed:  Encounter Date: 2020 Status: (Other)         Patient: Thea Acosta   MR Number: [id number]   YOB: 1934   Date of Visit: 2020       [facility]      NAME:  Thea Acosta             :  1934    MRN: [id number]    CODE STATUS:  DNR    FACILITY: [facility]SW[facility] ROOMING ACTIVITY QUESTION    CHIEF COMPLAIN/REASON FOR VISIT:  Chief Complaint   Patient presents with   ? Review Of Multiple Medical Conditions     hypothyroidism       HISTORY OF PRESENT ILLNESS: Thea Acosta is a 86 y.o. female being seen for review of multiple medical conditions. She comes to the TCU after a stay at [facility]. Per her EMR \" with a past medical history of CAD, CKD, COPD, DM, hypothyroid state, history of bioprosthetic valve replacement, HTN, severe pulmonary artery hypertension, restless leg syndrome, asthma who was admitted on 9/10/2020 for acute change in mental status. Hospital course is notable for significant improvement in her overall condition. Initially patient was found to have evidence of hypoxia and currently she requires 1 L of oxygen to maintain saturations greater than 95%.   Additionally there was concerns of lower extremity cellulitis versus venous stasis. She does have chronic lower extremity edema for which she is on Bumex. Her TSH was elevated, levothyroxine dose increased.\"  Her wt is stable, up 1 pound in the past week. We reviewed her rehab stauts, her hypothyroidism as well as need for ongoing TSH level early October. Reports feeling more energy but residual fatigue.      Allergies   Allergen Reactions   ? Tramadol Anxiety and Other (See Comments)     Per patient, Thea developed psychosis and severe anxiety and agitation \"for three days\" after receiving tramadol in the past. She stated that she would \"never\" take it again and would be extremely upset if " she receives it. She asked me to add this to her chart's allergy list specifically.    ? Codeine Hives   ? Codeine Phosphate (Bulk)      This allergy is per [facility] records.   ? Codeine Sulfate      This allergy is per [facility] records.   ? Codeine-Butalbital-Asa-Caff      This allergy is per [facility] records.   ? Codeine-Guaifenesin      This allergy is per [facility] records.   ? Lisinopril Cough   ? Penicillins Swelling   :     Current Outpatient Medications   Medication Sig   ? acetaminophen (TYLENOL) 500 MG tablet Take 2 tablets (1,000 mg total) by mouth 3 (three) times a day. (Patient taking differently: Take 1,000 mg by mouth 3 (three) times a day as needed. )   ? albuterol (PROAIR HFA;PROVENTIL HFA;VENTOLIN HFA) 90 mcg/actuation inhaler Inhale 2 puffs every 4 (four) hours as needed for wheezing.   ? aspirin 81 MG EC tablet Take 81 mg by mouth daily.   ? atorvastatin (LIPITOR) 80 MG tablet TAKE 1 TABLET(80 MG) BY MOUTH DAILY   ? bumetanide (BUMEX) 2 MG tablet Take 1 tablet (2 mg total) by mouth 2 (two) times a day at 9am and 6pm. (Patient taking differently: Take 2 mg by mouth 2 (two) times a day at 9am and 6pm. 2mg in am and 1mg at noon)   ? colchicine 0.6 mg tablet take two tablets (1.2 mg) by mouth at onset of gout symptoms, then repeat one tablet (0.6 mg) by mouth one hour later   ? diclofenac sodium (VOLTAREN) 1 % Gel Apply 2 g topically 4 (four) times a day as needed.    ? dimethicone 5 % Crea Apply 1 application topically 2 (two) times a day as needed (to buttocks as needed for skin breakdown).   ? ferrous sulfate 325 (65 FE) MG tablet Take 1 tablet by mouth daily.   ? levothyroxine (SYNTHROID, LEVOTHROID) 200 MCG tablet Take 1 tablet (200 mcg total) by mouth daily before supper.   ? metoprolol tartrate (LOPRESSOR) 25 MG tablet Take 12.5 mg by mouth 2 (two) times a  day. Hold for sbp<105  Hr <55   ? nystatin (MYCOSTATIN) powder Apply topically 4 (four) times a day.   ? omeprazole (PRILOSEC) 20 MG capsule Take 1 capsule (20 mg total) by mouth daily before breakfast.   ? ondansetron (ZOFRAN-ODT) 4 MG disintegrating tablet Take 4 mg by mouth every 6 (six) hours as needed for nausea.   ? polyethylene glycol (MIRALAX) 17 gram packet Take 17 g by mouth daily as needed.    ? potassium chloride (K-DUR,KLOR-CON) 20 MEQ tablet Take 40 mEq by mouth daily before breakfast.   ? rOPINIRole (REQUIP) 0.5 MG tablet Take 0.5 mg by mouth at bedtime.   ? senna (SENOKOT) 8.6 mg tablet Take 1 tablet by mouth daily. Hold for loose stools   ? white petrolatum (AQUAPHOR ORIGINAL) 41 % Oint Apply 1 application topically at bedtime.         REVIEW OF SYSTEMS:    Currently, no fever, chills, or rigors. Does not have any visual or hearing problems. Denies any chest pain, headaches, palpitations, lightheadedness, dizziness, shortness of breath, or cough. Appetite is good. Denies any GERD symptoms. Denies any difficulty with swallowing, nausea, or vomiting.  Denies any abdominal pain, diarrhea or constipation. Denies any urinary symptoms. No insomnia. No active bleeding. No rash.       PHYSICAL EXAMINATION:  Vitals:    09/30/20 0613   BP: 116/76   Pulse: 97   Temp: 96.9  F (36.1  C)   Weight: 207 lb (93.9 kg)         GENERAL: Awake, Alert, oriented x3, not in any form of acute distress, answers questions appropriately, follows simple commands, conversant  HEENT: Head is normocephalic with normal hair distribution. No evidence of trauma. Ears: No acute purulent discharge. Eyes: Conjunctivae pink with no scleral jaundice. Nose: Normal mucosa and septum. NECK: Supple with no cervical or supraclavicular lymphadenopathy. Trachea is midline.   EXTREMITIES: Aged  arthritic  joint swelling.Left arm with swelling but ROM UE WNL. No pedal edema  SKIN: Warm and dry, no erythema noted, no rashes or  lesions.  NEUROLOGICAL: The patient is oriented to person, place and time. Strength and sensation are grossly intact. Face is symmetric.        Social distancing and PPE utilized due to Covid 19            LABS:    Lab Results   Component Value Date    WBC 5.7 09/12/2020    HGB 9.8 (L) 09/25/2020    HCT 30.8 (L) 09/12/2020     (H) 09/12/2020     09/12/2020       Results for orders placed or performed in visit on 09/25/20   Basic Metabolic Panel   Result Value Ref Range    Sodium 137 136 - 145 mmol/L    Potassium 3.9 3.5 - 5.0 mmol/L    Chloride 92 (L) 98 - 107 mmol/L    CO2 32 (H) 22 - 31 mmol/L    Anion Gap, Calculation 13 5 - 18 mmol/L    Glucose 141 (H) 70 - 125 mg/dL    Calcium 9.9 8.5 - 10.5 mg/dL    BUN 52 (H) 8 - 28 mg/dL    Creatinine 1.68 (H) 0.60 - 1.10 mg/dL    GFR MDRD Af Amer 35 (L) >60 mL/min/1.73m2    GFR MDRD Non Af Amer 29 (L) >60 mL/min/1.73m2           Lab Results   Component Value Date    HGBA1C 6.5 (H) 07/31/2020     Vitamin D, Total (25-Hydroxy)   Date Value Ref Range Status   01/20/2020 45.9 30.0 - 80.0 ng/mL Final     Lab Results   Component Value Date    [id number] 09/12/2020       ASSESSMENT/PLAN:  1. Hypothyroidism, unspecified type    2. CHF (congestive heart failure), NYHA class I, acute, systolic (H)        1.Hypothyroidism: Med adjusted in acute care 9/11. We will look at TSH level next week and prn. Some fatigue but reports overall feeling well.    2. CHF: 02 02 at 1-2l, her wt is watched, up 1 pound in a week, no excessive edema observed.BMP on 9/25 with sliht elevation in C02 at 32, BNP on 9/10 at 167.  Continue with current POC and rehab services.    Electronically signed by:  Asuncion Rangel CNP  This progress note was completed using Dragon software and there may be grammatical errors.      45  minutes spent of which greater than 55 % was face to face communication with the patient about above plan of care including her rehab progress, med management and  Need  for compliance with meds for her overall well being.

## 2021-06-20 NOTE — LETTER
Letter by Jennifer Valdes NP at      Author: Jennifer Valdes NP Service: -- Author Type: --    Filed:  Encounter Date: 2020 Status: (Other)         Patient: Thea Acosta   MR Number: [id number]   YOB: 1934   Date of Visit: 2020                 [facility]    DATE: 2020    NAME:  Thea Acosta             :  1934  MRN: [id number]  CODE STATUS:  DNR    VISIT TYPE: Review Of Multiple Medical Conditions (total shoulder arthroplasty, anemia)     FACILITY:  [facility] [[id number]]       CHIEF COMPLAIN/REASON FOR VISIT:    Chief Complaint   Patient presents with   ? Review Of Multiple Medical Conditions     total shoulder arthroplasty, anemia               HISTORY OF PRESENT ILLNESS: Thea Acosta is a 86 y.o. female who admitted - for acute metabolic encephalopathy. This was felt to be s/t hypothyroidism and noncompliance with levothyroxine dosing. She was readmitted - for left shoulder dislocation and underwent reverse total shoulder arthroplasty on . Postop course was uncomplicated and transferred back to [facility]. She has PMH of CAD, CKD, DM, s/p PPM, diastolic CHF, pulmonary artery hypertension, mitral valve stenosis, chronic a fib no anticoagulation, s/p AVR, asthma. Prior to this she lived at home with her grand daughter.     Today Ms. Acosta is seen for review of systems for total shoulder arthroplasty and anemia. She is seen in her room sitting in wheelchair today. She says she thinks she is doing very well. Her shoulder hurts at times and she takes the sling off when she is in bed and can rest it. However she thinks most of the time it feels better being in the sling. She feels her pain is controlled. Her bowels are moving well and even moved this morning. Her appetite remains too good and no problems with  Stomach upset. Her breathing is fine and no recent concerns. She denies feeling dizzy or  "lightheaded at all recently.         REVIEW OF SYSTEMS:  PROBLEMS AND REVIEW OF SYSTEMS:   Review of Systems  Today on ROS:   Currently, no fever, chills, or rigors. Decreased vision and hearing. Denies any chest pain, headaches, palpitations, lightheadedness, dizziness, no cough, no sob. Appetite is good.  Denies any abdominal pain. No active bleeding. Positive for urinary incontinence, leg swelling, left arm and shoulder swelling, sling in place, left shoulder incision, no nausea or vomiting, constipation improved, pain controlled      Allergies   Allergen Reactions   ? Tramadol Anxiety and Other (See Comments)     Per patient, Thea developed psychosis and severe anxiety and agitation \"for three days\" after receiving tramadol in the past. She stated that she would \"never\" take it again and would be extremely upset if she receives it. She asked me to add this to her chart's allergy list specifically.    ? Codeine Hives   ? Codeine Phosphate (Bulk)      This allergy is per [facility] records.   ? Codeine Sulfate      This allergy is per [facility] records.   ? Codeine-Butalbital-Asa-Caff      This allergy is per [facility] records.   ? Codeine-Guaifenesin      This allergy is per [facility] records.   ? Lisinopril Cough   ? Penicillins Swelling     Current Outpatient Medications   Medication Sig   ? acetaminophen (TYLENOL) 500 MG tablet Take 2 tablets (1,000 mg total) by mouth 3 (three) times a day. (Patient taking differently: Take 1,000 mg by mouth 3 (three) times a day. And daily prn)   ? albuterol (PROAIR HFA;PROVENTIL HFA;VENTOLIN HFA) 90 mcg/actuation inhaler Inhale 2 puffs every 4 (four) hours as needed for wheezing.   ? aspirin 81 MG EC tablet Take 1 tablet (81 mg total) by mouth 2 (two) times a day.   ? atorvastatin (LIPITOR) 80 MG tablet TAKE 1 TABLET(80 MG) BY MOUTH DAILY "   ? bumetanide (BUMEX) 2 MG tablet Take 1.5 tablets (3 mg total) by mouth 2 (two) times a day at 9am and 6pm.   ? colchicine 0.6 mg tablet take two tablets (1.2 mg) by mouth at onset of gout symptoms, then repeat one tablet (0.6 mg) by mouth one hour later   ? diclofenac sodium (VOLTAREN) 1 % Gel Apply 2 g topically 4 (four) times a day as needed.    ? ferrous sulfate 325 (65 FE) MG tablet TAKE 1 TABLET(325 MG) BY MOUTH TWICE DAILY (Patient taking differently: Take 1 tablet by mouth 3 (three) times a day with meals. )   ? levothyroxine (SYNTHROID, LEVOTHROID) 150 MCG tablet Take 1 tablet (150 mcg total) by mouth daily before supper.   ? nystatin (MYCOSTATIN) powder Apply topically 4 (four) times a day.   ? omeprazole (PRILOSEC) 20 MG capsule Take 1 capsule (20 mg total) by mouth daily before breakfast.   ? polyethylene glycol (MIRALAX) 17 gram packet Take 17 g by mouth daily as needed.   ? potassium chloride (K-DUR,KLOR-CON) 20 MEQ tablet Take 20 mEq by mouth daily.    ? predniSONE (DELTASONE) 2.5 MG tablet Take 7.5 mg/d prednisone PO for 3 weeks.   ? senna (SENOKOT) 8.6 mg tablet Take 1 tablet by mouth daily.   ? white petrolatum (AQUAPHOR ORIGINAL) 41 % Oint Apply 1 application topically at bedtime.     Past Medical History:    Past Medical History:   Diagnosis Date   ? Acute kidney injury superimposed on CKD (H) 3/3/2017   ? Asthma    ? CAD (coronary artery disease)    ? Cataract    ? Chronic kidney disease     ckd3   ? COPD (chronic obstructive pulmonary disease) (H)    ? Diabetes mellitus (H)    ? Disease of thyroid gland    ? H/O heart valve replacement with bioprosthetic valve    ? History of transfusion    ? Hypertension    ? Normochromic normocytic anemia    ? Pulmonary hypertension (H) 09/27/2019    Noted on echocardiogram   ? Restless leg syndrome            PHYSICAL EXAMINATION  Vitals:    08/17/20 0700   BP: 138/81   Pulse: 60   Resp: 18   Temp: 97.1  F (36.2  C)   SpO2: 93%   Weight: 210 lb (95.3 kg)        Today on physical exam:     GENERAL: Awake, Alert, obese,  not in any form of acute distress, answers questions appropriately, follows simple commands, conversant  HEENT: Head is normocephalic with normal hair distribution. No evidence of trauma. Ears: No acute purulent discharge. Eyes: Conjunctivae pink with no scleral jaundice. Nose: Normal mucosa and septum. NECK: Supple with no cervical or supraclavicular lymphadenopathy. Trachea is midline. [place], decreased vision  CHEST: No tenderness or deformity, no crepitus, left chest PPM  LUNG: Dim but clear today to auscultation.   No shortness of breath noted today, no cough noted  BACK: No kyphosis of the thoracic spine. Symmetric, no curvature, ROM normal, no CVA tenderness, no spinal tenderness   CVS: irregularly irregular rhythm, murmur,  2+ pulses symmetric in all extremities.  ABDOMEN: Rounded and soft, nontender to palpation, non distended, no masses, no organomegaly, good bowel sounds, no rebound or guarding, no peritoneal signs.   EXTREMITIES: 1+ ble nonpitting edema, left upper extremity 1+ edema with lymphedema sleeve, left shoulder incision c/d/i, left arm sling in place  SKIN: Warm and dry  NEUROLOGICAL: The patient is oriented to person, place and time. Oriented today, no recent confusion, Forgetful at times.             LABS:   Recent Results (from the past 168 hour(s))   Basic Metabolic Panel   Result Value Ref Range    Sodium 141 136 - 145 mmol/L    Potassium 3.6 3.5 - 5.0 mmol/L    Chloride 98 98 - 107 mmol/L    CO2 35 (H) 22 - 31 mmol/L    Anion Gap, Calculation 8 5 - 18 mmol/L    Glucose 114 70 - 125 mg/dL    Calcium 9.2 8.5 - 10.5 mg/dL    BUN 37 (H) 8 - 28 mg/dL    Creatinine 1.47 (H) 0.60 - 1.10 mg/dL    GFR MDRD Af Amer 41 (L) >60 mL/min/1.73m2    GFR MDRD Non Af Amer 34 (L) >60 mL/min/1.73m2   Hemoglobin   Result Value Ref Range    Hemoglobin 8.2 (L) 12.0 - 16.0 g/dL   Thyroid Stimulating Hormone (TSH)   Result Value Ref Range    TSH 3.43  0.30 - 5.00 uIU/mL     Results for orders placed or performed in visit on 08/17/20   Basic Metabolic Panel   Result Value Ref Range    Sodium 141 136 - 145 mmol/L    Potassium 3.6 3.5 - 5.0 mmol/L    Chloride 98 98 - 107 mmol/L    CO2 35 (H) 22 - 31 mmol/L    Anion Gap, Calculation 8 5 - 18 mmol/L    Glucose 114 70 - 125 mg/dL    Calcium 9.2 8.5 - 10.5 mg/dL    BUN 37 (H) 8 - 28 mg/dL    Creatinine 1.47 (H) 0.60 - 1.10 mg/dL    GFR MDRD Af Amer 41 (L) >60 mL/min/1.73m2    GFR MDRD Non Af Amer 34 (L) >60 mL/min/1.73m2         Lab Results   Component Value Date    WBC 6.9 08/05/2020    HGB 8.2 (L) 08/17/2020    HCT 29.0 (L) 08/05/2020    MCV 98 08/05/2020     08/05/2020       Lab Results   Component Value Date    [id number] 333 07/21/2020     Lab Results   Component Value Date    HGBA1C 6.5 (H) 07/31/2020     Lab Results   Component Value Date    INR 0.99 07/28/2020    INR 1.30 (H) 02/07/2018    INR 1.51 (H) 03/06/2017     Vitamin D, Total (25-Hydroxy)   Date Value Ref Range Status   01/20/2020 45.9 30.0 - 80.0 ng/mL Final     Lab Results   Component Value Date    TSH 3.43 08/17/2020           ASSESSMENT/PLAN:    Left shoulder dislocation, s/p reverse total shoulder arthroplasty: Incision c/d/i. Pain controlled on tylenol.  Ice prn. PT, OT following. voltaren gel prn. F/u with surgeon Dr. Ramirez on 8/14-f/u again in 4 weeks, limit external rotation, no bathing until 4 weeks postop, healing well.  Wearing lymphedema sleeve on LUE. Overall improving. Remains NWB, may remove sling when resting.   Asthma:  Encourage cough and deep breathe. IS q1h while awake. Albuterol prn. Shortness of breath at baseline today.   Hypothyroid: on levothyroxine.   Chronic CHF: Daily [id number]. No shortness of breath recently. 1+ ble edema. On bumex 2mg two times a day, keep legs elevated when possible.  F/u with cardiology prn. Cardiac diet. Monitor for fluid overload. Dean short. Appears stable today.   S/p PPM: follows  with device clinic. No recent concerns.   CKD stage 3: Cr 1.39, gfr 36 on 8/1. Stable.   H/o Type 2 DM: No meds, no monitoring needed. Diet controlled. No recent concerns.   Dyslipidemia: On aspirin, atorvastatin.   HTN: SBP 130s, On lopressor, bumex.  hold parameters for lopressor. Stable.  GERD: On omeprazole. No concerns today.   Vitamin d deficiency: On vitamin d.  Hypokalemia: On kcl.  PAF, s/p AVR: Regular rate and rhythm today. On metoprolol. F/u with caridology. No concenrs.   Anemia of CKD: on iron. Hg 10.9 on 7/24.   Hg 8.8 on 8/1. Hg 9 on 8/5. Recheck on 8/17 8.2, will increase iron to three times a day. Discussed with her and no signs of blood loss. No symptoms with Anemia. Staff will continue to monitor for signs of blood loss and symptoms, will notify  With  Any changes. Will order recheck on 8/24.   Candidal intertrigo:  Nystatin.   Constipation: ordered senna daily, miralax daily prn.        Electronically signed by: Jennifer Valdes NP

## 2021-06-20 NOTE — LETTER
Letter by Shawna Crespo CNP at      Author: Shawna Crespo CNP Service: -- Author Type: --    Filed:  Encounter Date: 2020 Status: (Other)         Patient: Thea Acosta   MR Number: [id number]   YOB: 1934   Date of Visit: 2020                 [facility]    DATE: 8/3/2020    NAME:  Thea Acosta             :  1934  MRN: [id number]  CODE STATUS:  DNR    VISIT TYPE: Problem Visit (Pain management)     FACILITY:  [facility] [[id number]]       CHIEF COMPLAIN/REASON FOR VISIT:    Chief Complaint   Patient presents with   ? Problem Visit     Pain management               HISTORY OF PRESENT ILLNESS: Thea Acosta is a 86 y.o. female who admitted - for acute metabolic encephalopathy. This was felt to be s/t hypothyroidism and noncompliance with levothyroxine dosing. She was readmitted - for left shoulder dislocation and underwent reverse total shoulder arthroplasty on . Postop course was uncomplicated and transferred back to [facility]. She has PMH of CAD, CKD, DM, s/p PPM, diastolic CHF, pulmonary artery hypertension, mitral valve stenosis, chronic a fib no anticoagulation, s/p AVR, asthma. Prior to this she lived at home with her grand daughter.     Today Ms. Acosta is seen for left shoulder dislocation.  Thea said that she continues to have aching intermittent pain in her left shoulder rated at 4-5 out of 10.  She was never able to start diclofenac gel due to being hospitalized for altered mental status.  She was agreeable to plan to restart diclofenac gel as previously prescribed for pain management.  She denied any other concerns at this time.  The patient denied fever, chills, diaphoresis, changes in mood or appetite, sleep disturbances, nasal congestion, sore throat, lightheadedness, dizziness, breathing difficulty, chest pain, palpitations, constipation, urinary symptoms, or numbness or tingling in  "extremities.      REVIEW OF SYSTEMS:  PROBLEMS AND REVIEW OF SYSTEMS:   Review of Systems  Today on ROS:   Currently, no fever, chills, or rigors. Decreased vision and hearing. Denies any chest pain, headaches, palpitations, lightheadedness, dizziness, no cough, no sob. Appetite is good.  Denies any abdominal pain. No active bleeding. Positive for urinary incontinence, leg swelling, left arm and shoulder swelling, sling in place, left shoulder incision, no nausea or vomiting, no constipation, pain controlled      Allergies   Allergen Reactions   ? Tramadol Anxiety and Other (See Comments)     Per patient, Thea developed psychosis and severe anxiety and agitation \"for three days\" after receiving tramadol in the past. She stated that she would \"never\" take it again and would be extremely upset if she receives it. She asked me to add this to her chart's allergy list specifically.    ? Codeine Hives   ? Codeine Phosphate (Bulk)      This allergy is per [facility] records.   ? Codeine Sulfate      This allergy is per [facility] records.   ? Codeine-Butalbital-Asa-Caff      This allergy is per [facility] records.   ? Codeine-Guaifenesin      This allergy is per [facility] records.   ? Lisinopril Cough   ? Penicillins Swelling     Current Outpatient Medications   Medication Sig   ? acetaminophen (TYLENOL) 500 MG tablet Take 2 tablets (1,000 mg total) by mouth 3 (three) times a day. (Patient taking differently: Take 1,000 mg by mouth 3 (three) times a day. And daily prn)   ? albuterol (PROAIR HFA;PROVENTIL HFA;VENTOLIN HFA) 90 mcg/actuation inhaler Inhale 2 puffs every 4 (four) hours as needed for wheezing.   ? aspirin 81 MG EC tablet Take 1 tablet (81 mg total) by mouth 2 (two) times a day.   ? atorvastatin (LIPITOR) 80 MG tablet TAKE 1 TABLET(80 MG) BY MOUTH DAILY   ? bumetanide (BUMEX) 2 " MG tablet Take 1.5 tablets (3 mg total) by mouth 2 (two) times a day at 9am and 6pm.   ? colchicine 0.6 mg tablet take two tablets (1.2 mg) by mouth at onset of gout symptoms, then repeat one tablet (0.6 mg) by mouth one hour later   ? diclofenac sodium (VOLTAREN) 1 % Gel Apply 2 g topically 4 (four) times a day as needed.    ? ferrous sulfate 325 (65 FE) MG tablet TAKE 1 TABLET(325 MG) BY MOUTH TWICE DAILY (Patient taking differently: Take 1 tablet by mouth 3 (three) times a day with meals. )   ? levothyroxine (SYNTHROID, LEVOTHROID) 150 MCG tablet Take 1 tablet (150 mcg total) by mouth daily before supper.   ? nystatin (MYCOSTATIN) powder Apply topically 4 (four) times a day.   ? omeprazole (PRILOSEC) 20 MG capsule Take 1 capsule (20 mg total) by mouth daily before breakfast.   ? polyethylene glycol (MIRALAX) 17 gram packet Take 17 g by mouth daily as needed.   ? potassium chloride (K-DUR,KLOR-CON) 20 MEQ tablet Take 20 mEq by mouth daily.    ? predniSONE (DELTASONE) 2.5 MG tablet Take 7.5 mg/d prednisone PO for 3 weeks.   ? senna (SENOKOT) 8.6 mg tablet Take 1 tablet by mouth daily.   ? white petrolatum (AQUAPHOR ORIGINAL) 41 % Oint Apply 1 application topically at bedtime.     Past Medical History:    Past Medical History:   Diagnosis Date   ? Acute kidney injury superimposed on CKD (H) 3/3/2017   ? Asthma    ? CAD (coronary artery disease)    ? Cataract    ? Chronic kidney disease     ckd3   ? COPD (chronic obstructive pulmonary disease) (H)    ? Diabetes mellitus (H)    ? Disease of thyroid gland    ? H/O heart valve replacement with bioprosthetic valve    ? History of transfusion    ? Hypertension    ? Normochromic normocytic anemia    ? Pulmonary hypertension (H) 09/27/2019    Noted on echocardiogram   ? Restless leg syndrome            PHYSICAL EXAMINATION  Vitals:    07/24/20 0252   Resp: 16       Today on physical exam:     GENERAL: Awake, Alert, obese,  not in any form of acute distress, answers  questions appropriately, follows simple commands, conversant  HEENT: Head is normocephalic with normal hair distribution. No evidence of trauma. Ears: No acute purulent discharge. Eyes: Conjunctivae pink with no scleral jaundice. Nose: Normal mucosa and septum. NECK: Supple with no cervical or supraclavicular lymphadenopathy. Trachea is midline. [place], decreased vision  CHEST: No tenderness or deformity, no crepitus, left chest PPM  LUNG: Lung sounds clear in all fields   no shortness of breath noted today, no cough noted  BACK: No kyphosis of the thoracic spine. Symmetric, no curvature, ROM normal, no CVA tenderness, no spinal tenderness   CVS: irregularly irregular rhythm, murmur,  2+ pulses symmetric in all extremities.  ABDOMEN: Rounded and soft, nontender to palpation, non distended, no masses, no organomegaly, good bowel sounds, no rebound or guarding, no peritoneal signs.   EXTREMITIES: 1+ ble nonpitting edema, left upper extremity 2+ edema, left shoulder incision c/d/i, left arm sling in place  SKIN: Warm and dry  NEUROLOGICAL: The patient is oriented to person, place and time. Confused at times, oriented on exam. Forgetful at times.  Positive for visual hallucinations.            LABS:   Recent Results (from the past 168 hour(s))   Basic Metabolic Panel   Result Value Ref Range    Sodium 141 136 - 145 mmol/L    Potassium 3.6 3.5 - 5.0 mmol/L    Chloride 98 98 - 107 mmol/L    CO2 35 (H) 22 - 31 mmol/L    Anion Gap, Calculation 8 5 - 18 mmol/L    Glucose 114 70 - 125 mg/dL    Calcium 9.2 8.5 - 10.5 mg/dL    BUN 37 (H) 8 - 28 mg/dL    Creatinine 1.47 (H) 0.60 - 1.10 mg/dL    GFR MDRD Af Amer 41 (L) >60 mL/min/1.73m2    GFR MDRD Non Af Amer 34 (L) >60 mL/min/1.73m2   Hemoglobin   Result Value Ref Range    Hemoglobin 8.2 (L) 12.0 - 16.0 g/dL   Thyroid Stimulating Hormone (TSH)   Result Value Ref Range    TSH 3.43 0.30 - 5.00 uIU/mL     Results for orders placed or performed during the hospital encounter of  07/18/20   Basic metabolic panel   Result Value Ref Range    Sodium 141 136 - 145 mmol/L    Potassium 4.1 3.5 - 5.0 mmol/L    Chloride 95 (L) 98 - 107 mmol/L    CO2 34 (H) 22 - 31 mmol/L    Anion Gap, Calculation 12 5 - 18 mmol/L    Glucose 108 70 - 125 mg/dL    Calcium 9.4 8.5 - 10.5 mg/dL    BUN 49 (H) 8 - 28 mg/dL    Creatinine 1.67 (H) 0.60 - 1.10 mg/dL    GFR MDRD Af Amer 35 (L) >60 mL/min/1.73m2    GFR MDRD Non Af Amer 29 (L) >60 mL/min/1.73m2         Lab Results   Component Value Date    WBC 6.9 08/05/2020    HGB 8.2 (L) 08/17/2020    HCT 29.0 (L) 08/05/2020    MCV 98 08/05/2020     08/05/2020       Lab Results   Component Value Date    [id number] 333 07/21/2020     Lab Results   Component Value Date    HGBA1C 6.5 (H) 07/31/2020     Lab Results   Component Value Date    INR 0.99 07/28/2020    INR 1.30 (H) 02/07/2018    INR 1.51 (H) 03/06/2017     Vitamin D, Total (25-Hydroxy)   Date Value Ref Range Status   01/20/2020 45.9 30.0 - 80.0 ng/mL Final     Lab Results   Component Value Date    TSH 3.43 08/17/2020           ASSESSMENT/PLAN:    Restart diclofenac 1% gel to left shoulder 3 times daily for osteoarthritis pain.  Otherwise continue current care plan for all other chronic medical conditions, as they are stable. Encouraged patient to engage in healthy lifestyle behaviors such as engaging in social activities, exercising (PT/OT), eating well, and following care plan. Follow up for routine check-up, or sooner if needed. Will continue to monitor patient and work with nursing staff collaboratively to work toward positive patient outcomes.        Electronically signed by: Shawna Crespo CNP

## 2021-06-20 NOTE — LETTER
Letter by Jennifer Valdes NP at      Author: Jennifer Valdes NP Service: -- Author Type: --    Filed:  Encounter Date: 2020 Status: Signed         Patient: Thea Acosta   MR Number: [id number]   YOB: 1934   Date of Visit: 2020                 [facility]    DATE: 2020    NAME:  Thea Acosta             :  1934  MRN: [id number]  CODE STATUS:  FULL CODE    VISIT TYPE: Problem Visit (constipation, hypotension)     FACILITY:  [facility] [[id number]]       CHIEF COMPLAIN/REASON FOR VISIT:    Chief Complaint   Patient presents with   ? Problem Visit     constipation, hypotension               HISTORY OF PRESENT ILLNESS: Thea Acosta is a 85 y.o. female who was admitted - for dyspnea and fluid overload, acute on chronic diastolic CHF. She was treated with IV diuresis and later transitioned to PO. Then she developed MIGUEL and held bumex and metolazone. During stay she developed herpes zoster outbreak on left chest. She also had left leg laceration and treated with clindamycin. Left leg negative for DVT on doppler. She completed IV ceftezole and po keflex for cellulitis. She did have some orthostatic hypotension that required med changes. TSH stable and ACTH stim test not indicated. She did have some epigastric pain and was treated with PPI and maalox. She was recommended TCU stay for deconditioning. She has PMH of CAD, CKD, DM, s/p PPM, diastolic CHF, pulmonary artery hypertension, mitral valve stenosis, chronic a fib no anticoagulation, s/p AVR, asthma. Prior to this she lived at home with her grand daughter.     Today Ms. Acosta is seen for concerns of hypotension over weekend and constipation. She says today that she had a suppository yesterday and it did work for her but she needs some stool softeners on a regular basis. She denies any pain today or recently. She denies any recent headaches. She says her appetite is fine  but depends on the food. She denies urinary trouble recently. She is sleeping pretty well. She gets dizzy if she gets up and moves too fast, but mostly in the morning and then she is fine. She doesn't think she was more dizzy over the weekend. Her blood pressures are running 90-110s. She is trying to drink more. She denies any numbness or tingling in her legs. Her rash on upper abdomen/chest area is much improved and the scabs are starting to fall off. She says it itches at times, but just a little. She is not having any burning or pain though. Her legs are very dry and scaly and she denies that they are putting any lotion on her legs. She is not having any shortness of breath today but does get winded at times with therapy. No cough recently. Her left leg wound is healed.       REVIEW OF SYSTEMS:  PROBLEMS AND REVIEW OF SYSTEMS:   Review of Systems  Today on ROS:   Currently, no fever, chills, or rigors. Decreased vision and hearing. Denies any chest pain, headaches, palpitations, lightheadedness, dizziness. Appetite is good.  Denies any abdominal pain. No active bleeding. Positive for edema ble, shortness of breath with exertion, sleeps in recliner, low blood pressure at times, chest rash improving, urinary incontinence, right elbow swelling improved, no pain, constipation, dry flaking skin on legs      Allergies   Allergen Reactions   ? Codeine Hives   ? Codeine Phosphate (Bulk)      This allergy is per [facility] records.   ? Codeine Sulfate      This allergy is per [facility] records.   ? Codeine-Butalbital-Asa-Caff      This allergy is per [facility] records.   ? Codeine-Guaifenesin      This allergy is per [facility] records.   ? Lisinopril Cough   ? Penicillins Swelling     Current Outpatient Medications   Medication Sig   ? polyethylene glycol (MIRALAX) 17 gram packet Take 17 g  by mouth daily.   ? senna (SENOKOT) 8.6 mg tablet Take 1 tablet by mouth daily as needed for constipation.   ? white petrolatum (AQUAPHOR ORIGINAL) 41 % Oint Apply 1 application topically at bedtime.   ? acetaminophen (TYLENOL) 500 MG tablet Take 2 tablets (1,000 mg total) by mouth 3 (three) times a day as needed for pain. Not to exceed 4000 mg in 24 hours.   ? aspirin 81 MG EC tablet Take 81 mg by mouth daily.   ? atorvastatin (LIPITOR) 80 MG tablet TAKE 1 TABLET(80 MG) BY MOUTH AT BEDTIME   ? bumetanide (BUMEX) 2 MG tablet TAKE 1 TABLET(2 MG) BY MOUTH TWICE DAILY AT 9 AM AND AT 6 PM   ? cholecalciferol, vitamin D3, (VITAMIN D3) 2,000 unit Tab Take 2,000 Units by mouth once daily.   ? ferrous sulfate 325 (65 FE) MG tablet TAKE 1 TABLET(325 MG) BY MOUTH TWICE DAILY   ? ipratropium-albuterol (COMBIVENT RESPIMAT)  mcg/actuation Mist inhaler INHALE 1 PUFF BY MOUTH FOUR TIMES DAILY (Patient taking differently: 4 (four) times a day as needed. )   ? levothyroxine (SYNTHROID, LEVOTHROID) 150 MCG tablet TAKE 1 TABLET BY MOUTH DAILY AT 6:00 AM   ? lidocaine (XYLOCAINE) 5 % ointment Apply to painful areas   ? metoprolol tartrate (LOPRESSOR) 25 MG tablet Take 0.5 tablets (12.5 mg total) by mouth 2 (two) times a day.   ? omeprazole (PRILOSEC) 20 MG capsule Take 1 capsule (20 mg total) by mouth daily.   ? potassium chloride (K-DUR,KLOR-CON) 10 MEQ tablet Take 1 tablet (10 mEq total) by mouth daily.     Past Medical History:    Past Medical History:   Diagnosis Date   ? Acute kidney injury superimposed on CKD (H) 3/3/2017   ? Asthma    ? CAD (coronary artery disease)    ? Cataract    ? Chronic kidney disease     ckd3   ? Diabetes mellitus (H)    ? Disease of thyroid gland    ? H/O heart valve replacement with bioprosthetic valve    ? History of transfusion    ? Hypertension    ? Normochromic normocytic anemia    ? Pulmonary hypertension (H) 09/27/2019    Noted on echocardiogram   ? Restless leg syndrome            PHYSICAL  EXAMINATION  Vitals:    01/19/20 1944   BP: 114/68   Pulse: 65   Resp: 18   Temp: (!) 96.3  F (35.7  C)   SpO2: 100%   Weight: 192 lb (87.1 kg)       Today on physical exam:     GENERAL: Awake, Alert, obese, oriented x3, not in any form of acute distress, answers questions appropriately, follows simple commands, conversant  HEENT: Head is normocephalic with normal hair distribution. No evidence of trauma. Ears: No acute purulent discharge. Eyes: Conjunctivae pink with no scleral jaundice. Nose: Normal mucosa and septum. NECK: Supple with no cervical or supraclavicular lymphadenopathy. Trachea is midline. [place], decreased vision  CHEST: No tenderness or deformity, no crepitus, left chest PPM  LUNG: Dim to auscultation with good chest expansion. There are no crackles or wheezes, normal AP diameter. Shortness of breath with exertion, no cough today  BACK: No kyphosis of the thoracic spine. Symmetric, no curvature, ROM normal, no CVA tenderness, no spinal tenderness   CVS: irregularly irregular rhythm, murmur,  2+ pulses symmetric in all extremities.  ABDOMEN: Rounded and soft, nontender to palpation, non distended, no masses, no organomegaly, good bowel sounds, no rebound or guarding, no peritoneal signs.   EXTREMITIES: 1+ ble nonpitting edema, dry flaking and scaly skin, left leg wound healed, scabbed  SKIN: Warm and dry  NEUROLOGICAL: The patient is oriented to person, place and time. Strength and sensation are grossly intact. Face is symmetric.            LABS:   Recent Results (from the past 168 hour(s))   C-Reactive Protein(CRP)   Result Value Ref Range    CRP 0.6 0.0 - 0.8 mg/dL   HM2(CBC w/o Differential)   Result Value Ref Range    WBC 8.4 4.0 - 11.0 thou/uL    RBC 3.69 (L) 3.80 - 5.40 mill/uL    Hemoglobin 11.3 (L) 12.0 - 16.0 g/dL    Hematocrit 35.9 35.0 - 47.0 %    MCV 97 80 - 100 fL    MCH 30.6 27.0 - 34.0 pg    MCHC 31.5 (L) 32.0 - 36.0 g/dL    RDW 13.3 11.0 - 14.5 %    Platelets 248 140 - 440 thou/uL    MPV  10.6 8.5 - 12.5 fL   Basic Metabolic Panel   Result Value Ref Range    Sodium 135 (L) 136 - 145 mmol/L    Potassium 4.2 3.5 - 5.0 mmol/L    Chloride 88 (L) 98 - 107 mmol/L    CO2 35 (H) 22 - 31 mmol/L    Anion Gap, Calculation 12 5 - 18 mmol/L    Glucose 139 (H) 70 - 125 mg/dL    Calcium 9.7 8.5 - 10.5 mg/dL    BUN 66 (H) 8 - 28 mg/dL    Creatinine 1.75 (H) 0.60 - 1.10 mg/dL    GFR MDRD Af Amer 33 (L) >60 mL/min/1.73m2    GFR MDRD Non Af Amer 28 (L) >60 mL/min/1.73m2   Hepatic Profile   Result Value Ref Range    Bilirubin, Total 0.7 0.0 - 1.0 mg/dL    Bilirubin, Direct 0.3 <=0.5 mg/dL    Protein, Total 7.1 6.0 - 8.0 g/dL    Albumin 2.8 (L) 3.5 - 5.0 g/dL    Alkaline Phosphatase 57 45 - 120 U/L    AST 22 0 - 40 U/L    ALT <9 0 - 45 U/L   Basic Metabolic Panel   Result Value Ref Range    Sodium 135 (L) 136 - 145 mmol/L    Potassium 3.8 3.5 - 5.0 mmol/L    Chloride 91 (L) 98 - 107 mmol/L    CO2 33 (H) 22 - 31 mmol/L    Anion Gap, Calculation 11 5 - 18 mmol/L    Glucose 112 70 - 125 mg/dL    Calcium 9.7 8.5 - 10.5 mg/dL    BUN 48 (H) 8 - 28 mg/dL    Creatinine 1.71 (H) 0.60 - 1.10 mg/dL    GFR MDRD Af Amer 34 (L) >60 mL/min/1.73m2    GFR MDRD Non Af Amer 28 (L) >60 mL/min/1.73m2   Vitamin D, Total (25-Hydroxy)   Result Value Ref Range    Vitamin D, Total (25-Hydroxy) 45.9 30.0 - 80.0 ng/mL   HM1 (CBC with Diff)   Result Value Ref Range    WBC 6.5 4.0 - 11.0 thou/uL    RBC 3.57 (L) 3.80 - 5.40 mill/uL    Hemoglobin 11.0 (L) 12.0 - 16.0 g/dL    Hematocrit 35.4 35.0 - 47.0 %    MCV 99 80 - 100 fL    MCH 30.8 27.0 - 34.0 pg    MCHC 31.1 (L) 32.0 - 36.0 g/dL    RDW 13.6 11.0 - 14.5 %    Platelets 177 140 - 440 thou/uL    MPV 11.4 8.5 - 12.5 fL    Neutrophils % 59 50 - 70 %    Lymphocytes % 30 20 - 40 %    Monocytes % 6 2 - 10 %    Eosinophils % 4 0 - 6 %    Basophils % 1 0 - 2 %    Neutrophils Absolute 3.8 2.0 - 7.7 thou/uL    Lymphocytes Absolute 1.9 0.8 - 4.4 thou/uL    Monocytes Absolute 0.4 0.0 - 0.9 thou/uL     Eosinophils Absolute 0.3 0.0 - 0.4 thou/uL    Basophils Absolute 0.1 0.0 - 0.2 thou/uL     Results for orders placed or performed in visit on 01/20/20   Basic Metabolic Panel   Result Value Ref Range    Sodium 135 (L) 136 - 145 mmol/L    Potassium 3.8 3.5 - 5.0 mmol/L    Chloride 91 (L) 98 - 107 mmol/L    CO2 33 (H) 22 - 31 mmol/L    Anion Gap, Calculation 11 5 - 18 mmol/L    Glucose 112 70 - 125 mg/dL    Calcium 9.7 8.5 - 10.5 mg/dL    BUN 48 (H) 8 - 28 mg/dL    Creatinine 1.71 (H) 0.60 - 1.10 mg/dL    GFR MDRD Af Amer 34 (L) >60 mL/min/1.73m2    GFR MDRD Non Af Amer 28 (L) >60 mL/min/1.73m2         Lab Results   Component Value Date    WBC 6.5 01/20/2020    HGB 11.0 (L) 01/20/2020    HCT 35.4 01/20/2020    MCV 99 01/20/2020     01/20/2020       No results found for: [id number]  Lab Results   Component Value Date    HGBA1C 6.9 (H) 09/25/2019     Lab Results   Component Value Date    INR 1.30 (H) 02/07/2018    INR 1.51 (H) 03/06/2017    INR 1.27 (H) 03/05/2017     Vitamin D, Total (25-Hydroxy)   Date Value Ref Range Status   01/20/2020 45.9 30.0 - 80.0 ng/mL Final     Lab Results   Component Value Date    TSH 4.22 09/25/2019           ASSESSMENT/PLAN:     1 Acute on chronic CHF: Daily [id number], reports weighing over 300lbs a year or so ago. Shortness of breath with exertion, 1+ ble edema. On bumex 2mg two times a day, keep legs elevated when possible. Sleeps in recliner.  F/u with cardiology prn. PT, OT for conditioning. Cardiac, low sodium diet. Daily weights. Appears euvolemic today.   2. Herpes Zoster: Across upper abdomen/chest. scabbed, healing. Minimal itching, no pain, scabs now falling off. No isolation needed. Tylenol prn pain. Completed antiviral treatment. Consider adding gabapentin if develops further burning, pain, neuralgia. No concerns today.   3. S/p PPM: follows with device clinic. No recent concerns.   4. Mod persistent Asthma: On combivent four times a day prn. SOB with exertion,  no cough today. Feels stable, no recent wheezing or cough.   5. MIGUEL on CKD stage 3: Cr 1.75 on 1/14. Cr 1.71 on 1/20. Stable.   6. Type 2 DM: No meds, no monitoring needed. Diet controlled. Weight loss recently. Glucose on bmp 1/20 was 112.   7. Dyslipidemia: On aspirin, atorvastatin.   8. HTN: SBP 90-110s. On lopressor, bumex.  hold parameters for lopressor. Reports dizziness only in am when gets up too fast.   9. GERD: On omeprazole. No concerns today.   10. Hypothyroid: On levothyroxine.   11. Vitamin d deficiency: On vitamin d. Vit d 45 on 1/20. Stable.   12. Hypokalemia: On kcl.    13. PAF, s/p AVR: Regular rate and rhythm today. On metoprolol. F/u with caridology. No recent palpitations or chest pain.   14. Right elbow gout exacerbation: ice prn, tylenol prn. No pain today. No gout medications and previously declined starting. Erythema resolved today. Edema much improved.   15. Anemia of CKD: on iron two times a day. Hg  11.3 on 1/14. Hg 11 on 1/20.   16. Left leg cellulitis, left leg wound: now resolved. left leg wound now scabbed and healed. Leave open to air. edema improved. Monitor. Completed antibiotic treatment. Venous doppler negative for DVT.   17. Constipation: reports supp given yesterday. Will schedule miralax daily and add senna daily prn.   18. PVD, venous stasis: ordered aquaphor at bedtime and daily prn for dry, scaly, flaking skin to maintain dry and supple skin barrier. No open wounds on legs. No numb/tingling in legs. No leg pain.     Per therapy PT - 140ft fww SBA/CGA, t/f's SBA/CGA with extra time, OT - UB setup, doesnt wear pants, toileting CGA/Min A, showering min/mod A. Yellow tag. Lives in house with family with stairs. recommend 2 weeks.      Electronically signed by: Jennifer Valdes NP

## 2021-06-20 NOTE — LETTER
Letter by Jennifer Valdes NP at      Author: Jennifer Valdes NP Service: -- Author Type: --    Filed:  Encounter Date: 8/3/2020 Status: (Other)         Patient: Thea Acosta   MR Number: [id number]   YOB: 1934   Date of Visit: 8/3/2020                 [facility]    DATE: 8/3/2020    NAME:  Thea Acosta             :  1934  MRN: [id number]  CODE STATUS:  DNR    VISIT TYPE: Problem Visit (admit tcu, shoulder p ain)     FACILITY:  [facility] [[id number]]       CHIEF COMPLAIN/REASON FOR VISIT:    Chief Complaint   Patient presents with   ? Problem Visit     admit tcu, shoulder p ain               HISTORY OF PRESENT ILLNESS: Thea Acosta is a 86 y.o. female who admitted - for acute metabolic encephalopathy. This was felt to be s/t hypothyroidism and noncompliance with levothyroxine dosing. She was readmitted - for left shoulder dislocation and underwent reverse total shoulder arthroplasty on . Postop course was uncomplicated and transferred back to [facility]. She has PMH of CAD, CKD, DM, s/p PPM, diastolic CHF, pulmonary artery hypertension, mitral valve stenosis, chronic a fib no anticoagulation, s/p AVR, asthma. Prior to this she lived at home with her grand daughter.     Today Ms. Acosta is seen for admit to tcu and shoulder pain. She is seen in her room after just finishing with OT. She says her pain is definitely there but controlled with tylenol. She says she does not want to take anything stronger than tylenol. She does not like narcotics and they tend to make her confused and goofy. She thinks her bowels have not moved yet since surgery but does not feel constipated. She thinks her breathing is fine. She is not having urinary issues. She is not having dizziness or lightheadedness. No recent chest pain. She says she is doing well. She just wants her shoulder better so she can get out of here. She denies medication  "concerns. Nursing had reported some nausea but when asked about this she said she had nausea when she first arrived but not since. She is not having any issues today.         REVIEW OF SYSTEMS:  PROBLEMS AND REVIEW OF SYSTEMS:   Review of Systems  Today on ROS:   Currently, no fever, chills, or rigors. Decreased vision and hearing. Denies any chest pain, headaches, palpitations, lightheadedness, dizziness, no cough, no sob. Appetite is good.  Denies any abdominal pain. No active bleeding. Positive for urinary incontinence, leg swelling, left arm and shoulder swelling, sling in place, left shoulder incision, no nausea or vomiting, no constipation, pain controlled      Allergies   Allergen Reactions   ? Tramadol Anxiety and Other (See Comments)     Per patient, Thea developed psychosis and severe anxiety and agitation \"for three days\" after receiving tramadol in the past. She stated that she would \"never\" take it again and would be extremely upset if she receives it. She asked me to add this to her chart's allergy list specifically.    ? Codeine Hives   ? Codeine Phosphate (Bulk)      This allergy is per [facility] records.   ? Codeine Sulfate      This allergy is per [facility] records.   ? Codeine-Butalbital-Asa-Caff      This allergy is per [facility] records.   ? Codeine-Guaifenesin      This allergy is per [facility] records.   ? Lisinopril Cough   ? Penicillins Swelling     Current Outpatient Medications   Medication Sig   ? acetaminophen (TYLENOL) 500 MG tablet Take 2 tablets (1,000 mg total) by mouth 3 (three) times a day. (Patient taking differently: Take 1,000 mg by mouth 3 (three) times a day. And daily prn)   ? albuterol (PROAIR HFA;PROVENTIL HFA;VENTOLIN HFA) 90 mcg/actuation inhaler Inhale 2 puffs every 4 (four) hours as needed for wheezing.   ? aspirin 81 MG EC tablet " Take 1 tablet (81 mg total) by mouth 2 (two) times a day.   ? atorvastatin (LIPITOR) 80 MG tablet TAKE 1 TABLET(80 MG) BY MOUTH DAILY   ? bumetanide (BUMEX) 2 MG tablet Take 1.5 tablets (3 mg total) by mouth 2 (two) times a day at 9am and 6pm.   ? colchicine 0.6 mg tablet take two tablets (1.2 mg) by mouth at onset of gout symptoms, then repeat one tablet (0.6 mg) by mouth one hour later   ? diclofenac sodium (VOLTAREN) 1 % Gel Apply 2 g topically 4 (four) times a day as needed.    ? ferrous sulfate 325 (65 FE) MG tablet TAKE 1 TABLET(325 MG) BY MOUTH TWICE DAILY   ? levothyroxine (SYNTHROID, LEVOTHROID) 150 MCG tablet Take 1 tablet (150 mcg total) by mouth daily before supper.   ? nystatin (MYCOSTATIN) powder Apply topically 4 (four) times a day.   ? omeprazole (PRILOSEC) 20 MG capsule Take 1 capsule (20 mg total) by mouth daily before breakfast.   ? potassium chloride (K-DUR,KLOR-CON) 20 MEQ tablet Take 20 mEq by mouth daily.    ? predniSONE (DELTASONE) 2.5 MG tablet Take 7.5 mg/d prednisone PO for 3 weeks.   ? white petrolatum (AQUAPHOR ORIGINAL) 41 % Oint Apply 1 application topically at bedtime.     Past Medical History:    Past Medical History:   Diagnosis Date   ? Acute kidney injury superimposed on CKD (H) 3/3/2017   ? Asthma    ? CAD (coronary artery disease)    ? Cataract    ? Chronic kidney disease     ckd3   ? COPD (chronic obstructive pulmonary disease) (H)    ? Diabetes mellitus (H)    ? Disease of thyroid gland    ? H/O heart valve replacement with bioprosthetic valve    ? History of transfusion    ? Hypertension    ? Normochromic normocytic anemia    ? Pulmonary hypertension (H) 09/27/2019    Noted on echocardiogram   ? Restless leg syndrome            PHYSICAL EXAMINATION  Vitals:    08/03/20 0700   BP: 111/51   Pulse: 60   Resp: 18   Temp: 96.8  F (36  C)   SpO2: 91%   Weight: 206 lb (93.4 kg)       Today on physical exam:     GENERAL: Awake, Alert, obese,  not in any form of acute distress,  answers questions appropriately, follows simple commands, conversant  HEENT: Head is normocephalic with normal hair distribution. No evidence of trauma. Ears: No acute purulent discharge. Eyes: Conjunctivae pink with no scleral jaundice. Nose: Normal mucosa and septum. NECK: Supple with no cervical or supraclavicular lymphadenopathy. Trachea is midline. [place], decreased vision  CHEST: No tenderness or deformity, no crepitus, left chest PPM  LUNG: Dim but clear today to auscultation.   No shortness of breath noted today, no cough noted  BACK: No kyphosis of the thoracic spine. Symmetric, no curvature, ROM normal, no CVA tenderness, no spinal tenderness   CVS: irregularly irregular rhythm, murmur,  2+ pulses symmetric in all extremities.  ABDOMEN: Rounded and soft, nontender to palpation, non distended, no masses, no organomegaly, good bowel sounds, no rebound or guarding, no peritoneal signs.   EXTREMITIES: 1+ ble nonpitting edema, left upper extremity 2+ edema, left shoulder incision c/d/i, left arm sling in place  SKIN: Warm and dry  NEUROLOGICAL: The patient is oriented to person, place and time. Confused at times, oriented on exam. Forgetful at times.             LABS:   Recent Results (from the past 168 hour(s))   Urinalysis   Result Value Ref Range    Color, UA Yellow Colorless, Yellow, Straw, Light Yellow    Clarity, UA Clear Clear    Glucose, UA Negative Negative    Bilirubin, UA Negative Negative    Ketones, UA Negative Negative    Specific Gravity, UA 1.010 1.001 - 1.030    Blood, UA Negative Negative    pH, UA 5.5 4.5 - 8.0    Protein, UA Negative Negative mg/dL    Urobilinogen, UA <2.0 E.U./dL <2.0 E.U./dL, 2.0 E.U./dL    Nitrite, UA Negative Negative    Leukocytes, UA Trace (!) Negative    Bacteria, UA Few (!) None Seen hpf    RBC, UA 0-2 None Seen, 0-2 hpf    WBC, UA 5-10 (!) None Seen, 0-5 hpf    Squam Epithel, UA 5-10 (!) None Seen, 0-5 lpf   Culture, Urine    Specimen: Urine   Result Value Ref Range     Culture 10,000-50,000 col/ml Enterococcus faecalis (!)        Susceptibility    Enterococcus faecalis - SHARI     Ampicillin 1 Sensitive      Nitrofurantoin <=16 Sensitive      Levofloxacin 1 Sensitive      Tetracycline <=0.5 Sensitive    ECG 12 lead nursing unit performed   Result Value Ref Range    SYSTOLIC BLOOD PRESSURE 124 mmHg    DIASTOLIC BLOOD PRESSURE 60 mmHg    VENTRICULAR RATE 63 BPM    ATRIAL RATE 57 BPM    P-R INTERVAL      QRS DURATION 172 ms    Q-T INTERVAL 496 ms    QTC CALCULATION (BEZET) 507 ms    P Axis      R AXIS 106 degrees    T AXIS 261 degrees    MUSE DIAGNOSIS       Ventricular-paced rhythm with occasional Premature ventricular complexes  Abnormal ECG  When compared with ECG of 18-JUL-2020 11:23,  Electronic ventricular pacemaker has replaced Wide QRS rhythm  Confirmed by SEE ED PROVIDER NOTE FOR, ECG INTERPRETATION ([id number]),  VIV ELDER ([id number]) on 7/28/2020 10:25:45 PM     INR   Result Value Ref Range    INR 0.99 0.90 - 1.10   APTT(PTT)   Result Value Ref Range    PTT 24 24 - 37 seconds   Type and Screen   Result Value Ref Range    ABORh A POS     Antibody Screen Negative Negative   HM1 (CBC with Diff)   Result Value Ref Range    WBC 9.0 4.0 - 11.0 thou/uL    RBC 3.65 (L) 3.80 - 5.40 mill/uL    Hemoglobin 11.5 (L) 12.0 - 16.0 g/dL    Hematocrit 36.3 35.0 - 47.0 %     80 - 100 fL    MCH 31.5 27.0 - 34.0 pg    MCHC 31.7 (L) 32.0 - 36.0 g/dL    RDW 14.6 (H) 11.0 - 14.5 %    Platelets 227 140 - 440 thou/uL    MPV 10.8 8.5 - 12.5 fL    Neutrophils % 86 (H) 50 - 70 %    Lymphocytes % 8 (L) 20 - 40 %    Monocytes % 4 2 - 10 %    Eosinophils % 1 0 - 6 %    Basophils % 0 0 - 2 %    Neutrophils Absolute 7.7 2.0 - 7.7 thou/uL    Lymphocytes Absolute 0.7 (L) 0.8 - 4.4 thou/uL    Monocytes Absolute 0.3 0.0 - 0.9 thou/uL    Eosinophils Absolute 0.1 0.0 - 0.4 thou/uL    Basophils Absolute 0.0 0.0 - 0.2 thou/uL   Basic Metabolic Panel   Result Value Ref Range    Sodium 142 136 - 145 mmol/L     Potassium 3.9 3.5 - 5.0 mmol/L    Chloride 96 (L) 98 - 107 mmol/L    CO2 35 (H) 22 - 31 mmol/L    Anion Gap, Calculation 11 5 - 18 mmol/L    Glucose 152 (H) 70 - 125 mg/dL    Calcium 9.7 8.5 - 10.5 mg/dL    BUN 52 (H) 8 - 28 mg/dL    Creatinine 1.47 (H) 0.60 - 1.10 mg/dL    GFR MDRD Af Amer 41 (L) >60 mL/min/1.73m2    GFR MDRD Non Af Amer 34 (L) >60 mL/min/1.73m2   COVID-19 Virus PCR MRF    Specimen: Respiratory   Result Value Ref Range    COVID-19 VIRUS SPECIMEN SOURCE Nasopharyngeal     2019-nCOV Not Detected    Platelet Count - every other day x 3   Result Value Ref Range    Platelets 183 140 - 440 thou/uL   POCT Glucose    Specimen: Blood   Result Value Ref Range    Glucose 164 (H) 70 - 139 mg/dL   POCT Glucose    Specimen: Blood   Result Value Ref Range    Glucose 248 (H) 70 - 139 mg/dL   POCT Glucose    Specimen: Blood   Result Value Ref Range    Glucose 153 (H) 70 - 139 mg/dL   Glycosylated Hemoglobin A1C   Result Value Ref Range    Hemoglobin A1c 6.5 (H) <=5.6 %   POCT Glucose    Specimen: Blood   Result Value Ref Range    Glucose 119 70 - 139 mg/dL   POCT Glucose in PACU if patient is diabetic to assess for hypo/hyperglycemia    Specimen: Blood   Result Value Ref Range    Glucose 161 (H) 70 - 139 mg/dL   POCT Glucose    Specimen: Blood   Result Value Ref Range    Glucose 205 (H) 70 - 139 mg/dL   Hemoglobin - Daily x 2   Result Value Ref Range    Hemoglobin 8.8 (L) 12.0 - 16.0 g/dL   Basic Metabolic Panel   Result Value Ref Range    Sodium 139 136 - 145 mmol/L    Potassium 4.3 3.5 - 5.0 mmol/L    Chloride 97 (L) 98 - 107 mmol/L    CO2 36 (H) 22 - 31 mmol/L    Anion Gap, Calculation 6 5 - 18 mmol/L    Glucose 165 (H) 70 - 125 mg/dL    Calcium 8.8 8.5 - 10.5 mg/dL    BUN 47 (H) 8 - 28 mg/dL    Creatinine 1.39 (H) 0.60 - 1.10 mg/dL    GFR MDRD Af Amer 44 (L) >60 mL/min/1.73m2    GFR MDRD Non Af Amer 36 (L) >60 mL/min/1.73m2   Platelet Count - every other day x 3   Result Value Ref Range    Platelets 162 140 -  440 thou/uL   POCT Glucose    Specimen: Blood   Result Value Ref Range    Glucose 157 (H) 70 - 139 mg/dL   POCT Glucose    Specimen: Blood   Result Value Ref Range    Glucose 129 70 - 139 mg/dL     Results for orders placed or performed during the hospital encounter of 07/28/20   Basic Metabolic Panel   Result Value Ref Range    Sodium 139 136 - 145 mmol/L    Potassium 4.3 3.5 - 5.0 mmol/L    Chloride 97 (L) 98 - 107 mmol/L    CO2 36 (H) 22 - 31 mmol/L    Anion Gap, Calculation 6 5 - 18 mmol/L    Glucose 165 (H) 70 - 125 mg/dL    Calcium 8.8 8.5 - 10.5 mg/dL    BUN 47 (H) 8 - 28 mg/dL    Creatinine 1.39 (H) 0.60 - 1.10 mg/dL    GFR MDRD Af Amer 44 (L) >60 mL/min/1.73m2    GFR MDRD Non Af Amer 36 (L) >60 mL/min/1.73m2         Lab Results   Component Value Date    WBC 9.0 07/28/2020    HGB 8.8 (L) 08/01/2020    HCT 36.3 07/28/2020     07/28/2020     08/01/2020       Lab Results   Component Value Date    KUEAZERL48 333 07/21/2020     Lab Results   Component Value Date    HGBA1C 6.5 (H) 07/31/2020     Lab Results   Component Value Date    INR 0.99 07/28/2020    INR 1.30 (H) 02/07/2018    INR 1.51 (H) 03/06/2017     Vitamin D, Total (25-Hydroxy)   Date Value Ref Range Status   01/20/2020 45.9 30.0 - 80.0 ng/mL Final     Lab Results   Component Value Date    TSH 67.01 (H) 07/18/2020           ASSESSMENT/PLAN:    Left shoulder dislocation, s/p reverse total shoulder arthroplasty: Incision c/d/i. Pain controlled on tylenol. Will dc norco per her request. Ice prn. PT, OT following. voltaren changed to four times a day prn. F/u with surgeon.  Asthma:  Encourage cough and deep breathe. IS q1h while awake. Albuterol prn.   Hypothyroid: on levothyroxine.   Chronic CHF: Daily [id number]. No shortness of breath recently. 1+ ble edema. On bumex 2mg two times a day, keep legs elevated when possible.  F/u with cardiology prn. Cardiac diet. Monitor for fluid overload.   S/p PPM: follows with device clinic. No  recent concerns.   CKD stage 3: Cr 1.39, gfr 36 on 8/1. Stable.   H/o Type 2 DM: No meds, no monitoring needed. Diet controlled. No recent concerns.   Dyslipidemia: On aspirin, atorvastatin.   HTN: SBP 110s, On lopressor, bumex.  hold parameters for lopressor. Stable.  GERD: On omeprazole. No concerns today.   Vitamin d deficiency: On vitamin d.  Hypokalemia: On kcl.  PAF, s/p AVR: Regular rate and rhythm today. On metoprolol. F/u with caridology. No concenrs.   Anemia of CKD: on iron. No recent concerns. Hg 10.9 on 7/24.   Hg 8.8 on 8/1.   Candidal intertrigo:  Nystatin.        Electronically signed by: Jennifer Valdes NP

## 2021-06-20 NOTE — LETTER
"Letter by Jennifer Valdes NP at      Author: Jennifer Valdes NP Service: -- Author Type: --    Filed:  Encounter Date: 10/12/2020 Status: (Other)         Patient: Thea Acosta   MR Number: [id number]   YOB: 1934   Date of Visit: 10/12/2020                 [facility]  Video visit    Gayle Field is a 84 y.o. female who is being evaluated via a billable video visit.      The patient has been notified of following:     \"This video visit will be conducted via a call between you and your physician/provider. We have found that certain health care needs can be provided without the need for an in-person physical exam.  This service lets us provide the care you need with a video conversation.  If a prescription is necessary we can send it to the facility team.  If lab work is needed we can place an order through the facility team to have that test done at a later time.    If during the course of the call the physician/provider feels a video visit is not appropriate, you will not be charged for this service.\"     Physician/provider has received verbal consent for a Video Visit from the patient? Yes    Patient would like the video invitation sent by: me Send to : Nurse manager of [facility]    Video Start Time: 1130      DATE: 10/12/2020    NAME:  Thea Acosta             :  1934  MRN: [id number]  CODE STATUS:  DNR    VISIT TYPE: Problem Visit (loose stools)     FACILITY:  [facility] [[id number]]       CHIEF COMPLAIN/REASON FOR VISIT:    Chief Complaint   Patient presents with   ? Problem Visit     loose stools               HISTORY OF PRESENT ILLNESS: Thea Acosta is a 86 y.o. female who admitted - for acute metabolic encephalopathy. This was felt to be s/t hypothyroidism and noncompliance with levothyroxine dosing. She was readmitted - for left shoulder dislocation and underwent reverse total shoulder arthroplasty on . " "Postop course was uncomplicated and transferred back to [facility]. She has PMH of CAD, CKD, DM, s/p PPM, diastolic CHF, pulmonary artery hypertension, mitral valve stenosis, chronic a fib no anticoagulation, s/p AVR, asthma. Prior to this she lived at home with her grand daughter. She was readmitted 9/10-9/13 for altered mental status. She was treated for possible cellulitis and hypoxic respiratory failure. Her tsh was elevated so levothyroxine dose was increased. She was discharged [facility] to [facility].     Today Ms. Acosta is seen for concerns of loose stools. She was going to be discharged tomorrow as she finishes therapy today and insurance issued last day for today. She filed appeal and lost this. Her granddaughter has moved out so she says she is going to stay for another week until her son can come from [place] to stay with  Her. She has had two bouts of loose stools today. She is having some mild cramping and discomfort with this. She and nursing denied any blood in stool. She denies any nausea or stomach upset. No other concerns today. She thinks her leg swelling is the same. Her weights are noted to be down over weekend 215-204lbs.       REVIEW OF SYSTEMS:  PROBLEMS AND REVIEW OF SYSTEMS:   Review of Systems  Today on ROS:   Currently, no fever, chills, or rigors. Decreased vision and hearing. Denies any chest pain, palpitations, lightheadedness, dizziness, no cough. Appetite is good.   No active bleeding. Positive for urinary incontinence, urinary frequency, leg swelling, left shoulder incision healed, no nausea, loose stools, pain controlled, velcro leg wraps, shortness of breath withe exertion, no insomnia, no headaches       Allergies   Allergen Reactions   ? Tramadol Anxiety and Other (See Comments)     Per patient, Thea developed psychosis and severe anxiety and agitation \"for three days\" after receiving tramadol in the past. She stated that she would \"never\" take it again and would be extremely upset if " she receives it. She asked me to add this to her chart's allergy list specifically.    ? Codeine Hives   ? Codeine Phosphate (Bulk)      This allergy is per [facility] records.   ? Codeine Sulfate      This allergy is per [facility] records.   ? Codeine-Butalbital-Asa-Caff      This allergy is per [facility] records.   ? Codeine-Guaifenesin      This allergy is per [facility] records.   ? Lisinopril Cough   ? Penicillins Swelling     Current Outpatient Medications   Medication Sig   ? acetaminophen (TYLENOL) 500 MG tablet Take 2 tablets (1,000 mg total) by mouth 3 (three) times a day. (Patient taking differently: Take 1,000 mg by mouth 3 (three) times a day as needed. )   ? albuterol (PROAIR HFA;PROVENTIL HFA;VENTOLIN HFA) 90 mcg/actuation inhaler Inhale 2 puffs every 4 (four) hours as needed for wheezing.   ? aspirin 81 MG EC tablet Take 81 mg by mouth daily.   ? atorvastatin (LIPITOR) 80 MG tablet TAKE 1 TABLET(80 MG) BY MOUTH DAILY   ? bumetanide (BUMEX) 2 MG tablet Take 1 tablet (2 mg total) by mouth 2 (two) times a day at 9am and 6pm. (Patient taking differently: Take 2 mg by mouth 2 (two) times a day at 9am and 6pm. 2mg in am and 1mg at noon)   ? colchicine 0.6 mg tablet take two tablets (1.2 mg) by mouth at onset of gout symptoms, then repeat one tablet (0.6 mg) by mouth one hour later   ? diclofenac sodium (VOLTAREN) 1 % Gel Apply 2 g topically 4 (four) times a day as needed.    ? dimethicone 5 % Crea Apply 1 application topically 2 (two) times a day as needed (to buttocks as needed for skin breakdown).   ? ferrous sulfate 325 (65 FE) MG tablet Take 1 tablet by mouth daily.   ? levothyroxine (SYNTHROID, LEVOTHROID) 200 MCG tablet Take 1 tablet (200 mcg total) by mouth daily before supper.   ? metoprolol tartrate (LOPRESSOR) 25 MG tablet Take 12.5 mg by mouth 2 (two) times a day.  Hold for sbp<105  Hr <55   ? nystatin (MYCOSTATIN) powder Apply topically 4 (four) times a day.   ? omeprazole (PRILOSEC) 20 MG capsule Take 1 capsule (20 mg total) by mouth daily before breakfast.   ? ondansetron (ZOFRAN-ODT) 4 MG disintegrating tablet Take 4 mg by mouth every 6 (six) hours as needed for nausea.   ? polyethylene glycol (MIRALAX) 17 gram packet Take 17 g by mouth daily as needed.    ? potassium chloride (K-DUR,KLOR-CON) 20 MEQ tablet Take 40 mEq by mouth daily before breakfast.   ? rOPINIRole (REQUIP) 0.5 MG tablet Take 0.5 mg by mouth at bedtime.   ? senna (SENOKOT) 8.6 mg tablet Take 1 tablet by mouth daily as needed. Hold for loose stools   ? white petrolatum (AQUAPHOR ORIGINAL) 41 % Oint Apply 1 application topically at bedtime.     Past Medical History:    Past Medical History:   Diagnosis Date   ? Acute kidney injury superimposed on CKD (H) 3/3/2017   ? Asthma    ? CAD (coronary artery disease)    ? Cataract    ? Chronic kidney disease     ckd3   ? COPD (chronic obstructive pulmonary disease) (H)    ? Diabetes mellitus (H)    ? Disease of thyroid gland    ? H/O heart valve replacement with bioprosthetic valve    ? History of transfusion    ? Hypertension    ? Normochromic normocytic anemia    ? Pulmonary hypertension (H) 09/27/2019    Noted on echocardiogram   ? Restless leg syndrome            PHYSICAL EXAMINATION  Vitals:    10/12/20 0700   BP: 104/57   Pulse: (!) 59   Resp: 18   Temp: 97.6  F (36.4  C)   SpO2: 95%   Weight: 203 lb (92.1 kg)           LABS:   Recent Results (from the past 168 hour(s))   Cell Ct/Diff, Body Fluid   Result Value Ref Range    Color, Fluid Pink     Appearance, Fluid Turbid     WBC, Fluid 8,998 (H) 0 - 99 /uL    RBC, Fluid <50,000 <50,000 /ul    Neutrophil % 52 (H) <=25 %    Lymphocyte % 29 <=78 %    Monocyte % 10 <=71 %    Macrophage % 8 <=71 %    Mesothelial %      Eosinophil % 1 <=1 %    Other Cells %     Culture/Gram Stain: Body Fluid    Specimen: Fluid, Body;  Body Fluid   Result Value Ref Range    Culture No Growth     Gram Stain Result No polymorphonuclear leukocytes seen     Gram Stain Result No organisms seen    Culture, Anaerobic    Specimen: Body Fluid   Result Value Ref Range    Culture No anaerobic organisms isolated    Crystals, Body Fluid   Result Value Ref Range    Crystals, Fluid Uric Acid Crystals Seen    Basic Metabolic Panel   Result Value Ref Range    Sodium 141 136 - 145 mmol/L    Potassium 4.0 3.5 - 5.0 mmol/L    Chloride 97 (L) 98 - 107 mmol/L    CO2 36 (H) 22 - 31 mmol/L    Anion Gap, Calculation 8 5 - 18 mmol/L    Glucose 96 70 - 125 mg/dL    Calcium 9.7 8.5 - 10.5 mg/dL    BUN 41 (H) 8 - 28 mg/dL    Creatinine 1.53 (H) 0.60 - 1.10 mg/dL    GFR MDRD Af Amer 39 (L) >60 mL/min/1.73m2    GFR MDRD Non Af Amer 32 (L) >60 mL/min/1.73m2     Results for orders placed or performed in visit on 10/12/20   Basic Metabolic Panel   Result Value Ref Range    Sodium 141 136 - 145 mmol/L    Potassium 4.0 3.5 - 5.0 mmol/L    Chloride 97 (L) 98 - 107 mmol/L    CO2 36 (H) 22 - 31 mmol/L    Anion Gap, Calculation 8 5 - 18 mmol/L    Glucose 96 70 - 125 mg/dL    Calcium 9.7 8.5 - 10.5 mg/dL    BUN 41 (H) 8 - 28 mg/dL    Creatinine 1.53 (H) 0.60 - 1.10 mg/dL    GFR MDRD Af Amer 39 (L) >60 mL/min/1.73m2    GFR MDRD Non Af Amer 32 (L) >60 mL/min/1.73m2         Lab Results   Component Value Date    WBC 5.7 09/12/2020    HGB 9.8 (L) 09/25/2020    HCT 30.8 (L) 09/12/2020     (H) 09/12/2020     09/12/2020       Lab Results   Component Value Date    [id number] 09/12/2020     Lab Results   Component Value Date    HGBA1C 6.5 (H) 07/31/2020     Lab Results   Component Value Date    INR 1.11 (H) 09/10/2020    INR 0.99 07/28/2020    INR 1.30 (H) 02/07/2018     Vitamin D, Total (25-Hydroxy)   Date Value Ref Range Status   01/20/2020 45.9 30.0 - 80.0 ng/mL Final     Lab Results   Component Value Date    TSH 9.38 (H) 09/11/2020           ASSESSMENT/PLAN:    Acute on  Chronic CHF: Daily [phone] then [phone], then dropped to [phone]. On metolazone burst and weights improving. improved shortness of breath and edema. On bumex, encourage leg elevation. Cardiac diet. velcro leg wraps.  notify for weight change for 2 lbs in 24 hours or 5lbs in 7 days.  Bmp on 10/12-reviewed and stable today, potassium 4.0. no changes today. Stable.   S/p reverse total shoulder arthroplasty: Incision healed.  PT, OT following. F/u with surgeon Dr. Ramirez on 8/14-f/u again in 4 weeks, limit external rotation, no bathing until 4 weeks postop, healing well, f/u again on 9/4-doing well, dc sling, AROM and PROM with therapy, last f/u 10/5-doing well, no concerns. Weight bear with walker, pain improving. Tylenol prn. No recent concerns.   S/p PPM: follows with device clinic. No recent concerns.   Asthma:  Encourage cough and deep breathe. Albuterol prn. Stable today.   Hypothyroid: on levothyroxine. tsh 9.38 on 9/11, levothyroxine dose adjusted. recheck tsh in 6 weeks-discharging later this week. Will recommend this at pcp follow up.   CKD stage 3: Cr 1.39, gfr 36 on 8/1.  9/4 cr 1.9, gfr 25. Bmp 9/18-cr 2.05 reduced bumex, 1.98 on 9/21. Cr 1.68 on 9/25 stable. Cr 1.53 on 10/12. Stable.   H/o Type 2 DM: No meds, no monitoring needed. Diet controlled. Encouraged to monitor carb intake.   Dyslipidemia: On atorvastatin.   HTN: SBP 100s, On bumex, metoprolol. Stable recently.   GERD: On omeprazole. No concerns today.   Vitamin d deficiency: On vitamin d.  Hypokalemia: On kcl. Stable.   PAF, s/p AVR: Regular rate and rhythm recently. No recent a fib rvr. Controlled off metoprolol. F/u with caridology. On aspirin daily.   Anemia of CKD: on iron. Hg 10.9 on 7/24.   Hg 8.8 on 8/1. Hg 9 on 8/5. Recheck on 8/17 8.2, iron to three times a day. recheck on 8/24-hg 8.4. hg 9.7 on 9/4, hg 9.5 on 9/12. Hg on 9/25-no change 9.8. stable.   Candidal intertrigo:  Nystatin.    Constipation: senna daily, miralax daily prn. Now having loose stools, change senna to daily prn. Hold today. Send stool for c diff. Start lactobacillus 2 tabs daily x 7 days.   Restless legs, insomnia: requip at bedtime. No concerns today.   Nausea:  zofran prn.     Therapy PT: trsf mod A, walking 10-40ft with 4WW CGA, had a good day yesterday      OT: min A UB drsg, max A toileting, LB dressing max assist, . Feeding: mod. Not elevating feet during the day and LE edema is increasing, hand and elbow swollen   Yellow tag. Lives with granddaughter. She will need 24 hour supervision and assist with all cares, home PT/OT. LCD 10/12. New ramp in at home. Filed appeal, awaiting results.     Video-Visit Details    Type of service:  Video Visit    Video End Time (time video stopped): 1155    Originating Location (pt. Location):[facility] [[id number]]    Distant Location (provider location):  [facility]     Mode of Communication:  Google Duo      Electronically signed by: Jennifer Valdes NP

## 2021-06-20 NOTE — LETTER
Letter by Jennifer Valdes NP at      Author: Jennifer Valdes NP Service: -- Author Type: --    Filed:  Encounter Date: 2020 Status: (Other)         Patient: Thea Acosta   MR Number: [id number]   YOB: 1934   Date of Visit: 2020                 [facility]    DATE: 2020    NAME:  Thea Acosta             :  1934  MRN: [id number]  CODE STATUS:  DNR    VISIT TYPE: Problem Visit (wheezing, cough)     FACILITY:  [facility] [[id number]]       CHIEF COMPLAIN/REASON FOR VISIT:    Chief Complaint   Patient presents with   ? Problem Visit     wheezing, cough               HISTORY OF PRESENT ILLNESS: Thea Acosta is a 85 y.o. female who was admitted - for dyspnea and fluid overload, acute on chronic diastolic CHF. She was treated with IV diuresis and later transitioned to PO. Then she developed MIGUEL and held bumex and metolazone. During stay she developed herpes zoster outbreak on left chest. She also had left leg laceration and treated with clindamycin. Left leg negative for DVT on doppler. She completed IV ceftezole and po keflex for cellulitis. She did have some orthostatic hypotension that required med changes. TSH stable and ACTH stim test not indicated. She did have some epigastric pain and was treated with PPI and maalox. She was recommended TCU stay for deconditioning. She has PMH of CAD, CKD, DM, s/p PPM, diastolic CHF, pulmonary artery hypertension, mitral valve stenosis, chronic a fib no anticoagulation, s/p AVR, asthma. Prior to this she lived at home with her grand daughter.     Today Ms. Acosta is seen for concerns of persistent cough and wheezing. Nursing staff also reported overnight she was hypoxic and only was 79%. They did not put oxygen on her but did have her sit up and later gave her a neb treatment. This morning nursing checked her again she was up to low 90s, 90-92%. She is not on oxygen now. She has not  complained of more shortness of breath but staff can tell that she is. She is seen today in her room by herself. She says she does still have wheezing and congestion. She is doing the nebs and thinks they help some. She doesn't think she has been weighed yet today. She thinks her legs are still swollen but the redness is better. She denies fever or chills. No muscle aches.       REVIEW OF SYSTEMS:  PROBLEMS AND REVIEW OF SYSTEMS:   Review of Systems  Today on ROS:   Currently, no fever, chills, or rigors. Decreased vision and hearing. Denies any chest pain, headaches, palpitations, lightheadedness, dizziness. Appetite is good.  Denies any abdominal pain. No active bleeding. Positive for sleeps in recliner, low blood pressure at times, chest rash nearly resolved, urinary incontinence, no pain, constipation improved, dry flaking skin on legs, weight gain, congested cough, wheezing, worsening shortness of breath, hypoxia, worsening edema, redness and warmth on both legs improved today      Allergies   Allergen Reactions   ? Codeine Hives   ? Codeine Phosphate (Bulk)      This allergy is per [facility] records.   ? Codeine Sulfate      This allergy is per [facility] records.   ? Codeine-Butalbital-Asa-Caff      This allergy is per [facility] records.   ? Codeine-Guaifenesin      This allergy is per [facility] records.   ? Lisinopril Cough   ? Penicillins Swelling     Current Outpatient Medications   Medication Sig   ? acetaminophen (TYLENOL) 500 MG tablet Take 2 tablets (1,000 mg total) by mouth 3 (three) times a day as needed for pain. Not to exceed 4000 mg in 24 hours.   ? aspirin 81 MG EC tablet Take 81 mg by mouth daily.   ? atorvastatin (LIPITOR) 80 MG tablet TAKE 1 TABLET(80 MG) BY MOUTH AT BEDTIME   ? bacitracin 500 unit/gram ointment Apply topically 2 (two) times a day.   ?  bumetanide (BUMEX) 2 MG tablet TAKE 1 TABLET(2 MG) BY MOUTH TWICE DAILY AT 9 AM AND AT 6 PM   ? cholecalciferol, vitamin D3, (VITAMIN D3) 2,000 unit Tab Take 2,000 Units by mouth once daily.   ? ferrous sulfate 325 (65 FE) MG tablet TAKE 1 TABLET(325 MG) BY MOUTH TWICE DAILY   ? ipratropium-albuterol (COMBIVENT RESPIMAT)  mcg/actuation Mist inhaler INHALE 1 PUFF BY MOUTH FOUR TIMES DAILY (Patient taking differently: 4 (four) times a day as needed. )   ? levothyroxine (SYNTHROID, LEVOTHROID) 150 MCG tablet TAKE 1 TABLET BY MOUTH DAILY AT 6:00 AM   ? lidocaine (XYLOCAINE) 5 % ointment Apply to painful areas   ? metoprolol tartrate (LOPRESSOR) 25 MG tablet Take 0.5 tablets (12.5 mg total) by mouth 2 (two) times a day.   ? omeprazole (PRILOSEC) 20 MG capsule Take 1 capsule (20 mg total) by mouth daily.   ? polyethylene glycol (MIRALAX) 17 gram packet Take 17 g by mouth daily.   ? potassium chloride (K-DUR,KLOR-CON) 20 MEQ tablet Take 40 mEq by mouth 2 (two) times a day.    ? senna (SENOKOT) 8.6 mg tablet Take 1 tablet by mouth daily as needed for constipation.   ? white petrolatum (AQUAPHOR ORIGINAL) 41 % Oint Apply 1 application topically at bedtime.     Past Medical History:    Past Medical History:   Diagnosis Date   ? Acute kidney injury superimposed on CKD (H) 3/3/2017   ? Asthma    ? CAD (coronary artery disease)    ? Cataract    ? Chronic kidney disease     ckd3   ? Diabetes mellitus (H)    ? Disease of thyroid gland    ? H/O heart valve replacement with bioprosthetic valve    ? History of transfusion    ? Hypertension    ? Normochromic normocytic anemia    ? Pulmonary hypertension (H) 09/27/2019    Noted on echocardiogram   ? Restless leg syndrome            PHYSICAL EXAMINATION  Vitals:    01/31/20 0700   BP: 103/62   Pulse: (!) 57   Resp: 18   Temp: 96.9  F (36.1  C)   SpO2: 94%   Weight: 198 lb (89.8 kg)       Today on physical exam:     GENERAL: Awake, Alert, obese, oriented x3, not in any form of  acute distress, answers questions appropriately, follows simple commands, conversant  HEENT: Head is normocephalic with normal hair distribution. No evidence of trauma. Ears: No acute purulent discharge. Eyes: Conjunctivae pink with no scleral jaundice. Nose: Normal mucosa and septum. NECK: Supple with no cervical or supraclavicular lymphadenopathy. Trachea is midline. [place], decreased vision  CHEST: No tenderness or deformity, no crepitus, left chest PPM  LUNG: Dim with frequent and diffuse coarse crackles, expiratory and inspiratory wheezing to auscultation.   Shortness of breath with exertion and at rest, harsh, congested nonproductive cough  BACK: No kyphosis of the thoracic spine. Symmetric, no curvature, ROM normal, no CVA tenderness, no spinal tenderness   CVS: irregularly irregular rhythm, murmur,  2+ pulses symmetric in all extremities.  ABDOMEN: Rounded and soft, nontender to palpation, non distended, no masses, no organomegaly, good bowel sounds, no rebound or guarding, no peritoneal signs.   EXTREMITIES: 3+ ble pitting edema, dry flaking and scaly skin, left leg wound healed, scabbed, legs are taut today, ace wraps. improved erythema on lower legs extends ankles to knees, warm to touch  SKIN: Warm and dry  NEUROLOGICAL: The patient is oriented to person, place and time. Strength and sensation are grossly intact. Face is symmetric.            LABS:   Recent Results (from the past 168 hour(s))   Basic Metabolic Panel   Result Value Ref Range    Sodium 131 (L) 136 - 145 mmol/L    Potassium 3.3 (L) 3.5 - 5.0 mmol/L    Chloride 88 (L) 98 - 107 mmol/L    CO2 32 (H) 22 - 31 mmol/L    Anion Gap, Calculation 11 5 - 18 mmol/L    Glucose 105 70 - 125 mg/dL    Calcium 9.7 8.5 - 10.5 mg/dL    BUN 55 (H) 8 - 28 mg/dL    Creatinine 1.91 (H) 0.60 - 1.10 mg/dL    GFR MDRD Af Amer 30 (L) >60 mL/min/1.73m2    GFR MDRD Non Af Amer 25 (L) >60 mL/min/1.73m2   Basic Metabolic Panel   Result Value Ref Range    Sodium 134 (L) 136  - 145 mmol/L    Potassium 2.9 (L) 3.5 - 5.0 mmol/L    Chloride 84 (L) 98 - 107 mmol/L    CO2 37 (H) 22 - 31 mmol/L    Anion Gap, Calculation 13 5 - 18 mmol/L    Glucose 103 70 - 125 mg/dL    Calcium 9.8 8.5 - 10.5 mg/dL    BUN 56 (H) 8 - 28 mg/dL    Creatinine 1.75 (H) 0.60 - 1.10 mg/dL    GFR MDRD Af Amer 33 (L) >60 mL/min/1.73m2    GFR MDRD Non Af Amer 28 (L) >60 mL/min/1.73m2     Results for orders placed or performed in visit on 01/31/20   Basic Metabolic Panel   Result Value Ref Range    Sodium 134 (L) 136 - 145 mmol/L    Potassium 2.9 (L) 3.5 - 5.0 mmol/L    Chloride 84 (L) 98 - 107 mmol/L    CO2 37 (H) 22 - 31 mmol/L    Anion Gap, Calculation 13 5 - 18 mmol/L    Glucose 103 70 - 125 mg/dL    Calcium 9.8 8.5 - 10.5 mg/dL    BUN 56 (H) 8 - 28 mg/dL    Creatinine 1.75 (H) 0.60 - 1.10 mg/dL    GFR MDRD Af Amer 33 (L) >60 mL/min/1.73m2    GFR MDRD Non Af Amer 28 (L) >60 mL/min/1.73m2         Lab Results   Component Value Date    WBC 6.5 01/20/2020    HGB 11.0 (L) 01/20/2020    HCT 35.4 01/20/2020    MCV 99 01/20/2020     01/20/2020       No results found for: [id number]  Lab Results   Component Value Date    HGBA1C 6.9 (H) 09/25/2019     Lab Results   Component Value Date    INR 1.30 (H) 02/07/2018    INR 1.51 (H) 03/06/2017    INR 1.27 (H) 03/05/2017     Vitamin D, Total (25-Hydroxy)   Date Value Ref Range Status   01/20/2020 45.9 30.0 - 80.0 ng/mL Final     Lab Results   Component Value Date    TSH 4.22 09/25/2019           ASSESSMENT/PLAN:     1 Viral URI with cough, Acute on chronic moderate persistent asthma exacerbation: congestion, wheezing, worsening sob. No fever, No sore throat, no further rhinorrhea. On duonebs four times a day and q4h prn, mucinex bid. Encourage rest, limit fluids due to chf. Notify if develops fever and will order chest xray. o2 sats in low 90s, if drops <88-90% will need o2 supplementation. Concern for acute CHF.  IS a1h while awake. Ordered o2 prn, not used during the night.  Was low and needs to be used if hypoxic. Discussed with nursing today. Will add short prednisone burst. Prednisone 20mg daily x 3 days.   2. Acute on chronic CHF: Daily [phone], reports weighing over 300lbs a year or so ago. Shortness of breath continues to worsen, now 3+ pitting edema. On bumex 2mg two times a day, keep legs elevated when possible. Sleeps in recliner.  F/u with cardiology prn. PT, OT for conditioning. Cardiac, low sodium diet. Daily weights. Ace wrap legs, elevate as much as possible. metolazone 5mg daily x 5 more days. Bmp on 2/3. Lymphedema wrapping consult to OT-not started, discussed with OT today.   3. BLE Cellulitis: erythema, warmth ble, suspect from significant fluid overload and worsening edema. No open areas at this time. Needs to elevate (educated today), reports recliner is uncomfortable. consult OT for lymphedema wrapping. aquaphor two times a day until start lymph wrapping. Ace wraps in mean time. Further diurese. on keflex two times a day x 7 days,  Noted pcn allergy but previously tolerated keflex. Improved erythema today.   4. S/p PPM: follows with device clinic. No recent concerns.   5. Herpes Zoster: Across upper abdomen/chest. scabbed, healing. Minimal itching, no pain, scabs now falling off. No isolation needed. Tylenol prn pain. Completed antiviral treatment. Much improved.   6. MIGUEL on CKD stage 3: Cr 1.75 on 1/14. Cr 1.71 on 1/20. Stable. Bmp on 1/27-cr 1.91. stable. Bmp on 1/31.   7. Type 2 DM: No meds, no monitoring needed. Diet controlled. Weight loss recently. Glucose on bmp 1/20 was 112.   8. Dyslipidemia: On aspirin, atorvastatin.   9. HTN: SBP baseline 110s, On lopressor, bumex.  hold parameters for lopressor.  10. GERD: On omeprazole. No concerns today.   11. Hypothyroid: On levothyroxine.   12. Vitamin d deficiency: On vitamin d. Vit d 45 on 1/20. Stable.   13. Hypokalemia: On kcl.critical k 2.9 today, give 40kcl q8 x 4 doses then increase  to 40 two times a day. Bmp on 2/3.   14. PAF, s/p AVR: Regular rate and rhythm today. On metoprolol. F/u with caridology. No recent palpitations or chest pain.   15. Right elbow gout exacerbation: resolved.   16. Anemia of CKD: on iron two times a day. Hg  11.3 on 1/14. Hg 11 on 1/20.   17. Constipation:  scheduled miralax daily and senna daily prn. Now stable.   18. PVD, venous stasis: aquaphor at bedtime and daily prn for dry, scaly, flaking skin to maintain dry and supple skin barrier. No open wounds on legs. No numb/tingling in legs. No leg pain. Ace wraps. Elevate. Increased aquaphor to two times a day until lymph wrapping.      Per therapy PT - 140ft fww SBA/CGA, t/f's SBA/CGA with extra time, high fall risk (17/28 Octavio), OT - UB setup, doesnt wear pants, toileting CGA/Min A, showering min/mod A. Yellow tag. Lives in house with family. Recommend 1-2 weeks.      Electronically signed by: Jennifer Valdes NP

## 2021-06-20 NOTE — LETTER
Letter by Jennifer Valdes NP at      Author: Jennifer Valdes NP Service: -- Author Type: --    Filed:  Encounter Date: 2020 Status: (Other)         Patient: Thea Acosta   MR Number: [id number]   YOB: 1934   Date of Visit: 2020                 [facility]    DATE: 2020    NAME:  Thea Acosta             :  1934  MRN: [id number]  CODE STATUS:  DNR    VISIT TYPE: Problem Visit (dysuria)     FACILITY:  [facility] [[id number]]       CHIEF COMPLAIN/REASON FOR VISIT:    Chief Complaint   Patient presents with   ? Problem Visit     dysuria               HISTORY OF PRESENT ILLNESS: Thea Acosta is a 86 y.o. female who admitted - for acute metabolic encephalopathy. This was felt to be s/t hypothyroidism and noncompliance with levothyroxine dosing. She was readmitted - for left shoulder dislocation and underwent reverse total shoulder arthroplasty on . Postop course was uncomplicated and transferred back to [facility]. She has PMH of CAD, CKD, DM, s/p PPM, diastolic CHF, pulmonary artery hypertension, mitral valve stenosis, chronic a fib no anticoagulation, s/p AVR, asthma. Prior to this she lived at home with her grand daughter.     Today Ms. Acosta is seen for concerns of dysuria today. Staff report she has been having increased urinary frequency, hesitancy, urgency and dysuria. They were requesting UA on her today. She is seen in her room today sitting in recliner. Her legs are dependent position and noted to be more red and warm above her lymphedema wraps today. Her leg swelling is improved a little more today. She does not feel short of breath right now and thinks this has been improving. She is not noted to be wearing any oxygen. She says her bowels are moving fine. She is having frequency and just feels like she has to go to the bathroom all the time. She denies pain or burning with urination but says she does  "have some accidents because she is needing to go so urgently. She denies any fevers or chills. We did discuss she is on several diuretics right now that would make her go more frequently  And urgently so this may be the cause of her urinary concerns. However we will check a UA/UC today due to having multiple new or worsening symptoms. We also discussed her red and warm legs today but she denies pain in them and they were not tender to touch. There are no noted blisters or open areas. She says they ache when she walks but this is not new. She denies feeling like her legs are warm or on fire. We discussed to monitor her legs closely for further signs of cellulitis due to her recent CHF exacerbation and lymphedema worsening. Discussed with nursing as well to monitor her legs closely and update me if they become more warm, red, swollen, develop open areas, or become more painful.       REVIEW OF SYSTEMS:  PROBLEMS AND REVIEW OF SYSTEMS:   Review of Systems  Today on ROS:   Currently, no fever, chills, or rigors. Decreased vision and hearing. Denies any chest pain, headaches, palpitations, lightheadedness, dizziness, no cough. Appetite is good.  Denies any abdominal pain. No active bleeding. Positive for worsening urinary incontinence, urinary frequency, hesitancy, urgency, leg swelling, left arm swelling improved today, left shoulder incision, no nausea or vomiting, no constipation, pain controlled, lymphedema wraps, shortness of breath improved      Allergies   Allergen Reactions   ? Tramadol Anxiety and Other (See Comments)     Per patient, Thea developed psychosis and severe anxiety and agitation \"for three days\" after receiving tramadol in the past. She stated that she would \"never\" take it again and would be extremely upset if she receives it. She asked me to add this to her chart's allergy list specifically.    ? Codeine Hives   ? Codeine Phosphate (Bulk)      This allergy is per [facility] of [place] records.   ? Codeine Sulfate      This allergy is per [facility] [place] records.   ? Codeine-Butalbital-Asa-Caff      This allergy is per [facility] [place] records.   ? Codeine-Guaifenesin      This allergy is per [facility] [place] records.   ? Lisinopril Cough   ? Penicillins Swelling     Current Outpatient Medications   Medication Sig   ? acetaminophen (TYLENOL) 500 MG tablet Take 2 tablets (1,000 mg total) by mouth 3 (three) times a day. (Patient taking differently: Take 1,000 mg by mouth 3 (three) times a day. And daily prn)   ? albuterol (PROAIR HFA;PROVENTIL HFA;VENTOLIN HFA) 90 mcg/actuation inhaler Inhale 2 puffs every 4 (four) hours as needed for wheezing.   ? aspirin 81 MG EC tablet Take 1 tablet (81 mg total) by mouth 2 (two) times a day.   ? atorvastatin (LIPITOR) 80 MG tablet TAKE 1 TABLET(80 MG) BY MOUTH DAILY   ? bumetanide (BUMEX) 2 MG tablet Take 1.5 tablets (3 mg total) by mouth 2 (two) times a day at 9am and 6pm. (Patient taking differently: Take 4 mg by mouth 2 (two) times a day at 9am and 6pm. )   ? colchicine 0.6 mg tablet take two tablets (1.2 mg) by mouth at onset of gout symptoms, then repeat one tablet (0.6 mg) by mouth one hour later   ? diclofenac sodium (VOLTAREN) 1 % Gel Apply 2 g topically 4 (four) times a day as needed.    ? ferrous sulfate 325 (65 FE) MG tablet Take 1 tablet by mouth daily.   ? levothyroxine (SYNTHROID, LEVOTHROID) 150 MCG tablet Take 1 tablet (150 mcg total) by mouth daily before supper.   ? nystatin (MYCOSTATIN) powder Apply topically 4 (four) times a day.   ? omeprazole (PRILOSEC) 20 MG capsule Take 1 capsule (20 mg total) by mouth daily before breakfast.   ? polyethylene glycol (MIRALAX) 17 gram packet Take 17 g by mouth daily as needed.   ? potassium chloride (K-DUR,KLOR-CON) 20 MEQ tablet Take by mouth. (40meq Q AM and 20meq Q PM)   ? predniSONE (DELTASONE) 2.5 MG tablet  Take 7.5 mg/d prednisone PO for 3 weeks.   ? senna (SENOKOT) 8.6 mg tablet Take 1 tablet by mouth daily.   ? white petrolatum (AQUAPHOR ORIGINAL) 41 % Oint Apply 1 application topically at bedtime.     Past Medical History:    Past Medical History:   Diagnosis Date   ? Acute kidney injury superimposed on CKD (H) 3/3/2017   ? Asthma    ? CAD (coronary artery disease)    ? Cataract    ? Chronic kidney disease     ckd3   ? COPD (chronic obstructive pulmonary disease) (H)    ? Diabetes mellitus (H)    ? Disease of thyroid gland    ? H/O heart valve replacement with bioprosthetic valve    ? History of transfusion    ? Hypertension    ? Normochromic normocytic anemia    ? Pulmonary hypertension (H) 09/27/2019    Noted on echocardiogram   ? Restless leg syndrome            PHYSICAL EXAMINATION  Vitals:    09/09/20 0700   BP: 106/57   Pulse: (!) 59   Resp: 18   Temp: 98  F (36.7  C)   SpO2: 95%   Weight: (!) 230 lb (104.3 kg)       Today on physical exam:     GENERAL: lethargic, obese,  not in any form of acute distress, answers questions appropriately, follows simple commands, conversant  HEENT: Head is normocephalic with normal hair distribution. No evidence of trauma. Ears: No acute purulent discharge. Eyes: Conjunctivae pink with no scleral jaundice. Nose: Normal mucosa and septum. NECK: Supple with no cervical or supraclavicular lymphadenopathy. Trachea is midline. [place], decreased vision  CHEST: No tenderness or deformity, no crepitus, left chest PPM  LUNG: Dim but clear today to auscultation.   Shortness of breath with exertion, no cough noted  BACK: No kyphosis of the thoracic spine. Symmetric, no curvature, ROM normal, no CVA tenderness, no spinal tenderness   CVS: irregularly irregular rhythm, murmur,  2+ pulses symmetric in all extremities.  ABDOMEN: Rounded and soft, nontender to palpation, non distended, no masses, no organomegaly, good bowel sounds, no rebound or guarding, no peritoneal signs.   EXTREMITIES: 2+  ble pitting edema, lymphedema wrapping, left upper extremity 1+ nonpitting edema no sleeve in place today, left shoulder incision c/d/i, no sling, ble erythema, some warmth noted today, nontender to palpation  SKIN: Warm and dry  NEUROLOGICAL: The patient is oriented to person, place and time. Oriented today but a little lethargic, no recent confusion, Forgetful at times.             LABS:   Recent Results (from the past 168 hour(s))   Basic Metabolic Panel   Result Value Ref Range    Sodium 141 136 - 145 mmol/L    Potassium 3.4 (L) 3.5 - 5.0 mmol/L    Chloride 92 (L) 98 - 107 mmol/L    CO2 37 (H) 22 - 31 mmol/L    Anion Gap, Calculation 12 5 - 18 mmol/L    Glucose 98 70 - 125 mg/dL    Calcium 10.5 8.5 - 10.5 mg/dL    BUN 42 (H) 8 - 28 mg/dL    Creatinine 1.90 (H) 0.60 - 1.10 mg/dL    GFR MDRD Af Amer 30 (L) >60 mL/min/1.73m2    GFR MDRD Non Af Amer 25 (L) >60 mL/min/1.73m2   Hemoglobin   Result Value Ref Range    Hemoglobin 9.7 (L) 12.0 - 16.0 g/dL   Basic Metabolic Panel   Result Value Ref Range    Sodium 141 136 - 145 mmol/L    Potassium 3.3 (L) 3.5 - 5.0 mmol/L    Chloride 85 (L) 98 - 107 mmol/L    CO2 42 (HH) 22 - 31 mmol/L    Anion Gap, Calculation 14 5 - 18 mmol/L    Glucose 85 70 - 125 mg/dL    Calcium 10.3 8.5 - 10.5 mg/dL    BUN 45 (H) 8 - 28 mg/dL    Creatinine 1.76 (H) 0.60 - 1.10 mg/dL    GFR MDRD Af Amer 33 (L) >60 mL/min/1.73m2    GFR MDRD Non Af Amer 27 (L) >60 mL/min/1.73m2   Hemoglobin   Result Value Ref Range    Hemoglobin 10.2 (L) 12.0 - 16.0 g/dL     Results for orders placed or performed in visit on 09/09/20   Basic Metabolic Panel   Result Value Ref Range    Sodium 141 136 - 145 mmol/L    Potassium 3.3 (L) 3.5 - 5.0 mmol/L    Chloride 85 (L) 98 - 107 mmol/L    CO2 42 (HH) 22 - 31 mmol/L    Anion Gap, Calculation 14 5 - 18 mmol/L    Glucose 85 70 - 125 mg/dL    Calcium 10.3 8.5 - 10.5 mg/dL    BUN 45 (H) 8 - 28 mg/dL    Creatinine 1.76 (H) 0.60 - 1.10 mg/dL    GFR MDRD Af Amer 33 (L) >60  mL/min/1.73m2    GFR MDRD Non Af Amer 27 (L) >60 mL/min/1.73m2         Lab Results   Component Value Date    WBC 6.9 08/05/2020    HGB 10.2 (L) 09/09/2020    HCT 29.0 (L) 08/05/2020    MCV 98 08/05/2020     08/05/2020       Lab Results   Component Value Date    [id number] 333 07/21/2020     Lab Results   Component Value Date    HGBA1C 6.5 (H) 07/31/2020     Lab Results   Component Value Date    INR 0.99 07/28/2020    INR 1.30 (H) 02/07/2018    INR 1.51 (H) 03/06/2017     Vitamin D, Total (25-Hydroxy)   Date Value Ref Range Status   01/20/2020 45.9 30.0 - 80.0 ng/mL Final     Lab Results   Component Value Date    TSH 3.43 08/17/2020           ASSESSMENT/PLAN:    Dysuria, urinary incontinence: denied burning or pain today but staff reported she complained overnight. Having increased frequency, hesitancy, urgency. Afebrile. Will order UA/UC but may be related to increased diuretics for CHF exacerbation.   Left shoulder dislocation, s/p reverse total shoulder arthroplasty: Incision c/d/i. Pain controlled on tylenol.  Ice prn. PT, OT following. voltaren gel prn. F/u with surgeon Dr. Ramirez on 8/14-f/u again in 4 weeks, limit external rotation, no bathing until 4 weeks postop, healing well, f/u again on 9/4-doing well, dc sling, AROM and PROM with therapy, may  Weight bear with walker, f/u again on 10/5.  lymphedema sleeve to LUE. Edema improved today. No concerns today.   Chronic CHF: Daily [phone] previously  Now running [phone]. Shortness of breath improved, edema improving.  On bumex and metolazone, encourage to elevate legs as well. Encouraged to sleep in recliner instead of wheelchair. Continue lymphedema wrapping. Cardiac diet.   continue metolazone until 9/9, bmp on 9/9. Appears improving so will dc metolazone after today. Clinically much improved. Some erythema and warmth on legs today, discussed with nursing to monitor closely  For further signs of cellulitis.   S/p PPM:  follows with device clinic. No recent concerns.   Asthma:  Encourage cough and deep breathe. IS q1h while awake. Albuterol prn. Shortness of breath with exertion. Stable.   Hypothyroid: on levothyroxine.   CKD stage 3: Cr 1.39, gfr 36 on 8/1.  9/4 cr 1.9, gfr 25, bmp today.   H/o Type 2 DM: No meds, no monitoring needed. Diet controlled. No recent concerns.   Dyslipidemia: On aspirin, atorvastatin.   HTN: SBP 130s, On lopressor, bumex.  hold parameters for lopressor. Stable.  GERD: On omeprazole. No concerns today.   Vitamin d deficiency: On vitamin d.  Hypokalemia: On kcl. Increased due to increase bumex and metolazone burst.   PAF, s/p AVR: Regular rate and rhythm today. On metoprolol. F/u with caridology. No concerns.   Anemia of CKD: on iron. Hg 10.9 on 7/24.   Hg 8.8 on 8/1. Hg 9 on 8/5. Recheck on 8/17 8.2, iron to three times a day. recheck on 8/24-hg 8.4. hg 9.7 on 9/4, repeat today.   Candidal intertrigo:  Nystatin.   Constipation: senna daily, miralax daily prn.     PT: trsf min-max A, walking 25ft with trang walker with Min A, OT: LE & hand lymph, mod A UB drsg, max A toileting. Lives with granddaughter. Yellow tag recommend 2 weeks    Electronically signed by: Jennifer Valdes NP

## 2021-06-20 NOTE — LETTER
Letter by Jennifer Valdes NP at      Author: Jennifer Valdes NP Service: -- Author Type: --    Filed:  Encounter Date: 2020 Status: Signed         Patient: Thea Acosta   MR Number: [id number]   YOB: 1934   Date of Visit: 2020                 [facility]    DATE: 2020    NAME:  Thea Acosta             :  1934  MRN: [id number]  CODE STATUS:  FULL CODE    VISIT TYPE: Problem Visit (hospital f/u)     FACILITY:  [facility] [[id number]]       CHIEF COMPLAIN/REASON FOR VISIT:    Chief Complaint   Patient presents with   ? Problem Visit     hospital f/u               HISTORY OF PRESENT ILLNESS: Thea Acosta is a 85 y.o. female who was admitted - for dyspnea and fluid overload, acute on chronic diastolic CHF. She was treated with IV diuresis and later transitioned to PO. Then she developed MIGUEL and held bumex and metolazone. During stay she developed herpes zoster outbreak on left chest. She also had left leg laceration and treated with clindamycin. Left leg negative for DVT on doppler. She completed IV ceftezole and po keflex for cellulitis. She did have some orthostatic hypotension that required med changes. TSH stable and ACTH stim test not indicated. She did have some epigastric pain and was treated with PPI and maalox. She was recommended TCU stay for deconditioning. She has PMH of CAD, CKD, DM, s/p PPM, diastolic CHF, pulmonary artery hypertension, mitral valve stenosis, chronic a fib no anticoagulation, s/p AVR, asthma. Prior to this she lived at home with her grand daughter.     Today Ms. Acosta states she has swelling of her right elbow due to a recent gout flare. She denies taking any medication for gout though. It is not very painful anymore. She slept ok and has very minimal pain. She says her appetite is poor and just does not feel hungry. She thinks she has lost weight and that she weighed over 300lbs some times  ago, maybe over a year. She denies any bowel issues recently. She has a little constipation but not much. She denies any issues with bowels at home. She does not go often though, maybe every couple days at home. She denies any nausea, vomiting, stomach upset. She denies any shortness of breath. She denies any recent illness. She has urinary incontinence and no recent burning or pain. She has a dry cough. She denies any headaches. She does still have the shingles rash but it is not hurting or itching as much. It is getting better. She is not having any breathing issues. She confirms she wants to be full code. Per nursing her blood pressure today was 97/50. She says her son is visiting here from [place] but she does live with her granddaughter still.       REVIEW OF SYSTEMS:  PROBLEMS AND REVIEW OF SYSTEMS:   Review of Systems  Today on ROS:   Currently, no fever, chills, or rigors. Decreased vision and hearing. Denies any chest pain, headaches, palpitations, lightheadedness, dizziness. Appetite is fair.  Denies any abdominal pain. No active bleeding. Positive for edema ble, shortness of breath at baseline, sleeps in recliner, low blood pressure today, left leg redness, wound, chest rash, urinary incontinence, right elbow swelling, redness, right elbow minimal pain, constipation intermittent, appetite fair, dry cough      Allergies   Allergen Reactions   ? Codeine Hives   ? Codeine Phosphate (Bulk)      This allergy is per [facility] records.   ? Codeine Sulfate      This allergy is per [facility] records.   ? Codeine-Butalbital-Asa-Caff      This allergy is per [facility] records.   ? Codeine-Guaifenesin      This allergy is per [facility] records.   ? Lisinopril Cough   ? Penicillins Swelling     Current Outpatient Medications   Medication Sig   ? acetaminophen (TYLENOL) 500 MG tablet  Take 2 tablets (1,000 mg total) by mouth 3 (three) times a day as needed for pain. Not to exceed 4000 mg in 24 hours.   ? aspirin 81 MG EC tablet Take 81 mg by mouth daily.   ? atorvastatin (LIPITOR) 80 MG tablet TAKE 1 TABLET(80 MG) BY MOUTH AT BEDTIME   ? bumetanide (BUMEX) 2 MG tablet TAKE 1 TABLET(2 MG) BY MOUTH TWICE DAILY AT 9 AM AND AT 6 PM   ? cholecalciferol, vitamin D3, (VITAMIN D3) 2,000 unit Tab Take 2,000 Units by mouth once daily.   ? ferrous sulfate 325 (65 FE) MG tablet TAKE 1 TABLET(325 MG) BY MOUTH TWICE DAILY   ? ipratropium-albuterol (COMBIVENT RESPIMAT)  mcg/actuation Mist inhaler INHALE 1 PUFF BY MOUTH FOUR TIMES DAILY (Patient taking differently: 4 (four) times a day as needed. )   ? levothyroxine (SYNTHROID, LEVOTHROID) 150 MCG tablet TAKE 1 TABLET BY MOUTH DAILY AT 6:00 AM   ? lidocaine (XYLOCAINE) 5 % ointment Apply to painful areas   ? metoprolol tartrate (LOPRESSOR) 25 MG tablet Take 0.5 tablets (12.5 mg total) by mouth 2 (two) times a day.   ? omeprazole (PRILOSEC) 20 MG capsule Take 1 capsule (20 mg total) by mouth daily.   ? potassium chloride (K-DUR,KLOR-CON) 10 MEQ tablet Take 1 tablet (10 mEq total) by mouth daily.     Past Medical History:    Past Medical History:   Diagnosis Date   ? Acute kidney injury superimposed on CKD (H) 3/3/2017   ? Asthma    ? CAD (coronary artery disease)    ? Cataract    ? Chronic kidney disease     ckd3   ? Diabetes mellitus (H)    ? Disease of thyroid gland    ? H/O heart valve replacement with bioprosthetic valve    ? History of transfusion    ? Hypertension    ? Normochromic normocytic anemia    ? Pulmonary hypertension (H) 09/27/2019    Noted on echocardiogram   ? Restless leg syndrome            PHYSICAL EXAMINATION  Vitals:    01/15/20 2330   BP: 100/63   Pulse: 61   Resp: 20   Temp: 96.7  F (35.9  C)   SpO2: 96%   Weight: 190 lb (86.2 kg)       Today on physical exam:     GENERAL: Awake, Alert, obese, oriented x3, not in any form of acute  distress, answers questions appropriately, follows simple commands, conversant  HEENT: Head is normocephalic with normal hair distribution. No evidence of trauma. Ears: No acute purulent discharge. Eyes: Conjunctivae pink with no scleral jaundice. Nose: Normal mucosa and septum. NECK: Supple with no cervical or supraclavicular lymphadenopathy. Trachea is midline. [place], decreased vision  CHEST: No tenderness or deformity, no crepitus, left chest PPM  LUNG: Dim to auscultation with good chest expansion. There are no crackles or wheezes, normal AP diameter. Shortness of breath with exertion, dry cough  BACK: No kyphosis of the thoracic spine. Symmetric, no curvature, ROM normal, no CVA tenderness, no spinal tenderness   CVS: irregularly irregular rhythm, murmur,  2+ pulses symmetric in all extremities.  ABDOMEN: Rounded and soft, nontender to palpation, non distended, no masses, no organomegaly, good bowel sounds, no rebound or guarding, no peritoneal signs.   EXTREMITIES: 1-2+ ble nonpitting edema, dry flaking skin, left leg erythema, left leg wound   SKIN: Warm and dry  NEUROLOGICAL: The patient is oriented to person, place and time. Strength and sensation are grossly intact. Face is symmetric. weakness            LABS:   Recent Results (from the past 168 hour(s))   Potassium - Next AM   Result Value Ref Range    Potassium 3.9 3.5 - 5.0 mmol/L   Basic metabolic panel   Result Value Ref Range    Sodium 135 (L) 136 - 145 mmol/L    Potassium 3.9 3.5 - 5.0 mmol/L    Chloride 87 (L) 98 - 107 mmol/L    CO2 35 (H) 22 - 31 mmol/L    Anion Gap, Calculation 13 5 - 18 mmol/L    Glucose 129 (H) 70 - 125 mg/dL    Calcium 9.6 8.5 - 10.5 mg/dL    BUN 73 (H) 8 - 28 mg/dL    Creatinine 1.74 (H) 0.60 - 1.10 mg/dL    GFR MDRD Af Amer 34 (L) >60 mL/min/1.73m2    GFR MDRD Non Af Amer 28 (L) >60 mL/min/1.73m2   Potassium - Next AM   Result Value Ref Range    Potassium 4.1 3.5 - 5.0 mmol/L   C-Reactive Protein(CRP)   Result Value Ref Range     CRP 0.6 0.0 - 0.8 mg/dL   HM2(CBC w/o Differential)   Result Value Ref Range    WBC 8.4 4.0 - 11.0 thou/uL    RBC 3.69 (L) 3.80 - 5.40 mill/uL    Hemoglobin 11.3 (L) 12.0 - 16.0 g/dL    Hematocrit 35.9 35.0 - 47.0 %    MCV 97 80 - 100 fL    MCH 30.6 27.0 - 34.0 pg    MCHC 31.5 (L) 32.0 - 36.0 g/dL    RDW 13.3 11.0 - 14.5 %    Platelets 248 140 - 440 thou/uL    MPV 10.6 8.5 - 12.5 fL   Basic Metabolic Panel   Result Value Ref Range    Sodium 135 (L) 136 - 145 mmol/L    Potassium 4.2 3.5 - 5.0 mmol/L    Chloride 88 (L) 98 - 107 mmol/L    CO2 35 (H) 22 - 31 mmol/L    Anion Gap, Calculation 12 5 - 18 mmol/L    Glucose 139 (H) 70 - 125 mg/dL    Calcium 9.7 8.5 - 10.5 mg/dL    BUN 66 (H) 8 - 28 mg/dL    Creatinine 1.75 (H) 0.60 - 1.10 mg/dL    GFR MDRD Af Amer 33 (L) >60 mL/min/1.73m2    GFR MDRD Non Af Amer 28 (L) >60 mL/min/1.73m2   Hepatic Profile   Result Value Ref Range    Bilirubin, Total 0.7 0.0 - 1.0 mg/dL    Bilirubin, Direct 0.3 <=0.5 mg/dL    Protein, Total 7.1 6.0 - 8.0 g/dL    Albumin 2.8 (L) 3.5 - 5.0 g/dL    Alkaline Phosphatase 57 45 - 120 U/L    AST 22 0 - 40 U/L    ALT <9 0 - 45 U/L     Results for orders placed or performed during the hospital encounter of 01/04/20   Basic Metabolic Panel   Result Value Ref Range    Sodium 135 (L) 136 - 145 mmol/L    Potassium 4.2 3.5 - 5.0 mmol/L    Chloride 88 (L) 98 - 107 mmol/L    CO2 35 (H) 22 - 31 mmol/L    Anion Gap, Calculation 12 5 - 18 mmol/L    Glucose 139 (H) 70 - 125 mg/dL    Calcium 9.7 8.5 - 10.5 mg/dL    BUN 66 (H) 8 - 28 mg/dL    Creatinine 1.75 (H) 0.60 - 1.10 mg/dL    GFR MDRD Af Amer 33 (L) >60 mL/min/1.73m2    GFR MDRD Non Af Amer 28 (L) >60 mL/min/1.73m2         Lab Results   Component Value Date    WBC 8.4 01/14/2020    HGB 11.3 (L) 01/14/2020    HCT 35.9 01/14/2020    MCV 97 01/14/2020     01/14/2020       No results found for: [id number]  Lab Results   Component Value Date    HGBA1C 6.9 (H) 09/25/2019     Lab Results   Component Value  Date    INR 1.30 (H) 02/07/2018    INR 1.51 (H) 03/06/2017    INR 1.27 (H) 03/05/2017     Vitamin D, Total (25-Hydroxy)   Date Value Ref Range Status   11/06/2018 41.7 30.0 - 80.0 ng/mL Final     Lab Results   Component Value Date    TSH 4.22 09/25/2019           ASSESSMENT/PLAN:     1 Acute on chronic CHF: Daily weights-190lbs on admit, reports weighing over 300lbs a year or so ago. Shortness of breath with exertion, 1-2+ ble edema. On bumex 2mg two times a day, keep elevated when possible. Sleeps in recliner.  F/u with cardiology prn. PT, OT for conditioning. Will consider ordering renan hose if wound improves. Cardiac, low sodium diet. Daily weights.   2. Herpes Zoster: Across upper abdomen/chest. Now scabbed, healing. Minimal itching and pain. No isolation needed. Tylenol prn pain. Completed antiviral treatment. Consider adding gabapentin if develops further burning, pain, neuralgia.   3. S/p PPM: follows with device clinic.   4. Mod persistent Asthma: On combivent four times a day, changed to four times a day prn. SOB with exertion, dry cough.   5. MIGUEL on CKD stage 3: Cr 1.75 on 1/14.   6. Type 2 DM: No meds, no monitoring needed. Diet controlled. Weight loss recently.   7. Dyslipidemia: On aspirin, atorvastatin.   8. HTN: SBP 120s. On lopressor, bumex. Added hold parameters for lopressor.   9. GERD: On omeprazole. No concerns today.   10. Hypothyroid: On levothyroxine.   11. Vitamin d deficiency: On vitamin d.   12. Hypokalemia: On kcl.    13. PAF, s/p AVR: Regular rate and rhythm today. On metoprolol. F/u with caridology.   14. Right elbow gout exacerbation: ice prn, tylenol prn. No pain today, edema improving, erythema improving. No gout medications and declines starting these today.   15. Anemia of CKD: on iron two times a day. Hg  11.3 on 1/14. Monitor.   16. Hypotension: sbp 90-100s. Will add hold parameters to metoprolol.   17. Left leg cellulitis, left leg wound: improving. Edema improved, minimal pain.  Wound cares. Monitor. Completed antibiotic treatment. Venous doppler negative for DVT.     Per therapy eval today    Bmp, hm1, vit d on 1/20     Electronically signed by: Jennifer Valdes NP     Total floor/unit time spent 35 min with 25 min spent on counseling and coordination of care. Counseling regarding herpes zoster management chf management, left leg cellulitis management, cellulitis management, wound care management, pain management. Coordinated care with nursing for management of chf, diabetes, herpes zoster, cellulitis, wound cares, a fib management, hypotension management.

## 2021-06-20 NOTE — LETTER
Letter by Jennifer Valdes NP at      Author: Jennifer Valdes NP Service: -- Author Type: --    Filed:  Encounter Date: 2020 Status: (Other)         Patient: Thea Acosta   MR Number: [id number]   YOB: 1934   Date of Visit: 2020                 [facility]    DATE: 2020    NAME:  Thea Acosta             :  1934  MRN: [id number]  CODE STATUS:  DNR    VISIT TYPE: Problem Visit (cough)     FACILITY:  [facility] [[id number]]       CHIEF COMPLAIN/REASON FOR VISIT:    Chief Complaint   Patient presents with   ? Problem Visit     cough               HISTORY OF PRESENT ILLNESS: Thea Acosta is a 85 y.o. female who was admitted - for dyspnea and fluid overload, acute on chronic diastolic CHF. She was treated with IV diuresis and later transitioned to PO. Then she developed MIGUEL and held bumex and metolazone. During stay she developed herpes zoster outbreak on left chest. She also had left leg laceration and treated with clindamycin. Left leg negative for DVT on doppler. She completed IV ceftezole and po keflex for cellulitis. She did have some orthostatic hypotension that required med changes. TSH stable and ACTH stim test not indicated. She did have some epigastric pain and was treated with PPI and maalox. She was recommended TCU stay for deconditioning. She has PMH of CAD, CKD, DM, s/p PPM, diastolic CHF, pulmonary artery hypertension, mitral valve stenosis, chronic a fib no anticoagulation, s/p AVR, asthma. Prior to this she lived at home with her grand daughter.     Today Ms. Acosta is seen for concerns of cough. Staff report she coughed quite frequently and harshly most of the weekend. She has not been complaining of more shortness of breath but her weights have been steadily increasing as well and is up 190-201lbs then this morning was 203lbs. On exam she is seen alone in her room. She reports that she has been coughing  and feels congested but cannot get anything up. She has used her inhaler but not really helping. She thinks her breathing is ok but does admit to feeling shortness of breath probably more than baseline. Her legs are swollen and they have not always been wrapping. She says they are dry. She says it is difficult to elevate her legs. She denies any fevers or cold symptoms with the cough. No nasal drainage or congestion. She denies feeling dizzy today.       REVIEW OF SYSTEMS:  PROBLEMS AND REVIEW OF SYSTEMS:   Review of Systems  Today on ROS:   Currently, no fever, chills, or rigors. Decreased vision and hearing. Denies any chest pain, headaches, palpitations, lightheadedness, dizziness. Appetite is good.  Denies any abdominal pain. No active bleeding. Positive for edema ble, shortness of breath with exertion and at rest today, sleeps in recliner, low blood pressure at times, chest rash improving, urinary incontinence, no pain, constipation improved, dry flaking skin on legs, weight gain, congested cough, wheezing      Allergies   Allergen Reactions   ? Codeine Hives   ? Codeine Phosphate (Bulk)      This allergy is per [facility] records.   ? Codeine Sulfate      This allergy is per [facility] records.   ? Codeine-Butalbital-Asa-Caff      This allergy is per [facility] records.   ? Codeine-Guaifenesin      This allergy is per [facility] records.   ? Lisinopril Cough   ? Penicillins Swelling     Current Outpatient Medications   Medication Sig   ? acetaminophen (TYLENOL) 500 MG tablet Take 2 tablets (1,000 mg total) by mouth 3 (three) times a day as needed for pain. Not to exceed 4000 mg in 24 hours.   ? aspirin 81 MG EC tablet Take 81 mg by mouth daily.   ? atorvastatin (LIPITOR) 80 MG tablet TAKE 1 TABLET(80 MG) BY MOUTH AT BEDTIME   ? bacitracin 500 unit/gram ointment Apply topically 2  (two) times a day.   ? bumetanide (BUMEX) 2 MG tablet TAKE 1 TABLET(2 MG) BY MOUTH TWICE DAILY AT 9 AM AND AT 6 PM   ? cholecalciferol, vitamin D3, (VITAMIN D3) 2,000 unit Tab Take 2,000 Units by mouth once daily.   ? ferrous sulfate 325 (65 FE) MG tablet TAKE 1 TABLET(325 MG) BY MOUTH TWICE DAILY   ? ipratropium-albuterol (COMBIVENT RESPIMAT)  mcg/actuation Mist inhaler INHALE 1 PUFF BY MOUTH FOUR TIMES DAILY (Patient taking differently: 4 (four) times a day as needed. )   ? levothyroxine (SYNTHROID, LEVOTHROID) 150 MCG tablet TAKE 1 TABLET BY MOUTH DAILY AT 6:00 AM   ? lidocaine (XYLOCAINE) 5 % ointment Apply to painful areas   ? metoprolol tartrate (LOPRESSOR) 25 MG tablet Take 0.5 tablets (12.5 mg total) by mouth 2 (two) times a day.   ? omeprazole (PRILOSEC) 20 MG capsule Take 1 capsule (20 mg total) by mouth daily.   ? polyethylene glycol (MIRALAX) 17 gram packet Take 17 g by mouth daily.   ? potassium chloride (K-DUR,KLOR-CON) 20 MEQ tablet Take 40 mEq by mouth daily.   ? senna (SENOKOT) 8.6 mg tablet Take 1 tablet by mouth daily as needed for constipation.   ? white petrolatum (AQUAPHOR ORIGINAL) 41 % Oint Apply 1 application topically at bedtime.     Past Medical History:    Past Medical History:   Diagnosis Date   ? Acute kidney injury superimposed on CKD (H) 3/3/2017   ? Asthma    ? CAD (coronary artery disease)    ? Cataract    ? Chronic kidney disease     ckd3   ? Diabetes mellitus (H)    ? Disease of thyroid gland    ? H/O heart valve replacement with bioprosthetic valve    ? History of transfusion    ? Hypertension    ? Normochromic normocytic anemia    ? Pulmonary hypertension (H) 09/27/2019    Noted on echocardiogram   ? Restless leg syndrome            PHYSICAL EXAMINATION  Vitals:    01/26/20 1941   BP: 118/68   Pulse: 60   Resp: 23   Temp: (!) 96.1  F (35.6  C)   SpO2: 100%   Weight: 201 lb (91.2 kg)       Today on physical exam:     GENERAL: Awake, Alert, obese, oriented x3, not in any  form of acute distress, answers questions appropriately, follows simple commands, conversant  HEENT: Head is normocephalic with normal hair distribution. No evidence of trauma. Ears: No acute purulent discharge. Eyes: Conjunctivae pink with no scleral jaundice. Nose: Normal mucosa and septum. NECK: Supple with no cervical or supraclavicular lymphadenopathy. Trachea is midline. Pauma, decreased vision  CHEST: No tenderness or deformity, no crepitus, left chest PPM  LUNG: Dim with scattered coarse crackles, expiratory wheezing to auscultation with good chest expansion.   Shortness of breath with exertion and at rest, harsh, congested nonproductive cough  BACK: No kyphosis of the thoracic spine. Symmetric, no curvature, ROM normal, no CVA tenderness, no spinal tenderness   CVS: irregularly irregular rhythm, murmur,  2+ pulses symmetric in all extremities.  ABDOMEN: Rounded and soft, nontender to palpation, non distended, no masses, no organomegaly, good bowel sounds, no rebound or guarding, no peritoneal signs.   EXTREMITIES: 2+ ble nonpitting edema, dry flaking and scaly skin, left leg wound healed, scabbed  SKIN: Warm and dry  NEUROLOGICAL: The patient is oriented to person, place and time. Strength and sensation are grossly intact. Face is symmetric.            LABS:   Recent Results (from the past 168 hour(s))   Basic Metabolic Panel   Result Value Ref Range    Sodium 131 (L) 136 - 145 mmol/L    Potassium 3.3 (L) 3.5 - 5.0 mmol/L    Chloride 88 (L) 98 - 107 mmol/L    CO2 32 (H) 22 - 31 mmol/L    Anion Gap, Calculation 11 5 - 18 mmol/L    Glucose 105 70 - 125 mg/dL    Calcium 9.7 8.5 - 10.5 mg/dL    BUN 55 (H) 8 - 28 mg/dL    Creatinine 1.91 (H) 0.60 - 1.10 mg/dL    GFR MDRD Af Amer 30 (L) >60 mL/min/1.73m2    GFR MDRD Non Af Amer 25 (L) >60 mL/min/1.73m2     Results for orders placed or performed in visit on 01/27/20   Basic Metabolic Panel   Result Value Ref Range    Sodium 131 (L) 136 - 145 mmol/L    Potassium 3.3  (L) 3.5 - 5.0 mmol/L    Chloride 88 (L) 98 - 107 mmol/L    CO2 32 (H) 22 - 31 mmol/L    Anion Gap, Calculation 11 5 - 18 mmol/L    Glucose 105 70 - 125 mg/dL    Calcium 9.7 8.5 - 10.5 mg/dL    BUN 55 (H) 8 - 28 mg/dL    Creatinine 1.91 (H) 0.60 - 1.10 mg/dL    GFR MDRD Af Amer 30 (L) >60 mL/min/1.73m2    GFR MDRD Non Af Amer 25 (L) >60 mL/min/1.73m2         Lab Results   Component Value Date    WBC 6.5 01/20/2020    HGB 11.0 (L) 01/20/2020    HCT 35.4 01/20/2020    MCV 99 01/20/2020     01/20/2020       No results found for: [id number]  Lab Results   Component Value Date    HGBA1C 6.9 (H) 09/25/2019     Lab Results   Component Value Date    INR 1.30 (H) 02/07/2018    INR 1.51 (H) 03/06/2017    INR 1.27 (H) 03/05/2017     Vitamin D, Total (25-Hydroxy)   Date Value Ref Range Status   01/20/2020 45.9 30.0 - 80.0 ng/mL Final     Lab Results   Component Value Date    TSH 4.22 09/25/2019           ASSESSMENT/PLAN:     1 Viral URI with cough: congestion, wheezing today. No fever, No sore throat, had clear rhinorrhea last week but reports improved today. Still has wheezing, increased shortness of breath. On duonebs two times a day and q4h prn x 5 days, robitussin 10ml three times a day x 3 days. Encourage rest, limit fluids due to chf. Notify if develops fever and will order chest xray. o2 sat stable today. Continues to be symptomatic, extended duoenbs until 1/31, completed robitussin so will order mucinex tabs two times a day x 7days. Treat for acute chf as well.    2. Acute on chronic CHF: Daily [id number], reports weighing over 300lbs a year or so ago. Shortness of breath with exertion worse today, 2+ ble edema. On bumex 2mg two times a day, keep legs elevated when possible. Sleeps in recliner.  F/u with cardiology prn. PT, OT for conditioning. Cardiac, low sodium diet. Daily weights. Weights continue to climb give one additional bumex today and metolazone daily 30 min prior to am bumex dose x  3 days then will re evaluate. Increased potassium supplement as well. Bmp on 1/31. Ace wrap legs and need to elevate.    3. Herpes Zoster: Across upper abdomen/chest. scabbed, healing. Minimal itching, no pain, scabs now falling off. No isolation needed. Tylenol prn pain. Completed antiviral treatment. Much improved.   4. S/p PPM: follows with device clinic. No recent concerns.   5. Acute on chronic Mod persistent Asthma: On combivent four times a day prn. Increased shortness of breath, wheezing, cough recently. Ordered duonebs two times a day and prn x 5 days-now extended until 1/31, ordered mucinex x 7 days. Monitor closely if need prednisone burst however appears more chf related.   6. MIGUEL on CKD stage 3: Cr 1.75 on 1/14. Cr 1.71 on 1/20. Stable. Bmp on 1/27-cr 1.91. stable. Bmp on 1/31.   7. Type 2 DM: No meds, no monitoring needed. Diet controlled. Weight loss recently. Glucose on bmp 1/20 was 112.   8. Dyslipidemia: On aspirin, atorvastatin.   9. HTN: SBP 90-110s. On lopressor, bumex.  hold parameters for lopressor. Reports dizziness only in am when gets up too fast.   10. GERD: On omeprazole. No concerns today.   11. Hypothyroid: On levothyroxine.   12. Vitamin d deficiency: On vitamin d. Vit d 45 on 1/20. Stable.   13. Hypokalemia: On kcl.    14. PAF, s/p AVR: Regular rate and rhythm today. On metoprolol. F/u with caridology. No recent palpitations or chest pain.   15. Right elbow gout exacerbation: resolved.   16. Anemia of CKD: on iron two times a day. Hg  11.3 on 1/14. Hg 11 on 1/20.   17. Constipation:  scheduled miralax daily and senna daily prn. Improved today.   18. PVD, venous stasis: ordered aquaphor at bedtime and daily prn for dry, scaly, flaking skin to maintain dry and supple skin barrier. No open wounds on legs. No numb/tingling in legs. No leg pain. Ace wraps. Elevate.     Per therapy PT - 140ft fww SBA/CGA, t/f's SBA/CGA with extra time, high fall risk (17/28 Octavio), OT - UB setup, doesnt  wear pants, toileting CGA/Min A, showering min/mod A. Yellow tag. Lives in house with family. Recommend 1-2 weeks.      Electronically signed by: Jennifer Valdes NP

## 2021-06-20 NOTE — LETTER
Letter by Rojelio Farnsworth MD at      Author: Rojelio Farnsworth MD Service: -- Author Type: --    Filed:  Encounter Date: 1/21/2020 Status: (Other)         Patient: Thea Acosta   MR Number: [id number]   YOB: 1934   Date of Visit: 1/21/2020     [facility]    Facility:   [facility] [[id number]]   Code Status: DNR      CHIEF COMPLAINT/REASON FOR VISIT:  Chief Complaint   Patient presents with   ? H & P       HISTORY:      HPI: Ms. Trivedi is an 85-year-old female with a past medical history of chronic diastolic heart failure, severe pulmonary hypertension, mitral valve stenosis, chronic atrial fibrillation, permanent pacemaker in place, bioprosthetic AVR 2006, CKD 3, coronary artery disease, hyperlipidemia, asthma, hypothyroidism, GERD, vitamin D deficiency, gout, anemia, admitted to TCU following her recent hospital stay, seen today for H&P.    Hospital course patient was admitted on January 4 and discharged on January 14 from [facility] where she presented with shortness of breath.  She had a concurrent left chest rash.   Patient was diagnosed with acute on chronic diastolic heart failure, with suspicion that prednisone may have triggered fluid gain.  She was treated with IV diuretics, metolazone and return to her usual p.o. Bumex 2 mg twice daily by the time of discharge with a discharge weight of 86 kg on room air.   Left chest rash was diagnosed as shingles which was treated with antivirals.   Patient appeared to have resolving right elbow gout, her steroids were discontinued.   However she was felt to have left leg cellulitis related to a prior pretibial wound and was treated with cephalexin transition to Keflex with completed course by the time of discharge.   Other: Patient developed acute kidney injury and was troubled by orthostatic hypotension with adjustment in her diuretics resulting.    Subjective/review of  "systems  Patient reports that she is feeling \"pretty good\".  When I asked her she feels normal she says no that she is still feeling tired and worn out.  She admits to some lightheadedness on January 20 but none today despite ongoing marginal blood pressures.  She reports her appetite is okay.  Bowels are working with \"prune juice\".  Last normal bowel movement was 2 days ago requiring suppository treatment, until she had another one this morning.  She says she usually goes every 2 to 3 days at home.  She reports that the wound in the left pretibial area occurred around [place] time when she took her family to the wild game and bumped and try to get out of the car quickly.  She reports that she has had the shingles vaccine and is never had shingles before.  She says she gets occasional headaches but denies other pains including chest pain and abdominal pain she says that the gout occasionally flares but that her right elbow is always a bit red and swollen but it does not bother her so she pays no attention.  She says her memory is \"not the best\".  No nausea vomiting diarrhea melena bright red blood per rectum dysuria fever sweats chills falls injuries orthopnea PND.  She says that she is to have terrible peripheral edema but it is improved greatly.  She says she is lost 100 pounds in the last year and thinks that a lot of it was fluid weight.  Remainder 13 system review is negative.    Social history: Patient says she lives in her long-term home of about 40 years on the [facility].  She reports that she also owns a home in [place] which is her home town.  She said she spent many weeks there mainly during the warmer months over the past years but now is facing the prospect of selling the house because she cannot get there.  She gave up driving a couple of years ago.    Past Medical History:   Diagnosis Date   ? Acute kidney injury superimposed on CKD (H) 3/3/2017   ? Asthma    ? CAD (coronary " artery disease)    ? Cataract    ? Chronic kidney disease     ckd3   ? Diabetes mellitus (H)    ? Disease of thyroid gland    ? H/O heart valve replacement with bioprosthetic valve    ? History of transfusion    ? Hypertension    ? Normochromic normocytic anemia    ? Pulmonary hypertension (H) 2019    Noted on echocardiogram   ? Restless leg syndrome              Family History   Problem Relation Age of Onset   ? No Medical Problems Mother         age 86   ? No Medical Problems Father         MVA   ? Cancer Brother         lung   ? No Medical Problems Brother      Social History     Socioeconomic History   ? Marital status:      Spouse name: None   ? Number of children: None   ? Years of education: None   ? Highest education level: None   Occupational History   ? Occupation:  in operating room     Comment: retired   Social Needs   ? Financial resource strain: None   ? Food insecurity:     Worry: None     Inability: None   ? Transportation needs:     Medical: None     Non-medical: None   Tobacco Use   ? Smoking status: Former Smoker     Packs/day: 0.00     Last attempt to quit: 1950     Years since quittin.3   ? Smokeless tobacco: Never Used   Substance and Sexual Activity   ? Alcohol use: No   ? Drug use: No   ? Sexual activity: Never   Lifestyle   ? Physical activity:     Days per week: None     Minutes per session: None   ? Stress: None   Relationships   ? Social connections:     Talks on phone: None     Gets together: None     Attends Qu[employer] service: None     Active member of club or organization: None     Attends meetings of clubs or organizations: None     Relationship status: None   ? Intimate partner violence:     Fear of current or ex partner: None     Emotionally abused: None     Physically abused: None     Forced sexual activity: None   Other Topics Concern   ? Incontinent Not Asked   ? Toileting independently Not Asked   ? Cognitive impairment Not Asked   ? Vision  "impairment Not Asked   ? Hearing impairment Not Asked   ? Dentures Not Asked   Social History Narrative    She live in a single floor house.  Her son lives next door and her granddaughter is \"around a lot\"   3/2017         Review of Systems as above    Vitals:    01/21/20 1629   BP: 96/58   Pulse: 60   Resp: 16   Temp: (!) 95.5  F (35.3  C)   SpO2: 96%   Weight: 195 lb (88.5 kg)   Height: 5' 7\" (1.702 m)       Physical Exam  Patient is very pleasant, normally conversant female in no acute distress.  She has clear sclera EOMI oropharynx is moist and clear neck is without adenopathy or mass heart is regular in the 70s with crisp valve sounds I do not hear significant murmur.  Lungs are clear she moves her easily no resolved rhonchi or wheezing.  Abdomen is obese without organomegaly mass tenderness.  Right elbow shows boggy bursa over the olecranon.  There is a 8 cm red/erythematous area over the proximal olecranon itself.  However it is nontender with no sign of secondary cellulitis.  Both lower extremities are remarkable for venous stasis dermatitis to the midshin.  She also has a 2.5 cm scabbed lesion in the left pretibial area which is not currently draining.  Skin is also remarkable for very dense scabbed rash in the approximate T8 dermatome left-sided from midline to midline.  Some of the lesions are macerated especially under the left breast where there is skin on skin contact.  The rest are crusted and dry.  Patient says they are no longer painful or tender to the touch.  There are few scattered hyperkeratotic lesions on her arms right greater than left.  Elsewhere skin is warm and dry  LABS:   From January 20 sodium 135 potassium 3.8 chloride 91 CO2 33 BUN 48 creatinine 1.71 similar to her recent baseline for example she was discharged at 1.75 in the hospital.  White blood count 6.5 hemoglobin 11.0 platelets 177K.    ASSESSMENT:      ICD-10-CM    1. Type 2 diabetes mellitus with stage 3 chronic kidney " disease, without long-term current use of insulin (H) E11.22     N18.3    2. Atherosclerosis of native coronary artery of native heart without angina pectoris I25.10    3. Essential hypertension with goal blood pressure less than 140/90 I10    4. Moderate persistent asthma without complication J45.40    5. PAF (paroxysmal atrial fibrillation) (H) I48.0    6. CHF (congestive heart failure), NYHA class I, acute, systolic (H) I50.21    7. Pulmonary hypertension (H) I27.20    8. CKD (chronic kidney disease), stage III (H) N18.3    9. H/O heart valve replacement with bioprosthetic valve Z95.3    10. Acute kidney injury superimposed on chronic kidney disease (H) N17.9     N18.9    11. Physical deconditioning R53.81    12. Cardiac pacemaker in situ Z95.0      Assessment/plan    Acute on chronic CHF, suspected trigger prednisone  Severe pulmonary hypertension  Mitral valve stenosis  Chronic atrial fibrillation  Permanent pacemaker  Bioprosthetic AVR 2006     Aspirin is patient's anticoagulant.  Metoprolol 12.5 mg twice daily with hold parameters added recently.  Longstanding diuretic dose is Bumex 2 mg twice daily, which she is currently taking.  Renal function is stable.  Fairly massive weight loss in the last year is notable as well.  Patient reports this as a desired weight loss with lifestyle changes and better diuresis.  I am concerned about patient's hypotension weight is currently 195 pounds she was discharged at 190 pounds, albeit on a different scale.  Weight at this facility peaked at 193.4 on January 20.   -Hold parameters written for Bumex.   -Monitor weight   -Monitor renal function, next BMP January 27 in a week    Hypotension  History of orthostatic hypotension     Asymptomatic today although she did have some lightheadedness yesterday.  Hold parameters have been added for Bumex, metoprolol.  Need to continue to monitor blood pressures, weights, renal function as above.  Orthostatic precautions are reviewed  with patient.  She states understanding.    CKD 3     Appears relatively stable   -See above no additional changes    Cellulitis left leg  Left pretibial wound  Chronic venous stasis dermatitis     There is no remaining sign of cellulitis.  Patient does have venous stasis dermatitis bilaterally, but all who know her agree that her massive edema is markedly improved from the past.  She does also has a pretibial wound from around [address] time in the left leg.  I added bacitracin to this wound and recommend leg elevation above heart level resting.  Compression stockings if/as tolerated    Shingles left T8     Resolving now with no active blistering however she has some maceration of the superficial wounds where skin on skin contact underneath the left breast is present.   -Continue inter-dry   -Add bacitracin ointment due to the extensive scabbing, irritation and risk for secondary bacterial infection    Right elbow bursitis  Presumed gout     Patient has recurrent gout with a recent flare treated with prednisone.  She says that her elbow is back to baseline.  She does have evidence of chronic bursitis with bogginess and mild erythema.  We will need to watch for secondary signs of inflammation/infection.  I cannot discover whether patient does have a crystal diagnosis for gout or if it has been a presumptive diagnosis.  If the former, consider allopurinol renally dosed.  If not I would keep my mind open to the possibility of recurrent bursitis as an alternate diagnosis.    GERD     -Omeprazole    Hypothyroidism     -Levothyroxine 150 daily    Asthma     Patient reports good control.  Hospital physicians did not feel this represented an exacerbation rather more of a pure heart failure presentation.  She did receive 1 dose of Solu-Medrol in the emergency room but none since.  There is no wheezing on exam and she feels at baseline.   -Continue Anoro Ellipta and PRN albuterol     Vitamin D deficiency     On replacement  with calcium    Hyperlipidemia     Atorvastatin 80 mg daily    Constipation     Reviewed bowel programs with patient she has MiraLAX scheduled daily and senna as needed.  Discussed with the staff as well to use as needed senna as needed.    Pain     Acetaminophen    Generalized weakness  TCU need  Disposition     PT OT  involved.  So far everyone seems optimistic that this will be a relatively short stay with return to her home with her granddaughter.  She has 3 steps to get in.    Electronically signed by: Rojelio Farnsworth MD

## 2021-06-20 NOTE — LETTER
Letter by Jennifer Valdes NP at      Author: Jennifer Valdes NP Service: -- Author Type: --    Filed:  Encounter Date: 2020 Status: (Other)         Patient: Thea Acosta   MR Number: [id number]   YOB: 1934   Date of Visit: 2020                 [facility]    DATE: 2020    NAME:  Thea Acosta             :  1934  MRN: [id number]  CODE STATUS:  DNR    VISIT TYPE: Problem Visit (chf)     FACILITY:  [facility] [[id number]]       CHIEF COMPLAIN/REASON FOR VISIT:    Chief Complaint   Patient presents with   ? Problem Visit     chf               HISTORY OF PRESENT ILLNESS: Thea Acosta is a 86 y.o. female who admitted - for acute metabolic encephalopathy. This was felt to be s/t hypothyroidism and noncompliance with levothyroxine dosing. She was readmitted - for left shoulder dislocation and underwent reverse total shoulder arthroplasty on . Postop course was uncomplicated and transferred back to [facility]. She has PMH of CAD, CKD, DM, s/p PPM, diastolic CHF, pulmonary artery hypertension, mitral valve stenosis, chronic a fib no anticoagulation, s/p AVR, asthma. Prior to this she lived at home with her grand daughter.     Today Ms. Acosta is seen today for concerns of fluid overload and chf. Her weights are somehwat trending down [phone] but still has significantly worse edema and more shortness of breath. She has been refusing to sleep in her bed and even the recliner last night. She wanted to be completely upright in her wheelchair but appeared very uncomfortable and her legs are much more swollen today. She does have an appointment today with ortho. Her left arm is much more swollen today too. She is seen in her wheelchair today and is a bit groggy. She says she did not sleep well but did not want to be in the recliner because she felt like she couldn't breathe well. Her left arm and shoulder were pinned  "in her wheelchair and assisted to help  Her elevate this as it is more swollen today. She thinks her pain is ok but has noticed her legs have been increasingly  More swollen lately. Her appetite is ok and no issues with therapy. She does feel short of breath at rest today.         REVIEW OF SYSTEMS:  PROBLEMS AND REVIEW OF SYSTEMS:   Review of Systems  Today on ROS:   Currently, no fever, chills, or rigors. Decreased vision and hearing. Denies any chest pain, headaches, palpitations, lightheadedness, dizziness, no cough. Appetite is good.  Denies any abdominal pain. No active bleeding. Positive for urinary incontinence, leg swelling worse today, left arm swelling worse today, sling in place, left shoulder incision, no nausea or vomiting, no constipation, pain controlled, lymphedema wraps, sob      Allergies   Allergen Reactions   ? Tramadol Anxiety and Other (See Comments)     Per patient, Thea developed psychosis and severe anxiety and agitation \"for three days\" after receiving tramadol in the past. She stated that she would \"never\" take it again and would be extremely upset if she receives it. She asked me to add this to her chart's allergy list specifically.    ? Codeine Hives   ? Codeine Phosphate (Bulk)      This allergy is per [facility] records.   ? Codeine Sulfate      This allergy is per [facility] records.   ? Codeine-Butalbital-Asa-Caff      This allergy is per [facility] records.   ? Codeine-Guaifenesin      This allergy is per [facility] records.   ? Lisinopril Cough   ? Penicillins Swelling     Current Outpatient Medications   Medication Sig   ? acetaminophen (TYLENOL) 500 MG tablet Take 2 tablets (1,000 mg total) by mouth 3 (three) times a day. (Patient taking differently: Take 1,000 mg by mouth 3 (three) times a day. And daily prn)   ? albuterol (PROAIR HFA;PROVENTIL " HFA;VENTOLIN HFA) 90 mcg/actuation inhaler Inhale 2 puffs every 4 (four) hours as needed for wheezing.   ? aspirin 81 MG EC tablet Take 1 tablet (81 mg total) by mouth 2 (two) times a day.   ? atorvastatin (LIPITOR) 80 MG tablet TAKE 1 TABLET(80 MG) BY MOUTH DAILY   ? bumetanide (BUMEX) 2 MG tablet Take 1.5 tablets (3 mg total) by mouth 2 (two) times a day at 9am and 6pm.   ? colchicine 0.6 mg tablet take two tablets (1.2 mg) by mouth at onset of gout symptoms, then repeat one tablet (0.6 mg) by mouth one hour later   ? diclofenac sodium (VOLTAREN) 1 % Gel Apply 2 g topically 4 (four) times a day as needed.    ? ferrous sulfate 325 (65 FE) MG tablet Take 1 tablet by mouth daily.   ? levothyroxine (SYNTHROID, LEVOTHROID) 150 MCG tablet Take 1 tablet (150 mcg total) by mouth daily before supper.   ? nystatin (MYCOSTATIN) powder Apply topically 4 (four) times a day.   ? omeprazole (PRILOSEC) 20 MG capsule Take 1 capsule (20 mg total) by mouth daily before breakfast.   ? polyethylene glycol (MIRALAX) 17 gram packet Take 17 g by mouth daily as needed.   ? potassium chloride (K-DUR,KLOR-CON) 20 MEQ tablet Take 40 mEq by mouth daily.   ? predniSONE (DELTASONE) 2.5 MG tablet Take 7.5 mg/d prednisone PO for 3 weeks.   ? senna (SENOKOT) 8.6 mg tablet Take 1 tablet by mouth daily.   ? white petrolatum (AQUAPHOR ORIGINAL) 41 % Oint Apply 1 application topically at bedtime.     Past Medical History:    Past Medical History:   Diagnosis Date   ? Acute kidney injury superimposed on CKD (H) 3/3/2017   ? Asthma    ? CAD (coronary artery disease)    ? Cataract    ? Chronic kidney disease     ckd3   ? COPD (chronic obstructive pulmonary disease) (H)    ? Diabetes mellitus (H)    ? Disease of thyroid gland    ? H/O heart valve replacement with bioprosthetic valve    ? History of transfusion    ? Hypertension    ? Normochromic normocytic anemia    ? Pulmonary hypertension (H) 09/27/2019    Noted on echocardiogram   ? Restless leg  syndrome            PHYSICAL EXAMINATION  Vitals:    09/04/20 0700   BP: 134/70   Pulse: (!) 59   Resp: 18   Temp: (!) 96.2  F (35.7  C)   SpO2: 91%   Weight: (!) 232 lb (105.2 kg)       Today on physical exam:     GENERAL: lethargic, obese,  not in any form of acute distress, answers questions appropriately, follows simple commands, conversant  HEENT: Head is normocephalic with normal hair distribution. No evidence of trauma. Ears: No acute purulent discharge. Eyes: Conjunctivae pink with no scleral jaundice. Nose: Normal mucosa and septum. NECK: Supple with no cervical or supraclavicular lymphadenopathy. Trachea is midline. [place], decreased vision  CHEST: No tenderness or deformity, no crepitus, left chest PPM  LUNG: Dim but clear today to auscultation.   Shortness of breath  With exertion and at rest, no cough noted  BACK: No kyphosis of the thoracic spine. Symmetric, no curvature, ROM normal, no CVA tenderness, no spinal tenderness   CVS: irregularly irregular rhythm, murmur,  2+ pulses symmetric in all extremities.  ABDOMEN: Rounded and soft, nontender to palpation, non distended, no masses, no organomegaly, good bowel sounds, no rebound or guarding, no peritoneal signs.   EXTREMITIES: 3+ ble pitting edema, lymphedema wrapping, left upper extremity 2+ pitting edema no sleeve in place today, left shoulder incision c/d/i, left arm sling in place  SKIN: Warm and dry  NEUROLOGICAL: The patient is oriented to person, place and time. Oriented today but a little lethargic, no recent confusion, Forgetful at times.             LABS:   Recent Results (from the past 168 hour(s))   Basic Metabolic Panel   Result Value Ref Range    Sodium 141 136 - 145 mmol/L    Potassium 3.4 (L) 3.5 - 5.0 mmol/L    Chloride 92 (L) 98 - 107 mmol/L    CO2 37 (H) 22 - 31 mmol/L    Anion Gap, Calculation 12 5 - 18 mmol/L    Glucose 98 70 - 125 mg/dL    Calcium 10.5 8.5 - 10.5 mg/dL    BUN 42 (H) 8 - 28 mg/dL    Creatinine 1.90 (H) 0.60 - 1.10  mg/dL    GFR MDRD Af Amer 30 (L) >60 mL/min/1.73m2    GFR MDRD Non Af Amer 25 (L) >60 mL/min/1.73m2   Hemoglobin   Result Value Ref Range    Hemoglobin 9.7 (L) 12.0 - 16.0 g/dL     Results for orders placed or performed in visit on 09/04/20   Basic Metabolic Panel   Result Value Ref Range    Sodium 141 136 - 145 mmol/L    Potassium 3.4 (L) 3.5 - 5.0 mmol/L    Chloride 92 (L) 98 - 107 mmol/L    CO2 37 (H) 22 - 31 mmol/L    Anion Gap, Calculation 12 5 - 18 mmol/L    Glucose 98 70 - 125 mg/dL    Calcium 10.5 8.5 - 10.5 mg/dL    BUN 42 (H) 8 - 28 mg/dL    Creatinine 1.90 (H) 0.60 - 1.10 mg/dL    GFR MDRD Af Amer 30 (L) >60 mL/min/1.73m2    GFR MDRD Non Af Amer 25 (L) >60 mL/min/1.73m2         Lab Results   Component Value Date    WBC 6.9 08/05/2020    HGB 9.7 (L) 09/04/2020    HCT 29.0 (L) 08/05/2020    MCV 98 08/05/2020     08/05/2020       Lab Results   Component Value Date    ZQHPHUOC12 333 07/21/2020     Lab Results   Component Value Date    HGBA1C 6.5 (H) 07/31/2020     Lab Results   Component Value Date    INR 0.99 07/28/2020    INR 1.30 (H) 02/07/2018    INR 1.51 (H) 03/06/2017     Vitamin D, Total (25-Hydroxy)   Date Value Ref Range Status   01/20/2020 45.9 30.0 - 80.0 ng/mL Final     Lab Results   Component Value Date    TSH 3.43 08/17/2020           ASSESSMENT/PLAN:    Left shoulder dislocation, s/p reverse total shoulder arthroplasty: Incision c/d/i. Pain controlled on tylenol.  Ice prn. PT, OT following. voltaren gel prn. F/u with surgeon Dr. Ramirez on 8/14-f/u again in 4 weeks, limit external rotation, no bathing until 4 weeks postop, healing well. Follow up appt today. lymphedema sleeve to LUE. Remains NWB, may remove sling when resting. Assisted to elevate today due to worsening edema. Most likely positional as slept in wheelchair in awkward position.   Asthma:  Encourage cough and deep breathe. IS q1h while awake. Albuterol prn. More shortness of breath today.   Hypothyroid: on levothyroxine.    Chronic CHF: Daily [phone] previously  Now running [phone]. Shortness of breath worse today, edema worse recently. Previously  Increased bumex and added metolazone, needs to elevate legs as well. Encouraged to sleep in recliner instead of wheelchair. Continue lymphedema wrapping. Cardiac diet.   Will extend metolazone until 9/9, bmp on 9/9.   S/p PPM: follows with device clinic. No recent concerns.   CKD stage 3: Cr 1.39, gfr 36 on 8/1.  Bmp today.    H/o Type 2 DM: No meds, no monitoring needed. Diet controlled. No recent concerns.   Dyslipidemia: On aspirin, atorvastatin.   HTN: SBP 130s, On lopressor, bumex.  hold parameters for lopressor. Stable.  GERD: On omeprazole. No concerns today.   Vitamin d deficiency: On vitamin d.  Hypokalemia: On kcl. Increased due to increase bumex and metolazone burst.   PAF, s/p AVR: Regular rate and rhythm today. On metoprolol. F/u with caridology. No concerns.   Anemia of CKD: on iron. Hg 10.9 on 7/24.   Hg 8.8 on 8/1. Hg 9 on 8/5. Recheck on 8/17 8.2, iron to three times a day. recheck on 8/24-hg 8.4.   Candidal intertrigo:  Nystatin.   Constipation: senna daily, miralax daily prn.     PT: trsf min-max A, walking 25ft with trang walker with Min A, OT: LE & hand lymph, mod A UB drsg, max A toileting. Lives with granddaughter. Yellow tag recommend 2 weeks    Electronically signed by: Jennifer Valdes NP

## 2021-06-20 NOTE — LETTER
Letter by Jennfier Valdes NP at      Author: Jennifer Valdes NP Service: -- Author Type: --    Filed:  Encounter Date: 2020 Status: (Other)         Patient: Thea Acosta   MR Number: [id number]   YOB: 1934   Date of Visit: 2020                 [facility]    DATE: 2020    NAME:  Thea Acosta             :  1934  MRN: [id number]  CODE STATUS:  Full code    VISIT TYPE: Problem Visit (hallucinations)     FACILITY:  [facility] [[id number]]       CHIEF COMPLAIN/REASON FOR VISIT:    Chief Complaint   Patient presents with   ? Problem Visit     hallucinations               HISTORY OF PRESENT ILLNESS: Thea Acosta is a 86 y.o. female who [facility] at [facility] after fall and shoulder dislocation then admitted - for acute metabolic encephalopathy. This was felt to be s/t hypothyroidism and noncompliance with levothyroxine dosing. She did have some acute hypoxia and required intermittent o2 supplementation. She was recommended further [facility]cu stay for rehab. She has PMH of CAD, CKD, DM, s/p PPM, diastolic CHF, pulmonary artery hypertension, mitral valve stenosis, chronic a fib no anticoagulation, s/p AVR, asthma. Prior to this she lived at home with her grand daughter.     Today Ms. Acosta is seen for concerns of hallucinations. Staff report this has been going on for 3-4 days now. She was seeing people in her window and a train going by her window (she is on the 3rd floor). Staff also note she has some yeast infection under her breasts and armpits. No labs or urine check has been ordered for the hallucinations. On exam she is seen in her room sitting in her wheelchair. She says her hallucinations were better today  And last night. She says they are not frightening and usually just seeing people waving at her that aren't there. She is not wearing oxygen today and thinks her breathing is fine. She denies cough, shortness of breath,  fever or other concerns today. She thinks her bowels are moving too good and appetite is great. She has minimal pain in her shoulder. She thinks otherwise she is doing fine. Per nursing she is taking her meds as prescribed. She is not needing oxygen today with o2 sat of 94%. Thea does mention today that she has gotten confused and had hallucinations in past with narcotics but she is not currently on any. She denies urinary complaints but we will check her urine for uti.         REVIEW OF SYSTEMS:  PROBLEMS AND REVIEW OF SYSTEMS:   Review of Systems  Today on ROS:   Currently, no fever, chills, or rigors. Decreased vision and hearing. Denies any chest pain, headaches, palpitations, lightheadedness, dizziness, no cough, no sob. Appetite is good.  Denies any abdominal pain. No active bleeding. Positive for urinary incontinence, leg swelling, left arm and shoulder swelling, immobilizer in place, no nausea or vomiting, no constipation, hallucinations, rash under breasts and in armpit      Allergies   Allergen Reactions   ? Codeine Hives   ? Codeine Phosphate (Bulk)      This allergy is per [facility] records.   ? Codeine Sulfate      This allergy is per [facility] records.   ? Codeine-Butalbital-Asa-Caff      This allergy is per [facility] records.   ? Codeine-Guaifenesin      This allergy is per [facility] records.   ? Lisinopril Cough   ? Penicillins Swelling     Current Outpatient Medications   Medication Sig   ? acetaminophen (TYLENOL) 500 MG tablet Take 2 tablets (1,000 mg total) by mouth 3 (three) times a day. (Patient taking differently: Take 1,000 mg by mouth 3 (three) times a day. And daily prn.)   ? albuterol (PROAIR HFA;PROVENTIL HFA;VENTOLIN HFA) 90 mcg/actuation inhaler Inhale 2 puffs every 4 (four) hours as needed for wheezing.   ? albuterol (PROVENTIL) 2.5 mg /3 mL (0.083  %) nebulizer solution Take 3 mL (2.5 mg total) by nebulization every 4 (four) hours as needed for wheezing or shortness of breath.   ? aspirin 81 MG EC tablet Take 81 mg by mouth daily.   ? atorvastatin (LIPITOR) 80 MG tablet TAKE 1 TABLET(80 MG) BY MOUTH DAILY   ? bumetanide (BUMEX) 2 MG tablet Take 1.5 tablets (3 mg total) by mouth 2 (two) times a day at 9am and 6pm.   ? cholecalciferol, vitamin D3, (VITAMIN D3) 2,000 unit Tab Take 2,000 Units by mouth once daily.   ? colchicine 0.6 mg tablet take two tablets (1.2 mg) by mouth at onset of gout symptoms, then repeat one tablet (0.6 mg) by mouth one hour later   ? ferrous sulfate 325 (65 FE) MG tablet TAKE 1 TABLET(325 MG) BY MOUTH TWICE DAILY   ? levothyroxine (SYNTHROID, LEVOTHROID) 150 MCG tablet Take 1 tablet (150 mcg total) by mouth daily before supper.   ? lidocaine (XYLOCAINE) 5 % ointment Apply to painful areas (Patient taking differently: Apply 1 application topically 2 (two) times a day as needed. Apply to painful areas)   ? nystatin (MYCOSTATIN) powder Apply topically 4 (four) times a day.   ? omeprazole (PRILOSEC) 20 MG capsule Take 1 capsule (20 mg total) by mouth daily before breakfast.   ? potassium chloride (K-DUR,KLOR-CON) 20 MEQ tablet Take 20 mEq by mouth daily.    ? predniSONE (DELTASONE) 2.5 MG tablet Take 7.5 mg/d prednisone PO for 3 weeks.   ? white petrolatum (AQUAPHOR ORIGINAL) 41 % Oint Apply 1 application topically at bedtime.     Past Medical History:    Past Medical History:   Diagnosis Date   ? Acute kidney injury superimposed on CKD (H) 3/3/2017   ? Asthma    ? CAD (coronary artery disease)    ? Cataract    ? Chronic kidney disease     ckd3   ? Diabetes mellitus (H)    ? Disease of thyroid gland    ? H/O heart valve replacement with bioprosthetic valve    ? History of transfusion    ? Hypertension    ? Normochromic normocytic anemia    ? Pulmonary hypertension (H) 09/27/2019    Noted on echocardiogram   ? Restless leg syndrome             PHYSICAL EXAMINATION  Vitals:    07/27/20 0700   BP: 112/59   Pulse: 67   Resp: 18   Temp: (!) 96.4  F (35.8  C)   SpO2: 94%   Weight: 206 lb (93.4 kg)       Today on physical exam:     GENERAL: Awake, Alert, obese,  not in any form of acute distress, answers questions appropriately, follows simple commands, conversant  HEENT: Head is normocephalic with normal hair distribution. No evidence of trauma. Ears: No acute purulent discharge. Eyes: Conjunctivae pink with no scleral jaundice. Nose: Normal mucosa and septum. NECK: Supple with no cervical or supraclavicular lymphadenopathy. Trachea is midline. [place], decreased vision  CHEST: No tenderness or deformity, no crepitus, left chest PPM  LUNG: Dim but clear today to auscultation.   No shortness of breath noted today, no cough noted  BACK: No kyphosis of the thoracic spine. Symmetric, no curvature, ROM normal, no CVA tenderness, no spinal tenderness   CVS: irregularly irregular rhythm, murmur,  2+ pulses symmetric in all extremities.  ABDOMEN: Rounded and soft, nontender to palpation, non distended, no masses, no organomegaly, good bowel sounds, no rebound or guarding, no peritoneal signs.   EXTREMITIES: 1+ ble nonpitting edema, left upper extremity 2+ edema, shoulder immobilizer in place  SKIN: Warm and dry  NEUROLOGICAL: The patient is oriented to person, place and time. Confused at times, oriented on exam. Forgetful at times. Visual hallucinations, no auditory hallucinations noted            LABS:   Recent Results (from the past 168 hour(s))   Vitamin B12   Result Value Ref Range    Vitamin B-12 333 213 - 816 pg/mL   HM2(CBC w/o Differential)   Result Value Ref Range    WBC 8.4 4.0 - 11.0 thou/uL    RBC 3.59 (L) 3.80 - 5.40 mill/uL    Hemoglobin 10.9 (L) 12.0 - 16.0 g/dL    Hematocrit 35.9 35.0 - 47.0 %     80 - 100 fL    MCH 30.4 27.0 - 34.0 pg    MCHC 30.4 (L) 32.0 - 36.0 g/dL    RDW 14.3 11.0 - 14.5 %    Platelets 227 140 - 440 thou/uL    MPV  10.9 8.5 - 12.5 fL   Comprehensive Metabolic Panel   Result Value Ref Range    Sodium 142 136 - 145 mmol/L    Potassium 3.6 3.5 - 5.0 mmol/L    Chloride 97 (L) 98 - 107 mmol/L    CO2 32 (H) 22 - 31 mmol/L    Anion Gap, Calculation 13 5 - 18 mmol/L    Glucose 108 70 - 125 mg/dL    BUN 55 (H) 8 - 28 mg/dL    Creatinine 1.56 (H) 0.60 - 1.10 mg/dL    GFR MDRD Af Amer 38 (L) >60 mL/min/1.73m2    GFR MDRD Non Af Amer 31 (L) >60 mL/min/1.73m2    Bilirubin, Total 0.9 0.0 - 1.0 mg/dL    Calcium 9.7 8.5 - 10.5 mg/dL    Protein, Total 7.4 6.0 - 8.0 g/dL    Albumin 3.4 (L) 3.5 - 5.0 g/dL    Alkaline Phosphatase 67 45 - 120 U/L    AST 23 0 - 40 U/L    ALT 17 0 - 45 U/L     Results for orders placed or performed during the hospital encounter of 07/18/20   Basic metabolic panel   Result Value Ref Range    Sodium 141 136 - 145 mmol/L    Potassium 4.1 3.5 - 5.0 mmol/L    Chloride 95 (L) 98 - 107 mmol/L    CO2 34 (H) 22 - 31 mmol/L    Anion Gap, Calculation 12 5 - 18 mmol/L    Glucose 108 70 - 125 mg/dL    Calcium 9.4 8.5 - 10.5 mg/dL    BUN 49 (H) 8 - 28 mg/dL    Creatinine 1.67 (H) 0.60 - 1.10 mg/dL    GFR MDRD Af Amer 35 (L) >60 mL/min/1.73m2    GFR MDRD Non Af Amer 29 (L) >60 mL/min/1.73m2         Lab Results   Component Value Date    WBC 8.4 07/24/2020    HGB 10.9 (L) 07/24/2020    HCT 35.9 07/24/2020     07/24/2020     07/24/2020       Lab Results   Component Value Date    IRCRINFC39 333 07/21/2020     Lab Results   Component Value Date    HGBA1C 7.0 (H) 02/26/2020     Lab Results   Component Value Date    INR 1.30 (H) 02/07/2018    INR 1.51 (H) 03/06/2017    INR 1.27 (H) 03/05/2017     Vitamin D, Total (25-Hydroxy)   Date Value Ref Range Status   01/20/2020 45.9 30.0 - 80.0 ng/mL Final     Lab Results   Component Value Date    TSH 67.01 (H) 07/18/2020           ASSESSMENT/PLAN:     Visual hallucinations, metabolic encephalopathy: may be related to UTI, will check UA/UC. Also ordered bmp hm1 for tomorrow. Could be  related to hypothyroidism but unclear. No narcotic use. Med review and no other concerning medications to contribute.   Acute hypoxic respiratory failure, asthma: off o2 today, o2 sat 94%. Dc o2 orders and monitor. Encourage cough  And deep breathe. IS q1h while awake. Albuterol prn. On prednisone x 3 weeks.   Hypothyroid: felt to be noncompliant with levothyroxine dosing, tsh 67. Felt to be contributing to metabolic encephalopathy. Now being compliant with staff. Will need repeat tsh in 6 weeks.   Left shoulder dislocation: immobilizer in place, pt, ot following. Tylenol three times a day and will add daily prn for pain control.   chronic CHF: Daily [id number]. No shortness of breath today. 1+ ble edema. On bumex 2mg two times a day, keep legs elevated when possible.  F/u with cardiology prn. Changed diet from regular to cardiac, low sodium. Daily weights.   S/p PPM: follows with device clinic. No recent concerns.   CKD stage 3: Cr 1.56 on 7/24.   H/o Type 2 DM: No meds, no monitoring needed. Diet controlled. No recent concerns.   Dyslipidemia: On aspirin, atorvastatin.   HTN: SBP 110s, On lopressor, bumex.  hold parameters for lopressor. Stable.  GERD: On omeprazole. No concerns today.   Vitamin d deficiency: On vitamin d.  Hypokalemia: On kcl.  PAF, s/p AVR: Regular rate and rhythm today. On metoprolol. F/u with caridology. No concenrs.   Anemia of CKD: on iron. No recent concerns. Hg 10.9 on 7/24.     Candidal intertrigo:  Already on nystatin four times a day.      Electronically signed by: Jennifer Valdes NP

## 2021-06-20 NOTE — LETTER
Letter by Jennifer Valdes NP at      Author: Jennifer Valdes NP Service: -- Author Type: --    Filed:  Encounter Date: 2020 Status: (Other)         Patient: Thea Acosta   MR Number: [id number]   YOB: 1934   Date of Visit: 2020                 [facility]    DATE: 2020    NAME:  Thea Acosta             :  1934  MRN: [id number]  CODE STATUS:  DNR    VISIT TYPE: Problem Visit (nausea, constipation)     FACILITY:  [facility] [[id number]]       CHIEF COMPLAIN/REASON FOR VISIT:    Chief Complaint   Patient presents with   ? Problem Visit     nausea, constipation               HISTORY OF PRESENT ILLNESS: Thea Acosta is a 86 y.o. female who admitted - for acute metabolic encephalopathy. This was felt to be s/t hypothyroidism and noncompliance with levothyroxine dosing. She was readmitted - for left shoulder dislocation and underwent reverse total shoulder arthroplasty on . Postop course was uncomplicated and transferred back to [facility]. She has PMH of CAD, CKD, DM, s/p PPM, diastolic CHF, pulmonary artery hypertension, mitral valve stenosis, chronic a fib no anticoagulation, s/p AVR, asthma. Prior to this she lived at home with her grand daughter. She was readmitted 9/10- for altered mental status. She was treated for possible cellulitis and hypoxic respiratory failure. Her tsh was elevated so levothyroxine dose was increased. She was discharged back to [facility]cu.     Today Ms. Acosta is seen today for nausea and constipation. She says she has not eaten much today because she has felt like she is going to throw up. She thinks they gave her something for it but it has only helped a little. She has no sore throat, heartburn, cough or breathing issues. She thinks she has a runny nose but no fever or chills. She is having constipation. She thinks she took some stool softeners today. She is not having any abdominal  "pain or bloating. She feels clear headed today and denies any hallucinations or confusion. She says her arms are both sore today. Her legs feel fine. She thinks her legs look the best they have in a long time. She has velcro wraps on today instead of the lymphedema wraps. Nursing had requested clarification of her code status and we confirmed she is DNR. Hospital had changed her back to full code but was DNR during last tcu stay. She is asking today if she can get her oxygen off. She thinks it might be contributing to her runny nose and nausea. Her o2 sat was 97% this morning so did turn her off and requested nursing to check her in a little bit.     REVIEW OF SYSTEMS:  PROBLEMS AND REVIEW OF SYSTEMS:   Review of Systems  Today on ROS:   Currently, no fever, chills, or rigors. Decreased vision and hearing. Denies any chest pain, headaches, palpitations, lightheadedness, dizziness, no cough. Appetite is good.  Denies any abdominal pain. No active bleeding. Positive for urinary incontinence, urinary frequency, leg swelling, left shoulder incision, nausea, no constipation, pain controlled, velcro leg wraps, shortness of breath improved      Allergies   Allergen Reactions   ? Tramadol Anxiety and Other (See Comments)     Per patient, Thea developed psychosis and severe anxiety and agitation \"for three days\" after receiving tramadol in the past. She stated that she would \"never\" take it again and would be extremely upset if she receives it. She asked me to add this to her chart's allergy list specifically.    ? Codeine Hives   ? Codeine Phosphate (Bulk)      This allergy is per [facility] [place] records.   ? Codeine Sulfate      This allergy is per [facility] [place] records.   ? Codeine-Butalbital-Asa-Caff      This allergy is per [facility] [place] records.   ? Codeine-Guaifenesin      This allergy is per [facility] [place] records.   ? Lisinopril Cough   ? Penicillins Swelling     Current Outpatient Medications   Medication Sig   ? acetaminophen (TYLENOL) 500 MG tablet Take 2 tablets (1,000 mg total) by mouth 3 (three) times a day. (Patient taking differently: Take 1,000 mg by mouth 3 (three) times a day as needed. )   ? albuterol (PROAIR HFA;PROVENTIL HFA;VENTOLIN HFA) 90 mcg/actuation inhaler Inhale 2 puffs every 4 (four) hours as needed for wheezing.   ? atorvastatin (LIPITOR) 80 MG tablet TAKE 1 TABLET(80 MG) BY MOUTH DAILY   ? bumetanide (BUMEX) 2 MG tablet Take 1 tablet (2 mg total) by mouth 2 (two) times a day at 9am and 6pm.   ? colchicine 0.6 mg tablet take two tablets (1.2 mg) by mouth at onset of gout symptoms, then repeat one tablet (0.6 mg) by mouth one hour later   ? diclofenac sodium (VOLTAREN) 1 % Gel Apply 2 g topically 4 (four) times a day as needed.    ? dimethicone 5 % Crea Apply 1 application topically 2 (two) times a day as needed (to buttocks as needed for skin breakdown).   ? ferrous sulfate 325 (65 FE) MG tablet Take 1 tablet by mouth daily.   ? levothyroxine (SYNTHROID, LEVOTHROID) 200 MCG tablet Take 1 tablet (200 mcg total) by mouth daily before supper.   ? nystatin (MYCOSTATIN) powder Apply topically 4 (four) times a day.   ? omeprazole (PRILOSEC) 20 MG capsule Take 1 capsule (20 mg total) by mouth daily before breakfast.   ? ondansetron (ZOFRAN-ODT) 4 MG disintegrating tablet Take 4 mg by mouth every 6 (six) hours as needed for nausea.   ? polyethylene glycol (MIRALAX) 17 gram packet Take 17 g by mouth daily.    ? potassium chloride (K-DUR,KLOR-CON) 20 MEQ tablet Take 20 mEq by mouth daily. At 1400   ? potassium chloride (K-DUR,KLOR-CON) 20 MEQ tablet Take 40 mEq by mouth daily before breakfast.   ? senna (SENOKOT) 8.6 mg tablet Take 1 tablet by mouth daily. Hold for loose stools   ? sulfamethoxazole-trimethoprim (SEPTRA) 400-80 mg per tablet Take 1 tablet by mouth 2 (two) times a day for 2  days.   ? white petrolatum (AQUAPHOR ORIGINAL) 41 % Oint Apply 1 application topically at bedtime.     Past Medical History:    Past Medical History:   Diagnosis Date   ? Acute kidney injury superimposed on CKD (H) 3/3/2017   ? Asthma    ? CAD (coronary artery disease)    ? Cataract    ? Chronic kidney disease     ckd3   ? COPD (chronic obstructive pulmonary disease) (H)    ? Diabetes mellitus (H)    ? Disease of thyroid gland    ? H/O heart valve replacement with bioprosthetic valve    ? History of transfusion    ? Hypertension    ? Normochromic normocytic anemia    ? Pulmonary hypertension (H) 09/27/2019    Noted on echocardiogram   ? Restless leg syndrome            PHYSICAL EXAMINATION  Vitals:    09/14/20 0700   BP: 138/75   Pulse: (!) 59   Resp: 18   Temp: 96.6  F (35.9  C)   SpO2: 97%   Weight: (!) 230 lb (104.3 kg)       Today on physical exam:     GENERAL: lethargic, obese,  not in any form of acute distress, answers questions appropriately, follows simple commands, conversant  HEENT: Head is normocephalic with normal hair distribution. No evidence of trauma. Ears: No acute purulent discharge. Eyes: Conjunctivae pink with no scleral jaundice. Nose: Normal mucosa and septum. NECK: Supple with no cervical or supraclavicular lymphadenopathy. Trachea is midline. [place], decreased vision, clear rhinorrhea  CHEST: No tenderness or deformity, no crepitus, left chest PPM  LUNG: Dim but clear today to auscultation.   Shortness of breath with exertion, no cough noted  BACK: No kyphosis of the thoracic spine. Symmetric, no curvature, ROM normal, no CVA tenderness, no spinal tenderness   CVS: irregularly irregular rhythm, murmur,  2+ pulses symmetric in all extremities.  ABDOMEN: Rounded and soft, nontender to palpation, non distended, no masses, no organomegaly, good bowel sounds, no rebound or guarding, no peritoneal signs.   EXTREMITIES: 1+ ble nonpitting edema, velcro leg wraps, left shoulder incision c/d/i, no sling,  rl discoloration ble  SKIN: Warm and dry  NEUROLOGICAL: The patient is oriented to person, place and time. No confusion or hallucinations            LABS:   Recent Results (from the past 168 hour(s))   Basic Metabolic Panel   Result Value Ref Range    Sodium 141 136 - 145 mmol/L    Potassium 3.3 (L) 3.5 - 5.0 mmol/L    Chloride 85 (L) 98 - 107 mmol/L    CO2 42 (HH) 22 - 31 mmol/L    Anion Gap, Calculation 14 5 - 18 mmol/L    Glucose 85 70 - 125 mg/dL    Calcium 10.3 8.5 - 10.5 mg/dL    BUN 45 (H) 8 - 28 mg/dL    Creatinine 1.76 (H) 0.60 - 1.10 mg/dL    GFR MDRD Af Amer 33 (L) >60 mL/min/1.73m2    GFR MDRD Non Af Amer 27 (L) >60 mL/min/1.73m2   Hemoglobin   Result Value Ref Range    Hemoglobin 10.2 (L) 12.0 - 16.0 g/dL   Urinalysis   Result Value Ref Range    Color, UA Straw Colorless, Yellow, Straw, Light Yellow    Clarity, UA Clear Clear    Glucose, UA Negative Negative    Bilirubin, UA Negative Negative    Ketones, UA Negative Negative    Specific Gravity, UA 1.007 1.001 - 1.030    Blood, UA Negative Negative    pH, UA 7.5 4.5 - 8.0    Protein, UA Negative Negative mg/dL    Urobilinogen, UA <2.0 E.U./dL <2.0 E.U./dL, 2.0 E.U./dL    Nitrite, UA Negative Negative    Leukocytes, UA Negative Negative   Comprehensive metabolic panel   Result Value Ref Range    Sodium 141 136 - 145 mmol/L    Potassium 3.5 3.5 - 5.0 mmol/L    Chloride 87 (L) 98 - 107 mmol/L    CO2 41 (HH) 22 - 31 mmol/L    Anion Gap, Calculation 13 5 - 18 mmol/L    Glucose 106 70 - 125 mg/dL    BUN 49 (H) 8 - 28 mg/dL    Creatinine 1.92 (H) 0.60 - 1.10 mg/dL    GFR MDRD Af Amer 30 (L) >60 mL/min/1.73m2    GFR MDRD Non Af Amer 25 (L) >60 mL/min/1.73m2    Bilirubin, Total 1.2 (H) 0.0 - 1.0 mg/dL    Calcium 10.1 8.5 - 10.5 mg/dL    Protein, Total 7.3 6.0 - 8.0 g/dL    Albumin 3.6 3.5 - 5.0 g/dL    Alkaline Phosphatase 88 45 - 120 U/L    AST 20 0 - 40 U/L    ALT 10 0 - 45 U/L   Protime-INR   Result Value Ref Range    INR 1.11 (H) 0.90 - 1.10   Troponin I    Result Value Ref Range    Troponin I 0.08 0.00 - 0.29 ng/mL   Blood Gases, Venous   Result Value Ref Range    pH, Venous 7.48 (H) 7.35 - 7.45    pCO2, Venous 63 (H) 35 - 50 mm Hg    pO2, Dandre 43 25 - 47 mm Hg    Base Excess, Venous 23.1 mmol/L    HCO3, Venous 44.1 (H) 24.0 - 30.0 mmol/L    Oxyhemoglobin 76.8 (H) 70.0 - 75.0 %    O2 Sat, Venous 78.8 (H) 70.0 - 75.0 %   Lactic Acid   Result Value Ref Range    Lactic Acid 1.5 0.7 - 2.0 mmol/L   Blood culture from PERIPHERAL SITE    Specimen: Vein, Peripheral; Blood   Result Value Ref Range    Anaerobic Blood Culture Bottle No Growth No Growth, No organisms seen, bottle returned to instrument, Specimen not received, No Growth at 24 hours, No Growth at 48 hours, No Growth at 72 hours, No Growth at 96 hours, No Growth at 120 hours    Aerobic Blood Culture Bottle No Growth No Growth, No organisms seen, bottle returned to instrument, Specimen not received, No Growth at 24 hours, No Growth at 120 hours, No Growth at 48 hours, No Growth at 72 hours, No Growth at 96 hours   C-Reactive Protein   Result Value Ref Range    CRP 0.8 0.0 - 0.8 mg/dL   D-dimer, Quantitative   Result Value Ref Range    D-Dimer, Quant 1.18 (H) <=0.50 FEU ug/mL   HM1 (CBC with Diff)   Result Value Ref Range    WBC 6.0 4.0 - 11.0 thou/uL    RBC 3.24 (L) 3.80 - 5.40 mill/uL    Hemoglobin 10.1 (L) 12.0 - 16.0 g/dL    Hematocrit 33.0 (L) 35.0 - 47.0 %     (H) 80 - 100 fL    MCH 31.2 27.0 - 34.0 pg    MCHC 30.6 (L) 32.0 - 36.0 g/dL    RDW 14.7 (H) 11.0 - 14.5 %    Platelets 170 140 - 440 thou/uL    MPV 10.6 8.5 - 12.5 fL    Neutrophils % 70 50 - 70 %    Lymphocytes % 16 (L) 20 - 40 %    Monocytes % 9 2 - 10 %    Eosinophils % 5 0 - 6 %    Basophils % 1 0 - 2 %    Immature Granulocyte % 0 <=0 %    Neutrophils Absolute 4.2 2.0 - 7.7 thou/uL    Lymphocytes Absolute 1.0 0.8 - 4.4 thou/uL    Monocytes Absolute 0.5 0.0 - 0.9 thou/uL    Eosinophils Absolute 0.3 0.0 - 0.4 thou/uL    Basophils Absolute 0.0  0.0 - 0.2 thou/uL    Immature Granulocyte Absolute 0.0 <=0.0 thou/uL   BNP(B-type Natriuretic Peptide)   Result Value Ref Range     0 - 167 pg/mL   ECG 12 lead nursing unit performed   Result Value Ref Range    SYSTOLIC BLOOD PRESSURE 108 mmHg    DIASTOLIC BLOOD PRESSURE 52 mmHg    VENTRICULAR RATE 60 BPM    ATRIAL RATE 60 BPM    P-R INTERVAL      QRS DURATION 170 ms    Q-T INTERVAL 554 ms    QTC CALCULATION (BEZET) 554 ms    P Axis      R AXIS 98 degrees    T AXIS 260 degrees    MUSE DIAGNOSIS       Wide QRS rhythm  Rightward axis  Left bundle branch block  Abnormal ECG  When compared with ECG of 28-JUL-2020 15:43,  Wide QRS rhythm has replaced Electronic ventricular pacemaker  Confirmed by SEE ED PROVIDER NOTE FOR, ECG INTERPRETATION ([id number]),  Joana Bernard ([id number]) on 9/14/2020 1:01:54 PM     Urinalysis-UC if Indicated   Result Value Ref Range    Color, UA Yellow Colorless, Yellow, Straw, Light Yellow    Clarity, UA Clear Clear    Glucose, UA Negative Negative    Bilirubin, UA Negative Negative    Ketones, UA Negative Negative    Specific Gravity, UA 1.005 1.001 - 1.030    Blood, UA Small (!) Negative    pH, UA 7.0 4.5 - 8.0    Protein, UA Negative Negative mg/dL    Urobilinogen, UA <2.0 E.U./dL <2.0 E.U./dL, 2.0 E.U./dL    Nitrite, UA Negative Negative    Leukocytes, UA Negative Negative    Bacteria, UA None Seen None Seen hpf    RBC, UA 0-2 None Seen, 0-2 hpf    WBC, UA 0-5 None Seen, 0-5 hpf    Squam Epithel, UA 5-10 (!) None Seen, 0-5 lpf    Mucus, UA Few (!) None Seen lpf    Hyaline Casts, UA 0-5 0-5, None Seen lpf   COVID-19 Virus PCR MRF    Specimen: Respiratory   Result Value Ref Range    COVID-19 VIRUS SPECIMEN SOURCE Nasopharyngeal     2019-nCOV Not Detected    Blood Gases, Arterial   Result Value Ref Range    pH, Arterial 7.46 (H) 7.37 - 7.44    pCO2, Arterial 67 (H) 35 - 45 mm Hg    pO2, Arterial 78 75 - 85 mm Hg    Bicarbonate, Arterial Calc 44.9 (H) 23.0 - 29.0 mmol/L    O2 Sat,  Arterial 96.2 (H) 95.0 - 96.0 %    Oxyhemoglobin 93.7 (L) 95.0 - 96.0 %    Base Excess, Arterial Calc 23.4 mmol/L    Ventilation Mode Nasal cannula     Flow 1.0 LPM    Sample Stabilized Temperature 37.0 degrees C   Troponin I   Result Value Ref Range    Troponin I 0.07 0.00 - 0.29 ng/mL   Basic metabolic panel   Result Value Ref Range    Sodium 141 136 - 145 mmol/L    Potassium 3.0 (L) 3.5 - 5.0 mmol/L    Chloride 89 (L) 98 - 107 mmol/L    CO2 39 (H) 22 - 31 mmol/L    Anion Gap, Calculation 13 5 - 18 mmol/L    Glucose 74 70 - 125 mg/dL    Calcium 9.4 8.5 - 10.5 mg/dL    BUN 46 (H) 8 - 28 mg/dL    Creatinine 1.59 (H) 0.60 - 1.10 mg/dL    GFR MDRD Af Amer 37 (L) >60 mL/min/1.73m2    GFR MDRD Non Af Amer 31 (L) >60 mL/min/1.73m2   Hemogram with PLT   Result Value Ref Range    WBC 4.1 4.0 - 11.0 thou/uL    RBC 3.03 (L) 3.80 - 5.40 mill/uL    Hemoglobin 9.5 (L) 12.0 - 16.0 g/dL    Hematocrit 31.2 (L) 35.0 - 47.0 %     (H) 80 - 100 fL    MCH 31.4 27.0 - 34.0 pg    MCHC 30.4 (L) 32.0 - 36.0 g/dL    RDW 14.8 (H) 11.0 - 14.5 %    Platelets 137 (L) 140 - 440 thou/uL    MPV 10.6 8.5 - 12.5 fL   Thyroid Cascade   Result Value Ref Range    TSH 9.38 (H) 0.30 - 5.00 uIU/mL   Reticulocytes   Result Value Ref Range    Retic Absolute Count 0.089 0.010 - 0.110 mill/uL    Retic Ct Pct 2.87 (H) 0.8 - 2.7 %   T4, Free   Result Value Ref Range    Free T4 1.0 0.7 - 1.8 ng/dL   Potassium   Result Value Ref Range    Potassium 3.3 (L) 3.5 - 5.0 mmol/L   Magnesium   Result Value Ref Range    Magnesium 2.1 1.8 - 2.6 mg/dL   Potassium   Result Value Ref Range    Potassium 3.8 3.5 - 5.0 mmol/L   Basic Metabolic Panel   Result Value Ref Range    Sodium 141 136 - 145 mmol/L    Potassium 3.6 3.5 - 5.0 mmol/L    Chloride 91 (L) 98 - 107 mmol/L    CO2 40 (H) 22 - 31 mmol/L    Anion Gap, Calculation 10 5 - 18 mmol/L    Glucose 99 70 - 125 mg/dL    Calcium 9.4 8.5 - 10.5 mg/dL    BUN 46 (H) 8 - 28 mg/dL    Creatinine 1.57 (H) 0.60 - 1.10 mg/dL     GFR MDRD Af Amer 38 (L) >60 mL/min/1.73m2    GFR MDRD Non Af Amer 31 (L) >60 mL/min/1.73m2   HM2(CBC w/o Differential)   Result Value Ref Range    WBC 5.7 4.0 - 11.0 thou/uL    RBC 2.99 (L) 3.80 - 5.40 mill/uL    Hemoglobin 9.5 (L) 12.0 - 16.0 g/dL    Hematocrit 30.8 (L) 35.0 - 47.0 %     (H) 80 - 100 fL    MCH 31.8 27.0 - 34.0 pg    MCHC 30.8 (L) 32.0 - 36.0 g/dL    RDW 14.6 (H) 11.0 - 14.5 %    Platelets 159 140 - 440 thou/uL    MPV 10.8 8.5 - 12.5 fL   Hepatic Profile   Result Value Ref Range    Bilirubin, Total 0.7 0.0 - 1.0 mg/dL    Bilirubin, Direct 0.3 <=0.5 mg/dL    Protein, Total 6.9 6.0 - 8.0 g/dL    Albumin 3.3 (L) 3.5 - 5.0 g/dL    Alkaline Phosphatase 76 45 - 120 U/L    AST 22 0 - 40 U/L    ALT <9 0 - 45 U/L   Cortisol   Result Value Ref Range    Cortisol 10.9 ug/dL   Folate, Serum   Result Value Ref Range    Folate 13.3 >=3.5 ng/mL   Vitamin B12   Result Value Ref Range    Vitamin B-12 468 213 - 816 pg/mL   Magnesium   Result Value Ref Range    Magnesium 2.0 1.8 - 2.6 mg/dL   Echo Complete   Result Value Ref Range    LV volume diastolic 58.4 46 - 106 cm3    LV volume systolic 25.5 14 - 42 cm3    HR 60 bpm    IVSd 1.22 (!) 0.6 - 0.9 cm    LVIDd 4.66 3.8 - 5.2 cm    LVIDs 2.95 2.2 - 3.5 cm    LVOT diam 1.9 cm    LVOT mean gradient 3 mmHg    LVOT peak VTI 29.8 cm    LVOT mean elena 73.1 cm/s    LVOT peak elena 115 cm/s    LVOT peak gradient 5 mmHg    LV PWd 1.36 (!) 0.6 - 0.9 cm    MV E' lat elena 9.67 cm/s    MV E' med elena 6.19 cm/s    AV mean elena 124 cm/s    AV mean gradient 7 mmHg    AV VTI 50.1 cm    AV peak elena 190 cm/s    AO root 2.7 cm    AO ascending 3.1 cm    LA size 3.8 cm    MV decel slope 5,720 mm/s2    MV decel time 363 ms    MV P 1/2 time 106 ms    MV peak E elena 207 cm/s    MV mean elena 100 cm/s    MV mean gradient 6 mmHg    MV VTI 54.6 cm    MV peak Velocity 215 cm/s    TR peak elena 353 cm/s    Remi 67 in    Weight 3,323.2 lbs    /57 mmHg    IVS/PW ratio 0.9     LV FS 36.7 28 - 44 %     Echo LVEF calculated 56 55 - 75 %    LA volume 123.0 mL    LV mass 232.2 g    MV area p 1/2 time 2.1 cm2    LVOT area 2.83 cm2    LVOT SV 84.4 cm3    LV CO 5.1 l/min    LA area 2 34.2 cm2    LA area 1 29.2 cm2    Height 67.0 in    Weight 208 lbs    LA length 6.9 cm    BSA 2.11 m2    TR peak gradent 49.8 mmHg    AV area 1.7 cm2    AV DIM IND elena 0.6     MV area cont eq 1.5 cm2    AV peak gradient 14.4 mmHg    MV peak gradient 18.5 mmHg    LV systolic volume index 12.1 8 - 24 cm3/m2    LV diastolic volume index 27.7 29 - 61 cm3/m2    LA volume index 58.3 mL/m2    LV mass index 110.0 g/m2    LV SVi 40.0 ml/m2    TAPSE 1.7 cm    MV med E/e' ratio 33.4     MV lat E/e' ratio 21.4     LV Ci 2.4 l/min/m2    MV Avg E/e' Ratio 26.1 cm/s    AV DIM IND VTI 0.6     MVA VTI 1.55 cm2   Potassium - Next AM   Result Value Ref Range    Potassium 3.8 3.5 - 5.0 mmol/L   Basic Metabolic Panel   Result Value Ref Range    Sodium 142 136 - 145 mmol/L    Potassium 3.6 3.5 - 5.0 mmol/L    Chloride 93 (L) 98 - 107 mmol/L    CO2 40 (H) 22 - 31 mmol/L    Anion Gap, Calculation 9 5 - 18 mmol/L    Glucose 87 70 - 125 mg/dL    Calcium 10.0 8.5 - 10.5 mg/dL    BUN 39 (H) 8 - 28 mg/dL    Creatinine 1.77 (H) 0.60 - 1.10 mg/dL    GFR MDRD Af Amer 33 (L) >60 mL/min/1.73m2    GFR MDRD Non Af Amer 27 (L) >60 mL/min/1.73m2     Results for orders placed or performed during the hospital encounter of 09/10/20   Basic Metabolic Panel   Result Value Ref Range    Sodium 141 136 - 145 mmol/L    Potassium 3.6 3.5 - 5.0 mmol/L    Chloride 91 (L) 98 - 107 mmol/L    CO2 40 (H) 22 - 31 mmol/L    Anion Gap, Calculation 10 5 - 18 mmol/L    Glucose 99 70 - 125 mg/dL    Calcium 9.4 8.5 - 10.5 mg/dL    BUN 46 (H) 8 - 28 mg/dL    Creatinine 1.57 (H) 0.60 - 1.10 mg/dL    GFR MDRD Af Amer 38 (L) >60 mL/min/1.73m2    GFR MDRD Non Af Amer 31 (L) >60 mL/min/1.73m2         Lab Results   Component Value Date    WBC 5.7 09/12/2020    HGB 9.5 (L) 09/12/2020    HCT 30.8 (L)  09/12/2020     (H) 09/12/2020     09/12/2020       Lab Results   Component Value Date    CYSVOTGO70 468 09/12/2020     Lab Results   Component Value Date    HGBA1C 6.5 (H) 07/31/2020     Lab Results   Component Value Date    INR 1.11 (H) 09/10/2020    INR 0.99 07/28/2020    INR 1.30 (H) 02/07/2018     Vitamin D, Total (25-Hydroxy)   Date Value Ref Range Status   01/20/2020 45.9 30.0 - 80.0 ng/mL Final     Lab Results   Component Value Date    TSH 9.38 (H) 09/11/2020           ASSESSMENT/PLAN:    Acute hypoxic respiratory failure: improving, changed o2 from 2LNC to RA today, o2 sat 97% on 2LNC. Encourage IS q1h while awake. Clarified o2 orders today to titrate off for sat >88%. Discussed with her and nursing.   BLE Cellulitis: appears resolved today. Complete bactrim 9/15. Edema much improved today.   Left shoulder dislocation, s/p reverse total shoulder arthroplasty: Incision c/d/i. Pain controlled on tylenol, changed to prn.  Ice prn. PT, OT following. voltaren gel prn. F/u with surgeon Dr. Ramirez on 8/14-f/u again in 4 weeks, limit external rotation, no bathing until 4 weeks postop, healing well, f/u again on 9/4-doing well, dc sling, AROM and PROM with therapy, may  Weight bear with walker, f/u again on 10/5. No recent edema concerns. Some arm soreness today.  Chronic CHF: Daily [phone] previously  Now running [phone]. Shortness of breath with exertion, edema improved.  On bumex, encourage leg elevation. Cardiac diet. velcro leg wraps today.  Discussed with nursing and notify for weight change for 2 lbs in 24 hours or 5lbs in 7 days.   S/p PPM: follows with device clinic. No recent concerns.   Asthma:  Encourage cough and deep breathe. IS q1h while awake. Albuterol prn. Shortness of breath with exertion. Stable.   Hypothyroid: on levothyroxine. tsh 9.38 on 9/11, levothyroxine dose adjusted. Will need to recheck tsh in 6 weeks.   CKD stage 3: Cr 1.39, gfr 36 on 8/1.   9/4 cr 1.9, gfr 25. Bmp today.   H/o Type 2 DM: No meds, no monitoring needed. Diet controlled. No recent concerns.   Dyslipidemia: On aspirin, atorvastatin.   HTN: SBP 130s, On lopressor, bumex.  hold parameters for lopressor. Stable.  GERD: On omeprazole. No concerns today.   Vitamin d deficiency: On vitamin d.  Hypokalemia: On kcl. Increased due to increase bumex and metolazone burst.   PAF, s/p AVR: Regular rate and rhythm today. On metoprolol. F/u with caridology. No concerns.   Anemia of CKD: on iron. Hg 10.9 on 7/24.   Hg 8.8 on 8/1. Hg 9 on 8/5. Recheck on 8/17 8.2, iron to three times a day. recheck on 8/24-hg 8.4. hg 9.7 on 9/4, hg 9.5 on 9/12. Stable.   Candidal intertrigo:  Nystatin.   Constipation: senna daily, miralax daily prn. More problems recently, changed miralax to daily  Scheduled.   Nausea: ordered zofran prn.     Bmp, hg ordered 9/18.     Therapy re eval today    Electronically signed by: Jennifer Valdes NP       Total floor/unit time spent 36 min with >50% time spent on counseling and coordination of care. Counseling regarding respiratory failure management. Coordinated care with nursing for management of chf, respiratory failure.

## 2021-06-20 NOTE — LETTER
Letter by Rojelio Farnsworth MD at      Author: Rojelio Farnsworth MD Service: -- Author Type: --    Filed:  Encounter Date: 9/8/2020 Status: (Other)         Patient: Thea Acosta   MR Number: [id number]   YOB: 1934   Date of Visit: 9/8/2020      [facility]    Facility:  [facility] [[id number]]    Code Status:  FULL CODE    Chief Complaint   Patient presents with   ? Problem Visit   :                    Patient Active Problem List   Diagnosis   ? Constipation   ? Sciatica   ? Dyspnea, unspecified type   ? Hypothyroidism   ? Type 2 diabetes mellitus with stage 3 chronic kidney disease, without long-term current use of insulin (H)   ? Hyperparathyroidism   ? Vitamin D deficiency   ? Mixed hyperlipidemia   ? Atherosclerosis of native coronary artery of native heart without angina pectoris   ? CKD (chronic kidney disease), stage III (H)   ? Osteoarthritis Of The Knee   ? Restless Legs Syndrome   ? H/O heart valve replacement with bioprosthetic valve   ? Cardiac pacemaker in situ   ? Complete heart block (H)   ? Normochromic normocytic anemia   ? Essential hypertension with goal blood pressure less than 140/90   ? Moderate persistent asthma without complication   ? PAF (paroxysmal atrial fibrillation) (H)   ? GERD (gastroesophageal reflux disease)   ? Primary insomnia   ? Hypokalemia   ? Physical deconditioning   ? Asthma   ? CHF (congestive heart failure), NYHA class I, acute, systolic (H)   ? Pulmonary hypertension (H)   ? Atrial fibrillation, unspecified type (H)   ? Status post aortic valve replacement   ? Non-rheumatic mitral valve stenosis   ? Nonrheumatic mitral valve stenosis   ? PVD (peripheral vascular disease) (H)   ? Lymphedema   ? S/p reverse total shoulder arthroplasty       History:  Thea Acosta  is an 86 year old female with history of coronary artery disease, CHF, paroxysmal atrial fibrillation, AVR, permanent  pacemaker placement, COPD, CKD 3, DM 2, hypertension, GERD,  seen for review of her multiple medical issues    Hospital course #3/most recent:  Patient was hospitalized between July 28 and August 1 after I sent her from this TCU upon discovering her dislocated left shoulder on July 28.  Shoulder reduction was achieved but could not be maintained.  She was seen by orthopedics who diagnosed chronic instability and   recommended total reverse shoulder which was completed on July 31, 2020.  Surgery was complicated by mild perioperative hypoxia.  Patient was treated with hydrocodone (since discontinued).    Hospital course #1:  Patient was hospitalized at [facility] between July 12 and July 6 following a fall with left shoulder dislocation which was reduced in the emergency room.  However she was noted to be hypoxic and was diagnosed with acute hypoxic respiratory failure felt most likely secondary to atelectasis plus opiates which are being used for pain control.  She had weaned off oxygen by the time of discharge.  She also had acute kidney injury and was noted to have metabolic alkalosis that hospital stay.  She was discharged with a creatinine of 1.4 after holding her Bumex for a brief time.     She was discharged to [facility]CU where she stayed for fewer than 48 hours due to development of acute delirium.    Hospital course #2:     She was readmitted between July 18 and July 21 again at [facility].  Hypothyroidism was felt to be the primary cause of her delirium.  TSH 67 on July 18.  She has been noncompliant with her thyroid medication quite regularly for at least months.  She was again found to be hypoxic without certain cause as she was not on opiates this time.  She did have a chest x-ray on July 20 suggesting a chronic CHF type appearance but no acute problems noted.  She again had elevation of her creatinine to a peak of 1006 discharged 1.67 with a baseline of about 1.4.        Subjective/ROS:     -augmented by discussion with facility staff involved in direct care  -limited by: Memory     Patient continues to have swollen legs.  Edema therapist has been involved with compression dressings.  She is not feeling short of breath.  She has no chest pain.  She denies orthopnea PND.  Patient's Bumex has been increased to 4 mg twice daily and she has had additional metolazone now for the better part of a week due to phase out after tomorrow's dose on September 9.  Meanwhile her creatinine has bumped up to 1.9 and her potassium is down to 3.4 on September 4.    Patient has developed right shoulder pain and immobility at the worst time the even after her left total shoulder arthroplasty.  She recalls no specific injury but has had falls and has been using the arm a lot more compensating for the inability to use the left arm.  It hurts when she flexes or abduction it.  She has been seen by therapy.    Patient reports that she otherwise is feeling well.  She says she is sleeping well without orthopnea or PND she has no chest pain abdominal pain her appetite is good no fever sweats or chills melena diarrhea bright red blood per rectum constipation dysuria    Remainder negative        Past Medical History:   Diagnosis Date   ? Acute kidney injury superimposed on CKD (H) 3/3/2017   ? Asthma    ? CAD (coronary artery disease)    ? Cataract    ? Chronic kidney disease     ckd3   ? COPD (chronic obstructive pulmonary disease) (H)    ? Diabetes mellitus (H)    ? Disease of thyroid gland    ? H/O heart valve replacement with bioprosthetic valve    ? History of transfusion    ? Hypertension    ? Normochromic normocytic anemia    ? Pulmonary hypertension (H) 09/27/2019    Noted on echocardiogram   ? Restless leg syndrome      Past Surgical History:   Procedure Laterality Date   ? APPENDECTOMY     ? CHOLECYSTECTOMY     ? EYE SURGERY Bilateral     Cataracts   ? HIP PINNING Right 3/1/2017    Procedure: RIGHT HIP TROCHANTERIC  ARTHUR;  Surgeon: Moshe Davies MD;  Location: [facility] Main OR;  Service:    ? INSERT / REPLACE / REMOVE PACEMAKER  2007    guidant   ? INSERT / REPLACE / REMOVE PACEMAKER  2018    phoebe sci gen change   ? JOINT REPLACEMENT Left     knee   ? DE RECONSTR TOTAL SHOULDER IMPLANT Left 2020    Procedure: LEFT REVERSE TOTAL SHOULDER ARTHROPLASTY;  Surgeon: Alvarez Palomino MD;  Location: [facility] Main OR;  Service: Orthopedics   ? TISSUE AORTIC VALVE REPLACEMENT  2007               Family History   Problem Relation Age of Onset   ? No Medical Problems Mother         age 86   ? No Medical Problems Father         MVA   ? Cancer Brother         lung   ? No Medical Problems Brother    :       Social History     Socioeconomic History   ? Marital status:      Spouse name: Not on file   ? Number of children: Not on file   ? Years of education: Not on file   ? Highest education level: Not on file   Occupational History   ? Occupation:  in operating room     Comment: retired   Social Needs   ? Financial resource strain: Not on file   ? Food insecurity     Worry: Not on file     Inability: Not on file   ? Transportation needs     Medical: Not on file     Non-medical: Not on file   Tobacco Use   ? Smoking status: Former Smoker     Packs/day: 0.00     Last attempt to quit: 1950     Years since quittin.0   ? Smokeless tobacco: Never Used   Substance and Sexual Activity   ? Alcohol use: No   ? Drug use: No   ? Sexual activity: Never   Lifestyle   ? Physical activity     Days per week: Not on file     Minutes per session: Not on file   ? Stress: Not on file   Relationships   ? Social connections     Talks on phone: Not on file     Gets together: Not on file     Attends Jainism service: Not on file     Active member of club or organization: Not on file     Attends meetings of clubs or organizations: Not on file     Relationship status: Not on file   ? Intimate partner violence  "    Fear of current or ex partner: Not on file     Emotionally abused: Not on file     Physically abused: Not on file     Forced sexual activity: Not on file   Other Topics Concern   ? Incontinent Not Asked   ? Toileting independently Not Asked   ? Cognitive impairment Not Asked   ? Vision impairment Not Asked   ? Hearing impairment Not Asked   ? Dentures Not Asked   Social History Narrative    She live in a single floor house.  Her son lives next door and her granddaughter is \"around a lot\"   3/2017   :    Patient says she lives on [address]  She lives with her granddaughter.  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    Current Outpatient Medications on File Prior to Visit   Medication Sig Dispense Refill   ? bumetanide (BUMEX) 2 MG tablet Take 1.5 tablets (3 mg total) by mouth 2 (two) times a day at 9am and 6pm. (Patient taking differently: Take 4 mg by mouth 2 (two) times a day at 9am and 6pm. ) 270 tablet 2   ? acetaminophen (TYLENOL) 500 MG tablet Take 2 tablets (1,000 mg total) by mouth 3 (three) times a day. (Patient taking differently: Take 1,000 mg by mouth 3 (three) times a day. And daily prn)  0   ? albuterol (PROAIR HFA;PROVENTIL HFA;VENTOLIN HFA) 90 mcg/actuation inhaler Inhale 2 puffs every 4 (four) hours as needed for wheezing.  0   ? aspirin 81 MG EC tablet Take 1 tablet (81 mg total) by mouth 2 (two) times a day.  0   ? atorvastatin (LIPITOR) 80 MG tablet TAKE 1 TABLET(80 MG) BY MOUTH DAILY 90 tablet 3   ? colchicine 0.6 mg tablet take two tablets (1.2 mg) by mouth at onset of gout symptoms, then repeat one tablet (0.6 mg) by mouth one hour later 24 tablet 2   ? diclofenac sodium (VOLTAREN) 1 % Gel Apply 2 g topically 4 (four) times a day as needed.      ? ferrous sulfate 325 (65 FE) MG tablet Take 1 tablet by mouth daily.     ? levothyroxine (SYNTHROID, LEVOTHROID) 150 MCG tablet Take 1 tablet (150 mcg total) by mouth daily before supper. 90 tablet 3   ? " nystatin (MYCOSTATIN) powder Apply topically 4 (four) times a day. 60 g 2   ? omeprazole (PRILOSEC) 20 MG capsule Take 1 capsule (20 mg total) by mouth daily before breakfast. 90 capsule 3   ? polyethylene glycol (MIRALAX) 17 gram packet Take 17 g by mouth daily as needed.     ? potassium chloride (K-DUR,KLOR-CON) 20 MEQ tablet Take 40 mEq by mouth daily.     ? predniSONE (DELTASONE) 2.5 MG tablet Take 7.5 mg/d prednisone PO for 3 weeks. 90 tablet 0   ? senna (SENOKOT) 8.6 mg tablet Take 1 tablet by mouth daily.     ? white petrolatum (AQUAPHOR ORIGINAL) 41 % Oint Apply 1 application topically at bedtime.       No current facility-administered medications on file prior to visit.    :      ALLERGIES:  Tramadol; Codeine; Codeine phosphate (bulk); Codeine sulfate; Codeine-butalbital-asa-caff; Codeine-guaifenesin; Lisinopril; and Penicillins    Vitals:  There were no vitals taken for this visit. except as noted below    DemographicsICD-10VitalsOrderseMARNotesCensus  Most Recent Vitals Date/Time Taken  Temperature: 94.7  F 09/08/2020 03:58 PM  Pulse: 58 per minute 09/08/2020 03:58 PM  Respirations: 16 per minute 09/07/2020 04:32 PM  Blood Pressure: 118 / 65 mmHg 09/08/2020 03:58 PM  O2 Saturation: 93 % 09/08/2020 03:58 PM  Blood Sugar: 114 mg/dL 03/20/2017 05:08 PM  Weight: 230.1 lbs / Routine       BMI: 36.03 09/07/2020 11:02 AM  Height: 5ft 7.0in 07/17/2020 12:01 PM    Regarding the patient's temperature it is highly unlikely to be accurate.  I saw another patient this morning who had a temperature recorded at 84 degrees!  Discussed with facility recommend retesting/recalibration/new equipment as necessary    Physical exam:    Patient looks good today.  She is bright alert talkative and appears quite comfortable.  She is moving air easily without accessory muscle use or tachypnea.  No rales rhonchi or wheezing but is with some minimal decrease in the breath sounds bilaterally posteriorly.  Heart is irregularly  irregular in the 80s is soft murmur.  Abdomen is soft no HJR.  Her extremities show compression wraps in place so it is hard to  how much edema is in the pretibial areas.  However the tops of her feet are quite puffy.  There is no edema above the knee.       Due to the 2020 Covid 19 pandemic, except as noted above, the patient was visually observed at a 6 foot plus distance.  An observational exam was performed in an effort to keep patient safe from Covid 19 and other communicable diseases.   Labs:  Lab Results   Component Value Date    WBC 6.9 08/05/2020    HGB 9.7 (L) 09/04/2020    HCT 29.0 (L) 08/05/2020    MCV 98 08/05/2020     08/05/2020     Results for orders placed or performed in visit on 09/04/20   Basic Metabolic Panel   Result Value Ref Range    Sodium 141 136 - 145 mmol/L    Potassium 3.4 (L) 3.5 - 5.0 mmol/L    Chloride 92 (L) 98 - 107 mmol/L    CO2 37 (H) 22 - 31 mmol/L    Anion Gap, Calculation 12 5 - 18 mmol/L    Glucose 98 70 - 125 mg/dL    Calcium 10.5 8.5 - 10.5 mg/dL    BUN 42 (H) 8 - 28 mg/dL    Creatinine 1.90 (H) 0.60 - 1.10 mg/dL    GFR MDRD Af Amer 30 (L) >60 mL/min/1.73m2    GFR MDRD Non Af Amer 25 (L) >60 mL/min/1.73m2         Lab Results   Component Value Date    TSH 3.43 08/17/2020     Lab Results   Component Value Date    HGBA1C 6.5 (H) 07/31/2020     [unfilled]  Lab Results   Component Value Date    NKESCTJT31 333 07/21/2020     Lab Results   Component Value Date     07/18/2020     [unfilled]  Most Recent EKG     Units 07/28/20  1543   VENTRATE BPM 63   ATRIALRATE BPM 57   QRSDURATION ms 172   QTINTERVAL ms 496   QTCCALC ms 507   RAXIS degrees 106   TAXIS degrees 261   MUSEDX  Ventricular-paced rhythm with occasional Premature ventricular complexes  Abnormal ECG  When compared with ECG of 18-JUL-2020 11:23,  Electronic ventricular pacemaker has replaced Wide QRS rhythm  Confirmed by SEE ED PROVIDER NOTE FOR, ECG INTERPRETATION ([id number]),  VIV ELDER ([id number])  on 7/28/2020 10:25:45 PM           Assessment/Plan:      ICD-10-CM    1. CKD (chronic kidney disease), stage III (H)  N18.3    2. Status post aortic valve replacement  Z95.2    3. Lymphedema  I89.0    Left shoulder instability  Recurrent dislocation left shoulder  Status post reverse total replacement of left shoulder July 31  Right shoulder pain   Neglected to mention on exam above the patient has difficulty with flexion and abduction of her right shoulder.  With passive range of motion I can get the arm to 90 degrees but she will start to feel some discomfort at that point.  Question if she has rotator cuff tendinitis?  Could consider orthopedic evaluation of the shoulder as well if not getting better.  I have not ordered an x-ray  -Continue PT  -Poor candidate for NSAIDs, will have to stick with current pain control measures including acetaminophen and topicals.          Bilateral lower extremity edema   weight gain  Chronic diastolic CHF   I cannot explain her weight fluctuation.  She was as low as 205 on August 9.  5 throughout the 250 pounds noted on August 28 which I think must be an error, her peak would be 239 pounds and now back down to 230 pounds.  Despite the lymphedema of the lower legs and feet she otherwise does not look volume overloaded without JVD rales shortness of breath orthopnea etc.  I now worry about intravascular volume depletion in the face of total body water up/peripheral edema considering her creatinine climbing.   Results for orders placed or performed in visit on 09/04/20   Basic Metabolic Panel   Result Value Ref Range    Sodium 141 136 - 145 mmol/L    Potassium 3.4 (L) 3.5 - 5.0 mmol/L    Chloride 92 (L) 98 - 107 mmol/L    CO2 37 (H) 22 - 31 mmol/L    Anion Gap, Calculation 12 5 - 18 mmol/L    Glucose 98 70 - 125 mg/dL    Calcium 10.5 8.5 - 10.5 mg/dL    BUN 42 (H) 8 - 28 mg/dL    Creatinine 1.90 (H) 0.60 - 1.10 mg/dL    GFR MDRD Af Amer 30 (L) >60 mL/min/1.73m2    GFR MDRD Non Af  Amer 25 (L) >60 mL/min/1.73m2       Echocardiogram reading:    Performing Date  Performing Department    2018   CARDIAC TESTING [[id number]]    Patient Information     Patient Name   Thea Acosat  MRN   [id number]  Sex   Female   1   1934 (83 y.o.)    Indications     Heart murmur; Fever    Summary       Normal left ventricular size and systolic function.    When compared to the previous study dated 3/1/2017, no significant change.    Left ventricle ejection fraction is normal. The estimated left ventricular ejection fraction is 55%.    Normal right ventricular size and systolic function.    Left Atrium: Left atrial volume is moderately increased.    Aortic Valve: Bioprosthetic valve is not well visualized. No evidence of paravalvular leak and mean gradient is 15 mmHg which is borderline elevated, however not significantly changed compared to prior.    Mitral Valve: There is severe mitral annular calcification. Moderate mitral Calcific stenosis. No mitral regurgitation.          -Despite ongoing peripheral edema I am concerned about her rising creatinine in the face of aggressive diuresis attempts.  Her Bumex is up to 4 mg twice daily plus now several days of metolazone daily.  - Communicated with Ms. Keisha CNP.  She has been managing and plans to do BMP tomorrow.  And less her renal function has returned to baseline, I would recommend backing off on diuretics and continue to manage with low-salt diet, compression, leg elevation, close clinical monitoring.  Pelvic obstruction unlikely.  Hypoalbuminemia has been mild 3.4 at last check on .    Acute metabolic encephalopathy  Hallucinations  Hypothyroidism   Abnormal urinalysis   Patient doing quite well.  Likely at best mental baseline at this point.  Hypothyroidism was thought to be a potentially large contributor to her a.m. E however UTI and other contributing factors were present.   -TSH is normalized down to 3.43 on August  "17.  -Continue current levothyroxine with recheck in another few weeks of TSH    Suspected chronic hypoxic respiratory failure   Patient has continued to do well off of oxygen.  This is despite the weight gain and concern for fluid as noted above.  This was likely multifactorial possible obesity/hypoventilation, concern for undiagnosed RANJIT, history of CHF in the past but no evidence of pulmonary edema now, COPD is also noted on her problem list although she states no awareness of that.    - Continue to monitor O2 sats, supplemental oxygen as need be  -Continue to avoid opiates and other sedating medications as possible  - No additional work-up anticipated          COPD   I see this in old notes and records.  I do not see complete pulmonary function tests on record however.  Patient says that she smoked in the past \"but did not inhale\".  She is skeptical of the diagnosis..  Nonetheless I suspect that there is some COPD present.  She does have albuterol if need be but not needed currently with no wheezing.  No wheezing heard today.    Coronary artery disease   Continue aspirin and atorvastatin.  Not sure why she is not on beta-blocker, can follow-up with primary MD on that.    Paroxysmal atrial fibrillation     Takes only aspirin for stroke prophylaxis, does not need rate controlling agent    Permanent pacemaker   Follows with device clinic.    DM 2   Blood sugar control adequate.    Lab Results   Component Value Date    HGBA1C 6.5 (H) 07/31/2020   Continue off medication.    Gout   Presumed.  Hand swelling has resolved.  She has no pain.  She has completed her prednisone taper.  -Consider preventive allopurinol?     She also has colchicine but is not currently taking it, just a PRN medication     CKD 3  Elevated creatinine, possible acute kidney injury     Results for orders placed or performed in visit on 09/04/20   Basic Metabolic Panel   Result Value Ref Range    Sodium 141 136 - 145 mmol/L    Potassium 3.4 (L) " 3.5 - 5.0 mmol/L    Chloride 92 (L) 98 - 107 mmol/L    CO2 37 (H) 22 - 31 mmol/L    Anion Gap, Calculation 12 5 - 18 mmol/L    Glucose 98 70 - 125 mg/dL    Calcium 10.5 8.5 - 10.5 mg/dL    BUN 42 (H) 8 - 28 mg/dL    Creatinine 1.90 (H) 0.60 - 1.10 mg/dL    GFR MDRD Af Amer 30 (L) >60 mL/min/1.73m2    GFR MDRD Non Af Amer 25 (L) >60 mL/min/1.73m2       See discussion above.  -Recheck blood work tomorrow under the care of Ms. Valdes  -Recommend moderation of thyroid doses and less patient has returned to her prior stable baseline which I think is less likely than continued elevation of her creatinine, we will see.    Hypertension   Formerly on beta-blocker which is been held for relatively soft blood pressures at times.  Continue to manage off of medications other than the diuretics.    GERD   Omeprazole    Intertrigo   Resolved for the time being  Vitamin D deficiency   On replacement  Case discussed with:    Facility staff             Rojelio Farnsworth MD

## 2021-06-20 NOTE — LETTER
"Letter by Gamal Hdz MD at      Author: Gamal Hdz MD Service: -- Author Type: --    Filed:  Encounter Date: 2/27/2020 Status: (Other)         Thea Acosta  [address] [place] [address]             February 27, 2020         Dear Ms. Acosta,    Below are the results from your recent visit:    Resulted Orders   Glycosylated Hemoglobin A1c   Result Value Ref Range    Hemoglobin A1c 7.0 (H) 3.5 - 6.0 %   HM2(CBC w/o Differential)   Result Value Ref Range    WBC 5.1 4.0 - 11.0 thou/uL    RBC 3.43 (L) 3.80 - 5.40 mill/uL    Hemoglobin 10.7 (L) 12.0 - 16.0 g/dL    Hematocrit 32.6 (L) 35.0 - 47.0 %    MCV 95 80 - 100 fL    MCH 31.3 27.0 - 34.0 pg    MCHC 32.9 32.0 - 36.0 g/dL    RDW 13.6 11.0 - 14.5 %    Platelets 164 140 - 440 thou/uL    MPV 8.7 7.0 - 10.0 fL   Uric Acid   Result Value Ref Range    Uric Acid 11.5 (H) 2.0 - 7.5 mg/dL   Basic Metabolic Panel   Result Value Ref Range    Sodium 142 136 - 145 mmol/L    Potassium 4.4 3.5 - 5.0 mmol/L    Chloride 102 98 - 107 mmol/L    CO2 30 22 - 31 mmol/L    Anion Gap, Calculation 10 5 - 18 mmol/L    Glucose 95 70 - 125 mg/dL    Calcium 9.7 8.5 - 10.5 mg/dL    BUN 27 8 - 28 mg/dL    Creatinine 1.57 (H) 0.60 - 1.10 mg/dL    GFR MDRD Af Amer 38 (L) >60 mL/min/1.73m2    GFR MDRD Non Af Amer 31 (L) >60 mL/min/1.73m2    Narrative    Fasting Glucose reference range is 70-99 mg/dL per  American Diabetes Association (ADA) guidelines.   BNP(B-type Natriuretic Peptide)   Result Value Ref Range     (H) 0 - 167 pg/mL       Continue your current diabetes management as discussed in order to further improve.  Goal A1c remains < 7.0% ideally.     Your complete blood count results show stable anemia.  We will continue to follow closely.     Your uric acid level remains significantly elevated.  It has improved.  Continue to follow a \"gout diet\" in order to further improve.    Your kidney function remains reduced.  It appears stable.    Your \"fluid level\" (i.e. " BNP) remains elevated.  It appears stable as well.  Continue your current medication as discussed.     Please call with questions or contact us using [employer].    Sincerely,        Electronically signed by Gamal Hdz MD

## 2021-06-20 NOTE — LETTER
Letter by Jennifer Valdes NP at      Author: Jennifer Valdes NP Service: -- Author Type: --    Filed:  Encounter Date: 2020 Status: (Other)         Patient: Thea Acosta   MR Number: [id number]   YOB: 1934   Date of Visit: 2020                 [facility]    DATE: 2020    NAME:  Thea Acosta             :  1934  MRN: [id number]  CODE STATUS:  DNR    VISIT TYPE: Problem Visit (headache, diarrhea)     FACILITY:  [facility] [[id number]]       CHIEF COMPLAIN/REASON FOR VISIT:    Chief Complaint   Patient presents with   ? Problem Visit     headache, diarrhea               HISTORY OF PRESENT ILLNESS: Thea Acosta is a 86 y.o. female who admitted - for acute metabolic encephalopathy. This was felt to be s/t hypothyroidism and noncompliance with levothyroxine dosing. She was readmitted - for left shoulder dislocation and underwent reverse total shoulder arthroplasty on . Postop course was uncomplicated and transferred back to [facility]. She has PMH of CAD, CKD, DM, s/p PPM, diastolic CHF, pulmonary artery hypertension, mitral valve stenosis, chronic a fib no anticoagulation, s/p AVR, asthma. Prior to this she lived at home with her grand daughter. She was readmitted 9/10- for altered mental status. She was treated for possible cellulitis and hypoxic respiratory failure. Her tsh was elevated so levothyroxine dose was increased. She was discharged back to [facility].     Today Ms. Acosta is seen for concerns of headache and diarrhea today. She is seen in her room today in [facility]. She says she had loose stools all day yesterday and during the night. She is not sure why. She says she is not sure she still wants the regular stool softeners and we discussed changing them to as needed. She says she has also been having a headache and wondering if she could get some tylenol today. She says her legs are doing well and  "wears her velcro wraps during the day. Her breathing is at her baseline. She thinks they nunu labs on her this morning. We discussed we will see what her hemoglobin is and decide whether to restart aspirin or not. She denies other concerns today but would like some tylenol from the nurse if possible.       REVIEW OF SYSTEMS:  PROBLEMS AND REVIEW OF SYSTEMS:   Review of Systems  Today on ROS:   Currently, no fever, chills, or rigors. Decreased vision and hearing. Denies any chest pain, palpitations, lightheadedness, dizziness, no cough. Appetite is good.  Denies any abdominal pain. No active bleeding. Positive for urinary incontinence, urinary frequency, leg swelling improved, left shoulder incision, no nausea, loose stools, pain controlled, velcro leg wraps, no shortness of breath recently, restless legs and insomnia improved, headaches      Allergies   Allergen Reactions   ? Tramadol Anxiety and Other (See Comments)     Per patient, Thea developed psychosis and severe anxiety and agitation \"for three days\" after receiving tramadol in the past. She stated that she would \"never\" take it again and would be extremely upset if she receives it. She asked me to add this to her chart's allergy list specifically.    ? Codeine Hives   ? Codeine Phosphate (Bulk)      This allergy is per [facility] records.   ? Codeine Sulfate      This allergy is per [facility] records.   ? Codeine-Butalbital-Asa-Caff      This allergy is per [facility] records.   ? Codeine-Guaifenesin      This allergy is per [facility] records.   ? Lisinopril Cough   ? Penicillins Swelling     Current Outpatient Medications   Medication Sig   ? aspirin 81 MG EC tablet Take 81 mg by mouth daily.   ? acetaminophen (TYLENOL) 500 MG tablet Take 2 tablets (1,000 mg total) by mouth 3 (three) times a day. (Patient taking " differently: Take 1,000 mg by mouth 3 (three) times a day as needed. )   ? albuterol (PROAIR HFA;PROVENTIL HFA;VENTOLIN HFA) 90 mcg/actuation inhaler Inhale 2 puffs every 4 (four) hours as needed for wheezing.   ? atorvastatin (LIPITOR) 80 MG tablet TAKE 1 TABLET(80 MG) BY MOUTH DAILY   ? bumetanide (BUMEX) 2 MG tablet Take 1 tablet (2 mg total) by mouth 2 (two) times a day at 9am and 6pm. (Patient taking differently: Take 2 mg by mouth 2 (two) times a day at 9am and 6pm. 2mg in am and 1mg at noon)   ? colchicine 0.6 mg tablet take two tablets (1.2 mg) by mouth at onset of gout symptoms, then repeat one tablet (0.6 mg) by mouth one hour later   ? diclofenac sodium (VOLTAREN) 1 % Gel Apply 2 g topically 4 (four) times a day as needed.    ? dimethicone 5 % Crea Apply 1 application topically 2 (two) times a day as needed (to buttocks as needed for skin breakdown).   ? ferrous sulfate 325 (65 FE) MG tablet Take 1 tablet by mouth daily.   ? levothyroxine (SYNTHROID, LEVOTHROID) 200 MCG tablet Take 1 tablet (200 mcg total) by mouth daily before supper.   ? metoprolol tartrate (LOPRESSOR) 25 MG tablet Take 12.5 mg by mouth 2 (two) times a day. Hold for sbp<105  Hr <55   ? nystatin (MYCOSTATIN) powder Apply topically 4 (four) times a day.   ? omeprazole (PRILOSEC) 20 MG capsule Take 1 capsule (20 mg total) by mouth daily before breakfast.   ? ondansetron (ZOFRAN-ODT) 4 MG disintegrating tablet Take 4 mg by mouth every 6 (six) hours as needed for nausea.   ? polyethylene glycol (MIRALAX) 17 gram packet Take 17 g by mouth daily as needed.    ? potassium chloride (K-DUR,KLOR-CON) 20 MEQ tablet Take 40 mEq by mouth daily before breakfast.   ? rOPINIRole (REQUIP) 0.5 MG tablet Take 0.5 mg by mouth at bedtime.   ? senna (SENOKOT) 8.6 mg tablet Take 1 tablet by mouth daily. Hold for loose stools   ? white petrolatum (AQUAPHOR ORIGINAL) 41 % Oint Apply 1 application topically at bedtime.     Past Medical History:    Past Medical  History:   Diagnosis Date   ? Acute kidney injury superimposed on CKD (H) 3/3/2017   ? Asthma    ? CAD (coronary artery disease)    ? Cataract    ? Chronic kidney disease     ckd3   ? COPD (chronic obstructive pulmonary disease) (H)    ? Diabetes mellitus (H)    ? Disease of thyroid gland    ? H/O heart valve replacement with bioprosthetic valve    ? History of transfusion    ? Hypertension    ? Normochromic normocytic anemia    ? Pulmonary hypertension (H) 09/27/2019    Noted on echocardiogram   ? Restless leg syndrome            PHYSICAL EXAMINATION  Vitals:    09/25/20 0700   BP: 128/72   Pulse: 60   Resp: 18   Temp: (!) 96.1  F (35.6  C)   SpO2: 91%   Weight: 206 lb (93.4 kg)       Today on physical exam:     GENERAL: lethargic, obese,  not in any form of acute distress, answers questions appropriately, follows simple commands, conversant  HEENT: Head is normocephalic with normal hair distribution. No evidence of trauma. Ears: No acute purulent discharge. Eyes: Conjunctivae pink with no scleral jaundice. Nose: Normal mucosa and septum. NECK: Supple with no cervical or supraclavicular lymphadenopathy. Trachea is midline. [place], decreased vision  CHEST: No tenderness or deformity, no crepitus, left chest PPM  LUNG: Dim but clear today to auscultation.   Shortness of breath with exertion, no cough noted  BACK: No kyphosis of the thoracic spine. Symmetric, no curvature, ROM normal, no CVA tenderness, no spinal tenderness   CVS: irregularly irregular rhythm, murmur,  2+ pulses symmetric in all extremities.  ABDOMEN: Rounded and soft, nontender to palpation, non distended, no masses, no organomegaly, good bowel sounds, no rebound or guarding, no peritoneal signs.   EXTREMITIES: 1-2+ ble nonpitting edema, velcro leg wraps, left shoulder incision healed, rl discoloration ble  SKIN: Warm and dry  NEUROLOGICAL: The patient is oriented to person, place and time.             LABS:   Recent Results (from the past 168  hour(s))   Basic Metabolic Panel   Result Value Ref Range    Sodium 140 136 - 145 mmol/L    Potassium 3.2 (L) 3.5 - 5.0 mmol/L    Chloride 94 (L) 98 - 107 mmol/L    CO2 35 (H) 22 - 31 mmol/L    Anion Gap, Calculation 11 5 - 18 mmol/L    Glucose 118 70 - 125 mg/dL    Calcium 9.7 8.5 - 10.5 mg/dL    BUN 53 (H) 8 - 28 mg/dL    Creatinine 1.93 (H) 0.60 - 1.10 mg/dL    GFR MDRD Af Amer 30 (L) >60 mL/min/1.73m2    GFR MDRD Non Af Amer 25 (L) >60 mL/min/1.73m2   Hemoglobin   Result Value Ref Range    Hemoglobin 9.8 (L) 12.0 - 16.0 g/dL   Basic Metabolic Panel   Result Value Ref Range    Sodium 137 136 - 145 mmol/L    Potassium 3.9 3.5 - 5.0 mmol/L    Chloride 92 (L) 98 - 107 mmol/L    CO2 32 (H) 22 - 31 mmol/L    Anion Gap, Calculation 13 5 - 18 mmol/L    Glucose 141 (H) 70 - 125 mg/dL    Calcium 9.9 8.5 - 10.5 mg/dL    BUN 52 (H) 8 - 28 mg/dL    Creatinine 1.68 (H) 0.60 - 1.10 mg/dL    GFR MDRD Af Amer 35 (L) >60 mL/min/1.73m2    GFR MDRD Non Af Amer 29 (L) >60 mL/min/1.73m2     Results for orders placed or performed in visit on 09/25/20   Basic Metabolic Panel   Result Value Ref Range    Sodium 137 136 - 145 mmol/L    Potassium 3.9 3.5 - 5.0 mmol/L    Chloride 92 (L) 98 - 107 mmol/L    CO2 32 (H) 22 - 31 mmol/L    Anion Gap, Calculation 13 5 - 18 mmol/L    Glucose 141 (H) 70 - 125 mg/dL    Calcium 9.9 8.5 - 10.5 mg/dL    BUN 52 (H) 8 - 28 mg/dL    Creatinine 1.68 (H) 0.60 - 1.10 mg/dL    GFR MDRD Af Amer 35 (L) >60 mL/min/1.73m2    GFR MDRD Non Af Amer 29 (L) >60 mL/min/1.73m2         Lab Results   Component Value Date    WBC 5.7 09/12/2020    HGB 9.8 (L) 09/25/2020    HCT 30.8 (L) 09/12/2020     (H) 09/12/2020     09/12/2020       Lab Results   Component Value Date    [id number] 468 09/12/2020     Lab Results   Component Value Date    HGBA1C 6.5 (H) 07/31/2020     Lab Results   Component Value Date    INR 1.11 (H) 09/10/2020    INR 0.99 07/28/2020    INR 1.30 (H) 02/07/2018     Vitamin D, Total  (25-Hydroxy)   Date Value Ref Range Status   01/20/2020 45.9 30.0 - 80.0 ng/mL Final     Lab Results   Component Value Date    TSH 9.38 (H) 09/11/2020           ASSESSMENT/PLAN:      Left shoulder dislocation, s/p reverse total shoulder arthroplasty: Incision healed.  PT, OT following. voltaren gel prn. F/u with surgeon Dr. Ramirez on 8/14-f/u again in 4 weeks, limit external rotation, no bathing until 4 weeks postop, healing well, f/u again on 9/4-doing well, dc sling, AROM and PROM with therapy, may  Next appt scheduled for 10/5. Weight bear with walker, pain improving. Tylenol prn. Doing well with therapy.   Chronic CHF: Daily [phone] then [phone], stable. Unclear why some weights in 205-203 now 206lbs. Continues to have very inconsistent weights. Shortness of breath much improved, edema improved.  On bumex, encourage leg elevation. Cardiac diet. velcro leg wraps.  notify for weight change for 2 lbs in 24 hours or 5lbs in 7 days. Bmp today.   S/p PPM: follows with device clinic. No recent concerns.   Asthma:  Encourage cough and deep breathe. IS q1h while awake. Albuterol prn. Shortness of breath with exertion. Stable.   Hypothyroid: on levothyroxine. tsh 9.38 on 9/11, levothyroxine dose adjusted. recheck tsh in 6 weeks.   CKD stage 3: Cr 1.39, gfr 36 on 8/1.  9/4 cr 1.9, gfr 25. Bmp 9/18-cr 2.05 reduced bumex, 1.98 on 9/21. Cr 1.68 on 9/25. Improved. Continue current dosing.   H/o Type 2 DM: No meds, no monitoring needed. Diet controlled. No recent concerns.   Dyslipidemia: On atorvastatin.   HTN: SBP 120s, On bumex, metoprolol. Stable recently.   GERD: On omeprazole. No concerns today.   Vitamin d deficiency: On vitamin d.  Hypokalemia: On kcl. Hyperkalemic today k 5.2 will reduce to 40meq daily and bmp on 9/23-unfortunately was drawn today instead. k dropped to 3.2. will give one time 40kcl dose and recheck on 9/25. Unclear how dropped 2 points with only a 20meq dose  reduction.  Then recheck 3.9 on 9/25. No further changes.   PAF, s/p AVR: Regular rate and rhythm recently. No recent a fib rvr. Controlled off metoprolol. F/u with caridology. On aspirin daily.   Anemia of CKD: on iron. Hg 10.9 on 7/24.   Hg 8.8 on 8/1. Hg 9 on 8/5. Recheck on 8/17 8.2, iron to three times a day. recheck on 8/24-hg 8.4. hg 9.7 on 9/4, hg 9.5 on 9/12. Discussed trial off to see if this improves anemia. Hg on 9/25-no change 9.8. will restart aspirin.   Candidal intertrigo:  Nystatin.   Constipation: senna daily, miralax daily. Now having loose stools, change to miralax daily prn.   Restless legs, insomnia: requip at bedtime.   Nausea:  zofran prn.   Headache: tylenol prn.     Therapy PT: trsf max assist from , mod from recliner, walking 10ft with 4WW CGA, OT: min A UB drsg, max A toileting, LB dressing max assist, . Feeding: mod. lcd 2-3 weeks. Yellow tag. Lives with granddaughter.     Electronically signed by: Jennifer Valdes NP

## 2021-06-20 NOTE — LETTER
Letter by Rojelio Farnsworth MD at      Author: Rojelio Farnsworth MD Service: -- Author Type: --    Filed:  Encounter Date: 9/15/2020 Status: (Other)         Patient: Thea Acosta   MR Number: [id number]   YOB: 1934   Date of Visit: 9/15/2020      [facility]    Facility:  [facility] [[id number]]    Code Status:  DNR    Chief Complaint   Patient presents with   ? H & P   :                    Patient Active Problem List   Diagnosis   ? Constipation   ? Sciatica   ? Dyspnea, unspecified type   ? Hypothyroidism   ? Type 2 diabetes mellitus with stage 3 chronic kidney disease, without long-term current use of insulin (H)   ? Hyperparathyroidism   ? Vitamin D deficiency   ? Mixed hyperlipidemia   ? Atherosclerosis of native coronary artery of native heart without angina pectoris   ? CKD (chronic kidney disease), stage III (H)   ? Osteoarthritis Of The Knee   ? Restless Legs Syndrome   ? H/O heart valve replacement with bioprosthetic valve   ? Cardiac pacemaker in situ   ? Complete heart block (H)   ? Normochromic normocytic anemia   ? Essential hypertension with goal blood pressure less than 140/90   ? Moderate persistent asthma without complication   ? PAF (paroxysmal atrial fibrillation) (H)   ? GERD (gastroesophageal reflux disease)   ? Primary insomnia   ? Hypokalemia   ? Physical deconditioning   ? Asthma   ? Acute respiratory failure with hypoxia (H)   ? CHF (congestive heart failure), NYHA class I, acute, systolic (H)   ? Pulmonary hypertension (H)   ? Atrial fibrillation, unspecified type (H)   ? Status post aortic valve replacement   ? Non-rheumatic mitral valve stenosis   ? Nonrheumatic mitral valve stenosis   ? PVD (peripheral vascular disease) (H)   ? Cellulitis   ? Lymphedema   ? S/p reverse total shoulder arthroplasty   ? Hypoxia   ? Acute diastolic heart failure (H)       History:   Thea Acosta  is an 86 year old female  with history of coronary artery disease, CHF, paroxysmal atrial fibrillation, bioprosthetic AVR, permanent pacemaker placement, COPD, CKD 3, DM 2, hypertension, GERD, right hip/femur fracture with IM emely 2017,  total reverse shoulder procedure, osteoarthritis seen for review of her multiple medical issues     Hospital course #4 (most recent):   Patient was hospitalized September 10 through September 13 presenting with acute metabolic encephalopathy which occurred while in this TCU.  She was noted to be hypoxic on admit, and hypoxic respiratory failure was felt to be her primary cause of encephalopathy.  Recall the patient was previously hospitalized with acute metabolic encephalopathy (see below) which was attributed primarily to her hypothyroidism.  Her TSH was up again at this admission and her Synthroid was increased.     However she was also diagnosed with lower extremity cellulitis as a contributing factor, treated with ceftriaxone and later with Septra which she remains on at this point.    -She also had hypokalemia  -Patient was also treated for acute kidney injury and peripheral edema/fluid overload with IV diuretics.  Recall that she had been on increasing doses of oral Bumex (up to 4 mg twice daily) prior to admission but was recalcitrant to this treatment, at least as far as her peripheral edema went.    Hospital course #1:  Patient was hospitalized at [facility] between July 12 and July 6 following a fall with left shoulder dislocation which was reduced in the emergency room.  However she was noted to be hypoxic and was diagnosed with acute hypoxic respiratory failure felt most likely secondary to atelectasis plus opiates which are being used for pain control.  She had weaned off oxygen by the time of discharge.  She also had acute kidney injury and was noted to have metabolic alkalosis that hospital stay.  She was discharged with a creatinine of 1.4 after holding her Bumex for a brief time.                "  She was discharged to [facility] TCU where she stayed for fewer than 48 hours due to development of acute delirium.     Hospital course #2:     She was readmitted between July 18 and July 21 again at [facility].  Hypothyroidism was felt to be the primary cause of her delirium.  TSH 67 on July 18.  She has been noncompliant with her thyroid medication quite regularly for at least months.  She was again found to be hypoxic without certain cause as she was not on opiates this time.  She did have a chest x-ray on July 20 suggesting a chronic CHF type appearance but no acute problems noted.  She again had elevation of her creatinine to a peak of 1006 discharged 1.67 with a baseline of about 1.4    Hospital course #3  Patient was hospitalized between July 28 and August 1 after I sent her from this TCU upon discovering her dislocated left shoulder on July 28.  Shoulder reduction was achieved but could not be maintained.  She was seen by orthopedics who diagnosed chronic instability and   recommended total reverse shoulder which was completed on July 31, 2020.  Surgery was complicated by mild perioperative hypoxia.  Patient was treated with hydrocodone (since discontinued).           Subjective/ROS:    -augmented by discussion with facility staff involved in direct care  -limited by: Memory    Patient says she does not remember much about the hospitalization.  She knows that she is breathing better and that \"I got my legs back\" referring to the decrease in edema.  Her weight has always been quite perplexing here.  She was 203 pounds yesterday 210 pounds today but she was recently over 230 pounds consistently.  She is back to Bumex 2 mg twice daily.    Patient continues to complain of her right shoulder \"not working\".  She is lost the ability to flex and extend it.  She thinks it may have been injured at some point but she cannot remember it.  We also wondered whether it was seen increased use in compensation " with her left shoulder surgery in July?.    Patient denies chest pain cough shortness of breath orthopnea PND and notes her peripheral edema is better.  She denies fever sweats or chills.  She denies headaches change in vision speaking swallowing hearing.  She does not feel like she has any swallowing issues.  No diarrhea dysuria no sense of constipation.  Remainder 13 system review is negative    Past Medical History:   Diagnosis Date   ? Acute kidney injury superimposed on CKD (H) 3/3/2017   ? Asthma    ? CAD (coronary artery disease)    ? Cataract    ? Chronic kidney disease     ckd3   ? COPD (chronic obstructive pulmonary disease) (H)    ? Diabetes mellitus (H)    ? Disease of thyroid gland    ? H/O heart valve replacement with bioprosthetic valve    ? History of transfusion    ? Hypertension    ? Normochromic normocytic anemia    ? Pulmonary hypertension (H) 09/27/2019    Noted on echocardiogram   ? Restless leg syndrome      Past Surgical History:   Procedure Laterality Date   ? APPENDECTOMY     ? CHOLECYSTECTOMY     ? EYE SURGERY Bilateral     Cataracts   ? HIP PINNING Right 3/1/2017    Procedure: RIGHT HIP TROCHANTERIC RODDING;  Surgeon: Moshe Davies MD;  Location: [facility] OR;  Service:    ? INSERT / REPLACE / REMOVE PACEMAKER  06/25/2007    guidant   ? INSERT / REPLACE / REMOVE PACEMAKER  02/07/2018    phoebe sci gen change   ? JOINT REPLACEMENT Left     knee   ? PA RECONSTR TOTAL SHOULDER IMPLANT Left 7/31/2020    Procedure: LEFT REVERSE TOTAL SHOULDER ARTHROPLASTY;  Surgeon: Alvarez Palomino MD;  Location: [facility] OR;  Service: Orthopedics   ? TISSUE AORTIC VALVE REPLACEMENT  06/25/2007               Family History   Problem Relation Age of Onset   ? No Medical Problems Mother         age 86   ? No Medical Problems Father         MVA   ? Cancer Brother         lung   ? No Medical Problems Brother    :       Social History     Socioeconomic History   ? Marital status:      Spouse  "name: Not on file   ? Number of children: Not on file   ? Years of education: Not on file   ? Highest education level: Not on file   Occupational History   ? Occupation:  in operating room     Comment: retired   Social Needs   ? Financial resource strain: Not on file   ? Food insecurity     Worry: Not on file     Inability: Not on file   ? Transportation needs     Medical: Not on file     Non-medical: Not on file   Tobacco Use   ? Smoking status: Former Smoker     Packs/day: 0.00     Last attempt to quit: 1950     Years since quittin.0   ? Smokeless tobacco: Never Used   Substance and Sexual Activity   ? Alcohol use: No   ? Drug use: No   ? Sexual activity: Never   Lifestyle   ? Physical activity     Days per week: Not on file     Minutes per session: Not on file   ? Stress: Not on file   Relationships   ? Social connections     Talks on phone: Not on file     Gets together: Not on file     Attends Taoism service: Not on file     Active member of club or organization: Not on file     Attends meetings of clubs or organizations: Not on file     Relationship status: Not on file   ? Intimate partner violence     Fear of current or ex partner: Not on file     Emotionally abused: Not on file     Physically abused: Not on file     Forced sexual activity: Not on file   Other Topics Concern   ? Incontinent Not Asked   ? Toileting independently Not Asked   ? Cognitive impairment Not Asked   ? Vision impairment Not Asked   ? Hearing impairment Not Asked   ? Dentures Not Asked   Social History Narrative    She live in a single floor house.  Her son lives next door and her granddaughter is \"around a lot\"   3/2017   :    Patient says she still is planning to go back home    Current Outpatient Medications on File Prior to Visit   Medication Sig Dispense Refill   ? acetaminophen (TYLENOL) 500 MG tablet Take 2 tablets (1,000 mg total) by mouth 3 (three) times a day. (Patient taking differently: Take 1,000 mg " by mouth 3 (three) times a day as needed. )  0   ? albuterol (PROAIR HFA;PROVENTIL HFA;VENTOLIN HFA) 90 mcg/actuation inhaler Inhale 2 puffs every 4 (four) hours as needed for wheezing.  0   ? atorvastatin (LIPITOR) 80 MG tablet TAKE 1 TABLET(80 MG) BY MOUTH DAILY 90 tablet 3   ? bumetanide (BUMEX) 2 MG tablet Take 1 tablet (2 mg total) by mouth 2 (two) times a day at 9am and 6pm.  0   ? colchicine 0.6 mg tablet take two tablets (1.2 mg) by mouth at onset of gout symptoms, then repeat one tablet (0.6 mg) by mouth one hour later 24 tablet 2   ? diclofenac sodium (VOLTAREN) 1 % Gel Apply 2 g topically 4 (four) times a day as needed.      ? dimethicone 5 % Crea Apply 1 application topically 2 (two) times a day as needed (to buttocks as needed for skin breakdown).     ? ferrous sulfate 325 (65 FE) MG tablet Take 1 tablet by mouth daily.     ? levothyroxine (SYNTHROID, LEVOTHROID) 200 MCG tablet Take 1 tablet (200 mcg total) by mouth daily before supper.     ? nystatin (MYCOSTATIN) powder Apply topically 4 (four) times a day. 60 g 2   ? omeprazole (PRILOSEC) 20 MG capsule Take 1 capsule (20 mg total) by mouth daily before breakfast. 90 capsule 3   ? ondansetron (ZOFRAN-ODT) 4 MG disintegrating tablet Take 4 mg by mouth every 6 (six) hours as needed for nausea.     ? polyethylene glycol (MIRALAX) 17 gram packet Take 17 g by mouth daily.      ? potassium chloride (K-DUR,KLOR-CON) 20 MEQ tablet Take 20 mEq by mouth daily. At 1400     ? potassium chloride (K-DUR,KLOR-CON) 20 MEQ tablet Take 40 mEq by mouth daily before breakfast.     ? senna (SENOKOT) 8.6 mg tablet Take 1 tablet by mouth daily. Hold for loose stools     ? sulfamethoxazole-trimethoprim (SEPTRA) 400-80 mg per tablet Take 1 tablet by mouth 2 (two) times a day for 2 days. 4 tablet 0   ? white petrolatum (AQUAPHOR ORIGINAL) 41 % Oint Apply 1 application topically at bedtime.       No current facility-administered medications on file prior to visit.    :       ALLERGIES:  Tramadol; Codeine; Codeine phosphate (bulk); Codeine sulfate; Codeine-butalbital-asa-caff; Codeine-guaifenesin; Lisinopril; and Penicillins    Vitals:  There were no vitals taken for this visit. except as noted below    Vital signs: Reviewed per facility EMR vitals including as follows:              Temperature 95.8 supposedly, heart rate 58 respirations 19 blood pressure 129/71 O2 sats 94% though they have been as low as 86% yesterday.  Weight recorded is 203 pounds yesterday 210 pounds today.      There is no height or weight on file to calculate BMI.    Physical exam:    General:  Alert  oriented x2, unaware of exact date appears relatively good, happy with smiles answers questions appropriately    Normocephalic atraumatic with extensive hair loss.  Gaze is conjugate speech is strong good range of motion of her neck.    Lousy range of motion of both shoulders, she has very poor flexion and abduction, extension is better.  Check she does little bit better on the operative left side than she does on the right.  She complains of some subdeltoid ache or pain with attempts at flexion on the right.  Surgical scar in the left looks good.  She is moving air easily with decreased breath sounds in the bases.  Heart is irregular near 80 with soft systolic murmur abdomen is soft.  She has Velcro wraps on her lower legs I take those off she has mild venous stasis hyperpigmentation but no active cellulitis that I can see on either side.        Due to the 2020 Covid 19 pandemic, except as noted above, the patient was visually observed at a 6 foot plus distance.  An observational exam was performed in an effort to keep patient safe from Covid 19 and other communicable diseases.   Labs:  Lab Results   Component Value Date    WBC 5.7 09/12/2020    HGB 9.5 (L) 09/12/2020    HCT 30.8 (L) 09/12/2020     (H) 09/12/2020     09/12/2020     Results for orders placed or performed during the hospital encounter  of 09/10/20   Basic Metabolic Panel   Result Value Ref Range    Sodium 141 136 - 145 mmol/L    Potassium 3.6 3.5 - 5.0 mmol/L    Chloride 91 (L) 98 - 107 mmol/L    CO2 40 (H) 22 - 31 mmol/L    Anion Gap, Calculation 10 5 - 18 mmol/L    Glucose 99 70 - 125 mg/dL    Calcium 9.4 8.5 - 10.5 mg/dL    BUN 46 (H) 8 - 28 mg/dL    Creatinine 1.57 (H) 0.60 - 1.10 mg/dL    GFR MDRD Af Amer 38 (L) >60 mL/min/1.73m2    GFR MDRD Non Af Amer 31 (L) >60 mL/min/1.73m2         Lab Results   Component Value Date    TSH 9.38 (H) 2020     Lab Results   Component Value Date    HGBA1C 6.5 (H) 2020     [unfilled]  Lab Results   Component Value Date    CQQHRYCQ59 468 2020     Lab Results   Component Value Date     09/10/2020     [unfilled]  Most Recent EKG     Units 09/10/20  0518   VENTRATE BPM 60   ATRIALRATE BPM 60   QRSDURATION ms 170   QTINTERVAL ms 554   QTCCALC ms 554   RAXIS degrees 98   TAXIS degrees 260   MUSEDX  Wide QRS rhythm  Rightward axis  Left bundle branch block  Abnormal ECG  When compared with ECG of 2020 15:43,  Wide QRS rhythm has replaced Electronic ventricular pacemaker  Confirmed by SEE ED PROVIDER NOTE FOR, ECG INTERPRETATION (4000),  MuseAdmin, MuseAdmin () on 2020 1:01:54 PM           Echo Complete   Order# [id number]   Reading physician: Sylwia Lanza MD  Ordering physician: Amrit Luna DO  Study date: 20    Performing Date  Performing Department    Sep 12, 2020   CARDIAC TESTING [[id number]]    Patient Information     Patient Name   Thea Acosta  MRN   [id number]  Sex   Female   1   1934 (86 y.o.)    Indications     Pulmonary hypertension    Summary       Severe biatrial chamber enlargement    Left ventricle ejection fraction is normal. The calculated left ventricular ejection fraction is 56%.    Left ventricular diastolic dysfunction    Mild to moderate calcific mitral stenosis with moderate mitral regurgitation    Normal  functioning of a bioprosthetic aortic valve without insufficiency    Moderate tricuspid insufficiency with moderate elevation in pulmonary artery pressures.    When compared to the previous study dated 9/27/2019, no interval change.        Imaging:  Imaging results 30 days: Xr Chest 1 View Portable     Result Date: 9/10/2020  EXAM: XR CHEST 1 VIEW PORTABLE LOCATION: [facility] DATE/TIME: 9/10/2020 6:54 AM INDICATION: hypoxia COMPARISON: 07/28/2020      No focal consolidation or pleural effusion. Cardiac pacemaker. Median sternotomy. Stable enlarged cardiac silhouette. Mild pulmonary vascular congestion. Left shoulder arthroplasty.     Ct Head Without Contrast     Result Date: 9/10/2020  EXAM: CT HEAD WO CONTRAST LOCATION: [facility] DATE/TIME: 9/10/2020 6:48 AM INDICATION: Altered mental status. Confusion. COMPARISON: CT head dated 07/18/2020. TECHNIQUE: Routine without IV contrast. Multiplanar reformats. Dose reduction techniques were used. FINDINGS: Patient motion degrades evaluation. Within these confines: INTRACRANIAL CONTENTS: No intracranial hemorrhage, extraaxial collection, or mass effect.  No CT evidence of acute infarct. Mild presumed chronic small vessel ischemic changes. Mild to moderate generalized volume loss. No hydrocephalus. Note is made of coarse atherosclerotic calcifications of the intracranial vasculature. VISUALIZED ORBITS/SINUSES/MASTOIDS: Prior bilateral cataract surgery. Visualized portions of the orbits are otherwise unremarkable. No paranasal sinus mucosal disease. No middle ear or mastoid effusion. BONES/SOFT TISSUES: No acute abnormality.      1.  No CT evidence for acute intracranial process. 2.  Brain atrophy and presumed chronic microvascular ischemic changes as above.     Us Venous Legs Bilateral     Result Date: 9/10/2020  EXAM: US VENOUS LEGS BILATERAL LOCATION: [facility] DATE/TIME: 9/10/2020 7:24 AM INDICATION: swelling, dimer elevated COMPARISON:  1/10/2020. TECHNIQUE: Venous Duplex ultrasound of bilateral lower extremities with and without compression, augmentation and duplex. Color flow and spectral Doppler with waveform analysis performed. FINDINGS: Exam includes the common femoral, femoral, popliteal veins as well as segmentally visualized deep calf veins and greater saphenous vein. RIGHT: No deep vein thrombosis. No superficial thrombophlebitis. No popliteal cyst. LEFT: No deep vein thrombosis. No superficial thrombophlebitis. No popliteal cyst.      1.  No deep venous thrombosis in the bilateral lower extremities.     Echo    Severe biatrial chamber enlargement    Left ventricle ejection fraction is normal. The calculated left ventricular ejection fraction is 56%.    Left ventricular diastolic dysfunction    Mild to moderate calcific mitral stenosis with moderate mitral regurgitation    Normal functioning of a bioprosthetic aortic valve without insufficiency    Moderate tricuspid insufficiency with moderate elevation in pulmonary artery pressures.    When compared to the previous study dated 9/27/2019, no interval change.        SIGNIFICANT FINDINGS (Imaging, labs):   Xr Chest 1 View Portable  Result Date: 7/28/2020  EXAM: XR CHEST 1 VIEW PORTABLE LOCATION: [facility] DATE/TIME: 7/28/2020 3:37 PM INDICATION: preop COMPARISON: 07/20/2020   Anteriorly dislocated left shoulder known from shoulder series. Sternotomy. Pacemaker. Generalized cardiac enlargement. Lungs are clear and expanded.     Ct Shoulder Without Contrast Left  Result Date: 7/28/2020  1.  Anterior dislocation left shoulder. 2.  Glenohumeral osteoarthritis with likely multiple loose bodies present within the left shoulder joint. Large complex left shoulder joint effusion. 3.  No definite acute left shoulder fracture. 4.  Mild acromioclavicular joint osteoarthritis. 5.  Severe subscapularis muscle atrophy. 6.  Coronary arterial disease post sternal splitting operation and prosthetic  aortic valve placement. Left subclavian cardiac pacemaker.      Xr Shoulder Left 2 Or More Vws Portable  Result Date: 7/31/2020  Postoperative changes of left reverse shoulder arthroplasty. Components appear well seated. Sternotomy. Cardiac pacer. Hypertrophic change left acromioclavicular joint.      Xr Shoulder Left 2 Or More Vws  Result Date: 7/28/2020  Restoration of anatomic alignment of the left glenohumeral joint. Left acromioclavicular joint is normally aligned. No acute fracture is detected.      Xr Shoulder Left 2 Or More Vws  Result Date: 7/28/2020  Anteriorly dislocated shoulder similar to 07/12/2020 and reduced on films 07/13/2020. No fractures.       Assessment/Plan:      ICD-10-CM    1. Status post reverse total replacement of left shoulder  Z96.612    2. PAF (paroxysmal atrial fibrillation) (H)  I48.0    3. Acute diastolic heart failure (H)  I50.31        Recurrent acute metabolic encephalopathy  Hallucinations   Likely multifactorial in nature.  Patient likely had degree of intravascular volume depletion and was found to have metabolic alkalosis which I assume to be contraction alkalosis.  She was hypoxic and likely has untreated RANJIT.  She was felt to have cellulitis of the lower extremity.  Her hypothyroidism is now treated and not likely to be a contributor.  She appears to be back to recent baseline.    We will need to try to avoid metabolic derangements which is easily said but sometimes difficult to maintain.    UTI has felt to be a contributing factor in the past but not this time around    Lower extremity cellulitis   Presumed, sounds like if it was present it was mild.  She is on Septra which concerns me with her increasing creatinine but she completes it today.  This situation looks resolved.    Chronic diastolic CHF  Bilateral lower extremity edema  Weight instability   If patient is truly losing and gaining at least 25 pounds of water weight as it appears over the last months, it is  perplexing to me exactly why.  She was 203 pounds yesterday, 210 pounds today.  She was noted to be 205 pounds on August 9 but has generally been about 230 pounds over the last weeks.  She is on Bumex which has been increased with increasing weights but has not been effective at helping her edema.  Meanwhile it likely contributed to contraction alkalosis.  - It is not clear to me that an alternative diuretic would be beneficial.  She had metolazone added prior to admission for several days without positive effect on her edema/weight but a negative effect on her kidney function and acid-base metabolism.  -Perhaps nephrology would have something to offer?  If she deteriorates again we will need to consider requesting their involvement in her care.  For now we will continue the Bumex 2 mg twice daily, monitor weight, low-salt diet    Left shoulder instability status post reverse total arthroplasty July 31  Right shoulder pain   On about rotator cuff tendinitis.  She has not gotten better since I saw her last time despite ongoing physical therapy.  -Continue PT  - Consult to orthopedics placed.  Perhaps she would be an injection candidate?    Chronic kidney disease  Recent acute kidney injury   Concerned about her creatinine up to 1.77, perhaps related to Septra?.  Fortunately she finishes that today  -She has already had BMP ordered for September 18 which looks appropriate.  -Avoid nephrotoxic renal    Acute metabolic alkalosis   Noted with her recent admission.  Suspected contraction alkalosis.  She did have some evidence of appropriate respiratory compensation.  -Fluid and diuretic management continues to be a challenge.  Hopefully she will stabilize but may need renal support here?    Suspected chronic hypoxic respiratory failure  Recent acute hypoxic respiratory failure  COPD   Patient was again hypoxic on admission.  She has required oxygen on and off in the past as noted previously.  I am concerned about  "untreated RANJIT here as well and would recommend relatively low O2 sats 88 to 92% as a target to avoid CO2 retention.  Patient reports she is not interested in sleep study CPAP/BiPAP    She is not wheezing, she has no recent PFTs on record.  PRN albuterol if needed.    Coronary artery disease   Noncontributory.  She is on aspirin and atorvastatin.  Metoprolol was discontinued at the time when she is having low blood pressure and has not been restarted.  She is currently off aspirin as well--- as far as I can tell this was discontinued inadvertently.  She was on 81 twice daily for DVT prophylaxis following her surgery that was slated to be for about 30 days and when it discontinued nobody restarted her low-dose aspirin.  -We will restart low-dose aspirin 81 mg enteric-coated daily  -We will restart metoprolol at low-dose 12.5 twice daily with hold parameters as she has had low heart rate at x2, will hold for less than heart rate of 55 or systolic blood pressure less than 105.    Paroxysmal atrial fibrillation  Permanent pacemaker   Patient was once on metoprolol which appears to have been held due to hypotension several weeks ago.  Her heart rates are generally controlled, similar to see below.  However the metoprolol is indicated for coronary artery disease indication as above.  - Restart aspirin and metoprolol as noted above    Gout   A month concerned that she complains of \"hands not working\" by which she seems to be in stiffness today.  However she does not have red or inflamed joints.  She did have some presumed gout in the last several weeks so this will need to be watched    Diastolic dysfunction  Pulmonary hypertension   Degree of pulmonary hypertension portends a guarded prognosis    Hypertension   Were going to attempt to restart her beta-blocker as her blood pressures have been consistently better now and she has coronary artery disease indication    GERD   Omeprazole    Vitamin D deficiency   On " replacement    Multiple other chronic medical issues reviewed without change in treatment today              Case discussed with:  Primary CNP electronically  Facility staff         Rojelio Farnsworth MD

## 2021-06-20 NOTE — LETTER
Letter by Jennifer Valdes NP at      Author: Jennifer Valdes NP Service: -- Author Type: --    Filed:  Encounter Date: 2020 Status: (Other)         Patient: Thea Acosta   MR Number: [id number]   YOB: 1934   Date of Visit: 2020                 [facility]    DATE: 2020    NAME:  Thea Acosta             :  1934  MRN: [id number]  CODE STATUS:  DNR    VISIT TYPE: Review Of Multiple Medical Conditions (tsa, chf)     FACILITY:  [facility] [[id number]]       CHIEF COMPLAIN/REASON FOR VISIT:    Chief Complaint   Patient presents with   ? Review Of Multiple Medical Conditions     tsa, chf               HISTORY OF PRESENT ILLNESS: Thea Acosta is a 86 y.o. female who admitted - for acute metabolic encephalopathy. This was felt to be s/t hypothyroidism and noncompliance with levothyroxine dosing. She was readmitted - for left shoulder dislocation and underwent reverse total shoulder arthroplasty on . Postop course was uncomplicated and transferred back to [facility]. She has PMH of CAD, CKD, DM, s/p PPM, diastolic CHF, pulmonary artery hypertension, mitral valve stenosis, chronic a fib no anticoagulation, s/p AVR, asthma. Prior to this she lived at home with her grand daughter.     Today Ms. Acosta is seen for review of systems for chf, tsa. She is seen today in her room sitting in wheelchair. She feels pretty good, just some soreness in the shoulder at times. She is improving with therapy just wishes she could use the shoulder more. She is asking again how long she will have restrictions. She says she is not sure when she goes back to see her surgeon again. She denies any concerns with bowels or bladder. She is not having any other new concerns today.         REVIEW OF SYSTEMS:  PROBLEMS AND REVIEW OF SYSTEMS:   Review of Systems  Today on ROS:   Currently, no fever, chills, or rigors. Decreased vision and  "hearing. Denies any chest pain, headaches, palpitations, lightheadedness, dizziness, no cough, no sob. Appetite is good.  Denies any abdominal pain. No active bleeding. Positive for urinary incontinence, leg swelling, left arm and shoulder swelling improving, sling in place, left shoulder incision, no nausea or vomiting, no constipation, pain controlled and improving, lymphedema wraps      Allergies   Allergen Reactions   ? Tramadol Anxiety and Other (See Comments)     Per patient, Thea developed psychosis and severe anxiety and agitation \"for three days\" after receiving tramadol in the past. She stated that she would \"never\" take it again and would be extremely upset if she receives it. She asked me to add this to her chart's allergy list specifically.    ? Codeine Hives   ? Codeine Phosphate (Bulk)      This allergy is per [facility] records.   ? Codeine Sulfate      This allergy is per [facility] records.   ? Codeine-Butalbital-Asa-Caff      This allergy is per [facility] records.   ? Codeine-Guaifenesin      This allergy is per [facility] records.   ? Lisinopril Cough   ? Penicillins Swelling     Current Outpatient Medications   Medication Sig   ? acetaminophen (TYLENOL) 500 MG tablet Take 2 tablets (1,000 mg total) by mouth 3 (three) times a day. (Patient taking differently: Take 1,000 mg by mouth 3 (three) times a day. And daily prn)   ? albuterol (PROAIR HFA;PROVENTIL HFA;VENTOLIN HFA) 90 mcg/actuation inhaler Inhale 2 puffs every 4 (four) hours as needed for wheezing.   ? aspirin 81 MG EC tablet Take 1 tablet (81 mg total) by mouth 2 (two) times a day.   ? atorvastatin (LIPITOR) 80 MG tablet TAKE 1 TABLET(80 MG) BY MOUTH DAILY   ? bumetanide (BUMEX) 2 MG tablet Take 1.5 tablets (3 mg total) by mouth 2 (two) times a day at 9am and 6pm.   ? colchicine 0.6 mg tablet take two " tablets (1.2 mg) by mouth at onset of gout symptoms, then repeat one tablet (0.6 mg) by mouth one hour later   ? diclofenac sodium (VOLTAREN) 1 % Gel Apply 2 g topically 4 (four) times a day as needed.    ? ferrous sulfate 325 (65 FE) MG tablet TAKE 1 TABLET(325 MG) BY MOUTH TWICE DAILY (Patient taking differently: Take 1 tablet by mouth 3 (three) times a day with meals. )   ? levothyroxine (SYNTHROID, LEVOTHROID) 150 MCG tablet Take 1 tablet (150 mcg total) by mouth daily before supper.   ? nystatin (MYCOSTATIN) powder Apply topically 4 (four) times a day.   ? omeprazole (PRILOSEC) 20 MG capsule Take 1 capsule (20 mg total) by mouth daily before breakfast.   ? polyethylene glycol (MIRALAX) 17 gram packet Take 17 g by mouth daily as needed.   ? potassium chloride (K-DUR,KLOR-CON) 20 MEQ tablet Take 20 mEq by mouth daily.    ? predniSONE (DELTASONE) 2.5 MG tablet Take 7.5 mg/d prednisone PO for 3 weeks.   ? senna (SENOKOT) 8.6 mg tablet Take 1 tablet by mouth daily.   ? white petrolatum (AQUAPHOR ORIGINAL) 41 % Oint Apply 1 application topically at bedtime.     Past Medical History:    Past Medical History:   Diagnosis Date   ? Acute kidney injury superimposed on CKD (H) 3/3/2017   ? Asthma    ? CAD (coronary artery disease)    ? Cataract    ? Chronic kidney disease     ckd3   ? COPD (chronic obstructive pulmonary disease) (H)    ? Diabetes mellitus (H)    ? Disease of thyroid gland    ? H/O heart valve replacement with bioprosthetic valve    ? History of transfusion    ? Hypertension    ? Normochromic normocytic anemia    ? Pulmonary hypertension (H) 09/27/2019    Noted on echocardiogram   ? Restless leg syndrome            PHYSICAL EXAMINATION  Vitals:    08/28/20 0700   BP: 132/72   Pulse: 72   Resp: 18   Temp: 96.8  F (36  C)   SpO2: 93%   Weight: 214 lb (97.1 kg)       Today on physical exam:     GENERAL: Awake, Alert, obese,  not in any form of acute distress, answers questions appropriately, follows simple  commands, conversant  HEENT: Head is normocephalic with normal hair distribution. No evidence of trauma. Ears: No acute purulent discharge. Eyes: Conjunctivae pink with no scleral jaundice. Nose: Normal mucosa and septum. NECK: Supple with no cervical or supraclavicular lymphadenopathy. Trachea is midline. [place], decreased vision  CHEST: No tenderness or deformity, no crepitus, left chest PPM  LUNG: Dim but clear today to auscultation.   No shortness of breath noted today, no cough noted  BACK: No kyphosis of the thoracic spine. Symmetric, no curvature, ROM normal, no CVA tenderness, no spinal tenderness   CVS: irregularly irregular rhythm, murmur,  2+ pulses symmetric in all extremities.  ABDOMEN: Rounded and soft, nontender to palpation, non distended, no masses, no organomegaly, good bowel sounds, no rebound or guarding, no peritoneal signs.   EXTREMITIES: 2+ ble nonpitting edema, lymphedema wrapping, left upper extremity 1+ edema with lymphedema sleeve, left shoulder incision c/d/i, left arm sling in place  SKIN: Warm and dry  NEUROLOGICAL: The patient is oriented to person, place and time. Oriented today, no recent confusion, Forgetful at times.             LABS:   Recent Results (from the past 168 hour(s))   Hemoglobin   Result Value Ref Range    Hemoglobin 8.4 (L) 12.0 - 16.0 g/dL     Results for orders placed or performed in visit on 08/17/20   Basic Metabolic Panel   Result Value Ref Range    Sodium 141 136 - 145 mmol/L    Potassium 3.6 3.5 - 5.0 mmol/L    Chloride 98 98 - 107 mmol/L    CO2 35 (H) 22 - 31 mmol/L    Anion Gap, Calculation 8 5 - 18 mmol/L    Glucose 114 70 - 125 mg/dL    Calcium 9.2 8.5 - 10.5 mg/dL    BUN 37 (H) 8 - 28 mg/dL    Creatinine 1.47 (H) 0.60 - 1.10 mg/dL    GFR MDRD Af Amer 41 (L) >60 mL/min/1.73m2    GFR MDRD Non Af Amer 34 (L) >60 mL/min/1.73m2         Lab Results   Component Value Date    WBC 6.9 08/05/2020    HGB 8.4 (L) 08/24/2020    HCT 29.0 (L) 08/05/2020    MCV 98 08/05/2020      08/05/2020       Lab Results   Component Value Date    TILOSGWP60 333 07/21/2020     Lab Results   Component Value Date    HGBA1C 6.5 (H) 07/31/2020     Lab Results   Component Value Date    INR 0.99 07/28/2020    INR 1.30 (H) 02/07/2018    INR 1.51 (H) 03/06/2017     Vitamin D, Total (25-Hydroxy)   Date Value Ref Range Status   01/20/2020 45.9 30.0 - 80.0 ng/mL Final     Lab Results   Component Value Date    TSH 3.43 08/17/2020           ASSESSMENT/PLAN:    Left shoulder dislocation, s/p reverse total shoulder arthroplasty: Incision c/d/i. Pain controlled on tylenol.  Ice prn. PT, OT following. voltaren gel prn. F/u with surgeon Dr. Ramirez on 8/14-f/u again in 4 weeks, limit external rotation, no bathing until 4 weeks postop, healing well.  lymphedema sleeve on LUE. Overall improving. Remains NWB, may remove sling when resting. Improving with therapy. No new concerns today.   Asthma:  Encourage cough and deep breathe. IS q1h while awake. Albuterol prn. Shortness of breath at baseline today.   Hypothyroid: on levothyroxine.   Chronic CHF: Daily [id number]. Shortness of breath at baseline, 2+ ble edema. On bumex 2mg two times a day, keep legs elevated when possible.  F/u with cardiology prn. Cardiac diet. Monitor for fluid overload. Lymphedema wrapping. No concerns today, appears euvolemic.   S/p PPM: follows with device clinic. No recent concerns.   CKD stage 3: Cr 1.39, gfr 36 on 8/1. Stable.   H/o Type 2 DM: No meds, no monitoring needed. Diet controlled. No recent concerns.   Dyslipidemia: On aspirin, atorvastatin.   HTN: SBP 130s, On lopressor, bumex.  hold parameters for lopressor. Stable.  GERD: On omeprazole. No concerns today.   Vitamin d deficiency: On vitamin d.  Hypokalemia: On kcl.  PAF, s/p AVR: Regular rate and rhythm today. On metoprolol. F/u with caridology. No concerns.   Anemia of CKD: on iron. Hg 10.9 on 7/24.   Hg 8.8 on 8/1. Hg 9 on 8/5. Recheck on 8/17 8.2, iron to  three times a day. recheck on 8/24-hg 8.4.   Candidal intertrigo:  Nystatin.   Constipation: senna daily, miralax daily prn.      PT: trsf min-max A, walking 25ft with trang walker with Min A, OT: LE & hand lymph, mod A UB drsg, max A toileting. Lives with granddaughter. Yellow tag recommend 2 weeks    Electronically signed by: Jennifer Valdes NP

## 2021-06-20 NOTE — LETTER
Letter by Jennifer Valdes NP at      Author: Jennifer Valdes NP Service: -- Author Type: --    Filed:  Encounter Date: 2/10/2020 Status: (Other)         Patient: Thea Acosta   MR Number: [id number]   YOB: 1934   Date of Visit: 2/10/2020                 [facility]    DATE: 2/10/2020    NAME:  Thea Acosta             :  1934  MRN: [id number]  CODE STATUS:  DNR    VISIT TYPE: Review Of Multiple Medical Conditions     FACILITY:  [facility] [[id number]]       CHIEF COMPLAIN/REASON FOR VISIT:    Chief Complaint   Patient presents with   ? Review Of Multiple Medical Conditions               HISTORY OF PRESENT ILLNESS: Thea Acosta is a 85 y.o. female who was admitted - for dyspnea and fluid overload, acute on chronic diastolic CHF. She was treated with IV diuresis and later transitioned to PO. Then she developed MIGUEL and held bumex and metolazone. During stay she developed herpes zoster outbreak on left chest. She also had left leg laceration and treated with clindamycin. Left leg negative for DVT on doppler. She completed IV ceftezole and po keflex for cellulitis. She did have some orthostatic hypotension that required med changes. TSH stable and ACTH stim test not indicated. She did have some epigastric pain and was treated with PPI and maalox. She was recommended TCU stay for deconditioning. She has PMH of CAD, CKD, DM, s/p PPM, diastolic CHF, pulmonary artery hypertension, mitral valve stenosis, chronic a fib no anticoagulation, s/p AVR, asthma. Prior to this she lived at home with her grand daughter.     Today Ms. Acosta is seen for follow up visit. Staff note she continues to have some cough and wheezing but has been doing better. Her weights are up again recently 197-198lbs from 195lbs last week. She also has rash in her groin and abdominal folds still and her nystatin order was only for 7 days. Her leg swelling is reportedly  improving with the lymphedema wraps. On exam she is seen in her wheelchair alone in room. She says she is doing pretty well but did not sleep last night and is very tired and dozing off in her wheelchair today. Her leg swelling is improving and the redness and warmth is gone. She is not having any pain right now. She thinks her breathing is getting better but does still have the cough and wheezing at times. She denies any other concerns today and her bowels are moving.         REVIEW OF SYSTEMS:  PROBLEMS AND REVIEW OF SYSTEMS:   Review of Systems  Today on ROS:   Currently, no fever, chills, or rigors. Decreased vision and hearing. Denies any chest pain, headaches, palpitations, lightheadedness, dizziness. Appetite is good.  Denies any abdominal pain. No active bleeding. Positive for urinary incontinence, no pain, constipation improved, cough improved, shortness of breath improved, leg swelling improving, lymphedema wraps, intermittent wheezing, rash in groin and abdominal folds, weight gain      Allergies   Allergen Reactions   ? Codeine Hives   ? Codeine Phosphate (Bulk)      This allergy is per [facility] records.   ? Codeine Sulfate      This allergy is per [facility] records.   ? Codeine-Butalbital-Asa-Caff      This allergy is per [facility] records.   ? Codeine-Guaifenesin      This allergy is per [facility] records.   ? Lisinopril Cough   ? Penicillins Swelling     Current Outpatient Medications   Medication Sig   ? acetaminophen (TYLENOL) 500 MG tablet Take 2 tablets (1,000 mg total) by mouth 3 (three) times a day as needed for pain. Not to exceed 4000 mg in 24 hours.   ? aspirin 81 MG EC tablet Take 81 mg by mouth daily.   ? atorvastatin (LIPITOR) 80 MG tablet TAKE 1 TABLET(80 MG) BY MOUTH AT BEDTIME   ? bacitracin 500 unit/gram ointment Apply topically 2 (two) times a day.    ? bumetanide (BUMEX) 2 MG tablet TAKE 1 TABLET(2 MG) BY MOUTH TWICE DAILY AT 9 AM AND AT 6 PM   ? cholecalciferol, vitamin D3, (VITAMIN D3) 2,000 unit Tab Take 2,000 Units by mouth once daily.   ? ferrous sulfate 325 (65 FE) MG tablet TAKE 1 TABLET(325 MG) BY MOUTH TWICE DAILY   ? ipratropium-albuterol (COMBIVENT RESPIMAT)  mcg/actuation Mist inhaler INHALE 1 PUFF BY MOUTH FOUR TIMES DAILY (Patient taking differently: 4 (four) times a day as needed. )   ? levothyroxine (SYNTHROID, LEVOTHROID) 150 MCG tablet TAKE 1 TABLET BY MOUTH DAILY AT 6:00 AM   ? lidocaine (XYLOCAINE) 5 % ointment Apply to painful areas   ? metoprolol tartrate (LOPRESSOR) 25 MG tablet Take 0.5 tablets (12.5 mg total) by mouth 2 (two) times a day.   ? omeprazole (PRILOSEC) 20 MG capsule Take 1 capsule (20 mg total) by mouth daily.   ? polyethylene glycol (MIRALAX) 17 gram packet Take 17 g by mouth daily.   ? potassium chloride (K-DUR,KLOR-CON) 20 MEQ tablet Take 40 mEq by mouth 2 (two) times a day.    ? senna (SENOKOT) 8.6 mg tablet Take 1 tablet by mouth daily as needed for constipation.   ? white petrolatum (AQUAPHOR ORIGINAL) 41 % Oint Apply 1 application topically at bedtime.     Past Medical History:    Past Medical History:   Diagnosis Date   ? Acute kidney injury superimposed on CKD (H) 3/3/2017   ? Asthma    ? CAD (coronary artery disease)    ? Cataract    ? Chronic kidney disease     ckd3   ? Diabetes mellitus (H)    ? Disease of thyroid gland    ? H/O heart valve replacement with bioprosthetic valve    ? History of transfusion    ? Hypertension    ? Normochromic normocytic anemia    ? Pulmonary hypertension (H) 09/27/2019    Noted on echocardiogram   ? Restless leg syndrome            PHYSICAL EXAMINATION  Vitals:    02/09/20 1954   BP: 120/64   Pulse: 61   Resp: 18   Temp: 96.7  F (35.9  C)   SpO2: 94%   Weight: 197 lb (89.4 kg)       Today on physical exam:     GENERAL: Awake, Alert, obese, oriented x3, not in any form of  acute distress, answers questions appropriately, follows simple commands, conversant  HEENT: Head is normocephalic with normal hair distribution. No evidence of trauma. Ears: No acute purulent discharge. Eyes: Conjunctivae pink with no scleral jaundice. Nose: Normal mucosa and septum. NECK: Supple with no cervical or supraclavicular lymphadenopathy. Trachea is midline. [place], decreased vision  CHEST: No tenderness or deformity, no crepitus, left chest PPM  LUNG: Dim with expiratory wheezes today to auscultation.   Shortness of breath with exertion, sometimes at rest, dry and intermittently congested cough  BACK: No kyphosis of the thoracic spine. Symmetric, no curvature, ROM normal, no CVA tenderness, no spinal tenderness   CVS: irregularly irregular rhythm, murmur,  2+ pulses symmetric in all extremities.  ABDOMEN: Rounded and soft, nontender to palpation, non distended, no masses, no organomegaly, good bowel sounds, no rebound or guarding, no peritoneal signs.   EXTREMITIES: 1-2+ ble pitting edema,  lymphedema wraps  SKIN: Warm and dry  NEUROLOGICAL: The patient is oriented to person, place and time. Strength and sensation are grossly intact. Face is symmetric.            LABS:   Recent Results (from the past 168 hour(s))   Basic Metabolic Panel   Result Value Ref Range    Sodium 137 136 - 145 mmol/L    Potassium 3.1 (L) 3.5 - 5.0 mmol/L    Chloride 88 (L) 98 - 107 mmol/L    CO2 37 (H) 22 - 31 mmol/L    Anion Gap, Calculation 12 5 - 18 mmol/L    Glucose 107 70 - 125 mg/dL    Calcium 10.1 8.5 - 10.5 mg/dL    BUN 67 (H) 8 - 28 mg/dL    Creatinine 2.07 (H) 0.60 - 1.10 mg/dL    GFR MDRD Af Amer 28 (L) >60 mL/min/1.73m2    GFR MDRD Non Af Amer 23 (L) >60 mL/min/1.73m2   Basic Metabolic Panel   Result Value Ref Range    Sodium 135 (L) 136 - 145 mmol/L    Potassium 3.6 3.5 - 5.0 mmol/L    Chloride 84 (L) 98 - 107 mmol/L    CO2 37 (H) 22 - 31 mmol/L    Anion Gap, Calculation 14 5 - 18 mmol/L    Glucose 138 (H) 70 - 125  mg/dL    Calcium 10.0 8.5 - 10.5 mg/dL    BUN 82 (H) 8 - 28 mg/dL    Creatinine 2.07 (H) 0.60 - 1.10 mg/dL    GFR MDRD Af Amer 28 (L) >60 mL/min/1.73m2    GFR MDRD Non Af Amer 23 (L) >60 mL/min/1.73m2     Results for orders placed or performed in visit on 02/07/20   Basic Metabolic Panel   Result Value Ref Range    Sodium 135 (L) 136 - 145 mmol/L    Potassium 3.6 3.5 - 5.0 mmol/L    Chloride 84 (L) 98 - 107 mmol/L    CO2 37 (H) 22 - 31 mmol/L    Anion Gap, Calculation 14 5 - 18 mmol/L    Glucose 138 (H) 70 - 125 mg/dL    Calcium 10.0 8.5 - 10.5 mg/dL    BUN 82 (H) 8 - 28 mg/dL    Creatinine 2.07 (H) 0.60 - 1.10 mg/dL    GFR MDRD Af Amer 28 (L) >60 mL/min/1.73m2    GFR MDRD Non Af Amer 23 (L) >60 mL/min/1.73m2         Lab Results   Component Value Date    WBC 6.5 01/20/2020    HGB 11.0 (L) 01/20/2020    HCT 35.4 01/20/2020    MCV 99 01/20/2020     01/20/2020       No results found for: [id number]  Lab Results   Component Value Date    HGBA1C 6.9 (H) 09/25/2019     Lab Results   Component Value Date    INR 1.30 (H) 02/07/2018    INR 1.51 (H) 03/06/2017    INR 1.27 (H) 03/05/2017     Vitamin D, Total (25-Hydroxy)   Date Value Ref Range Status   01/20/2020 45.9 30.0 - 80.0 ng/mL Final     Lab Results   Component Value Date    TSH 4.22 09/25/2019           ASSESSMENT/PLAN:     1 Viral URI with cough, Acute on chronic moderate persistent asthma exacerbation:No fever, No sore throat, no further rhinorrhea. Shortness of breath and cough improved. On duonebs four times a day and q4h prn, mucinex bid. Continues to have intermittent expiratory wheezing. Will extend prednisone again until 2/14. Overall improving.   2. Acute on chronic CHF: Daily [id number], reports weighing over 300lbs a year or so ago. Shortness of breath continues improved,  1-2+ pitting ble edema. On bumex 2mg two times a day, keep legs elevated when possible.  F/u with cardiology prn. PT, OT for  conditioning. Cardiac, low sodium diet. Daily weights.  lymphedema wrapping. Bmp on 2/3 stable. Weights increased again and salvador add metolazone one time on 2/11 5mg and monitor weights.   3. BLE Cellulitis: improving. Completed keflex. Lymphedema wraps.  4. S/p PPM: follows with device clinic. No recent concerns.   5. Herpes Zoster: resolved.   6. MIGUEL on CKD stage 3: Cr 1.75 on 1/14. Cr 1.71 on 1/20. Stable. Bmp on 1/27-cr 1.91. stable. Bmp on 1/31.   7. Type 2 DM: No meds, no monitoring needed. Diet controlled. Weight loss recently. Glucose on bmp 1/20 was 112.   8. Dyslipidemia: On aspirin, atorvastatin.   9. HTN: SBP 120s, On lopressor, bumex.  hold parameters for lopressor. No recent concerns, occasional reading in 90s but asymptomatic.   10. GERD: On omeprazole. No concerns today.   11. Hypothyroid: On levothyroxine.   12. Vitamin d deficiency: On vitamin d. Vit d 45 on 1/20. Stable.   13. Hypokalemia: On kcl.  14. PAF, s/p AVR: Regular rate and rhythm today. On metoprolol. F/u with caridology. No recent palpitations or chest pain.   15. Right elbow gout exacerbation: resolved.   16. Anemia of CKD: on iron two times a day. Hg  11.3 on 1/14. Hg 11 on 1/20.   17. Constipation:  scheduled miralax daily and senna daily prn. Now stable.   18. PVD, venous stasis: aquaphor two times a day. maintain dry and supple skin barrier. No open wounds on legs. No numb/tingling in legs. No leg pain. Lymphedema wrapping.   19. Candidal intertrigo: ordered nystatin powder two times a day until resolved.     Per therapy PT -150ft fww SPV, t/f's SPV, high fall risk (18/28 Tinetti), 3 stairs CGA-SBA, OT - UB setup, doesnt wear pants, toileting CGA, lymph treatment going well. Green tag. Lives in house with stairs with family assist. lcd 2/17.  pt, ot   Wheelchair ordered.      Electronically signed by: Jennifer Valdes NP

## 2021-06-20 NOTE — LETTER
Letter by Jennifer Valdes NP at      Author: Jennifer Valdes NP Service: -- Author Type: --    Filed:  Encounter Date: 2/3/2020 Status: (Other)         Patient: Thea Acosta   MR Number: [id number]   YOB: 1934   Date of Visit: 2/3/2020                 [facility]    DATE: 2/3/2020    NAME:  Thea Acosta             :  1934  MRN: [id number]  CODE STATUS:  DNR    VISIT TYPE: Problem Visit (wheezing, sob)     FACILITY:  [facility] [[id number]]       CHIEF COMPLAIN/REASON FOR VISIT:    Chief Complaint   Patient presents with   ? Problem Visit     wheezing, sob               HISTORY OF PRESENT ILLNESS: Thea Acosta is a 85 y.o. female who was admitted - for dyspnea and fluid overload, acute on chronic diastolic CHF. She was treated with IV diuresis and later transitioned to PO. Then she developed MIGUEL and held bumex and metolazone. During stay she developed herpes zoster outbreak on left chest. She also had left leg laceration and treated with clindamycin. Left leg negative for DVT on doppler. She completed IV ceftezole and po keflex for cellulitis. She did have some orthostatic hypotension that required med changes. TSH stable and ACTH stim test not indicated. She did have some epigastric pain and was treated with PPI and maalox. She was recommended TCU stay for deconditioning. She has PMH of CAD, CKD, DM, s/p PPM, diastolic CHF, pulmonary artery hypertension, mitral valve stenosis, chronic a fib no anticoagulation, s/p AVR, asthma. Prior to this she lived at home with her grand daughter.     Today Ms. Acosta is seen for follow up on shortness of breath and wheezing. She is seen in her wheelchair today with Ot present. They are working on lymphedema wrapping. Thea says her breathing is ok. She thinks her cough is better and not as congested or wet but still having some wheezing and shortness of breath at times. She thinks it is  improved from last week and that the steroids over the weekend did help. She is still doing the nebulizers as well. She denies fevers or chills and thinks her leg redness is really improving. She denies pain in her legs but her legs are still very swollen. She thinks her weight was down to 196lbs today. She does not believe the 190lbs was accurate from when she came in and thinks her baseline is more around 195lbs. She is not wearing the oxygen and bowels are moving fine. No fevers or other concerns over the weekend.       REVIEW OF SYSTEMS:  PROBLEMS AND REVIEW OF SYSTEMS:   Review of Systems  Today on ROS:   Currently, no fever, chills, or rigors. Decreased vision and hearing. Denies any chest pain, headaches, palpitations, lightheadedness, dizziness. Appetite is good.  Denies any abdominal pain. No active bleeding. Positive for sleeps in recliner, chest rash nearly resolved, urinary incontinence, no pain, constipation improved, dry flaking skin on legs, cough improved, shortness of breath improved, wheezing today, leg swelling, redness and warmth on legs improved      Allergies   Allergen Reactions   ? Codeine Hives   ? Codeine Phosphate (Bulk)      This allergy is per [facility] records.   ? Codeine Sulfate      This allergy is per [facility] records.   ? Codeine-Butalbital-Asa-Caff      This allergy is per [facility] records.   ? Codeine-Guaifenesin      This allergy is per [facility] records.   ? Lisinopril Cough   ? Penicillins Swelling     Current Outpatient Medications   Medication Sig   ? acetaminophen (TYLENOL) 500 MG tablet Take 2 tablets (1,000 mg total) by mouth 3 (three) times a day as needed for pain. Not to exceed 4000 mg in 24 hours.   ? aspirin 81 MG EC tablet Take 81 mg by mouth daily.   ? atorvastatin (LIPITOR) 80 MG tablet TAKE 1 TABLET(80 MG) BY MOUTH AT BEDTIME    ? bacitracin 500 unit/gram ointment Apply topically 2 (two) times a day.   ? bumetanide (BUMEX) 2 MG tablet TAKE 1 TABLET(2 MG) BY MOUTH TWICE DAILY AT 9 AM AND AT 6 PM   ? cholecalciferol, vitamin D3, (VITAMIN D3) 2,000 unit Tab Take 2,000 Units by mouth once daily.   ? ferrous sulfate 325 (65 FE) MG tablet TAKE 1 TABLET(325 MG) BY MOUTH TWICE DAILY   ? ipratropium-albuterol (COMBIVENT RESPIMAT)  mcg/actuation Mist inhaler INHALE 1 PUFF BY MOUTH FOUR TIMES DAILY (Patient taking differently: 4 (four) times a day as needed. )   ? levothyroxine (SYNTHROID, LEVOTHROID) 150 MCG tablet TAKE 1 TABLET BY MOUTH DAILY AT 6:00 AM   ? lidocaine (XYLOCAINE) 5 % ointment Apply to painful areas   ? metoprolol tartrate (LOPRESSOR) 25 MG tablet Take 0.5 tablets (12.5 mg total) by mouth 2 (two) times a day.   ? omeprazole (PRILOSEC) 20 MG capsule Take 1 capsule (20 mg total) by mouth daily.   ? polyethylene glycol (MIRALAX) 17 gram packet Take 17 g by mouth daily.   ? potassium chloride (K-DUR,KLOR-CON) 20 MEQ tablet Take 40 mEq by mouth 2 (two) times a day.    ? senna (SENOKOT) 8.6 mg tablet Take 1 tablet by mouth daily as needed for constipation.   ? white petrolatum (AQUAPHOR ORIGINAL) 41 % Oint Apply 1 application topically at bedtime.     Past Medical History:    Past Medical History:   Diagnosis Date   ? Acute kidney injury superimposed on CKD (H) 3/3/2017   ? Asthma    ? CAD (coronary artery disease)    ? Cataract    ? Chronic kidney disease     ckd3   ? Diabetes mellitus (H)    ? Disease of thyroid gland    ? H/O heart valve replacement with bioprosthetic valve    ? History of transfusion    ? Hypertension    ? Normochromic normocytic anemia    ? Pulmonary hypertension (H) 09/27/2019    Noted on echocardiogram   ? Restless leg syndrome            PHYSICAL EXAMINATION  Vitals:    02/02/20 2241   BP: 114/56   Pulse: 64   Resp: 18   Temp: 97  F (36.1  C)   SpO2: 90%   Weight: 198 lb (89.8 kg)       Today on physical  exam:     GENERAL: Awake, Alert, obese, oriented x3, not in any form of acute distress, answers questions appropriately, follows simple commands, conversant  HEENT: Head is normocephalic with normal hair distribution. No evidence of trauma. Ears: No acute purulent discharge. Eyes: Conjunctivae pink with no scleral jaundice. Nose: Normal mucosa and septum. NECK: Supple with no cervical or supraclavicular lymphadenopathy. Trachea is midline. [place], decreased vision  CHEST: No tenderness or deformity, no crepitus, left chest PPM  LUNG: Dim with expiratory wheezing to auscultation.   Shortness of breath with exertion, sometimes at rest, dry and intermittently congested cough  BACK: No kyphosis of the thoracic spine. Symmetric, no curvature, ROM normal, no CVA tenderness, no spinal tenderness   CVS: irregularly irregular rhythm, murmur,  2+ pulses symmetric in all extremities.  ABDOMEN: Rounded and soft, nontender to palpation, non distended, no masses, no organomegaly, good bowel sounds, no rebound or guarding, no peritoneal signs.   EXTREMITIES: 2-3+ ble pitting edema, dry flaking and scaly ski. improved erythema on lower legs extends ankles to knees, warm to touch improved  SKIN: Warm and dry  NEUROLOGICAL: The patient is oriented to person, place and time. Strength and sensation are grossly intact. Face is symmetric.            LABS:   Recent Results (from the past 168 hour(s))   Basic Metabolic Panel   Result Value Ref Range    Sodium 134 (L) 136 - 145 mmol/L    Potassium 2.9 (L) 3.5 - 5.0 mmol/L    Chloride 84 (L) 98 - 107 mmol/L    CO2 37 (H) 22 - 31 mmol/L    Anion Gap, Calculation 13 5 - 18 mmol/L    Glucose 103 70 - 125 mg/dL    Calcium 9.8 8.5 - 10.5 mg/dL    BUN 56 (H) 8 - 28 mg/dL    Creatinine 1.75 (H) 0.60 - 1.10 mg/dL    GFR MDRD Af Amer 33 (L) >60 mL/min/1.73m2    GFR MDRD Non Af Amer 28 (L) >60 mL/min/1.73m2     Results for orders placed or performed in visit on 01/31/20   Basic Metabolic Panel   Result  Value Ref Range    Sodium 134 (L) 136 - 145 mmol/L    Potassium 2.9 (L) 3.5 - 5.0 mmol/L    Chloride 84 (L) 98 - 107 mmol/L    CO2 37 (H) 22 - 31 mmol/L    Anion Gap, Calculation 13 5 - 18 mmol/L    Glucose 103 70 - 125 mg/dL    Calcium 9.8 8.5 - 10.5 mg/dL    BUN 56 (H) 8 - 28 mg/dL    Creatinine 1.75 (H) 0.60 - 1.10 mg/dL    GFR MDRD Af Amer 33 (L) >60 mL/min/1.73m2    GFR MDRD Non Af Amer 28 (L) >60 mL/min/1.73m2         Lab Results   Component Value Date    WBC 6.5 01/20/2020    HGB 11.0 (L) 01/20/2020    HCT 35.4 01/20/2020    MCV 99 01/20/2020     01/20/2020       No results found for: [id number]  Lab Results   Component Value Date    HGBA1C 6.9 (H) 09/25/2019     Lab Results   Component Value Date    INR 1.30 (H) 02/07/2018    INR 1.51 (H) 03/06/2017    INR 1.27 (H) 03/05/2017     Vitamin D, Total (25-Hydroxy)   Date Value Ref Range Status   01/20/2020 45.9 30.0 - 80.0 ng/mL Final     Lab Results   Component Value Date    TSH 4.22 09/25/2019           ASSESSMENT/PLAN:     1 Viral URI with cough, Acute on chronic moderate persistent asthma exacerbation:No fever, No sore throat, no further rhinorrhea. Shortness of breath and cough improved, still has some wheezing. On duonebs four times a day and q4h prn, mucinex bid. Encourage rest, limit fluids due to chf. No o2 supplementation needed over weekend, dc orders. Extend prednisone burst another 3 days then stop. Appears improving. Continue duonebs for now.   2. Acute on chronic CHF: Daily [phone], reports weighing over 300lbs a year or so ago. Shortness of breath continues improved,  2-3+ pitting ble edema. On bumex 2mg two times a day, keep legs elevated when possible. Sleeps in recliner.  F/u with cardiology prn. PT, OT for conditioning. Cardiac, low sodium diet. Daily weights. Starting lymphedema wrapping today, will dc ace wraps keep elevated. Bmp on 2/3. Improving, weights back down. Feels baseline weight around  195lbs. Completes metolazone 2/4.   3. BLE Cellulitis: improving. On keflex.OT started lymphedema wrapping today.   4. S/p PPM: follows with device clinic. No recent concerns.   5. Herpes Zoster: Across upper abdomen/chest. scabbed, healing. Minimal itching, no pain, scabs now falling off. No isolation needed. Tylenol prn pain. Completed antiviral treatment. Now resolved.   6. MIGUEL on CKD stage 3: Cr 1.75 on 1/14. Cr 1.71 on 1/20. Stable. Bmp on 1/27-cr 1.91. stable. Bmp on 1/31.   7. Type 2 DM: No meds, no monitoring needed. Diet controlled. Weight loss recently. Glucose on bmp 1/20 was 112.   8. Dyslipidemia: On aspirin, atorvastatin.   9. HTN: SBP 110s, On lopressor, bumex.  hold parameters for lopressor. No recent concerns, occasional reading in 90s but asymptomatic.   10. GERD: On omeprazole. No concerns today.   11. Hypothyroid: On levothyroxine.   12. Vitamin d deficiency: On vitamin d. Vit d 45 on 1/20. Stable.   13. Hypokalemia: On kcl.bmp today 2/3.   14. PAF, s/p AVR: Regular rate and rhythm today. On metoprolol. F/u with caridology. No recent palpitations or chest pain.   15. Right elbow gout exacerbation: resolved.   16. Anemia of CKD: on iron two times a day. Hg  11.3 on 1/14. Hg 11 on 1/20.   17. Constipation:  scheduled miralax daily and senna daily prn. Now stable.   18. PVD, venous stasis: aquaphor two times a day. maintain dry and supple skin barrier. No open wounds on legs. No numb/tingling in legs. No leg pain. Lymphedema wrapping.     Per therapy PT - 90-140ft fww SBA, t/f's SBA, high fall risk (18/28 [place]), need to work on stairs, OT - UB setup, doesnt wear pants, toileting CGA, starting lymph treatment with her.    Yellow tag. Lives in house with family. Recommend 1-2 weeks.      Electronically signed by: Jennifer Valdes NP

## 2021-06-20 NOTE — LETTER
Letter by Jennifer Valdes NP at      Author: Jennifer Valdes NP Service: -- Author Type: --    Filed:  Encounter Date: 2020 Status: (Other)         Patient: Thea Acosta   MR Number: [id number]   YOB: 1934   Date of Visit: 2020                 [facility]    DATE: 2020    NAME:  Thea Acosta             :  1934  MRN: [id number]  CODE STATUS:  DNR    VISIT TYPE: Review Of Multiple Medical Conditions (face to face for wheelchair)     FACILITY:  [facility] [[id number]]       CHIEF COMPLAIN/REASON FOR VISIT:    Chief Complaint   Patient presents with   ? Review Of Multiple Medical Conditions     face to face for wheelchair               HISTORY OF PRESENT ILLNESS: Thea Acosta is a 85 y.o. female who was admitted - for dyspnea and fluid overload, acute on chronic diastolic CHF. She was treated with IV diuresis and later transitioned to PO. Then she developed MIGUEL and held bumex and metolazone. During stay she developed herpes zoster outbreak on left chest. She also had left leg laceration and treated with clindamycin. Left leg negative for DVT on doppler. She completed IV ceftezole and po keflex for cellulitis. She did have some orthostatic hypotension that required med changes. TSH stable and ACTH stim test not indicated. She did have some epigastric pain and was treated with PPI and maalox. She was recommended TCU stay for deconditioning. She has PMH of CAD, CKD, DM, s/p PPM, diastolic CHF, pulmonary artery hypertension, mitral valve stenosis, chronic a fib no anticoagulation, s/p AVR, asthma. Prior to this she lived at home with her grand daughter.     Today Ms. Acosta is seen today for review of systems and face to face for wheelchair order. Nursing staff reports her weight today was 196lbs, which is stable. She reported to nursing feeling much better today and even wanting to eat breakfast, which she does not usually do.  She was started on lymphedema treatment yesterday. On exam she is seen in her room sitting in wheelchair. She says she feels great today and thinks the steroids are helping her breathing. She is not coughing as much or as short of breath. She denies pain right now and no trouble sleeping. No urinary concerns or other complaints today.     DME Order  Wheelchair    Wheelchair Face to Face documentation  Length of need: 99 (lifetime)  Diagnosis:   1. Acute on chronic systolic congestive heart failure (H)     2. Moderate persistent asthma with exacerbation     3. Atherosclerosis of native coronary artery of native heart without angina pectoris     4. Type 2 diabetes mellitus with stage 3 chronic kidney disease, without long-term current use of insulin (H)     5. Viral URI     6. Physical deconditioning     7. Lymphedema          This patient has significant mobility limitations that impair her activities of daily living including toileting, dressing, and bathing in a customary home.  She has had weakness, falls, poor standing balance, poor endurance while attempting to perform MRADLs from chronic heart failure and unsteady gait and mobility.  She has impairment in ability to complete mobility related ADLs from standing and would not be able to safely complete mobility and mobility related ADLs with use of cane, walker, or crutches.  Her current home has adequate space in entry doorway, bedrooms, bathroom, and kitchen for maneuvering a manual wheelchair.  The patient will use this wheelchair daily and it will improve her participation in MRADLs as she will be able to get out of bed and move about her home efficiently.  She is willing to use a manual wheelchair.  The patient does have sufficient upper extremity strength to propel a light weight wheelchair but has 24 hour supervision who will propel her in the wheelchair.  She is anticipated to spend at least 8 hours/day in the wheelchair.  she requires a smaller  wheelchair due to significant weight loss and current wheelchair is too wide and unable to propel independently. She requires light weight wheelchair due to being unable to propel standard wheelchair. She has demonstrated independence with light weight wheelchair with hands and feet. She does require trang height due to short stature.     Please contact with questions [phone], Fax: [phone].    Signature  Jennifer Valdes ALYCE  [facility]        REVIEW OF SYSTEMS:  PROBLEMS AND REVIEW OF SYSTEMS:   Review of Systems  Today on ROS:   Currently, no fever, chills, or rigors. Decreased vision and hearing. Denies any chest pain, headaches, palpitations, lightheadedness, dizziness. Appetite is good.  Denies any abdominal pain. No active bleeding. Positive for sleeps in recliner, chest rash nearly resolved, urinary incontinence, no pain, constipation improved, dry flaking skin on legs, cough improved, shortness of breath improved, leg swelling, lymphedema wraps      Allergies   Allergen Reactions   ? Codeine Hives   ? Codeine Phosphate (Bulk)      This allergy is per [facility] records.   ? Codeine Sulfate      This allergy is per [facility] records.   ? Codeine-Butalbital-Asa-Caff      This allergy is per [facility] records.   ? Codeine-Guaifenesin      This allergy is per [facility] records.   ? Lisinopril Cough   ? Penicillins Swelling     Current Outpatient Medications   Medication Sig   ? acetaminophen (TYLENOL) 500 MG tablet Take 2 tablets (1,000 mg total) by mouth 3 (three) times a day as needed for pain. Not to exceed 4000 mg in 24 hours.   ? aspirin 81 MG EC tablet Take 81 mg by mouth daily.   ? atorvastatin (LIPITOR) 80 MG tablet TAKE 1 TABLET(80 MG) BY MOUTH AT BEDTIME   ? bacitracin 500 unit/gram ointment Apply topically 2 (two) times a day.   ?  bumetanide (BUMEX) 2 MG tablet TAKE 1 TABLET(2 MG) BY MOUTH TWICE DAILY AT 9 AM AND AT 6 PM   ? cholecalciferol, vitamin D3, (VITAMIN D3) 2,000 unit Tab Take 2,000 Units by mouth once daily.   ? ferrous sulfate 325 (65 FE) MG tablet TAKE 1 TABLET(325 MG) BY MOUTH TWICE DAILY   ? ipratropium-albuterol (COMBIVENT RESPIMAT)  mcg/actuation Mist inhaler INHALE 1 PUFF BY MOUTH FOUR TIMES DAILY (Patient taking differently: 4 (four) times a day as needed. )   ? levothyroxine (SYNTHROID, LEVOTHROID) 150 MCG tablet TAKE 1 TABLET BY MOUTH DAILY AT 6:00 AM   ? lidocaine (XYLOCAINE) 5 % ointment Apply to painful areas   ? metoprolol tartrate (LOPRESSOR) 25 MG tablet Take 0.5 tablets (12.5 mg total) by mouth 2 (two) times a day.   ? omeprazole (PRILOSEC) 20 MG capsule Take 1 capsule (20 mg total) by mouth daily.   ? polyethylene glycol (MIRALAX) 17 gram packet Take 17 g by mouth daily.   ? potassium chloride (K-DUR,KLOR-CON) 20 MEQ tablet Take 40 mEq by mouth 2 (two) times a day.    ? senna (SENOKOT) 8.6 mg tablet Take 1 tablet by mouth daily as needed for constipation.   ? white petrolatum (AQUAPHOR ORIGINAL) 41 % Oint Apply 1 application topically at bedtime.     Past Medical History:    Past Medical History:   Diagnosis Date   ? Acute kidney injury superimposed on CKD (H) 3/3/2017   ? Asthma    ? CAD (coronary artery disease)    ? Cataract    ? Chronic kidney disease     ckd3   ? Diabetes mellitus (H)    ? Disease of thyroid gland    ? H/O heart valve replacement with bioprosthetic valve    ? History of transfusion    ? Hypertension    ? Normochromic normocytic anemia    ? Pulmonary hypertension (H) 09/27/2019    Noted on echocardiogram   ? Restless leg syndrome            PHYSICAL EXAMINATION  Vitals:    02/04/20 0700   BP: 120/67   Pulse: (!) 58   Resp: 18   Temp: 96.7  F (35.9  C)   SpO2: 92%   Weight: 196 lb (88.9 kg)       Today on physical exam:     GENERAL: Awake, Alert, obese, oriented x3, not in any form of  acute distress, answers questions appropriately, follows simple commands, conversant  HEENT: Head is normocephalic with normal hair distribution. No evidence of trauma. Ears: No acute purulent discharge. Eyes: Conjunctivae pink with no scleral jaundice. Nose: Normal mucosa and septum. NECK: Supple with no cervical or supraclavicular lymphadenopathy. Trachea is midline. [place], decreased vision  CHEST: No tenderness or deformity, no crepitus, left chest PPM  LUNG: Dim but clear today to auscultation.   Shortness of breath with exertion, sometimes at rest, dry and intermittently congested cough  BACK: No kyphosis of the thoracic spine. Symmetric, no curvature, ROM normal, no CVA tenderness, no spinal tenderness   CVS: irregularly irregular rhythm, murmur,  2+ pulses symmetric in all extremities.  ABDOMEN: Rounded and soft, nontender to palpation, non distended, no masses, no organomegaly, good bowel sounds, no rebound or guarding, no peritoneal signs.   EXTREMITIES: 2+ ble pitting edema, dry flaking and scaly skin but covered with lymphedema wraps today  SKIN: Warm and dry  NEUROLOGICAL: The patient is oriented to person, place and time. Strength and sensation are grossly intact. Face is symmetric.            LABS:   Recent Results (from the past 168 hour(s))   Basic Metabolic Panel   Result Value Ref Range    Sodium 134 (L) 136 - 145 mmol/L    Potassium 2.9 (L) 3.5 - 5.0 mmol/L    Chloride 84 (L) 98 - 107 mmol/L    CO2 37 (H) 22 - 31 mmol/L    Anion Gap, Calculation 13 5 - 18 mmol/L    Glucose 103 70 - 125 mg/dL    Calcium 9.8 8.5 - 10.5 mg/dL    BUN 56 (H) 8 - 28 mg/dL    Creatinine 1.75 (H) 0.60 - 1.10 mg/dL    GFR MDRD Af Amer 33 (L) >60 mL/min/1.73m2    GFR MDRD Non Af Amer 28 (L) >60 mL/min/1.73m2   Basic Metabolic Panel   Result Value Ref Range    Sodium 137 136 - 145 mmol/L    Potassium 3.1 (L) 3.5 - 5.0 mmol/L    Chloride 88 (L) 98 - 107 mmol/L    CO2 37 (H) 22 - 31 mmol/L    Anion Gap, Calculation 12 5 - 18  mmol/L    Glucose 107 70 - 125 mg/dL    Calcium 10.1 8.5 - 10.5 mg/dL    BUN 67 (H) 8 - 28 mg/dL    Creatinine 2.07 (H) 0.60 - 1.10 mg/dL    GFR MDRD Af Amer 28 (L) >60 mL/min/1.73m2    GFR MDRD Non Af Amer 23 (L) >60 mL/min/1.73m2     Results for orders placed or performed in visit on 02/03/20   Basic Metabolic Panel   Result Value Ref Range    Sodium 137 136 - 145 mmol/L    Potassium 3.1 (L) 3.5 - 5.0 mmol/L    Chloride 88 (L) 98 - 107 mmol/L    CO2 37 (H) 22 - 31 mmol/L    Anion Gap, Calculation 12 5 - 18 mmol/L    Glucose 107 70 - 125 mg/dL    Calcium 10.1 8.5 - 10.5 mg/dL    BUN 67 (H) 8 - 28 mg/dL    Creatinine 2.07 (H) 0.60 - 1.10 mg/dL    GFR MDRD Af Amer 28 (L) >60 mL/min/1.73m2    GFR MDRD Non Af Amer 23 (L) >60 mL/min/1.73m2         Lab Results   Component Value Date    WBC 6.5 01/20/2020    HGB 11.0 (L) 01/20/2020    HCT 35.4 01/20/2020    MCV 99 01/20/2020     01/20/2020       No results found for: [id number]  Lab Results   Component Value Date    HGBA1C 6.9 (H) 09/25/2019     Lab Results   Component Value Date    INR 1.30 (H) 02/07/2018    INR 1.51 (H) 03/06/2017    INR 1.27 (H) 03/05/2017     Vitamin D, Total (25-Hydroxy)   Date Value Ref Range Status   01/20/2020 45.9 30.0 - 80.0 ng/mL Final     Lab Results   Component Value Date    TSH 4.22 09/25/2019           ASSESSMENT/PLAN:     1 Viral URI with cough, Acute on chronic moderate persistent asthma exacerbation:No fever, No sore throat, no further rhinorrhea. Shortness of breath and cough improved, wheezing improved today. On duonebs four times a day and q4h prn, mucinex bid. Encourage rest, limit fluids due to chf. Extended prednisone and appears improving.  2. Acute on chronic CHF: Daily [phone], reports weighing over 300lbs a year or so ago. Shortness of breath continues improved,  2+ pitting ble edema. On bumex 2mg two times a day, keep legs elevated when possible. Sleeps in recliner.  F/u with  cardiology prn. PT, OT for conditioning. Cardiac, low sodium diet. Daily weights.  lymphedema wrapping. Bmp on 2/3 stable. Improving, weights back down. Feels baseline weight around 195lbs. Completed metolazone 2/4.   3. BLE Cellulitis: improving. On keflex. Lymphedema wraps.  4. S/p PPM: follows with device clinic. No recent concerns.   5. Herpes Zoster: Across upper abdomen/chest. scabbed, healing. Minimal itching, no pain, scabs now falling off. No isolation needed. Tylenol prn pain. Completed antiviral treatment. Now resolved.   6. MIGUEL on CKD stage 3: Cr 1.75 on 1/14. Cr 1.71 on 1/20. Stable. Bmp on 1/27-cr 1.91. stable. Bmp on 1/31.   7. Type 2 DM: No meds, no monitoring needed. Diet controlled. Weight loss recently. Glucose on bmp 1/20 was 112.   8. Dyslipidemia: On aspirin, atorvastatin.   9. HTN: SBP 120s, On lopressor, bumex.  hold parameters for lopressor. No recent concerns, occasional reading in 90s but asymptomatic.   10. GERD: On omeprazole. No concerns today.   11. Hypothyroid: On levothyroxine.   12. Vitamin d deficiency: On vitamin d. Vit d 45 on 1/20. Stable.   13. Hypokalemia: On kcl.bmp today 2/3-k 3.1, given additional 40meq and recheck on 2/7. Now completed metolazone.    14. PAF, s/p AVR: Regular rate and rhythm today. On metoprolol. F/u with caridology. No recent palpitations or chest pain.   15. Right elbow gout exacerbation: resolved.   16. Anemia of CKD: on iron two times a day. Hg  11.3 on 1/14. Hg 11 on 1/20.   17. Constipation:  scheduled miralax daily and senna daily prn. Now stable.   18. PVD, venous stasis: aquaphor two times a day. maintain dry and supple skin barrier. No open wounds on legs. No numb/tingling in legs. No leg pain. Lymphedema wrapping.     Per therapy PT - 90-140ft fww SBA, t/f's SBA, high fall risk (18/28 [place]), need to work on stairs, OT - UB setup, doesnt wear pants, toileting CGA, starting lymph treatment with her.    Yellow tag. Lives in house with family.  Recommend 1-2 weeks. Wheelchair ordered.      Electronically signed by: Jennifer Valdes NP

## 2021-06-20 NOTE — LETTER
Letter by Rojelio Farnsworth MD at      Author: Rojelio Farnsworth MD Service: -- Author Type: --    Filed:  Encounter Date: 8/11/2020 Status: (Other)         Patient: Thea Acosta   MR Number: [id number]   YOB: 1934   Date of Visit: 8/11/2020      [facility]    Facility:  [facility] [[id number]]    Code Status:  FULL CODE    Chief Complaint   Patient presents with   ? Problem Visit   ? Follow Up   ? Review Of Multiple Medical Conditions   :                    Patient Active Problem List   Diagnosis   ? Constipation   ? Sciatica   ? Dyspnea, unspecified type   ? Hypothyroidism   ? Type 2 diabetes mellitus with stage 3 chronic kidney disease, without long-term current use of insulin (H)   ? Hyperparathyroidism   ? Vitamin D deficiency   ? Mixed hyperlipidemia   ? Atherosclerosis of native coronary artery of native heart without angina pectoris   ? CKD (chronic kidney disease), stage III (H)   ? Osteoarthritis Of The Knee   ? Restless Legs Syndrome   ? H/O heart valve replacement with bioprosthetic valve   ? Cardiac pacemaker in situ   ? Complete heart block (H)   ? Normochromic normocytic anemia   ? Essential hypertension with goal blood pressure less than 140/90   ? Moderate persistent asthma without complication   ? PAF (paroxysmal atrial fibrillation) (H)   ? GERD (gastroesophageal reflux disease)   ? Primary insomnia   ? Hypokalemia   ? Physical deconditioning   ? Asthma   ? CHF (congestive heart failure), NYHA class I, acute, systolic (H)   ? Pulmonary hypertension (H)   ? Atrial fibrillation, unspecified type (H)   ? Status post aortic valve replacement   ? Non-rheumatic mitral valve stenosis   ? Nonrheumatic mitral valve stenosis   ? PVD (peripheral vascular disease) (H)   ? Lymphedema   ? Acute respiratory failure (H)   ? Delirium   ? S/p reverse total shoulder arthroplasty       History:  Thea Acosta  is an 86 year old  female with history of coronary artery disease, CHF, paroxysmal atrial fibrillation, AVR, permanent pacemaker placement, COPD, CKD 3, DM 2, hypertension, GERD,  seen for admission to TCU on 8/11/2020 following a series of hospitalizations    Hospital course #3/most recent:  Patient was hospitalized between July 28 and August 1 after I sent her from this TCU upon discovering her dislocated left shoulder on July 28.  Shoulder reduction was achieved but could not be maintained.  She was seen by orthopedics who diagnosed chronic instability and   recommended total reverse shoulder which was completed on July 31, 2020.  Surgery was complicated by mild perioperative hypoxia.  Patient was treated with hydrocodone (since discontinued).    Hospital course #1:  Patient was hospitalized at [facility] between July 12 and July 6 following a fall with left shoulder dislocation which was reduced in the emergency room.  However she was noted to be hypoxic and was diagnosed with acute hypoxic respiratory failure felt most likely secondary to atelectasis plus opiates which are being used for pain control.  She had weaned off oxygen by the time of discharge.  She also had acute kidney injury and was noted to have metabolic alkalosis that hospital stay.  She was discharged with a creatinine of 1.4 after holding her Bumex for a brief time.     She was discharged to [facility] TCU where she stayed for fewer than 48 hours due to development of acute delirium.    Hospital course #2:     She was readmitted between July 18 and July 21 again at [facility].  Hypothyroidism was felt to be the primary cause of her delirium.  TSH 67 on July 18.  She has been noncompliant with her thyroid medication quite regularly for at least months.  She was again found to be hypoxic without certain cause as she was not on opiates this time.  She did have a chest x-ray on July 20 suggesting a chronic CHF type appearance but no acute problems noted.   She again had elevation of her creatinine to a peak of 1006 discharged 1.67 with a baseline of about 1.4.        Subjective/ROS:    -augmented by discussion with facility staff involved in direct care  -limited by: Memory but less so than in the past.    -Patient feels like she is turned a corner and the staff agrees.  They find her to be more alert, sharper in discussion and with her memory.  Her pain is decreased.  She is tolerating her shoulder immobilizer.    -I saw her last week for a lot of edema especially in the left hand but also on both legs.  Her weight was up from a baseline of 205 pounds up to 224 pounds at peak.  The weight fell so rapidly one wonders if we were not having scale errors?    At any rate she is doing well and says that her breathing is good.  She is been off oxygen for time now.  She says she does not feel short of breath with light activity.    She continues to say that she is just not having much pain from the shoulder and she is grateful for it.    Bowels continue to work well.  She has no bladder complaints.  No fever sweats or chills.  No new falls or injuries.     Remainder negative    Past Medical History:   Diagnosis Date   ? Acute kidney injury superimposed on CKD (H) 3/3/2017   ? Asthma    ? CAD (coronary artery disease)    ? Cataract    ? Chronic kidney disease     ckd3   ? COPD (chronic obstructive pulmonary disease) (H)    ? Diabetes mellitus (H)    ? Disease of thyroid gland    ? H/O heart valve replacement with bioprosthetic valve    ? History of transfusion    ? Hypertension    ? Normochromic normocytic anemia    ? Pulmonary hypertension (H) 09/27/2019    Noted on echocardiogram   ? Restless leg syndrome      Past Surgical History:   Procedure Laterality Date   ? APPENDECTOMY     ? CHOLECYSTECTOMY     ? EYE SURGERY Bilateral     Cataracts   ? HIP PINNING Right 3/1/2017    Procedure: RIGHT HIP TROCHANTERIC RODDING;  Surgeon: Moshe Davies MD;  Location: [facility]  OR;  Service:    ? INSERT / REPLACE / REMOVE PACEMAKER  2007    guidant   ? INSERT / REPLACE / REMOVE PACEMAKER  2018    phoebe sci gen change   ? JOINT REPLACEMENT Left     knee   ? ND RECONSTR TOTAL SHOULDER IMPLANT Left 2020    Procedure: LEFT REVERSE TOTAL SHOULDER ARTHROPLASTY;  Surgeon: Alvarez Palomino MD;  Location: [facility] Main OR;  Service: Orthopedics   ? TISSUE AORTIC VALVE REPLACEMENT  2007               Family History   Problem Relation Age of Onset   ? No Medical Problems Mother         age 86   ? No Medical Problems Father         MVA   ? Cancer Brother         lung   ? No Medical Problems Brother    :       Social History     Socioeconomic History   ? Marital status:      Spouse name: Not on file   ? Number of children: Not on file   ? Years of education: Not on file   ? Highest education level: Not on file   Occupational History   ? Occupation:  in operating room     Comment: retired   Social Needs   ? Financial resource strain: Not on file   ? Food insecurity     Worry: Not on file     Inability: Not on file   ? Transportation needs     Medical: Not on file     Non-medical: Not on file   Tobacco Use   ? Smoking status: Former Smoker     Packs/day: 0.00     Last attempt to quit: 1950     Years since quittin.9   ? Smokeless tobacco: Never Used   Substance and Sexual Activity   ? Alcohol use: No   ? Drug use: No   ? Sexual activity: Never   Lifestyle   ? Physical activity     Days per week: Not on file     Minutes per session: Not on file   ? Stress: Not on file   Relationships   ? Social connections     Talks on phone: Not on file     Gets together: Not on file     Attends L[employer]-day service: Not on file     Active member of club or organization: Not on file     Attends meetings of clubs or organizations: Not on file     Relationship status: Not on file   ? Intimate partner violence     Fear of current or ex partner: Not on file     Emotionally  "abused: Not on file     Physically abused: Not on file     Forced sexual activity: Not on file   Other Topics Concern   ? Incontinent Not Asked   ? Toileting independently Not Asked   ? Cognitive impairment Not Asked   ? Vision impairment Not Asked   ? Hearing impairment Not Asked   ? Dentures Not Asked   Social History Narrative    She live in a single floor house.  Her son lives next door and her granddaughter is \"around a lot\"   3/2017   :    Patient says she lives on [address]  She lives with her granddaughter.  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    Current Outpatient Medications on File Prior to Visit   Medication Sig Dispense Refill   ? acetaminophen (TYLENOL) 500 MG tablet Take 2 tablets (1,000 mg total) by mouth 3 (three) times a day. (Patient taking differently: Take 1,000 mg by mouth 3 (three) times a day. And daily prn)  0   ? albuterol (PROAIR HFA;PROVENTIL HFA;VENTOLIN HFA) 90 mcg/actuation inhaler Inhale 2 puffs every 4 (four) hours as needed for wheezing.  0   ? aspirin 81 MG EC tablet Take 1 tablet (81 mg total) by mouth 2 (two) times a day.  0   ? atorvastatin (LIPITOR) 80 MG tablet TAKE 1 TABLET(80 MG) BY MOUTH DAILY 90 tablet 3   ? bumetanide (BUMEX) 2 MG tablet Take 1.5 tablets (3 mg total) by mouth 2 (two) times a day at 9am and 6pm. 270 tablet 2   ? colchicine 0.6 mg tablet take two tablets (1.2 mg) by mouth at onset of gout symptoms, then repeat one tablet (0.6 mg) by mouth one hour later 24 tablet 2   ? diclofenac sodium (VOLTAREN) 1 % Gel Apply 2 g topically 4 (four) times a day as needed.      ? ferrous sulfate 325 (65 FE) MG tablet TAKE 1 TABLET(325 MG) BY MOUTH TWICE DAILY 180 tablet 3   ? levothyroxine (SYNTHROID, LEVOTHROID) 150 MCG tablet Take 1 tablet (150 mcg total) by mouth daily before supper. 90 tablet 3   ? nystatin (MYCOSTATIN) powder Apply topically 4 (four) times a day. 60 g 2   ? omeprazole (PRILOSEC) 20 MG capsule Take 1 capsule " (20 mg total) by mouth daily before breakfast. 90 capsule 3   ? polyethylene glycol (MIRALAX) 17 gram packet Take 17 g by mouth daily as needed.     ? potassium chloride (K-DUR,KLOR-CON) 20 MEQ tablet Take 20 mEq by mouth daily.      ? predniSONE (DELTASONE) 2.5 MG tablet Take 7.5 mg/d prednisone PO for 3 weeks. 90 tablet 0   ? senna (SENOKOT) 8.6 mg tablet Take 1 tablet by mouth daily.     ? white petrolatum (AQUAPHOR ORIGINAL) 41 % Oint Apply 1 application topically at bedtime.       No current facility-administered medications on file prior to visit.    :      ALLERGIES:  Tramadol; Codeine; Codeine phosphate (bulk); Codeine sulfate; Codeine-butalbital-asa-caff; Codeine-guaifenesin; Lisinopril; and Penicillins    Vitals:  There were no vitals taken for this visit. except as noted below    Vital signs:        Most Recent Vitals Date/Time Taken  Temperature: 95.5  F 08/11/2020 02:56 PM  Pulse: 60 per minute 08/11/2020 02:56 PM  Respirations: 18 per minute 08/11/2020 01:17 PM  Blood Pressure: 124 / 59 mmHg 08/11/2020 02:56 PM  O2 Saturation: 97 % 08/11/2020 02:56 PM  Blood Sugar: 114 mg/dL 03/20/2017 05:08 PM  Weight: 204.4 lbs / Routine       BMI: 32.01 08/10/2020 03:30 PM  Height: 5ft 7.0in 07/17/2020 12:01 PM    Physical exam:    General: Patient does indeed look good today.  She sees me in the leung and greets me though she cannot remember my name.  She is alert oriented x3 pleasant and normally conversant.  She is wearing her lymphedema glove on the left hand and her edema is markedly improved compared to last week.  She still has mild left greater than right lower extremity edema with mild stasis dermatitis in the pretibial areas but significantly improved from t last week.  She is breathing comfortably without accessory muscle use, cough or tachypnea.  She demonstrates good movement in the left hands and fingers.    Due to the 2020 Covid 19 pandemic, except as noted above, the patient was visually observed at a  6 foot plus distance.  An observational exam was performed in an effort to keep patient safe from Covid 19 and other communicable diseases.   Labs:  Lab Results   Component Value Date    WBC 6.9 08/05/2020    HGB 9.0 (L) 08/05/2020    HCT 29.0 (L) 08/05/2020    MCV 98 08/05/2020     08/05/2020     Results for orders placed or performed in visit on 08/05/20   Basic Metabolic Panel   Result Value Ref Range    Sodium 139 136 - 145 mmol/L    Potassium 3.8 3.5 - 5.0 mmol/L    Chloride 95 (L) 98 - 107 mmol/L    CO2 35 (H) 22 - 31 mmol/L    Anion Gap, Calculation 9 5 - 18 mmol/L    Glucose 111 70 - 125 mg/dL    Calcium 9.5 8.5 - 10.5 mg/dL    BUN 42 (H) 8 - 28 mg/dL    Creatinine 1.42 (H) 0.60 - 1.10 mg/dL    GFR MDRD Af Amer 43 (L) >60 mL/min/1.73m2    GFR MDRD Non Af Amer 35 (L) >60 mL/min/1.73m2         Lab Results   Component Value Date    TSH 67.01 (H) 07/18/2020     Lab Results   Component Value Date    HGBA1C 6.5 (H) 07/31/2020     [unfilled]  Lab Results   Component Value Date    TRGVEWEU20 333 07/21/2020     Lab Results   Component Value Date     07/18/2020     [unfilled]  Most Recent EKG     Units 07/28/20  1543   VENTRATE BPM 63   ATRIALRATE BPM 57   QRSDURATION ms 172   QTINTERVAL ms 496   QTCCALC ms 507   RAXIS degrees 106   TAXIS degrees 261   MUSEDX  Ventricular-paced rhythm with occasional Premature ventricular complexes  Abnormal ECG  When compared with ECG of 18-JUL-2020 11:23,  Electronic ventricular pacemaker has replaced Wide QRS rhythm  Confirmed by SEE ED PROVIDER NOTE FOR, ECG INTERPRETATION ([id number]),  VIV ELDER ([id number]) on 7/28/2020 10:25:45 PM           Assessment/Plan:      ICD-10-CM    1. Delirium  R41.0    2. Status post reverse total replacement of left shoulder  Z96.612    3. Atrial fibrillation, unspecified type (H)  I48.91    4. CHF (congestive heart failure), NYHA class I, acute, systolic (H)  I50.21    Left shoulder instability  Recurrent dislocation left  shoulder  Status post reverse total replacement of left shoulder    Further improvement today.  She is now eyeing a return to home.  No last days been set to my knowledge.  Her left hand edema is markedly improved.  Continue Bumex as current.          Bilateral lower extremity edema   weight gain  Chronic diastolic CHF   I must question the weights in retrospect however she was quite edematous last week and not significantly improved.  No signs of respiratory distress, doing well on room air.  Her weight is back down to 205 pounds.  We have continued her Bumex 3 mg twice daily which is up from her chronic dose of 2 mg twice daily.  Appreciate lymphedema consultants involvement.  Results for orders placed or performed in visit on 20   Basic Metabolic Panel   Result Value Ref Range    Sodium 139 136 - 145 mmol/L    Potassium 3.8 3.5 - 5.0 mmol/L    Chloride 95 (L) 98 - 107 mmol/L    CO2 35 (H) 22 - 31 mmol/L    Anion Gap, Calculation 9 5 - 18 mmol/L    Glucose 111 70 - 125 mg/dL    Calcium 9.5 8.5 - 10.5 mg/dL    BUN 42 (H) 8 - 28 mg/dL    Creatinine 1.42 (H) 0.60 - 1.10 mg/dL    GFR MDRD Af Amer 43 (L) >60 mL/min/1.73m2    GFR MDRD Non Af Amer 35 (L) >60 mL/min/1.73m2       Continue daily weights, low-salt diet, diuretics as above.   Consider possible reduction in Bumex back to 2 mg twice daily depending on how she is doing over the next week        Echocardiogram reading:    Performing Date  Performing Department    2018   CARDIAC TESTING [[id number]]    Patient Information     Patient Name   Thea Acosta  MRN   [id number]  Sex   Female   1   1934 (83 y.o.)    Indications     Heart murmur; Fever    Summary       Normal left ventricular size and systolic function.    When compared to the previous study dated 3/1/2017, no significant change.    Left ventricle ejection fraction is normal. The estimated left ventricular ejection fraction is 55%.    Normal right ventricular size and  "systolic function.    Left Atrium: Left atrial volume is moderately increased.    Aortic Valve: Bioprosthetic valve is not well visualized. No evidence of paravalvular leak and mean gradient is 15 mmHg which is borderline elevated, however not significantly changed compared to prior.    Mitral Valve: There is severe mitral annular calcification. Moderate mitral Calcific stenosis. No mitral regurgitation.              Acute metabolic encephalopathy  Hallucinations  Hypothyroidism   Abnormal urinalysis   Looks like patient is finally getting back to her best baseline.  She has had quite a john paul course with 3 hospitalizations.  She is off opiates which is helped quite a bit.  She has little pain complaint which is fortunate.     However patient's hypothyroidism likely a significant contributor as well.  TSH was 67 on July 18.  She is back on replacement  -Follow-up for next TSH and dosage adjustment    Suspected chronic hypoxic respiratory failure   Nothing new here today except report patient has done well over the last week off oxygen.  Though this finding has been intermittent, she is prone to dropping her O2 sats.  I suspect it is multifactorial.  She has been felt to have COPD in the past although she is unaware of that and denies it.  She also has history of CHF though I do not find evidence of pulmonary edema.  She is now off opiates.  Obesity, possible hypoventilation, possible RANJIT contributing?  - Continue to monitor O2 sats, supplemental oxygen as need be  -Continue to avoid opiates and other sedating medications as possible  - No additional work-up anticipated          COPD   I see this in old notes and records.  I do not see complete pulmonary function tests on record however.  Patient says that she smoked in the past \"but did not inhale\".  She is skeptical of the diagnosis..  Nonetheless I suspect that there is some COPD present.  She does have albuterol if need be but not needed currently with no " wheezing.  No wheezing heard today.    Coronary artery disease   Continue aspirin and atorvastatin.  Not sure why she is not on beta-blocker, can follow-up with primary MD on that.    Paroxysmal atrial fibrillation   Sounds are she is in normal sinus rhythm today.  Takes only aspirin for stroke prophylaxis, does not need rate controlling agent    Permanent pacemaker   Follows with device clinic.    DM 2   Blood sugar control adequate.  A1c 7.0 on February 26.    Gout   Presumed.  Hand pain left greater than right.  Patient is on prednisone taper which she will continue herself.  Her 7.5 mg dose ends on August 22, she will need a new tapering dose entered at that point.  She can likely taper rapidly.  Question renally dosed allopurinol?     She also has colchicine but is not currently taking it, just a PRN medication     CKD 3     Results for orders placed or performed in visit on 08/05/20   Basic Metabolic Panel   Result Value Ref Range    Sodium 139 136 - 145 mmol/L    Potassium 3.8 3.5 - 5.0 mmol/L    Chloride 95 (L) 98 - 107 mmol/L    CO2 35 (H) 22 - 31 mmol/L    Anion Gap, Calculation 9 5 - 18 mmol/L    Glucose 111 70 - 125 mg/dL    Calcium 9.5 8.5 - 10.5 mg/dL    BUN 42 (H) 8 - 28 mg/dL    Creatinine 1.42 (H) 0.60 - 1.10 mg/dL    GFR MDRD Af Amer 43 (L) >60 mL/min/1.73m2    GFR MDRD Non Af Amer 35 (L) >60 mL/min/1.73m2       Mild elevations of creatinine with recent hospitalizations.  Doing relatively well, avoid nephrotoxins    Hypertension   Patient has had relatively low blood pressure at times throughout these past weeks.  Thus she is not on antihypertensives.   -July the past week and recent memory for blood pressures this past week.  - Anticipate restart of beta-blocker though that could be done as an outpatient with primary MD.    GERD   Omeprazole    Intertrigo   Improved on nystatin  Vitamin D deficiency   On replacement  Case discussed with:    Facility staff             Rojelio Farnsworth  MD

## 2021-06-20 NOTE — LETTER
Letter by Jennifer Valdes NP at      Author: Jennifer Valdes NP Service: -- Author Type: --    Filed:  Encounter Date: 2020 Status: (Other)         Patient: Thea Acosta   MR Number: [id number]   YOB: 1934   Date of Visit: 2020                 [facility]    DATE: 2020    NAME:  Thea Acosta             :  1934  MRN: [id number]  CODE STATUS:  DNR    VISIT TYPE: Problem Visit (fluid overload, cellulitis)     FACILITY:  [facility] [[id number]]       CHIEF COMPLAIN/REASON FOR VISIT:    Chief Complaint   Patient presents with   ? Problem Visit     fluid overload, cellulitis               HISTORY OF PRESENT ILLNESS: Thea Acosta is a 85 y.o. female who was admitted - for dyspnea and fluid overload, acute on chronic diastolic CHF. She was treated with IV diuresis and later transitioned to PO. Then she developed MIGUEL and held bumex and metolazone. During stay she developed herpes zoster outbreak on left chest. She also had left leg laceration and treated with clindamycin. Left leg negative for DVT on doppler. She completed IV ceftezole and po keflex for cellulitis. She did have some orthostatic hypotension that required med changes. TSH stable and ACTH stim test not indicated. She did have some epigastric pain and was treated with PPI and maalox. She was recommended TCU stay for deconditioning. She has PMH of CAD, CKD, DM, s/p PPM, diastolic CHF, pulmonary artery hypertension, mitral valve stenosis, chronic a fib no anticoagulation, s/p AVR, asthma. Prior to this she lived at home with her grand daughter.     Today Ms. Acosta is seen for nursing concerns for reddened lower extremities and concerns for cellulitis. Her legs are also more swollen and pitting. Her weights continue to be up >10 lbs since admission. She continues to have severe congestion, cough, and increased shortness of breath. Nursing reports there was a hold  parameter put on her bumex a few days ago and as result some doses have been held. Nursing reports her blood pressures have been on lower side since admission and has not been symptomatic with this. On exam she is seen in her room alone. She reports her legs are more red but they are not painful for her. She says when she had cellulitis before it was very painful. She does feel they are more swollen and admits she does not elevate her legs. She sits in her wheelchair as the recliner is uncomfortable for her. She says she still has a terrible cough and feels very congested. She is very short of breath with any movement now. She has to stop in therapy to catch her breath frequently. She reports that she does not feel very well. She does think the breathing treatments help some. She is not able to cough much up though and clear the fluid. She denies fevers or chills. She has been sleeping ok. Discussed with Thea that she is having a CHF exacerbation and this explains her symptoms. We discussed that by missing the doses of bumex have most likely set her back as she is not able to get rid of the extra fluid. We reviewed her weights and she is up 13lbs since admission. We discussed her respiratory status and her oxygen levels are borderline needing oxygen. If she drops <88-90% we will have to use supplemental o2. We discussed increasing her duonebs to assist with shortness of breath, congestion, cough and will increase her metolazone. We also discussed the metolazone was ordered to augment the bumex but since she has not been taking the bumex the metolazone is very minimally effective. We did discuss as well the symptoms on her legs and she has worsening edema that is now tight and pitting with cellulitis infection on both legs. We discussed treating with antibiotic and consulting therapy for lymphedema wrapping. She was agreeable to plan.       REVIEW OF SYSTEMS:  PROBLEMS AND REVIEW OF SYSTEMS:   Review of  Systems  Today on ROS:   Currently, no fever, chills, or rigors. Decreased vision and hearing. Denies any chest pain, headaches, palpitations, lightheadedness, dizziness. Appetite is good.  Denies any abdominal pain. No active bleeding. Positive for sleeps in recliner, low blood pressure at times, chest rash nearly resolved, urinary incontinence, no pain, constipation improved, dry flaking skin on legs, weight gain, congested cough, wheezing, worsening shortness of breath, hypoxia, worsening edema, redness and warmth on both legs      Allergies   Allergen Reactions   ? Codeine Hives   ? Codeine Phosphate (Bulk)      This allergy is per [facility] records.   ? Codeine Sulfate      This allergy is per [facility] records.   ? Codeine-Butalbital-Asa-Caff      This allergy is per [facility] records.   ? Codeine-Guaifenesin      This allergy is per [facility] records.   ? Lisinopril Cough   ? Penicillins Swelling     Current Outpatient Medications   Medication Sig   ? acetaminophen (TYLENOL) 500 MG tablet Take 2 tablets (1,000 mg total) by mouth 3 (three) times a day as needed for pain. Not to exceed 4000 mg in 24 hours.   ? aspirin 81 MG EC tablet Take 81 mg by mouth daily.   ? atorvastatin (LIPITOR) 80 MG tablet TAKE 1 TABLET(80 MG) BY MOUTH AT BEDTIME   ? bacitracin 500 unit/gram ointment Apply topically 2 (two) times a day.   ? bumetanide (BUMEX) 2 MG tablet TAKE 1 TABLET(2 MG) BY MOUTH TWICE DAILY AT 9 AM AND AT 6 PM   ? cholecalciferol, vitamin D3, (VITAMIN D3) 2,000 unit Tab Take 2,000 Units by mouth once daily.   ? ferrous sulfate 325 (65 FE) MG tablet TAKE 1 TABLET(325 MG) BY MOUTH TWICE DAILY   ? ipratropium-albuterol (COMBIVENT RESPIMAT)  mcg/actuation Mist inhaler INHALE 1 PUFF BY MOUTH FOUR TIMES DAILY (Patient taking differently: 4 (four) times a day as needed. )   ?  levothyroxine (SYNTHROID, LEVOTHROID) 150 MCG tablet TAKE 1 TABLET BY MOUTH DAILY AT 6:00 AM   ? lidocaine (XYLOCAINE) 5 % ointment Apply to painful areas   ? metoprolol tartrate (LOPRESSOR) 25 MG tablet Take 0.5 tablets (12.5 mg total) by mouth 2 (two) times a day.   ? omeprazole (PRILOSEC) 20 MG capsule Take 1 capsule (20 mg total) by mouth daily.   ? polyethylene glycol (MIRALAX) 17 gram packet Take 17 g by mouth daily.   ? potassium chloride (K-DUR,KLOR-CON) 20 MEQ tablet Take 40 mEq by mouth daily.   ? senna (SENOKOT) 8.6 mg tablet Take 1 tablet by mouth daily as needed for constipation.   ? white petrolatum (AQUAPHOR ORIGINAL) 41 % Oint Apply 1 application topically at bedtime.     Past Medical History:    Past Medical History:   Diagnosis Date   ? Acute kidney injury superimposed on CKD (H) 3/3/2017   ? Asthma    ? CAD (coronary artery disease)    ? Cataract    ? Chronic kidney disease     ckd3   ? Diabetes mellitus (H)    ? Disease of thyroid gland    ? H/O heart valve replacement with bioprosthetic valve    ? History of transfusion    ? Hypertension    ? Normochromic normocytic anemia    ? Pulmonary hypertension (H) 09/27/2019    Noted on echocardiogram   ? Restless leg syndrome            PHYSICAL EXAMINATION  Vitals:    01/30/20 0700   BP: 132/65   Pulse: 60   Resp: 18   Temp: 96.8  F (36  C)   SpO2: 92%   Weight: 201 lb (91.2 kg)       Today on physical exam:     GENERAL: Awake, Alert, obese, oriented x3, not in any form of acute distress, answers questions appropriately, follows simple commands, conversant  HEENT: Head is normocephalic with normal hair distribution. No evidence of trauma. Ears: No acute purulent discharge. Eyes: Conjunctivae pink with no scleral jaundice. Nose: Normal mucosa and septum. NECK: Supple with no cervical or supraclavicular lymphadenopathy. Trachea is midline. [place], decreased vision  CHEST: No tenderness or deformity, no crepitus, left chest PPM  LUNG: Dim with frequent and  diffuse coarse crackles, expiratory and inspiratory wheezing to auscultation.   Shortness of breath with exertion and at rest, harsh, congested nonproductive cough  BACK: No kyphosis of the thoracic spine. Symmetric, no curvature, ROM normal, no CVA tenderness, no spinal tenderness   CVS: irregularly irregular rhythm, murmur,  2+ pulses symmetric in all extremities.  ABDOMEN: Rounded and soft, nontender to palpation, non distended, no masses, no organomegaly, good bowel sounds, no rebound or guarding, no peritoneal signs.   EXTREMITIES: 3+ ble pitting edema, dry flaking and scaly skin, left leg wound healed, scabbed, legs are taut today, ace wraps in place and were removed for exam. Diffuse erythema on lower legs extends ankles to knees, warm to touch  SKIN: Warm and dry  NEUROLOGICAL: The patient is oriented to person, place and time. Strength and sensation are grossly intact. Face is symmetric.            LABS:   Recent Results (from the past 168 hour(s))   Basic Metabolic Panel   Result Value Ref Range    Sodium 131 (L) 136 - 145 mmol/L    Potassium 3.3 (L) 3.5 - 5.0 mmol/L    Chloride 88 (L) 98 - 107 mmol/L    CO2 32 (H) 22 - 31 mmol/L    Anion Gap, Calculation 11 5 - 18 mmol/L    Glucose 105 70 - 125 mg/dL    Calcium 9.7 8.5 - 10.5 mg/dL    BUN 55 (H) 8 - 28 mg/dL    Creatinine 1.91 (H) 0.60 - 1.10 mg/dL    GFR MDRD Af Amer 30 (L) >60 mL/min/1.73m2    GFR MDRD Non Af Amer 25 (L) >60 mL/min/1.73m2     Results for orders placed or performed in visit on 01/27/20   Basic Metabolic Panel   Result Value Ref Range    Sodium 131 (L) 136 - 145 mmol/L    Potassium 3.3 (L) 3.5 - 5.0 mmol/L    Chloride 88 (L) 98 - 107 mmol/L    CO2 32 (H) 22 - 31 mmol/L    Anion Gap, Calculation 11 5 - 18 mmol/L    Glucose 105 70 - 125 mg/dL    Calcium 9.7 8.5 - 10.5 mg/dL    BUN 55 (H) 8 - 28 mg/dL    Creatinine 1.91 (H) 0.60 - 1.10 mg/dL    GFR MDRD Af Amer 30 (L) >60 mL/min/1.73m2    GFR MDRD Non Af Amer 25 (L) >60 mL/min/1.73m2          Lab Results   Component Value Date    WBC 6.5 01/20/2020    HGB 11.0 (L) 01/20/2020    HCT 35.4 01/20/2020    MCV 99 01/20/2020     01/20/2020       No results found for: [id number]  Lab Results   Component Value Date    HGBA1C 6.9 (H) 09/25/2019     Lab Results   Component Value Date    INR 1.30 (H) 02/07/2018    INR 1.51 (H) 03/06/2017    INR 1.27 (H) 03/05/2017     Vitamin D, Total (25-Hydroxy)   Date Value Ref Range Status   01/20/2020 45.9 30.0 - 80.0 ng/mL Final     Lab Results   Component Value Date    TSH 4.22 09/25/2019           ASSESSMENT/PLAN:     1 Viral URI with cough, Acute on chronic moderate persistent asthma exacerbation: congestion, wheezing, worsening sob. No fever, No sore throat, no further rhinorrhea. On duonebs two times a day and q4h prn, mucinex bid. Encourage rest, limit fluids due to chf. Notify if develops fever and will order chest xray. o2 sats in low 90s, if drops <88-90% will need o2 supplementation. Concern for acute CHF. Will further diurese, no improvement in next day will start prednisone burst as well. IS a1h while awake. Increased duonebs to four times a day and prn.   2. Acute on chronic CHF: Daily [id number], reports weighing over 300lbs a year or so ago. Shortness of breath continues to worsen, now 3+ pitting edema. On bumex 2mg two times a day, keep legs elevated when possible. Sleeps in recliner.  F/u with cardiology prn. PT, OT for conditioning. Cardiac, low sodium diet. Daily weights. Ace wrap legs, elevate as much as possible. Hold parameter was placed on bumex and now missed several doses. Has been asymptomatic with hypotension. Will remove hold parameter. Completed 3 days of metolazone and only lost 2 lbs. Still up from baseline and appears very fluid overloaded on exam. Schedule metolazone 5mg daily x 5 more days. Bmp on 2/3. Lymphedema wrapping consult to OT.    3. BLE Cellulitis: erythema, warmth ble, suspect from significant  fluid overload and worsening edema. No open areas at this time. Needs to elevate (educated today), reports recliner is uncomfortable. Will consult OT for lymphedema wrapping. aquaphor two times a day until start lymph wrapping. Ace wraps in mean time. Further diurese. Start keflex two times a day x 7 days,  Noted pcn allergy but previously tolerated keflex.   4. S/p PPM: follows with device clinic. No recent concerns.   5. Herpes Zoster: Across upper abdomen/chest. scabbed, healing. Minimal itching, no pain, scabs now falling off. No isolation needed. Tylenol prn pain. Completed antiviral treatment. Much improved.   6. MIGUEL on CKD stage 3: Cr 1.75 on 1/14. Cr 1.71 on 1/20. Stable. Bmp on 1/27-cr 1.91. stable. Bmp on 1/31.   7. Type 2 DM: No meds, no monitoring needed. Diet controlled. Weight loss recently. Glucose on bmp 1/20 was 112.   8. Dyslipidemia: On aspirin, atorvastatin.   9. HTN: SBP baseline 90-110s, missed few doses of bumex due to hold parameter. Has normally run lower and tolerated fine with no orthostasis. Today 130s, suspect from fluid overload. Will remove parameter on bumex. Further diuresis. On lopressor, bumex.  hold parameters for lopressor.  10. GERD: On omeprazole. No concerns today.   11. Hypothyroid: On levothyroxine.   12. Vitamin d deficiency: On vitamin d. Vit d 45 on 1/20. Stable.   13. Hypokalemia: On kcl.  Monitor with further diuresis.   14. PAF, s/p AVR: Regular rate and rhythm today. On metoprolol. F/u with caridology. No recent palpitations or chest pain.   15. Right elbow gout exacerbation: resolved.   16. Anemia of CKD: on iron two times a day. Hg  11.3 on 1/14. Hg 11 on 1/20.   17. Constipation:  scheduled miralax daily and senna daily prn. Now stable.   18. PVD, venous stasis: aquaphor at bedtime and daily prn for dry, scaly, flaking skin to maintain dry and supple skin barrier. No open wounds on legs. No numb/tingling in legs. No leg pain. Ace wraps. Elevate. Increased aquaphor  to two times a day until lymph wrapping.      Per therapy PT - 140ft fww SBA/CGA, t/f's SBA/CGA with extra time, high fall risk (17/28 Octavio), OT - UB setup, doesnt wear pants, toileting CGA/Min A, showering min/mod A. Yellow tag. Lives in house with family. Recommend 1-2 weeks.      Electronically signed by: Jennifer Valdes NP       Total floor/unit time spent 45 min with >50% time spent on counseling and coordination of care. Counseling with patient regarding acute chf exacerbation, cellulitis, edema management. Discussed and coordinated care with nursing for evaluation and management of edema, cellulitis, chf exacerbation. Discussed with OT and coordinated referral for lymphedema management with them.

## 2021-06-20 NOTE — LETTER
Letter by Jennifer Valdes NP at      Author: Jennifer Valdes NP Service: -- Author Type: --    Filed:  Encounter Date: 2020 Status: (Other)         Patient: Thea Acosta   MR Number: [id number]   YOB: 1934   Date of Visit: 2020                 [facility]    DATE: 2020    NAME:  Thea Acosta             :  1934  MRN: [id number]  CODE STATUS:  DNR    VISIT TYPE: Problem Visit (constipation)     FACILITY:  [facility] [[id number]]       CHIEF COMPLAIN/REASON FOR VISIT:    Chief Complaint   Patient presents with   ? Problem Visit     constipation               HISTORY OF PRESENT ILLNESS: Thea Acosta is a 86 y.o. female who admitted - for acute metabolic encephalopathy. This was felt to be s/t hypothyroidism and noncompliance with levothyroxine dosing. She was readmitted - for left shoulder dislocation and underwent reverse total shoulder arthroplasty on . Postop course was uncomplicated and transferred back to [facility]. She has PMH of CAD, CKD, DM, s/p PPM, diastolic CHF, pulmonary artery hypertension, mitral valve stenosis, chronic a fib no anticoagulation, s/p AVR, asthma. Prior to this she lived at home with her grand daughter.     Today Ms. Acosta is seen for constipation today. She says her pain is well controlled and her therapy seems to be going well. Her swelling seems pretty good in her arm and shoulder today. Her legs are a little swollen but not too bad. Her bowels are not moving regularly and she does feel constipated. We discussed adding senna daily and miralax as needed. She cant recall when her last bm was but thinks it was a few days ago. She denies any worse shortness of breath today. Her weights recently have been stable 206-205lbs. She denies any dizziness or other concerns lately.         REVIEW OF SYSTEMS:  PROBLEMS AND REVIEW OF SYSTEMS:   Review of Systems  Today on ROS:   Currently, no  "fever, chills, or rigors. Decreased vision and hearing. Denies any chest pain, headaches, palpitations, lightheadedness, dizziness, no cough, no sob. Appetite is good.  Denies any abdominal pain. No active bleeding. Positive for urinary incontinence, leg swelling, left arm and shoulder swelling, sling in place, left shoulder incision, no nausea or vomiting, constipation, pain controlled      Allergies   Allergen Reactions   ? Tramadol Anxiety and Other (See Comments)     Per patient, Thea developed psychosis and severe anxiety and agitation \"for three days\" after receiving tramadol in the past. She stated that she would \"never\" take it again and would be extremely upset if she receives it. She asked me to add this to her chart's allergy list specifically.    ? Codeine Hives   ? Codeine Phosphate (Bulk)      This allergy is per [facility] records.   ? Codeine Sulfate      This allergy is per [facility] records.   ? Codeine-Butalbital-Asa-Caff      This allergy is per [facility] records.   ? Codeine-Guaifenesin      This allergy is per [facility] records.   ? Lisinopril Cough   ? Penicillins Swelling     Current Outpatient Medications   Medication Sig   ? polyethylene glycol (MIRALAX) 17 gram packet Take 17 g by mouth daily as needed.   ? senna (SENOKOT) 8.6 mg tablet Take 1 tablet by mouth daily.   ? acetaminophen (TYLENOL) 500 MG tablet Take 2 tablets (1,000 mg total) by mouth 3 (three) times a day. (Patient taking differently: Take 1,000 mg by mouth 3 (three) times a day. And daily prn)   ? albuterol (PROAIR HFA;PROVENTIL HFA;VENTOLIN HFA) 90 mcg/actuation inhaler Inhale 2 puffs every 4 (four) hours as needed for wheezing.   ? aspirin 81 MG EC tablet Take 1 tablet (81 mg total) by mouth 2 (two) times a day.   ? atorvastatin (LIPITOR) 80 MG tablet TAKE 1 TABLET(80 MG) BY MOUTH DAILY "   ? bumetanide (BUMEX) 2 MG tablet Take 1.5 tablets (3 mg total) by mouth 2 (two) times a day at 9am and 6pm.   ? colchicine 0.6 mg tablet take two tablets (1.2 mg) by mouth at onset of gout symptoms, then repeat one tablet (0.6 mg) by mouth one hour later   ? diclofenac sodium (VOLTAREN) 1 % Gel Apply 2 g topically 4 (four) times a day as needed.    ? ferrous sulfate 325 (65 FE) MG tablet TAKE 1 TABLET(325 MG) BY MOUTH TWICE DAILY   ? levothyroxine (SYNTHROID, LEVOTHROID) 150 MCG tablet Take 1 tablet (150 mcg total) by mouth daily before supper.   ? nystatin (MYCOSTATIN) powder Apply topically 4 (four) times a day.   ? omeprazole (PRILOSEC) 20 MG capsule Take 1 capsule (20 mg total) by mouth daily before breakfast.   ? potassium chloride (K-DUR,KLOR-CON) 20 MEQ tablet Take 20 mEq by mouth daily.    ? predniSONE (DELTASONE) 2.5 MG tablet Take 7.5 mg/d prednisone PO for 3 weeks.   ? white petrolatum (AQUAPHOR ORIGINAL) 41 % Oint Apply 1 application topically at bedtime.     Past Medical History:    Past Medical History:   Diagnosis Date   ? Acute kidney injury superimposed on CKD (H) 3/3/2017   ? Asthma    ? CAD (coronary artery disease)    ? Cataract    ? Chronic kidney disease     ckd3   ? COPD (chronic obstructive pulmonary disease) (H)    ? Diabetes mellitus (H)    ? Disease of thyroid gland    ? H/O heart valve replacement with bioprosthetic valve    ? History of transfusion    ? Hypertension    ? Normochromic normocytic anemia    ? Pulmonary hypertension (H) 09/27/2019    Noted on echocardiogram   ? Restless leg syndrome            PHYSICAL EXAMINATION  Vitals:    08/06/20 0700   BP: 130/62   Pulse: 65   Resp: 18   Temp: 97  F (36.1  C)   SpO2: 98%   Weight: 205 lb (93 kg)       Today on physical exam:     GENERAL: Awake, Alert, obese,  not in any form of acute distress, answers questions appropriately, follows simple commands, conversant  HEENT: Head is normocephalic with normal hair distribution. No evidence  of trauma. Ears: No acute purulent discharge. Eyes: Conjunctivae pink with no scleral jaundice. Nose: Normal mucosa and septum. NECK: Supple with no cervical or supraclavicular lymphadenopathy. Trachea is midline. [place], decreased vision  CHEST: No tenderness or deformity, no crepitus, left chest PPM  LUNG: Dim but clear today to auscultation.   No shortness of breath noted today, no cough noted  BACK: No kyphosis of the thoracic spine. Symmetric, no curvature, ROM normal, no CVA tenderness, no spinal tenderness   CVS: irregularly irregular rhythm, murmur,  2+ pulses symmetric in all extremities.  ABDOMEN: Rounded and soft, nontender to palpation, non distended, no masses, no organomegaly, good bowel sounds, no rebound or guarding, no peritoneal signs.   EXTREMITIES: 1+ ble nonpitting edema, left upper extremity 1+ edema, left shoulder incision c/d/i, left arm sling in place  SKIN: Warm and dry  NEUROLOGICAL: The patient is oriented to person, place and time. Confused at times, oriented on exam. Forgetful at times.             LABS:   Recent Results (from the past 168 hour(s))   Glycosylated Hemoglobin A1C   Result Value Ref Range    Hemoglobin A1c 6.5 (H) <=5.6 %   POCT Glucose    Specimen: Blood   Result Value Ref Range    Glucose 119 70 - 139 mg/dL   POCT Glucose in PACU if patient is diabetic to assess for hypo/hyperglycemia    Specimen: Blood   Result Value Ref Range    Glucose 161 (H) 70 - 139 mg/dL   POCT Glucose    Specimen: Blood   Result Value Ref Range    Glucose 205 (H) 70 - 139 mg/dL   Hemoglobin - Daily x 2   Result Value Ref Range    Hemoglobin 8.8 (L) 12.0 - 16.0 g/dL   Basic Metabolic Panel   Result Value Ref Range    Sodium 139 136 - 145 mmol/L    Potassium 4.3 3.5 - 5.0 mmol/L    Chloride 97 (L) 98 - 107 mmol/L    CO2 36 (H) 22 - 31 mmol/L    Anion Gap, Calculation 6 5 - 18 mmol/L    Glucose 165 (H) 70 - 125 mg/dL    Calcium 8.8 8.5 - 10.5 mg/dL    BUN 47 (H) 8 - 28 mg/dL    Creatinine 1.39 (H) 0.60  - 1.10 mg/dL    GFR MDRD Af Amer 44 (L) >60 mL/min/1.73m2    GFR MDRD Non Af Amer 36 (L) >60 mL/min/1.73m2   Platelet Count - every other day x 3   Result Value Ref Range    Platelets 162 140 - 440 thou/uL   POCT Glucose    Specimen: Blood   Result Value Ref Range    Glucose 157 (H) 70 - 139 mg/dL   POCT Glucose    Specimen: Blood   Result Value Ref Range    Glucose 129 70 - 139 mg/dL   Basic Metabolic Panel   Result Value Ref Range    Sodium 139 136 - 145 mmol/L    Potassium 3.8 3.5 - 5.0 mmol/L    Chloride 95 (L) 98 - 107 mmol/L    CO2 35 (H) 22 - 31 mmol/L    Anion Gap, Calculation 9 5 - 18 mmol/L    Glucose 111 70 - 125 mg/dL    Calcium 9.5 8.5 - 10.5 mg/dL    BUN 42 (H) 8 - 28 mg/dL    Creatinine 1.42 (H) 0.60 - 1.10 mg/dL    GFR MDRD Af Amer 43 (L) >60 mL/min/1.73m2    GFR MDRD Non Af Amer 35 (L) >60 mL/min/1.73m2   HM1 (CBC with Diff)   Result Value Ref Range    WBC 6.9 4.0 - 11.0 thou/uL    RBC 2.97 (L) 3.80 - 5.40 mill/uL    Hemoglobin 9.0 (L) 12.0 - 16.0 g/dL    Hematocrit 29.0 (L) 35.0 - 47.0 %    MCV 98 80 - 100 fL    MCH 30.3 27.0 - 34.0 pg    MCHC 31.0 (L) 32.0 - 36.0 g/dL    RDW 14.8 (H) 11.0 - 14.5 %    Platelets 175 140 - 440 thou/uL    MPV 11.3 8.5 - 12.5 fL    Neutrophils % 65 50 - 70 %    Lymphocytes % 23 20 - 40 %    Monocytes % 7 2 - 10 %    Eosinophils % 5 0 - 6 %    Basophils % 0 0 - 2 %    Neutrophils Absolute 4.5 2.0 - 7.7 thou/uL    Lymphocytes Absolute 1.6 0.8 - 4.4 thou/uL    Monocytes Absolute 0.5 0.0 - 0.9 thou/uL    Eosinophils Absolute 0.3 0.0 - 0.4 thou/uL    Basophils Absolute 0.0 0.0 - 0.2 thou/uL     Results for orders placed or performed in visit on 08/05/20   Basic Metabolic Panel   Result Value Ref Range    Sodium 139 136 - 145 mmol/L    Potassium 3.8 3.5 - 5.0 mmol/L    Chloride 95 (L) 98 - 107 mmol/L    CO2 35 (H) 22 - 31 mmol/L    Anion Gap, Calculation 9 5 - 18 mmol/L    Glucose 111 70 - 125 mg/dL    Calcium 9.5 8.5 - 10.5 mg/dL    BUN 42 (H) 8 - 28 mg/dL    Creatinine  1.42 (H) 0.60 - 1.10 mg/dL    GFR MDRD Af Amer 43 (L) >60 mL/min/1.73m2    GFR MDRD Non Af Amer 35 (L) >60 mL/min/1.73m2         Lab Results   Component Value Date    WBC 6.9 08/05/2020    HGB 9.0 (L) 08/05/2020    HCT 29.0 (L) 08/05/2020    MCV 98 08/05/2020     08/05/2020       Lab Results   Component Value Date    [id number] 333 07/21/2020     Lab Results   Component Value Date    HGBA1C 6.5 (H) 07/31/2020     Lab Results   Component Value Date    INR 0.99 07/28/2020    INR 1.30 (H) 02/07/2018    INR 1.51 (H) 03/06/2017     Vitamin D, Total (25-Hydroxy)   Date Value Ref Range Status   01/20/2020 45.9 30.0 - 80.0 ng/mL Final     Lab Results   Component Value Date    TSH 67.01 (H) 07/18/2020           ASSESSMENT/PLAN:    Left shoulder dislocation, s/p reverse total shoulder arthroplasty: Incision c/d/i. Pain controlled on tylenol.  Ice prn. PT, OT following. voltaren gel prn. F/u with surgeon. Feels she is progressing well. Edema improved today.  Asthma:  Encourage cough and deep breathe. IS q1h while awake. Albuterol prn. Shortness of breath at baseline today.   Hypothyroid: on levothyroxine.   Chronic CHF: Daily [id number]. No shortness of breath recently. 1+ ble edema. On bumex 2mg two times a day, keep legs elevated when possible.  F/u with cardiology prn. Cardiac diet. Monitor for fluid overload. Dean short.   S/p PPM: follows with device clinic. No recent concerns.   CKD stage 3: Cr 1.39, gfr 36 on 8/1. Stable.   H/o Type 2 DM: No meds, no monitoring needed. Diet controlled. No recent concerns.   Dyslipidemia: On aspirin, atorvastatin.   HTN: SBP 130s, On lopressor, bumex.  hold parameters for lopressor. Stable.  GERD: On omeprazole. No concerns today.   Vitamin d deficiency: On vitamin d.  Hypokalemia: On kcl.  PAF, s/p AVR: Regular rate and rhythm today. On metoprolol. F/u with caridology. No concenrs.   Anemia of CKD: on iron. No recent concerns. Hg 10.9 on 7/24.   Hg 8.8 on 8/1.    Candidal intertrigo:  Nystatin.   Constipation: ordered senna daily, miralax daily prn.        Electronically signed by: Jennifer Valdes NP

## 2021-06-20 NOTE — LETTER
Letter by Rojelio Farnsworth MD at      Author: Rojelio Farnsworth MD Service: -- Author Type: --    Filed:  Encounter Date: 8/18/2020 Status: (Other)         Patient: Thea Acosta   MR Number: [id number]   YOB: 1934   Date of Visit: 8/18/2020      [facility]    Facility:  [facility] [[id number]]    Code Status:  FULL CODE    Chief Complaint   Patient presents with   ? Review Of Multiple Medical Conditions   :                    Patient Active Problem List   Diagnosis   ? Constipation   ? Sciatica   ? Dyspnea, unspecified type   ? Hypothyroidism   ? Type 2 diabetes mellitus with stage 3 chronic kidney disease, without long-term current use of insulin (H)   ? Hyperparathyroidism   ? Vitamin D deficiency   ? Mixed hyperlipidemia   ? Atherosclerosis of native coronary artery of native heart without angina pectoris   ? CKD (chronic kidney disease), stage III (H)   ? Osteoarthritis Of The Knee   ? Restless Legs Syndrome   ? H/O heart valve replacement with bioprosthetic valve   ? Cardiac pacemaker in situ   ? Complete heart block (H)   ? Normochromic normocytic anemia   ? Essential hypertension with goal blood pressure less than 140/90   ? Moderate persistent asthma without complication   ? PAF (paroxysmal atrial fibrillation) (H)   ? GERD (gastroesophageal reflux disease)   ? Primary insomnia   ? Hypokalemia   ? Physical deconditioning   ? Asthma   ? CHF (congestive heart failure), NYHA class I, acute, systolic (H)   ? Pulmonary hypertension (H)   ? Atrial fibrillation, unspecified type (H)   ? Status post aortic valve replacement   ? Non-rheumatic mitral valve stenosis   ? Nonrheumatic mitral valve stenosis   ? PVD (peripheral vascular disease) (H)   ? Lymphedema   ? S/p reverse total shoulder arthroplasty       History:  Thea Acosta  is an 86 year old female with history of coronary artery disease, CHF, paroxysmal atrial  fibrillation, AVR, permanent pacemaker placement, COPD, CKD 3, DM 2, hypertension, GERD,  seen for admission to TCU on 8/18/2020 following a series of hospitalizations    Hospital course #3/most recent:  Patient was hospitalized between July 28 and August 1 after I sent her from this TCU upon discovering her dislocated left shoulder on July 28.  Shoulder reduction was achieved but could not be maintained.  She was seen by orthopedics who diagnosed chronic instability and   recommended total reverse shoulder which was completed on July 31, 2020.  Surgery was complicated by mild perioperative hypoxia.  Patient was treated with hydrocodone (since discontinued).    Hospital course #1:  Patient was hospitalized at [facility] between July 12 and July 6 following a fall with left shoulder dislocation which was reduced in the emergency room.  However she was noted to be hypoxic and was diagnosed with acute hypoxic respiratory failure felt most likely secondary to atelectasis plus opiates which are being used for pain control.  She had weaned off oxygen by the time of discharge.  She also had acute kidney injury and was noted to have metabolic alkalosis that hospital stay.  She was discharged with a creatinine of 1.4 after holding her Bumex for a brief time.     She was discharged to [facility] TCU where she stayed for fewer than 48 hours due to development of acute delirium.    Hospital course #2:     She was readmitted between July 18 and July 21 again at [facility].  Hypothyroidism was felt to be the primary cause of her delirium.  TSH 67 on July 18.  She has been noncompliant with her thyroid medication quite regularly for at least months.  She was again found to be hypoxic without certain cause as she was not on opiates this time.  She did have a chest x-ray on July 20 suggesting a chronic CHF type appearance but no acute problems noted.  She again had elevation of her creatinine to a peak of 1006 discharged  "1.67 with a baseline of about 1.4.        Subjective/ROS:    -augmented by discussion with facility staff involved in direct care  -limited by: Memory     -Patient sees me in the leung visiting her neighbor and asks me to visit with her as well.  -Patient wishes to report that she like to go home.  However when we talked this through I find out that she is fully aware of the plan and has orthopedic follow-up in about 5 weeks and that she realizes she is going to need some more help with her shoulder immobilized.  -Patient reports that her edema is much improved.  She is very much liking the Velcro wraps and says that they are so much easier to live with than the JOHAN stockings/compression stockings of the past.  \"I am glad I spent the extra money on them, it is worth it\"  -Patient says she is enjoying the therapist and the care she is getting that she does not have complaints.  -Patient is using Tylenol as needed for pain and says that things are much better in that area.    No fever sweats chills cough shortness of breath orthopnea PND.  Appetite is good.  No constipation no dysuria.  Remainder negative.    Past Medical History:   Diagnosis Date   ? Acute kidney injury superimposed on CKD (H) 3/3/2017   ? Asthma    ? CAD (coronary artery disease)    ? Cataract    ? Chronic kidney disease     ckd3   ? COPD (chronic obstructive pulmonary disease) (H)    ? Diabetes mellitus (H)    ? Disease of thyroid gland    ? H/O heart valve replacement with bioprosthetic valve    ? History of transfusion    ? Hypertension    ? Normochromic normocytic anemia    ? Pulmonary hypertension (H) 09/27/2019    Noted on echocardiogram   ? Restless leg syndrome      Past Surgical History:   Procedure Laterality Date   ? APPENDECTOMY     ? CHOLECYSTECTOMY     ? EYE SURGERY Bilateral     Cataracts   ? HIP PINNING Right 3/1/2017    Procedure: RIGHT HIP TROCHANTERIC RODDING;  Surgeon: Moshe Davies MD;  Location: [facility];  Service:  "   ? INSERT / REPLACE / REMOVE PACEMAKER  2007    [facility]   ? INSERT / REPLACE / REMOVE PACEMAKER  2018    [facility] change   ? JOINT REPLACEMENT Left     knee   ? SD RECONSTR TOTAL SHOULDER IMPLANT Left 2020    Procedure: LEFT REVERSE TOTAL SHOULDER ARTHROPLASTY;  Surgeon: Alvarez Palomino MD;  Location: [facility] Main OR;  Service: Orthopedics   ? TISSUE AORTIC VALVE REPLACEMENT  2007               Family History   Problem Relation Age of Onset   ? No Medical Problems Mother         age 86   ? No Medical Problems Father         MVA   ? Cancer Brother         lung   ? No Medical Problems Brother    :       Social History     Socioeconomic History   ? Marital status:      Spouse name: Not on file   ? Number of children: Not on file   ? Years of education: Not on file   ? Highest education level: Not on file   Occupational History   ? Occupation:  in operating room     Comment: retired   Social Needs   ? Financial resource strain: Not on file   ? Food insecurity     Worry: Not on file     Inability: Not on file   ? Transportation needs     Medical: Not on file     Non-medical: Not on file   Tobacco Use   ? Smoking status: Former Smoker     Packs/day: 0.00     Last attempt to quit: 1950     Years since quittin.9   ? Smokeless tobacco: Never Used   Substance and Sexual Activity   ? Alcohol use: No   ? Drug use: No   ? Sexual activity: Never   Lifestyle   ? Physical activity     Days per week: Not on file     Minutes per session: Not on file   ? Stress: Not on file   Relationships   ? Social connections     Talks on phone: Not on file     Gets together: Not on file     Attends Jew service: Not on file     Active member of club or organization: Not on file     Attends meetings of clubs or organizations: Not on file     Relationship status: Not on file   ? Intimate partner violence     Fear of current or ex partner: Not on file     Emotionally abused: Not on file  "    Physically abused: Not on file     Forced sexual activity: Not on file   Other Topics Concern   ? Incontinent Not Asked   ? Toileting independently Not Asked   ? Cognitive impairment Not Asked   ? Vision impairment Not Asked   ? Hearing impairment Not Asked   ? Dentures Not Asked   Social History Narrative    She live in a single floor house.  Her son lives next door and her granddaughter is \"around a lot\"   3/2017   :    Patient says she lives on [address]  She lives with her granddaughter.  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    Current Outpatient Medications on File Prior to Visit   Medication Sig Dispense Refill   ? acetaminophen (TYLENOL) 500 MG tablet Take 2 tablets (1,000 mg total) by mouth 3 (three) times a day. (Patient taking differently: Take 1,000 mg by mouth 3 (three) times a day. And daily prn)  0   ? albuterol (PROAIR HFA;PROVENTIL HFA;VENTOLIN HFA) 90 mcg/actuation inhaler Inhale 2 puffs every 4 (four) hours as needed for wheezing.  0   ? aspirin 81 MG EC tablet Take 1 tablet (81 mg total) by mouth 2 (two) times a day.  0   ? atorvastatin (LIPITOR) 80 MG tablet TAKE 1 TABLET(80 MG) BY MOUTH DAILY 90 tablet 3   ? bumetanide (BUMEX) 2 MG tablet Take 1.5 tablets (3 mg total) by mouth 2 (two) times a day at 9am and 6pm. 270 tablet 2   ? colchicine 0.6 mg tablet take two tablets (1.2 mg) by mouth at onset of gout symptoms, then repeat one tablet (0.6 mg) by mouth one hour later 24 tablet 2   ? diclofenac sodium (VOLTAREN) 1 % Gel Apply 2 g topically 4 (four) times a day as needed.      ? ferrous sulfate 325 (65 FE) MG tablet TAKE 1 TABLET(325 MG) BY MOUTH TWICE DAILY (Patient taking differently: Take 1 tablet by mouth 3 (three) times a day with meals. ) 180 tablet 3   ? levothyroxine (SYNTHROID, LEVOTHROID) 150 MCG tablet Take 1 tablet (150 mcg total) by mouth daily before supper. 90 tablet 3   ? nystatin (MYCOSTATIN) powder Apply topically 4 (four) times a " day. 60 g 2   ? omeprazole (PRILOSEC) 20 MG capsule Take 1 capsule (20 mg total) by mouth daily before breakfast. 90 capsule 3   ? polyethylene glycol (MIRALAX) 17 gram packet Take 17 g by mouth daily as needed.     ? potassium chloride (K-DUR,KLOR-CON) 20 MEQ tablet Take 20 mEq by mouth daily.      ? predniSONE (DELTASONE) 2.5 MG tablet Take 7.5 mg/d prednisone PO for 3 weeks. 90 tablet 0   ? senna (SENOKOT) 8.6 mg tablet Take 1 tablet by mouth daily.     ? white petrolatum (AQUAPHOR ORIGINAL) 41 % Oint Apply 1 application topically at bedtime.       No current facility-administered medications on file prior to visit.    :      ALLERGIES:  Tramadol; Codeine; Codeine phosphate (bulk); Codeine sulfate; Codeine-butalbital-asa-caff; Codeine-guaifenesin; Lisinopril; and Penicillins    Vitals:  There were no vitals taken for this visit. except as noted below    Vital signs:        Recent Vitals Date/Time Taken  Temperature: 95.6  F 08/18/2020 02:38 PM  Pulse: 55 per minute 08/18/2020 02:38 PM  Respirations: 20 per minute 08/18/2020 06:30 PM  Blood Pressure: 117 / 61 mmHg 08/18/2020 02:38 PM  O2 Saturation: 94 % 08/18/2020 02:38 PM  Blood Sugar: 114 mg/dL 03/20/2017 05:08 PM  Weight: 212.3 lbs / Routine       BMI: 33.25 08/18/2020 02:17 PM  Height: 5ft 7.0in 07/17/2020 12:01 PM  Physical exam:    Patient recognizes me immediately despite the mask and face shield, though cannot say my name.  She is bright alert talkative.  She is breathing comfortably without accessory muscle use or tachypnea.  Her left arm is in the shoulder immobilizer.  Her hands as well with no edema do not erythema today.  She has good CMS with good cap refill.  Lower extremities with bilateral Velcro compression garments in place.    Due to the 2020 Covid 19 pandemic, except as noted above, the patient was visually observed at a 6 foot plus distance.  An observational exam was performed in an effort to keep patient safe from Covid 19 and other  communicable diseases.   Labs:  Lab Results   Component Value Date    WBC 6.9 08/05/2020    HGB 8.2 (L) 08/17/2020    HCT 29.0 (L) 08/05/2020    MCV 98 08/05/2020     08/05/2020     Results for orders placed or performed in visit on 08/17/20   Basic Metabolic Panel   Result Value Ref Range    Sodium 141 136 - 145 mmol/L    Potassium 3.6 3.5 - 5.0 mmol/L    Chloride 98 98 - 107 mmol/L    CO2 35 (H) 22 - 31 mmol/L    Anion Gap, Calculation 8 5 - 18 mmol/L    Glucose 114 70 - 125 mg/dL    Calcium 9.2 8.5 - 10.5 mg/dL    BUN 37 (H) 8 - 28 mg/dL    Creatinine 1.47 (H) 0.60 - 1.10 mg/dL    GFR MDRD Af Amer 41 (L) >60 mL/min/1.73m2    GFR MDRD Non Af Amer 34 (L) >60 mL/min/1.73m2         Lab Results   Component Value Date    TSH 3.43 08/17/2020     Lab Results   Component Value Date    HGBA1C 6.5 (H) 07/31/2020     [unfilled]  Lab Results   Component Value Date    MUMQGKQH98 333 07/21/2020     Lab Results   Component Value Date     07/18/2020     [unfilled]  Most Recent EKG     Units 07/28/20  1543   VENTRATE BPM 63   ATRIALRATE BPM 57   QRSDURATION ms 172   QTINTERVAL ms 496   QTCCALC ms 507   RAXIS degrees 106   TAXIS degrees 261   MUSEDX  Ventricular-paced rhythm with occasional Premature ventricular complexes  Abnormal ECG  When compared with ECG of 18-JUL-2020 11:23,  Electronic ventricular pacemaker has replaced Wide QRS rhythm  Confirmed by SEE ED PROVIDER NOTE FOR, ECG INTERPRETATION ([id number]),  VIV ELDER ([id number]) on 7/28/2020 10:25:45 PM           Assessment/Plan:      ICD-10-CM    1. Physical deconditioning  R53.81    2. Lymphedema  I89.0    3. Status post reverse total replacement of left shoulder  Z96.612    Left shoulder instability  Recurrent dislocation left shoulder  Status post reverse total replacement of left shoulder July 31   Patient has stabilized with slow improvement over the last couple of weeks.  She continues to require therapy and support at this time.  Anticipate discharge  timing per therapy/ as her medical situation is looking more stable now.          Bilateral lower extremity edema   weight gain  Chronic diastolic CHF   I cannot explain her weight fluctuation.  She was only down 10 pounds when I saw her last week and now back up to her recent baseline to 12.  She is down from a peak of 224 earlier this hospitalization.  Regardless of weight, her edema is much improved and clinically she looks good.  Results for orders placed or performed in visit on 20   Basic Metabolic Panel   Result Value Ref Range    Sodium 141 136 - 145 mmol/L    Potassium 3.6 3.5 - 5.0 mmol/L    Chloride 98 98 - 107 mmol/L    CO2 35 (H) 22 - 31 mmol/L    Anion Gap, Calculation 8 5 - 18 mmol/L    Glucose 114 70 - 125 mg/dL    Calcium 9.2 8.5 - 10.5 mg/dL    BUN 37 (H) 8 - 28 mg/dL    Creatinine 1.47 (H) 0.60 - 1.10 mg/dL    GFR MDRD Af Amer 41 (L) >60 mL/min/1.73m2    GFR MDRD Non Af Amer 34 (L) >60 mL/min/1.73m2       Continue frequent weights, low-salt diet, diuretics as above.   I thought about decreasing her Bumex back to 2 mg twice a day instead of the current 3 mg twice daily but with questionable 10 pound weight gain avoid leave it where it is as we will continue to monitor clinically.  Renal function stable as noted above.        Echocardiogram reading:    Performing Date  Performing Department    2018   CARDIAC TESTING [[id number]]    Patient Information     Patient Name   Thea Acosta  MRN   [id number]  Sex   Female   1   1934 (83 y.o.)    Indications     Heart murmur; Fever    Summary       Normal left ventricular size and systolic function.    When compared to the previous study dated 3/1/2017, no significant change.    Left ventricle ejection fraction is normal. The estimated left ventricular ejection fraction is 55%.    Normal right ventricular size and systolic function.    Left Atrium: Left atrial volume is moderately increased.    Aortic Valve:  "Bioprosthetic valve is not well visualized. No evidence of paravalvular leak and mean gradient is 15 mmHg which is borderline elevated, however not significantly changed compared to prior.    Mitral Valve: There is severe mitral annular calcification. Moderate mitral Calcific stenosis. No mitral regurgitation.              Acute metabolic encephalopathy  Hallucinations  Hypothyroidism   Abnormal urinalysis   Resolving nicely now.  I suspect patient is at or near baseline.  She has had quite a john paul course with 3 hospitalizations.  She is off opiates which is helped quite a bit.  She has little pain complaint which is fortunate.     However patient's hypothyroidism likely a significant contributor as well.  TSH was 67 on July 18.  She is back on replacement  -Follow-up for next TSH and dosage adjustment    Suspected chronic hypoxic respiratory failure   Nothing new here today except to again report patient has done well over the last week off oxygen.  Though this finding has been intermittent, she is prone to dropping her O2 sats.  I suspect it is multifactorial.  She has been felt to have COPD in the past although she is unaware of that and denies it.  She also has history of CHF though I do not find evidence of pulmonary edema.  She is now off opiates.  Obesity, possible hypoventilation, possible RANJIT contributing?  - Continue to monitor O2 sats, supplemental oxygen as need be  -Continue to avoid opiates and other sedating medications as possible  - No additional work-up anticipated          COPD   I see this in old notes and records.  I do not see complete pulmonary function tests on record however.  Patient says that she smoked in the past \"but did not inhale\".  She is skeptical of the diagnosis..  Nonetheless I suspect that there is some COPD present.  She does have albuterol if need be but not needed currently with no wheezing.  No wheezing heard today.    Coronary artery disease   Continue aspirin and " atorvastatin.  Not sure why she is not on beta-blocker, can follow-up with primary MD on that.    Paroxysmal atrial fibrillation     Takes only aspirin for stroke prophylaxis, does not need rate controlling agent    Permanent pacemaker   Follows with device clinic.    DM 2   Blood sugar control adequate.  A1c 7.0 on February 26.    Gout   Presumed.  Hand pain left greater than right.  Patient is on prednisone taper which she will continue herself.  Her 7.5 mg dose ends on August 22, she will need a new tapering dose entered at that point.  She can likely taper rapidly.  Question renally dosed allopurinol?     She also has colchicine but is not currently taking it, just a PRN medication     CKD 3     Results for orders placed or performed in visit on 08/17/20   Basic Metabolic Panel   Result Value Ref Range    Sodium 141 136 - 145 mmol/L    Potassium 3.6 3.5 - 5.0 mmol/L    Chloride 98 98 - 107 mmol/L    CO2 35 (H) 22 - 31 mmol/L    Anion Gap, Calculation 8 5 - 18 mmol/L    Glucose 114 70 - 125 mg/dL    Calcium 9.2 8.5 - 10.5 mg/dL    BUN 37 (H) 8 - 28 mg/dL    Creatinine 1.47 (H) 0.60 - 1.10 mg/dL    GFR MDRD Af Amer 41 (L) >60 mL/min/1.73m2    GFR MDRD Non Af Amer 34 (L) >60 mL/min/1.73m2       Mild elevations of creatinine with recent hospitalizations.  Doing relatively well, avoid nephrotoxins    Hypertension   Patient has had relatively low blood pressure at times throughout these past weeks.  Thus she is not on antihypertensives.   -July the past week and recent memory for blood pressures this past week.  - Anticipate restart of beta-blocker though that could be done as an outpatient with primary MD.    GERD   Omeprazole    Intertrigo   Improved on nystatin  Vitamin D deficiency   On replacement  Case discussed with:    Facility staff             Rojelio Farnsworth MD

## 2021-06-20 NOTE — LETTER
Letter by Jane Jones at      Author: Jane Jones Service: -- Author Type: --    Filed:  Encounter Date: 3/25/2020 Status: (Other)         Thea Acosta  [address] [place] [address]      March 25, 2020      Dear Ms. Acosta,    RE: Remote Results    We are writing to you regarding your recent Remote Pacemaker check from home. Your transmission was received successfully. Battery status is satisfactory at this time.     Your results are within normal limits.    Your next device appointment will be a remote check on June 24, 2020; this will occur automatically.    To schedule or reschedule, please call [phone] and press 1.    NOTE: If you would like to do an extra transmission, please call [phone] and press 3 to speak to a nurse BEFORE transmitting. This ensures that the Device Clinic staff is aware of the reason you are sending a transmission, and can follow-up with you after it has been reviewed.    We will be checking your implanted device from home (remotely) every three months unless otherwise instructed. We will need to see you in the clinic at least once a year. You may need to be seen in the clinic sooner depending on the results of your check.    Please be aware:    The follow-up schedule is like a Physician prescription.    Your remote monitor is paired to your specific implanted device.      Sincerely,    [facility]

## 2021-06-20 NOTE — LETTER
Letter by Jennifer Valdes NP at      Author: Jennifer Valdes NP Service: -- Author Type: --    Filed:  Encounter Date: 2020 Status: (Other)         Patient: Thea Acosta   MR Number: [id number]   YOB: 1934   Date of Visit: 2020                 [facility]    DATE: 2020    NAME:  Thea Acosta             :  1934  MRN: [id number]  CODE STATUS:  DNR    VISIT TYPE: Review Of Multiple Medical Conditions (total shoulder arthroplasty)     FACILITY:  [facility] [[id number]]       CHIEF COMPLAIN/REASON FOR VISIT:    Chief Complaint   Patient presents with   ? Review Of Multiple Medical Conditions     total shoulder arthroplasty               HISTORY OF PRESENT ILLNESS: Thea Acosta is a 86 y.o. female who admitted - for acute metabolic encephalopathy. This was felt to be s/t hypothyroidism and noncompliance with levothyroxine dosing. She was readmitted - for left shoulder dislocation and underwent reverse total shoulder arthroplasty on . Postop course was uncomplicated and transferred back to [facility]. She has PMH of CAD, CKD, DM, s/p PPM, diastolic CHF, pulmonary artery hypertension, mitral valve stenosis, chronic a fib no anticoagulation, s/p AVR, asthma. Prior to this she lived at home with her grand daughter.     Today Ms. Acosta is seen for review of systems for total shoulder arthroplasty. She is seen in her recliner today. She says her shoulder is sore but overall pain is controlled. She denies any issues with her bowels. Her legs were rewrapped today and the therapist told her the swelling was better. She is not having any more shortness of breath than usual. She denies any dizziness or other concerns recently. She is asking questions about how long it will take her to heal from this surgery. Her vitals and weights have been stable.         REVIEW OF SYSTEMS:  PROBLEMS AND REVIEW OF SYSTEMS:   Review of  "Systems  Today on ROS:   Currently, no fever, chills, or rigors. Decreased vision and hearing. Denies any chest pain, headaches, palpitations, lightheadedness, dizziness, no cough, no sob. Appetite is good.  Denies any abdominal pain. No active bleeding. Positive for urinary incontinence, leg swelling, left arm and shoulder swelling improving, sling in place, left shoulder incision, no nausea or vomiting, no constipation, pain controlled, lymphedema wraps      Allergies   Allergen Reactions   ? Tramadol Anxiety and Other (See Comments)     Per patient, Thea developed psychosis and severe anxiety and agitation \"for three days\" after receiving tramadol in the past. She stated that she would \"never\" take it again and would be extremely upset if she receives it. She asked me to add this to her chart's allergy list specifically.    ? Codeine Hives   ? Codeine Phosphate (Bulk)      This allergy is per [facility] records.   ? Codeine Sulfate      This allergy is per [facility] records.   ? Codeine-Butalbital-Asa-Caff      This allergy is per [facility] records.   ? Codeine-Guaifenesin      This allergy is per [facility] records.   ? Lisinopril Cough   ? Penicillins Swelling     Current Outpatient Medications   Medication Sig   ? acetaminophen (TYLENOL) 500 MG tablet Take 2 tablets (1,000 mg total) by mouth 3 (three) times a day. (Patient taking differently: Take 1,000 mg by mouth 3 (three) times a day. And daily prn)   ? albuterol (PROAIR HFA;PROVENTIL HFA;VENTOLIN HFA) 90 mcg/actuation inhaler Inhale 2 puffs every 4 (four) hours as needed for wheezing.   ? aspirin 81 MG EC tablet Take 1 tablet (81 mg total) by mouth 2 (two) times a day.   ? atorvastatin (LIPITOR) 80 MG tablet TAKE 1 TABLET(80 MG) BY MOUTH DAILY   ? bumetanide (BUMEX) 2 MG tablet Take 1.5 tablets (3 mg total) by mouth 2 " (two) times a day at 9am and 6pm.   ? colchicine 0.6 mg tablet take two tablets (1.2 mg) by mouth at onset of gout symptoms, then repeat one tablet (0.6 mg) by mouth one hour later   ? diclofenac sodium (VOLTAREN) 1 % Gel Apply 2 g topically 4 (four) times a day as needed.    ? ferrous sulfate 325 (65 FE) MG tablet TAKE 1 TABLET(325 MG) BY MOUTH TWICE DAILY (Patient taking differently: Take 1 tablet by mouth 3 (three) times a day with meals. )   ? levothyroxine (SYNTHROID, LEVOTHROID) 150 MCG tablet Take 1 tablet (150 mcg total) by mouth daily before supper.   ? nystatin (MYCOSTATIN) powder Apply topically 4 (four) times a day.   ? omeprazole (PRILOSEC) 20 MG capsule Take 1 capsule (20 mg total) by mouth daily before breakfast.   ? polyethylene glycol (MIRALAX) 17 gram packet Take 17 g by mouth daily as needed.   ? potassium chloride (K-DUR,KLOR-CON) 20 MEQ tablet Take 20 mEq by mouth daily.    ? predniSONE (DELTASONE) 2.5 MG tablet Take 7.5 mg/d prednisone PO for 3 weeks.   ? senna (SENOKOT) 8.6 mg tablet Take 1 tablet by mouth daily.   ? white petrolatum (AQUAPHOR ORIGINAL) 41 % Oint Apply 1 application topically at bedtime.     Past Medical History:    Past Medical History:   Diagnosis Date   ? Acute kidney injury superimposed on CKD (H) 3/3/2017   ? Asthma    ? CAD (coronary artery disease)    ? Cataract    ? Chronic kidney disease     ckd3   ? COPD (chronic obstructive pulmonary disease) (H)    ? Diabetes mellitus (H)    ? Disease of thyroid gland    ? H/O heart valve replacement with bioprosthetic valve    ? History of transfusion    ? Hypertension    ? Normochromic normocytic anemia    ? Pulmonary hypertension (H) 09/27/2019    Noted on echocardiogram   ? Restless leg syndrome            PHYSICAL EXAMINATION  Vitals:    08/24/20 0700   BP: 130/73   Pulse: 60   Resp: 20   Temp: 96.6  F (35.9  C)   SpO2: 95%   Weight: 213 lb (96.6 kg)       Today on physical exam:     GENERAL: Awake, Alert, obese,  not in any  form of acute distress, answers questions appropriately, follows simple commands, conversant  HEENT: Head is normocephalic with normal hair distribution. No evidence of trauma. Ears: No acute purulent discharge. Eyes: Conjunctivae pink with no scleral jaundice. Nose: Normal mucosa and septum. NECK: Supple with no cervical or supraclavicular lymphadenopathy. Trachea is midline. Tribe, decreased vision  CHEST: No tenderness or deformity, no crepitus, left chest PPM  LUNG: Dim but clear today to auscultation.   No shortness of breath noted today, no cough noted  BACK: No kyphosis of the thoracic spine. Symmetric, no curvature, ROM normal, no CVA tenderness, no spinal tenderness   CVS: irregularly irregular rhythm, murmur,  2+ pulses symmetric in all extremities.  ABDOMEN: Rounded and soft, nontender to palpation, non distended, no masses, no organomegaly, good bowel sounds, no rebound or guarding, no peritoneal signs.   EXTREMITIES: 2+ ble nonpitting edema, lymphedema wrapping, left upper extremity 1+ edema with lymphedema sleeve, left shoulder incision c/d/i, left arm sling in place  SKIN: Warm and dry  NEUROLOGICAL: The patient is oriented to person, place and time. Oriented today, no recent confusion, Forgetful at times.             LABS:   Recent Results (from the past 168 hour(s))   Hemoglobin   Result Value Ref Range    Hemoglobin 8.4 (L) 12.0 - 16.0 g/dL     Results for orders placed or performed in visit on 08/17/20   Basic Metabolic Panel   Result Value Ref Range    Sodium 141 136 - 145 mmol/L    Potassium 3.6 3.5 - 5.0 mmol/L    Chloride 98 98 - 107 mmol/L    CO2 35 (H) 22 - 31 mmol/L    Anion Gap, Calculation 8 5 - 18 mmol/L    Glucose 114 70 - 125 mg/dL    Calcium 9.2 8.5 - 10.5 mg/dL    BUN 37 (H) 8 - 28 mg/dL    Creatinine 1.47 (H) 0.60 - 1.10 mg/dL    GFR MDRD Af Amer 41 (L) >60 mL/min/1.73m2    GFR MDRD Non Af Amer 34 (L) >60 mL/min/1.73m2         Lab Results   Component Value Date    WBC 6.9 08/05/2020     HGB 8.4 (L) 08/24/2020    HCT 29.0 (L) 08/05/2020    MCV 98 08/05/2020     08/05/2020       Lab Results   Component Value Date    [id number] 333 07/21/2020     Lab Results   Component Value Date    HGBA1C 6.5 (H) 07/31/2020     Lab Results   Component Value Date    INR 0.99 07/28/2020    INR 1.30 (H) 02/07/2018    INR 1.51 (H) 03/06/2017     Vitamin D, Total (25-Hydroxy)   Date Value Ref Range Status   01/20/2020 45.9 30.0 - 80.0 ng/mL Final     Lab Results   Component Value Date    TSH 3.43 08/17/2020           ASSESSMENT/PLAN:    Left shoulder dislocation, s/p reverse total shoulder arthroplasty: Incision c/d/i. Pain controlled on tylenol.  Ice prn. PT, OT following. voltaren gel prn. F/u with surgeon Dr. Ramirez on 8/14-f/u again in 4 weeks, limit external rotation, no bathing until 4 weeks postop, healing well.  lymphedema sleeve on LUE. Overall improving. Remains NWB, may remove sling when resting. Improving with therapy.   Asthma:  Encourage cough and deep breathe. IS q1h while awake. Albuterol prn. Shortness of breath at baseline today.   Hypothyroid: on levothyroxine.   Chronic CHF: Daily [id number]. Shortness of breath at baseline, 2+ ble edema. On bumex 2mg two times a day, keep legs elevated when possible.  F/u with cardiology prn. Cardiac diet. Monitor for fluid overload. Lymphedema wrapping. No concerns today, appears euvolemic.   S/p PPM: follows with device clinic. No recent concerns.   CKD stage 3: Cr 1.39, gfr 36 on 8/1. Stable.   H/o Type 2 DM: No meds, no monitoring needed. Diet controlled. No recent concerns.   Dyslipidemia: On aspirin, atorvastatin.   HTN: SBP 130s, On lopressor, bumex.  hold parameters for lopressor. Stable.  GERD: On omeprazole. No concerns today.   Vitamin d deficiency: On vitamin d.  Hypokalemia: On kcl.  PAF, s/p AVR: Regular rate and rhythm today. On metoprolol. F/u with caridology. No concerns.   Anemia of CKD: on iron. Hg 10.9 on 7/24.   Hg 8.8 on  8/1. Hg 9 on 8/5. Recheck on 8/17 8.2, increased iron to three times a day. recheck on 8/24.   Candidal intertrigo:  Nystatin.   Constipation: senna daily, miralax daily prn.        Electronically signed by: Jennifer Valdes NP

## 2021-06-20 NOTE — LETTER
Letter by Jennifer Valdes NP at      Author: Jennifer Valdes NP Service: -- Author Type: --    Filed:  Encounter Date: 2020 Status: (Other)         Patient: Thea Acosta   MR Number: [id number]   YOB: 1934   Date of Visit: 2020                 [facility]    DATE: 2020    NAME:  Thea Acosta             :  1934  MRN: [id number]  CODE STATUS:  DNR    VISIT TYPE: Review Of Multiple Medical Conditions     FACILITY:  [facility] [[id number]]       CHIEF COMPLAIN/REASON FOR VISIT:    Chief Complaint   Patient presents with   ? Review Of Multiple Medical Conditions               HISTORY OF PRESENT ILLNESS: Thea Acosta is a 85 y.o. female who was admitted - for dyspnea and fluid overload, acute on chronic diastolic CHF. She was treated with IV diuresis and later transitioned to PO. Then she developed MIGUEL and held bumex and metolazone. During stay she developed herpes zoster outbreak on left chest. She also had left leg laceration and treated with clindamycin. Left leg negative for DVT on doppler. She completed IV ceftezole and po keflex for cellulitis. She did have some orthostatic hypotension that required med changes. TSH stable and ACTH stim test not indicated. She did have some epigastric pain and was treated with PPI and maalox. She was recommended TCU stay for deconditioning. She has PMH of CAD, CKD, DM, s/p PPM, diastolic CHF, pulmonary artery hypertension, mitral valve stenosis, chronic a fib no anticoagulation, s/p AVR, asthma. Prior to this she lived at home with her grand daughter.     Today Ms. Acosta is seen today for follow up visit. She is seen alone in her room. She says she is still coughing but her breathing is so much better. She feels she can finally take some deep breaths and is not getting as winded with therapy. She is not bringing much up with her cough but sometimes she does. She thinks her legs look  good and the swelling has gone down. They did just switch her today from lymphedema wrapping to renan hose. She says otherwise she is itching to get out of here. She is doing a lot more for herself and feels much stronger. She is planning to go home on Tuesday and cannot wait for this. On review of chart her weights are up again 194-198lbs but clinically appears very stable and euvolemic today.         REVIEW OF SYSTEMS:  PROBLEMS AND REVIEW OF SYSTEMS:   Review of Systems  Today on ROS:   Currently, no fever, chills, or rigors. Decreased vision and hearing. Denies any chest pain, headaches, palpitations, lightheadedness, dizziness. Appetite is good.  Denies any abdominal pain. No active bleeding. Positive for urinary incontinence, no pain, constipation improved, cough improved, shortness of breath improved, leg swelling improving, renan hose, wheezing resolved, weight gain      Allergies   Allergen Reactions   ? Codeine Hives   ? Codeine Phosphate (Bulk)      This allergy is per [facility] records.   ? Codeine Sulfate      This allergy is per [facility] records.   ? Codeine-Butalbital-Asa-Caff      This allergy is per [facility] records.   ? Codeine-Guaifenesin      This allergy is per [facility] records.   ? Lisinopril Cough   ? Penicillins Swelling     Current Outpatient Medications   Medication Sig   ? acetaminophen (TYLENOL) 500 MG tablet Take 2 tablets (1,000 mg total) by mouth 3 (three) times a day as needed for pain. Not to exceed 4000 mg in 24 hours.   ? aspirin 81 MG EC tablet Take 81 mg by mouth daily.   ? atorvastatin (LIPITOR) 80 MG tablet TAKE 1 TABLET(80 MG) BY MOUTH AT BEDTIME   ? bacitracin 500 unit/gram ointment Apply topically 2 (two) times a day.   ? bumetanide (BUMEX) 2 MG tablet TAKE 1 TABLET(2 MG) BY MOUTH TWICE DAILY AT 9 AM AND AT 6 PM   ? cholecalciferol, vitamin  D3, (VITAMIN D3) 2,000 unit Tab Take 2,000 Units by mouth once daily.   ? ferrous sulfate 325 (65 FE) MG tablet TAKE 1 TABLET(325 MG) BY MOUTH TWICE DAILY   ? ipratropium-albuterol (COMBIVENT RESPIMAT)  mcg/actuation Mist inhaler INHALE 1 PUFF BY MOUTH FOUR TIMES DAILY (Patient taking differently: 4 (four) times a day as needed. )   ? levothyroxine (SYNTHROID, LEVOTHROID) 150 MCG tablet TAKE 1 TABLET BY MOUTH DAILY AT 6:00 AM   ? lidocaine (XYLOCAINE) 5 % ointment Apply to painful areas   ? metoprolol tartrate (LOPRESSOR) 25 MG tablet Take 0.5 tablets (12.5 mg total) by mouth 2 (two) times a day.   ? omeprazole (PRILOSEC) 20 MG capsule Take 1 capsule (20 mg total) by mouth daily.   ? polyethylene glycol (MIRALAX) 17 gram packet Take 17 g by mouth daily.   ? potassium chloride (K-DUR,KLOR-CON) 20 MEQ tablet Take 40 mEq by mouth 2 (two) times a day.    ? senna (SENOKOT) 8.6 mg tablet Take 1 tablet by mouth daily as needed for constipation.   ? white petrolatum (AQUAPHOR ORIGINAL) 41 % Oint Apply 1 application topically at bedtime.     Past Medical History:    Past Medical History:   Diagnosis Date   ? Acute kidney injury superimposed on CKD (H) 3/3/2017   ? Asthma    ? CAD (coronary artery disease)    ? Cataract    ? Chronic kidney disease     ckd3   ? Diabetes mellitus (H)    ? Disease of thyroid gland    ? H/O heart valve replacement with bioprosthetic valve    ? History of transfusion    ? Hypertension    ? Normochromic normocytic anemia    ? Pulmonary hypertension (H) 09/27/2019    Noted on echocardiogram   ? Restless leg syndrome            PHYSICAL EXAMINATION  Vitals:    02/13/20 2230   BP: 119/68   Pulse: 60   Resp: 18   Temp: 97.2  F (36.2  C)   SpO2: 94%   Weight: 198 lb (89.8 kg)       Today on physical exam:     GENERAL: Awake, Alert, obese, oriented x3, not in any form of acute distress, answers questions appropriately, follows simple commands, conversant  HEENT: Head is normocephalic with normal  hair distribution. No evidence of trauma. Ears: No acute purulent discharge. Eyes: Conjunctivae pink with no scleral jaundice. Nose: Normal mucosa and septum. NECK: Supple with no cervical or supraclavicular lymphadenopathy. Trachea is midline. Lummi, decreased vision  CHEST: No tenderness or deformity, no crepitus, left chest PPM  LUNG: Dim but clear today to auscultation.   Shortness of breath with exertion improving, dry cough improving  BACK: No kyphosis of the thoracic spine. Symmetric, no curvature, ROM normal, no CVA tenderness, no spinal tenderness   CVS: irregularly irregular rhythm, murmur,  2+ pulses symmetric in all extremities.  ABDOMEN: Rounded and soft, nontender to palpation, non distended, no masses, no organomegaly, good bowel sounds, no rebound or guarding, no peritoneal signs.   EXTREMITIES: 1+ ble nonpitting edema,  Dean hose  SKIN: Warm and dry  NEUROLOGICAL: The patient is oriented to person, place and time. Strength and sensation are grossly intact. Face is symmetric.            LABS:   No results found for this or any previous visit (from the past 168 hour(s)).  Results for orders placed or performed in visit on 02/07/20   Basic Metabolic Panel   Result Value Ref Range    Sodium 135 (L) 136 - 145 mmol/L    Potassium 3.6 3.5 - 5.0 mmol/L    Chloride 84 (L) 98 - 107 mmol/L    CO2 37 (H) 22 - 31 mmol/L    Anion Gap, Calculation 14 5 - 18 mmol/L    Glucose 138 (H) 70 - 125 mg/dL    Calcium 10.0 8.5 - 10.5 mg/dL    BUN 82 (H) 8 - 28 mg/dL    Creatinine 2.07 (H) 0.60 - 1.10 mg/dL    GFR MDRD Af Amer 28 (L) >60 mL/min/1.73m2    GFR MDRD Non Af Amer 23 (L) >60 mL/min/1.73m2         Lab Results   Component Value Date    WBC 6.5 01/20/2020    HGB 11.0 (L) 01/20/2020    HCT 35.4 01/20/2020    MCV 99 01/20/2020     01/20/2020       No results found for: [id number]  Lab Results   Component Value Date    HGBA1C 6.9 (H) 09/25/2019     Lab Results   Component Value Date    INR 1.30 (H) 02/07/2018    INR  1.51 (H) 03/06/2017    INR 1.27 (H) 03/05/2017     Vitamin D, Total (25-Hydroxy)   Date Value Ref Range Status   01/20/2020 45.9 30.0 - 80.0 ng/mL Final     Lab Results   Component Value Date    TSH 4.22 09/25/2019           ASSESSMENT/PLAN:     1 Viral URI with cough, Acute on chronic moderate persistent asthma exacerbation: Now resolved. Shortness of breath improved, cough improved. no further wheezing and completes prednisone today. On duonebs four times a day and q4h prn, mucinex bid.    2. Acute on chronic CHF: Daily [id number], reports weighing over 300lbs a year or so ago. Shortness of breath continues improved,  1+ nonpitting ble edema. On bumex 2mg two times a day, keep legs elevated when possible.  F/u with cardiology prn. PT, OT for conditioning. Cardiac, low sodium diet. Daily weights. Now back to renan hose. Stable today on exam.   3. BLE Cellulitis: improving. Completed keflex. Lymphedema wraps.  4. S/p PPM: follows with device clinic. No recent concerns.   5. Herpes Zoster: resolved.   6. MIGUEL on CKD stage 3: Cr 1.75 on 1/14. Cr 1.71 on 1/20. Stable. Bmp on 1/27-cr 1.91. stable. Bmp on 1/31.   7. Type 2 DM: No meds, no monitoring needed. Diet controlled. Weight loss recently. Glucose on bmp 1/20 was 112.   8. Dyslipidemia: On aspirin, atorvastatin.   9. HTN: SBP 110s, On lopressor, bumex.  hold parameters for lopressor. Stable.  10. GERD: On omeprazole. No concerns today.   11. Hypothyroid: On levothyroxine.   12. Vitamin d deficiency: On vitamin d. Vit d 45 on 1/20. Stable.   13. Hypokalemia: On kcl.  14. PAF, s/p AVR: Regular rate and rhythm today. On metoprolol. F/u with caridology. No recent palpitations or chest pain.   15. Right elbow gout exacerbation: resolved.   16. Anemia of CKD: on iron two times a day. Hg  11.3 on 1/14. Hg 11 on 1/20.   17. Constipation:  scheduled miralax daily and senna daily prn. Now stable.   18. PVD, venous stasis:  aquaphor two times a day. maintain dry and supple skin barrier. No open wounds on legs. No numb/tingling in legs. No leg pain. Lymphedema wrapping now completed today and renan hose in place.   19. Candidal intertrigo:  nystatin powder two times a day until resolved.     Per therapy PT -150ft fww SPV, t/f's SPV, high fall risk (18/28 [place]etti), 3 stairs CGA-SBA, OT - UB setup, doesnt wear pants, toileting CGA, lymph treatment going well. Green tag. Lives in house with stairs with family assist. lcd 2/17.  pt, ot   Wheelchair ordered.      Electronically signed by: Jennifer Valdes NP

## 2021-06-20 NOTE — LETTER
Letter by Rojelio Farnsworth MD at      Author: Rojelio Farnsworth MD Service: -- Author Type: --    Filed:  Encounter Date: 7/28/2020 Status: (Other)         Patient: Thea Acosta   MR Number: [id number]   YOB: 1934   Date of Visit: 7/28/2020      [facility]    Facility:  [facility] [[id number]]    Code Status:  FULL CODE    Chief Complaint   Patient presents with   ? H & P   :                    Patient Active Problem List   Diagnosis   ? Constipation   ? Sciatica   ? Dyspnea, unspecified type   ? Hypothyroidism   ? Type 2 diabetes mellitus with stage 3 chronic kidney disease, without long-term current use of insulin (H)   ? Hyperparathyroidism   ? Vitamin D deficiency   ? Mixed hyperlipidemia   ? Atherosclerosis of native coronary artery of native heart without angina pectoris   ? CKD (chronic kidney disease), stage III (H)   ? Osteoarthritis Of The Knee   ? Restless Legs Syndrome   ? H/O heart valve replacement with bioprosthetic valve   ? Cardiac pacemaker in situ   ? Complete heart block (H)   ? Normochromic normocytic anemia   ? Essential hypertension with goal blood pressure less than 140/90   ? Moderate persistent asthma without complication   ? PAF (paroxysmal atrial fibrillation) (H)   ? GERD (gastroesophageal reflux disease)   ? Primary insomnia   ? Hypokalemia   ? Physical deconditioning   ? Asthma   ? CHF (congestive heart failure), NYHA class I, acute, systolic (H)   ? Pulmonary hypertension (H)   ? Atrial fibrillation, unspecified type (H)   ? Status post aortic valve replacement   ? Non-rheumatic mitral valve stenosis   ? Nonrheumatic mitral valve stenosis   ? PVD (peripheral vascular disease) (H)   ? Lymphedema   ? Shoulder dislocation, left, initial encounter   ? Acute respiratory failure (H)   ? Delirium   ? Acute delirium   ? Visual hallucination       History:  Thea Acosta  is an 86 year old female with  "history of coronary artery disease, CHF, paroxysmal atrial fibrillation, AVR, permanent pacemaker placement, COPD, CKD 3, DM 2, hypertension, GERD,  seen for admission to TCU on 7/28/2020 following a series of hospitalizations    Hospital course #1:  Patient was hospitalized at [facility] between July 12 and July 6 following a fall with left shoulder dislocation which was reduced in the emergency room.  However she was noted to be hypoxic and was diagnosed with acute hypoxic respiratory failure felt most likely secondary to atelectasis plus opiates which are being used for pain control.  She had weaned off oxygen by the time of discharge.  She also had acute kidney injury and was noted to have metabolic alkalosis that hospital stay.  She was discharged with a creatinine of 1.4 after holding her Bumex for a brief time.     She was discharged to [facility] TCU where she stayed for fewer than 48 hours due to development of acute delirium.    Hospital course #2:     She was readmitted between July 18 and July 21 again at [facility].  Hypothyroidism was felt to be the primary cause of her delirium.  TSH 67 on July 18.  She has been noncompliant with her thyroid medication quite regularly for at least months.  She was again found to be hypoxic without certain cause as she was not on opiates this time.  She did have a chest x-ray on July 20 suggesting a chronic CHF type appearance but no acute problems noted.  She again had elevation of her creatinine to a peak of 1006 discharged 1.67 with a baseline of about 1.4.        Subjective/ROS:    -augmented by discussion with facility staff involved in direct care  -limited by: Memory    While patient is generally a good historian, she does have memory loss/retrograde amnesia and has lost some of the last days.  However she retains some insight to her hallucinations and says \"they are gone now\".  Chart notes that she has had hallucinations and confusion in the past " "with opiates.  Chart also notes that she is had hypoxia before during the hospitalization but has never been on home oxygen.    Patient reports that she feels okay now.  She says her pain is better.  However she says she is unable to move her shoulder and that affects her sleep.  She is wearing the sling when up.  She denies numbness weakness tingling into her left hand.  She does have some swelling in the left hand which she says is not new.  She does have some bruising and dorsum of her left hand which she says is not new.  She says that she has had quite severe arthritis in her hands, someone at the hospital \"thought it was gout\" which is why she is currently on prednisone.  She is not sure that it has helped.    She says she has been able to eat.  She does not think she is constipated.  She has had no dysuria.  She denies nausea vomiting headache change in vision speaking swallowing or hearing.  She denies falls or injuries here at the facility.  He denies depression and anxiety.  Remainder 13 system ROS negative    Past Medical History:   Diagnosis Date   ? Acute kidney injury superimposed on CKD (H) 3/3/2017   ? Asthma    ? CAD (coronary artery disease)    ? Cataract    ? Chronic kidney disease     ckd3   ? Diabetes mellitus (H)    ? Disease of thyroid gland    ? H/O heart valve replacement with bioprosthetic valve    ? History of transfusion    ? Hypertension    ? Normochromic normocytic anemia    ? Pulmonary hypertension (H) 09/27/2019    Noted on echocardiogram   ? Restless leg syndrome      Past Surgical History:   Procedure Laterality Date   ? APPENDECTOMY     ? CHOLECYSTECTOMY     ? EYE SURGERY Bilateral     Cataracts   ? HIP PINNING Right 3/1/2017    Procedure: RIGHT HIP TROCHANTERIC RODDING;  Surgeon: Moshe Davies MD;  Location: [facility];  Service:    ? INSERT / REPLACE / REMOVE PACEMAKER  06/25/2007    guidant   ? INSERT / REPLACE / REMOVE PACEMAKER  02/07/2018    phoebe sci gen change   ? " JOINT REPLACEMENT Left     knee   ? TISSUE AORTIC VALVE REPLACEMENT  2007               Family History   Problem Relation Age of Onset   ? No Medical Problems Mother         age 86   ? No Medical Problems Father         MVA   ? Cancer Brother         lung   ? No Medical Problems Brother    :       Social History     Socioeconomic History   ? Marital status:      Spouse name: Not on file   ? Number of children: Not on file   ? Years of education: Not on file   ? Highest education level: Not on file   Occupational History   ? Occupation:  in operating room     Comment: retired   Social Needs   ? Financial resource strain: Not on file   ? Food insecurity     Worry: Not on file     Inability: Not on file   ? Transportation needs     Medical: Not on file     Non-medical: Not on file   Tobacco Use   ? Smoking status: Former Smoker     Packs/day: 0.00     Last attempt to quit: 1950     Years since quittin.8   ? Smokeless tobacco: Never Used   Substance and Sexual Activity   ? Alcohol use: No   ? Drug use: No   ? Sexual activity: Never   Lifestyle   ? Physical activity     Days per week: Not on file     Minutes per session: Not on file   ? Stress: Not on file   Relationships   ? Social connections     Talks on phone: Not on file     Gets together: Not on file     Attends [employer] service: Not on file     Active member of club or organization: Not on file     Attends meetings of clubs or organizations: Not on file     Relationship status: Not on file   ? Intimate partner violence     Fear of current or ex partner: Not on file     Emotionally abused: Not on file     Physically abused: Not on file     Forced sexual activity: Not on file   Other Topics Concern   ? Incontinent Not Asked   ? Toileting independently Not Asked   ? Cognitive impairment Not Asked   ? Vision impairment Not Asked   ? Hearing impairment Not Asked   ? Dentures Not Asked   Social History Narrative    She live in a single  "floor house.  Her son lives next door and her granddaughter is \"around a lot\"   3/2017   :    Patient says she lives on [address]  She lives with her granddaughter.  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    Current Facility-Administered Medications on File Prior to Visit   Medication Dose Route Frequency Provider Last Rate Last Dose   ? [COMPLETED] fentaNYL pf injection 50 mcg (SUBLIMAZE)  50 mcg Intravenous Once [facility]   50 mcg at 07/28/20 1154   ? [COMPLETED] ketamine 50 mg/mL injection 46.5 mg (NEPHRON)  0.5 mg/kg Intravenous Once [facility]   50 mg at 07/28/20 1317   ? naloxone injection 0.2-0.4 mg (NARCAN)  0.2-0.4 mg Intravenous PRN Oh[facility]ap[facility] donaldson, DO        Or   ? naloxone injection 0.2-0.4 mg (NARCAN)  0.2-0.4 mg Intramuscular PRN Oh[facility]apito donaldson, DO       ? [COMPLETED] ondansetron injection 4 mg (ZOFRAN)  4 mg Intravenous Once [facility]   4 mg at 07/28/20 1311   ? [COMPLETED] propofoL injection (DIPRIVAN)   Intravenous Code/Trauma/Sedation Med [facility]   10 mg at 07/28/20 1327   ? [COMPLETED] propofoL injection 47 mg (DIPRIVAN)  0.5 mg/kg Intravenous Once [facility]   25 mg at 07/28/20 1317   ? sodium chloride flush 10 mL (NS)  10 mL Intravenous Line Care [facility]         Current Outpatient Medications on File Prior to Visit   Medication Sig Dispense Refill   ? acetaminophen (TYLENOL) 500 MG tablet Take 2 tablets (1,000 mg total) by mouth 3 (three) times a day. (Patient taking differently: Take 1,000 mg by mouth 3 (three) times a day. And daily prn.)  0   ? albuterol (PROAIR HFA;PROVENTIL HFA;VENTOLIN HFA) 90 mcg/actuation inhaler Inhale 2 puffs every 4 (four) hours as needed for wheezing.  0   ? albuterol (PROVENTIL) 2.5 mg /3 mL (0.083 %) nebulizer solution Take 3 mL (2.5 mg total) by nebulization every 4 (four) hours as needed for wheezing or shortness of breath.  0   ? aspirin 81 MG EC tablet " Take 81 mg by mouth daily.     ? atorvastatin (LIPITOR) 80 MG tablet TAKE 1 TABLET(80 MG) BY MOUTH DAILY 90 tablet 3   ? bumetanide (BUMEX) 2 MG tablet Take 1.5 tablets (3 mg total) by mouth 2 (two) times a day at 9am and 6pm. 270 tablet 2   ? cholecalciferol, vitamin D3, (VITAMIN D3) 2,000 unit Tab Take 2,000 Units by mouth once daily.     ? colchicine 0.6 mg tablet take two tablets (1.2 mg) by mouth at onset of gout symptoms, then repeat one tablet (0.6 mg) by mouth one hour later 24 tablet 2   ? ferrous sulfate 325 (65 FE) MG tablet TAKE 1 TABLET(325 MG) BY MOUTH TWICE DAILY 180 tablet 3   ? levothyroxine (SYNTHROID, LEVOTHROID) 150 MCG tablet Take 1 tablet (150 mcg total) by mouth daily before supper. 90 tablet 3   ? lidocaine (XYLOCAINE) 5 % ointment Apply to painful areas (Patient taking differently: Apply 1 application topically 2 (two) times a day as needed. Apply to painful areas) 35.44 g 0   ? nystatin (MYCOSTATIN) powder Apply topically 4 (four) times a day. 60 g 2   ? omeprazole (PRILOSEC) 20 MG capsule Take 1 capsule (20 mg total) by mouth daily before breakfast. 90 capsule 3   ? potassium chloride (K-DUR,KLOR-CON) 20 MEQ tablet Take 20 mEq by mouth daily.      ? predniSONE (DELTASONE) 2.5 MG tablet Take 7.5 mg/d prednisone PO for 3 weeks. 90 tablet 0   ? white petrolatum (AQUAPHOR ORIGINAL) 41 % Oint Apply 1 application topically at bedtime.     :      ALLERGIES:  Codeine; Codeine phosphate (bulk); Codeine sulfate; Codeine-butalbital-asa-caff; Codeine-guaifenesin; Lisinopril; and Penicillins    Vitals:  There were no vitals taken for this visit. except as noted below    Vital signs: Reviewed per facility EMR vitals including as follows:              st Recent Vitals Date/Time Taken  Temperature: 94.7  F 07/28/2020 07:03 AM  Pulse: 58 per minute 07/28/2020 07:03 AM  Respirations: 19 per minute 07/28/2020 09:18 AM  Blood Pressure: 119 / 63 mmHg 07/28/2020 07:03 AM  O2 Saturation: 94 % 07/28/2020 07:03  AM  Blood Sugar: 114 mg/dL 03/20/2017 05:08 PM  Weight: 206.5 lbs / Admission       BMI: 32.34 07/24/2020 10:26 AM  Height: 5ft 7.0in 07/17/2020 12:01 PM  There is no height or weight on file to calculate BMI.    Physical exam:    General:  Alert  oriented oriented x3 appears calm, no apparent distress, normally conversant.  Speech is clear.  She answers questions appropriately.    HEENT:  NC/AT, sclera clear, EOMI   Neck:  no mass, adenopathy, thyromegaly  Chest: Patient has obvious rounded deformity in the left upper outer chest.  This is above her pacemaker.  Shoulders: She has little to no spontaneous active range of motion of her left shoulder.  Passive range of motion leads to guarding.  The rounded mass is mildly tender especially medially.    Heart:  rhythm: Regular today rate: 80s m/g/r: Soft systolic murmur   Lungs: Decreased breath sounds in bases otherwise clear to auscultation throughout.  She is breathing company without accessory muscle use or tachypnea  Abd:  nontender, nondistended, bowel sounds audible and wnls, no mass, no organomegaly     Ext: 1-2 symmetric pitting edema in the pretibial areas  Skin: Mild pretibial venous stasis dermatitis and irritation but no infection weeping or blistering  Neuro:                CMS is intact to the fingertips on the left.  She is got a quick refill of capillaries in the finger beds.  I can palpate radial pulse which is strong.  She denies numbness.      Due to the 2020 Covid 19 pandemic, except as noted above, the patient was visually observed at a 6 foot plus distance.  An observational exam was performed in an effort to keep patient safe from Covid 19 and other communicable diseases.   Labs:  Lab Results   Component Value Date    WBC 8.4 07/24/2020    HGB 10.9 (L) 07/24/2020    HCT 35.9 07/24/2020     07/24/2020     07/24/2020     Results for orders placed or performed during the hospital encounter of 07/18/20   Basic metabolic panel   Result  Value Ref Range    Sodium 141 136 - 145 mmol/L    Potassium 4.1 3.5 - 5.0 mmol/L    Chloride 95 (L) 98 - 107 mmol/L    CO2 34 (H) 22 - 31 mmol/L    Anion Gap, Calculation 12 5 - 18 mmol/L    Glucose 108 70 - 125 mg/dL    Calcium 9.4 8.5 - 10.5 mg/dL    BUN 49 (H) 8 - 28 mg/dL    Creatinine 1.67 (H) 0.60 - 1.10 mg/dL    GFR MDRD Af Amer 35 (L) >60 mL/min/1.73m2    GFR MDRD Non Af Amer 29 (L) >60 mL/min/1.73m2         Lab Results   Component Value Date    TSH 67.01 (H) 07/18/2020     Lab Results   Component Value Date    HGBA1C 7.0 (H) 02/26/2020     [unfilled]  Lab Results   Component Value Date    [id number] 333 07/21/2020     Lab Results   Component Value Date     07/18/2020     [unfilled]  Most Recent EKG     Units 07/18/20  1123   VENTRATE BPM 60   ATRIALRATE BPM 63   QRSDURATION ms 202   QTINTERVAL ms 532   QTCCALC ms 532   RAXIS degrees 99   TAXIS degrees -83   MUSEDX  Wide QRS rhythm  Rightward axis  Left bundle branch block  Abnormal ECG  When compared with ECG of 04-JAN-2020 16:44,  Wide QRS rhythm has replaced Electronic ventricular pacemaker  Confirmed by SEE ED PROVIDER NOTE FOR, ECG INTERPRETATION ([id number]),  ZEYAD KHAN ([id number]) on 7/18/2020 10:15:30 PM           Assessment/Plan:      ICD-10-CM    1. Visual hallucination  R44.1    2. Cardiac pacemaker in situ  Z95.0    3. Acute respiratory failure with hypoxia (H)  J96.01    4. CHF (congestive heart failure), NYHA class I, acute, systolic (H)  I50.21    5. Shoulder dislocation, left, initial encounter  S43.005A        Fall with injury   suspected anterior inferior dislocation left shoulder, recurrent   This all started early in the month after a fall.  I stressed there has not been a new fall known with what appears to be recurrence of her dislocation discovered today.  Surprisingly pain-free.  CMS intact in the left upper extremity.  Unclear how long shoulder has been out.  Unclear if dislocation could be part of prior  "encephalopathy/poor sleep/hallucinations?  - Send to hospital for x-rays and definitive treatment/reduction now, discussed with staff  -Should note that she had a head CT, with no acute changes.  Acute metabolic encephalopathy  Hallucinations  Hypothyroidism   Abnormal urinalysis   It is not clear to me that patient was ever back to her best mental baseline after her initial hospitalization.  She seemed to be having trouble to 1 degree or another with confusion throughout her earlier TCU stay.  She was still using opiates at that time, thus this may be opiate related as it has been in the past.  She has now been off opiates and feels clear today.  Unfortunately she has potentially painful condition noted above, we will see how things go at the hospital but would like to avoid opiates if at all possible.  Recall the patient had repeat negative CT scan on July 18     Furthermore hypoxia has been intermittently present and may well contribute.     While her urinalysis is abnormal, is not highly suspicious for UTI.  Thus will wait for her urine culture results which are pending today.     Finally her hypothyroidism has considered to be a likely contributor here.  If so we might not see rapid resolution.  This is the first I have met her, but I am betting the other factors above along with a somewhat \"susceptible brain\" other bigger factors.    Suspected chronic hypoxic respiratory failure   Though this finding has been intermittent, she seems to be prone to dropping her O2 sats.  I suspect it is multifactorial.  She has been felt to have COPD in the past although she is unaware of that and denies it.  She also has history of CHF though that has appeared compensated.  She has been treated with opiates recently.  Obesity, possible hypoventilation, possible RANJIT contributing?  - Monitor O2 sats, supplemental oxygen as need be  -Avoid opiates and other sedating medications as possible  - PE has not been suspected.  - Patient " "sounds like she is not interested in sleep study  -No plan for VQ scan/PE rounds based on current improvement.    CHF, chronic diastolic   See echo from 2018 below.  Patient's weight has been stable between 202 and 206.  She does not shortness of breath.  Lungs sound clear except for the decreased breath sounds at bases today.  -Agree with low-sodium diet  - Bumex 2 mg twice daily as current  - Monitor weights daily  Performing Date  Performing Department    2018   CARDIAC TESTING [[id number]]    Patient Information     Patient Name   Thea Acosta  MRN   [id number]  Sex   Female   1   1934 (83 y.o.)    Indications     Heart murmur; Fever    Summary       Normal left ventricular size and systolic function.    When compared to the previous study dated 3/1/2017, no significant change.    Left ventricle ejection fraction is normal. The estimated left ventricular ejection fraction is 55%.    Normal right ventricular size and systolic function.    Left Atrium: Left atrial volume is moderately increased.    Aortic Valve: Bioprosthetic valve is not well visualized. No evidence of paravalvular leak and mean gradient is 15 mmHg which is borderline elevated, however not significantly changed compared to prior.    Mitral Valve: There is severe mitral annular calcification. Moderate mitral Calcific stenosis. No mitral regurgitation.          COPD   I see this in old notes and records.  I do not see complete pulmonary function tests on record however.  Patient says that she smoked in the past \"but did not inhale\".  She is skeptical.  Nonetheless I suspect that there is some COPD present.  She does have albuterol if need be but not needed currently with no wheezing    Coronary artery disease   Continue aspirin and atorvastatin.  Not sure why she is not on beta-blocker, can follow-up with primary MD on that.    Paroxysmal atrial fibrillation   Sounds are she is in normal sinus rhythm today.  Takes only aspirin " for stroke prophylaxis, does not need rate controlling agent    Permanent pacemaker   Palpable in the left anterior chest wall but below the mass described above, distinct from what I think is shoulder dislocation.  Follows with device clinic.    DM 2   Blood sugar control adequate.  A1c 7.0 on February 26.    Gout   Presumed.  Hand pain left greater than right.  Patient is on prednisone taper which she will continue.  She also has colchicine but is not currently taking it, just a PRN medication    CKD 3   Patient has had some kidney stress these last 2 hospitalizations with elevation of her creatinine above baseline.  I have ordered a recheck for next Tuesday of her BMP.    Hypertension   Not currently on therapy be on Bumex.  Monitor without changes today    GERD   Omeprazole      Case discussed with:    Facility staff             Rojelio Farnsworth MD

## 2021-06-20 NOTE — LETTER
Letter by Jennifer Valdes NP at      Author: Jennifer Valdes NP Service: -- Author Type: --    Filed:  Encounter Date: 10/9/2020 Status: (Other)         Patient: Thea Acosta   MR Number: [id number]   YOB: 1934   Date of Visit: 10/9/2020                 [facility]    DATE: 10/9/2020    NAME:  Thea Acosta             :  1934  MRN: [id number]  CODE STATUS:  DNR    VISIT TYPE: Problem Visit (fluid overload, weight gain)     FACILITY:  [facility] [[id number]]       CHIEF COMPLAIN/REASON FOR VISIT:    Chief Complaint   Patient presents with   ? Problem Visit     fluid overload, weight gain               HISTORY OF PRESENT ILLNESS: Thea Acosta is a 86 y.o. female who admitted - for acute metabolic encephalopathy. This was felt to be s/t hypothyroidism and noncompliance with levothyroxine dosing. She was readmitted - for left shoulder dislocation and underwent reverse total shoulder arthroplasty on . Postop course was uncomplicated and transferred back to [facility]. She has PMH of CAD, CKD, DM, s/p PPM, diastolic CHF, pulmonary artery hypertension, mitral valve stenosis, chronic a fib no anticoagulation, s/p AVR, asthma. Prior to this she lived at home with her grand daughter. She was readmitted 9/10- for altered mental status. She was treated for possible cellulitis and hypoxic respiratory failure. Her tsh was elevated so levothyroxine dose was increased. She was discharged back to u.     Today Ms. Acosta is seen today for concerns of fluid overload and weight gain. Her weights have recently increased from [phone]. Her legs are much more swollen today as well her left arm. She is not complaining of shortness of breath and is not requiring oxygen. She is seen in her recliner today. She says she does feel like her legs are more swollen today and that she is retaining more fluid. She is not feeling short of breath  "at rest but does get winded when she is up and moving around. She says she slept ok and is sleeping in her recliner. She doesn't think she has to sit more upright than usual. She denies any dizziness or other concerns recently. She says she is eating fine and no issues with her bowels. No fever or chills. Therapy did set a last day for her on 10/12 but she says she filed an appeal to see if she can stay longer. We discussed adding an additional diuretic for 5 days and check labs on Monday.       REVIEW OF SYSTEMS:  PROBLEMS AND REVIEW OF SYSTEMS:   Review of Systems  Today on ROS:   Currently, no fever, chills, or rigors. Decreased vision and hearing. Denies any chest pain, palpitations, lightheadedness, dizziness, no cough. Appetite is good.  Denies any abdominal pain. No active bleeding. Positive for urinary incontinence, urinary frequency, leg swelling worse, weight gain, left shoulder incision healed, no nausea, no diarrhea or constipation, pain controlled, velcro leg wraps, shortness of breath withe exertion, no insomnia, no headaches       Allergies   Allergen Reactions   ? Tramadol Anxiety and Other (See Comments)     Per patient, Thea developed psychosis and severe anxiety and agitation \"for three days\" after receiving tramadol in the past. She stated that she would \"never\" take it again and would be extremely upset if she receives it. She asked me to add this to her chart's allergy list specifically.    ? Codeine Hives   ? Codeine Phosphate (Bulk)      This allergy is per [facility] records.   ? Codeine Sulfate      This allergy is per [facility] records.   ? Codeine-Butalbital-Asa-Caff      This allergy is per [facility] records.   ? Codeine-Guaifenesin      This allergy is per [facility] records.   ? Lisinopril Cough   ? Penicillins Swelling     Current Outpatient " Medications   Medication Sig   ? acetaminophen (TYLENOL) 500 MG tablet Take 2 tablets (1,000 mg total) by mouth 3 (three) times a day. (Patient taking differently: Take 1,000 mg by mouth 3 (three) times a day as needed. )   ? albuterol (PROAIR HFA;PROVENTIL HFA;VENTOLIN HFA) 90 mcg/actuation inhaler Inhale 2 puffs every 4 (four) hours as needed for wheezing.   ? aspirin 81 MG EC tablet Take 81 mg by mouth daily.   ? atorvastatin (LIPITOR) 80 MG tablet TAKE 1 TABLET(80 MG) BY MOUTH DAILY   ? bumetanide (BUMEX) 2 MG tablet Take 1 tablet (2 mg total) by mouth 2 (two) times a day at 9am and 6pm. (Patient taking differently: Take 2 mg by mouth 2 (two) times a day at 9am and 6pm. 2mg in am and 1mg at noon)   ? colchicine 0.6 mg tablet take two tablets (1.2 mg) by mouth at onset of gout symptoms, then repeat one tablet (0.6 mg) by mouth one hour later   ? diclofenac sodium (VOLTAREN) 1 % Gel Apply 2 g topically 4 (four) times a day as needed.    ? dimethicone 5 % Crea Apply 1 application topically 2 (two) times a day as needed (to buttocks as needed for skin breakdown).   ? ferrous sulfate 325 (65 FE) MG tablet Take 1 tablet by mouth daily.   ? levothyroxine (SYNTHROID, LEVOTHROID) 200 MCG tablet Take 1 tablet (200 mcg total) by mouth daily before supper.   ? metoprolol tartrate (LOPRESSOR) 25 MG tablet Take 12.5 mg by mouth 2 (two) times a day. Hold for sbp<105  Hr <55   ? nystatin (MYCOSTATIN) powder Apply topically 4 (four) times a day.   ? omeprazole (PRILOSEC) 20 MG capsule Take 1 capsule (20 mg total) by mouth daily before breakfast.   ? ondansetron (ZOFRAN-ODT) 4 MG disintegrating tablet Take 4 mg by mouth every 6 (six) hours as needed for nausea.   ? polyethylene glycol (MIRALAX) 17 gram packet Take 17 g by mouth daily as needed.    ? potassium chloride (K-DUR,KLOR-CON) 20 MEQ tablet Take 40 mEq by mouth daily before breakfast.   ? rOPINIRole (REQUIP) 0.5 MG tablet Take 0.5 mg by mouth at bedtime.   ? senna  (SENOKOT) 8.6 mg tablet Take 1 tablet by mouth daily. Hold for loose stools   ? white petrolatum (AQUAPHOR ORIGINAL) 41 % Oint Apply 1 application topically at bedtime.     Past Medical History:    Past Medical History:   Diagnosis Date   ? Acute kidney injury superimposed on CKD (H) 3/3/2017   ? Asthma    ? CAD (coronary artery disease)    ? Cataract    ? Chronic kidney disease     ckd3   ? COPD (chronic obstructive pulmonary disease) (H)    ? Diabetes mellitus (H)    ? Disease of thyroid gland    ? H/O heart valve replacement with bioprosthetic valve    ? History of transfusion    ? Hypertension    ? Normochromic normocytic anemia    ? Pulmonary hypertension (H) 09/27/2019    Noted on echocardiogram   ? Restless leg syndrome            PHYSICAL EXAMINATION  Vitals:    10/09/20 0700   BP: 108/68   Pulse: (!) 56   Resp: 18   Temp: 97  F (36.1  C)   SpO2: 95%   Weight: 216 lb (98 kg)       Today on physical exam:     GENERAL: alert, obese,  not in any form of acute distress, answers questions appropriately, follows simple commands, conversant  HEENT: Head is normocephalic with normal hair distribution. No evidence of trauma. Ears: No acute purulent discharge. Eyes: Conjunctivae pink with no scleral jaundice. Nose: Normal mucosa and septum. NECK: Supple with no cervical or supraclavicular lymphadenopathy. Trachea is midline. [place], decreased vision  CHEST: No tenderness or deformity, no crepitus, left chest PPM  LUNG: Dim but clear today to auscultation.   Shortness of breath with exertion, no cough noted  BACK: No kyphosis of the thoracic spine. Symmetric, no curvature, ROM normal, no CVA tenderness, no spinal tenderness   CVS: irregularly irregular rhythm, murmur,  2+ pulses symmetric in all extremities.  ABDOMEN: Rounded and soft, nontender to palpation, non distended, no masses, no organomegaly, good bowel sounds, no rebound or guarding, no peritoneal signs.   EXTREMITIES: 2-3+ ble pitting edema, velcro leg wraps,  left shoulder incision healed, rl discoloration ble, right shoulder limited ROM and pain with movement, no erythema, warmth, or edema at shoulder joint  SKIN: Warm and dry  NEUROLOGICAL: The patient is oriented to person, place and time.             LABS:   Recent Results (from the past 168 hour(s))   Cell Ct/Diff, Body Fluid   Result Value Ref Range    Color, Fluid Pink     Appearance, Fluid Turbid     WBC, Fluid 8,998 (H) 0 - 99 /uL    RBC, Fluid <50,000 <50,000 /ul    Neutrophil % 52 (H) <=25 %    Lymphocyte % 29 <=78 %    Monocyte % 10 <=71 %    Macrophage % 8 <=71 %    Mesothelial %      Eosinophil % 1 <=1 %    Other Cells %     Culture/Gram Stain: Body Fluid    Specimen: Fluid, Body; Body Fluid   Result Value Ref Range    Culture No Growth     Gram Stain Result No polymorphonuclear leukocytes seen     Gram Stain Result No organisms seen    Culture, Anaerobic    Specimen: Body Fluid   Result Value Ref Range    Culture No anaerobic organisms isolated    Crystals, Body Fluid   Result Value Ref Range    Crystals, Fluid Uric Acid Crystals Seen      Results for orders placed or performed in visit on 09/25/20   Basic Metabolic Panel   Result Value Ref Range    Sodium 137 136 - 145 mmol/L    Potassium 3.9 3.5 - 5.0 mmol/L    Chloride 92 (L) 98 - 107 mmol/L    CO2 32 (H) 22 - 31 mmol/L    Anion Gap, Calculation 13 5 - 18 mmol/L    Glucose 141 (H) 70 - 125 mg/dL    Calcium 9.9 8.5 - 10.5 mg/dL    BUN 52 (H) 8 - 28 mg/dL    Creatinine 1.68 (H) 0.60 - 1.10 mg/dL    GFR MDRD Af Amer 35 (L) >60 mL/min/1.73m2    GFR MDRD Non Af Amer 29 (L) >60 mL/min/1.73m2         Lab Results   Component Value Date    WBC 5.7 09/12/2020    HGB 9.8 (L) 09/25/2020    HCT 30.8 (L) 09/12/2020     (H) 09/12/2020     09/12/2020       Lab Results   Component Value Date    [id number] 09/12/2020     Lab Results   Component Value Date    HGBA1C 6.5 (H) 07/31/2020     Lab Results   Component Value Date    INR 1.11 (H) 09/10/2020     INR 0.99 07/28/2020    INR 1.30 (H) 02/07/2018     Vitamin D, Total (25-Hydroxy)   Date Value Ref Range Status   01/20/2020 45.9 30.0 - 80.0 ng/mL Final     Lab Results   Component Value Date    TSH 9.38 (H) 09/11/2020           ASSESSMENT/PLAN:    Acute on Chronic CHF: Daily [id number] then [phone], then dropped to 200-205 and now up to 213-216lbs, weight gain, increased shortness of breath, increased edema. On bumex, encourage leg elevation. Cardiac diet. velcro leg wraps.  notify for weight change for 2 lbs in 24 hours or 5lbs in 7 days. Will add metolazone daily x 5 days 30 min prior to am bumex. Bmp on 10/12. Increase kcl to 60meq daily x 5 days then reduce back to 40meq daily.   S/p reverse total shoulder arthroplasty: Incision healed.  PT, OT following. F/u with surgeon Dr. Ramirez on 8/14-f/u again in 4 weeks, limit external rotation, no bathing until 4 weeks postop, healing well, f/u again on 9/4-doing well, dc sling, AROM and PROM with therapy, last f/u 10/5-doing well, no concerns. Weight bear with walker, pain improving. Tylenol prn. No recent concerns.   S/p PPM: follows with device clinic. No recent concerns.   Asthma:  Encourage cough and deep breathe. Albuterol prn. Shortness of breath with exertion.  No recent cough, wheezing. Off o2. Treating for fluid overload.   Hypothyroid: on levothyroxine. tsh 9.38 on 9/11, levothyroxine dose adjusted. recheck tsh in 6 weeks-discharging later this week. Will recommend this at pcp follow up.   CKD stage 3: Cr 1.39, gfr 36 on 8/1.  9/4 cr 1.9, gfr 25. Bmp 9/18-cr 2.05 reduced bumex, 1.98 on 9/21. Cr 1.68 on 9/25 stable.   H/o Type 2 DM: No meds, no monitoring needed. Diet controlled. Encouraged to monitor carb intake.   Dyslipidemia: On atorvastatin.   HTN: SBP 100s, On bumex, metoprolol. Stable recently.   GERD: On omeprazole. No concerns today.   Vitamin d deficiency: On vitamin d.  Hypokalemia: On kcl. Stable.   PAF, s/p AVR:  Regular rate and rhythm recently. No recent a fib rvr. Controlled off metoprolol. F/u with caridology. On aspirin daily.   Anemia of CKD: on iron. Hg 10.9 on 7/24.   Hg 8.8 on 8/1. Hg 9 on 8/5. Recheck on 8/17 8.2, iron to three times a day. recheck on 8/24-hg 8.4. hg 9.7 on 9/4, hg 9.5 on 9/12. Discussed trial off to see if this improves anemia. Hg on 9/25-no change 9.8. restarted aspirin as no change in anemia with trial off.    Candidal intertrigo:  Nystatin.   Constipation: senna daily, miralax daily prn no recent concerns.   Restless legs, insomnia: requip at bedtime. Symptoms improved.   Nausea:  zofran prn.       Therapy PT: trsf mod A, walking 10-40ft with 4WW CGA, had a good day yesterday      OT: min A UB drsg, max A toileting, LB dressing max assist, . Feeding: mod. Not elevating feet during the day and LE edema is increasing, hand and elbow swollen   Yellow tag. Lives with granddaughter. She will need 24 hour supervision and assist with all cares, home PT/OT. LCD 10/12. New ramp in at home. Filed appeal, awaiting results.     Electronically signed by: Jennifer Valdes NP

## 2021-06-20 NOTE — LETTER
"Letter by Asuncion Rangel CNP at      Author: Asuncion Rangel CNP Service: -- Author Type: --    Filed:  Encounter Date: 10/1/2020 Status: (Other)         Patient: Thea Acosta   MR Number: [id number]   YOB: 1934   Date of Visit: 10/1/2020       [facility]      NAME:  Thea Acosta             :  1934    MRN: [id number]    CODE STATUS:  DNR    FACILITY: [facility] [[id number]]    CHIEF COMPLAIN/REASON FOR VISIT:  Chief Complaint   Patient presents with   ? Review Of Multiple Medical Conditions     rehab status       HISTORY OF PRESENT ILLNESS: Thea Acosta is a 86 y.o. female being seen for review of multiple medical conditions. She comes to the TCU after a stay at [facility]. Per her EMR \" with a past medical history of CAD, CKD, COPD, DM, hypothyroid state, history of bioprosthetic valve replacement, HTN, severe pulmonary artery hypertension, restless leg syndrome, asthma who was admitted on 9/10/2020 for acute change in mental status. Hospital course is notable for significant improvement in her overall condition. Initially patient was found to have evidence of hypoxia and currently she requires 1 L of oxygen to maintain saturations greater than 95%.   Additionally there was concerns of lower extremity cellulitis versus venous stasis. She does have chronic lower extremity edema for which she is on Bumex. Her TSH was elevated, levothyroxine dose increased.\"  Her wt is stable, at 20.1 lb ,noted a decrease but desired due to CHF and lymphedema. She wants to dc home soon, we discussed rehab goals and safety needs prior to her achieving her dc. Noted to have decreased swelling to her left arm than ealier this week, using lymph glove to hand.    Allergies   Allergen Reactions   ? Tramadol Anxiety and Other (See Comments)     Per patient, Thea developed psychosis and severe anxiety and agitation \"for three days\" after receiving tramadol in " "the past. She stated that she would \"never\" take it again and would be extremely upset if she receives it. She asked me to add this to her chart's allergy list specifically.    ? Codeine Hives   ? Codeine Phosphate (Bulk)      This allergy is per [facility] records.   ? Codeine Sulfate      This allergy is per [facility] records.   ? Codeine-Butalbital-Asa-Caff      This allergy is per [facility] records.   ? Codeine-Guaifenesin      This allergy is per [facility] records.   ? Lisinopril Cough   ? Penicillins Swelling   :     Current Outpatient Medications   Medication Sig   ? acetaminophen (TYLENOL) 500 MG tablet Take 2 tablets (1,000 mg total) by mouth 3 (three) times a day. (Patient taking differently: Take 1,000 mg by mouth 3 (three) times a day as needed. )   ? albuterol (PROAIR HFA;PROVENTIL HFA;VENTOLIN HFA) 90 mcg/actuation inhaler Inhale 2 puffs every 4 (four) hours as needed for wheezing.   ? aspirin 81 MG EC tablet Take 81 mg by mouth daily.   ? atorvastatin (LIPITOR) 80 MG tablet TAKE 1 TABLET(80 MG) BY MOUTH DAILY   ? bumetanide (BUMEX) 2 MG tablet Take 1 tablet (2 mg total) by mouth 2 (two) times a day at 9am and 6pm. (Patient taking differently: Take 2 mg by mouth 2 (two) times a day at 9am and 6pm. 2mg in am and 1mg at noon)   ? colchicine 0.6 mg tablet take two tablets (1.2 mg) by mouth at onset of gout symptoms, then repeat one tablet (0.6 mg) by mouth one hour later   ? diclofenac sodium (VOLTAREN) 1 % Gel Apply 2 g topically 4 (four) times a day as needed.    ? dimethicone 5 % Crea Apply 1 application topically 2 (two) times a day as needed (to buttocks as needed for skin breakdown).   ? ferrous sulfate 325 (65 FE) MG tablet Take 1 tablet by mouth daily.   ? levothyroxine (SYNTHROID, LEVOTHROID) 200 MCG tablet Take 1 tablet (200 mcg total) by mouth daily before " supper.   ? metoprolol tartrate (LOPRESSOR) 25 MG tablet Take 12.5 mg by mouth 2 (two) times a day. Hold for sbp<105  Hr <55   ? nystatin (MYCOSTATIN) powder Apply topically 4 (four) times a day.   ? omeprazole (PRILOSEC) 20 MG capsule Take 1 capsule (20 mg total) by mouth daily before breakfast.   ? ondansetron (ZOFRAN-ODT) 4 MG disintegrating tablet Take 4 mg by mouth every 6 (six) hours as needed for nausea.   ? polyethylene glycol (MIRALAX) 17 gram packet Take 17 g by mouth daily as needed.    ? potassium chloride (K-DUR,KLOR-CON) 20 MEQ tablet Take 40 mEq by mouth daily before breakfast.   ? rOPINIRole (REQUIP) 0.5 MG tablet Take 0.5 mg by mouth at bedtime.   ? senna (SENOKOT) 8.6 mg tablet Take 1 tablet by mouth daily. Hold for loose stools   ? white petrolatum (AQUAPHOR ORIGINAL) 41 % Oint Apply 1 application topically at bedtime.         REVIEW OF SYSTEMS:    Currently, no fever, chills, or rigors. Does not have any visual or hearing problems. Denies any chest pain, headaches, palpitations, lightheadedness, dizziness, shortness of breath, or cough. Appetite is good. Denies any GERD symptoms. Denies any difficulty with swallowing, nausea, or vomiting.  Denies any abdominal pain, diarrhea or constipation. Denies any urinary symptoms. No insomnia. No active bleeding. No rash.       PHYSICAL EXAMINATION:  Vitals:    10/01/20 1628   BP: 130/70   Pulse: (!) 55   Temp: (!) 96.1  F (35.6  C)         GENERAL: Awake, Alert, oriented x3, not in any form of acute distress, answers questions appropriately, follows simple commands, conversant  HEENT: Head is normocephalic with normal hair distribution. No evidence of trauma. Ears: No acute purulent discharge. Eyes: Conjunctivae pink with no scleral jaundice. Nose: Normal mucosa and septum. NECK: Supple with no cervical or supraclavicular lymphadenopathy. Trachea is midline.   EXTREMITIES: Aged  arthritic  joint swelling.Left arm with swelling but ROM UE WN, wearing lymph  gloveL. No pedal edema  SKIN: Warm and dry, no erythema noted, no rashes or lesions.  NEUROLOGICAL: The patient is oriented to person, place and time. Strength and sensation are grossly intact. Face is symmetric.        Social distancing and PPE utilized due to Covid 19            LABS:    Lab Results   Component Value Date    WBC 5.7 09/12/2020    HGB 9.8 (L) 09/25/2020    HCT 30.8 (L) 09/12/2020     (H) 09/12/2020     09/12/2020       Results for orders placed or performed in visit on 09/25/20   Basic Metabolic Panel   Result Value Ref Range    Sodium 137 136 - 145 mmol/L    Potassium 3.9 3.5 - 5.0 mmol/L    Chloride 92 (L) 98 - 107 mmol/L    CO2 32 (H) 22 - 31 mmol/L    Anion Gap, Calculation 13 5 - 18 mmol/L    Glucose 141 (H) 70 - 125 mg/dL    Calcium 9.9 8.5 - 10.5 mg/dL    BUN 52 (H) 8 - 28 mg/dL    Creatinine 1.68 (H) 0.60 - 1.10 mg/dL    GFR MDRD Af Amer 35 (L) >60 mL/min/1.73m2    GFR MDRD Non Af Amer 29 (L) >60 mL/min/1.73m2           Lab Results   Component Value Date    HGBA1C 6.5 (H) 07/31/2020     Vitamin D, Total (25-Hydroxy)   Date Value Ref Range Status   01/20/2020 45.9 30.0 - 80.0 ng/mL Final     Lab Results   Component Value Date    [id number] 09/12/2020       ASSESSMENT/PLAN:  1. Lymphedema    2. Physical deconditioning        1.Lymphedema: Wt down around 5 lbs, swelling decreased to left arm. Reports no pain.  2.Physical deconditioning: Continue rehabilitation services.She desires to go home but stll working on therapy goals, we discussed this and concurs it is better to be at her highest functioning level prior to dc.     Continue with current POC and rehab services.    Electronically signed by:  Asuncion Rangel CNP  This progress note was completed using Dragon software and there may be grammatical errors.

## 2021-06-20 NOTE — LETTER
Letter by Jennifer Valdes NP at      Author: Jennifer Valdes NP Service: -- Author Type: --    Filed:  Encounter Date: 10/5/2020 Status: (Other)         Patient: Thea Acosta   MR Number: [id number]   YOB: 1934   Date of Visit: 10/5/2020                 [facility]    DATE: 10/5/2020    NAME:  Thea Acosta             :  1934  MRN: [id number]  CODE STATUS:  DNR    VISIT TYPE: Review Of Multiple Medical Conditions (restless leg syndrome, chf)     FACILITY:  [facility] [[id number]]       CHIEF COMPLAIN/REASON FOR VISIT:    Chief Complaint   Patient presents with   ? Review Of Multiple Medical Conditions     restless leg syndrome, chf               HISTORY OF PRESENT ILLNESS: Thea Acosta is a 86 y.o. female who admitted - for acute metabolic encephalopathy. This was felt to be s/t hypothyroidism and noncompliance with levothyroxine dosing. She was readmitted - for left shoulder dislocation and underwent reverse total shoulder arthroplasty on . Postop course was uncomplicated and transferred back to [facility]. She has PMH of CAD, CKD, DM, s/p PPM, diastolic CHF, pulmonary artery hypertension, mitral valve stenosis, chronic a fib no anticoagulation, s/p AVR, asthma. Prior to this she lived at home with her grand daughter. She was readmitted 9/10- for altered mental status. She was treated for possible cellulitis and hypoxic respiratory failure. Her tsh was elevated so levothyroxine dose was increased. She was discharged [facility] to [facility].     Today Ms. Acosta is seen today for review of systems for restless leg syndrome and chf. She is seen in her wheelchair today. She says she has been doing pretty well. She had some questions for me but now cannot remember what they were. She was encouraged to let the nurses know if she thought of it later. She says she goes to ortho again today  To see how her shoulder is doing. She feels  like after she got her left shoulder fixed now her right shoulder is giving her so much trouble. She says she is planning on going home the end of this week. She lives with her granddaughter who works during the day. She was told by therapy here they were recommending 24 hour care but she cannot afford this plus she does not want someone in her home that much. She says she thinks she will be fine to be by  Herself during the day. Her son just installed a nice ramp and she has a wheelchair. We discussed the risks and benefits of following these recommendations from therapy and . We discussed the potential for her to fall or something happen during the day without help there. She says she doesn't think she has enough money to pay for these services and living expenses for the remainder of her life. She says her breathing has been good lately  And does not feel like she is retaining more fluid. She was excited that her weight got down to 199lbs but now is back up to 215lbs. She is not sure why it bounces around so much but does note they are weighing her in the wheelchair. We discussed there may be discrepancies in the scale based on her wheelchair then. She says  Her legs are looking fine and she is sleeping much better on the new medication for her restless legs. She would like to continue this at home. She denies any other concerns today.       REVIEW OF SYSTEMS:  PROBLEMS AND REVIEW OF SYSTEMS:   Review of Systems  Today on ROS:   Currently, no fever, chills, or rigors. Decreased vision and hearing. Denies any chest pain, palpitations, lightheadedness, dizziness, no cough. Appetite is good.  Denies any abdominal pain. No active bleeding. Positive for urinary incontinence, urinary frequency, leg swelling improved, left shoulder incision, no nausea, no diarrhea or constipation, pain controlled, velcro leg wraps, no shortness of breath recently, no insomnia, no headaches      Allergies   Allergen  "Reactions   ? Tramadol Anxiety and Other (See Comments)     Per patient, Thea developed psychosis and severe anxiety and agitation \"for three days\" after receiving tramadol in the past. She stated that she would \"never\" take it again and would be extremely upset if she receives it. She asked me to add this to her chart's allergy list specifically.    ? Codeine Hives   ? Codeine Phosphate (Bulk)      This allergy is per [facility] records.   ? Codeine Sulfate      This allergy is per [facility] records.   ? Codeine-Butalbital-Asa-Caff      This allergy is per [facility] records.   ? Codeine-Guaifenesin      This allergy is per [facility] records.   ? Lisinopril Cough   ? Penicillins Swelling     Current Outpatient Medications   Medication Sig   ? acetaminophen (TYLENOL) 500 MG tablet Take 2 tablets (1,000 mg total) by mouth 3 (three) times a day. (Patient taking differently: Take 1,000 mg by mouth 3 (three) times a day as needed. )   ? albuterol (PROAIR HFA;PROVENTIL HFA;VENTOLIN HFA) 90 mcg/actuation inhaler Inhale 2 puffs every 4 (four) hours as needed for wheezing.   ? aspirin 81 MG EC tablet Take 81 mg by mouth daily.   ? atorvastatin (LIPITOR) 80 MG tablet TAKE 1 TABLET(80 MG) BY MOUTH DAILY   ? bumetanide (BUMEX) 2 MG tablet Take 1 tablet (2 mg total) by mouth 2 (two) times a day at 9am and 6pm. (Patient taking differently: Take 2 mg by mouth 2 (two) times a day at 9am and 6pm. 2mg in am and 1mg at noon)   ? colchicine 0.6 mg tablet take two tablets (1.2 mg) by mouth at onset of gout symptoms, then repeat one tablet (0.6 mg) by mouth one hour later   ? diclofenac sodium (VOLTAREN) 1 % Gel Apply 2 g topically 4 (four) times a day as needed.    ? dimethicone 5 % Crea Apply 1 application topically 2 (two) times a day as needed (to buttocks as needed for skin breakdown).   ? ferrous " sulfate 325 (65 FE) MG tablet Take 1 tablet by mouth daily.   ? levothyroxine (SYNTHROID, LEVOTHROID) 200 MCG tablet Take 1 tablet (200 mcg total) by mouth daily before supper.   ? metoprolol tartrate (LOPRESSOR) 25 MG tablet Take 12.5 mg by mouth 2 (two) times a day. Hold for sbp<105  Hr <55   ? nystatin (MYCOSTATIN) powder Apply topically 4 (four) times a day.   ? omeprazole (PRILOSEC) 20 MG capsule Take 1 capsule (20 mg total) by mouth daily before breakfast.   ? ondansetron (ZOFRAN-ODT) 4 MG disintegrating tablet Take 4 mg by mouth every 6 (six) hours as needed for nausea.   ? polyethylene glycol (MIRALAX) 17 gram packet Take 17 g by mouth daily as needed.    ? potassium chloride (K-DUR,KLOR-CON) 20 MEQ tablet Take 40 mEq by mouth daily before breakfast.   ? rOPINIRole (REQUIP) 0.5 MG tablet Take 0.5 mg by mouth at bedtime.   ? senna (SENOKOT) 8.6 mg tablet Take 1 tablet by mouth daily. Hold for loose stools   ? white petrolatum (AQUAPHOR ORIGINAL) 41 % Oint Apply 1 application topically at bedtime.     Past Medical History:    Past Medical History:   Diagnosis Date   ? Acute kidney injury superimposed on CKD (H) 3/3/2017   ? Asthma    ? CAD (coronary artery disease)    ? Cataract    ? Chronic kidney disease     ckd3   ? COPD (chronic obstructive pulmonary disease) (H)    ? Diabetes mellitus (H)    ? Disease of thyroid gland    ? H/O heart valve replacement with bioprosthetic valve    ? History of transfusion    ? Hypertension    ? Normochromic normocytic anemia    ? Pulmonary hypertension (H) 09/27/2019    Noted on echocardiogram   ? Restless leg syndrome            PHYSICAL EXAMINATION  Vitals:    10/05/20 0700   BP: 119/64   Pulse: 64   Resp: 18   Temp: 96.6  F (35.9  C)   SpO2: 94%   Weight: 215 lb (97.5 kg)       Today on physical exam:     GENERAL: lethargic, obese,  not in any form of acute distress, answers questions appropriately, follows simple commands, conversant  HEENT: Head is normocephalic with  normal hair distribution. No evidence of trauma. Ears: No acute purulent discharge. Eyes: Conjunctivae pink with no scleral jaundice. Nose: Normal mucosa and septum. NECK: Supple with no cervical or supraclavicular lymphadenopathy. Trachea is midline. [place], decreased vision  CHEST: No tenderness or deformity, no crepitus, left chest PPM  LUNG: Dim but clear today to auscultation.   Shortness of breath with exertion, no cough noted  BACK: No kyphosis of the thoracic spine. Symmetric, no curvature, ROM normal, no CVA tenderness, no spinal tenderness   CVS: irregularly irregular rhythm, murmur,  2+ pulses symmetric in all extremities.  ABDOMEN: Rounded and soft, nontender to palpation, non distended, no masses, no organomegaly, good bowel sounds, no rebound or guarding, no peritoneal signs.   EXTREMITIES: 1-2+ ble nonpitting edema, velcro leg wraps, left shoulder incision healed, rl discoloration ble, right shoulder limited ROM and pain with movement, no erythema, warmth, or edema at shoulder joint  SKIN: Warm and dry  NEUROLOGICAL: The patient is oriented to person, place and time.             LABS:   No results found for this or any previous visit (from the past 168 hour(s)).  Results for orders placed or performed in visit on 09/25/20   Basic Metabolic Panel   Result Value Ref Range    Sodium 137 136 - 145 mmol/L    Potassium 3.9 3.5 - 5.0 mmol/L    Chloride 92 (L) 98 - 107 mmol/L    CO2 32 (H) 22 - 31 mmol/L    Anion Gap, Calculation 13 5 - 18 mmol/L    Glucose 141 (H) 70 - 125 mg/dL    Calcium 9.9 8.5 - 10.5 mg/dL    BUN 52 (H) 8 - 28 mg/dL    Creatinine 1.68 (H) 0.60 - 1.10 mg/dL    GFR MDRD Af Amer 35 (L) >60 mL/min/1.73m2    GFR MDRD Non Af Amer 29 (L) >60 mL/min/1.73m2         Lab Results   Component Value Date    WBC 5.7 09/12/2020    HGB 9.8 (L) 09/25/2020    HCT 30.8 (L) 09/12/2020     (H) 09/12/2020     09/12/2020       Lab Results   Component Value Date    [id number] 468 09/12/2020     Lab  Results   Component Value Date    HGBA1C 6.5 (H) 07/31/2020     Lab Results   Component Value Date    INR 1.11 (H) 09/10/2020    INR 0.99 07/28/2020    INR 1.30 (H) 02/07/2018     Vitamin D, Total (25-Hydroxy)   Date Value Ref Range Status   01/20/2020 45.9 30.0 - 80.0 ng/mL Final     Lab Results   Component Value Date    TSH 9.38 (H) 09/11/2020           ASSESSMENT/PLAN:      S/p reverse total shoulder arthroplasty: Incision healed.  PT, OT following. F/u with surgeon Dr. Ramirez on 8/14-f/u again in 4 weeks, limit external rotation, no bathing until 4 weeks postop, healing well, f/u again on 9/4-doing well, dc sling, AROM and PROM with therapy, may  Next appt scheduled for 10/5. Weight bear with walker, pain improving. Tylenol prn. Reports left shoulder much improved but now having issues with right shoulder. Encouraged her to discuss with ortho today and evaluate if steroid injection in right shoulder may be beneficial.   Chronic CHF: Daily [phone] then [phone], then dropped to 200-205lbs now at 215lbs, inconsistent weights, weighing in wheelchair may be discrepancy due to wheelchairs. no Shortness of breath today, continues with exertion, edema stable.  On bumex, encourage leg elevation. Cardiac diet. velcro leg wraps.  notify for weight change for 2 lbs in 24 hours or 5lbs in 7 days. Appears to be euvolemic today. Educated on importance of weighing daily and following low sodium and cardiac diet at home.   S/p PPM: follows with device clinic. No recent concerns.   Asthma:  Encourage cough and deep breathe. Albuterol prn. Shortness of breath with exertion.  No recent cough, wheezing. Off o2.  Hypothyroid: on levothyroxine. tsh 9.38 on 9/11, levothyroxine dose adjusted. recheck tsh in 6 weeks-discharging later this week. Will recommend this at pcp follow up.   CKD stage 3: Cr 1.39, gfr 36 on 8/1.  9/4 cr 1.9, gfr 25. Bmp 9/18-cr 2.05 reduced bumex, 1.98 on 9/21. Cr 1.68 on 9/25  stable.   H/o Type 2 DM: No meds, no monitoring needed. Diet controlled. Encouraged to monitor carb intake.   Dyslipidemia: On atorvastatin.   HTN: SBP 110s, On bumex, metoprolol. Stable recently.   GERD: On omeprazole. No concerns today.   Vitamin d deficiency: On vitamin d.  Hypokalemia: On kcl. Stable.   PAF, s/p AVR: Regular rate and rhythm recently. No recent a fib rvr. Controlled off metoprolol. F/u with caridology. On aspirin daily.   Anemia of CKD: on iron. Hg 10.9 on 7/24.   Hg 8.8 on 8/1. Hg 9 on 8/5. Recheck on 8/17 8.2, iron to three times a day. recheck on 8/24-hg 8.4. hg 9.7 on 9/4, hg 9.5 on 9/12. Discussed trial off to see if this improves anemia. Hg on 9/25-no change 9.8. restarted aspirin as no change in anemia with trial off.    Candidal intertrigo:  Nystatin.   Constipation: senna daily, miralax daily prn no recent concerns.   Restless legs, insomnia: requip at bedtime. Symptoms improved.   Nausea:  zofran prn.       Therapy PT: trsf mod A, walking 10-40ft with 4WW CGA, had a good day yesterday      OT: min A UB drsg, max A toileting, LB dressing max assist, . Feeding: mod. Not elevating feet during the day and LE edema is increasing, hand and elbow swollen   Yellow tag. Lives with granddaughter. She will need 24 hour supervision and assist with all cares, home PT/OT. LCD 10/12. New ramp in at home.     Electronically signed by: Jennifer Valdes NP

## 2021-06-20 NOTE — LETTER
Letter by Jennifer Valdes NP at      Author: Jennifer Valdes NP Service: -- Author Type: --    Filed:  Encounter Date: 2020 Status: (Other)         Patient: Thea Acosta   MR Number: [id number]   YOB: 1934   Date of Visit: 2020                 [facility]    DATE: 2020    NAME:  Thea Acosta             :  1934  MRN: [id number]  CODE STATUS:  DNR    VISIT TYPE: Problem Visit (wheezing, cough)     FACILITY:  [facility] [[id number]]       CHIEF COMPLAIN/REASON FOR VISIT:    Chief Complaint   Patient presents with   ? Problem Visit     wheezing, cough               HISTORY OF PRESENT ILLNESS: Thea Acosta is a 85 y.o. female who was admitted - for dyspnea and fluid overload, acute on chronic diastolic CHF. She was treated with IV diuresis and later transitioned to PO. Then she developed MIGUEL and held bumex and metolazone. During stay she developed herpes zoster outbreak on left chest. She also had left leg laceration and treated with clindamycin. Left leg negative for DVT on doppler. She completed IV ceftezole and po keflex for cellulitis. She did have some orthostatic hypotension that required med changes. TSH stable and ACTH stim test not indicated. She did have some epigastric pain and was treated with PPI and maalox. She was recommended TCU stay for deconditioning. She has PMH of CAD, CKD, DM, s/p PPM, diastolic CHF, pulmonary artery hypertension, mitral valve stenosis, chronic a fib no anticoagulation, s/p AVR, asthma. Prior to this she lived at home with her grand daughter.     Today Ms. Acosta is seen for concerns of wheezing and congestion today. Nursing noted she did not have a fever today. On exam she says she doesn't feel the best and has a significant cough and congestion. She does feel a little more winded but not a lot. She does not feel that her legs are more swollen than normal. She is not feeling feverish  and denies a sore throat. She does have some runny nose but just some clear drainage. No nasal congestion or sinus pressure. She is not having any concerns with her bowels and appetite is still good. She denies dizziness or other concerns today. Discussed treatment options and concerns regarding lung sounds today. She says she does not like masks on her face so she doesn't like the nebulizers but she would do it if they had the one with the mouthpiece. Reviewed her weights and they are up as well 190-196lbs.       REVIEW OF SYSTEMS:  PROBLEMS AND REVIEW OF SYSTEMS:   Review of Systems  Today on ROS:   Currently, no fever, chills, or rigors. Decreased vision and hearing. Denies any chest pain, headaches, palpitations, lightheadedness, dizziness. Appetite is good.  Denies any abdominal pain. No active bleeding. Positive for edema ble, shortness of breath with exertion and at rest today, sleeps in recliner, low blood pressure at times, chest rash improving, urinary incontinence, no pain, constipation improved, dry flaking skin on legs, weight gain, congested cough, wheezing      Allergies   Allergen Reactions   ? Codeine Hives   ? Codeine Phosphate (Bulk)      This allergy is per [facility] records.   ? Codeine Sulfate      This allergy is per [facility] records.   ? Codeine-Butalbital-Asa-Caff      This allergy is per [facility] records.   ? Codeine-Guaifenesin      This allergy is per [facility] records.   ? Lisinopril Cough   ? Penicillins Swelling     Current Outpatient Medications   Medication Sig   ? acetaminophen (TYLENOL) 500 MG tablet Take 2 tablets (1,000 mg total) by mouth 3 (three) times a day as needed for pain. Not to exceed 4000 mg in 24 hours.   ? aspirin 81 MG EC tablet Take 81 mg by mouth daily.   ? atorvastatin (LIPITOR) 80 MG tablet TAKE 1 TABLET(80 MG) BY MOUTH AT  BEDTIME   ? bacitracin 500 unit/gram ointment Apply topically 2 (two) times a day.   ? bumetanide (BUMEX) 2 MG tablet TAKE 1 TABLET(2 MG) BY MOUTH TWICE DAILY AT 9 AM AND AT 6 PM   ? cholecalciferol, vitamin D3, (VITAMIN D3) 2,000 unit Tab Take 2,000 Units by mouth once daily.   ? ferrous sulfate 325 (65 FE) MG tablet TAKE 1 TABLET(325 MG) BY MOUTH TWICE DAILY   ? ipratropium-albuterol (COMBIVENT RESPIMAT)  mcg/actuation Mist inhaler INHALE 1 PUFF BY MOUTH FOUR TIMES DAILY (Patient taking differently: 4 (four) times a day as needed. )   ? levothyroxine (SYNTHROID, LEVOTHROID) 150 MCG tablet TAKE 1 TABLET BY MOUTH DAILY AT 6:00 AM   ? lidocaine (XYLOCAINE) 5 % ointment Apply to painful areas   ? metoprolol tartrate (LOPRESSOR) 25 MG tablet Take 0.5 tablets (12.5 mg total) by mouth 2 (two) times a day.   ? omeprazole (PRILOSEC) 20 MG capsule Take 1 capsule (20 mg total) by mouth daily.   ? polyethylene glycol (MIRALAX) 17 gram packet Take 17 g by mouth daily.   ? potassium chloride (K-DUR,KLOR-CON) 10 MEQ tablet Take 1 tablet (10 mEq total) by mouth daily.   ? senna (SENOKOT) 8.6 mg tablet Take 1 tablet by mouth daily as needed for constipation.   ? white petrolatum (AQUAPHOR ORIGINAL) 41 % Oint Apply 1 application topically at bedtime.     Past Medical History:    Past Medical History:   Diagnosis Date   ? Acute kidney injury superimposed on CKD (H) 3/3/2017   ? Asthma    ? CAD (coronary artery disease)    ? Cataract    ? Chronic kidney disease     ckd3   ? Diabetes mellitus (H)    ? Disease of thyroid gland    ? H/O heart valve replacement with bioprosthetic valve    ? History of transfusion    ? Hypertension    ? Normochromic normocytic anemia    ? Pulmonary hypertension (H) 09/27/2019    Noted on echocardiogram   ? Restless leg syndrome            PHYSICAL EXAMINATION  Vitals:    01/22/20 2237   BP: 100/62   Pulse: 60   Resp: 18   Temp: (!) 96.5  F (35.8  C)   SpO2: 98%   Weight: 196 lb (88.9 kg)       Today  on physical exam:     GENERAL: Awake, Alert, obese, oriented x3, not in any form of acute distress, answers questions appropriately, follows simple commands, conversant  HEENT: Head is normocephalic with normal hair distribution. No evidence of trauma. Ears: No acute purulent discharge. Eyes: Conjunctivae pink with no scleral jaundice. Nose: Normal mucosa and septum. NECK: Supple with no cervical or supraclavicular lymphadenopathy. Trachea is midline. [place], decreased vision  CHEST: No tenderness or deformity, no crepitus, left chest PPM  LUNG: Dim with scattered coarse crackles, expiratory wheezing to auscultation with good chest expansion.   Shortness of breath with exertion and at rest, harsh, congested mildly productive cough  BACK: No kyphosis of the thoracic spine. Symmetric, no curvature, ROM normal, no CVA tenderness, no spinal tenderness   CVS: irregularly irregular rhythm, murmur,  2+ pulses symmetric in all extremities.  ABDOMEN: Rounded and soft, nontender to palpation, non distended, no masses, no organomegaly, good bowel sounds, no rebound or guarding, no peritoneal signs.   EXTREMITIES: 1+ ble nonpitting edema, dry flaking and scaly skin, left leg wound healed, scabbed  SKIN: Warm and dry  NEUROLOGICAL: The patient is oriented to person, place and time. Strength and sensation are grossly intact. Face is symmetric.            LABS:   Recent Results (from the past 168 hour(s))   Basic Metabolic Panel   Result Value Ref Range    Sodium 135 (L) 136 - 145 mmol/L    Potassium 3.8 3.5 - 5.0 mmol/L    Chloride 91 (L) 98 - 107 mmol/L    CO2 33 (H) 22 - 31 mmol/L    Anion Gap, Calculation 11 5 - 18 mmol/L    Glucose 112 70 - 125 mg/dL    Calcium 9.7 8.5 - 10.5 mg/dL    BUN 48 (H) 8 - 28 mg/dL    Creatinine 1.71 (H) 0.60 - 1.10 mg/dL    GFR MDRD Af Amer 34 (L) >60 mL/min/1.73m2    GFR MDRD Non Af Amer 28 (L) >60 mL/min/1.73m2   Vitamin D, Total (25-Hydroxy)   Result Value Ref Range    Vitamin D, Total (25-Hydroxy)  45.9 30.0 - 80.0 ng/mL   HM1 (CBC with Diff)   Result Value Ref Range    WBC 6.5 4.0 - 11.0 thou/uL    RBC 3.57 (L) 3.80 - 5.40 mill/uL    Hemoglobin 11.0 (L) 12.0 - 16.0 g/dL    Hematocrit 35.4 35.0 - 47.0 %    MCV 99 80 - 100 fL    MCH 30.8 27.0 - 34.0 pg    MCHC 31.1 (L) 32.0 - 36.0 g/dL    RDW 13.6 11.0 - 14.5 %    Platelets 177 140 - 440 thou/uL    MPV 11.4 8.5 - 12.5 fL    Neutrophils % 59 50 - 70 %    Lymphocytes % 30 20 - 40 %    Monocytes % 6 2 - 10 %    Eosinophils % 4 0 - 6 %    Basophils % 1 0 - 2 %    Neutrophils Absolute 3.8 2.0 - 7.7 thou/uL    Lymphocytes Absolute 1.9 0.8 - 4.4 thou/uL    Monocytes Absolute 0.4 0.0 - 0.9 thou/uL    Eosinophils Absolute 0.3 0.0 - 0.4 thou/uL    Basophils Absolute 0.1 0.0 - 0.2 thou/uL     Results for orders placed or performed in visit on 01/20/20   Basic Metabolic Panel   Result Value Ref Range    Sodium 135 (L) 136 - 145 mmol/L    Potassium 3.8 3.5 - 5.0 mmol/L    Chloride 91 (L) 98 - 107 mmol/L    CO2 33 (H) 22 - 31 mmol/L    Anion Gap, Calculation 11 5 - 18 mmol/L    Glucose 112 70 - 125 mg/dL    Calcium 9.7 8.5 - 10.5 mg/dL    BUN 48 (H) 8 - 28 mg/dL    Creatinine 1.71 (H) 0.60 - 1.10 mg/dL    GFR MDRD Af Amer 34 (L) >60 mL/min/1.73m2    GFR MDRD Non Af Amer 28 (L) >60 mL/min/1.73m2         Lab Results   Component Value Date    WBC 6.5 01/20/2020    HGB 11.0 (L) 01/20/2020    HCT 35.4 01/20/2020    MCV 99 01/20/2020     01/20/2020       No results found for: [id number]  Lab Results   Component Value Date    HGBA1C 6.9 (H) 09/25/2019     Lab Results   Component Value Date    INR 1.30 (H) 02/07/2018    INR 1.51 (H) 03/06/2017    INR 1.27 (H) 03/05/2017     Vitamin D, Total (25-Hydroxy)   Date Value Ref Range Status   01/20/2020 45.9 30.0 - 80.0 ng/mL Final     Lab Results   Component Value Date    TSH 4.22 09/25/2019           ASSESSMENT/PLAN:     1 Viral URI with cough: congestion, wheezing today. No fever, did have one dose of robitussin overnight and did  improve cough. No sore throat, but clear rhinorrhea, wheezing, increased shortness of breath. Will order duonebs two times a day and q4h prn x 5 days, robitussin 10ml three times a day x 3 days. Encourage rest, limit fluids due to chf. Notify if develops fever and will order chest xray. o2 sat stable today.   2. Acute on chronic CHF: Daily [id number], reports weighing over 300lbs a year or so ago. Shortness of breath with exertion, 1+ ble edema. On bumex 2mg two times a day, keep legs elevated when possible. Sleeps in recliner.  F/u with cardiology prn. PT, OT for conditioning. Cardiac, low sodium diet. Daily weights. Weights are up and increased shortness of breath, congestion today. Will order metolazone 2.5mg x 1 dose today for fluid overload.   3. Herpes Zoster: Across upper abdomen/chest. scabbed, healing. Minimal itching, no pain, scabs now falling off. No isolation needed. Tylenol prn pain. Completed antiviral treatment. Much improved.   4. S/p PPM: follows with device clinic. No recent concerns.   5. Acute on chronic Mod persistent Asthma: On combivent four times a day prn. Increased shortness of breath, wheezing, cough today. Ordered duonebs two times a day and prn x 5 days, robitussin. Monitor closely if need prednisone burst.   6. MIGUEL on CKD stage 3: Cr 1.75 on 1/14. Cr 1.71 on 1/20. Stable. Bmp on 1/27.   7. Type 2 DM: No meds, no monitoring needed. Diet controlled. Weight loss recently. Glucose on bmp 1/20 was 112.   8. Dyslipidemia: On aspirin, atorvastatin.   9. HTN: SBP 100s. On lopressor, bumex.  hold parameters for lopressor. Reports dizziness only in am when gets up too fast.   10. GERD: On omeprazole. No concerns today.   11. Hypothyroid: On levothyroxine.   12. Vitamin d deficiency: On vitamin d. Vit d 45 on 1/20. Stable.   13. Hypokalemia: On kcl.    14. PAF, s/p AVR: Regular rate and rhythm today. On metoprolol. F/u with caridology. No recent palpitations or chest pain.   15. Right  elbow gout exacerbation: resolved.   16. Anemia of CKD: on iron two times a day. Hg  11.3 on 1/14. Hg 11 on 1/20.   17. Constipation:  scheduled miralax daily and senna daily prn. Improved today.   18. PVD, venous stasis: ordered aquaphor at bedtime and daily prn for dry, scaly, flaking skin to maintain dry and supple skin barrier. No open wounds on legs. No numb/tingling in legs. No leg pain.     Per therapy PT - 140ft fww SBA/CGA, t/f's SBA/CGA with extra time, OT - UB setup, doesnt wear pants, toileting CGA/Min A, showering min/mod A. Yellow tag. Lives in house with family with stairs. recommend 2 weeks.      Electronically signed by: Jennifer Valdes NP     Total floor/unit time spent 35 min with 25 min spent on counseling and coordination of care. Counseling regarding asthma exacerbation, cough management, acute chf management. Coordinated care with nursing for management of asthma exacerbation, viral uri, monitoring for hypoxia and worsening of condition, when to notify provider versus send to ED, chf management.

## 2021-06-20 NOTE — LETTER
Letter by Rojelio Farnsworth MD at      Author: Rojelio Farnsworth MD Service: -- Author Type: --    Filed:  Encounter Date: 8/4/2020 Status: (Other)         Patient: Thea Acosta   MR Number: [id number]   YOB: 1934   Date of Visit: 8/4/2020      [facility]    Facility:  [facility] [[id number]]    Code Status:  FULL CODE    Chief Complaint   Patient presents with   ? H & P   :                    Patient Active Problem List   Diagnosis   ? Constipation   ? Sciatica   ? Dyspnea, unspecified type   ? Hypothyroidism   ? Type 2 diabetes mellitus with stage 3 chronic kidney disease, without long-term current use of insulin (H)   ? Hyperparathyroidism   ? Vitamin D deficiency   ? Mixed hyperlipidemia   ? Atherosclerosis of native coronary artery of native heart without angina pectoris   ? CKD (chronic kidney disease), stage III (H)   ? Osteoarthritis Of The Knee   ? Restless Legs Syndrome   ? H/O heart valve replacement with bioprosthetic valve   ? Cardiac pacemaker in situ   ? Complete heart block (H)   ? Normochromic normocytic anemia   ? Essential hypertension with goal blood pressure less than 140/90   ? Moderate persistent asthma without complication   ? PAF (paroxysmal atrial fibrillation) (H)   ? GERD (gastroesophageal reflux disease)   ? Primary insomnia   ? Hypokalemia   ? Physical deconditioning   ? Asthma   ? CHF (congestive heart failure), NYHA class I, acute, systolic (H)   ? Pulmonary hypertension (H)   ? Atrial fibrillation, unspecified type (H)   ? Status post aortic valve replacement   ? Non-rheumatic mitral valve stenosis   ? Nonrheumatic mitral valve stenosis   ? PVD (peripheral vascular disease) (H)   ? Lymphedema   ? Acute respiratory failure (H)   ? Delirium   ? S/p reverse total shoulder arthroplasty       History:  Thea Acosta  is an 86 year old female with history of coronary artery disease, CHF, paroxysmal  atrial fibrillation, AVR, permanent pacemaker placement, COPD, CKD 3, DM 2, hypertension, GERD,  seen for admission to TCU on 8/4/2020 following a series of hospitalizations    Hospital course #3/most recent:  Patient was hospitalized between July 28 and August 1 after I sent her from this TCU upon discovering her dislocated left shoulder on July 28.  Shoulder reduction was achieved but could not be maintained.  She was seen by orthopedics who diagnosed chronic instability and   recommended total reverse shoulder which was completed on July 31, 2020.  Surgery was complicated by mild perioperative hypoxia.  Patient was treated with hydrocodone (since discontinued).    Hospital course #1:  Patient was hospitalized at [facility] between July 12 and July 6 following a fall with left shoulder dislocation which was reduced in the emergency room.  However she was noted to be hypoxic and was diagnosed with acute hypoxic respiratory failure felt most likely secondary to atelectasis plus opiates which are being used for pain control.  She had weaned off oxygen by the time of discharge.  She also had acute kidney injury and was noted to have metabolic alkalosis that hospital stay.  She was discharged with a creatinine of 1.4 after holding her Bumex for a brief time.     She was discharged to [facility] where she stayed for fewer than 48 hours due to development of acute delirium.    Hospital course #2:     She was readmitted between July 18 and July 21 again at [facility].  Hypothyroidism was felt to be the primary cause of her delirium.  TSH 67 on July 18.  She has been noncompliant with her thyroid medication quite regularly for at least months.  She was again found to be hypoxic without certain cause as she was not on opiates this time.  She did have a chest x-ray on July 20 suggesting a chronic CHF type appearance but no acute problems noted.  She again had elevation of her creatinine to a peak of 1006  "discharged 1.67 with a baseline of about 1.4.        Subjective/ROS:    -augmented by discussion with facility staff involved in direct care  -limited by: Memory    Patient does not have good recall of the events leading to the hospitalization or the hospitalization itself.  She feels \"pretty good\" today.  She denies pain.  Therapist is wondering about removing the buttress pad underneath the immobilizer and does not think it is doing much good in her seated position in the wheelchair.  I asked staff to address that question to the orthopedist.    Patient reports that her breathing is \".  She is been off oxygen again.    I pushed her a little bit on the pain and she sticks to her guns saying that her shoulder just does not seem to bother her much.  She is grateful for that.  She is not requiring opiates which have been discontinued.  She has Voltaren gel and acetaminophen.    Weight gain is a considerable concern although she is not feeling short of breath and is off oxygen.  She was unaware of her leg edema status.  She is on Bumex 1.5 twice daily.    When asked about bowel movements she does not think she is had one for days.  But when I talked to the staff she had a large bowel movement fewer than 24 hours ago.  She does not feel abdominal pain flank pain dysuria.  Denies chest pain headaches change in vision speaking swallowing hearing.  Remainder negative    Past Medical History:   Diagnosis Date   ? Acute kidney injury superimposed on CKD (H) 3/3/2017   ? Asthma    ? CAD (coronary artery disease)    ? Cataract    ? Chronic kidney disease     ckd3   ? COPD (chronic obstructive pulmonary disease) (H)    ? Diabetes mellitus (H)    ? Disease of thyroid gland    ? H/O heart valve replacement with bioprosthetic valve    ? History of transfusion    ? Hypertension    ? Normochromic normocytic anemia    ? Pulmonary hypertension (H) 09/27/2019    Noted on echocardiogram   ? Restless leg syndrome      Past Surgical " History:   Procedure Laterality Date   ? APPENDECTOMY     ? CHOLECYSTECTOMY     ? EYE SURGERY Bilateral     Cataracts   ? HIP PINNING Right 3/1/2017    Procedure: RIGHT HIP TROCHANTERIC RODDING;  Surgeon: Moshe Davies MD;  Location: [facility] Main OR;  Service:    ? INSERT / REPLACE / REMOVE PACEMAKER  2007    guidant   ? INSERT / REPLACE / REMOVE PACEMAKER  2018    phoebe sci gen change   ? JOINT REPLACEMENT Left     knee   ? CA RECONSTR TOTAL SHOULDER IMPLANT Left 2020    Procedure: LEFT REVERSE TOTAL SHOULDER ARTHROPLASTY;  Surgeon: Alvarez Palomino MD;  Location: [facility] Main OR;  Service: Orthopedics   ? TISSUE AORTIC VALVE REPLACEMENT  2007               Family History   Problem Relation Age of Onset   ? No Medical Problems Mother         age 86   ? No Medical Problems Father         MVA   ? Cancer Brother         lung   ? No Medical Problems Brother    :       Social History     Socioeconomic History   ? Marital status:      Spouse name: Not on file   ? Number of children: Not on file   ? Years of education: Not on file   ? Highest education level: Not on file   Occupational History   ? Occupation:  in operating room     Comment: retired   Social Needs   ? Financial resource strain: Not on file   ? Food insecurity     Worry: Not on file     Inability: Not on file   ? Transportation needs     Medical: Not on file     Non-medical: Not on file   Tobacco Use   ? Smoking status: Former Smoker     Packs/day: 0.00     Last attempt to quit: 1950     Years since quittin.9   ? Smokeless tobacco: Never Used   Substance and Sexual Activity   ? Alcohol use: No   ? Drug use: No   ? Sexual activity: Never   Lifestyle   ? Physical activity     Days per week: Not on file     Minutes per session: Not on file   ? Stress: Not on file   Relationships   ? Social connections     Talks on phone: Not on file     Gets together: Not on file     Attends Orthodoxy service: Not on  "file     Active member of club or organization: Not on file     Attends meetings of clubs or organizations: Not on file     Relationship status: Not on file   ? Intimate partner violence     Fear of current or ex partner: Not on file     Emotionally abused: Not on file     Physically abused: Not on file     Forced sexual activity: Not on file   Other Topics Concern   ? Incontinent Not Asked   ? Toileting independently Not Asked   ? Cognitive impairment Not Asked   ? Vision impairment Not Asked   ? Hearing impairment Not Asked   ? Dentures Not Asked   Social History Narrative    She live in a single floor house.  Her son lives next door and her granddaughter is \"around a lot\"   3/2017   :    Patient says she lives on [address]  She lives with her granddaughter.  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    Current Outpatient Medications on File Prior to Visit   Medication Sig Dispense Refill   ? acetaminophen (TYLENOL) 500 MG tablet Take 2 tablets (1,000 mg total) by mouth 3 (three) times a day. (Patient taking differently: Take 1,000 mg by mouth 3 (three) times a day. And daily prn)  0   ? albuterol (PROAIR HFA;PROVENTIL HFA;VENTOLIN HFA) 90 mcg/actuation inhaler Inhale 2 puffs every 4 (four) hours as needed for wheezing.  0   ? aspirin 81 MG EC tablet Take 1 tablet (81 mg total) by mouth 2 (two) times a day.  0   ? atorvastatin (LIPITOR) 80 MG tablet TAKE 1 TABLET(80 MG) BY MOUTH DAILY 90 tablet 3   ? bumetanide (BUMEX) 2 MG tablet Take 1.5 tablets (3 mg total) by mouth 2 (two) times a day at 9am and 6pm. 270 tablet 2   ? colchicine 0.6 mg tablet take two tablets (1.2 mg) by mouth at onset of gout symptoms, then repeat one tablet (0.6 mg) by mouth one hour later 24 tablet 2   ? diclofenac sodium (VOLTAREN) 1 % Gel Apply 2 g topically 4 (four) times a day as needed.      ? ferrous sulfate 325 (65 FE) MG tablet TAKE 1 TABLET(325 MG) BY MOUTH TWICE DAILY 180 tablet 3   ? " levothyroxine (SYNTHROID, LEVOTHROID) 150 MCG tablet Take 1 tablet (150 mcg total) by mouth daily before supper. 90 tablet 3   ? nystatin (MYCOSTATIN) powder Apply topically 4 (four) times a day. 60 g 2   ? omeprazole (PRILOSEC) 20 MG capsule Take 1 capsule (20 mg total) by mouth daily before breakfast. 90 capsule 3   ? potassium chloride (K-DUR,KLOR-CON) 20 MEQ tablet Take 20 mEq by mouth daily.      ? predniSONE (DELTASONE) 2.5 MG tablet Take 7.5 mg/d prednisone PO for 3 weeks. 90 tablet 0   ? white petrolatum (AQUAPHOR ORIGINAL) 41 % Oint Apply 1 application topically at bedtime.       No current facility-administered medications on file prior to visit.    :      ALLERGIES:  Tramadol; Codeine; Codeine phosphate (bulk); Codeine sulfate; Codeine-butalbital-asa-caff; Codeine-guaifenesin; Lisinopril; and Penicillins    Vitals:  There were no vitals taken for this visit. except as noted below    Vital signs:    Most Recent Vitals Date/Time Taken  Temperature: 96.2  F 08/04/2020 02:48 PM  Pulse: 60 per minute 08/04/2020 02:48 PM  Respirations: 18 per minute 08/04/2020 05:45 PM  Blood Pressure: 105 / 58 mmHg 08/04/2020 02:48 PM  O2 Saturation: 95 % 08/04/2020 02:48 PM  Blood Sugar: 114 mg/dL 03/20/2017 05:08 PM  Weight: 220 lbs / Routine       BMI: 34.45 08/04/2020 11:00 AM  Height: 5ft 7.0in 07/17/2020 12:01 PM    Physical exam:    General:  Alert  oriented oriented x3 appears calm, no apparent distress, normally conversant.  Speech is clear.  She answers questions appropriately.  Left upper chest/shoulder surgical wound shows dsg intact and dry.  Dressings were not removed.  Surrounding skin is unremarkable without erythema or tenderness.  However she has 3+ pitting edema in the hand but very little in the forearm or upper arm.  It looks like the shoulder immobilizer may put a little pressure on the dorsum of the hand the way she positions it in the sling.    She also has bilateral pitting edema to the knees left  greater than right with some mild erythematous venous stasis dermatitis in the pretibial areas.    However she is breathing comfortably with decreased breath sounds in the bases but no rales and she is moving air easily without accessory muscle use or tachypnea.  Heart is regular today in the 70s S1-S2 without murmur gallop or rub abdomen is soft.  Demonstrates good range of motion of her neck with rotation of head.        Due to the 2020 Covid 19 pandemic, except as noted above, the patient was visually observed at a 6 foot plus distance.  An observational exam was performed in an effort to keep patient safe from Covid 19 and other communicable diseases.   Labs:  Lab Results   Component Value Date    WBC 9.0 07/28/2020    HGB 8.8 (L) 08/01/2020    HCT 36.3 07/28/2020     07/28/2020     08/01/2020     Results for orders placed or performed during the hospital encounter of 07/28/20   Basic Metabolic Panel   Result Value Ref Range    Sodium 139 136 - 145 mmol/L    Potassium 4.3 3.5 - 5.0 mmol/L    Chloride 97 (L) 98 - 107 mmol/L    CO2 36 (H) 22 - 31 mmol/L    Anion Gap, Calculation 6 5 - 18 mmol/L    Glucose 165 (H) 70 - 125 mg/dL    Calcium 8.8 8.5 - 10.5 mg/dL    BUN 47 (H) 8 - 28 mg/dL    Creatinine 1.39 (H) 0.60 - 1.10 mg/dL    GFR MDRD Af Amer 44 (L) >60 mL/min/1.73m2    GFR MDRD Non Af Amer 36 (L) >60 mL/min/1.73m2         Lab Results   Component Value Date    TSH 67.01 (H) 07/18/2020     Lab Results   Component Value Date    HGBA1C 6.5 (H) 07/31/2020     [unfilled]  Lab Results   Component Value Date    FTVAGULS22 333 07/21/2020     Lab Results   Component Value Date     07/18/2020     [unfilled]  Most Recent EKG     Units 07/28/20  1543   VENTRATE BPM 63   ATRIALRATE BPM 57   QRSDURATION ms 172   QTINTERVAL ms 496   QTCCALC ms 507   RAXIS degrees 106   TAXIS degrees 261   MUSEDX  Ventricular-paced rhythm with occasional Premature ventricular complexes  Abnormal ECG  When compared with  ECG of 18-JUL-2020 11:23,  Electronic ventricular pacemaker has replaced Wide QRS rhythm  Confirmed by SEE ED PROVIDER NOTE FOR, ECG INTERPRETATION ([id number]),  VIV ELDER ([id number]) on 7/28/2020 10:25:45 PM           Assessment/Plan:      ICD-10-CM    1. Status post reverse total replacement of left shoulder  Z96.612    Left shoulder instability  Recurrent dislocation left shoulder  Status post reverse total replacement of left shoulder July 31   Patient is doing quite well in most regards.  Major concerns are edema of the hand but she has very thin loose skin there and some minimal pressure from the shoulder immobilizer is probably mostly responsible because the edema does not extend up into the forearm or shoulder.  There is no tenderness.  No cords.  - OT lymphedema therapy  Consider lymphedema garment for the hand per therapist  Reposition her  as necessary to prevent any pressure which could contribute to the edema  - Resting the arm up above the heart on the chest when resting would make sense, however she likes to be in the wheelchair.          Bilateral lower extremity edema   weight gain  Chronic diastolic CHF   Patient does not appear to have pulmonary edema on exam.  O2 sats are good off of room air.  She does have a history of recurrent oxygen need though.   However her weight is up from a baseline of 205 to 220 pounds today.  However she was at 224 yesterday and has had some diuresis on her current Bumex dose, 3 mg twice daily, up from her chronic 2 mg twice daily dosage..  I think that she is clinically stable we can continue current dose of Bumex and add leg elevation above heart except when she is up for therapy meals or bathroom, which should be 20 out of 24 hours/day with the legs elevated.  Lymphedema therapy consultation, lymphedema garments per therapist.  We will continue daily weights and close monitoring.  BMP August 5.  Performing Date  Performing Department    Feb 7, 2018  [facility]  CARDIAC TESTING [[id number]]    Patient Information     Patient Name   Thea Acosta  MRN   [id number]  Sex   Female   1   1934 (83 y.o.)    Indications     Heart murmur; Fever    Summary       Normal left ventricular size and systolic function.    When compared to the previous study dated 3/1/2017, no significant change.    Left ventricle ejection fraction is normal. The estimated left ventricular ejection fraction is 55%.    Normal right ventricular size and systolic function.    Left Atrium: Left atrial volume is moderately increased.    Aortic Valve: Bioprosthetic valve is not well visualized. No evidence of paravalvular leak and mean gradient is 15 mmHg which is borderline elevated, however not significantly changed compared to prior.    Mitral Valve: There is severe mitral annular calcification. Moderate mitral Calcific stenosis. No mitral regurgitation.              Acute metabolic encephalopathy  Hallucinations  Hypothyroidism   Abnormal urinalysis   I still feel that patient has not had a chance to get back to her best baseline status.  She is been in and out of the hospital 3 times now has undergone surgery with anesthesia, has been continually in unfamiliar settings.  She is off opiates.  Her pain is controlled.  These issues should help.  She did have a negative head CT on .  Her hypoxia has resolved.      Recall that her hypothyroidism was thought to be a significant contributor to this issue and if so we should continue to see rapid improvement due to the replacement.  TSH was 67 on .    Suspected chronic hypoxic respiratory failure   Though this finding has been intermittent, she is prone to dropping her O2 sats.  I suspect it is multifactorial.  She has been felt to have COPD in the past although she is unaware of that and denies it.  She also has history of CHF though I do not find evidence of pulmonary edema.  She is now off opiates.  Obesity, possible hypoventilation,  "possible RANJIT contributing?  - Monitor O2 sats, supplemental oxygen as need be  -Avoid opiates and other sedating medications as possible  - PE has not been suspected.  - Patient sounds like she is not interested in sleep study  -No plan for VQ scan/PE rounds based on current improvement.            COPD   I see this in old notes and records.  I do not see complete pulmonary function tests on record however.  Patient says that she smoked in the past \"but did not inhale\".  She is skeptical.  Nonetheless I suspect that there is some COPD present.  She does have albuterol if need be but not needed currently with no wheezing.  No wheezing heard today.    Coronary artery disease   Continue aspirin and atorvastatin.  Not sure why she is not on beta-blocker, can follow-up with primary MD on that.    Paroxysmal atrial fibrillation   Sounds are she is in normal sinus rhythm today.  Takes only aspirin for stroke prophylaxis, does not need rate controlling agent    Permanent pacemaker   Palpable in the left anterior chest wall but below the mass described above, distinct from what I think is shoulder dislocation.  Follows with device clinic.    DM 2   Blood sugar control adequate.  A1c 7.0 on February 26.    Gout   Presumed.  Hand pain left greater than right.  Patient is on prednisone taper which she will continue.  She also has colchicine but is not currently taking it, just a PRN medication    CKD 3   Mild elevations of creatinine with recent hospitalizations.  Monitor closely avoid nephrotoxins as able.    Hypertension   Patient has had relatively low blood pressure at times throughout these past weeks.  Thus she is not on antihypertensives.  Has been on metoprolol up to recently.  She is on Bumex.  Expect that Lopressor may be restarted in the foreseeable future.    GERD   Omeprazole    Intertrigo   Improved on nystatin  Vitamin D deficiency   On replacement  Case discussed with:    Facility staff             Rojelio " Jessee Farnsworth MD

## 2021-06-20 NOTE — LETTER
Letter by Jennifer Valdes NP at      Author: Jennifer Valdes NP Service: -- Author Type: --    Filed:  Encounter Date: 2020 Status: (Other)         Patient: Thea Acosta   MR Number: [id number]   YOB: 1934   Date of Visit: 2020                 [facility]    DATE: 2020    NAME:  Thea Acosta             :  1934  MRN: [id number]  CODE STATUS:  DNR    VISIT TYPE: Problem Visit (weight gain, edema)     FACILITY:  [facility] [[id number]]       CHIEF COMPLAIN/REASON FOR VISIT:    Chief Complaint   Patient presents with   ? Problem Visit     weight gain, edema               HISTORY OF PRESENT ILLNESS: Thea Acosta is a 86 y.o. female who admitted - for acute metabolic encephalopathy. This was felt to be s/t hypothyroidism and noncompliance with levothyroxine dosing. She was readmitted - for left shoulder dislocation and underwent reverse total shoulder arthroplasty on . Postop course was uncomplicated and transferred back to [facility]. She has PMH of CAD, CKD, DM, s/p PPM, diastolic CHF, pulmonary artery hypertension, mitral valve stenosis, chronic a fib no anticoagulation, s/p AVR, asthma. Prior to this she lived at home with her grand daughter.     Today Ms. Acosta is seen today for concerns of weight gain, edema. Her weights were very erratic over weekend from [phone]. Requested staff to re weigh her today. She does appear to have more edema today. Staff also noted she complained of right elbow pain yesterday with therapy and xray was ordered but has not been done yet. She is seen in her recliner today. She says her breathing is about the same but has noticed her left arm and both legs are much more swollen today. We did discuss that she was sleeping in her recliner with legs down  on arrival in her room. We discussed the importance of elevating her legs especially at night. She says her legs are due  "to be rewrapped today. She denies any other concerns over the weekend. She does not complain of any  Pain in the right arm on exam.         REVIEW OF SYSTEMS:  PROBLEMS AND REVIEW OF SYSTEMS:   Review of Systems  Today on ROS:   Currently, no fever, chills, or rigors. Decreased vision and hearing. Denies any chest pain, headaches, palpitations, lightheadedness, dizziness, no cough, no sob. Appetite is good.  Denies any abdominal pain. No active bleeding. Positive for urinary incontinence, leg swelling worse today, left arm swelling worse today, sling in place, left shoulder incision, no nausea or vomiting, no constipation, pain controlled, lymphedema wraps      Allergies   Allergen Reactions   ? Tramadol Anxiety and Other (See Comments)     Per patient, Thea developed psychosis and severe anxiety and agitation \"for three days\" after receiving tramadol in the past. She stated that she would \"never\" take it again and would be extremely upset if she receives it. She asked me to add this to her chart's allergy list specifically.    ? Codeine Hives   ? Codeine Phosphate (Bulk)      This allergy is per [facility] [place] records.   ? Codeine Sulfate      This allergy is per [facility] [place] records.   ? Codeine-Butalbital-Asa-Caff      This allergy is per [facility] [place] records.   ? Codeine-Guaifenesin      This allergy is per [facility] [place] records.   ? Lisinopril Cough   ? Penicillins Swelling     Current Outpatient Medications   Medication Sig   ? acetaminophen (TYLENOL) 500 MG tablet Take 2 tablets (1,000 mg total) by mouth 3 (three) times a day. (Patient taking differently: Take 1,000 mg by mouth 3 (three) times a day. And daily prn)   ? albuterol (PROAIR HFA;PROVENTIL HFA;VENTOLIN HFA) 90 mcg/actuation inhaler Inhale 2 puffs every 4 (four) hours as needed for wheezing.   ? aspirin 81 MG EC tablet Take 1 " tablet (81 mg total) by mouth 2 (two) times a day.   ? atorvastatin (LIPITOR) 80 MG tablet TAKE 1 TABLET(80 MG) BY MOUTH DAILY   ? bumetanide (BUMEX) 2 MG tablet Take 1.5 tablets (3 mg total) by mouth 2 (two) times a day at 9am and 6pm.   ? colchicine 0.6 mg tablet take two tablets (1.2 mg) by mouth at onset of gout symptoms, then repeat one tablet (0.6 mg) by mouth one hour later   ? diclofenac sodium (VOLTAREN) 1 % Gel Apply 2 g topically 4 (four) times a day as needed.    ? ferrous sulfate 325 (65 FE) MG tablet TAKE 1 TABLET(325 MG) BY MOUTH TWICE DAILY (Patient taking differently: Take 1 tablet by mouth 3 (three) times a day with meals. )   ? levothyroxine (SYNTHROID, LEVOTHROID) 150 MCG tablet Take 1 tablet (150 mcg total) by mouth daily before supper.   ? nystatin (MYCOSTATIN) powder Apply topically 4 (four) times a day.   ? omeprazole (PRILOSEC) 20 MG capsule Take 1 capsule (20 mg total) by mouth daily before breakfast.   ? polyethylene glycol (MIRALAX) 17 gram packet Take 17 g by mouth daily as needed.   ? potassium chloride (K-DUR,KLOR-CON) 20 MEQ tablet Take 20 mEq by mouth daily.    ? predniSONE (DELTASONE) 2.5 MG tablet Take 7.5 mg/d prednisone PO for 3 weeks.   ? senna (SENOKOT) 8.6 mg tablet Take 1 tablet by mouth daily.   ? white petrolatum (AQUAPHOR ORIGINAL) 41 % Oint Apply 1 application topically at bedtime.     Past Medical History:    Past Medical History:   Diagnosis Date   ? Acute kidney injury superimposed on CKD (H) 3/3/2017   ? Asthma    ? CAD (coronary artery disease)    ? Cataract    ? Chronic kidney disease     ckd3   ? COPD (chronic obstructive pulmonary disease) (H)    ? Diabetes mellitus (H)    ? Disease of thyroid gland    ? H/O heart valve replacement with bioprosthetic valve    ? History of transfusion    ? Hypertension    ? Normochromic normocytic anemia    ? Pulmonary hypertension (H) 09/27/2019    Noted on echocardiogram   ? Restless leg syndrome            PHYSICAL  EXAMINATION  Vitals:    08/31/20 0700   BP: 125/53   Pulse: (!) 57   Resp: 18   Temp: 98  F (36.7  C)   SpO2: 91%   Weight: (!) 236 lb (107 kg)       Today on physical exam:     GENERAL: Awake, Alert, obese,  not in any form of acute distress, answers questions appropriately, follows simple commands, conversant  HEENT: Head is normocephalic with normal hair distribution. No evidence of trauma. Ears: No acute purulent discharge. Eyes: Conjunctivae pink with no scleral jaundice. Nose: Normal mucosa and septum. NECK: Supple with no cervical or supraclavicular lymphadenopathy. Trachea is midline. [place], decreased vision  CHEST: No tenderness or deformity, no crepitus, left chest PPM  LUNG: Dim but clear today to auscultation.   No shortness of breath noted today, no cough noted  BACK: No kyphosis of the thoracic spine. Symmetric, no curvature, ROM normal, no CVA tenderness, no spinal tenderness   CVS: irregularly irregular rhythm, murmur,  2+ pulses symmetric in all extremities.  ABDOMEN: Rounded and soft, nontender to palpation, non distended, no masses, no organomegaly, good bowel sounds, no rebound or guarding, no peritoneal signs.   EXTREMITIES: 3+ ble pitting edema, lymphedema wrapping, left upper extremity 2+ pitting edema no sleeve in place today, left shoulder incision c/d/i, left arm sling in place  SKIN: Warm and dry  NEUROLOGICAL: The patient is oriented to person, place and time. Oriented today, no recent confusion, Forgetful at times.             LABS:   No results found for this or any previous visit (from the past 168 hour(s)).  Results for orders placed or performed in visit on 08/17/20   Basic Metabolic Panel   Result Value Ref Range    Sodium 141 136 - 145 mmol/L    Potassium 3.6 3.5 - 5.0 mmol/L    Chloride 98 98 - 107 mmol/L    CO2 35 (H) 22 - 31 mmol/L    Anion Gap, Calculation 8 5 - 18 mmol/L    Glucose 114 70 - 125 mg/dL    Calcium 9.2 8.5 - 10.5 mg/dL    BUN 37 (H) 8 - 28 mg/dL    Creatinine 1.47  (H) 0.60 - 1.10 mg/dL    GFR MDRD Af Amer 41 (L) >60 mL/min/1.73m2    GFR MDRD Non Af Amer 34 (L) >60 mL/min/1.73m2         Lab Results   Component Value Date    WBC 6.9 08/05/2020    HGB 8.4 (L) 08/24/2020    HCT 29.0 (L) 08/05/2020    MCV 98 08/05/2020     08/05/2020       Lab Results   Component Value Date    [id number] 333 07/21/2020     Lab Results   Component Value Date    HGBA1C 6.5 (H) 07/31/2020     Lab Results   Component Value Date    INR 0.99 07/28/2020    INR 1.30 (H) 02/07/2018    INR 1.51 (H) 03/06/2017     Vitamin D, Total (25-Hydroxy)   Date Value Ref Range Status   01/20/2020 45.9 30.0 - 80.0 ng/mL Final     Lab Results   Component Value Date    TSH 3.43 08/17/2020           ASSESSMENT/PLAN:    Left shoulder dislocation, s/p reverse total shoulder arthroplasty: Incision c/d/i. Pain controlled on tylenol.  Ice prn. PT, OT following. voltaren gel prn. F/u with surgeon Dr. Ramirez on 8/14-f/u again in 4 weeks, limit external rotation, no bathing until 4 weeks postop, healing well.  lymphedema sleeve to LUE. Overall improving. Remains NWB, may remove sling when resting. Encourage to elevate and wear sleeve as edema worse today.  Asthma:  Encourage cough and deep breathe. IS q1h while awake. Albuterol prn. Shortness of breath at baseline today.   Hypothyroid: on levothyroxine.   Chronic CHF: Daily [phone] was stable then erratic over weekend with readings of 250 and 236lbs. Shortness of breath at baseline, but edema worse today 3+ pitting ble edema. On bumex 2mg two times a day, keep legs elevated when possible.  F/u with cardiology prn. Cardiac diet.  Lymphedema wrapping. Discussed with her and nursing Concerns for keeping legs in dependent position. Will increase bumex to 4mg two times a day today, metolazone daily x 5 days, bmp on 9/4, increase kcl to 40 x 5 days.   S/p PPM: follows with device clinic. No recent concerns.   CKD stage 3: Cr 1.39, gfr 36 on 8/1. Stable.   H/o Type  2 DM: No meds, no monitoring needed. Diet controlled. No recent concerns.   Dyslipidemia: On aspirin, atorvastatin.   HTN: SBP 120s, On lopressor, bumex.  hold parameters for lopressor. Stable.  GERD: On omeprazole. No concerns today.   Vitamin d deficiency: On vitamin d.  Hypokalemia: On kcl.  PAF, s/p AVR: Regular rate and rhythm today. On metoprolol. F/u with caridology. No concerns.   Anemia of CKD: on iron. Hg 10.9 on 7/24.   Hg 8.8 on 8/1. Hg 9 on 8/5. Recheck on 8/17 8.2, iron to three times a day. recheck on 8/24-hg 8.4.   Candidal intertrigo:  Nystatin.   Constipation: senna daily, miralax daily prn.  RUE pain: per staff had  Right elbow pain in therapy yesterday and on call ordered xray. No edema, erythema or ecchymosis today. No complaints of pain today on exam.      PT: trsf min-max A, walking 25ft with trang walker with Min A, OT: LE & hand lymph, mod A UB drsg, max A toileting. Lives with granddaughter. Yellow tag recommend 2 weeks    Electronically signed by: Jennifer Valdes NP     Total floor/unit time spent 36 min with >50% time spent on counseling and coordination of care. Counseling regarding chf exacerbation. Coordinated care with nursing for management of rue pain, chf management.

## 2021-06-20 NOTE — LETTER
Letter by Candy Vanessa RDCS at      Author: Candy Vanessa RDCS Service: -- Author Type: --    Filed:  Encounter Date: 6/24/2020 Status: (Other)         Thea Acosta  [address] [place] [address]      June 24, 2020      Dear Ms. Acosta,    RE: Remote Results    We are writing to you regarding your recent Remote Pacemaker check from home. Your transmission was received successfully. Battery status is satisfactory at this time.     Your results are showing no significant changes.    Your next device appointment will be a remote check on September 23, 2020; this will occur automatically.    To schedule or reschedule, please call [phone] and press 1.    NOTE: If you would like to do an extra transmission, please call [phone] and press 3 to speak to a nurse BEFORE transmitting. This ensures that the Device Clinic staff is aware of the reason you are sending a transmission, and can follow-up with you after it has been reviewed.    We will be checking your implanted device from home (remotely) every three months unless otherwise instructed. We will need to see you in the clinic at least once a year. You may need to be seen in the clinic sooner depending on the results of your check.    Please be aware:    The follow-up schedule is like a Physician prescription.    Your remote monitor is paired to your specific implanted device.      Sincerely,    [facility]

## 2021-06-20 NOTE — LETTER
Letter by Jennifer Valdes NP at      Author: Jennifer Valdes NP Service: -- Author Type: --    Filed:  Encounter Date: 2020 Status: (Other)         Patient: Thea Acosta   MR Number: [id number]   YOB: 1934   Date of Visit: 2020                 [facility]    DATE: 2020    NAME:  Thea Acosta             :  1934  MRN: [id number]  CODE STATUS:  DNR    VISIT TYPE: Review Of Multiple Medical Conditions (tsa, pain check, chf)     FACILITY:  [facility] [[id number]]       CHIEF COMPLAIN/REASON FOR VISIT:    Chief Complaint   Patient presents with   ? Review Of Multiple Medical Conditions     tsa, pain check, chf               HISTORY OF PRESENT ILLNESS: Thea Acosta is a 86 y.o. female who admitted - for acute metabolic encephalopathy. This was felt to be s/t hypothyroidism and noncompliance with levothyroxine dosing. She was readmitted - for left shoulder dislocation and underwent reverse total shoulder arthroplasty on . Postop course was uncomplicated and transferred back to [facility]. She has PMH of CAD, CKD, DM, s/p PPM, diastolic CHF, pulmonary artery hypertension, mitral valve stenosis, chronic a fib no anticoagulation, s/p AVR, asthma. Prior to this she lived at home with her grand daughter.     Today Ms. Acosta is seen for review of systems for total shoulder arthroplasty, pain check, chf. She is seen in her wheelchair and just finished working with therapy. She says she is doing well today. Her shoulder is continuing to get better. She hopes she can return home soon. She is wondering how long she may need to be on restrictions. We discussed this is usually for 6 weeks. She says she did see ortho and they  Told her not to come back for another 5 weeks. She thinks her incision is doing fine. The swelling her arm and shoulder is better but legs remain swollen. They are doing lymphedema wrapping for her.  "She denies any other concerns today.         REVIEW OF SYSTEMS:  PROBLEMS AND REVIEW OF SYSTEMS:   Review of Systems  Today on ROS:   Currently, no fever, chills, or rigors. Decreased vision and hearing. Denies any chest pain, headaches, palpitations, lightheadedness, dizziness, no cough, no sob. Appetite is good.  Denies any abdominal pain. No active bleeding. Positive for urinary incontinence, leg swelling, left arm and shoulder swelling, sling in place, left shoulder incision, no nausea or vomiting, no constipation, pain improving      Allergies   Allergen Reactions   ? Tramadol Anxiety and Other (See Comments)     Per patient, Thea developed psychosis and severe anxiety and agitation \"for three days\" after receiving tramadol in the past. She stated that she would \"never\" take it again and would be extremely upset if she receives it. She asked me to add this to her chart's allergy list specifically.    ? Codeine Hives   ? Codeine Phosphate (Bulk)      This allergy is per [facility] records.   ? Codeine Sulfate      This allergy is per [facility] [place] [facility] records.   ? Codeine-Butalbital-Asa-Caff      This allergy is per [facility] records.   ? Codeine-Guaifenesin      This allergy is per [facility] records.   ? Lisinopril Cough   ? Penicillins Swelling     Current Outpatient Medications   Medication Sig   ? acetaminophen (TYLENOL) 500 MG tablet Take 2 tablets (1,000 mg total) by mouth 3 (three) times a day. (Patient taking differently: Take 1,000 mg by mouth 3 (three) times a day. And daily prn)   ? albuterol (PROAIR HFA;PROVENTIL HFA;VENTOLIN HFA) 90 mcg/actuation inhaler Inhale 2 puffs every 4 (four) hours as needed for wheezing.   ? aspirin 81 MG EC tablet Take 1 tablet (81 mg total) by mouth 2 (two) times a day.   ? atorvastatin (LIPITOR) 80 MG tablet TAKE 1 TABLET(80 MG) BY MOUTH DAILY "   ? bumetanide (BUMEX) 2 MG tablet Take 1.5 tablets (3 mg total) by mouth 2 (two) times a day at 9am and 6pm.   ? colchicine 0.6 mg tablet take two tablets (1.2 mg) by mouth at onset of gout symptoms, then repeat one tablet (0.6 mg) by mouth one hour later   ? diclofenac sodium (VOLTAREN) 1 % Gel Apply 2 g topically 4 (four) times a day as needed.    ? ferrous sulfate 325 (65 FE) MG tablet TAKE 1 TABLET(325 MG) BY MOUTH TWICE DAILY (Patient taking differently: Take 1 tablet by mouth 3 (three) times a day with meals. )   ? levothyroxine (SYNTHROID, LEVOTHROID) 150 MCG tablet Take 1 tablet (150 mcg total) by mouth daily before supper.   ? nystatin (MYCOSTATIN) powder Apply topically 4 (four) times a day.   ? omeprazole (PRILOSEC) 20 MG capsule Take 1 capsule (20 mg total) by mouth daily before breakfast.   ? polyethylene glycol (MIRALAX) 17 gram packet Take 17 g by mouth daily as needed.   ? potassium chloride (K-DUR,KLOR-CON) 20 MEQ tablet Take 20 mEq by mouth daily.    ? predniSONE (DELTASONE) 2.5 MG tablet Take 7.5 mg/d prednisone PO for 3 weeks.   ? senna (SENOKOT) 8.6 mg tablet Take 1 tablet by mouth daily.   ? white petrolatum (AQUAPHOR ORIGINAL) 41 % Oint Apply 1 application topically at bedtime.     Past Medical History:    Past Medical History:   Diagnosis Date   ? Acute kidney injury superimposed on CKD (H) 3/3/2017   ? Asthma    ? CAD (coronary artery disease)    ? Cataract    ? Chronic kidney disease     ckd3   ? COPD (chronic obstructive pulmonary disease) (H)    ? Diabetes mellitus (H)    ? Disease of thyroid gland    ? H/O heart valve replacement with bioprosthetic valve    ? History of transfusion    ? Hypertension    ? Normochromic normocytic anemia    ? Pulmonary hypertension (H) 09/27/2019    Noted on echocardiogram   ? Restless leg syndrome            PHYSICAL EXAMINATION  Vitals:    08/21/20 0700   BP: 124/54   Pulse: 60   Resp: 16   Temp: 96.6  F (35.9  C)   SpO2: 94%   Weight: 213 lb (96.6 kg)        Today on physical exam:     GENERAL: Awake, Alert, obese,  not in any form of acute distress, answers questions appropriately, follows simple commands, conversant  HEENT: Head is normocephalic with normal hair distribution. No evidence of trauma. Ears: No acute purulent discharge. Eyes: Conjunctivae pink with no scleral jaundice. Nose: Normal mucosa and septum. NECK: Supple with no cervical or supraclavicular lymphadenopathy. Trachea is midline. [place], decreased vision  CHEST: No tenderness or deformity, no crepitus, left chest PPM  LUNG: Dim but clear today to auscultation.   No shortness of breath noted today, no cough noted  BACK: No kyphosis of the thoracic spine. Symmetric, no curvature, ROM normal, no CVA tenderness, no spinal tenderness   CVS: irregularly irregular rhythm, murmur,  2+ pulses symmetric in all extremities.  ABDOMEN: Rounded and soft, nontender to palpation, non distended, no masses, no organomegaly, good bowel sounds, no rebound or guarding, no peritoneal signs.   EXTREMITIES: 2+ ble nonpitting edema, lymphedema wrapping, left upper extremity 1+ edema with lymphedema sleeve, left shoulder incision c/d/i, left arm sling in place  SKIN: Warm and dry  NEUROLOGICAL: The patient is oriented to person, place and time. Oriented today, no recent confusion, Forgetful at times.             LABS:   Recent Results (from the past 168 hour(s))   Basic Metabolic Panel   Result Value Ref Range    Sodium 141 136 - 145 mmol/L    Potassium 3.6 3.5 - 5.0 mmol/L    Chloride 98 98 - 107 mmol/L    CO2 35 (H) 22 - 31 mmol/L    Anion Gap, Calculation 8 5 - 18 mmol/L    Glucose 114 70 - 125 mg/dL    Calcium 9.2 8.5 - 10.5 mg/dL    BUN 37 (H) 8 - 28 mg/dL    Creatinine 1.47 (H) 0.60 - 1.10 mg/dL    GFR MDRD Af Amer 41 (L) >60 mL/min/1.73m2    GFR MDRD Non Af Amer 34 (L) >60 mL/min/1.73m2   Hemoglobin   Result Value Ref Range    Hemoglobin 8.2 (L) 12.0 - 16.0 g/dL   Thyroid Stimulating Hormone (TSH)   Result Value Ref  Range    TSH 3.43 0.30 - 5.00 uIU/mL     Results for orders placed or performed in visit on 08/17/20   Basic Metabolic Panel   Result Value Ref Range    Sodium 141 136 - 145 mmol/L    Potassium 3.6 3.5 - 5.0 mmol/L    Chloride 98 98 - 107 mmol/L    CO2 35 (H) 22 - 31 mmol/L    Anion Gap, Calculation 8 5 - 18 mmol/L    Glucose 114 70 - 125 mg/dL    Calcium 9.2 8.5 - 10.5 mg/dL    BUN 37 (H) 8 - 28 mg/dL    Creatinine 1.47 (H) 0.60 - 1.10 mg/dL    GFR MDRD Af Amer 41 (L) >60 mL/min/1.73m2    GFR MDRD Non Af Amer 34 (L) >60 mL/min/1.73m2         Lab Results   Component Value Date    WBC 6.9 08/05/2020    HGB 8.2 (L) 08/17/2020    HCT 29.0 (L) 08/05/2020    MCV 98 08/05/2020     08/05/2020       Lab Results   Component Value Date    [id number] 333 07/21/2020     Lab Results   Component Value Date    HGBA1C 6.5 (H) 07/31/2020     Lab Results   Component Value Date    INR 0.99 07/28/2020    INR 1.30 (H) 02/07/2018    INR 1.51 (H) 03/06/2017     Vitamin D, Total (25-Hydroxy)   Date Value Ref Range Status   01/20/2020 45.9 30.0 - 80.0 ng/mL Final     Lab Results   Component Value Date    TSH 3.43 08/17/2020           ASSESSMENT/PLAN:    Left shoulder dislocation, s/p reverse total shoulder arthroplasty: Incision c/d/i. Pain controlled on tylenol.  Ice prn. PT, OT following. voltaren gel prn. F/u with surgeon Dr. Ramirez on 8/14-f/u again in 4 weeks, limit external rotation, no bathing until 4 weeks postop, healing well.  Wearing lymphedema sleeve on LUE. Overall improving. Remains NWB, may remove sling when resting. No complaints or concerns today.   Asthma:  Encourage cough and deep breathe. IS q1h while awake. Albuterol prn. Shortness of breath at baseline today.   Hypothyroid: on levothyroxine.   Chronic CHF: Daily [id number]. No shortness of breath recently. 2+ ble edema. On bumex 2mg two times a day, keep legs elevated when possible.  F/u with cardiology prn. Cardiac diet. Monitor for fluid  overload. Appears stable today. Lymphedema wrapping.   S/p PPM: follows with device clinic. No recent concerns.   CKD stage 3: Cr 1.39, gfr 36 on 8/1. Stable.   H/o Type 2 DM: No meds, no monitoring needed. Diet controlled. No recent concerns.   Dyslipidemia: On aspirin, atorvastatin.   HTN: SBP 120s, On lopressor, bumex.  hold parameters for lopressor. Stable.  GERD: On omeprazole. No concerns today.   Vitamin d deficiency: On vitamin d.  Hypokalemia: On kcl.  PAF, s/p AVR: Regular rate and rhythm today. On metoprolol. F/u with caridology. No concenrs.   Anemia of CKD: on iron. Hg 10.9 on 7/24.   Hg 8.8 on 8/1. Hg 9 on 8/5. Recheck on 8/17 8.2, increased iron to three times a day. . Will order recheck on 8/24.   Candidal intertrigo:  Nystatin.   Constipation: senna daily, miralax daily prn.        Electronically signed by: Jennifer Valdes NP

## 2021-06-20 NOTE — LETTER
Letter by Jennifer Valdes NP at      Author: Jennifer Valdes NP Service: -- Author Type: --    Filed:  Encounter Date: 2020 Status: (Other)         Patient: Thea Acosta   MR Number: [id number]   YOB: 1934   Date of Visit: 2020                 [facility]    DATE: 2020    NAME:  Thea Acosta             :  1934  MRN: [id number]  CODE STATUS:  DNR    VISIT TYPE: Problem Visit (abnormal labs, pain check, chf)     FACILITY:  [facility] [[id number]]       CHIEF COMPLAIN/REASON FOR VISIT:    Chief Complaint   Patient presents with   ? Problem Visit     abnormal labs, pain check, chf               HISTORY OF PRESENT ILLNESS: Thea Acosta is a 86 y.o. female who admitted - for acute metabolic encephalopathy. This was felt to be s/t hypothyroidism and noncompliance with levothyroxine dosing. She was readmitted - for left shoulder dislocation and underwent reverse total shoulder arthroplasty on . Postop course was uncomplicated and transferred back to [facility]. She has PMH of CAD, CKD, DM, s/p PPM, diastolic CHF, pulmonary artery hypertension, mitral valve stenosis, chronic a fib no anticoagulation, s/p AVR, asthma. Prior to this she lived at home with her grand daughter. She was readmitted 9/10- for altered mental status. She was treated for possible cellulitis and hypoxic respiratory failure. Her tsh was elevated so levothyroxine dose was increased. She was discharged back to u.     Today Ms. Acosta is seen for abnormal labs, pain check, chf. She is seen in her room sitting in wheelchair today. She says she has been doing well. She has had some restless legs at times and not always sleeping well. She says they were acting up last night. She says her legs were jumping at times. She is sleeping in the recliner and not the bed. She says she has had this problem for a long time. She is off oxygen and has been  "since yesterday. We discussed that her weights are trending down [phone] recently. She says her legs are the best they have been and has no shortness of breath today. Her labs were reviewed from today and renal function is worse. We discussed she is likely  Dry and will reduce her bumex dose. We reviewed her meds and she does not recall being on aspirin at home previously. She denies having history of clots or coronary artery disease, just the heart failure. We discussed this was stopped when she completed her dvt prophylaxis from shoulder and the risk of frequent falls and bleeding. She does have chronic anemia and we discussed risk versus benefit of this. She would like to stop this for now and will follow up with cardiology whether she needs to be on this or not. Her potassium was high today and will reduce her potassium supplement and will recheck bmp on 9/23. She says her arm pain is improving. She is hoping to return home soon.     REVIEW OF SYSTEMS:  PROBLEMS AND REVIEW OF SYSTEMS:   Review of Systems  Today on ROS:   Currently, no fever, chills, or rigors. Decreased vision and hearing. Denies any chest pain, headaches, palpitations, lightheadedness, dizziness, no cough. Appetite is good.  Denies any abdominal pain. No active bleeding. Positive for urinary incontinence, urinary frequency, leg swelling improved, left shoulder incision, no nausea, no constipation, pain controlled, velcro leg wraps, no shortness of breath recently, restless legs and insomnia at times      Allergies   Allergen Reactions   ? Tramadol Anxiety and Other (See Comments)     Per patient, Thea developed psychosis and severe anxiety and agitation \"for three days\" after receiving tramadol in the past. She stated that she would \"never\" take it again and would be extremely upset if she receives it. She asked me to add this to her chart's allergy list specifically.    ? Codeine Hives   ? Codeine Phosphate (Bulk)      This allergy " is per [facility] records.   ? Codeine Sulfate      This allergy is per [facility] records.   ? Codeine-Butalbital-Asa-Caff      This allergy is per [facility] records.   ? Codeine-Guaifenesin      This allergy is per [facility] records.   ? Lisinopril Cough   ? Penicillins Swelling     Current Outpatient Medications   Medication Sig   ? acetaminophen (TYLENOL) 500 MG tablet Take 2 tablets (1,000 mg total) by mouth 3 (three) times a day. (Patient taking differently: Take 1,000 mg by mouth 3 (three) times a day as needed. )   ? albuterol (PROAIR HFA;PROVENTIL HFA;VENTOLIN HFA) 90 mcg/actuation inhaler Inhale 2 puffs every 4 (four) hours as needed for wheezing.   ? atorvastatin (LIPITOR) 80 MG tablet TAKE 1 TABLET(80 MG) BY MOUTH DAILY   ? bumetanide (BUMEX) 2 MG tablet Take 1 tablet (2 mg total) by mouth 2 (two) times a day at 9am and 6pm. (Patient taking differently: Take 2 mg by mouth 2 (two) times a day at 9am and 6pm. 2mg in am and 1mg at noon)   ? colchicine 0.6 mg tablet take two tablets (1.2 mg) by mouth at onset of gout symptoms, then repeat one tablet (0.6 mg) by mouth one hour later   ? diclofenac sodium (VOLTAREN) 1 % Gel Apply 2 g topically 4 (four) times a day as needed.    ? dimethicone 5 % Crea Apply 1 application topically 2 (two) times a day as needed (to buttocks as needed for skin breakdown).   ? ferrous sulfate 325 (65 FE) MG tablet Take 1 tablet by mouth daily.   ? levothyroxine (SYNTHROID, LEVOTHROID) 200 MCG tablet Take 1 tablet (200 mcg total) by mouth daily before supper.   ? metoprolol tartrate (LOPRESSOR) 25 MG tablet Take 12.5 mg by mouth 2 (two) times a day. Hold for sbp<105  Hr <55   ? nystatin (MYCOSTATIN) powder Apply topically 4 (four) times a day.   ? omeprazole (PRILOSEC) 20 MG capsule Take 1 capsule (20 mg total) by mouth daily before breakfast.   ?  ondansetron (ZOFRAN-ODT) 4 MG disintegrating tablet Take 4 mg by mouth every 6 (six) hours as needed for nausea.   ? polyethylene glycol (MIRALAX) 17 gram packet Take 17 g by mouth daily.    ? potassium chloride (K-DUR,KLOR-CON) 20 MEQ tablet Take 40 mEq by mouth daily before breakfast.   ? senna (SENOKOT) 8.6 mg tablet Take 1 tablet by mouth daily. Hold for loose stools   ? white petrolatum (AQUAPHOR ORIGINAL) 41 % Oint Apply 1 application topically at bedtime.     Past Medical History:    Past Medical History:   Diagnosis Date   ? Acute kidney injury superimposed on CKD (H) 3/3/2017   ? Asthma    ? CAD (coronary artery disease)    ? Cataract    ? Chronic kidney disease     ckd3   ? COPD (chronic obstructive pulmonary disease) (H)    ? Diabetes mellitus (H)    ? Disease of thyroid gland    ? H/O heart valve replacement with bioprosthetic valve    ? History of transfusion    ? Hypertension    ? Normochromic normocytic anemia    ? Pulmonary hypertension (H) 09/27/2019    Noted on echocardiogram   ? Restless leg syndrome            PHYSICAL EXAMINATION  Vitals:    09/18/20 0700   BP: 131/73   Pulse: 60   Resp: 18   Temp: (!) 96.2  F (35.7  C)   SpO2: 94%   Weight: 205 lb (93 kg)       Today on physical exam:     GENERAL: lethargic, obese,  not in any form of acute distress, answers questions appropriately, follows simple commands, conversant  HEENT: Head is normocephalic with normal hair distribution. No evidence of trauma. Ears: No acute purulent discharge. Eyes: Conjunctivae pink with no scleral jaundice. Nose: Normal mucosa and septum. NECK: Supple with no cervical or supraclavicular lymphadenopathy. Trachea is midline. [place], decreased vision  CHEST: No tenderness or deformity, no crepitus, left chest PPM  LUNG: Dim but clear today to auscultation.   Shortness of breath with exertion, no cough noted  BACK: No kyphosis of the thoracic spine. Symmetric, no curvature, ROM normal, no CVA tenderness, no spinal tenderness    CVS: irregularly irregular rhythm, murmur,  2+ pulses symmetric in all extremities.  ABDOMEN: Rounded and soft, nontender to palpation, non distended, no masses, no organomegaly, good bowel sounds, no rebound or guarding, no peritoneal signs.   EXTREMITIES: 1+ ble nonpitting edema, velcro leg wraps, left shoulder incision healed, no sling, rl discoloration ble  SKIN: Warm and dry  NEUROLOGICAL: The patient is oriented to person, place and time.             LABS:   Recent Results (from the past 168 hour(s))   Basic Metabolic Panel   Result Value Ref Range    Sodium 142 136 - 145 mmol/L    Potassium 3.6 3.5 - 5.0 mmol/L    Chloride 93 (L) 98 - 107 mmol/L    CO2 40 (H) 22 - 31 mmol/L    Anion Gap, Calculation 9 5 - 18 mmol/L    Glucose 87 70 - 125 mg/dL    Calcium 10.0 8.5 - 10.5 mg/dL    BUN 39 (H) 8 - 28 mg/dL    Creatinine 1.77 (H) 0.60 - 1.10 mg/dL    GFR MDRD Af Amer 33 (L) >60 mL/min/1.73m2    GFR MDRD Non Af Amer 27 (L) >60 mL/min/1.73m2   Basic Metabolic Panel   Result Value Ref Range    Sodium 137 136 - 145 mmol/L    Potassium 5.2 (H) 3.5 - 5.0 mmol/L    Chloride 95 (L) 98 - 107 mmol/L    CO2 31 22 - 31 mmol/L    Anion Gap, Calculation 11 5 - 18 mmol/L    Glucose 96 70 - 125 mg/dL    Calcium 10.3 8.5 - 10.5 mg/dL    BUN 43 (H) 8 - 28 mg/dL    Creatinine 2.05 (H) 0.60 - 1.10 mg/dL    GFR MDRD Af Amer 28 (L) >60 mL/min/1.73m2    GFR MDRD Non Af Amer 23 (L) >60 mL/min/1.73m2   Hemoglobin   Result Value Ref Range    Hemoglobin 9.8 (L) 12.0 - 16.0 g/dL     Results for orders placed or performed in visit on 09/18/20   Basic Metabolic Panel   Result Value Ref Range    Sodium 137 136 - 145 mmol/L    Potassium 5.2 (H) 3.5 - 5.0 mmol/L    Chloride 95 (L) 98 - 107 mmol/L    CO2 31 22 - 31 mmol/L    Anion Gap, Calculation 11 5 - 18 mmol/L    Glucose 96 70 - 125 mg/dL    Calcium 10.3 8.5 - 10.5 mg/dL    BUN 43 (H) 8 - 28 mg/dL    Creatinine 2.05 (H) 0.60 - 1.10 mg/dL    GFR MDRD Af Amer 28 (L) >60 mL/min/1.73m2    GFR  MDRD Non Af Amer 23 (L) >60 mL/min/1.73m2         Lab Results   Component Value Date    WBC 5.7 09/12/2020    HGB 9.8 (L) 09/18/2020    HCT 30.8 (L) 09/12/2020     (H) 09/12/2020     09/12/2020       Lab Results   Component Value Date    [id number] 468 09/12/2020     Lab Results   Component Value Date    HGBA1C 6.5 (H) 07/31/2020     Lab Results   Component Value Date    INR 1.11 (H) 09/10/2020    INR 0.99 07/28/2020    INR 1.30 (H) 02/07/2018     Vitamin D, Total (25-Hydroxy)   Date Value Ref Range Status   01/20/2020 45.9 30.0 - 80.0 ng/mL Final     Lab Results   Component Value Date    TSH 9.38 (H) 09/11/2020           ASSESSMENT/PLAN:    Acute hypoxic respiratory failure: weaned off o2, resolved. Will dc o2 orders.   Left shoulder dislocation, s/p reverse total shoulder arthroplasty: Incision healed. Pain much improved and controlled on tylenol prn. PT, OT following. voltaren gel prn. F/u with surgeon Dr. Ramirez on 8/14-f/u again in 4 weeks, limit external rotation, no bathing until 4 weeks postop, healing well, f/u again on 9/4-doing well, dc sling, AROM and PROM with therapy, may  Weight bear with walker, f/u again on 10/5. Improving.   Chronic CHF: Daily [phone] then [phone], now [phone]. Shortness of breath much improved, edema improved.  On bumex, encourage leg elevation. Cardiac diet. velcro leg wraps.  notify for weight change for 2 lbs in 24 hours or 5lbs in 7 days. Renal function worse today, will reduce bumex to 2mg in am and 1mg at noon. Bmp on 9/23.   S/p PPM: follows with device clinic. No recent concerns.   Asthma:  Encourage cough and deep breathe. IS q1h while awake. Albuterol prn. Shortness of breath with exertion. Stable.   Hypothyroid: on levothyroxine. tsh 9.38 on 9/11, levothyroxine dose adjusted. recheck tsh in 6 weeks.   CKD stage 3: Cr 1.39, gfr 36 on 8/1.  9/4 cr 1.9, gfr 25. Bmp 9/18-cr 2.05 reduced bumex and recheck on 9/23.   H/o  Type 2 DM: No meds, no monitoring needed. Diet controlled. No recent concerns.   Dyslipidemia: On atorvastatin.   HTN: SBP 130s, On bumex, metoprolol. Stable recently.   GERD: On omeprazole. No concerns today.   Vitamin d deficiency: On vitamin d.  Hypokalemia: On kcl. Hyperkalemic today k 5.2 will reduce to 40meq daily and bmp on 9/23.   PAF, s/p AVR: Regular rate and rhythm recently. No recent a fib rvr. Controlled off metoprolol. F/u with caridology. Was on aspirin daily, completed dvt prophylaxis. Discussed trial off, does not recall being on previously. Will recheck hg on 9/23 if no change in anemia will restart.   Anemia of CKD: on iron. Hg 10.9 on 7/24.   Hg 8.8 on 8/1. Hg 9 on 8/5. Recheck on 8/17 8.2, iron to three times a day. recheck on 8/24-hg 8.4. hg 9.7 on 9/4, hg 9.5 on 9/12. Discussed trial off to see if this improves anemia.   Candidal intertrigo:  Nystatin.   Constipation: senna daily, miralax daily. Stable.   Nausea:  zofran prn.     Bmp, hg 9/23.     Therapy PT: trsf max assist from , mod from recliner, walking 10ft with 4WW CGA, OT: min A UB drsg, max A toileting, LB dressing max assist, . Feeding: mod. lcd 2-3 weeks. [place]. Lives with granddaughter.     Electronically signed by: Jennifer Valdes NP

## 2021-06-20 NOTE — LETTER
Letter by Jennifer Valdes NP at      Author: Jennifer Valdes NP Service: -- Author Type: --    Filed:  Encounter Date: 2020 Status: (Other)         Patient: Thea Acosta   MR Number: [id number]   YOB: 1934   Date of Visit: 2020                 [facility]    DATE: 2020    NAME:  Thea Acosta             :  1934  MRN: [id number]  CODE STATUS:  DNR    VISIT TYPE: Problem Visit (rest less legs, insomnia)     FACILITY:  [facility] [[id number]]       CHIEF COMPLAIN/REASON FOR VISIT:    Chief Complaint   Patient presents with   ? Problem Visit     rest less legs, insomnia               HISTORY OF PRESENT ILLNESS: Thea Acosta is a 86 y.o. female who admitted - for acute metabolic encephalopathy. This was felt to be s/t hypothyroidism and noncompliance with levothyroxine dosing. She was readmitted - for left shoulder dislocation and underwent reverse total shoulder arthroplasty on . Postop course was uncomplicated and transferred back to [facility]. She has PMH of CAD, CKD, DM, s/p PPM, diastolic CHF, pulmonary artery hypertension, mitral valve stenosis, chronic a fib no anticoagulation, s/p AVR, asthma. Prior to this she lived at home with her grand daughter. She was readmitted 9/10- for altered mental status. She was treated for possible cellulitis and hypoxic respiratory failure. Her tsh was elevated so levothyroxine dose was increased. She was discharged back to u.     Today Ms. Acosta is seen for restless legs and insomnia. She says she has not been sleeping the last few nights because her restless legs are so much worse than usual for her. She is requesting something be started to help her sleep. She says otherwise her breathing has been ok and legs still look good. They were unwrapped this morning and she is waiting for her velcro wraps to be put back on. She is moving her bowels regularly and  "appetite is good. She has no pain today. She did have blood drawn today and just had some last week so is wondering what is going on. We reviewed her lab results and reason for further lab orders, however discussed bmp was ordered for wed not today and some reason nunu today instead. We discussed will check hemoglobin then on Friday instead of Wednesday since they only did the bmp today  To see if she should remain off the aspirin or not. She denies other concerns.       REVIEW OF SYSTEMS:  PROBLEMS AND REVIEW OF SYSTEMS:   Review of Systems  Today on ROS:   Currently, no fever, chills, or rigors. Decreased vision and hearing. Denies any chest pain, headaches, palpitations, lightheadedness, dizziness, no cough. Appetite is good.  Denies any abdominal pain. No active bleeding. Positive for urinary incontinence, urinary frequency, leg swelling improved, left shoulder incision, no nausea, no constipation, pain controlled, velcro leg wraps, no shortness of breath recently, restless legs and insomnia      Allergies   Allergen Reactions   ? Tramadol Anxiety and Other (See Comments)     Per patient, Thea developed psychosis and severe anxiety and agitation \"for three days\" after receiving tramadol in the past. She stated that she would \"never\" take it again and would be extremely upset if she receives it. She asked me to add this to her chart's allergy list specifically.    ? Codeine Hives   ? Codeine Phosphate (Bulk)      This allergy is per [facility] records.   ? Codeine Sulfate      This allergy is per [facility] records.   ? Codeine-Butalbital-Asa-Caff      This allergy is per [facility] records.   ? Codeine-Guaifenesin      This allergy is per [facility] records.   ? Lisinopril Cough   ? Penicillins Swelling     Current Outpatient Medications   Medication Sig   ? rOPINIRole (REQUIP) " 0.5 MG tablet Take 0.5 mg by mouth at bedtime.   ? acetaminophen (TYLENOL) 500 MG tablet Take 2 tablets (1,000 mg total) by mouth 3 (three) times a day. (Patient taking differently: Take 1,000 mg by mouth 3 (three) times a day as needed. )   ? albuterol (PROAIR HFA;PROVENTIL HFA;VENTOLIN HFA) 90 mcg/actuation inhaler Inhale 2 puffs every 4 (four) hours as needed for wheezing.   ? atorvastatin (LIPITOR) 80 MG tablet TAKE 1 TABLET(80 MG) BY MOUTH DAILY   ? bumetanide (BUMEX) 2 MG tablet Take 1 tablet (2 mg total) by mouth 2 (two) times a day at 9am and 6pm. (Patient taking differently: Take 2 mg by mouth 2 (two) times a day at 9am and 6pm. 2mg in am and 1mg at noon)   ? colchicine 0.6 mg tablet take two tablets (1.2 mg) by mouth at onset of gout symptoms, then repeat one tablet (0.6 mg) by mouth one hour later   ? diclofenac sodium (VOLTAREN) 1 % Gel Apply 2 g topically 4 (four) times a day as needed.    ? dimethicone 5 % Crea Apply 1 application topically 2 (two) times a day as needed (to buttocks as needed for skin breakdown).   ? ferrous sulfate 325 (65 FE) MG tablet Take 1 tablet by mouth daily.   ? levothyroxine (SYNTHROID, LEVOTHROID) 200 MCG tablet Take 1 tablet (200 mcg total) by mouth daily before supper.   ? metoprolol tartrate (LOPRESSOR) 25 MG tablet Take 12.5 mg by mouth 2 (two) times a day. Hold for sbp<105  Hr <55   ? nystatin (MYCOSTATIN) powder Apply topically 4 (four) times a day.   ? omeprazole (PRILOSEC) 20 MG capsule Take 1 capsule (20 mg total) by mouth daily before breakfast.   ? ondansetron (ZOFRAN-ODT) 4 MG disintegrating tablet Take 4 mg by mouth every 6 (six) hours as needed for nausea.   ? polyethylene glycol (MIRALAX) 17 gram packet Take 17 g by mouth daily.    ? potassium chloride (K-DUR,KLOR-CON) 20 MEQ tablet Take 40 mEq by mouth daily before breakfast.   ? senna (SENOKOT) 8.6 mg tablet Take 1 tablet by mouth daily. Hold for loose stools   ? white petrolatum (AQUAPHOR ORIGINAL) 41 %  Oint Apply 1 application topically at bedtime.     Past Medical History:    Past Medical History:   Diagnosis Date   ? Acute kidney injury superimposed on CKD (H) 3/3/2017   ? Asthma    ? CAD (coronary artery disease)    ? Cataract    ? Chronic kidney disease     ckd3   ? COPD (chronic obstructive pulmonary disease) (H)    ? Diabetes mellitus (H)    ? Disease of thyroid gland    ? H/O heart valve replacement with bioprosthetic valve    ? History of transfusion    ? Hypertension    ? Normochromic normocytic anemia    ? Pulmonary hypertension (H) 09/27/2019    Noted on echocardiogram   ? Restless leg syndrome            PHYSICAL EXAMINATION  Vitals:    09/21/20 0700   BP: 109/53   Pulse: (!) 52   Resp: 20   Temp: (!) 96.4  F (35.8  C)   SpO2: 94%   Weight: (!) 230 lb (104.3 kg)       Today on physical exam:     GENERAL: lethargic, obese,  not in any form of acute distress, answers questions appropriately, follows simple commands, conversant  HEENT: Head is normocephalic with normal hair distribution. No evidence of trauma. Ears: No acute purulent discharge. Eyes: Conjunctivae pink with no scleral jaundice. Nose: Normal mucosa and septum. NECK: Supple with no cervical or supraclavicular lymphadenopathy. Trachea is midline. [facility] [facility], decreased vision  CHEST: No tenderness or deformity, no crepitus, left chest PPM  LUNG: Dim but clear today to auscultation.   Shortness of breath with exertion, no cough noted  BACK: No kyphosis of the thoracic spine. Symmetric, no curvature, ROM normal, no CVA tenderness, no spinal tenderness   CVS: irregularly irregular rhythm, murmur,  2+ pulses symmetric in all extremities.  ABDOMEN: Rounded and soft, nontender to palpation, non distended, no masses, no organomegaly, good bowel sounds, no rebound or guarding, no peritoneal signs.   EXTREMITIES: 1-2+ ble nonpitting edema, velcro leg wraps, left shoulder incision healed, rl discoloration ble  SKIN: Warm and dry  NEUROLOGICAL: The patient  is oriented to person, place and time.             LABS:   Recent Results (from the past 168 hour(s))   Basic Metabolic Panel   Result Value Ref Range    Sodium 137 136 - 145 mmol/L    Potassium 5.2 (H) 3.5 - 5.0 mmol/L    Chloride 95 (L) 98 - 107 mmol/L    CO2 31 22 - 31 mmol/L    Anion Gap, Calculation 11 5 - 18 mmol/L    Glucose 96 70 - 125 mg/dL    Calcium 10.3 8.5 - 10.5 mg/dL    BUN 43 (H) 8 - 28 mg/dL    Creatinine 2.05 (H) 0.60 - 1.10 mg/dL    GFR MDRD Af Amer 28 (L) >60 mL/min/1.73m2    GFR MDRD Non Af Amer 23 (L) >60 mL/min/1.73m2   Hemoglobin   Result Value Ref Range    Hemoglobin 9.8 (L) 12.0 - 16.0 g/dL   Basic Metabolic Panel   Result Value Ref Range    Sodium 140 136 - 145 mmol/L    Potassium 3.2 (L) 3.5 - 5.0 mmol/L    Chloride 94 (L) 98 - 107 mmol/L    CO2 35 (H) 22 - 31 mmol/L    Anion Gap, Calculation 11 5 - 18 mmol/L    Glucose 118 70 - 125 mg/dL    Calcium 9.7 8.5 - 10.5 mg/dL    BUN 53 (H) 8 - 28 mg/dL    Creatinine 1.93 (H) 0.60 - 1.10 mg/dL    GFR MDRD Af Amer 30 (L) >60 mL/min/1.73m2    GFR MDRD Non Af Amer 25 (L) >60 mL/min/1.73m2     Results for orders placed or performed in visit on 09/21/20   Basic Metabolic Panel   Result Value Ref Range    Sodium 140 136 - 145 mmol/L    Potassium 3.2 (L) 3.5 - 5.0 mmol/L    Chloride 94 (L) 98 - 107 mmol/L    CO2 35 (H) 22 - 31 mmol/L    Anion Gap, Calculation 11 5 - 18 mmol/L    Glucose 118 70 - 125 mg/dL    Calcium 9.7 8.5 - 10.5 mg/dL    BUN 53 (H) 8 - 28 mg/dL    Creatinine 1.93 (H) 0.60 - 1.10 mg/dL    GFR MDRD Af Amer 30 (L) >60 mL/min/1.73m2    GFR MDRD Non Af Amer 25 (L) >60 mL/min/1.73m2         Lab Results   Component Value Date    WBC 5.7 09/12/2020    HGB 9.8 (L) 09/18/2020    HCT 30.8 (L) 09/12/2020     (H) 09/12/2020     09/12/2020       Lab Results   Component Value Date    [id number] 09/12/2020     Lab Results   Component Value Date    HGBA1C 6.5 (H) 07/31/2020     Lab Results   Component Value Date    INR 1.11 (H)  09/10/2020    INR 0.99 07/28/2020    INR 1.30 (H) 02/07/2018     Vitamin D, Total (25-Hydroxy)   Date Value Ref Range Status   01/20/2020 45.9 30.0 - 80.0 ng/mL Final     Lab Results   Component Value Date    TSH 9.38 (H) 09/11/2020           ASSESSMENT/PLAN:      Left shoulder dislocation, s/p reverse total shoulder arthroplasty: Incision healed. Pain much improved and controlled on tylenol prn. PT, OT following. voltaren gel prn. F/u with surgeon Dr. Ramirez on 8/14-f/u again in 4 weeks, limit external rotation, no bathing until 4 weeks postop, healing well, f/u again on 9/4-doing well, dc sling, AROM and PROM with therapy, may  Next appt scheduled for 10/5. Weight bear with walker, Improving. No pain today. Edema resolved.  Chronic CHF: Daily [phone] then [phone], stable. Unclear why some weights in 205-203lbs range last week. Shortness of breath much improved, edema improved.  On bumex, encourage leg elevation. Cardiac diet. velcro leg wraps.  notify for weight change for 2 lbs in 24 hours or 5lbs in 7 days. Previously reduced bumex and creatinine improved to 1.98 today.   S/p PPM: follows with device clinic. No recent concerns.   Asthma:  Encourage cough and deep breathe. IS q1h while awake. Albuterol prn. Shortness of breath with exertion. Stable.   Hypothyroid: on levothyroxine. tsh 9.38 on 9/11, levothyroxine dose adjusted. recheck tsh in 6 weeks.   CKD stage 3: Cr 1.39, gfr 36 on 8/1.  9/4 cr 1.9, gfr 25. Bmp 9/18-cr 2.05 reduced bumex and recheck on 9/23-however bmp drawn today instead. Improved to 1.98. will recheck on 9/25.   H/o Type 2 DM: No meds, no monitoring needed. Diet controlled. No recent concerns.   Dyslipidemia: On atorvastatin.   HTN: SBP 100s, On bumex, metoprolol. Stable recently.   GERD: On omeprazole. No concerns today.   Vitamin d deficiency: On vitamin d.  Hypokalemia: On kcl. Hyperkalemic today k 5.2 will reduce to 40meq daily and bmp on  9/23-unfortunately was drawn today instead. k dropped to 3.2. will give one time 40kcl dose and recheck on 9/25. Unclear how dropped 2 points with only a 20meq dose reduction.    PAF, s/p AVR: Regular rate and rhythm recently. No recent a fib rvr. Controlled off metoprolol. F/u with caridology. Was on aspirin daily, completed dvt prophylaxis. Trial off aspirin, recheck hg on 9/23 if no change in anemia will restart.   Anemia of CKD: on iron. Hg 10.9 on 7/24.   Hg 8.8 on 8/1. Hg 9 on 8/5. Recheck on 8/17 8.2, iron to three times a day. recheck on 8/24-hg 8.4. hg 9.7 on 9/4, hg 9.5 on 9/12. Discussed trial off to see if this improves anemia. Hg on 9/25.   Candidal intertrigo:  Nystatin.   Constipation: senna daily, miralax daily. Stable.   Restless legs, insomnia: ordered requip at bedtime.   Nausea:  zofran prn.     Therapy PT: trsf max assist from WC, mod from recliner, walking 10ft with 4WW CGA, OT: min A UB drsg, max A toileting, LB dressing max assist, . Feeding: mod. lcd 2-3 weeks. Yellow tag. Lives with granddaughter.     Electronically signed by: Jennifer Valdes NP

## 2021-06-20 NOTE — LETTER
Letter by Jennifer Valdes NP at      Author: Jennifer Valdes NP Service: -- Author Type: --    Filed:  Encounter Date: 2020 Status: (Other)         Patient: Thea Acosta   MR Number: [id number]   YOB: 1934   Date of Visit: 2020                 [facility]    DATE: 2020    NAME:  Thea Acosta             :  1934  MRN: [id number]  CODE STATUS:  DNR    VISIT TYPE: Review Of Multiple Medical Conditions (chf )     FACILITY:  [facility] [[id number]]       CHIEF COMPLAIN/REASON FOR VISIT:    Chief Complaint   Patient presents with   ? Review Of Multiple Medical Conditions     chf                HISTORY OF PRESENT ILLNESS: Thea Acosta is a 86 y.o. female who admitted - for acute metabolic encephalopathy. This was felt to be s/t hypothyroidism and noncompliance with levothyroxine dosing. She was readmitted - for left shoulder dislocation and underwent reverse total shoulder arthroplasty on . Postop course was uncomplicated and transferred back to [facility]. She has PMH of CAD, CKD, DM, s/p PPM, diastolic CHF, pulmonary artery hypertension, mitral valve stenosis, chronic a fib no anticoagulation, s/p AVR, asthma. Prior to this she lived at home with her grand daughter.     Today Ms. Acosta is seen for follow up on chf. She is seen in her room sitting in wheelchair today. She says she went to ortho on Friday  And they took her sling off. They told her she can use her shoulder more but not sure how much she is supposed to use this. She is asking me for clarification on this today. We discussed this should be in her orders from ortho and that therapy would know best so to discuss with them. She says her bowels are moving fine and actually about to have one so needs to use the restroom after visit today. She says her breathing is better today but still has some shortness of breath. She thinks her legs are maybe a little  "better. Her weight she thought was down today. Her left shoulder is kind of sore today but is wondering if it is because she is using it more. She is taking tylenol if needed. She denies any other concerns today. Reviewed notes from ortho and ordered to stop sling, therapy to eval and start active and passive ROM, weight bear with walker but does not give other restrictions. F/u again on 10/5.         REVIEW OF SYSTEMS:  PROBLEMS AND REVIEW OF SYSTEMS:   Review of Systems  Today on ROS:   Currently, no fever, chills, or rigors. Decreased vision and hearing. Denies any chest pain, headaches, palpitations, lightheadedness, dizziness, no cough. Appetite is good.  Denies any abdominal pain. No active bleeding. Positive for urinary incontinence, leg swelling, left arm swelling improved today, left shoulder incision, no nausea or vomiting, no constipation, pain controlled, lymphedema wraps, shortness of breath improved      Allergies   Allergen Reactions   ? Tramadol Anxiety and Other (See Comments)     Per patient, Thea developed psychosis and severe anxiety and agitation \"for three days\" after receiving tramadol in the past. She stated that she would \"never\" take it again and would be extremely upset if she receives it. She asked me to add this to her chart's allergy list specifically.    ? Codeine Hives   ? Codeine Phosphate (Bulk)      This allergy is per [facility] records.   ? Codeine Sulfate      This allergy is per [facility] records.   ? Codeine-Butalbital-Asa-Caff      This allergy is per [facility] records.   ? Codeine-Guaifenesin      This allergy is per [facility] records.   ? Lisinopril Cough   ? Penicillins Swelling     Current Outpatient Medications   Medication Sig   ? acetaminophen (TYLENOL) 500 MG tablet Take 2 tablets (1,000 mg total) by mouth 3 (three) times a day. " (Patient taking differently: Take 1,000 mg by mouth 3 (three) times a day. And daily prn)   ? albuterol (PROAIR HFA;PROVENTIL HFA;VENTOLIN HFA) 90 mcg/actuation inhaler Inhale 2 puffs every 4 (four) hours as needed for wheezing.   ? aspirin 81 MG EC tablet Take 1 tablet (81 mg total) by mouth 2 (two) times a day.   ? atorvastatin (LIPITOR) 80 MG tablet TAKE 1 TABLET(80 MG) BY MOUTH DAILY   ? bumetanide (BUMEX) 2 MG tablet Take 1.5 tablets (3 mg total) by mouth 2 (two) times a day at 9am and 6pm.   ? colchicine 0.6 mg tablet take two tablets (1.2 mg) by mouth at onset of gout symptoms, then repeat one tablet (0.6 mg) by mouth one hour later   ? diclofenac sodium (VOLTAREN) 1 % Gel Apply 2 g topically 4 (four) times a day as needed.    ? ferrous sulfate 325 (65 FE) MG tablet Take 1 tablet by mouth daily.   ? levothyroxine (SYNTHROID, LEVOTHROID) 150 MCG tablet Take 1 tablet (150 mcg total) by mouth daily before supper.   ? nystatin (MYCOSTATIN) powder Apply topically 4 (four) times a day.   ? omeprazole (PRILOSEC) 20 MG capsule Take 1 capsule (20 mg total) by mouth daily before breakfast.   ? polyethylene glycol (MIRALAX) 17 gram packet Take 17 g by mouth daily as needed.   ? potassium chloride (K-DUR,KLOR-CON) 20 MEQ tablet Take 40 mEq by mouth daily.   ? predniSONE (DELTASONE) 2.5 MG tablet Take 7.5 mg/d prednisone PO for 3 weeks.   ? senna (SENOKOT) 8.6 mg tablet Take 1 tablet by mouth daily.   ? white petrolatum (AQUAPHOR ORIGINAL) 41 % Oint Apply 1 application topically at bedtime.     Past Medical History:    Past Medical History:   Diagnosis Date   ? Acute kidney injury superimposed on CKD (H) 3/3/2017   ? Asthma    ? CAD (coronary artery disease)    ? Cataract    ? Chronic kidney disease     ckd3   ? COPD (chronic obstructive pulmonary disease) (H)    ? Diabetes mellitus (H)    ? Disease of thyroid gland    ? H/O heart valve replacement with bioprosthetic valve    ? History of transfusion    ? Hypertension     ? Normochromic normocytic anemia    ? Pulmonary hypertension (H) 09/27/2019    Noted on echocardiogram   ? Restless leg syndrome            PHYSICAL EXAMINATION  Vitals:    09/07/20 0700   BP: 125/67   Pulse: 60   Resp: 16   Temp: 96.8  F (36  C)   SpO2: 92%   Weight: (!) 230 lb (104.3 kg)       Today on physical exam:     GENERAL: lethargic, obese,  not in any form of acute distress, answers questions appropriately, follows simple commands, conversant  HEENT: Head is normocephalic with normal hair distribution. No evidence of trauma. Ears: No acute purulent discharge. Eyes: Conjunctivae pink with no scleral jaundice. Nose: Normal mucosa and septum. NECK: Supple with no cervical or supraclavicular lymphadenopathy. Trachea is midline. [place], decreased vision  CHEST: No tenderness or deformity, no crepitus, left chest PPM  LUNG: Dim but clear today to auscultation.   Shortness of breath  With exertion but no longer at rest, no cough noted  BACK: No kyphosis of the thoracic spine. Symmetric, no curvature, ROM normal, no CVA tenderness, no spinal tenderness   CVS: irregularly irregular rhythm, murmur,  2+ pulses symmetric in all extremities.  ABDOMEN: Rounded and soft, nontender to palpation, non distended, no masses, no organomegaly, good bowel sounds, no rebound or guarding, no peritoneal signs.   EXTREMITIES: 2-3+ ble pitting edema, lymphedema wrapping, left upper extremity 1+ nonpitting edema no sleeve in place today, left shoulder incision c/d/i, no sling  SKIN: Warm and dry  NEUROLOGICAL: The patient is oriented to person, place and time. Oriented today but a little lethargic, no recent confusion, Forgetful at times.             LABS:   Recent Results (from the past 168 hour(s))   Basic Metabolic Panel   Result Value Ref Range    Sodium 141 136 - 145 mmol/L    Potassium 3.4 (L) 3.5 - 5.0 mmol/L    Chloride 92 (L) 98 - 107 mmol/L    CO2 37 (H) 22 - 31 mmol/L    Anion Gap, Calculation 12 5 - 18 mmol/L    Glucose 98  70 - 125 mg/dL    Calcium 10.5 8.5 - 10.5 mg/dL    BUN 42 (H) 8 - 28 mg/dL    Creatinine 1.90 (H) 0.60 - 1.10 mg/dL    GFR MDRD Af Amer 30 (L) >60 mL/min/1.73m2    GFR MDRD Non Af Amer 25 (L) >60 mL/min/1.73m2   Hemoglobin   Result Value Ref Range    Hemoglobin 9.7 (L) 12.0 - 16.0 g/dL     Results for orders placed or performed in visit on 09/04/20   Basic Metabolic Panel   Result Value Ref Range    Sodium 141 136 - 145 mmol/L    Potassium 3.4 (L) 3.5 - 5.0 mmol/L    Chloride 92 (L) 98 - 107 mmol/L    CO2 37 (H) 22 - 31 mmol/L    Anion Gap, Calculation 12 5 - 18 mmol/L    Glucose 98 70 - 125 mg/dL    Calcium 10.5 8.5 - 10.5 mg/dL    BUN 42 (H) 8 - 28 mg/dL    Creatinine 1.90 (H) 0.60 - 1.10 mg/dL    GFR MDRD Af Amer 30 (L) >60 mL/min/1.73m2    GFR MDRD Non Af Amer 25 (L) >60 mL/min/1.73m2         Lab Results   Component Value Date    WBC 6.9 08/05/2020    HGB 9.7 (L) 09/04/2020    HCT 29.0 (L) 08/05/2020    MCV 98 08/05/2020     08/05/2020       Lab Results   Component Value Date    [id number] 333 07/21/2020     Lab Results   Component Value Date    HGBA1C 6.5 (H) 07/31/2020     Lab Results   Component Value Date    INR 0.99 07/28/2020    INR 1.30 (H) 02/07/2018    INR 1.51 (H) 03/06/2017     Vitamin D, Total (25-Hydroxy)   Date Value Ref Range Status   01/20/2020 45.9 30.0 - 80.0 ng/mL Final     Lab Results   Component Value Date    TSH 3.43 08/17/2020           ASSESSMENT/PLAN:    Left shoulder dislocation, s/p reverse total shoulder arthroplasty: Incision c/d/i. Pain controlled on tylenol.  Ice prn. PT, OT following. voltaren gel prn. F/u with surgeon Dr. Ramirez on 8/14-f/u again in 4 weeks, limit external rotation, no bathing until 4 weeks postop, healing well, f/u again on 9/4-doing well, dc sling, AROM and PROM with therapy, may  Weight bear with walker, f/u again on 10/5.  lymphedema sleeve to LUE. Edema improved today. More soreness since using more.   Chronic CHF: Daily [phone]  previously  Now running [phone]. Shortness of breath worse improved, edema improved. Weights are down. Previously  Increased bumex and added metolazone, needs to elevate legs as well. Encouraged to sleep in recliner instead of wheelchair. Continue lymphedema wrapping. Cardiac diet.   continue metolazone until 9/9, bmp on 9/9. Will re evaluate for further metolazone at that time. Improved today.   S/p PPM: follows with device clinic. No recent concerns.   Asthma:  Encourage cough and deep breathe. IS q1h while awake. Albuterol prn. Shortness of breath with exertion. Stable.   Hypothyroid: on levothyroxine.   CKD stage 3: Cr 1.39, gfr 36 on 8/1.  Bmp today.    H/o Type 2 DM: No meds, no monitoring needed. Diet controlled. No recent concerns.   Dyslipidemia: On aspirin, atorvastatin.   HTN: SBP 120s, On lopressor, bumex.  hold parameters for lopressor. Stable.  GERD: On omeprazole. No concerns today.   Vitamin d deficiency: On vitamin d.  Hypokalemia: On kcl. Increased due to increase bumex and metolazone burst.   PAF, s/p AVR: Regular rate and rhythm today. On metoprolol. F/u with caridology. No concerns.   Anemia of CKD: on iron. Hg 10.9 on 7/24.   Hg 8.8 on 8/1. Hg 9 on 8/5. Recheck on 8/17 8.2, iron to three times a day. recheck on 8/24-hg 8.4.   Candidal intertrigo:  Nystatin.   Constipation: senna daily, miralax daily prn.     PT: trsf min-max A, walking 25ft with trang walker with Min A, OT: LE & hand lymph, mod A UB drsg, max A toileting. Lives with granddaughter. Yellow tag recommend 2 weeks    Electronically signed by: Jennifer Valdes NP

## 2021-06-20 NOTE — LETTER
Letter by Mar Balbuena CNP at      Author: Mar Balbuena CNP Service: -- Author Type: --    Filed:  Encounter Date: 8/12/2020 Status: (Other)         Patient: Thea Acosta   MR Number: [id number]   YOB: 1934   Date of Visit: 8/12/2020     Code Status:  FULL CODE  Visit Type: Follow Up (rehab, pain, gout)     Facility:  [facility] [[id number]]        Facility Type: SNF (Skilled Nursing Facility, TCU)    History of Present Illness: Thea Acosta is a 86 y.o. female with a past medical history for CAD, CHF, paroxysmal AFIB, AVR, s/p PPM, COPD, CKD3, DM2, HTN, GERD.      She was recently hospitalized 7/28/ to 8/1 after a fall at the TCU in which she dilocated her left shoulder She underwent a total reverse arthroplasty on 7/31.      She did have a CBC last week showing improvement in her hgb from 8.8 to 9.0.  Cr was slightly higher since her discharge.  Will need to continue to monitor.      Today, she states she is generally doing well.  Incision is cover with dressing and I do peek at the incision which is well approximated, glued and free of s/s of infection. She has good CMS and radial pulse.  She is wearing a compression glove for soft non pitting edema in which she states is improving.  She is NWB to her left arm. Appetite is good.     Review of Systems   Patient denies fever, chills, headache, lightheadedness, dizziness, rhinorrhea, cough, congestion, shortness of breath, chest pain, palpitations, abdominal pain, n/v, diarrhea, constipation, change in appetite, dysuria, frequency, burning or pain with urination.  Other than stated in HPI all other review of systems is negative.     Physical Exam   Vital signs: /61, HR 60, resp 15, temp 96.6  GENERAL APPEARANCE: Well developed, well nourished, in no acute distress.  HEENT: normocephalic, atraumatic  sclerae anicteric, conjunctivae clear and moist, EOM intact  LUNGS: Lung sounds CTA, no adventitious sounds,  respiratory effort normal.  CARD: RRR, S1, S2, without murmurs, gallops, rubs  ABD: Soft and nontender with normal bowel sounds.   EXTREMITIES: soft nonpitting edema of left hand, soft nonpitting BLE, wearing farrow wraps.   NEURO: Alert and oriented. Face is symmetric.  SKIN: Left shoulder incision is well approximated without s/s of infection.   PSYCH: euthymic          Labs:    Recent Results (from the past 240 hour(s))   Basic Metabolic Panel   Result Value Ref Range    Sodium 139 136 - 145 mmol/L    Potassium 3.8 3.5 - 5.0 mmol/L    Chloride 95 (L) 98 - 107 mmol/L    CO2 35 (H) 22 - 31 mmol/L    Anion Gap, Calculation 9 5 - 18 mmol/L    Glucose 111 70 - 125 mg/dL    Calcium 9.5 8.5 - 10.5 mg/dL    BUN 42 (H) 8 - 28 mg/dL    Creatinine 1.42 (H) 0.60 - 1.10 mg/dL    GFR MDRD Af Amer 43 (L) >60 mL/min/1.73m2    GFR MDRD Non Af Amer 35 (L) >60 mL/min/1.73m2   HM1 (CBC with Diff)   Result Value Ref Range    WBC 6.9 4.0 - 11.0 thou/uL    RBC 2.97 (L) 3.80 - 5.40 mill/uL    Hemoglobin 9.0 (L) 12.0 - 16.0 g/dL    Hematocrit 29.0 (L) 35.0 - 47.0 %    MCV 98 80 - 100 fL    MCH 30.3 27.0 - 34.0 pg    MCHC 31.0 (L) 32.0 - 36.0 g/dL    RDW 14.8 (H) 11.0 - 14.5 %    Platelets 175 140 - 440 thou/uL    MPV 11.3 8.5 - 12.5 fL    Neutrophils % 65 50 - 70 %    Lymphocytes % 23 20 - 40 %    Monocytes % 7 2 - 10 %    Eosinophils % 5 0 - 6 %    Basophils % 0 0 - 2 %    Neutrophils Absolute 4.5 2.0 - 7.7 thou/uL    Lymphocytes Absolute 1.6 0.8 - 4.4 thou/uL    Monocytes Absolute 0.5 0.0 - 0.9 thou/uL    Eosinophils Absolute 0.3 0.0 - 0.4 thou/uL    Basophils Absolute 0.0 0.0 - 0.2 thou/uL         Assessment:  1. Status post reverse total replacement of left shoulder     2. CHF (congestive heart failure), NYHA class I, acute, systolic (H)     3. Essential hypertension with goal blood pressure less than 140/90     4. Acute gout of hand, unspecified cause, unspecified laterality         Plan:   TRA: stable, pain in good control,  continue with scheduled APAP and prn APAP. ASA two times a day for DVT prophylaxis. Continue with therapies    CHF: compensated, bumex    HTN: good control on no medications.     Gout: improving, wean off prednisone 8/22. Colchicine prn       Electronically signed by: Mar Balbuena CNP

## 2021-06-21 NOTE — PROGRESS NOTES
Assessment/Plan:    1. Type 2 diabetes mellitus with complication, without long-term current use of insulin (H)  Recent A1c of 7.0% August 13, 2018 and will reassess at diabetic follow-up December 2018, fasting ideally..    2. Essential hypertension with goal blood pressure less than 140/90  Hypertension, stable.  Continue home medication as noted including metoprolol tartrate 50 mg twice daily.    3. Hypokalemia  Continues potassium supplement 20 mEq daily.  Ensure stable electrolytes with basic metabolic panel pending.    4. PAF (paroxysmal atrial fibrillation) (H)  History of paroxysmal atrial fibrillation.  Pacemaker in place.  Doing well without palpitations, presyncope etc.  Pacemaker evaluation September 2018 completed.    5. Need for vaccination  High-dose flu shot given.  - Influenza High Dose, Seasonal 65+ yrs    6. Hypothyroidism, unspecified type  Recent suboptimal thyroid management of elevated TSH.  Repeat TSH today.  Continues levothyroxine 125 mcg daily.  - Thyroid Stimulating Hormone (TSH)    Lab Results   Component Value Date    TSH 9.31 (H) 07/05/2018        7. CKD (chronic kidney disease) stage 4, GFR 15-29 ml/min (H)  CKD stage IV.  Check basic metabolic panel, CBC and vitamin D.  Continues use of metolazone 2.5 mg on Monday and Friday otherwise Bumex 2 mg twice daily.  - Basic Metabolic Panel  - HM2(CBC w/o Differential)  - Vitamin D, Total (25-Hydroxy)    8. Moderate persistent asthma  Asthma control test 17 out of 25 and continues Advair 500/51 inhalation twice daily Combivent inhaler 1 puff 4 times daily.    40 minutes total time with patient, > 50% with counseling and coordination of cares.         Subjective:    Thea Acosta is seen today for follow-up evaluation.  Complicated history.  Hypertension.  Continues metoprolol tartrate 50 mg twice daily.  History of diastolic heart failure (CHF with preserved ejection fraction).  Continues metolazone 2.5 mg Monday and Friday otherwise Bumex  "2 mg twice daily.  Denies chest pain.  No palpitations.  History of paroxysmal atrial fibrillation.  Pacemaker in place.  Thyroid replacement with prior TSH elevation.  Continues potassium supplement.  Some general weakness.  This is unchanged.  Is not been hospitalized recently and is happy about this.  States typically hospitalized over her birthday or Easter.  Needs flu shot.  Patient's son, Nate, is with at today's visit.  Type 2 diabetes, diet controlled with A1c of 7% August 13, 2018.  Admitted to hospital March 7 - March 12, 2018 with abnormal labs and encephalopathy.  Comprehensive review of systems as above otherwise all negative.      Reviewed hospital discharge summary from 3/12/18:  Thea Acosta is a 84 yo female with PMH of CKD 3, AS s/p AVR, complete heart block s/p PPM with recent generator change 2/12/18, moderate MS, chronic diastolic CHF, chronic anemia, chronic LE venous stasis, recent admission with cellulitis treated with clindamycin admitted with acute encephalopathy and MIGUEL. MIGUEL was thought to be hemodynamic due to poor renal autoregulation, CHF, hypotension, diuretics, poor oral intake. Renal function improved after IV fluids and holding diuretics. Oral intake seems to be improved. Patient was encouraged to consume 40-48 oz of fluids daily. Bumex was resumed at a lower dose.  Urine culture came back positive for Klebsiella and patient will be discharged on 3 days ciprofloxacin course. Her encephalopathy improved even prior to starting UTI treatment.   Hemoglobin has been in a 7.6 - 8.9 range without obvious signs of bleeding. Iron stores adequate. If anemia is not improving, patient might need to be referred to Nephrology to consider epoetin.   Palliative care was involved for goals of care discussion. Patient wishes to remain a full code.   Mrs. Acosta will be discharged back to SNF to continue her rehabilitation         (twice) now since 3/24/07 (\"Gal\" - renal cell CA) - remarried " "3/8/82   3 sons - Nate Mckoy (); Edwin (head of work force center in [facility]; Amrit Mckoy works at the [employer] (I see him now as a patient)   Son-in-law Esdras Mo   2 daughters - breast cancer   Granddaughter - Terri - plays the clarinet   New granddaughter - Patrizia?   Nondenominational  Dad - dec MVA age 52   Mom - dec 86 old age   2 bros - 1 bro  due to lung cancer in Aug, 2010 (had quit smoking > 40-50 years ago)   Quit smoking    No EtOH   Work: laundry services at [employer]   Surgeries: s/p gallbladder, bunion 06 left TKA ([facility]) 07 Aortic valve replacement (bioprostheitc) 07 pacemaker - dual chamber   Cardiovascular surgeon called 07 - patient is \"vasculopath\", prior Hb = 8.9, no Coumadin, will use ASA only 10-20-08: started back on Warfarin, 09 GI bleed, D/C Warfarin   11-10-08: Dexa order faxed to Thea Ordaz will schedule    3/13/18 FYI: Mrs. Acosta was admitted with an acute kidney injury. Renal function improved after IV fluids and holding diuretics. She also had an encephalopathy which improved significantly. She has UTI and was started on Ciprofloxacin. Mrs. Acosta was discharged back to TCU to continue her rehabilitation. For any additional concerns please call our services. thank you.     Past Surgical History:   Procedure Laterality Date     APPENDECTOMY       CHOLECYSTECTOMY       EYE SURGERY Bilateral     Cataracts     HIP PINNING Right 3/1/2017    Procedure: RIGHT HIP TROCHANTERIC RODDING;  Surgeon: Moshe Davies MD;  Location: [facility] OR;  Service:      INSERT / REPLACE / REMOVE PACEMAKER  2007    guidant     INSERT / REPLACE / REMOVE PACEMAKER  2018    phoebe sci gen change     JOINT REPLACEMENT Left     knee     TISSUE AORTIC VALVE REPLACEMENT  2007             Family History   Problem Relation Age of Onset     No Medical Problems Mother      age 86     No Medical Problems Father      MVA     " Cancer Brother      lung     No Medical Problems Brother         Past Medical History:   Diagnosis Date     Acute kidney injury superimposed on CKD (H) 3/3/2017     Asthma      CAD (coronary artery disease)      Cataract      Chronic kidney disease     ckd3     Diabetes mellitus (H)      Disease of thyroid gland      H/O heart valve replacement with bioprosthetic valve      History of transfusion      Hypertension      Normochromic normocytic anemia      Restless leg syndrome         Social History   Substance Use Topics     Smoking status: Former Smoker     Smokeless tobacco: Never Used     Alcohol use No        Current Outpatient Prescriptions   Medication Sig Dispense Refill     acetaminophen (TYLENOL) 500 MG tablet Take 2 tablets (1,000 mg total) by mouth 3 (three) times a day as needed for pain. Not to exceed 4000 mg in 24 hours.  0     aluminum-magnesium hydroxide-simethicone (MAALOX ADVANCED) 200-200-20 mg/5 mL Susp Take 30 mL by mouth every 6 (six) hours as needed.       aspirin 81 MG EC tablet Take 81 mg by mouth daily.       atorvastatin (LIPITOR) 80 MG tablet Take 1 tablet (80 mg total) by mouth at bedtime. 90 tablet 3     bumetanide (BUMEX) 2 MG tablet Take 1 tablet (2 mg total) by mouth 2 (two) times a day at 9am and 6pm. 180 tablet 3     cholecalciferol, vitamin D3, (VITAMIN D3) 2,000 unit Tab Take 2,000 Units by mouth once daily.       ferrous sulfate 325 (65 FE) MG tablet Take 1 tablet (325 mg total) by mouth 2 (two) times a day. 180 tablet 0     fluticasone-salmeterol (ADVAIR) 500-50 mcg/dose DISKUS Inhale 1 puff 2 (two) times a day.       folic acid (FOLVITE) 1 MG tablet Take 1 mg by mouth daily.       ipratropium-albuterol (COMBIVENT RESPIMAT)  mcg/actuation Mist inhaler Inhale 1 puff 4 (four) times a day. 4 g 11     levothyroxine (SYNTHROID, LEVOTHROID) 125 MCG tablet Take 1 tablet (125 mcg total) by mouth Daily at 6:00 am. 90 tablet 3     melatonin 3 mg Tab tablet Take 3 mg by mouth at  bedtime.        menthol-zinc oxide (CALMOSEPTINE) 0.44-20.6 % Oint ointment Apply 1 application topically 4 (four) times a day as needed (to denuded areas of skin). 113 g 0     metOLazone (ZAROXOLYN) 2.5 MG tablet Take 1 tablet (2.5 mg total) by mouth 2 (two) times a week. 24 tablet 3     metoprolol tartrate (LOPRESSOR) 50 MG tablet Take 50 mg by mouth 2 (two) times a day.       nystatin (MYCOSTATIN) cream Apply 1 application topically 2 (two) times a day. Apply to thighs abd folds until resolved.       omeprazole (PRILOSEC) 20 MG capsule TAKE 1 CAPSULE BY MOUTH EVERY DAY 90 capsule 3     potassium chloride (K-DUR,KLOR-CON) 20 MEQ tablet TAKE 1 TABLET(20 MEQ) BY MOUTH DAILY 90 tablet 3     white petrolatum-mineral oil (EUCERIN) Crea Apply 1 application topically every 4 (four) hours as needed. Apply to bilateral legs.       No current facility-administered medications for this visit.           Objective:    Vitals:    11/06/18 1334   BP: 130/70   Pulse: 64   Weight: 219 lb (99.3 kg)      Body mass index is 34.3 kg/(m^2).    Alert.  No apparent distress.  Chest with diminished breath sounds, symmetric.  Cardiac exam regular.  Extremities warm and dry.  Right greater than left foot edema however improvement noted.  Onychomycosis findings.  Significant scaling of skin of feet.  No evidence for cellulitis.  No ulceration.      This note has been dictated using voice recognition software and as a result may contain minor grammatical errors and unintended word substitutions.

## 2021-06-21 NOTE — LETTER
Letter by Jane Jones at      Author: Jane Jones Service: -- Author Type: --    Filed:  Encounter Date: 10/20/2020 Status: (Other)         Thea Acosta  [address]  [place] [address]      October 21, 2020      Dear Ms. Tracejosi,    RE: Remote Results    We are writing to you regarding your recent Remote Pacemaker check from home. Your transmission was received successfully. Battery status is satisfactory at this time.     Your results are showing no significant changes.    Your next device appointment will be a remote check on January 19, 2021; this will occur automatically.    To schedule or reschedule, please call [phone] and press 1.    NOTE: If you would like to do an extra transmission, please call [phone] and press 3 to speak to a nurse BEFORE transmitting. This ensures that the [facility] staff is aware of the reason you are sending a transmission, and can follow-up with you after it has been reviewed.    We will be checking your implanted device from home (remotely) every three months unless otherwise instructed. We will need to see you in the clinic at least once a year. You may need to be seen in the clinic sooner depending on the results of your check.    Please be aware:    The follow-up schedule is like a Physician prescription.    Your remote monitor is paired to your specific implanted device.      Sincerely,    [facility]

## 2021-06-21 NOTE — LETTER
"Letter by Jennifer Valdes NP at      Author: Jennifer Valdes NP Service: -- Author Type: --    Filed:  Encounter Date: 10/19/2020 Status: (Other)         Patient: Thea Acosta   MR Number: [id number]   YOB: 1934   Date of Visit: 10/19/2020                 [facility]  Video visit    Thea Acosta 85 yo. female who is being evaluated via a billable video visit.      The patient has been notified of following:     \"This video visit will be conducted via a call between you and your physician/provider. We have found that certain health care needs can be provided without the need for an in-person physical exam.  This service lets us provide the care you need with a video conversation.  If a prescription is necessary we can send it to the facility team.  If lab work is needed we can place an order through the facility team to have that test done at a later time.    If during the course of the call the physician/provider feels a video visit is not appropriate, you will not be charged for this service.\"     Physician/provider has received verbal consent for a Video Visit from the patient? Yes    Patient would like the video invitation sent by: me Send to : [facility]    Video Start Time: 1140      DATE: 10/19/020    NAME:  Thea Acosta             :  1934  MRN: [id number]  CODE STATUS:  DNR    VISIT TYPE: Discharge Summary     FACILITY:  [facility] [[id number]]       CHIEF COMPLAIN/REASON FOR VISIT:    Chief Complaint   Patient presents with   ? Discharge Summary               HISTORY OF PRESENT ILLNESS: Thea Acosta is a 86 y.o. female who admitted - for acute metabolic encephalopathy. This was felt to be s/t hypothyroidism and noncompliance with levothyroxine dosing. She was readmitted - for left shoulder dislocation and underwent reverse total shoulder arthroplasty on . Postop course was uncomplicated and " [facility]. She has PMH of CAD, CKD, DM, s/p PPM, diastolic CHF, pulmonary artery hypertension, mitral valve stenosis, chronic a fib no anticoagulation, s/p AVR, asthma. Prior to this she lived at home with her grand daughter. She was readmitted 9/10-9/13 for altered mental status. She was treated for possible cellulitis and hypoxic respiratory failure. Her tsh was elevated so levothyroxine dose was increased. She was discharged back to tcu.     TCU course:   Ms. Acosta has made progress with therapy and is ambulating 40 feet with walker and contact guard assist. She is min to max for dressing, max for toileting. She is mod assist for feeding. She was recommended 24 hour care at home. During her tcu course she was treated for CHF exacerbation with metolazone burst. Her weights are now more stable. Her most recent bmp on 10/12 was stable. Her shoulder pain has been improving. She last saw ortho on 10/5. She will follow up prn. She is off restrictions. She did have worsening of thyroid function and levothyroxine dose was recently adjusted. She will be due for tsh recheck at pcp follow up. Her renal function has been stable. Her diabetes is controlled with diet. Her vitlas have been stable. Her A fib is controlled. Her anemia has been stable 9-10 recently. She was started on iron. We did trial off aspirin and this did not improve anemia so she was resumed on this. She did have some recent loose stools but this is now resolved. She was started on requip for restless leg syndrome. She completed therapy  On 10/12 and lost her appeal. She stayed one week private pay due to her granddaughter moving out and not having 24 hour assist at home. Her son is coming from [place] to stay with her for 6 Weeks. She will have HH PT, OT, HHA, RN. She will f/u with PCP in 5-7 days. She recently had ramp installed at home and will be using wheelchair and walker at home.         Review of Systems    Currently, no  "fever, chills, or rigors. Decreased vision and hearing. Denies any chest pain, palpitations, lightheadedness, dizziness, no cough. Appetite is good.   No active bleeding. Positive for urinary incontinence, urinary frequency, leg swelling, left shoulder incision healed, no nausea, no recent constipation or diarrhea, pain controlled, velcro leg wraps, shortness of breath withe exertion, no insomnia, no headaches       Allergies   Allergen Reactions   ? Tramadol Anxiety and Other (See Comments)     Per patient, Thea developed psychosis and severe anxiety and agitation \"for three days\" after receiving tramadol in the past. She stated that she would \"never\" take it again and would be extremely upset if she receives it. She asked me to add this to her chart's allergy list specifically.    ? Codeine Hives   ? Codeine Phosphate (Bulk)      This allergy is per [facility] [place] records.   ? Codeine Sulfate      This allergy is per [facility] [place] records.   ? Codeine-Butalbital-Asa-Caff      This allergy is per [facility] [place] records.   ? Codeine-Guaifenesin      This allergy is per [facility] [place] records.   ? Lisinopril Cough   ? Penicillins Swelling     Current Outpatient Medications   Medication Sig   ? acetaminophen (TYLENOL) 500 MG tablet Take 2 tablets (1,000 mg total) by mouth 3 (three) times a day. (Patient taking differently: Take 1,000 mg by mouth 3 (three) times a day as needed. )   ? albuterol (PROAIR HFA;PROVENTIL HFA;VENTOLIN HFA) 90 mcg/actuation inhaler Inhale 2 puffs every 4 (four) hours as needed for wheezing.   ? aspirin 81 MG EC tablet Take 81 mg by mouth daily.   ? atorvastatin (LIPITOR) 80 MG tablet TAKE 1 TABLET(80 MG) BY MOUTH DAILY   ? bumetanide (BUMEX) 2 MG tablet Take 1 tablet (2 mg total) by mouth 2 (two) times a day at 9am and 6pm. (Patient taking differently: Take 2 mg by mouth 2 " (two) times a day at 9am and 6pm. 2mg in am and 1mg at noon)   ? colchicine 0.6 mg tablet take two tablets (1.2 mg) by mouth at onset of gout symptoms, then repeat one tablet (0.6 mg) by mouth one hour later   ? diclofenac sodium (VOLTAREN) 1 % Gel Apply 2 g topically 4 (four) times a day as needed.    ? dimethicone 5 % Crea Apply 1 application topically 2 (two) times a day as needed (to buttocks as needed for skin breakdown).   ? ferrous sulfate 325 (65 FE) MG tablet Take 1 tablet by mouth daily.   ? levothyroxine (SYNTHROID, LEVOTHROID) 200 MCG tablet Take 1 tablet (200 mcg total) by mouth daily before supper.   ? metoprolol tartrate (LOPRESSOR) 25 MG tablet Take 12.5 mg by mouth 2 (two) times a day. Hold for sbp<105  Hr <55   ? nystatin (MYCOSTATIN) powder Apply topically 4 (four) times a day.   ? omeprazole (PRILOSEC) 20 MG capsule Take 1 capsule (20 mg total) by mouth daily before breakfast.   ? ondansetron (ZOFRAN-ODT) 4 MG disintegrating tablet Take 4 mg by mouth every 6 (six) hours as needed for nausea.   ? polyethylene glycol (MIRALAX) 17 gram packet Take 17 g by mouth daily as needed.    ? potassium chloride (K-DUR,KLOR-CON) 20 MEQ tablet Take 40 mEq by mouth daily before breakfast.   ? rOPINIRole (REQUIP) 0.5 MG tablet Take 0.5 mg by mouth at bedtime.   ? senna (SENOKOT) 8.6 mg tablet Take 1 tablet by mouth daily as needed. Hold for loose stools   ? white petrolatum (AQUAPHOR ORIGINAL) 41 % Oint Apply 1 application topically at bedtime.     Past Medical History:    Past Medical History:   Diagnosis Date   ? Acute kidney injury superimposed on CKD (H) 3/3/2017   ? Asthma    ? CAD (coronary artery disease)    ? Cataract    ? Chronic kidney disease     ckd3   ? COPD (chronic obstructive pulmonary disease) (H)    ? Diabetes mellitus (H)    ? Disease of thyroid gland    ? H/O heart valve replacement with bioprosthetic valve    ? History of transfusion    ? Hypertension    ? Normochromic normocytic anemia    ?  Pulmonary hypertension (H) 09/27/2019    Noted on echocardiogram   ? Restless leg syndrome            PHYSICAL EXAMINATION  Vitals:    10/19/20 0700   BP: 102/64   Pulse: 64   Resp: 18   Temp: 96.6  F (35.9  C)   SpO2: 93%   Weight: 205 lb (93 kg)           LABS:   No results found for this or any previous visit (from the past 168 hour(s)).  Results for orders placed or performed in visit on 10/12/20   Basic Metabolic Panel   Result Value Ref Range    Sodium 141 136 - 145 mmol/L    Potassium 4.0 3.5 - 5.0 mmol/L    Chloride 97 (L) 98 - 107 mmol/L    CO2 36 (H) 22 - 31 mmol/L    Anion Gap, Calculation 8 5 - 18 mmol/L    Glucose 96 70 - 125 mg/dL    Calcium 9.7 8.5 - 10.5 mg/dL    BUN 41 (H) 8 - 28 mg/dL    Creatinine 1.53 (H) 0.60 - 1.10 mg/dL    GFR MDRD Af Amer 39 (L) >60 mL/min/1.73m2    GFR MDRD Non Af Amer 32 (L) >60 mL/min/1.73m2         Lab Results   Component Value Date    WBC 5.7 09/12/2020    HGB 9.8 (L) 09/25/2020    HCT 30.8 (L) 09/12/2020     (H) 09/12/2020     09/12/2020       Lab Results   Component Value Date    LOZSCLUG28 468 09/12/2020     Lab Results   Component Value Date    HGBA1C 6.5 (H) 07/31/2020     Lab Results   Component Value Date    INR 1.11 (H) 09/10/2020    INR 0.99 07/28/2020    INR 1.30 (H) 02/07/2018     Vitamin D, Total (25-Hydroxy)   Date Value Ref Range Status   01/20/2020 45.9 30.0 - 80.0 ng/mL Final     Lab Results   Component Value Date    TSH 9.38 (H) 09/11/2020           ASSESSMENT/PLAN:    Acute on Chronic CHF: Daily weights- [phone]. stable shortness of breath and edema. On bumex, encourage leg elevation. Cardiac diet. velcro leg wraps.  notify for weight change for 2 lbs in 24 hours or 5lbs in 7 days. Stable today. Continue current regimen at home. Encouraged to weigh frequently at home and notify her primary for weight gain. Completed metolazone burst.   S/p reverse total shoulder arthroplasty: Incision healed.  Completed restrictions,  last ortho visit 10/5. F/u prn.   S/p PPM: follows with device clinic. No recent concerns.   Asthma:  Encourage cough and deep breathe. Albuterol prn. No recent concerns.    Hypothyroid: on levothyroxine. tsh 9.38 on 9/11, levothyroxine dose adjusted. Needs tsh recheck at pcp follow up.  CKD stage 3: Cr 1.39, gfr 36 on 8/1.  9/4 cr 1.9, gfr 25. Bmp 9/18-cr 2.05 reduced bumex, 1.98 on 9/21. Cr 1.68 on 9/25 stable. Cr 1.53 on 10/12. Stable.   H/o Type 2 DM: managed per diet.   Dyslipidemia: On atorvastatin.   HTN: SBP 100s, On bumex, metoprolol. Stable recently.   GERD: On omeprazole. No concerns today.   Vitamin d deficiency: On vitamin d.  Hypokalemia: On kcl. Stable.   PAF, s/p AVR: Regular rate and rhythm recently. No recent a fib rvr. Controlled off metoprolol. F/u with caridology. On aspirin daily.   Anemia of CKD: on iron.stable 9-10.  Candidal intertrigo:  Nystatin.   Constipation: changed stool softeners to prn. No recent diarrhea.    Restless legs, insomnia: requip at bedtime. No concerns today.   Nausea:  zofran prn.     Video-Visit Details    Type of service:  Video Visit    Video End Time (time video stopped): 1150, additional 25 min spent on counseling and coordination of care for discharge, chf management.     Originating Location (pt. Location):[facility] [[id number]]    Distant Location (provider location):  [facility]     Mode of Communication: Face time      Electronically signed by: Jennifer Valdes NP     Please evaluate Thea Acosta for admission to Home Health.    Face to Face Attestation and Initial Plan of Care    The face-to-face encounter occurred on date: 10/19/20  Face to Face encounter was with: Jennifer Valdes    Please provide brief clinical summary of reason for visit and need for home care. Deconditioning after hospital course for chf    Please identify which of the following home health disciplines the patient will need AND describe  "the skilled services that you would like the home health agency to perform: SKILLED NURSING (RN): complex med management, PHYSICAL THERAPY: strength training and gait training, OCCUPATIONAL THERAPY: ADLs and home safety and HOME HEALTH AIDE    Homebound Status (describe the functional limitations that support this patient is confined to his/her home. Medicaid recipients are not required to be homebound.):assistive device needed:  4WW and Wheelchair    Name of physician who will be responsible for the ongoing home health plan of care (CMS requires the referring physician to provide the specific name of the community physician instead of a title, such as \"PCP\"): Dr. Gamal Hdz    Requested Start of Care Date: Within 48 hours    Other information to assist the home health agency in developing the initial Plan of Care:    I certify that services are/were furnished while this patient was under the care of a physician and that a physician or an allowed non-physician practitioner (NPP), had a face-to-face encounter that meets the physician face-to-face encounter requirements. The encounter was in whole, or in part, related to the primary reason for home health. The patient is confined to his/her home and needs intermittent skilled nursing, physical therapy, speech-language pathology, or the continued need for occupational therapy. A plan of care has been established by a physician and is periodically reviewed by a physician.    Post Discharge Medication Reconciliation Status: discharge medications reconciled, continue medications without change             "

## 2021-06-21 NOTE — LETTER
Letter by Candy Vanessa RDCS at      Author: Candy Vanessa RDCS Service: -- Author Type: --    Filed:  Encounter Date: 1/19/2021 Status: (Other)         Thea Acosta  [address] [place] [address]      January 19, 2021      Dear Ms. Acosta,    RE: Remote Results    We are writing to you regarding your recent Remote Pacemaker check from home. Your transmission was received successfully. Battery status is satisfactory at this time.     Your results are showing no significant changes.    Your next device appointment will be a clinic visit.  Please call in January to schedule.      To schedule or reschedule, please call [phone] and press 1.    NOTE: If you would like to do an extra transmission, please call [phone] and press 3 to speak to a nurse BEFORE transmitting. This ensures that the Device Clinic staff is aware of the reason you are sending a transmission, and can follow-up with you after it has been reviewed.    We will be checking your implanted device from home (remotely) every three months unless otherwise instructed. We will need to see you in the clinic at least once a year. You may need to be seen in the clinic sooner depending on the results of your check.    Please be aware:    The follow-up schedule is like a Physician prescription.    Your remote monitor is paired to your specific implanted device.      Sincerely,    [facility]

## 2021-06-21 NOTE — LETTER
"Letter by Gamal Hdz MD at      Author: Gamal Hdz MD Service: -- Author Type: --    Filed:  Encounter Date: 12/30/2020 Status: (Other)         Thea Acosta  [address] [place] [address]             December 30, 2020         Dear Ms. Acosta,    Below are the results from your recent visit:    Resulted Orders   BNP(B-type Natriuretic Peptide)   Result Value Ref Range     (H) 0 - 167 pg/mL   Thyroid Stimulating Hormone (TSH)   Result Value Ref Range    TSH 0.17 (L) 0.30 - 5.00 uIU/mL   Basic Metabolic Panel   Result Value Ref Range    Sodium 141 136 - 145 mmol/L    Potassium 5.0 3.5 - 5.0 mmol/L    Chloride 100 98 - 107 mmol/L    CO2 28 22 - 31 mmol/L    Anion Gap, Calculation 13 5 - 18 mmol/L    Glucose 99 70 - 125 mg/dL    Calcium 9.6 8.5 - 10.5 mg/dL    BUN 52 (H) 8 - 28 mg/dL    Creatinine 1.76 (H) 0.60 - 1.10 mg/dL    GFR MDRD Af Amer 33 (L) >60 mL/min/1.73m2    GFR MDRD Non Af Amer 27 (L) >60 mL/min/1.73m2    Narrative    Fasting Glucose reference range is 70-99 mg/dL per  American Diabetes Association (ADA) guidelines.   HM2(CBC w/o Differential)   Result Value Ref Range    WBC 5.4 4.0 - 11.0 thou/uL    RBC 3.40 (L) 3.80 - 5.40 mill/uL    Hemoglobin 10.2 (L) 12.0 - 16.0 g/dL    Hematocrit 32.2 (L) 35.0 - 47.0 %    MCV 95 80 - 100 fL    MCH 29.9 27.0 - 34.0 pg    MCHC 31.5 (L) 32.0 - 36.0 g/dL    RDW 14.6 (H) 11.0 - 14.5 %    Platelets 94 (L) 140 - 440 thou/uL    MPV 9.9 7.0 - 10.0 fL       Your \"fluid level\" (i.e. BNP) remains elevated.  Continue your current medication as discussed in order to decrease fluid retention.    Your thyroid screening test (i.e. TSH) was mildly low which suggests that you are getting too much thyroid hormone.  Okay to continue current dose due to fluctuations and repeat in 4 weeks before deciding on dose adjustment.    Your kidney function remains reduced, however, your electrolytes are fine.  Continue your current medication as discussed.  We will " continue to follow closely.     Your complete blood count results show stable blood count abnormalities including low hemoglobin and platelets.  We will continue to follow closely.     Please call with questions or contact us using [email or web address].    Sincerely,        Electronically signed by Gamal Hdz MD

## 2021-06-22 NOTE — PROGRESS NOTES
Assessment/Plan:    1. Type 2 diabetes mellitus with stage 3 chronic kidney disease, without long-term current use of insulin (H)  Type 2 diabetes improved control with A1c decreased from 7% down to 6.7% currently.  Continue dietary modifications and exercise as tolerated for further diabetes improvement.  Reassess just over 3 months.  - Glycosylated Hemoglobin A1c    2. CKD (chronic kidney disease), stage III (H)  History of CKD stage III-IV.  Most recent GFR 28.  Continues to do well otherwise.  Renal function has remained stable.  Re-assess scheduled follow-up just over 3 months.  - Basic Metabolic Panel    3. Essential hypertension with goal blood pressure less than 140/90  Hypertension, stable with blood pressure 120/60.  Continues metoprolol tartrate 50 mg twice daily    4. Edema, unspecified type  Continue home medications including Bumex 2 mg twice daily and metolazone 2.5 mg twice a week.  Continues potassium chloride 20 mEq daily potassium replacement.  Foot care was completed today with foot soaks, debridement of hyperkeratotic skin as well as nail care for onychomycosis involving toenails bilateral feet.    5. Normochromic normocytic anemia  Repeat CBC with normochromic normocytic anemia history prior hemoglobin 11.0 with MCV 93.  - HM2(CBC w/o Differential)    6. Hypothyroidism, unspecified type  Prior TSH 23.27 following office visit November 6, 2018.  Recheck TSH following recent dose adjustment of levothyroxine from 125 mcg up to 150 mcg daily approximately 6 weeks ago.  - Thyroid Stimulating Hormone (TSH)    PHQ 9 questionnaire for out of 27.    40 minutes total time with patient, > 50% with counseling and coordination of cares.         Subjective:    Thea J Tracejosi is seen today for follow-up evaluation.  Type 2 diabetes.  Prior A1c of 7% August 13, 2018.  Nonfasting today.  Does have a history of CKD stage III/IV with prior creatinine 1.71 and GFR 28.  Due to TSH elevation at 23.27 did increase  "levothyroxine from 125 mcg up to 150 mcg daily.  Has tolerated well without concerns.  Remains on Bumex 2 mg twice daily plus metolazone 2.5 mg twice a week.  Metoprolol tartrate 50 mg twice daily as well with history of underlying hypertension.  Atorvastatin 80 mg daily for lipid management.  Nonfasting today.  Foot swelling, stable.  Right greater than left historically.  Needs nail care.  Nail thickening and discoloration.  Unable to trim on her own.  Comprehensive review of systems as above otherwise all negative.     (twice) now since 3/24/07 (\"Gal\" - renal cell CA) - remarried 3/8/82   3 sons - Nate Mckoy (); Edwin (head of work force center in [facility]; Amrit Mckoy works at the [employer] (I see him now as a patient)   Son-in-law Esdras Mo   2 daughters - breast cancer   Granddaughter - Terri - plays the clarinet   New granddaughter - Patrizia?   Christian  Dad - dec MVA age 52   Mom - dec 86 old age   2 bros - 1 bro  due to lung cancer in Aug, 2010 (had quit smoking > 40-50 years ago)   Quit smoking    No EtOH   Work: laundry services at [employer]   Surgeries: s/p gallbladder, bunion 06 left TKA ([facility]) 07 Aortic valve replacement (bioprostheitc) 07 pacemaker - dual chamber   Cardiovascular surgeon called 07 - patient is \"vasculopath\", prior Hb = 8.9, no Coumadin, will use ASA only 10-20-08: started back on Warfarin, 09 GI bleed, D/C Warfarin   11-10-08: Dexa order faxed to Thea Ordaz will schedule    Past Surgical History:   Procedure Laterality Date     APPENDECTOMY       CHOLECYSTECTOMY       EYE SURGERY Bilateral     Cataracts     HIP PINNING Right 3/1/2017    Procedure: RIGHT HIP TROCHANTERIC RODDING;  Surgeon: Moshe Davies MD;  Location: [facility] Main OR;  Service:      INSERT / REPLACE / REMOVE PACEMAKER  2007    guidant     INSERT / REPLACE / REMOVE PACEMAKER  2018    phoebe sci gen change     JOINT " REPLACEMENT Left     knee     TISSUE AORTIC VALVE REPLACEMENT  06/25/2007             Family History   Problem Relation Age of Onset     No Medical Problems Mother         age 86     No Medical Problems Father         MVA     Cancer Brother         lung     No Medical Problems Brother         Past Medical History:   Diagnosis Date     Acute kidney injury superimposed on CKD (H) 3/3/2017     Asthma      CAD (coronary artery disease)      Cataract      Chronic kidney disease     ckd3     Diabetes mellitus (H)      Disease of thyroid gland      H/O heart valve replacement with bioprosthetic valve      History of transfusion      Hypertension      Normochromic normocytic anemia      Restless leg syndrome         Social History     Tobacco Use     Smoking status: Former Smoker     Smokeless tobacco: Never Used   Substance Use Topics     Alcohol use: No     Drug use: No        Current Outpatient Medications   Medication Sig Dispense Refill     acetaminophen (TYLENOL) 500 MG tablet Take 2 tablets (1,000 mg total) by mouth 3 (three) times a day as needed for pain. Not to exceed 4000 mg in 24 hours.  0     aluminum-magnesium hydroxide-simethicone (MAALOX ADVANCED) 200-200-20 mg/5 mL Susp Take 30 mL by mouth every 6 (six) hours as needed.       aspirin 81 MG EC tablet Take 81 mg by mouth daily.       atorvastatin (LIPITOR) 80 MG tablet Take 1 tablet (80 mg total) by mouth at bedtime. 90 tablet 3     bumetanide (BUMEX) 2 MG tablet Take 1 tablet (2 mg total) by mouth 2 (two) times a day at 9am and 6pm. 180 tablet 3     cholecalciferol, vitamin D3, (VITAMIN D3) 2,000 unit Tab Take 2,000 Units by mouth once daily.       ferrous sulfate 325 (65 FE) MG tablet TAKE 1 TABLET(325 MG) BY MOUTH TWICE DAILY 180 tablet 3     fluticasone-salmeterol (ADVAIR) 500-50 mcg/dose DISKUS Inhale 1 puff 2 (two) times a day.       folic acid (FOLVITE) 1 MG tablet Take 1 mg by mouth daily.       ipratropium-albuterol (COMBIVENT RESPIMAT)   mcg/actuation Mist inhaler Inhale 1 puff 4 (four) times a day. 4 g 11     levothyroxine (SYNTHROID, LEVOTHROID) 150 MCG tablet Take 1 tablet (150 mcg total) by mouth Daily at 6:00 am. 90 tablet 1     melatonin 3 mg Tab tablet Take 3 mg by mouth at bedtime.        menthol-zinc oxide (CALMOSEPTINE) 0.44-20.6 % Oint ointment Apply 1 application topically 4 (four) times a day as needed (to denuded areas of skin). 113 g 0     metOLazone (ZAROXOLYN) 2.5 MG tablet Take 1 tablet (2.5 mg total) by mouth 2 (two) times a week. 24 tablet 3     metoprolol tartrate (LOPRESSOR) 50 MG tablet Take 50 mg by mouth 2 (two) times a day.       nystatin (MYCOSTATIN) cream Apply 1 application topically 2 (two) times a day. Apply to thighs abd folds until resolved.       omeprazole (PRILOSEC) 20 MG capsule TAKE 1 CAPSULE BY MOUTH EVERY DAY 90 capsule 3     potassium chloride (K-DUR,KLOR-CON) 20 MEQ tablet TAKE 1 TABLET(20 MEQ) BY MOUTH DAILY 90 tablet 3     white petrolatum-mineral oil (EUCERIN) Crea Apply 1 application topically every 4 (four) hours as needed. Apply to bilateral legs.       No current facility-administered medications for this visit.           Objective:    Vitals:    12/18/18 1331   BP: 120/60   Pulse: 60   SpO2: 96%      There is no height or weight on file to calculate BMI.    Alert.  No apparent distress.  Chest without significant expiratory wheeze at this time.  No inspiratory crackle.  Symmetric expansion.  Cardiac exam regular.  Abdomen benign, obese.  Extremities with 2+ edema right greater than left foot.  Desquamation of skin noted.  Nail thickening and discoloration with dystrophic nails requiring trimming etc.  No skin breakdown of feet.  Neurologic exam nonfocal.  Patient is pleasant and smiling with frequent laughter.      This note has been dictated using voice recognition software and as a result may contain minor grammatical errors and unintended word substitutions.

## 2021-06-24 NOTE — TELEPHONE ENCOUNTER
RN cannot approve Refill Request    RN can NOT refill this medication historical medication requested.       Monie Jorgensen, [facility] Triage/Med Refill 2/27/2019    Requested Prescriptions   Pending Prescriptions Disp Refills     metoprolol tartrate (LOPRESSOR) 100 MG tablet [Pharmacy Med Name: METOPROLOL TARTRATE 100MG TABLETS] 270 tablet 0     Sig: TAKE 1 AND 1/2 TABLETS(150 MG) BY MOUTH TWICE DAILY    Beta-Blockers Refill Protocol Passed - 2/27/2019  4:43 PM       Passed - PCP or prescribing provider visit in past 12 months or next 3 months    Last office visit with prescriber/PCP: 12/18/2018 Gamal Hdz MD OR same dept: 12/18/2018 Gamal Hdz MD OR same specialty: 12/18/2018 Gamal Hdz MD  Last physical: 1/31/2018 Last MTM visit: Visit date not found   Next visit within 3 mo: Visit date not found  Next physical within 3 mo: Visit date not found  Prescriber OR PCP: Gamal Hdz MD  Last diagnosis associated with med order: 1. HTN (hypertension)  - metoprolol tartrate (LOPRESSOR) 100 MG tablet [Pharmacy Med Name: METOPROLOL TARTRATE 100MG TABLETS]; TAKE 1 AND 1/2 TABLETS(150 MG) BY MOUTH TWICE DAILY  Dispense: 270 tablet; Refill: 0    If protocol passes may refill for 12 months if within 3 months of last provider visit (or a total of 15 months).            Passed - Blood pressure filed in past 12 months    BP Readings from Last 1 Encounters:   12/18/18 120/60

## 2021-06-25 NOTE — PROGRESS NOTES
Assessment/Plan:    1. Type 2 diabetes mellitus with stage 3 chronic kidney disease, without long-term current use of insulin (H)  Type 2 diabetes, stable with A1c 6.7%.  Dietary and exercise modifications reviewed for ongoing attempts at weight goal less than 200 pounds ideally.  Reassess at follow-up exam just over 3 months.  May return in early June due to her son Nate's schedule allowing appointment prior to 90 days.  - Glycosylated Hemoglobin A1c  - Basic Metabolic Panel    2. CKD (chronic kidney disease), stage III (H)  History of CKD stage III.  Ensure stable renal function.  - Basic Metabolic Panel    3. Essential hypertension with goal blood pressure less than 140/90  Hypertension, stable and continues metoprolol tartrate 50 mg twice daily.  - Basic Metabolic Panel    4. Normochromic normocytic anemia  History normochromic normocytic anemia noted.  Ensure stable CBC.  - HM2(CBC w/o Differential)    5. Hypothyroidism, unspecified type  Prior TSH elevation 12.6 and did increase levothyroxine from 125 up to 250 mcg daily.  - Thyroid Stimulating Hormone (TSH)    6. Hypokalemia  Basic metabolic panel pending regarding mild hypokalemia with current potassium chloride 20 mEq daily.  - Basic Metabolic Panel      The following high BMI interventions were performed this visit: encouragement to exercise, weight monitoring, weight loss from baseline weight and lifestyle education regarding diet.  Ensure ongoing efforts to achieve weight goal < 200 pounds initially.        Subjective:    Thea Acosta is seen today for follow-up evaluation.  Type 2 diabetes.  Prior A1c of 6.7%.  Does eat candy bars and cookies as well as having a pop each day typically.  Other dietary indiscretions.  No exercise.  History of hypertension and hyperlipidemia.  Wondering about eating grapefruit while on atorvastatin 80 mg daily and-instructed patient to restrict this to more no more than half a grapefruit at times.  Metoprolol tartrate  "50 mg twice daily for hypertension.  Levothyroxine was increased from 125 up to 150 mcg daily for thyroid replacement.  Using Bumex 2 mg twice daily and metolazone 2.5 mg twice daily.  Potassium replacement continues.  No fever or chills.  Made it through the holidays without hospitalization.  Comprehensive review of systems as above otherwise all negative.     (twice) now since 3/24/07 (\"Gal\" - renal cell CA) - remarried 3/8/82   3 sons - Nate Mckoy (); Edwin (head of work force center in [facility]; Amrit Mckoy works at the post office (I see him now as a patient)   Son-in-law Esdras Mo   2 daughters - breast cancer   Granddaughter - Terri - plays the clarinet   New granddaughter - Patrizia?   Latter-day  Dad - dec MVA age 52   Mom - dec 86 old age   2 bros - 1 bro  due to lung cancer in Aug, 2010 (had quit smoking > 40-50 years ago)   Quit smoking    No EtOH   Work: laundry services at [employer]   Surgeries: s/p gallbladder, bunion 06 left TKA ([facility]) 07 Aortic valve replacement (bioprostheitc) 07 pacemaker - dual chamber   Cardiovascular surgeon called 07 - patient is \"vasculopath\", prior Hb = 8.9, no Coumadin, will use ASA only 10-20-08: started back on Warfarin, 09 GI bleed, D/C Warfarin   11-10-08: Dexa order faxed to Thea Ordaz will schedule    3/13/18 FYI: Mrs. Acosta was admitted with an acute kidney injury. Renal function improved after IV fluids and holding diuretics. She also had an encephalopathy which improved significantly. She has UTI and was started on Ciprofloxacin. Mrs. Acosta was discharged back to TCU to continue her rehabilitation. For any additional concerns please call our services. thank you.     18 FYI: Mrs. Acosta was admitted for evaluation of weakness. Patient was found to be septic on presentation with sores from right foot cellulitis which great Strep Group G. Patient was then started on Ancef and " switched to Keflex upon discharge. Patient was also treated for acute CHF treated with IV diuretics. Patient was initially planned for pacer battery change as outpatient which was also done on this hospital stay on 2/12/18. Patient is discharged to TCU for further therapy. Thank you.      Past Surgical History:   Procedure Laterality Date     APPENDECTOMY       CHOLECYSTECTOMY       EYE SURGERY Bilateral     Cataracts     HIP PINNING Right 3/1/2017    Procedure: RIGHT HIP TROCHANTERIC RODDING;  Surgeon: Moshe Davies MD;  Location: [facility] OR;  Service:      INSERT / REPLACE / REMOVE PACEMAKER  06/25/2007    guidant     INSERT / REPLACE / REMOVE PACEMAKER  02/07/2018    phoebe sci gen change     JOINT REPLACEMENT Left     knee     TISSUE AORTIC VALVE REPLACEMENT  06/25/2007             Family History   Problem Relation Age of Onset     No Medical Problems Mother         age 86     No Medical Problems Father         MVA     Cancer Brother         lung     No Medical Problems Brother         Past Medical History:   Diagnosis Date     Acute kidney injury superimposed on CKD (H) 3/3/2017     Asthma      CAD (coronary artery disease)      Cataract      Chronic kidney disease     ckd3     Diabetes mellitus (H)      Disease of thyroid gland      H/O heart valve replacement with bioprosthetic valve      History of transfusion      Hypertension      Normochromic normocytic anemia      Restless leg syndrome         Social History     Tobacco Use     Smoking status: Former Smoker     Smokeless tobacco: Never Used   Substance Use Topics     Alcohol use: No     Drug use: No        Current Outpatient Medications   Medication Sig Dispense Refill     acetaminophen (TYLENOL) 500 MG tablet Take 2 tablets (1,000 mg total) by mouth 3 (three) times a day as needed for pain. Not to exceed 4000 mg in 24 hours.  0     aluminum-magnesium hydroxide-simethicone (MAALOX ADVANCED) 200-200-20 mg/5 mL Susp Take 30 mL by mouth every 6  (six) hours as needed.       aspirin 81 MG EC tablet Take 81 mg by mouth daily.       atorvastatin (LIPITOR) 80 MG tablet Take 1 tablet (80 mg total) by mouth at bedtime. 90 tablet 3     bumetanide (BUMEX) 2 MG tablet Take 1 tablet (2 mg total) by mouth 2 (two) times a day at 9am and 6pm. 180 tablet 3     cholecalciferol, vitamin D3, (VITAMIN D3) 2,000 unit Tab Take 2,000 Units by mouth once daily.       COMBIVENT RESPIMAT  mcg/actuation Mist inhaler INHALE 1 PUFF BY MOUTH FOUR TIMES DAILY 4 g 11     ferrous sulfate 325 (65 FE) MG tablet TAKE 1 TABLET(325 MG) BY MOUTH TWICE DAILY 180 tablet 3     fluticasone-salmeterol (ADVAIR) 500-50 mcg/dose DISKUS Inhale 1 puff 2 (two) times a day.       folic acid (FOLVITE) 1 MG tablet Take 1 mg by mouth daily.       levothyroxine (SYNTHROID, LEVOTHROID) 150 MCG tablet Take 1 tablet (150 mcg total) by mouth Daily at 6:00 am. 90 tablet 1     melatonin 3 mg Tab tablet Take 3 mg by mouth at bedtime.        menthol-zinc oxide (CALMOSEPTINE) 0.44-20.6 % Oint ointment Apply 1 application topically 4 (four) times a day as needed (to denuded areas of skin). 113 g 0     metOLazone (ZAROXOLYN) 2.5 MG tablet Take 1 tablet (2.5 mg total) by mouth 2 (two) times a week. 24 tablet 3     metoprolol tartrate (LOPRESSOR) 100 MG tablet TAKE 1 AND 1/2 TABLETS(150 MG) BY MOUTH TWICE DAILY 270 tablet 3     metoprolol tartrate (LOPRESSOR) 50 MG tablet Take 50 mg by mouth 2 (two) times a day.       nystatin (MYCOSTATIN) cream Apply 1 application topically 2 (two) times a day. Apply to thighs abd folds until resolved.       omeprazole (PRILOSEC) 20 MG capsule TAKE 1 CAPSULE BY MOUTH EVERY DAY 90 capsule 3     potassium chloride (K-DUR,KLOR-CON) 20 MEQ tablet TAKE 1 TABLET(20 MEQ) BY MOUTH DAILY 90 tablet 3     white petrolatum-mineral oil (EUCERIN) Crea Apply 1 application topically every 4 (four) hours as needed. Apply to bilateral legs.       No current facility-administered medications for this  visit.           Objective:    Vitals:    03/19/19 1330   BP: 126/60   Pulse: (!) 50   SpO2: (!) 88%   Weight: (!) 227 lb (103 kg)      Body mass index is 35.55 kg/m .    Alert.  No apparent distress.  Chest appears clear.  Cardiac exam appears regular without cardiac ectopy.  Extremities warm and dry with 2+ peripheral edema.  Scaliness without skin ulceration.  Subjective sensation intact per patient.      This note has been dictated using voice recognition software and as a result may contain minor grammatical errors and unintended word substitutions.

## 2021-06-29 NOTE — PROGRESS NOTES
Progress Notes by Rojelio Farnsworth MD at 9/15/2020  6:08 PM     Author: Rojelio Farnsworth MD Service: -- Author Type: Physician    Filed: 9/15/2020  6:53 PM Encounter Date: 9/15/2020 Status: Signed    : Rojelio Farnsworth MD (Physician)        [facility]    Facility:  [facility] [[id number]]    Code Status:  DNR    Chief Complaint   Patient presents with   ? H & P   :                    Patient Active Problem List   Diagnosis   ? Constipation   ? Sciatica   ? Dyspnea, unspecified type   ? Hypothyroidism   ? Type 2 diabetes mellitus with stage 3 chronic kidney disease, without long-term current use of insulin (H)   ? Hyperparathyroidism   ? Vitamin D deficiency   ? Mixed hyperlipidemia   ? Atherosclerosis of native coronary artery of native heart without angina pectoris   ? CKD (chronic kidney disease), stage III (H)   ? Osteoarthritis Of The Knee   ? Restless Legs Syndrome   ? H/O heart valve replacement with bioprosthetic valve   ? Cardiac pacemaker in situ   ? Complete heart block (H)   ? Normochromic normocytic anemia   ? Essential hypertension with goal blood pressure less than 140/90   ? Moderate persistent asthma without complication   ? PAF (paroxysmal atrial fibrillation) (H)   ? GERD (gastroesophageal reflux disease)   ? Primary insomnia   ? Hypokalemia   ? Physical deconditioning   ? Asthma   ? Acute respiratory failure with hypoxia (H)   ? CHF (congestive heart failure), NYHA class I, acute, systolic (H)   ? Pulmonary hypertension (H)   ? Atrial fibrillation, unspecified type (H)   ? Status post aortic valve replacement   ? Non-rheumatic mitral valve stenosis   ? Nonrheumatic mitral valve stenosis   ? PVD (peripheral vascular disease) (H)   ? Cellulitis   ? Lymphedema   ? S/p reverse total shoulder arthroplasty   ? Hypoxia   ? Acute diastolic heart failure (H)       History:   Thea Acosta  is an 86 year old female with  history of coronary artery disease, CHF, paroxysmal atrial fibrillation, bioprosthetic AVR, permanent pacemaker placement, COPD, CKD 3, DM 2, hypertension, GERD, right hip/femur fracture with IM emely 2017,  total reverse shoulder procedure, osteoarthritis seen for review of her multiple medical issues     Hospital course #4 (most recent):   Patient was hospitalized September 10 through September 13 presenting with acute metabolic encephalopathy which occurred while in this TCU.  She was noted to be hypoxic on admit, and hypoxic respiratory failure was felt to be her primary cause of encephalopathy.  Recall the patient was previously hospitalized with acute metabolic encephalopathy (see below) which was attributed primarily to her hypothyroidism.  Her TSH was up again at this admission and her Synthroid was increased.     However she was also diagnosed with lower extremity cellulitis as a contributing factor, treated with ceftriaxone and later with Septra which she remains on at this point.    -She also had hypokalemia  -Patient was also treated for acute kidney injury and peripheral edema/fluid overload with IV diuretics.  Recall that she had been on increasing doses of oral Bumex (up to 4 mg twice daily) prior to admission but was recalcitrant to this treatment, at least as far as her peripheral edema went.    Hospital course #1:  Patient was hospitalized at [facility] between July 12 and July 6 following a fall with left shoulder dislocation which was reduced in the emergency room.  However she was noted to be hypoxic and was diagnosed with acute hypoxic respiratory failure felt most likely secondary to atelectasis plus opiates which are being used for pain control.  She had weaned off oxygen by the time of discharge.  She also had acute kidney injury and was noted to have metabolic alkalosis that hospital stay.  She was discharged with a creatinine of 1.4 after holding her Bumex for a brief time.                  "She was discharged to [facility] where she stayed for fewer than 48 hours due to development of acute delirium.     Hospital course #2:     She was readmitted between July 18 and July 21 again at [facility].  Hypothyroidism was felt to be the primary cause of her delirium.  TSH 67 on July 18.  She has been noncompliant with her thyroid medication quite regularly for at least months.  She was again found to be hypoxic without certain cause as she was not on opiates this time.  She did have a chest x-ray on July 20 suggesting a chronic CHF type appearance but no acute problems noted.  She again had elevation of her creatinine to a peak of 1006 discharged 1.67 with a baseline of about 1.4    Hospital course #3  Patient was hospitalized between July 28 and August 1 after I sent her from this TCU upon discovering her dislocated left shoulder on July 28.  Shoulder reduction was achieved but could not be maintained.  She was seen by orthopedics who diagnosed chronic instability and   recommended total reverse shoulder which was completed on July 31, 2020.  Surgery was complicated by mild perioperative hypoxia.  Patient was treated with hydrocodone (since discontinued).           Subjective/ROS:    -augmented by discussion with facility staff involved in direct care  -limited by: Memory    Patient says she does not remember much about the hospitalization.  She knows that she is breathing better and that \"I got my legs back\" referring to the decrease in edema.  Her weight has always been quite perplexing here.  She was 203 pounds yesterday 210 pounds today but she was recently over 230 pounds consistently.  She is back to Bumex 2 mg twice daily.    Patient continues to complain of her right shoulder \"not working\".  She is lost the ability to flex and extend it.  She thinks it may have been injured at some point but she cannot remember it.  We also wondered whether it was seen increased use in compensation " with her left shoulder surgery in July?.    Patient denies chest pain cough shortness of breath orthopnea PND and notes her peripheral edema is better.  She denies fever sweats or chills.  She denies headaches change in vision speaking swallowing hearing.  She does not feel like she has any swallowing issues.  No diarrhea dysuria no sense of constipation.  Remainder 13 system review is negative    Past Medical History:   Diagnosis Date   ? Acute kidney injury superimposed on CKD (H) 3/3/2017   ? Asthma    ? CAD (coronary artery disease)    ? Cataract    ? Chronic kidney disease     ckd3   ? COPD (chronic obstructive pulmonary disease) (H)    ? Diabetes mellitus (H)    ? Disease of thyroid gland    ? H/O heart valve replacement with bioprosthetic valve    ? History of transfusion    ? Hypertension    ? Normochromic normocytic anemia    ? Pulmonary hypertension (H) 09/27/2019    Noted on echocardiogram   ? Restless leg syndrome      Past Surgical History:   Procedure Laterality Date   ? APPENDECTOMY     ? CHOLECYSTECTOMY     ? EYE SURGERY Bilateral     Cataracts   ? HIP PINNING Right 3/1/2017    Procedure: RIGHT HIP TROCHANTERIC RODDING;  Surgeon: Moshe Davies MD;  Location: [facility] OR;  Service:    ? INSERT / REPLACE / REMOVE PACEMAKER  06/25/2007    guidant   ? INSERT / REPLACE / REMOVE PACEMAKER  02/07/2018    phoebe sci gen change   ? JOINT REPLACEMENT Left     knee   ? AL RECONSTR TOTAL SHOULDER IMPLANT Left 7/31/2020    Procedure: LEFT REVERSE TOTAL SHOULDER ARTHROPLASTY;  Surgeon: Alvarez Palomino MD;  Location: [facility] OR;  Service: Orthopedics   ? TISSUE AORTIC VALVE REPLACEMENT  06/25/2007               Family History   Problem Relation Age of Onset   ? No Medical Problems Mother         age 86   ? No Medical Problems Father         MVA   ? Cancer Brother         lung   ? No Medical Problems Brother    :       Social History     Socioeconomic History   ? Marital status:      Spouse  "name: Not on file   ? Number of children: Not on file   ? Years of education: Not on file   ? Highest education level: Not on file   Occupational History   ? Occupation:  in operating room     Comment: retired   Social Needs   ? Financial resource strain: Not on file   ? Food insecurity     Worry: Not on file     Inability: Not on file   ? Transportation needs     Medical: Not on file     Non-medical: Not on file   Tobacco Use   ? Smoking status: Former Smoker     Packs/day: 0.00     Last attempt to quit: 1950     Years since quittin.0   ? Smokeless tobacco: Never Used   Substance and Sexual Activity   ? Alcohol use: No   ? Drug use: No   ? Sexual activity: Never   Lifestyle   ? Physical activity     Days per week: Not on file     Minutes per session: Not on file   ? Stress: Not on file   Relationships   ? Social connections     Talks on phone: Not on file     Gets together: Not on file     Attends Restorationist service: Not on file     Active member of club or organization: Not on file     Attends meetings of clubs or organizations: Not on file     Relationship status: Not on file   ? Intimate partner violence     Fear of current or ex partner: Not on file     Emotionally abused: Not on file     Physically abused: Not on file     Forced sexual activity: Not on file   Other Topics Concern   ? Incontinent Not Asked   ? Toileting independently Not Asked   ? Cognitive impairment Not Asked   ? Vision impairment Not Asked   ? Hearing impairment Not Asked   ? Dentures Not Asked   Social History Narrative    She live in a single floor house.  Her son lives next door and her granddaughter is \"around a lot\"   3/2017   :    Patient says she still is planning to go back home    Current Outpatient Medications on File Prior to Visit   Medication Sig Dispense Refill   ? acetaminophen (TYLENOL) 500 MG tablet Take 2 tablets (1,000 mg total) by mouth 3 (three) times a day. (Patient taking differently: Take 1,000 mg " by mouth 3 (three) times a day as needed. )  0   ? albuterol (PROAIR HFA;PROVENTIL HFA;VENTOLIN HFA) 90 mcg/actuation inhaler Inhale 2 puffs every 4 (four) hours as needed for wheezing.  0   ? atorvastatin (LIPITOR) 80 MG tablet TAKE 1 TABLET(80 MG) BY MOUTH DAILY 90 tablet 3   ? bumetanide (BUMEX) 2 MG tablet Take 1 tablet (2 mg total) by mouth 2 (two) times a day at 9am and 6pm.  0   ? colchicine 0.6 mg tablet take two tablets (1.2 mg) by mouth at onset of gout symptoms, then repeat one tablet (0.6 mg) by mouth one hour later 24 tablet 2   ? diclofenac sodium (VOLTAREN) 1 % Gel Apply 2 g topically 4 (four) times a day as needed.      ? dimethicone 5 % Crea Apply 1 application topically 2 (two) times a day as needed (to buttocks as needed for skin breakdown).     ? ferrous sulfate 325 (65 FE) MG tablet Take 1 tablet by mouth daily.     ? levothyroxine (SYNTHROID, LEVOTHROID) 200 MCG tablet Take 1 tablet (200 mcg total) by mouth daily before supper.     ? nystatin (MYCOSTATIN) powder Apply topically 4 (four) times a day. 60 g 2   ? omeprazole (PRILOSEC) 20 MG capsule Take 1 capsule (20 mg total) by mouth daily before breakfast. 90 capsule 3   ? ondansetron (ZOFRAN-ODT) 4 MG disintegrating tablet Take 4 mg by mouth every 6 (six) hours as needed for nausea.     ? polyethylene glycol (MIRALAX) 17 gram packet Take 17 g by mouth daily.      ? potassium chloride (K-DUR,KLOR-CON) 20 MEQ tablet Take 20 mEq by mouth daily. At 1400     ? potassium chloride (K-DUR,KLOR-CON) 20 MEQ tablet Take 40 mEq by mouth daily before breakfast.     ? senna (SENOKOT) 8.6 mg tablet Take 1 tablet by mouth daily. Hold for loose stools     ? sulfamethoxazole-trimethoprim (SEPTRA) 400-80 mg per tablet Take 1 tablet by mouth 2 (two) times a day for 2 days. 4 tablet 0   ? white petrolatum (AQUAPHOR ORIGINAL) 41 % Oint Apply 1 application topically at bedtime.       No current facility-administered medications on file prior to visit.    :       ALLERGIES:  Tramadol; Codeine; Codeine phosphate (bulk); Codeine sulfate; Codeine-butalbital-asa-caff; Codeine-guaifenesin; Lisinopril; and Penicillins    Vitals:  There were no vitals taken for this visit. except as noted below    Vital signs: Reviewed per facility EMR vitals including as follows:              Temperature 95.8 supposedly, heart rate 58 respirations 19 blood pressure 129/71 O2 sats 94% though they have been as low as 86% yesterday.  Weight recorded is 203 pounds yesterday 210 pounds today.      There is no height or weight on file to calculate BMI.    Physical exam:    General:  Alert  oriented x2, unaware of exact date appears relatively good, happy with smiles answers questions appropriately    Normocephalic atraumatic with extensive hair loss.  Gaze is conjugate speech is strong good range of motion of her neck.    Lousy range of motion of both shoulders, she has very poor flexion and abduction, extension is better.  Check she does little bit better on the operative left side than she does on the right.  She complains of some subdeltoid ache or pain with attempts at flexion on the right.  Surgical scar in the left looks good.  She is moving air easily with decreased breath sounds in the bases.  Heart is irregular near 80 with soft systolic murmur abdomen is soft.  She has Velcro wraps on her lower legs I take those off she has mild venous stasis hyperpigmentation but no active cellulitis that I can see on either side.        Due to the 2020 Covid 19 pandemic, except as noted above, the patient was visually observed at a 6 foot plus distance.  An observational exam was performed in an effort to keep patient safe from Covid 19 and other communicable diseases.   Labs:  Lab Results   Component Value Date    WBC 5.7 09/12/2020    HGB 9.5 (L) 09/12/2020    HCT 30.8 (L) 09/12/2020     (H) 09/12/2020     09/12/2020     Results for orders placed or performed during the hospital encounter  of 09/10/20   Basic Metabolic Panel   Result Value Ref Range    Sodium 141 136 - 145 mmol/L    Potassium 3.6 3.5 - 5.0 mmol/L    Chloride 91 (L) 98 - 107 mmol/L    CO2 40 (H) 22 - 31 mmol/L    Anion Gap, Calculation 10 5 - 18 mmol/L    Glucose 99 70 - 125 mg/dL    Calcium 9.4 8.5 - 10.5 mg/dL    BUN 46 (H) 8 - 28 mg/dL    Creatinine 1.57 (H) 0.60 - 1.10 mg/dL    GFR MDRD Af Amer 38 (L) >60 mL/min/1.73m2    GFR MDRD Non Af Amer 31 (L) >60 mL/min/1.73m2         Lab Results   Component Value Date    TSH 9.38 (H) 2020     Lab Results   Component Value Date    HGBA1C 6.5 (H) 2020     [unfilled]  Lab Results   Component Value Date    [id number] 468 2020     Lab Results   Component Value Date     09/10/2020     [unfilled]  Most Recent EKG     Units 09/10/20  0518   VENTRATE BPM 60   ATRIALRATE BPM 60   QRSDURATION ms 170   QTINTERVAL ms 554   QTCCALC ms 554   RAXIS degrees 98   TAXIS degrees 260   MUSEDX  Wide QRS rhythm  Rightward axis  Left bundle branch block  Abnormal ECG  When compared with ECG of 2020 15:43,  Wide QRS rhythm has replaced Electronic ventricular pacemaker  Confirmed by SEE ED PROVIDER NOTE FOR, ECG INTERPRETATION ([id number]),  MuseAdmin, MuseAdmin () on 2020 1:01:54 PM           Echo Complete   Order# [id number]   Reading physician: Sylwia Lanza MD  Ordering physician: Amrit Lnua DO  Study date: 20    Performing Date  Performing Department    Sep 12, 2020   CARDIAC TESTING [[id number]]    Patient Information     Patient Name   Thea Acosta  MRN   [id number]  Sex   Female   1   1934 (86 y.o.)    Indications     Pulmonary hypertension    Summary       Severe biatrial chamber enlargement    Left ventricle ejection fraction is normal. The calculated left ventricular ejection fraction is 56%.    Left ventricular diastolic dysfunction    Mild to moderate calcific mitral stenosis with moderate mitral regurgitation    Normal  functioning of a bioprosthetic aortic valve without insufficiency    Moderate tricuspid insufficiency with moderate elevation in pulmonary artery pressures.    When compared to the previous study dated 9/27/2019, no interval change.        Imaging:  Imaging results 30 days: Xr Chest 1 View Portable     Result Date: 9/10/2020  EXAM: XR CHEST 1 VIEW PORTABLE LOCATION: [facility] DATE/TIME: 9/10/2020 6:54 AM INDICATION: hypoxia COMPARISON: 07/28/2020      No focal consolidation or pleural effusion. Cardiac pacemaker. Median sternotomy. Stable enlarged cardiac silhouette. Mild pulmonary vascular congestion. Left shoulder arthroplasty.     Ct Head Without Contrast     Result Date: 9/10/2020  EXAM: CT HEAD WO CONTRAST LOCATION: [facility] DATE/TIME: 9/10/2020 6:48 AM INDICATION: Altered mental status. Confusion. COMPARISON: CT head dated 07/18/2020. TECHNIQUE: Routine without IV contrast. Multiplanar reformats. Dose reduction techniques were used. FINDINGS: Patient motion degrades evaluation. Within these confines: INTRACRANIAL CONTENTS: No intracranial hemorrhage, extraaxial collection, or mass effect.  No CT evidence of acute infarct. Mild presumed chronic small vessel ischemic changes. Mild to moderate generalized volume loss. No hydrocephalus. Note is made of coarse atherosclerotic calcifications of the intracranial vasculature. VISUALIZED ORBITS/SINUSES/MASTOIDS: Prior bilateral cataract surgery. Visualized portions of the orbits are otherwise unremarkable. No paranasal sinus mucosal disease. No middle ear or mastoid effusion. BONES/SOFT TISSUES: No acute abnormality.      1.  No CT evidence for acute intracranial process. 2.  Brain atrophy and presumed chronic microvascular ischemic changes as above.     Us Venous Legs Bilateral     Result Date: 9/10/2020  EXAM: US VENOUS LEGS BILATERAL LOCATION: [facility] DATE/TIME: 9/10/2020 7:24 AM INDICATION: swelling, dimer elevated COMPARISON:  1/10/2020. TECHNIQUE: Venous Duplex ultrasound of bilateral lower extremities with and without compression, augmentation and duplex. Color flow and spectral Doppler with waveform analysis performed. FINDINGS: Exam includes the common femoral, femoral, popliteal veins as well as segmentally visualized deep calf veins and greater saphenous vein. RIGHT: No deep vein thrombosis. No superficial thrombophlebitis. No popliteal cyst. LEFT: No deep vein thrombosis. No superficial thrombophlebitis. No popliteal cyst.      1.  No deep venous thrombosis in the bilateral lower extremities.     Echo    Severe biatrial chamber enlargement    Left ventricle ejection fraction is normal. The calculated left ventricular ejection fraction is 56%.    Left ventricular diastolic dysfunction    Mild to moderate calcific mitral stenosis with moderate mitral regurgitation    Normal functioning of a bioprosthetic aortic valve without insufficiency    Moderate tricuspid insufficiency with moderate elevation in pulmonary artery pressures.    When compared to the previous study dated 9/27/2019, no interval change.        SIGNIFICANT FINDINGS (Imaging, labs):   Xr Chest 1 View Portable  Result Date: 7/28/2020  EXAM: XR CHEST 1 VIEW PORTABLE LOCATION: [facility] DATE/TIME: 7/28/2020 3:37 PM INDICATION: preop COMPARISON: 07/20/2020   Anteriorly dislocated left shoulder known from shoulder series. Sternotomy. Pacemaker. Generalized cardiac enlargement. Lungs are clear and expanded.     Ct Shoulder Without Contrast Left  Result Date: 7/28/2020  1.  Anterior dislocation left shoulder. 2.  Glenohumeral osteoarthritis with likely multiple loose bodies present within the left shoulder joint. Large complex left shoulder joint effusion. 3.  No definite acute left shoulder fracture. 4.  Mild acromioclavicular joint osteoarthritis. 5.  Severe subscapularis muscle atrophy. 6.  Coronary arterial disease post sternal splitting operation and prosthetic  aortic valve placement. Left subclavian cardiac pacemaker.      Xr Shoulder Left 2 Or More Vws Portable  Result Date: 7/31/2020  Postoperative changes of left reverse shoulder arthroplasty. Components appear well seated. Sternotomy. Cardiac pacer. Hypertrophic change left acromioclavicular joint.      Xr Shoulder Left 2 Or More Vws  Result Date: 7/28/2020  Restoration of anatomic alignment of the left glenohumeral joint. Left acromioclavicular joint is normally aligned. No acute fracture is detected.      Xr Shoulder Left 2 Or More Vws  Result Date: 7/28/2020  Anteriorly dislocated shoulder similar to 07/12/2020 and reduced on films 07/13/2020. No fractures.       Assessment/Plan:      ICD-10-CM    1. Status post reverse total replacement of left shoulder  Z96.612    2. PAF (paroxysmal atrial fibrillation) (H)  I48.0    3. Acute diastolic heart failure (H)  I50.31        Recurrent acute metabolic encephalopathy  Hallucinations   Likely multifactorial in nature.  Patient likely had degree of intravascular volume depletion and was found to have metabolic alkalosis which I assume to be contraction alkalosis.  She was hypoxic and likely has untreated RANJIT.  She was felt to have cellulitis of the lower extremity.  Her hypothyroidism is now treated and not likely to be a contributor.  She appears to be back to recent baseline.    We will need to try to avoid metabolic derangements which is easily said but sometimes difficult to maintain.    UTI has felt to be a contributing factor in the past but not this time around    Lower extremity cellulitis   Presumed, sounds like if it was present it was mild.  She is on Septra which concerns me with her increasing creatinine but she completes it today.  This situation looks resolved.    Chronic diastolic CHF  Bilateral lower extremity edema  Weight instability   If patient is truly losing and gaining at least 25 pounds of water weight as it appears over the last months, it is  perplexing to me exactly why.  She was 203 pounds yesterday, 210 pounds today.  She was noted to be 205 pounds on August 9 but has generally been about 230 pounds over the last weeks.  She is on Bumex which has been increased with increasing weights but has not been effective at helping her edema.  Meanwhile it likely contributed to contraction alkalosis.  - It is not clear to me that an alternative diuretic would be beneficial.  She had metolazone added prior to admission for several days without positive effect on her edema/weight but a negative effect on her kidney function and acid-base metabolism.  -Perhaps nephrology would have something to offer?  If she deteriorates again we will need to consider requesting their involvement in her care.  For now we will continue the Bumex 2 mg twice daily, monitor weight, low-salt diet    Left shoulder instability status post reverse total arthroplasty July 31  Right shoulder pain   On about rotator cuff tendinitis.  She has not gotten better since I saw her last time despite ongoing physical therapy.  -Continue PT  - Consult to orthopedics placed.  Perhaps she would be an injection candidate?    Chronic kidney disease  Recent acute kidney injury   Concerned about her creatinine up to 1.77, perhaps related to Septra?.  Fortunately she finishes that today  -She has already had BMP ordered for September 18 which looks appropriate.  -Avoid nephrotoxic renal    Acute metabolic alkalosis   Noted with her recent admission.  Suspected contraction alkalosis.  She did have some evidence of appropriate respiratory compensation.  -Fluid and diuretic management continues to be a challenge.  Hopefully she will stabilize but may need renal support here?    Suspected chronic hypoxic respiratory failure  Recent acute hypoxic respiratory failure  COPD   Patient was again hypoxic on admission.  She has required oxygen on and off in the past as noted previously.  I am concerned about  "untreated RANJIT here as well and would recommend relatively low O2 sats 88 to 92% as a target to avoid CO2 retention.  Patient reports she is not interested in sleep study CPAP/BiPAP    She is not wheezing, she has no recent PFTs on record.  PRN albuterol if needed.    Coronary artery disease   Noncontributory.  She is on aspirin and atorvastatin.  Metoprolol was discontinued at the time when she is having low blood pressure and has not been restarted.  She is currently off aspirin as well--- as far as I can tell this was discontinued inadvertently.  She was on 81 twice daily for DVT prophylaxis following her surgery that was slated to be for about 30 days and when it discontinued nobody restarted her low-dose aspirin.  -We will restart low-dose aspirin 81 mg enteric-coated daily  -We will restart metoprolol at low-dose 12.5 twice daily with hold parameters as she has had low heart rate at x2, will hold for less than heart rate of 55 or systolic blood pressure less than 105.    Paroxysmal atrial fibrillation  Permanent pacemaker   Patient was once on metoprolol which appears to have been held due to hypotension several weeks ago.  Her heart rates are generally controlled, similar to see below.  However the metoprolol is indicated for coronary artery disease indication as above.  - Restart aspirin and metoprolol as noted above    Gout   A month concerned that she complains of \"hands not working\" by which she seems to be in stiffness today.  However she does not have red or inflamed joints.  She did have some presumed gout in the last several weeks so this will need to be watched    Diastolic dysfunction  Pulmonary hypertension   Degree of pulmonary hypertension portends a guarded prognosis    Hypertension   Were going to attempt to restart her beta-blocker as her blood pressures have been consistently better now and she has coronary artery disease indication    GERD   Omeprazole    Vitamin D deficiency   On " replacement    Multiple other chronic medical issues reviewed without change in treatment today              Case discussed with:  Primary CNP electronically  Facility staff         Rojelio Farnsworth MD

## 2021-07-03 NOTE — ADDENDUM NOTE
Addendum Note by Jennifer Valdes NP at 9/14/2020  2:23 PM     Author: Jennifer Valdes NP Service: -- Author Type: Nurse Practitioner    Filed: 9/15/2020  7:11 PM Encounter Date: 9/14/2020 Status: Signed    : Jennifer Valdes NP (Nurse Practitioner)    Addended by: JENNIFER VALDES on: 9/15/2020 07:11 PM        Modules accepted: Orders

## 2021-07-14 PROBLEM — N18.30 ACUTE RENAL FAILURE SUPERIMPOSED ON STAGE 3 CHRONIC KIDNEY DISEASE (H): Status: RESOLVED | Noted: 2018-03-08 | Resolved: 2018-04-02

## 2021-07-14 PROBLEM — N17.9 AKI (ACUTE KIDNEY INJURY) (H): Chronic | Status: RESOLVED | Noted: 2017-03-03 | Resolved: 2018-03-19

## 2021-07-14 PROBLEM — I50.33 ACUTE ON CHRONIC DIASTOLIC HEART FAILURE (H): Status: RESOLVED | Noted: 2018-03-08 | Resolved: 2020-01-01

## 2021-07-14 PROBLEM — J96.01 ACUTE RESPIRATORY FAILURE WITH HYPOXIA (H): Status: RESOLVED | Noted: 2018-02-28 | Resolved: 2020-01-01

## 2021-07-14 PROBLEM — I50.9 CHF EXACERBATION (H): Status: RESOLVED | Noted: 2018-02-28 | Resolved: 2018-03-15

## 2021-07-14 PROBLEM — I50.30 HEART FAILURE WITH PRESERVED EJECTION FRACTION (H): Status: RESOLVED | Noted: 2018-02-22 | Resolved: 2020-01-18

## 2021-07-14 PROBLEM — J96.00 ACUTE RESPIRATORY FAILURE (H): Status: RESOLVED | Noted: 2020-01-01 | Resolved: 2020-01-01

## 2021-07-14 PROBLEM — I50.31 ACUTE DIASTOLIC CHF (CONGESTIVE HEART FAILURE) (H): Status: RESOLVED | Noted: 2018-02-07 | Resolved: 2018-03-15

## 2021-07-14 PROBLEM — I50.9 HEART FAILURE EXACERBATED BY SOTALOL (H): Status: RESOLVED | Noted: 2020-01-04 | Resolved: 2020-01-18

## 2021-07-14 PROBLEM — N17.9 ACUTE RENAL FAILURE SUPERIMPOSED ON STAGE 3 CHRONIC KIDNEY DISEASE (H): Status: RESOLVED | Noted: 2018-03-08 | Resolved: 2018-04-02

## 2021-07-14 PROBLEM — I50.31 ACUTE DIASTOLIC HEART FAILURE (H): Status: RESOLVED | Noted: 2020-01-01 | Resolved: 2020-01-01

## 2021-07-14 PROBLEM — I50.21 ACUTE SYSTOLIC HEART FAILURE (H): Status: RESOLVED | Noted: 2019-09-26 | Resolved: 2020-01-18

## 2021-07-14 PROBLEM — N17.9 ACUTE KIDNEY INJURY (H): Status: RESOLVED | Noted: 2020-01-01 | Resolved: 2020-01-01

## 2021-07-14 PROBLEM — J45.901 ASTHMA EXACERBATION: Status: RESOLVED | Noted: 2020-01-23 | Resolved: 2020-01-01

## 2021-08-03 PROBLEM — S72.143A: Status: RESOLVED | Noted: 2017-03-01 | Resolved: 2018-02-15

## 2021-08-03 PROBLEM — E66.01 MORBID OBESITY DUE TO EXCESS CALORIES (H): Status: RESOLVED | Noted: 2017-03-06 | Resolved: 2020-01-18

## 2021-08-03 PROBLEM — A40.8 OTHER STREPTOCOCCAL SEPSIS (H): Status: RESOLVED | Noted: 2018-02-07 | Resolved: 2018-02-15

## 2021-12-23 NOTE — TELEPHONE ENCOUNTER
[facility] Nurse Triage Telephone Note      Provider: CHILO Quigley  Facility: [facility]    Facility Type: TCU    Caller: Vanesa  Call Back Number:  [phone]    Allergies: Tramadol; Codeine; Codeine phosphate (bulk); Codeine sulfate; Codeine-butalbital-asa-caff; Codeine-guaifenesin; Lisinopril; and Penicillins    Reason for call: Nurse reporting Hgb and BMP results.  Notable meds:  Bumex 4mg two times a day, ferrous sulfate 325mg daily, Metolazone 5mg daily(ends today), potassium 40meq daily.  Patient does have an order for oxygen at 2L/min to keep sats >90%.  O2 sats today ranged from 85-95% depending on her level of activity.       Verbal Order/Direction given by Provider: Give an extra dose of potassium 40meq x 1 today.  Start tomorrow, take potassium 40meq Q AM and 20meq Q PM.  Check BMP on 9/14/20.  Use IS as ordered.      Provider giving order: CHILO Quigley    Verbal order given to: Vanesa Stephen RN       Patient passed peacefully with her sisters at the bedside. Chaplain Andee Gosselin present. TOD at 1743. Witnessed with Bia Lipscomb aware. [facility] Called. Patient declined. Referral number given. Morgue care done. House supervisor updated- aware patient is ready for the morgue. Belongings sent home with sister Solomon Thomas.  home- [employer].      Electronically signed by Rosmery Faulkner RN on 2021 at 7:21 PM

## 2022-05-06 NOTE — PROGRESS NOTES
[facility]    Facility:  [facility] [[id number]]    Code Status:  FULL CODE    Chief Complaint   Patient presents with     Problem Visit     Follow Up     Review Of Multiple Medical Conditions   :                    Patient Active Problem List   Diagnosis     Constipation     Sciatica     Dyspnea, unspecified type     Hypothyroidism     Type 2 diabetes mellitus with stage 3 chronic kidney disease, without long-term current use of insulin (H)     Hyperparathyroidism     Vitamin D deficiency     Mixed hyperlipidemia     Atherosclerosis of native coronary artery of native heart without angina pectoris     CKD (chronic kidney disease), stage III (H)     Osteoarthritis Of The Knee     Restless Legs Syndrome     H/O heart valve replacement with bioprosthetic valve     Cardiac pacemaker in situ     Complete heart block (H)     Normochromic normocytic anemia     Essential hypertension with goal blood pressure less than 140/90     Moderate persistent asthma without complication     PAF (paroxysmal atrial fibrillation) (H)     GERD (gastroesophageal reflux disease)     Primary insomnia     Hypokalemia     Physical deconditioning     Asthma     CHF (congestive heart failure), NYHA class I, acute, systolic (H)     Pulmonary hypertension (H)     Atrial fibrillation, unspecified type (H)     Status post aortic valve replacement     Non-rheumatic mitral valve stenosis     Nonrheumatic mitral valve stenosis     PVD (peripheral vascular disease) (H)     Lymphedema     Acute respiratory failure (H)     Delirium     S/p reverse total shoulder arthroplasty       History:  Thea Acosta  is an 86 year old female with history of coronary artery disease, CHF, paroxysmal atrial fibrillation, AVR, permanent pacemaker placement, COPD, CKD 3, DM 2, hypertension, GERD,  seen for admission to TCU on 8/11/2020 following a series of hospitalizations    Hospital course #3/most recent:  Patient  [FreeTextEntry1] : He is here with escort from group home [facility] who provides information. \par Per records from group home:\par This is a 59 year old male with T2DM, HTN, HLD, osteopenia, vitamin D insufficiency, OCD, anemia, neurogenic bladder, elevated creatinine, elevated LFT's, mild MR, paraphilia, esotropia, here for evaluation.  \par He is on metformin 500mg 2 x day.  Check hemoglobin A1c.\par Check vitamin B12 as he is on metformin.\par FS glucose is not checked at group home.  Start self-monitoring blood glucose 2 times a day.\par Last ophthalmologist exam was within one year and [facility] denies retinopathy. \par No known nephropathy.  Check urine microalbumin.\par No known neuropathy. \par He is on simvastatin 5 mg daily.  Check lipid panel.\par He is not on an ACE inhibitor or ARB.  BP is elevated.\par  was hospitalized between July 28 and August 1 after I sent her from this TCU upon discovering her dislocated left shoulder on July 28.  Shoulder reduction was achieved but could not be maintained.  She was seen by orthopedics who diagnosed chronic instability and   recommended total reverse shoulder which was completed on July 31, 2020.  Surgery was complicated by mild perioperative hypoxia.  Patient was treated with hydrocodone (since discontinued).    Hospital course #1:  Patient was hospitalized at [facility] between July 12 and July 6 following a fall with left shoulder dislocation which was reduced in the emergency room.  However she was noted to be hypoxic and was diagnosed with acute hypoxic respiratory failure felt most likely secondary to atelectasis plus opiates which are being used for pain control.  She had weaned off oxygen by the time of discharge.  She also had acute kidney injury and was noted to have metabolic alkalosis that hospital stay.  She was discharged with a creatinine of 1.4 after holding her Bumex for a brief time.     She was discharged to [facility] where she stayed for fewer than 48 hours due to development of acute delirium.    Hospital course #2:     She was readmitted between July 18 and July 21 again at [facility].  Hypothyroidism was felt to be the primary cause of her delirium.  TSH 67 on July 18.  She has been noncompliant with her thyroid medication quite regularly for at least months.  She was again found to be hypoxic without certain cause as she was not on opiates this time.  She did have a chest x-ray on July 20 suggesting a chronic CHF type appearance but no acute problems noted.  She again had elevation of her creatinine to a peak of 1006 discharged 1.67 with a baseline of about 1.4.        Subjective/ROS:    -augmented by discussion with facility staff involved in direct care  -limited by: Memory but less so than in the past.    -Patient feels like she is  turned a corner and the staff agrees.  They find her to be more alert, sharper in discussion and with her memory.  Her pain is decreased.  She is tolerating her shoulder immobilizer.    -I saw her last week for a lot of edema especially in the left hand but also on both legs.  Her weight was up from a baseline of 205 pounds up to 224 pounds at peak.  The weight fell so rapidly one wonders if we were not having scale errors?    At any rate she is doing well and says that her breathing is good.  She is been off oxygen for time now.  She says she does not feel short of breath with light activity.    She continues to say that she is just not having much pain from the shoulder and she is grateful for it.    Bowels continue to work well.  She has no bladder complaints.  No fever sweats or chills.  No new falls or injuries.     Remainder negative    Past Medical History:   Diagnosis Date     Acute kidney injury superimposed on CKD (H) 3/3/2017     Asthma      CAD (coronary artery disease)      Cataract      Chronic kidney disease     ckd3     COPD (chronic obstructive pulmonary disease) (H)      Diabetes mellitus (H)      Disease of thyroid gland      H/O heart valve replacement with bioprosthetic valve      History of transfusion      Hypertension      Normochromic normocytic anemia      Pulmonary hypertension (H) 09/27/2019    Noted on echocardiogram     Restless leg syndrome      Past Surgical History:   Procedure Laterality Date     APPENDECTOMY       CHOLECYSTECTOMY       EYE SURGERY Bilateral     Cataracts     HIP PINNING Right 3/1/2017    Procedure: RIGHT HIP TROCHANTERIC RODDING;  Surgeon: Moshe Davies MD;  Location: [facility];  Service:      INSERT / REPLACE / REMOVE PACEMAKER  06/25/2007    guidant     INSERT / REPLACE / REMOVE PACEMAKER  02/07/2018    phoebe sci gen change     JOINT REPLACEMENT Left     knee     WA RECONSTR TOTAL SHOULDER IMPLANT Left 7/31/2020    Procedure: LEFT REVERSE TOTAL SHOULDER  ARTHROPLASTY;  Surgeon: Alvarez Palomino MD;  Location: [facility] Main OR;  Service: Orthopedics     TISSUE AORTIC VALVE REPLACEMENT  2007               Family History   Problem Relation Age of Onset     No Medical Problems Mother         age 86     No Medical Problems Father         MVA     Cancer Brother         lung     No Medical Problems Brother    :       Social History     Socioeconomic History     Marital status:      Spouse name: Not on file     Number of children: Not on file     Years of education: Not on file     Highest education level: Not on file   Occupational History     Occupation:  in operating room     Comment: retired   Social Needs     Financial resource strain: Not on file     Food insecurity     Worry: Not on file     Inability: Not on file     Transportation needs     Medical: Not on file     Non-medical: Not on file   Tobacco Use     Smoking status: Former Smoker     Packs/day: 0.00     Last attempt to quit: 1950     Years since quittin.9     Smokeless tobacco: Never Used   Substance and Sexual Activity     Alcohol use: No     Drug use: No     Sexual activity: Never   Lifestyle     Physical activity     Days per week: Not on file     Minutes per session: Not on file     Stress: Not on file   Relationships     Social connections     Talks on phone: Not on file     Gets together: Not on file     Attends Worship service: Not on file     Active member of club or organization: Not on file     Attends meetings of clubs or organizations: Not on file     Relationship status: Not on file     Intimate partner violence     Fear of current or ex partner: Not on file     Emotionally abused: Not on file     Physically abused: Not on file     Forced sexual activity: Not on file   Other Topics Concern     Incontinent Not Asked     Toileting independently Not Asked     Cognitive impairment Not Asked     Vision impairment Not Asked     Hearing impairment Not Asked      "Dentures Not Asked   Social History Narrative    She live in a single floor house.  Her son lives next door and her granddaughter is \"around a lot\"   3/2017   :    Patient says she lives on [address]  She lives with her granddaughter.  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    Current Outpatient Medications on File Prior to Visit   Medication Sig Dispense Refill     acetaminophen (TYLENOL) 500 MG tablet Take 2 tablets (1,000 mg total) by mouth 3 (three) times a day. (Patient taking differently: Take 1,000 mg by mouth 3 (three) times a day. And daily prn)  0     albuterol (PROAIR HFA;PROVENTIL HFA;VENTOLIN HFA) 90 mcg/actuation inhaler Inhale 2 puffs every 4 (four) hours as needed for wheezing.  0     aspirin 81 MG EC tablet Take 1 tablet (81 mg total) by mouth 2 (two) times a day.  0     atorvastatin (LIPITOR) 80 MG tablet TAKE 1 TABLET(80 MG) BY MOUTH DAILY 90 tablet 3     bumetanide (BUMEX) 2 MG tablet Take 1.5 tablets (3 mg total) by mouth 2 (two) times a day at 9am and 6pm. 270 tablet 2     colchicine 0.6 mg tablet take two tablets (1.2 mg) by mouth at onset of gout symptoms, then repeat one tablet (0.6 mg) by mouth one hour later 24 tablet 2     diclofenac sodium (VOLTAREN) 1 % Gel Apply 2 g topically 4 (four) times a day as needed.        ferrous sulfate 325 (65 FE) MG tablet TAKE 1 TABLET(325 MG) BY MOUTH TWICE DAILY 180 tablet 3     levothyroxine (SYNTHROID, LEVOTHROID) 150 MCG tablet Take 1 tablet (150 mcg total) by mouth daily before supper. 90 tablet 3     nystatin (MYCOSTATIN) powder Apply topically 4 (four) times a day. 60 g 2     omeprazole (PRILOSEC) 20 MG capsule Take 1 capsule (20 mg total) by mouth daily before breakfast. 90 capsule 3     polyethylene glycol (MIRALAX) 17 gram packet Take 17 g by mouth daily as needed.       potassium chloride (K-DUR,KLOR-CON) 20 MEQ tablet Take 20 mEq by mouth daily.        predniSONE (DELTASONE) 2.5 MG tablet Take 7.5 mg/d " prednisone PO for 3 weeks. 90 tablet 0     senna (SENOKOT) 8.6 mg tablet Take 1 tablet by mouth daily.       white petrolatum (AQUAPHOR ORIGINAL) 41 % Oint Apply 1 application topically at bedtime.       No current facility-administered medications on file prior to visit.    :      ALLERGIES:  Tramadol; Codeine; Codeine phosphate (bulk); Codeine sulfate; Codeine-butalbital-asa-caff; Codeine-guaifenesin; Lisinopril; and Penicillins    Vitals:  There were no vitals taken for this visit. except as noted below    Vital signs:        Most Recent Vitals Date/Time Taken  Temperature: 95.5  F 08/11/2020 02:56 PM  Pulse: 60 per minute 08/11/2020 02:56 PM  Respirations: 18 per minute 08/11/2020 01:17 PM  Blood Pressure: 124 / 59 mmHg 08/11/2020 02:56 PM  O2 Saturation: 97 % 08/11/2020 02:56 PM  Blood Sugar: 114 mg/dL 03/20/2017 05:08 PM  Weight: 204.4 lbs / Routine       BMI: 32.01 08/10/2020 03:30 PM  Height: 5ft 7.0in 07/17/2020 12:01 PM    Physical exam:    General: Patient does indeed look good today.  She sees me in the leung and greets me though she cannot remember my name.  She is alert oriented x3 pleasant and normally conversant.  She is wearing her lymphedema glove on the left hand and her edema is markedly improved compared to last week.  She still has mild left greater than right lower extremity edema with mild stasis dermatitis in the pretibial areas but significantly improved from t last week.  She is breathing comfortably without accessory muscle use, cough or tachypnea.  She demonstrates good movement in the left hands and fingers.    Due to the 2020 Covid 19 pandemic, except as noted above, the patient was visually observed at a 6 foot plus distance.  An observational exam was performed in an effort to keep patient safe from Covid 19 and other communicable diseases.   Labs:  Lab Results   Component Value Date    WBC 6.9 08/05/2020    HGB 9.0 (L) 08/05/2020    HCT 29.0 (L) 08/05/2020    MCV 98 08/05/2020    PLT  175 08/05/2020     Results for orders placed or performed in visit on 08/05/20   Basic Metabolic Panel   Result Value Ref Range    Sodium 139 136 - 145 mmol/L    Potassium 3.8 3.5 - 5.0 mmol/L    Chloride 95 (L) 98 - 107 mmol/L    CO2 35 (H) 22 - 31 mmol/L    Anion Gap, Calculation 9 5 - 18 mmol/L    Glucose 111 70 - 125 mg/dL    Calcium 9.5 8.5 - 10.5 mg/dL    BUN 42 (H) 8 - 28 mg/dL    Creatinine 1.42 (H) 0.60 - 1.10 mg/dL    GFR MDRD Af Amer 43 (L) >60 mL/min/1.73m2    GFR MDRD Non Af Amer 35 (L) >60 mL/min/1.73m2         Lab Results   Component Value Date    TSH 67.01 (H) 07/18/2020     Lab Results   Component Value Date    HGBA1C 6.5 (H) 07/31/2020     [unfilled]  Lab Results   Component Value Date    [id number] 333 07/21/2020     Lab Results   Component Value Date     07/18/2020     [unfilled]  Most Recent EKG     Units 07/28/20  1543   VENTRATE BPM 63   ATRIALRATE BPM 57   QRSDURATION ms 172   QTINTERVAL ms 496   QTCCALC ms 507   RAXIS degrees 106   TAXIS degrees 261   MUSEDX  Ventricular-paced rhythm with occasional Premature ventricular complexes  Abnormal ECG  When compared with ECG of 18-JUL-2020 11:23,  Electronic ventricular pacemaker has replaced Wide QRS rhythm  Confirmed by SEE ED PROVIDER NOTE FOR, ECG INTERPRETATION ([id number]),  VIV ELDER ([id number]) on 7/28/2020 10:25:45 PM           Assessment/Plan:      ICD-10-CM    1. Delirium  R41.0    2. Status post reverse total replacement of left shoulder  Z96.612    3. Atrial fibrillation, unspecified type (H)  I48.91    4. CHF (congestive heart failure), NYHA class I, acute, systolic (H)  I50.21    Left shoulder instability  Recurrent dislocation left shoulder  Status post reverse total replacement of left shoulder July 31   Further improvement today.  She is now eyeing a return to home.  No last days been set to my knowledge.  Her left hand edema is markedly improved.  Continue Bumex as current.          Bilateral lower extremity edema    weight gain  Chronic diastolic CHF   I must question the weights in retrospect however she was quite edematous last week and not significantly improved.  No signs of respiratory distress, doing well on room air.  Her weight is back down to 205 pounds.  We have continued her Bumex 3 mg twice daily which is up from her chronic dose of 2 mg twice daily.  Appreciate lymphedema consultants involvement.  Results for orders placed or performed in visit on 20   Basic Metabolic Panel   Result Value Ref Range    Sodium 139 136 - 145 mmol/L    Potassium 3.8 3.5 - 5.0 mmol/L    Chloride 95 (L) 98 - 107 mmol/L    CO2 35 (H) 22 - 31 mmol/L    Anion Gap, Calculation 9 5 - 18 mmol/L    Glucose 111 70 - 125 mg/dL    Calcium 9.5 8.5 - 10.5 mg/dL    BUN 42 (H) 8 - 28 mg/dL    Creatinine 1.42 (H) 0.60 - 1.10 mg/dL    GFR MDRD Af Amer 43 (L) >60 mL/min/1.73m2    GFR MDRD Non Af Amer 35 (L) >60 mL/min/1.73m2       Continue daily weights, low-salt diet, diuretics as above.   Consider possible reduction in Bumex back to 2 mg twice daily depending on how she is doing over the next week        Echocardiogram reading:    Performing Date  Performing Department    2018   CARDIAC TESTING [[id number]]    Patient Information     Patient Name   Thea Acosta  MRN   [id number]  Sex   Female   1   1934 (83 y.o.)    Indications     Heart murmur; Fever    Summary       Normal left ventricular size and systolic function.    When compared to the previous study dated 3/1/2017, no significant change.    Left ventricle ejection fraction is normal. The estimated left ventricular ejection fraction is 55%.    Normal right ventricular size and systolic function.    Left Atrium: Left atrial volume is moderately increased.    Aortic Valve: Bioprosthetic valve is not well visualized. No evidence of paravalvular leak and mean gradient is 15 mmHg which is borderline elevated, however not significantly changed compared to prior.    Mitral  "Valve: There is severe mitral annular calcification. Moderate mitral Calcific stenosis. No mitral regurgitation.              Acute metabolic encephalopathy  Hallucinations  Hypothyroidism   Abnormal urinalysis   Looks like patient is finally getting back to her best baseline.  She has had quite a john paul course with 3 hospitalizations.  She is off opiates which is helped quite a bit.  She has little pain complaint which is fortunate.     However patient's hypothyroidism likely a significant contributor as well.  TSH was 67 on July 18.  She is back on replacement  -Follow-up for next TSH and dosage adjustment    Suspected chronic hypoxic respiratory failure   Nothing new here today except report patient has done well over the last week off oxygen.  Though this finding has been intermittent, she is prone to dropping her O2 sats.  I suspect it is multifactorial.  She has been felt to have COPD in the past although she is unaware of that and denies it.  She also has history of CHF though I do not find evidence of pulmonary edema.  She is now off opiates.  Obesity, possible hypoventilation, possible RANJIT contributing?  - Continue to monitor O2 sats, supplemental oxygen as need be  -Continue to avoid opiates and other sedating medications as possible  - No additional work-up anticipated          COPD   I see this in old notes and records.  I do not see complete pulmonary function tests on record however.  Patient says that she smoked in the past \"but did not inhale\".  She is skeptical of the diagnosis..  Nonetheless I suspect that there is some COPD present.  She does have albuterol if need be but not needed currently with no wheezing.  No wheezing heard today.    Coronary artery disease   Continue aspirin and atorvastatin.  Not sure why she is not on beta-blocker, can follow-up with primary MD on that.    Paroxysmal atrial fibrillation   Sounds are she is in normal sinus rhythm today.  Takes only aspirin for stroke " prophylaxis, does not need rate controlling agent    Permanent pacemaker   Follows with device clinic.    DM 2   Blood sugar control adequate.  A1c 7.0 on February 26.    Gout   Presumed.  Hand pain left greater than right.  Patient is on prednisone taper which she will continue herself.  Her 7.5 mg dose ends on August 22, she will need a new tapering dose entered at that point.  She can likely taper rapidly.  Question renally dosed allopurinol?     She also has colchicine but is not currently taking it, just a PRN medication     CKD 3     Results for orders placed or performed in visit on 08/05/20   Basic Metabolic Panel   Result Value Ref Range    Sodium 139 136 - 145 mmol/L    Potassium 3.8 3.5 - 5.0 mmol/L    Chloride 95 (L) 98 - 107 mmol/L    CO2 35 (H) 22 - 31 mmol/L    Anion Gap, Calculation 9 5 - 18 mmol/L    Glucose 111 70 - 125 mg/dL    Calcium 9.5 8.5 - 10.5 mg/dL    BUN 42 (H) 8 - 28 mg/dL    Creatinine 1.42 (H) 0.60 - 1.10 mg/dL    GFR MDRD Af Amer 43 (L) >60 mL/min/1.73m2    GFR MDRD Non Af Amer 35 (L) >60 mL/min/1.73m2       Mild elevations of creatinine with recent hospitalizations.  Doing relatively well, avoid nephrotoxins    Hypertension   Patient has had relatively low blood pressure at times throughout these past weeks.  Thus she is not on antihypertensives.   -July the past week and recent memory for blood pressures this past week.  - Anticipate restart of beta-blocker though that could be done as an outpatient with primary MD.    GERD   Omeprazole    Intertrigo   Improved on nystatin  Vitamin D deficiency   On replacement  Case discussed with:    Facility staff             Rojelio Farnsworth MD